# Patient Record
Sex: FEMALE | Race: WHITE | NOT HISPANIC OR LATINO | Employment: FULL TIME | URBAN - METROPOLITAN AREA
[De-identification: names, ages, dates, MRNs, and addresses within clinical notes are randomized per-mention and may not be internally consistent; named-entity substitution may affect disease eponyms.]

---

## 2017-02-16 ENCOUNTER — APPOINTMENT (OUTPATIENT)
Dept: URGENT CARE | Age: 42
End: 2017-02-16
Payer: OTHER MISCELLANEOUS

## 2017-02-16 PROCEDURE — G0382 LEV 3 HOSP TYPE B ED VISIT: HCPCS | Performed by: FAMILY MEDICINE

## 2017-02-16 PROCEDURE — 99283 EMERGENCY DEPT VISIT LOW MDM: CPT | Performed by: FAMILY MEDICINE

## 2018-01-10 NOTE — MISCELLANEOUS
Provider Comments  Provider Comments:   no show for 45 min  EDX study        Signatures   Electronically signed by : Allan Chung DO; Jun 6 2016  2:13PM EST                       (Author)

## 2018-01-10 NOTE — MISCELLANEOUS
Message   Recorded as Task   Date: 07/13/2016 01:25 PM, Created By: Lazaro Mary   Task Name: Miscellaneous   Assigned To: [facility] clinical,Team   Regarding Patient: Violetta Brown, Status: Active   Comment:    Isabella Underwood - 13 Jul 2016 1:25 PM     TASK CREATED  Caller: Self; General Medical Question; [phone] (Home); [phone] (Work)  S/w the pt  after receiving a vmlom from 0952 this am  She states she was previously seeing Dr Nancy Beltran who prescribing her tramadol and now Dr Jose Luis Whitlock does not want to order that for her  Her last povs was 4/16  I told her that FQ will prescribe the medication but that if she wanted FQ to prescribe then she would need a povs to review our prescri[ption policy and be evaluated by FQ  Reviewed narcotic contract regulations with her  Another alternative was to contact her PCP and see if they would be willing to take over prescribing  Pt  will consider the options and contact us  Just FYI  Thanks ! !   Zeb Pope - 13 Jul 2016 1:30 PM     TASK REPLIED TO: Previously Assigned To [facility],Team  md aware        Active Problems    1  Acute bronchitis (466 0) (J20 9)   2  Acute sinusitis (461 9) (J01 90)   3  ASCUS on Pap smear (796 9) (R89 6)   4  ASCUS on Pap smear (796 9) (R89 6)   5  Carpal tunnel syndrome, bilateral (354 0) (G56 01,G56 02)   6  Chronic left SI joint pain (724 6,338 29) (M53 3,G89 29)   7  Disc degeneration, lumbar (722 52) (M51 36)   8  Encounter for gynecological examination without abnormal finding (V72 31) (Z01 419)   9  Encounter for routine gynecological examination (V72 31) (Z01 419)   10  Encounter for screening mammogram for malignant neoplasm of breast (V76 12)    (Z12 31)   11  Low back pain (724 2) (M54 5)   12  Muscle spasm (728 85) (M62 838)   13  Myofascial pain syndrome (729 1) (M79 1)   14  Neck pain (723 1) (M54 2)   15  Nicotine dependence (305 1) (F17 200)   16   Pain, upper back (724 5) (M54 9)   17  Pap smear, as part of routine gynecological examination (V76 2) (Z01 419)   18  Pneumonia (486) (J18 9)   19  Postlaminectomy syndrome, lumbar (722 83) (M96 1)   20  Radicular pain of left lower extremity (724 4) (M54 10)   21  Sorethroat (462) (J02 9)   22  Visit for screening mammogram (V76 12) (Z12 31)    Current Meds   1  Methocarbamol 750 MG Oral Tablet; TAKE 1 TABLET 3 TIMES DAILY AS NEEDED FOR   MUSCLE SPASM; Therapy: 09IYZ5199 to (Evaluate:20Jan2016)  Requested for: 22Dec2015; Last   Rx:[id number] Ordered   2  TraMADol HCl - 50 MG Oral Tablet; TAKE 1 TABLET BY MOUTH EVERY 6 HOURS AS   NEEDED; Therapy: 89RMT5467 to (Last Rx:29Jun2016) Ordered   3  TraMADol HCl - 50 MG Oral Tablet; TAKE 1 TABLET EVERY 6 HOURS AS NEEDED FOR   PAIN;   Therapy: 17FGM0939 to (Evaluate:09Jyg8024); Last Rx:[id number] Ordered    Allergies    1   No Known Drug Allergies    Signatures   Electronically signed by : Kevin Lind RN; Jul 13 2016  3:22PM EST                       (Author)

## 2018-01-12 NOTE — MISCELLANEOUS
Message      Recorded as Task   Date: 04/18/2016 04:20 PM, Created By: Susan Cline   Task Name: Follow Up   Assigned To: [facility],Team   Regarding Patient: Breonna Nephew, Status: In Progress   Comment:    Steffanie Osullivan - 18 Apr 2016 4:20 PM     TASK CREATED  please let patient know xray cervical spine showed some mild arthritis  There also is some straigthening of the normal curvature of the spine   which means there are muscle spasms  She was prescribed physical therapy at last ov  She should ask that they do some massage/TENS after her sessions to help with muscle release  Elsi Kulkarni - 19 Apr 2016 8:31 AM     TASK EDITED  Left vm on home/cell for pt to c/b  Elsi Kulkarni - 19 Apr 2016 8:31 AM     TASK IN PROGRESS   Elsi Kulkarni - 19 Apr 2016 8:42 AM     TASK EDITED  Pt here today for an inj and asked to s/w me b/c I left a message for her to c/b  Pt informed of her xray results and Steffanie's rec for PT to do some massage/TENS after her sessions when she's there as outlined in the task note  Active Problems    1  Acute bronchitis (466 0) (J20 9)   2  Acute sinusitis (461 9) (J01 90)   3  ASCUS on Pap smear (796 9) (R89 6)   4  ASCUS on Pap smear (796 9) (R89 6)   5  Carpal tunnel syndrome, bilateral (354 0) (G56 01,G56 02)   6  Chronic left SI joint pain (724 6,338 29) (M53 3,G89 29)   7  Disc degeneration, lumbar (722 52) (M51 36)   8  Encounter for gynecological examination without abnormal finding (V72 31) (Z01 419)   9  Encounter for routine gynecological examination (V72 31) (Z01 419)   10  Encounter for screening mammogram for malignant neoplasm of breast (V76 12)    (Z12 31)   11  Low back pain (724 2) (M54 5)   12  Muscle spasm (728 85) (M62 838)   13  Myofascial pain syndrome (729 1) (M79 1)   14  Neck pain (723 1) (M54 2)   15  Nicotine dependence (305 1) (F17 200)   16   Pain, upper back (724 5) (M54 9)   17  Pap smear, as part of routine gynecological examination (V76 2) (Z01 419)   18  Pneumonia (486) (J18 9)   19  Postlaminectomy syndrome, lumbar (722 83) (M96 1)   20  Radicular pain of left lower extremity (724 4) (M54 10)   21  Sorethroat (462) (J02 9)   22  Visit for screening mammogram (V76 12) (Z12 31)    Current Meds   1  Methocarbamol 750 MG Oral Tablet; TAKE 1 TABLET 3 TIMES DAILY AS NEEDED FOR   MUSCLE SPASM; Therapy: 95FCL9551 to (Evaluate:20Jan2016)  Requested for: 17Uqc3943; Last   Rx:[id number] Ordered   2  TraMADol HCl - 50 MG Oral Tablet; TAKE 1 TABLET EVERY 6 HOURS AS NEEDED FOR   PAIN;   Therapy: 48MBU6614 to (Evaluate:98Tdl6905); Last Rx:[id number] Ordered    Allergies    1   No Known Drug Allergies    Signatures   Electronically signed by : Sandy Sanon, ; Apr 19 2016  8:43AM EST                       (Author)

## 2018-01-13 NOTE — RESULT NOTES
Message   Recorded as Task   Date: 07/12/2016 01:08 PM, Created By: Sandra High   Task Name: Follow Up   Assigned To: [facility],Team   Regarding Patient: Emerita Espinosa, Status: In Progress   Comment:    Danette Braun - 12 Jul 2016 1:08 PM     TASK CREATED  Caller: Self; General Medical Question; [phone] (Home); [phone] (Work)  Received VM from pt  on [facility] triage line from [phone] pm  Pt  states that she has a question about meds  Pt  requesting c/b at [phone]  Imelda Mittal - 12 Jul 2016 3:00 PM     TASK IN PROGRESS   Imelda Mittal - 12 Jul 2016 3:02 PM     TASK REPLIED TO: Previously Assigned To STONE[facility] ramirez,Team  Lmom for pt to return call  Isabella Underwood - 21 Jul 2016 8:40 AM     TASK EDITED  Attempted to call the pt  and LMOM to CB          Signatures   Electronically signed by : Lelia Storm, ; Jul 21 2016  1:39PM EST                       (Author)

## 2018-01-13 NOTE — MISCELLANEOUS
Message   Recorded as Task   Date: 03/17/2016 02:55 PM, Created By: Earline Ba   Task Name: Follow Up   Assigned To: [facility] clerical,Team   Regarding Patient: Richar uTrpin, Status: In Progress   Comment:    Vandana Wells - 17 Mar 2016 2:55 PM     TASK CREATED  By any chance would there by another CPT code used for the Left S1-S3 LBB (the CPT code I have is the 64450 x 3) - received call from patient's insurance and they are stating it is not a billable CPT code for the procedure we are requesting and they did not have a comparable code to offer - please advise   Jojo Reed - 17 Mar 2016 3:02 PM     TASK REPLIED TO: Previously Assigned To Jojo Reed  sacral 1-3 lateral branches are peripheral nerves and code 87285 is for peripheral nerve block   Vandana Wells - 17 Mar 2016 4:40 PM     TASK EDITED  S/W Sindy Lombardi of [employer] stated the CPT code 50642 can not be used for procedure and will be denied for coding issue - also stated it will most likely be denied anyway due to the fact that anything anything below S1 is considered investigational and experimental - she also stated the L4-L5 MBB will most likely be denied as well due to patient had fusions at the L4-L5, L5-S1 levels   Elias Pemberton - 18 Mar 2016 8:41 AM     TASK REPLIED TO: Previously Assigned To Jojo Reed md aware    please let pt know unable to do procedure   Vandana Wells - 18 Mar 2016 9:09 AM     TASK EDITED  Left message for patient to call back - procedure is being cancelled due to insurance will not approve - per Dr Yazmin Beltran patient can be scheduled for an ov to discuss other treatment options or can follow up with Dr Rafaela Waterman (will discuss when patient returns call)   Earline Ba - 18 Mar 2016 9:09 AM     TASK IN [address] [place] [address] - 18 Mar 2016 10:24 AM     TASK EDITED  Received message from Arlyn Schirmer (3/17/16 at 4:35 pm) CPT 41217 Left L4-L5 MBB was denied - can do peer to peer by calling [phone]    Also received call from Nehemiah Vivar stated can approve the CPT 35 66 48 x 3 Left S1-S3 LBB but to make the doctor aware the RFA will be denied   Tamera Haines - 21 Mar 2016 11:23 AM     TASK EDITED  S/W patient - made patient aware insurance did not authorize procedure - per Dr Toñito Norman - patient can be scheduled for a ov to discuss other treatment options or can follow up with Dr Rola Rivero - per patient she would like to be scheduled for an OV with Dr Toñito Norman and she will also follow up with Dr Rola Rivero - forwarding task to clerical team to call patient to schedule iTto Rashid - 21 Mar 2016 11:24 AM     TASK EDITED  Per Dr Toñito Norman please schedule patient for OV to discuss other treatment options - patient aware and expecting a call at [phone]   [facility] - 25 Mar 2016 9:32 AM     TASK EDITED  LMOM FOR PT TO C/B   Caridad Melvin - 05 Apr 2016 3:54 PM     TASK EDITED  PT WAS SEE YESTERDAY        Active Problems    1  Acute bronchitis (466 0) (J20 9)   2  Acute sinusitis (461 9) (J01 90)   3  ASCUS on Pap smear (796 9) (R89 6)   4  ASCUS on Pap smear (796 9) (R89 6)   5  Carpal tunnel syndrome, bilateral (354 0) (G56 01,G56 02)   6  Chronic left SI joint pain (724 6,338 29) (M53 3,G89 29)   7  Disc degeneration, lumbar (722 52) (M51 36)   8  Encounter for gynecological examination without abnormal finding (V72 31) (Z01 419)   9  Encounter for routine gynecological examination (V72 31) (Z01 419)   10  Encounter for screening mammogram for malignant neoplasm of breast (V76 12)    (Z12 31)   11  Low back pain (724 2) (M54 5)   12  Muscle spasm (728 85) (M62 838)   13  Myofascial pain syndrome (729 1) (M79 1)   14  Neck pain (723 1) (M54 2)   15  Nicotine dependence (305 1) (F17 200)   16  Pain, upper back (724 5) (M54 9)   17  Pap smear, as part of routine gynecological examination (V76 2) (Z01 419)   18  Pneumonia (486) (J18 9)   19  Postlaminectomy syndrome, lumbar (592 83) (M96 1)   20   Radicular pain of left lower extremity (724 4) (M54 10)   21  Sorethroat (462) (J02 9)   22  Visit for screening mammogram (V76 12) (Z12 31)    Current Meds   1  Methocarbamol 750 MG Oral Tablet; TAKE 1 TABLET 3 TIMES DAILY AS NEEDED FOR   MUSCLE SPASM; Therapy: 09QJT4712 to (Evaluate:20Jan2016)  Requested for: 96Jlo3926; Last   Rx:[id number] Ordered   2  TraMADol HCl - 50 MG Oral Tablet; TAKE 1 TABLET EVERY 6 HOURS AS NEEDED FOR   PAIN;   Therapy: 25SMR3203 to (Evaluate:18Mar2016); Last Rx:[id number] Ordered    Allergies    1  No Known Drug Allergies    Signatures   Electronically signed by :  Ysasine Stahl, ; Apr 5 2016  3:54PM EST                       (Author)

## 2018-01-15 NOTE — MISCELLANEOUS
Message   Recorded as Task   Date: 04/25/2016 03:36 PM, Created By: Tristan Aiken   Task Name: Follow Up   Assigned To: [address] ,Team   Regarding Patient: Dorcas Meneses, Status: In Progress   Comment:    Samra Shepard - 25 Apr 2016 3:36 PM     TASK CREATED  Pt is S/P CAUDAL BILLIE on 4/19/16 by Dr Davis Greek   No f/u scheduled   Samra Shepard - 26 Apr 2016 10:15 AM     TASK EDITED  1st attempt to reach pt  LM for return call  Samra Shepard - 28 Apr 2016 11:26 AM     TASK EDITED  2nd attempt to reach pt  LM for return call  Samra Shepard - 98 May 2016 11:00 AM     TASK REASSIGNED: Previously Assigned To [facility],Team  Please send a cannot reach you letter  Letter Sent  Active Problems    1  Acute bronchitis (466 0) (J20 9)   2  Acute sinusitis (461 9) (J01 90)   3  ASCUS on Pap smear (796 9) (R89 6)   4  ASCUS on Pap smear (796 9) (R89 6)   5  Carpal tunnel syndrome, bilateral (354 0) (G56 01,G56 02)   6  Chronic left SI joint pain (724 6,338 29) (M53 3,G89 29)   7  Disc degeneration, lumbar (722 52) (M51 36)   8  Encounter for gynecological examination without abnormal finding (V72 31) (Z01 419)   9  Encounter for routine gynecological examination (V72 31) (Z01 419)   10  Encounter for screening mammogram for malignant neoplasm of breast (V76 12)    (Z12 31)   11  Low back pain (724 2) (M54 5)   12  Muscle spasm (728 85) (M62 838)   13  Myofascial pain syndrome (729 1) (M79 1)   14  Neck pain (723 1) (M54 2)   15  Nicotine dependence (305 1) (F17 200)   16  Pain, upper back (724 5) (M54 9)   17  Pap smear, as part of routine gynecological examination (V76 2) (Z01 419)   18  Pneumonia (486) (J18 9)   19  Postlaminectomy syndrome, lumbar (722 83) (M96 1)   20  Radicular pain of left lower extremity (724 4) (M54 10)   21  Sorethroat (462) (J02 9)   22  Visit for screening mammogram (V76 12) (Z12 31)    Current Meds   1   Methocarbamol 750 MG Oral Tablet; TAKE 1 TABLET 3 TIMES DAILY AS NEEDED FOR   MUSCLE SPASM; Therapy: 43WUP4431 to (Evaluate:20Jan2016)  Requested for: 21Fks9971; Last   Rx:[id number] Ordered   2  TraMADol HCl - 50 MG Oral Tablet; TAKE 1 TABLET EVERY 4 TO 6 HOURS AS NEEDED   FOR PAIN;   Therapy: 04RFI4870 to (Evaluate:10May2016); Last CF:[id number] Ordered   3  TraMADol HCl - 50 MG Oral Tablet; TAKE 1 TABLET EVERY 6 HOURS AS NEEDED FOR   PAIN;   Therapy: 40EEI8328 to (Evaluate:44Cse5925); Last Rx:[id number] Ordered    Allergies    1   No Known Drug Allergies    Signatures   Electronically signed by : [facility], ; May 11 2016 11:18AM EST                       (Author)

## 2018-01-17 NOTE — RESULT NOTES
Message   Recorded as Task   Date: 03/03/2016 10:33 AM, Created By: Nicolasa Bowen   Task Name: Miscellaneous   Assigned To: Eduardo Abreu   Regarding Patient: Natalia Penaloza, Status: Active   CommentDonna Costello - 03 Mar 2016 10:33 AM     TASK CREATED  Caller: Self; [phone] (Home); [phone] (Work)  Pt called she is being re ref by   [facility]  She said she would like to have the RFA done  She wanted me to see if she could just have that done w/o coming in for a follow up first  I told her I would ask  Wasn't sure how to handle it she was last seen in Dec  Please advise  Thank you  Joelle Holt - 73 Mar 2016 11:53 AM     TASK REPLIED TO: Previously Assigned To [facility],Team  she needs to be schedule for nerve block to SIJ before RFA can be done    please schedule left L4-5 MBB, left S1-s3 LBB (Dx M47 816, M46 1, M 54 5) CPT 18827, 78204 x 3   Danette Braun - 03 Mar 2016 11:56 AM     TASK REASSIGNED: Previously Assigned To STONEY Bender - 03 Mar 2016 4:58 PM     TASK REASSIGNED: Previously Assigned To [address] surgery sched,Team   Vandana Wells - 07 Mar 2016 3:11 PM     TASK EDITED  S/W patient - patient scheduled for Tuesday, March 22 at [facility] with Dr Jeannie Castillo - patient advised nothing to eat or drink 1 hour prior to procedure but encouraged to have a light lunch - patient denies any blood thinners/abx's - patient also advised to arrange for  - patient aware and agreed        Signatures   Electronically signed by : Boo Cleveland, ; Mar  7 2016  3:11PM EST                       (Author)

## 2020-01-19 ENCOUNTER — HOSPITAL ENCOUNTER (EMERGENCY)
Facility: HOSPITAL | Age: 45
Discharge: HOME | End: 2020-01-19
Attending: EMERGENCY MEDICINE
Payer: COMMERCIAL

## 2020-01-19 VITALS
RESPIRATION RATE: 18 BRPM | DIASTOLIC BLOOD PRESSURE: 87 MMHG | TEMPERATURE: 97.2 F | HEART RATE: 89 BPM | SYSTOLIC BLOOD PRESSURE: 151 MMHG | OXYGEN SATURATION: 99 %

## 2020-01-19 DIAGNOSIS — S61.211A LACERATION OF LEFT INDEX FINGER WITHOUT FOREIGN BODY WITHOUT DAMAGE TO NAIL, INITIAL ENCOUNTER: Primary | ICD-10-CM

## 2020-01-19 PROCEDURE — 99282 EMERGENCY DEPT VISIT SF MDM: CPT

## 2020-01-19 PROCEDURE — 90715 TDAP VACCINE 7 YRS/> IM: CPT | Performed by: STUDENT IN AN ORGANIZED HEALTH CARE EDUCATION/TRAINING PROGRAM

## 2020-01-19 PROCEDURE — 12001 RPR S/N/AX/GEN/TRNK 2.5CM/<: CPT | Performed by: EMERGENCY MEDICINE

## 2020-01-19 PROCEDURE — 99284 EMERGENCY DEPT VISIT MOD MDM: CPT | Performed by: EMERGENCY MEDICINE

## 2020-01-19 PROCEDURE — 90471 IMMUNIZATION ADMIN: CPT

## 2020-01-19 RX ORDER — LIDOCAINE HYDROCHLORIDE 10 MG/ML
5 INJECTION, SOLUTION EPIDURAL; INFILTRATION; INTRACAUDAL; PERINEURAL ONCE
Status: COMPLETED | OUTPATIENT
Start: 2020-01-19 | End: 2020-01-19

## 2020-01-19 RX ORDER — BACITRACIN, NEOMYCIN, POLYMYXIN B 400; 3.5; 5 [USP'U]/G; MG/G; [USP'U]/G
1 OINTMENT TOPICAL ONCE
Status: DISCONTINUED | OUTPATIENT
Start: 2020-01-19 | End: 2020-01-19 | Stop reason: HOSPADM

## 2020-01-19 RX ADMIN — LIDOCAINE HYDROCHLORIDE 5 ML: 10 INJECTION, SOLUTION EPIDURAL; INFILTRATION; INTRACAUDAL; PERINEURAL at 04:28

## 2020-01-19 RX ADMIN — TETANUS TOXOID, REDUCED DIPHTHERIA TOXOID AND ACELLULAR PERTUSSIS VACCINE, ADSORBED 0.5 ML: 5; 2.5; 8; 8; 2.5 SUSPENSION INTRAMUSCULAR at 04:28

## 2020-01-19 NOTE — ED ATTENDING ATTESTATION
1/19/2020  IMoshe MD, saw and evaluated the patient  I have discussed the patient with the resident/non-physician practitioner and agree with the resident's/non-physician practitioner's findings, Plan of Care, and MDM as documented in the resident's/non-physician practitioner's note, except where noted  All available labs and Radiology studies were reviewed  I was present for key portions of any procedure(s) performed by the resident/non-physician practitioner and I was immediately available to provide assistance  At this point I agree with the current assessment done in the Emergency Department  I have conducted an independent evaluation of this patient a history and physical is as follows:    ED Course      Emergency Department Note- Chip Fitzpatrick 53 y o  female MRN: [id number]    Unit/Bed#: QCE Encounter: [id number]    Juanjose Sanchez is a 53 y o  female who presents with   Chief Complaint   Patient presents with    Finger Laceration     left pointer finger laceration from          History of Present Illness   HPI:  Juanjose Sanchez is a 53 y o  female who presents for evaluation of:  Left index finger laceration  Patient lacerated finger with woodcarving knife  Patient cannot remember last tetanus vaccination  Review of Systems   Constitutional: Negative for chills and fever  Musculoskeletal: Negative for back pain and neck pain  Neurological: Negative for dizziness and light-headedness  All other systems reviewed and are negative  Historical Information   History reviewed  No pertinent past medical history    Past Surgical History:   Procedure Laterality Date    BACK SURGERY       Social History   Social History     Substance and Sexual Activity   Alcohol Use Yes     Social History     Substance and Sexual Activity   Drug Use No     Social History     Tobacco Use   Smoking Status Current Every Day Smoker    Packs/day: 1 00    Types: Cigarettes     Family History: non-contributory    Meds/Allergies   all medications and allergies reviewed  No Known Allergies    Objective   First Vitals:   Blood Pressure: 151/87 (20 0350)  Pulse: 89 (20 0350)  Temperature: (!) 97 2 °F (36 2 °C) (20 035)  Temp Source: Oral (20 035)  Respirations: 18 (20)  SpO2: 99 % (20)    Current Vitals:   Blood Pressure: 151/87 (20 0350)  Pulse: 89 (200)  Temperature: (!) 97 2 °F (36 2 °C) (20)  Temp Source: Oral (20)  Respirations: 18 (20)  SpO2: 99 % (20)    No intake or output data in the 24 hours ending 20 0402    Invasive Devices     None                 Physical Exam   Constitutional: She appears well-developed and well-nourished  HENT:   Head: Normocephalic and atraumatic  Neck: Normal range of motion  Neck supple  Musculoskeletal: Normal range of motion  She exhibits no deformity  Neurological: She is alert  Skin: Skin is warm and dry  Capillary refill takes less than 2 seconds  Psychiatric: She has a normal mood and affect  Her behavior is normal  Judgment and thought content normal    Nursing note and vitals reviewed  52 yo F presents in NAD    Medical Decision Makin  Acute 2nd finger laceration: plan to suture wound closed    No results found for this or any previous visit (from the past 36 hour(s))  No orders to display         Portions of the record may have been created with voice recognition software  Occasional wrong word or "sound a like" substitutions may have occurred due to the inherent limitations of voice recognition software  Read the chart carefully and recognize, using context, where substitutions have occurred            Critical Care Time  Procedures

## 2020-01-19 NOTE — ED PROVIDER NOTES
History  Chief Complaint   Patient presents with    Finger Laceration     left pointer finger laceration from      This is a 47-year-old right-hand-dominant female with no significant past medical history who presents to the emergency department this morning with a laceration to the flexural surface of her left index finger  Patient states that she was up working on some woodworking projects with her  this morning when the knife in her right hand accidentally slipped off the wood and into her right finger  She immediately applied pressure and came to the emergency department for repair  Patient does not remember when her last tetanus shot was and is willing to get a booster today  Prior to Admission Medications   Prescriptions Last Dose Informant Patient Reported? Taking?   naproxen (EC NAPROSYN) 500 MG EC tablet   No No   Sig: Take 1 tablet by mouth 2 (two) times a day with meals for 5 days  Facility-Administered Medications: None       History reviewed  No pertinent past medical history  Past Surgical History:   Procedure Laterality Date    BACK SURGERY         History reviewed  No pertinent family history  I have reviewed and agree with the history as documented  Social History     Tobacco Use    Smoking status: Current Every Day Smoker     Packs/day: 1 00     Types: Cigarettes   Substance Use Topics    Alcohol use: Yes    Drug use: No        Review of Systems   Constitutional: Negative for chills, fatigue and fever  HENT: Negative for congestion, rhinorrhea, sinus pressure and sore throat  Eyes: Negative for visual disturbance  Respiratory: Negative for cough and shortness of breath  Cardiovascular: Negative for chest pain  Gastrointestinal: Negative for abdominal pain, constipation, diarrhea, nausea and vomiting  Genitourinary: Negative for dysuria, frequency, hematuria and urgency  Musculoskeletal: Negative for arthralgias and myalgias     Skin: Positive for wound  Negative for color change and rash  Neurological: Negative for dizziness, light-headedness and numbness  Physical Exam  ED Triage Vitals [01/19/20 0350]   Temperature Pulse Respirations Blood Pressure SpO2   (!) 97 2 °F (36 2 °C) 89 18 151/87 99 %      Temp Source Heart Rate Source Patient Position - Orthostatic VS BP Location FiO2 (%)   Oral Monitor -- Right arm --      Pain Score       --             Orthostatic Vital Signs  Vitals:    01/19/20 0350   BP: 151/87   Pulse: 89       Physical Exam   Constitutional: She is oriented to person, place, and time  She appears well-developed and well-nourished  No distress  HENT:   Head: Normocephalic and atraumatic  Eyes: Pupils are equal, round, and reactive to light  Conjunctivae and EOM are normal  Right eye exhibits no discharge  Left eye exhibits no discharge  No scleral icterus  Neck: Normal range of motion  Neck supple  No JVD present  Cardiovascular: Normal rate, regular rhythm and normal heart sounds  Exam reveals no gallop and no friction rub  No murmur heard  Pulmonary/Chest: Effort normal and breath sounds normal  No stridor  No respiratory distress  She has no wheezes  She has no rales  Abdominal: Soft  Bowel sounds are normal  She exhibits no distension  There is no tenderness  There is no guarding  Musculoskeletal: Normal range of motion  She exhibits no edema, tenderness or deformity  Neurological: She is alert and oriented to person, place, and time  No cranial nerve deficit or sensory deficit  She exhibits normal muscle tone  Skin: Skin is warm and dry  No rash noted  She is not diaphoretic  No erythema  No pallor  2 cm laceration to the flexural surface of the proximal phalanx of the left index finger  See attached photo  On examination after anesthesia the patient's wound was explored through the full range of motion and her flexor tendon appear to be untouched    The laceration only extended into the subcutaneous fat  Psychiatric: She has a normal mood and affect  Her behavior is normal    Nursing note and vitals reviewed  ED Medications  Medications   neomycin-bacitracin-polymyxin b (NEOSPORIN) ointment 1 small application (has no administration in time range)   lidocaine (PF) (XYLOCAINE-MPF) 1 % injection 5 mL (5 mL Infiltration Given 1/19/20 0428)   tetanus-diphtheria-acellular pertussis (BOOSTRIX) IM injection 0 5 mL (0 5 mL Intramuscular Given 1/19/20 0428)       Diagnostic Studies  Results Reviewed     None                 No orders to display         Procedures  Laceration repair  Date/Time: 1/19/2020 5:26 AM  Performed by: Anthony Reyes MD  Authorized by: Anthony Reyes MD   Consent: Verbal consent obtained  Risks and benefits: risks, benefits and alternatives were discussed  Consent given by: patient  Patient identity confirmed: verbally with patient  Body area: upper extremity  Location details: left index finger  Laceration length: 2 cm  Foreign bodies: no foreign bodies  Tendon involvement: none  Nerve involvement: none  Vascular damage: no  Anesthesia: local infiltration and digital block    Anesthesia:  Local Anesthetic: lidocaine 1% without epinephrine  Anesthetic total: 2 mL    Sedation:  Patient sedated: no      Wound Dehiscence:  Superficial Wound Dehiscence: simple closure      Procedure Details:  Preparation: Patient was prepped and draped in the usual sterile fashion    Amount of cleaning: standard  Debridement: none  Degree of undermining: none  Skin closure: 4-0 nylon  Number of sutures: 4  Technique: simple  Approximation: close  Approximation difficulty: simple  Dressing: antibiotic ointment and gauze roll  Patient tolerance: Patient tolerated the procedure well with no immediate complications            ED Course                               MDM  Number of Diagnoses or Management Options  Laceration of left index finger without foreign body without damage to nail, initial encounter:   Diagnosis management comments: Patient tolerated the laceration repair without any difficulties according to the procedure note above  She was given her tetanus booster here in the emergency department  The laceration repair was dressed with antibiotic ointment and gauze  Patient was instructed to return to their family physician in 5-7 days for suture removal or return to the emergency department sooner for any worsening or otherwise concerning symptoms  Disposition  Final diagnoses:   Laceration of left index finger without foreign body without damage to nail, initial encounter     Time reflects when diagnosis was documented in both MDM as applicable and the Disposition within this note     Time User Action Codes Description Comment    1/19/2020  4:53 AM Jai Rodarte Add [P47 [id number]] Laceration of left index finger without foreign body without damage to nail, initial encounter       ED Disposition     ED Disposition Condition Date/Time Comment    Discharge Stable Sun Jan 19, 2020  4:53 AM Puja Fitzpatrick discharge to home/self care  Follow-up Information     Follow up With Specialties Details Why Contact Info [facility], [address] [place] [address] Emergency Department Emergency Medicine  If symptoms worsen [address]  [phone]  ED, [address] [place] [address], [id number]   [phone]          Discharge Medication List as of 1/19/2020  4:54 AM      CONTINUE these medications which have NOT CHANGED    Details   naproxen (EC NAPROSYN) 500 MG EC tablet Take 1 tablet by mouth 2 (two) times a day with meals for 5 days  , Starting 9/24/2016, Until Thu 9/29/16, Print           No discharge procedures on file  ED Provider  Attending physically available and evaluated Rosalinda Mariscal I managed the patient along with the ED Attending      Electronically Signed by         Benjamin Rubin MD  01/19/20 7999

## 2020-01-19 NOTE — DISCHARGE INSTRUCTIONS
Follow-up in 5-7 days with her primary care physician for suture removal   Return to the emergency department sooner for any worsening or otherwise concerning symptoms

## 2020-06-02 ENCOUNTER — CONSULT (OUTPATIENT)
Dept: SURGERY | Facility: CLINIC | Age: 45
End: 2020-06-02
Payer: COMMERCIAL

## 2020-06-02 VITALS
SYSTOLIC BLOOD PRESSURE: 110 MMHG | DIASTOLIC BLOOD PRESSURE: 60 MMHG | HEART RATE: 74 BPM | WEIGHT: 138.2 LBS | BODY MASS INDEX: 27.13 KG/M2 | TEMPERATURE: 97.4 F | HEIGHT: 60 IN

## 2020-06-02 DIAGNOSIS — R19.4 BOWEL HABIT CHANGES: Primary | ICD-10-CM

## 2020-06-02 DIAGNOSIS — K43.2 VENTRAL INCISIONAL HERNIA: ICD-10-CM

## 2020-06-02 PROCEDURE — 99243 OFF/OP CNSLTJ NEW/EST LOW 30: CPT | Performed by: PHYSICIAN ASSISTANT

## 2020-06-08 ENCOUNTER — HOSPITAL ENCOUNTER (OUTPATIENT)
Dept: RADIOLOGY | Facility: HOSPITAL | Age: 45
Discharge: HOME | End: 2020-06-08
Payer: COMMERCIAL

## 2020-06-08 ENCOUNTER — TRANSCRIBE ORDERS (OUTPATIENT)
Dept: RADIOLOGY | Facility: HOSPITAL | Age: 45
End: 2020-06-08

## 2020-06-08 DIAGNOSIS — R19.4 BOWEL HABIT CHANGES: ICD-10-CM

## 2020-06-08 DIAGNOSIS — K43.2 VENTRAL INCISIONAL HERNIA: ICD-10-CM

## 2020-06-08 DIAGNOSIS — R93.89 ABNORMAL CT SCAN: Primary | ICD-10-CM

## 2020-06-08 PROCEDURE — 74177 CT ABD & PELVIS W/CONTRAST: CPT

## 2020-06-08 RX ADMIN — IOHEXOL 100 ML: 350 INJECTION, SOLUTION INTRAVENOUS at 18:22

## 2020-06-09 DIAGNOSIS — R19.4 BOWEL HABIT CHANGES: ICD-10-CM

## 2020-06-09 PROCEDURE — U0003 INFECTIOUS AGENT DETECTION BY NUCLEIC ACID (DNA OR RNA); SEVERE ACUTE RESPIRATORY SYNDROME CORONAVIRUS 2 (SARS-COV-2) (CORONAVIRUS DISEASE [COVID-19]), AMPLIFIED PROBE TECHNIQUE, MAKING USE OF HIGH THROUGHPUT TECHNOLOGIES AS DESCRIBED BY CMS-2020-01-R: HCPCS

## 2020-06-10 ENCOUNTER — TELEPHONE (OUTPATIENT)
Dept: SURGERY | Facility: CLINIC | Age: 45
End: 2020-06-10

## 2020-06-10 ENCOUNTER — HOSPITAL ENCOUNTER (OUTPATIENT)
Dept: CT IMAGING | Facility: HOSPITAL | Age: 45
Discharge: HOME | End: 2020-06-10
Attending: SURGERY
Payer: COMMERCIAL

## 2020-06-10 ENCOUNTER — TRANSCRIBE ORDERS (OUTPATIENT)
Dept: SURGERY | Facility: CLINIC | Age: 45
End: 2020-06-10

## 2020-06-10 DIAGNOSIS — R93.89 ABNORMAL CT SCAN: ICD-10-CM

## 2020-06-10 DIAGNOSIS — R16.0 LIVER MASS: Primary | ICD-10-CM

## 2020-06-10 LAB — SARS-COV-2 RNA SPEC QL NAA+PROBE: NOT DETECTED

## 2020-06-10 PROCEDURE — 71260 CT THORAX DX C+: CPT

## 2020-06-10 RX ADMIN — IOHEXOL 85 ML: 350 INJECTION, SOLUTION INTRAVENOUS at 09:36

## 2020-06-11 ENCOUNTER — ANESTHESIA EVENT (OUTPATIENT)
Dept: GASTROENTEROLOGY | Facility: HOSPITAL | Age: 45
End: 2020-06-11

## 2020-06-11 ENCOUNTER — OFFICE VISIT (OUTPATIENT)
Dept: SURGERY | Facility: CLINIC | Age: 45
End: 2020-06-11
Payer: COMMERCIAL

## 2020-06-11 VITALS
HEIGHT: 60 IN | WEIGHT: 131.2 LBS | TEMPERATURE: 97.1 F | HEART RATE: 92 BPM | DIASTOLIC BLOOD PRESSURE: 60 MMHG | SYSTOLIC BLOOD PRESSURE: 100 MMHG | BODY MASS INDEX: 25.76 KG/M2

## 2020-06-11 DIAGNOSIS — C79.9 METASTATIC DISEASE (HCC): ICD-10-CM

## 2020-06-11 DIAGNOSIS — R93.89 ABNORMAL CT SCAN: Primary | ICD-10-CM

## 2020-06-11 PROCEDURE — 99215 OFFICE O/P EST HI 40 MIN: CPT | Performed by: SURGERY

## 2020-06-11 RX ORDER — SODIUM CHLORIDE, SODIUM LACTATE, POTASSIUM CHLORIDE, CALCIUM CHLORIDE 600; 310; 30; 20 MG/100ML; MG/100ML; MG/100ML; MG/100ML
125 INJECTION, SOLUTION INTRAVENOUS CONTINUOUS
Status: CANCELLED | OUTPATIENT
Start: 2020-06-11

## 2020-06-12 ENCOUNTER — TRANSCRIBE ORDERS (OUTPATIENT)
Dept: ADMINISTRATIVE | Facility: HOSPITAL | Age: 45
End: 2020-06-12

## 2020-06-12 ENCOUNTER — ANESTHESIA (OUTPATIENT)
Dept: GASTROENTEROLOGY | Facility: HOSPITAL | Age: 45
End: 2020-06-12

## 2020-06-12 ENCOUNTER — LAB (OUTPATIENT)
Dept: LAB | Facility: HOSPITAL | Age: 45
End: 2020-06-12
Payer: COMMERCIAL

## 2020-06-12 ENCOUNTER — TRANSCRIBE ORDERS (OUTPATIENT)
Dept: LAB | Facility: HOSPITAL | Age: 45
End: 2020-06-12

## 2020-06-12 ENCOUNTER — HOSPITAL ENCOUNTER (OUTPATIENT)
Dept: GASTROENTEROLOGY | Facility: HOSPITAL | Age: 45
Setting detail: OUTPATIENT SURGERY
Discharge: HOME | End: 2020-06-12
Attending: SURGERY | Admitting: SURGERY
Payer: COMMERCIAL

## 2020-06-12 VITALS
BODY MASS INDEX: 23.75 KG/M2 | RESPIRATION RATE: 18 BRPM | SYSTOLIC BLOOD PRESSURE: 99 MMHG | HEART RATE: 84 BPM | TEMPERATURE: 98.4 F | HEIGHT: 60 IN | OXYGEN SATURATION: 99 % | DIASTOLIC BLOOD PRESSURE: 62 MMHG | WEIGHT: 121 LBS

## 2020-06-12 DIAGNOSIS — R10.9 ABDOMINAL PAIN: ICD-10-CM

## 2020-06-12 DIAGNOSIS — C18.0 MALIGNANT NEOPLASM OF CECUM (HCC): ICD-10-CM

## 2020-06-12 DIAGNOSIS — R19.4 BOWEL HABIT CHANGES: ICD-10-CM

## 2020-06-12 DIAGNOSIS — C18.0 MALIGNANT NEOPLASM OF CECUM (HCC): Primary | ICD-10-CM

## 2020-06-12 DIAGNOSIS — C20 RECTAL CANCER (HCC): Primary | ICD-10-CM

## 2020-06-12 DIAGNOSIS — R93.89 ABNORMAL CT SCAN: ICD-10-CM

## 2020-06-12 DIAGNOSIS — C79.9 METASTATIC DISEASE (HCC): Primary | ICD-10-CM

## 2020-06-12 DIAGNOSIS — K43.2 VENTRAL INCISIONAL HERNIA: ICD-10-CM

## 2020-06-12 LAB
ABO GROUP BLD: NORMAL
ALBUMIN SERPL BCP-MCNC: 2.3 G/DL (ref 3.5–5)
ALP SERPL-CCNC: 139 U/L (ref 46–116)
ALT SERPL W P-5'-P-CCNC: 18 U/L (ref 12–78)
ANION GAP SERPL CALCULATED.3IONS-SCNC: 9 MMOL/L (ref 4–13)
AST SERPL W P-5'-P-CCNC: 37 U/L (ref 5–45)
BASOPHILS # BLD AUTO: 0.06 THOUSANDS/ΜL (ref 0–0.1)
BASOPHILS NFR BLD AUTO: 0 % (ref 0–1)
BILIRUB SERPL-MCNC: 0.19 MG/DL (ref 0.2–1)
BLD GP AB SCN SERPL QL: NEGATIVE
BUN SERPL-MCNC: 7 MG/DL (ref 5–25)
CALCIUM SERPL-MCNC: 8.2 MG/DL (ref 8.3–10.1)
CANCER AG125 SERPL-ACNC: 32.5 U/ML (ref 0–30)
CEA SERPL-MCNC: 19.8 NG/ML (ref 0–3)
CHLORIDE SERPL-SCNC: 98 MMOL/L (ref 100–108)
CO2 SERPL-SCNC: 26 MMOL/L (ref 21–32)
CREAT SERPL-MCNC: 0.61 MG/DL (ref 0.6–1.3)
EOSINOPHIL # BLD AUTO: 0.32 THOUSAND/ΜL (ref 0–0.61)
EOSINOPHIL NFR BLD AUTO: 2 % (ref 0–6)
ERYTHROCYTE [DISTWIDTH] IN BLOOD BY AUTOMATED COUNT: 16.9 % (ref 11.6–15.1)
GFR SERPL CREATININE-BSD FRML MDRD: 109 ML/MIN/1.73SQ M
GLUCOSE P FAST SERPL-MCNC: 188 MG/DL (ref 65–99)
HBV SURFACE AB SER-ACNC: >1000 MIU/ML
HBV SURFACE AG SER QL: NORMAL
HCT VFR BLD AUTO: 29.4 % (ref 34.8–46.1)
HCV AB SER QL: NORMAL
HGB BLD-MCNC: 8.8 G/DL (ref 11.5–15.4)
IMM GRANULOCYTES # BLD AUTO: 0.06 THOUSAND/UL (ref 0–0.2)
IMM GRANULOCYTES NFR BLD AUTO: 0 % (ref 0–2)
INR PPP: 1.19 (ref 0.84–1.19)
LYMPHOCYTES # BLD AUTO: 1.92 THOUSANDS/ΜL (ref 0.6–4.47)
LYMPHOCYTES NFR BLD AUTO: 14 % (ref 14–44)
MCH RBC QN AUTO: 22.4 PG (ref 26.8–34.3)
MCHC RBC AUTO-ENTMCNC: 29.9 G/DL (ref 31.4–37.4)
MCV RBC AUTO: 75 FL (ref 82–98)
MONOCYTES # BLD AUTO: 0.84 THOUSAND/ΜL (ref 0.17–1.22)
MONOCYTES NFR BLD AUTO: 6 % (ref 4–12)
NEUTROPHILS # BLD AUTO: 10.26 THOUSANDS/ΜL (ref 1.85–7.62)
NEUTS SEG NFR BLD AUTO: 78 % (ref 43–75)
NRBC BLD AUTO-RTO: 0 /100 WBCS
PLATELET # BLD AUTO: 501 THOUSANDS/UL (ref 149–390)
PMV BLD AUTO: 9.9 FL (ref 8.9–12.7)
POTASSIUM SERPL-SCNC: 2.9 MMOL/L (ref 3.5–5.3)
PROT SERPL-MCNC: 7 G/DL (ref 6.4–8.2)
PROTHROMBIN TIME: 14.7 SECONDS (ref 11.6–14.5)
RBC # BLD AUTO: 3.93 MILLION/UL (ref 3.81–5.12)
RH BLD: POSITIVE
SODIUM SERPL-SCNC: 133 MMOL/L (ref 136–145)
SPECIMEN EXPIRATION DATE: NORMAL
WBC # BLD AUTO: 13.46 THOUSAND/UL (ref 4.31–10.16)

## 2020-06-12 PROCEDURE — 88305 TISSUE EXAM BY PATHOLOGIST: CPT | Performed by: PATHOLOGY

## 2020-06-12 PROCEDURE — 87340 HEPATITIS B SURFACE AG IA: CPT

## 2020-06-12 PROCEDURE — 85610 PROTHROMBIN TIME: CPT

## 2020-06-12 PROCEDURE — 88341 IMHCHEM/IMCYTCHM EA ADD ANTB: CPT | Performed by: PATHOLOGY

## 2020-06-12 PROCEDURE — 86301 IMMUNOASSAY TUMOR CA 19-9: CPT

## 2020-06-12 PROCEDURE — 86304 IMMUNOASSAY TUMOR CA 125: CPT

## 2020-06-12 PROCEDURE — 86901 BLOOD TYPING SEROLOGIC RH(D): CPT

## 2020-06-12 PROCEDURE — 85025 COMPLETE CBC W/AUTO DIFF WBC: CPT

## 2020-06-12 PROCEDURE — 86803 HEPATITIS C AB TEST: CPT

## 2020-06-12 PROCEDURE — 45380 COLONOSCOPY AND BIOPSY: CPT | Performed by: SURGERY

## 2020-06-12 PROCEDURE — 81232 DPYD GENE COMMON VARIANTS: CPT

## 2020-06-12 PROCEDURE — 86900 BLOOD TYPING SEROLOGIC ABO: CPT

## 2020-06-12 PROCEDURE — 88342 IMHCHEM/IMCYTCHM 1ST ANTB: CPT | Performed by: PATHOLOGY

## 2020-06-12 PROCEDURE — 86850 RBC ANTIBODY SCREEN: CPT

## 2020-06-12 PROCEDURE — 43239 EGD BIOPSY SINGLE/MULTIPLE: CPT | Performed by: SURGERY

## 2020-06-12 PROCEDURE — 82378 CARCINOEMBRYONIC ANTIGEN: CPT

## 2020-06-12 PROCEDURE — 80053 COMPREHEN METABOLIC PANEL: CPT

## 2020-06-12 PROCEDURE — 86706 HEP B SURFACE ANTIBODY: CPT

## 2020-06-12 PROCEDURE — 81350 UGT1A1 GENE COMMON VARIANTS: CPT

## 2020-06-12 PROCEDURE — 36415 COLL VENOUS BLD VENIPUNCTURE: CPT

## 2020-06-12 RX ORDER — PROPOFOL 10 MG/ML
INJECTION, EMULSION INTRAVENOUS AS NEEDED
Status: DISCONTINUED | OUTPATIENT
Start: 2020-06-12 | End: 2020-06-12 | Stop reason: SURG

## 2020-06-12 RX ORDER — SODIUM CHLORIDE 9 MG/ML
INJECTION, SOLUTION INTRAVENOUS CONTINUOUS PRN
Status: DISCONTINUED | OUTPATIENT
Start: 2020-06-12 | End: 2020-06-12 | Stop reason: SURG

## 2020-06-12 RX ORDER — LIDOCAINE HYDROCHLORIDE 10 MG/ML
INJECTION, SOLUTION EPIDURAL; INFILTRATION; INTRACAUDAL; PERINEURAL AS NEEDED
Status: DISCONTINUED | OUTPATIENT
Start: 2020-06-12 | End: 2020-06-12 | Stop reason: SURG

## 2020-06-12 RX ORDER — PROPOFOL 10 MG/ML
INJECTION, EMULSION INTRAVENOUS CONTINUOUS PRN
Status: DISCONTINUED | OUTPATIENT
Start: 2020-06-12 | End: 2020-06-12 | Stop reason: SURG

## 2020-06-12 RX ADMIN — SODIUM CHLORIDE: 9 INJECTION, SOLUTION INTRAVENOUS at 08:01

## 2020-06-12 RX ADMIN — PROPOFOL 120 MCG/KG/MIN: 10 INJECTION, EMULSION INTRAVENOUS at 08:08

## 2020-06-12 RX ADMIN — LIDOCAINE HYDROCHLORIDE 6 ML: 10 INJECTION, SOLUTION EPIDURAL; INFILTRATION; INTRACAUDAL; PERINEURAL at 08:08

## 2020-06-12 RX ADMIN — PROPOFOL 50 MG: 10 INJECTION, EMULSION INTRAVENOUS at 08:12

## 2020-06-12 RX ADMIN — PROPOFOL 50 MG: 10 INJECTION, EMULSION INTRAVENOUS at 08:14

## 2020-06-12 RX ADMIN — PROPOFOL 60 MG: 10 INJECTION, EMULSION INTRAVENOUS at 08:09

## 2020-06-12 RX ADMIN — PROPOFOL 60 MG: 10 INJECTION, EMULSION INTRAVENOUS at 08:08

## 2020-06-15 ENCOUNTER — TELEPHONE (OUTPATIENT)
Dept: RADIOLOGY | Facility: HOSPITAL | Age: 45
End: 2020-06-15

## 2020-06-15 ENCOUNTER — TELEPHONE (OUTPATIENT)
Dept: SURGERY | Facility: CLINIC | Age: 45
End: 2020-06-15

## 2020-06-15 DIAGNOSIS — E87.6 HYPOKALEMIA: Primary | ICD-10-CM

## 2020-06-15 RX ORDER — SODIUM CHLORIDE 9 MG/ML
75 INJECTION, SOLUTION INTRAVENOUS CONTINUOUS
Status: CANCELLED | OUTPATIENT
Start: 2020-06-15

## 2020-06-15 RX ORDER — POTASSIUM CHLORIDE 20 MEQ/1
20 TABLET, EXTENDED RELEASE ORAL 2 TIMES DAILY
Qty: 6 TABLET | Refills: 0 | Status: SHIPPED | OUTPATIENT
Start: 2020-06-15 | End: 2020-07-08 | Stop reason: HOSPADM

## 2020-06-16 ENCOUNTER — TELEPHONE (OUTPATIENT)
Dept: SURGERY | Facility: HOSPITAL | Age: 45
End: 2020-06-16

## 2020-06-16 ENCOUNTER — HOSPITAL ENCOUNTER (OUTPATIENT)
Dept: RADIOLOGY | Age: 45
Discharge: HOME | End: 2020-06-16
Payer: COMMERCIAL

## 2020-06-16 DIAGNOSIS — C20 RECTAL CANCER (HCC): ICD-10-CM

## 2020-06-16 LAB
CANCER AG19-9 SERPL-ACNC: 17 U/ML (ref 0–35)
GLUCOSE SERPL-MCNC: 98 MG/DL (ref 65–140)

## 2020-06-16 PROCEDURE — 78815 PET IMAGE W/CT SKULL-THIGH: CPT

## 2020-06-16 PROCEDURE — A9552 F18 FDG: HCPCS

## 2020-06-16 PROCEDURE — 82948 REAGENT STRIP/BLOOD GLUCOSE: CPT

## 2020-06-17 ENCOUNTER — TRANSCRIBE ORDERS (OUTPATIENT)
Dept: ADMINISTRATIVE | Facility: HOSPITAL | Age: 45
End: 2020-06-17

## 2020-06-17 ENCOUNTER — HOSPITAL ENCOUNTER (OUTPATIENT)
Dept: RADIOLOGY | Facility: HOSPITAL | Age: 45
Discharge: HOME | End: 2020-06-17
Attending: SURGERY
Payer: COMMERCIAL

## 2020-06-17 ENCOUNTER — TELEPHONE (OUTPATIENT)
Dept: SURGERY | Facility: CLINIC | Age: 45
End: 2020-06-17

## 2020-06-17 VITALS
HEART RATE: 82 BPM | HEIGHT: 62 IN | BODY MASS INDEX: 22.26 KG/M2 | SYSTOLIC BLOOD PRESSURE: 101 MMHG | OXYGEN SATURATION: 97 % | RESPIRATION RATE: 14 BRPM | TEMPERATURE: 97.4 F | WEIGHT: 121 LBS | DIASTOLIC BLOOD PRESSURE: 55 MMHG

## 2020-06-17 DIAGNOSIS — C79.9 METASTATIC DISEASE (HCC): ICD-10-CM

## 2020-06-17 DIAGNOSIS — C78.01 SECONDARY MALIGNANT NEOPLASM OF RIGHT LUNG (HCC): ICD-10-CM

## 2020-06-17 DIAGNOSIS — R16.0 LIVER MASS: ICD-10-CM

## 2020-06-17 DIAGNOSIS — Z12.31 VISIT FOR SCREENING MAMMOGRAM: Primary | ICD-10-CM

## 2020-06-17 PROCEDURE — 88307 TISSUE EXAM BY PATHOLOGIST: CPT | Performed by: PATHOLOGY

## 2020-06-17 PROCEDURE — 88342 IMHCHEM/IMCYTCHM 1ST ANTB: CPT | Performed by: PATHOLOGY

## 2020-06-17 PROCEDURE — 88341 IMHCHEM/IMCYTCHM EA ADD ANTB: CPT | Performed by: PATHOLOGY

## 2020-06-17 PROCEDURE — 99153 MOD SED SAME PHYS/QHP EA: CPT

## 2020-06-17 PROCEDURE — 88313 SPECIAL STAINS GROUP 2: CPT | Performed by: PATHOLOGY

## 2020-06-17 PROCEDURE — 76942 ECHO GUIDE FOR BIOPSY: CPT

## 2020-06-17 PROCEDURE — 47000 NEEDLE BIOPSY OF LIVER PERQ: CPT

## 2020-06-17 PROCEDURE — 88333 PATH CONSLTJ SURG CYTO XM 1: CPT | Performed by: PATHOLOGY

## 2020-06-17 PROCEDURE — 99152 MOD SED SAME PHYS/QHP 5/>YRS: CPT | Performed by: RADIOLOGY

## 2020-06-17 PROCEDURE — 76942 ECHO GUIDE FOR BIOPSY: CPT | Performed by: RADIOLOGY

## 2020-06-17 PROCEDURE — 99152 MOD SED SAME PHYS/QHP 5/>YRS: CPT

## 2020-06-17 PROCEDURE — 47000 NEEDLE BIOPSY OF LIVER PERQ: CPT | Performed by: RADIOLOGY

## 2020-06-17 RX ORDER — ACETAMINOPHEN 325 MG/1
650 TABLET ORAL EVERY 6 HOURS PRN
Status: DISCONTINUED | OUTPATIENT
Start: 2020-06-17 | End: 2020-06-18 | Stop reason: HOSPADM

## 2020-06-17 RX ORDER — SODIUM CHLORIDE 9 MG/ML
75 INJECTION, SOLUTION INTRAVENOUS CONTINUOUS
Status: DISCONTINUED | OUTPATIENT
Start: 2020-06-17 | End: 2020-06-18 | Stop reason: HOSPADM

## 2020-06-17 RX ORDER — OXYCODONE HYDROCHLORIDE AND ACETAMINOPHEN 5; 325 MG/1; MG/1
1 TABLET ORAL EVERY 4 HOURS PRN
Status: ON HOLD | COMMUNITY
End: 2020-08-15 | Stop reason: SDUPTHER

## 2020-06-17 RX ORDER — LIDOCAINE WITH 8.4% SOD BICARB 0.9%(10ML)
SYRINGE (ML) INJECTION CODE/TRAUMA/SEDATION MEDICATION
Status: COMPLETED | OUTPATIENT
Start: 2020-06-17 | End: 2020-06-17

## 2020-06-17 RX ORDER — FENTANYL CITRATE 50 UG/ML
INJECTION, SOLUTION INTRAMUSCULAR; INTRAVENOUS CODE/TRAUMA/SEDATION MEDICATION
Status: COMPLETED | OUTPATIENT
Start: 2020-06-17 | End: 2020-06-17

## 2020-06-17 RX ORDER — SERTRALINE HYDROCHLORIDE 25 MG/1
25 TABLET, FILM COATED ORAL DAILY
COMMUNITY

## 2020-06-17 RX ORDER — OXYCODONE HYDROCHLORIDE AND ACETAMINOPHEN 5; 325 MG/1; MG/1
1 TABLET ORAL EVERY 4 HOURS PRN
Status: DISCONTINUED | OUTPATIENT
Start: 2020-06-17 | End: 2020-06-18 | Stop reason: HOSPADM

## 2020-06-17 RX ORDER — MIDAZOLAM HYDROCHLORIDE 2 MG/2ML
INJECTION, SOLUTION INTRAMUSCULAR; INTRAVENOUS CODE/TRAUMA/SEDATION MEDICATION
Status: COMPLETED | OUTPATIENT
Start: 2020-06-17 | End: 2020-06-17

## 2020-06-17 RX ADMIN — FENTANYL CITRATE 25 MCG: 50 INJECTION INTRAMUSCULAR; INTRAVENOUS at 09:41

## 2020-06-17 RX ADMIN — MIDAZOLAM 0.5 MG: 1 INJECTION INTRAMUSCULAR; INTRAVENOUS at 09:27

## 2020-06-17 RX ADMIN — FENTANYL CITRATE 25 MCG: 50 INJECTION INTRAMUSCULAR; INTRAVENOUS at 09:30

## 2020-06-17 RX ADMIN — OXYCODONE HYDROCHLORIDE AND ACETAMINOPHEN 1 TABLET: 5; 325 TABLET ORAL at 10:56

## 2020-06-17 RX ADMIN — SODIUM CHLORIDE 75 ML/HR: 0.9 INJECTION, SOLUTION INTRAVENOUS at 08:50

## 2020-06-17 RX ADMIN — MIDAZOLAM 0.5 MG: 1 INJECTION INTRAMUSCULAR; INTRAVENOUS at 09:42

## 2020-06-17 RX ADMIN — Medication 8 ML: at 09:41

## 2020-06-17 RX ADMIN — FENTANYL CITRATE 25 MCG: 50 INJECTION INTRAMUSCULAR; INTRAVENOUS at 09:33

## 2020-06-17 RX ADMIN — MIDAZOLAM 0.5 MG: 1 INJECTION INTRAMUSCULAR; INTRAVENOUS at 09:54

## 2020-06-17 RX ADMIN — MIDAZOLAM 0.5 MG: 1 INJECTION INTRAMUSCULAR; INTRAVENOUS at 09:30

## 2020-06-17 RX ADMIN — FENTANYL CITRATE 25 MCG: 50 INJECTION INTRAMUSCULAR; INTRAVENOUS at 09:27

## 2020-06-18 ENCOUNTER — LAB REQUISITION (OUTPATIENT)
Dept: LAB | Facility: HOSPITAL | Age: 45
End: 2020-06-18
Payer: COMMERCIAL

## 2020-06-18 ENCOUNTER — TRANSCRIBE ORDERS (OUTPATIENT)
Dept: ADMINISTRATIVE | Facility: HOSPITAL | Age: 45
End: 2020-06-18

## 2020-06-18 DIAGNOSIS — C78.02 SECONDARY MALIGNANT NEOPLASM OF LEFT LUNG (HCC): ICD-10-CM

## 2020-06-18 DIAGNOSIS — C78.7 SECONDARY MALIGNANT NEOPLASM OF LIVER AND INTRAHEPATIC BILE DUCT (HCC): ICD-10-CM

## 2020-06-18 DIAGNOSIS — C20 MALIGNANT NEOPLASM OF RECTUM (HCC): ICD-10-CM

## 2020-06-18 DIAGNOSIS — C78.01 SECONDARY MALIGNANT NEOPLASM OF RIGHT LUNG (HCC): ICD-10-CM

## 2020-06-18 DIAGNOSIS — Z11.59 SPECIAL SCREENING EXAMINATION FOR UNSPECIFIED VIRAL DISEASE: Primary | ICD-10-CM

## 2020-06-18 DIAGNOSIS — C78.7 SECONDARY MALIGNANT NEOPLASM OF LIVER (HCC): ICD-10-CM

## 2020-06-18 DIAGNOSIS — C20 MALIGNANT NEOPLASM OF RECTUM (HCC): Primary | ICD-10-CM

## 2020-06-18 LAB
HCG SERPL QL: NEGATIVE
TIBC SERPL-MCNC: 243 UG/DL (ref 250–450)

## 2020-06-18 PROCEDURE — 83550 IRON BINDING TEST: CPT | Performed by: INTERNAL MEDICINE

## 2020-06-18 PROCEDURE — 84703 CHORIONIC GONADOTROPIN ASSAY: CPT | Performed by: INTERNAL MEDICINE

## 2020-06-18 PROCEDURE — U0003 INFECTIOUS AGENT DETECTION BY NUCLEIC ACID (DNA OR RNA); SEVERE ACUTE RESPIRATORY SYNDROME CORONAVIRUS 2 (SARS-COV-2) (CORONAVIRUS DISEASE [COVID-19]), AMPLIFIED PROBE TECHNIQUE, MAKING USE OF HIGH THROUGHPUT TECHNOLOGIES AS DESCRIBED BY CMS-2020-01-R: HCPCS | Performed by: OBSTETRICS & GYNECOLOGY

## 2020-06-19 ENCOUNTER — HOSPITAL ENCOUNTER (OUTPATIENT)
Dept: ULTRASOUND IMAGING | Facility: CLINIC | Age: 45
Discharge: HOME | End: 2020-06-19
Admitting: RADIOLOGY
Payer: COMMERCIAL

## 2020-06-19 ENCOUNTER — TELEPHONE (OUTPATIENT)
Dept: INTERVENTIONAL RADIOLOGY/VASCULAR | Facility: HOSPITAL | Age: 45
End: 2020-06-19

## 2020-06-19 ENCOUNTER — HOSPITAL ENCOUNTER (OUTPATIENT)
Dept: MAMMOGRAPHY | Facility: CLINIC | Age: 45
Discharge: HOME | End: 2020-06-19
Payer: COMMERCIAL

## 2020-06-19 ENCOUNTER — HOSPITAL ENCOUNTER (OUTPATIENT)
Dept: ULTRASOUND IMAGING | Facility: CLINIC | Age: 45
Discharge: HOME | End: 2020-06-19
Payer: COMMERCIAL

## 2020-06-19 ENCOUNTER — HOSPITAL ENCOUNTER (OUTPATIENT)
Dept: MAMMOGRAPHY | Facility: CLINIC | Age: 45
Discharge: HOME | End: 2020-06-19

## 2020-06-19 VITALS — BODY MASS INDEX: 23.92 KG/M2 | HEIGHT: 62 IN | WEIGHT: 130 LBS

## 2020-06-19 VITALS
WEIGHT: 130 LBS | SYSTOLIC BLOOD PRESSURE: 102 MMHG | BODY MASS INDEX: 23.92 KG/M2 | HEIGHT: 62 IN | TEMPERATURE: 97 F | HEART RATE: 86 BPM | DIASTOLIC BLOOD PRESSURE: 62 MMHG

## 2020-06-19 DIAGNOSIS — C20 MALIGNANT NEOPLASM OF RECTUM (HCC): ICD-10-CM

## 2020-06-19 DIAGNOSIS — R92.8 ABNORMAL MAMMOGRAM: ICD-10-CM

## 2020-06-19 DIAGNOSIS — C78.7 SECONDARY MALIGNANT NEOPLASM OF LIVER (HCC): ICD-10-CM

## 2020-06-19 LAB — SARS-COV-2 RNA SPEC QL NAA+PROBE: NOT DETECTED

## 2020-06-19 PROCEDURE — 19083 BX BREAST 1ST LESION US IMAG: CPT

## 2020-06-19 PROCEDURE — 88305 TISSUE EXAM BY PATHOLOGIST: CPT | Performed by: PATHOLOGY

## 2020-06-19 PROCEDURE — 76942 ECHO GUIDE FOR BIOPSY: CPT

## 2020-06-19 PROCEDURE — 76642 ULTRASOUND BREAST LIMITED: CPT

## 2020-06-19 PROCEDURE — 88361 TUMOR IMMUNOHISTOCHEM/COMPUT: CPT | Performed by: PATHOLOGY

## 2020-06-19 PROCEDURE — 88342 IMHCHEM/IMCYTCHM 1ST ANTB: CPT | Performed by: PATHOLOGY

## 2020-06-19 PROCEDURE — 88341 IMHCHEM/IMCYTCHM EA ADD ANTB: CPT | Performed by: PATHOLOGY

## 2020-06-19 PROCEDURE — 77066 DX MAMMO INCL CAD BI: CPT

## 2020-06-19 PROCEDURE — G0279 TOMOSYNTHESIS, MAMMO: HCPCS

## 2020-06-19 RX ORDER — LIDOCAINE HYDROCHLORIDE 10 MG/ML
5 INJECTION, SOLUTION EPIDURAL; INFILTRATION; INTRACAUDAL; PERINEURAL ONCE
Status: COMPLETED | OUTPATIENT
Start: 2020-06-19 | End: 2020-06-19

## 2020-06-19 RX ADMIN — LIDOCAINE HYDROCHLORIDE 5 ML: 10 INJECTION, SOLUTION EPIDURAL; INFILTRATION; INTRACAUDAL; PERINEURAL at 16:02

## 2020-06-19 RX ADMIN — LIDOCAINE HYDROCHLORIDE 5 ML: 10 INJECTION, SOLUTION EPIDURAL; INFILTRATION; INTRACAUDAL; PERINEURAL at 16:10

## 2020-06-22 ENCOUNTER — HOSPITAL ENCOUNTER (OUTPATIENT)
Dept: INTERVENTIONAL RADIOLOGY/VASCULAR | Facility: HOSPITAL | Age: 45
Discharge: HOME | End: 2020-06-22
Payer: COMMERCIAL

## 2020-06-22 VITALS
OXYGEN SATURATION: 98 % | WEIGHT: 130 LBS | SYSTOLIC BLOOD PRESSURE: 103 MMHG | BODY MASS INDEX: 23.92 KG/M2 | RESPIRATION RATE: 18 BRPM | DIASTOLIC BLOOD PRESSURE: 67 MMHG | HEIGHT: 62 IN | HEART RATE: 71 BPM | TEMPERATURE: 97.9 F

## 2020-06-22 DIAGNOSIS — C20 MALIGNANT NEOPLASM OF RECTUM (HCC): ICD-10-CM

## 2020-06-22 LAB — MISCELLANEOUS LAB TEST RESULT: NORMAL

## 2020-06-22 PROCEDURE — 36561 INSERT TUNNELED CV CATH: CPT

## 2020-06-22 PROCEDURE — 99152 MOD SED SAME PHYS/QHP 5/>YRS: CPT

## 2020-06-22 PROCEDURE — 76937 US GUIDE VASCULAR ACCESS: CPT | Performed by: RADIOLOGY

## 2020-06-22 PROCEDURE — 99152 MOD SED SAME PHYS/QHP 5/>YRS: CPT | Performed by: RADIOLOGY

## 2020-06-22 PROCEDURE — 99153 MOD SED SAME PHYS/QHP EA: CPT

## 2020-06-22 PROCEDURE — 76937 US GUIDE VASCULAR ACCESS: CPT

## 2020-06-22 PROCEDURE — 36561 INSERT TUNNELED CV CATH: CPT | Performed by: RADIOLOGY

## 2020-06-22 PROCEDURE — 77001 FLUOROGUIDE FOR VEIN DEVICE: CPT

## 2020-06-22 PROCEDURE — 77001 FLUOROGUIDE FOR VEIN DEVICE: CPT | Performed by: RADIOLOGY

## 2020-06-22 PROCEDURE — C1788 PORT, INDWELLING, IMP: HCPCS

## 2020-06-22 RX ORDER — SODIUM CHLORIDE 9 MG/ML
50 INJECTION, SOLUTION INTRAVENOUS CONTINUOUS
Status: DISCONTINUED | OUTPATIENT
Start: 2020-06-22 | End: 2020-06-23 | Stop reason: HOSPADM

## 2020-06-22 RX ORDER — FENTANYL CITRATE 50 UG/ML
INJECTION, SOLUTION INTRAMUSCULAR; INTRAVENOUS CODE/TRAUMA/SEDATION MEDICATION
Status: COMPLETED | OUTPATIENT
Start: 2020-06-22 | End: 2020-06-22

## 2020-06-22 RX ORDER — MIDAZOLAM HYDROCHLORIDE 2 MG/2ML
INJECTION, SOLUTION INTRAMUSCULAR; INTRAVENOUS CODE/TRAUMA/SEDATION MEDICATION
Status: COMPLETED | OUTPATIENT
Start: 2020-06-22 | End: 2020-06-22

## 2020-06-22 RX ADMIN — SODIUM CHLORIDE 50 ML/HR: 0.9 INJECTION, SOLUTION INTRAVENOUS at 08:38

## 2020-06-22 RX ADMIN — FENTANYL CITRATE 50 MCG: 50 INJECTION, SOLUTION INTRAMUSCULAR; INTRAVENOUS at 09:45

## 2020-06-22 RX ADMIN — MIDAZOLAM 1 MG: 1 INJECTION INTRAMUSCULAR; INTRAVENOUS at 09:45

## 2020-06-22 RX ADMIN — MIDAZOLAM 1 MG: 1 INJECTION INTRAMUSCULAR; INTRAVENOUS at 09:35

## 2020-06-22 RX ADMIN — FENTANYL CITRATE 50 MCG: 50 INJECTION, SOLUTION INTRAMUSCULAR; INTRAVENOUS at 09:35

## 2020-06-24 ENCOUNTER — HOSPITAL ENCOUNTER (OUTPATIENT)
Dept: ULTRASOUND IMAGING | Facility: CLINIC | Age: 45
Discharge: HOME | End: 2020-06-24

## 2020-06-25 LAB — MISCELLANEOUS LAB TEST RESULT: NORMAL

## 2020-06-26 ENCOUNTER — LAB REQUISITION (OUTPATIENT)
Dept: LAB | Facility: HOSPITAL | Age: 45
End: 2020-06-26
Payer: COMMERCIAL

## 2020-06-26 DIAGNOSIS — C78.7 SECONDARY MALIGNANT NEOPLASM OF LIVER AND INTRAHEPATIC BILE DUCT (HCC): ICD-10-CM

## 2020-06-26 DIAGNOSIS — C20 MALIGNANT NEOPLASM OF RECTUM (HCC): ICD-10-CM

## 2020-06-26 DIAGNOSIS — Z79.899 OTHER LONG TERM (CURRENT) DRUG THERAPY: ICD-10-CM

## 2020-06-26 DIAGNOSIS — C78.01 SECONDARY MALIGNANT NEOPLASM OF RIGHT LUNG (HCC): ICD-10-CM

## 2020-06-26 DIAGNOSIS — C78.02 SECONDARY MALIGNANT NEOPLASM OF LEFT LUNG (HCC): ICD-10-CM

## 2020-06-26 LAB
ALBUMIN SERPL BCP-MCNC: 2.4 G/DL (ref 3.5–5)
ALP SERPL-CCNC: 115 U/L (ref 46–116)
ALT SERPL W P-5'-P-CCNC: 37 U/L (ref 12–78)
ANION GAP SERPL CALCULATED.3IONS-SCNC: 12 MMOL/L (ref 4–13)
AST SERPL W P-5'-P-CCNC: 71 U/L (ref 5–45)
BILIRUB SERPL-MCNC: 0.27 MG/DL (ref 0.2–1)
BUN SERPL-MCNC: 8 MG/DL (ref 5–25)
CALCIUM SERPL-MCNC: 8.6 MG/DL (ref 8.3–10.1)
CHLORIDE SERPL-SCNC: 100 MMOL/L (ref 100–108)
CO2 SERPL-SCNC: 24 MMOL/L (ref 21–32)
CREAT SERPL-MCNC: 0.5 MG/DL (ref 0.6–1.3)
GFR SERPL CREATININE-BSD FRML MDRD: 116 ML/MIN/1.73SQ M
GLUCOSE SERPL-MCNC: 87 MG/DL (ref 65–140)
LDH SERPL-CCNC: 690 U/L (ref 81–234)
MAGNESIUM SERPL-MCNC: 2.3 MG/DL (ref 1.6–2.6)
POTASSIUM SERPL-SCNC: 4.3 MMOL/L (ref 3.5–5.3)
PROT SERPL-MCNC: 6.3 G/DL (ref 6.4–8.2)
SODIUM SERPL-SCNC: 136 MMOL/L (ref 136–145)

## 2020-06-26 PROCEDURE — 80053 COMPREHEN METABOLIC PANEL: CPT | Performed by: INTERNAL MEDICINE

## 2020-06-26 PROCEDURE — 83615 LACTATE (LD) (LDH) ENZYME: CPT | Performed by: INTERNAL MEDICINE

## 2020-06-26 PROCEDURE — 83735 ASSAY OF MAGNESIUM: CPT | Performed by: INTERNAL MEDICINE

## 2020-06-29 ENCOUNTER — APPOINTMENT (EMERGENCY)
Dept: RADIOLOGY | Facility: HOSPITAL | Age: 45
End: 2020-06-29
Payer: COMMERCIAL

## 2020-06-29 ENCOUNTER — HOSPITAL ENCOUNTER (EMERGENCY)
Facility: HOSPITAL | Age: 45
Discharge: HOME | End: 2020-06-29
Attending: EMERGENCY MEDICINE | Admitting: EMERGENCY MEDICINE
Payer: COMMERCIAL

## 2020-06-29 VITALS
HEIGHT: 62 IN | HEART RATE: 58 BPM | OXYGEN SATURATION: 96 % | BODY MASS INDEX: 23.92 KG/M2 | DIASTOLIC BLOOD PRESSURE: 73 MMHG | WEIGHT: 130 LBS | SYSTOLIC BLOOD PRESSURE: 121 MMHG | RESPIRATION RATE: 14 BRPM | TEMPERATURE: 97.2 F

## 2020-06-29 DIAGNOSIS — R10.30 LOWER ABDOMINAL PAIN: Primary | ICD-10-CM

## 2020-06-29 LAB
ALBUMIN SERPL BCP-MCNC: 2.8 G/DL (ref 3.5–5)
ALP SERPL-CCNC: 240 U/L (ref 46–116)
ALT SERPL W P-5'-P-CCNC: 36 U/L (ref 12–78)
ANION GAP SERPL CALCULATED.3IONS-SCNC: 7 MMOL/L (ref 4–13)
ANISOCYTOSIS BLD QL SMEAR: PRESENT
AST SERPL W P-5'-P-CCNC: 37 U/L (ref 5–45)
ATRIAL RATE: 53 BPM
BASOPHILS # BLD MANUAL: 0 THOUSAND/UL (ref 0–0.1)
BASOPHILS NFR MAR MANUAL: 0 % (ref 0–1)
BILIRUB SERPL-MCNC: 0.27 MG/DL (ref 0.2–1)
BILIRUB UR QL STRIP: NEGATIVE
BUN SERPL-MCNC: 14 MG/DL (ref 5–25)
CALCIUM SERPL-MCNC: 8.6 MG/DL (ref 8.3–10.1)
CHLORIDE SERPL-SCNC: 102 MMOL/L (ref 100–108)
CLARITY UR: CLEAR
CO2 SERPL-SCNC: 27 MMOL/L (ref 21–32)
COLOR UR: YELLOW
COLOR, POC: NORMAL
CREAT SERPL-MCNC: 0.58 MG/DL (ref 0.6–1.3)
EOSINOPHIL # BLD MANUAL: 0 THOUSAND/UL (ref 0–0.4)
EOSINOPHIL NFR BLD MANUAL: 0 % (ref 0–6)
ERYTHROCYTE [DISTWIDTH] IN BLOOD BY AUTOMATED COUNT: 17.6 % (ref 11.6–15.1)
EXT PREG TEST URINE: NEGATIVE
EXT. CONTROL ED NAV: NORMAL
GFR SERPL CREATININE-BSD FRML MDRD: 110 ML/MIN/1.73SQ M
GLUCOSE SERPL-MCNC: 119 MG/DL (ref 65–140)
GLUCOSE UR STRIP-MCNC: NEGATIVE MG/DL
HCT VFR BLD AUTO: 27.2 % (ref 34.8–46.1)
HGB BLD-MCNC: 8.2 G/DL (ref 11.5–15.4)
HGB UR QL STRIP.AUTO: NEGATIVE
HYPERCHROMIA BLD QL SMEAR: PRESENT
KETONES UR STRIP-MCNC: ABNORMAL MG/DL
LEUKOCYTE ESTERASE UR QL STRIP: NEGATIVE
LIPASE SERPL-CCNC: 55 U/L (ref 73–393)
LYMPHOCYTES # BLD AUTO: 2.97 THOUSAND/UL (ref 0.6–4.47)
LYMPHOCYTES # BLD AUTO: 7 % (ref 14–44)
MCH RBC QN AUTO: 22.4 PG (ref 26.8–34.3)
MCHC RBC AUTO-ENTMCNC: 30.1 G/DL (ref 31.4–37.4)
MCV RBC AUTO: 74 FL (ref 82–98)
METAMYELOCYTES NFR BLD MANUAL: 3 % (ref 0–1)
MICROCYTES BLD QL AUTO: PRESENT
MONOCYTES # BLD AUTO: 0 THOUSAND/UL (ref 0–1.22)
MONOCYTES NFR BLD: 0 % (ref 4–12)
NEUTROPHILS # BLD MANUAL: 37.39 THOUSAND/UL (ref 1.85–7.62)
NEUTS BAND NFR BLD MANUAL: 10 % (ref 0–8)
NEUTS SEG NFR BLD AUTO: 78 % (ref 43–75)
NITRITE UR QL STRIP: NEGATIVE
NRBC BLD AUTO-RTO: 0 /100 WBCS
OVALOCYTES BLD QL SMEAR: PRESENT
P AXIS: 30 DEGREES
PH UR STRIP.AUTO: 6 [PH] (ref 4.5–8)
PLATELET # BLD AUTO: 379 THOUSANDS/UL (ref 149–390)
PLATELET BLD QL SMEAR: ADEQUATE
PMV BLD AUTO: 9.7 FL (ref 8.9–12.7)
POIKILOCYTOSIS BLD QL SMEAR: PRESENT
POTASSIUM SERPL-SCNC: 3.5 MMOL/L (ref 3.5–5.3)
PR INTERVAL: 152 MS
PROT SERPL-MCNC: 6.8 G/DL (ref 6.4–8.2)
PROT UR STRIP-MCNC: NEGATIVE MG/DL
QRS AXIS: 49 DEGREES
QRSD INTERVAL: 90 MS
QT INTERVAL: 422 MS
QTC INTERVAL: 395 MS
RBC # BLD AUTO: 3.66 MILLION/UL (ref 3.81–5.12)
RBC MORPH BLD: PRESENT
SODIUM SERPL-SCNC: 136 MMOL/L (ref 136–145)
SP GR UR STRIP.AUTO: 1.02 (ref 1–1.03)
T WAVE AXIS: 51 DEGREES
UROBILINOGEN UR QL STRIP.AUTO: 1 E.U./DL
VARIANT LYMPHS # BLD AUTO: 2 %
VENTRICULAR RATE: 53 BPM
WBC # BLD AUTO: 42.49 THOUSAND/UL (ref 4.31–10.16)

## 2020-06-29 PROCEDURE — 99284 EMERGENCY DEPT VISIT MOD MDM: CPT | Performed by: EMERGENCY MEDICINE

## 2020-06-29 PROCEDURE — 36415 COLL VENOUS BLD VENIPUNCTURE: CPT | Performed by: EMERGENCY MEDICINE

## 2020-06-29 PROCEDURE — 81025 URINE PREGNANCY TEST: CPT | Performed by: EMERGENCY MEDICINE

## 2020-06-29 PROCEDURE — 80053 COMPREHEN METABOLIC PANEL: CPT | Performed by: EMERGENCY MEDICINE

## 2020-06-29 PROCEDURE — 83690 ASSAY OF LIPASE: CPT | Performed by: EMERGENCY MEDICINE

## 2020-06-29 PROCEDURE — 85027 COMPLETE CBC AUTOMATED: CPT | Performed by: EMERGENCY MEDICINE

## 2020-06-29 PROCEDURE — 93010 ELECTROCARDIOGRAM REPORT: CPT | Performed by: INTERNAL MEDICINE

## 2020-06-29 PROCEDURE — 81003 URINALYSIS AUTO W/O SCOPE: CPT

## 2020-06-29 PROCEDURE — 85007 BL SMEAR W/DIFF WBC COUNT: CPT | Performed by: EMERGENCY MEDICINE

## 2020-06-29 PROCEDURE — 99284 EMERGENCY DEPT VISIT MOD MDM: CPT

## 2020-06-29 PROCEDURE — 93005 ELECTROCARDIOGRAM TRACING: CPT

## 2020-06-29 PROCEDURE — 96374 THER/PROPH/DIAG INJ IV PUSH: CPT

## 2020-06-29 PROCEDURE — 74177 CT ABD & PELVIS W/CONTRAST: CPT

## 2020-06-29 RX ORDER — FENTANYL CITRATE 50 UG/ML
25 INJECTION, SOLUTION INTRAMUSCULAR; INTRAVENOUS ONCE
Status: COMPLETED | OUTPATIENT
Start: 2020-06-29 | End: 2020-06-29

## 2020-06-29 RX ORDER — ACETAMINOPHEN 325 MG/1
975 TABLET ORAL ONCE
Status: COMPLETED | OUTPATIENT
Start: 2020-06-29 | End: 2020-06-29

## 2020-06-29 RX ADMIN — FENTANYL CITRATE 25 MCG: 50 INJECTION, SOLUTION INTRAMUSCULAR; INTRAVENOUS at 13:22

## 2020-06-29 RX ADMIN — ACETAMINOPHEN 975 MG: 325 TABLET ORAL at 13:20

## 2020-06-29 RX ADMIN — IOHEXOL 80 ML: 350 INJECTION, SOLUTION INTRAVENOUS at 16:26

## 2020-06-29 NOTE — DISCHARGE INSTRUCTIONS
Please follow the return precautions for acute abdominal pain provided  Please call your oncologist in the morning regarding your symptoms and follow her recommendations  CT findings:     1  No bowel obstruction  Unchanged appearance of the periumbilical hernia, again containing nonobstructed loops of bowel  Also again seen is a smaller epigastric hernia containing fat and fluid  2   Decreased conspicuity of the rectal mass and improved pelvic lymphadenopathy  3   Increased size of metastatic lesions in the liver  4   Partially imaged bilateral lower lung metastatic disease is not significantly changed

## 2020-06-29 NOTE — ED PROVIDER NOTES
History  Chief Complaint   Patient presents with    Abdominal Pain     pt reports lower abdominal pain since this morning  pt recently started chemo for liver, breast, and rectal cancer  hx of hernias     53-year-old female with a past medical history of rectal, breast, liver cancer who presents for lower abdominal pain  She describes that pain worsened mostly over the course yesterday into today, starting in the left lower quadrant and proceeding to the right and then inferiorly the suprapubic region  She describes that has been associated some nausea, although she is not experiencing any nausea right now  She is not reporting any vomiting  She is reporting new heartburn  She says that she does have some bloody BM when she goes to the bathroom, however says that is not necessarily new given her rectal cancer  No new or increased bleeding  She is reporting diarrhea, but says that is not unusual also given that she has discern diet given her rectal cancer and decreased rectal sphincter diameter  No urinary changes  ROS otherwise negative  Prior to Admission Medications   Prescriptions Last Dose Informant Patient Reported? Taking?    Na Sulfate-K Sulfate-Mg Sulf (Suprep Bowel Prep Kit) 17 5-3 13-1 6 GM/177ML SOLN   No No   Sig: Take 1 kit by mouth once for 1 dose   oxyCODONE-acetaminophen (PERCOCET) 5-325 mg per tablet   Yes No   Sig: Take 1 tablet by mouth every 4 (four) hours as needed for moderate pain   potassium chloride (K-DUR,KLOR-CON) 20 mEq tablet   No No   Sig: Take 1 tablet (20 mEq total) by mouth 2 (two) times a day for 3 days   sertraline (ZOLOFT) 25 mg tablet   Yes No   Sig: Take 25 mg by mouth daily      Facility-Administered Medications: None       Past Medical History:   Diagnosis Date    History of rectal cancer 06/2020       Past Surgical History:   Procedure Laterality Date    BACK SURGERY      BREAST BIOPSY Right 06/19/2020    2 sites; 1 breast 1 axilla    HERNIA REPAIR 3 repairs    IR IMAGE GUIDED BIOPSY/ASPIRATION LIVER  6/17/2020    IR PORT PLACEMENT  6/22/2020    TUBAL LIGATION      US GUIDED BREAST BIOPSY RIGHT COMPLETE Right 6/19/2020    US GUIDED BREAST LYMPH NODE BIOPSY RIGHT Right 6/19/2020       Family History   Problem Relation Age of Onset    No Known Problems Mother     No Known Problems Sister     No Known Problems Daughter     Breast cancer Maternal Grandmother         age unknown    No Known Problems Sister     No Known Problems Daughter     Breast cancer Maternal Aunt 61    No Known Problems Maternal Aunt      I have reviewed and agree with the history as documented  E-Cigarette/Vaping    E-Cigarette Use Never User      E-Cigarette/Vaping Substances    Nicotine No     THC No     CBD No     Flavoring No     Other No     Unknown No      Social History     Tobacco Use    Smoking status: Current Every Day Smoker     Packs/day: 0 50     Types: Cigarettes    Smokeless tobacco: Never Used   Substance Use Topics    Alcohol use: Not Currently    Drug use: No        Review of Systems   Constitutional: Negative for chills and fever  HENT: Negative for congestion, rhinorrhea and sore throat  Respiratory: Negative for cough and shortness of breath  Cardiovascular: Negative for chest pain and palpitations  Gastrointestinal: Positive for abdominal pain, diarrhea and nausea  Negative for constipation and vomiting  Genitourinary: Negative for difficulty urinating and flank pain  Musculoskeletal: Negative for arthralgias  Neurological: Negative for dizziness, weakness, light-headedness and headaches  Psychiatric/Behavioral: Negative for agitation, behavioral problems and confusion  All other systems reviewed and are negative        Physical Exam  ED Triage Vitals   Temperature Pulse Respirations Blood Pressure SpO2   06/29/20 1159 06/29/20 1159 06/29/20 1159 06/29/20 1159 06/29/20 1159   (!) 97 2 °F (36 2 °C) 65 18 133/75 98 %      Temp Source Heart Rate Source Patient Position - Orthostatic VS BP Location FiO2 (%)   06/29/20 1159 06/29/20 1159 06/29/20 1428 06/29/20 1159 --   Oral Monitor Lying Left arm       Pain Score       06/29/20 1159       4             Orthostatic Vital Signs  Vitals:    06/29/20 1330 06/29/20 1428 06/29/20 1558 06/29/20 1741   BP: 123/74 126/87 120/73 121/73   Pulse: 60 59 62 58   Patient Position - Orthostatic VS:  Lying Lying Lying       Physical Exam   Constitutional: She is oriented to person, place, and time  She appears well-developed and well-nourished  HENT:   Head: Normocephalic and atraumatic  Eyes: EOM are normal    Neck: Normal range of motion  Neck supple  Cardiovascular: Normal rate, regular rhythm and normal heart sounds  Exam reveals no friction rub  No murmur heard  Pulmonary/Chest: Effort normal and breath sounds normal  No respiratory distress  She has no wheezes  She has no rales  Abdominal: Soft  Bowel sounds are normal  She exhibits no distension  There is tenderness in the suprapubic area and left lower quadrant  There is no rigidity, no rebound, no guarding and no CVA tenderness  Musculoskeletal: Normal range of motion  She exhibits no edema  Neurological: She is alert and oriented to person, place, and time  Coordination normal    Skin: Skin is warm  Psychiatric: She has a normal mood and affect   Her behavior is normal  Judgment and thought content normal        ED Medications  Medications   acetaminophen (TYLENOL) tablet 975 mg (975 mg Oral Given 6/29/20 1320)   fentanyl citrate (PF) 100 MCG/2ML 25 mcg (25 mcg Intravenous Given 6/29/20 1322)   iohexol (OMNIPAQUE) 350 MG/ML injection (MULTI-DOSE) 80 mL (80 mL Intravenous Given 6/29/20 1626)       Diagnostic Studies  Results Reviewed     Procedure Component Value Units Date/Time    POCT pregnancy, urine [[id number]]  (Normal) Resulted:  06/29/20 1414    Lab Status:  Final result Updated:  06/29/20 1415     EXT PREG TEST UR (Ref: Negative) NEGATIVE     Control V    POCT urinalysis dipstick [[id number]]  (Normal) Resulted:  06/29/20 1414    Lab Status:  Final result Updated:  06/29/20 1414     Color, UA -    Urine Macroscopic, POC [[id number]]  (Abnormal) Collected:  06/29/20 1413    Lab Status:  Final result Specimen:  Urine Updated:  06/29/20 1413     Color, UA Yellow     Clarity, UA Clear     pH, UA 6 0     Leukocytes, UA Negative     Nitrite, UA Negative     Protein, UA Negative mg/dl      Glucose, UA Negative mg/dl      Ketones, UA Trace mg/dl      Urobilinogen, UA 1 0 E U /dl      Bilirubin, UA Negative     Blood, UA Negative     Specific Gravity, UA 1 025    Narrative:       CLINITEK RESULT    CBC and differential [[id number]]  (Abnormal) Collected:  06/29/20 1324    Lab Status:  Final result Specimen:  Blood from Arm, Right Updated:  06/29/20 1401     WBC 42 49 Thousand/uL      RBC 3 66 Million/uL      Hemoglobin 8 2 g/dL      Hematocrit 27 2 %      MCV 74 fL      MCH 22 4 pg      MCHC 30 1 g/dL      RDW 17 6 %      MPV 9 7 fL      Platelets 391 Thousands/uL      nRBC 0 /100 WBCs     Narrative: This is an appended report  These results have been appended to a previously verified report      Comprehensive metabolic panel [[id number]]  (Abnormal) Collected:  06/29/20 1324    Lab Status:  Final result Specimen:  Blood from Arm, Right Updated:  06/29/20 1358     Sodium 136 mmol/L      Potassium 3 5 mmol/L      Chloride 102 mmol/L      CO2 27 mmol/L      ANION GAP 7 mmol/L      BUN 14 mg/dL      Creatinine 0 58 mg/dL      Glucose 119 mg/dL      Calcium 8 6 mg/dL      AST 37 U/L      ALT 36 U/L      Alkaline Phosphatase 240 U/L      Total Protein 6 8 g/dL      Albumin 2 8 g/dL      Total Bilirubin 0 27 mg/dL      eGFR 110 ml/min/1 73sq m     Narrative:       [place] guidelines for Chronic Kidney Disease (CKD):     Stage 1 with normal or high GFR (GFR > 90 mL/min/1 73 square meters)    Stage 2 Mild CKD (GFR = 60-89 mL/min/1 73 square meters)    Stage 3A Moderate CKD (GFR = 45-59 mL/min/1 73 square meters)    Stage 3B Moderate CKD (GFR = 30-44 mL/min/1 73 square meters)    Stage 4 Severe CKD (GFR = 15-29 mL/min/1 73 square meters)    Stage 5 End Stage CKD (GFR <15 mL/min/1 73 square meters)  Note: GFR calculation is accurate only with a steady state creatinine    Lipase [[id number]]  (Abnormal) Collected:  06/29/20 1324    Lab Status:  Final result Specimen:  Blood from Arm, Right Updated:  06/29/20 1358     Lipase 55 u/L                  CT abdomen pelvis with contrast   Final Result by Ulices Sarmiento MD (06/29 1713)      1  No bowel obstruction  Unchanged appearance of the periumbilical hernia, again containing nonobstructed loops of bowel  Also again seen is a smaller epigastric hernia containing fat and fluid  2   Decreased conspicuity of the rectal mass and improved pelvic lymphadenopathy  3   Increased size of metastatic lesions in the liver  4   Partially imaged bilateral lower lung metastatic disease is not significantly changed  Workstation performed: [id number]               Procedures  Procedures      ED Course  ED Course as of Jun 29 1937   Mon Jun 29, 2020   1359 WBC(!!): 42 49   1406 Reports improvement in pain on re-assessment  US AUDIT      Most Recent Value   Initial Alcohol Screen: US AUDIT-C    1  How often do you have a drink containing alcohol?  0 Filed at: 06/29/2020 1201   Audit-C Score  0 Filed at: 06/29/2020 1201                  CARL/DAST-10      Most Recent Value   How many times in the past year have you    Used an illegal drug or used a prescription medication for non-medical reasons? Never Filed at: 06/29/2020 1201                              MDM  Number of Diagnoses or Management Options  Lower abdominal pain:   Diagnosis management comments: Patient's presentation is concerning for possible bowel obstruction, appendicitis, hernia    CT scan of the abdomen and pelvis not reveal any acute pathology  GI labs were unremarkable except for a markedly elevated white blood cell count likely attributable to her chemotherapy  Patient discharged and advised to call her oncologist tomorrow morning to update her on CT findings and follow her management study  On discharge patient appears comfortable, able to ambulate without difficulty  Disposition  Final diagnoses:   Lower abdominal pain     Time reflects when diagnosis was documented in both MDM as applicable and the Disposition within this note     Time User Action Codes Description Comment    6/29/2020  6:18 PM 1001 [facility], [facility] [facility] [R10 30] Lower abdominal pain       ED Disposition     ED Disposition Condition Date/Time Comment    Discharge Stable Mon Jun 29, 2020  6:17 PM Puja Fitzpatrick discharge to home/self care  Follow-up Information     Follow up With Specialties Details Why [facility] , [address] [facility]  [phone]            Discharge Medication List as of 6/29/2020  6:19 PM      CONTINUE these medications which have NOT CHANGED    Details   Na Sulfate-K Sulfate-Mg Sulf (Suprep Bowel Prep Kit) 17 5-3 13-1 6 GM/177ML SOLN Take 1 kit by mouth once for 1 dose, Starting Tue 6/2/2020, Normal      oxyCODONE-acetaminophen (PERCOCET) 5-325 mg per tablet Take 1 tablet by mouth every 4 (four) hours as needed for moderate pain, Historical Med      potassium chloride (K-DUR,KLOR-CON) 20 mEq tablet Take 1 tablet (20 mEq total) by mouth 2 (two) times a day for 3 days, Starting Mon 6/15/2020, Until Thu 6/18/2020, Normal      sertraline (ZOLOFT) 25 mg tablet Take 25 mg by mouth daily, Historical Med           No discharge procedures on file  PDMP Review     None           ED Provider  Attending physically available and evaluated Iftikhar Vaughn  SIDNEY managed the patient along with the ED Attending      Electronically Signed by         Irma Espinal [address] [place], MD  06/29/20 462 Karen Sterling MD  06/29/20 9378

## 2020-06-29 NOTE — ED ATTENDING ATTESTATION
6/29/2020  I, Landy Patterson MD, saw and evaluated the patient  I have discussed the patient with the resident/non-physician practitioner and agree with the resident's/non-physician practitioner's findings, Plan of Care, and MDM as documented in the resident's/non-physician practitioner's note, except where noted  All available labs and Radiology studies were reviewed  I was present for key portions of any procedure(s) performed by the resident/non-physician practitioner and I was immediately available to provide assistance  At this point I agree with the current assessment done in the Emergency Department  I have conducted an independent evaluation of this patient a history and physical is as follows:  Patient presents to the emergency department the chief complaint of abdominal pain  The patient reports she was recently diagnosed with rectal cancer along with breast cancer and liver Mets from her rectal cancer  The patient had her 1st course of chemotherapy on Wednesday and noticed mild lower abdominal discomfort yesterday which worsened today  The patient has had several weeks of vague lower abdominal discomfort but the pain she has had over the last 2 days is more severe  The patient localizes the pain to the bilateral lower abdomen  The pain has not migrated  The patient admits to mild occasional nausea but has not at this time  The patient has had no vomiting  The patient has had several loose stools that are unchanged  The patient has also had blood in her stools for several weeks and that has not changed either  The patient denies any urinary symptoms  Patient also reports a 1 day history of the feeling of heartburn  Physical exam demonstrates a pleasant alert nontoxic female no acute distress  HEENT exam is normal   Lungs are clear with equal breath sounds  The heart had a regular rate rhythm  Abdomen is soft with normal bowel sounds    There is mild tenderness in the bilateral lower abdomen with left lower quadrant being more tender than the right lower quadrant  There is no rebound or guarding  Back was nontender  Skin had no rash  There is no focal neurologic deficits  ED Course      Signed out patient to Dr Joann Granados at 1500hrs, evaluation ongoing        Critical Care Time  Procedures

## 2020-07-01 ENCOUNTER — APPOINTMENT (EMERGENCY)
Dept: RADIOLOGY | Facility: HOSPITAL | Age: 45
DRG: 374 | End: 2020-07-01
Payer: COMMERCIAL

## 2020-07-01 ENCOUNTER — HOSPITAL ENCOUNTER (INPATIENT)
Facility: HOSPITAL | Age: 45
LOS: 7 days | Discharge: HOME HEALTH | DRG: 374 | End: 2020-07-08
Attending: EMERGENCY MEDICINE | Admitting: COLON & RECTAL SURGERY
Payer: COMMERCIAL

## 2020-07-01 ENCOUNTER — APPOINTMENT (INPATIENT)
Dept: RADIOLOGY | Facility: HOSPITAL | Age: 45
DRG: 374 | End: 2020-07-01
Payer: COMMERCIAL

## 2020-07-01 DIAGNOSIS — R50.81 FEVER IN OTHER DISEASES: ICD-10-CM

## 2020-07-01 DIAGNOSIS — D72.829 LEUKOCYTOSIS: ICD-10-CM

## 2020-07-01 DIAGNOSIS — K56.609 LARGE BOWEL OBSTRUCTION (HCC): Primary | ICD-10-CM

## 2020-07-01 DIAGNOSIS — R79.89 ELEVATED LACTIC ACID LEVEL: ICD-10-CM

## 2020-07-01 DIAGNOSIS — Z85.048 HISTORY OF RECTAL CANCER: ICD-10-CM

## 2020-07-01 DIAGNOSIS — C20 RECTAL CANCER (HCC): ICD-10-CM

## 2020-07-01 LAB
ABO GROUP BLD: NORMAL
ALBUMIN SERPL BCP-MCNC: 2.9 G/DL (ref 3.5–5)
ALP SERPL-CCNC: 336 U/L (ref 46–116)
ALT SERPL W P-5'-P-CCNC: 37 U/L (ref 12–78)
ANION GAP SERPL CALCULATED.3IONS-SCNC: 10 MMOL/L (ref 4–13)
AST SERPL W P-5'-P-CCNC: 28 U/L (ref 5–45)
BILIRUB SERPL-MCNC: 0.46 MG/DL (ref 0.2–1)
BLD GP AB SCN SERPL QL: NEGATIVE
BUN SERPL-MCNC: 23 MG/DL (ref 5–25)
CALCIUM SERPL-MCNC: 9.2 MG/DL (ref 8.3–10.1)
CHLORIDE SERPL-SCNC: 98 MMOL/L (ref 100–108)
CO2 SERPL-SCNC: 23 MMOL/L (ref 21–32)
CREAT SERPL-MCNC: 0.84 MG/DL (ref 0.6–1.3)
ERYTHROCYTE [DISTWIDTH] IN BLOOD BY AUTOMATED COUNT: 19.2 % (ref 11.6–15.1)
GFR SERPL CREATININE-BSD FRML MDRD: 83 ML/MIN/1.73SQ M
GLUCOSE SERPL-MCNC: 181 MG/DL (ref 65–140)
HCT VFR BLD AUTO: 35.7 % (ref 34.8–46.1)
HGB BLD-MCNC: 10.6 G/DL (ref 11.5–15.4)
LACTATE SERPL-SCNC: 1.3 MMOL/L (ref 0.5–2)
LACTATE SERPL-SCNC: 2.8 MMOL/L (ref 0.5–2)
LACTATE SERPL-SCNC: 2.8 MMOL/L (ref 0.5–2)
LIPASE SERPL-CCNC: 96 U/L (ref 73–393)
MCH RBC QN AUTO: 22.1 PG (ref 26.8–34.3)
MCHC RBC AUTO-ENTMCNC: 29.7 G/DL (ref 31.4–37.4)
MCV RBC AUTO: 75 FL (ref 82–98)
NRBC BLD AUTO-RTO: 0 /100 WBCS
PLATELET # BLD AUTO: 312 THOUSANDS/UL (ref 149–390)
PLATELET # BLD AUTO: 449 THOUSANDS/UL (ref 149–390)
PMV BLD AUTO: 9.3 FL (ref 8.9–12.7)
PMV BLD AUTO: 9.5 FL (ref 8.9–12.7)
POTASSIUM SERPL-SCNC: 3.6 MMOL/L (ref 3.5–5.3)
PROT SERPL-MCNC: 7.3 G/DL (ref 6.4–8.2)
RBC # BLD AUTO: 4.79 MILLION/UL (ref 3.81–5.12)
RH BLD: POSITIVE
SARS-COV-2 RNA RESP QL NAA+PROBE: NEGATIVE
SODIUM SERPL-SCNC: 131 MMOL/L (ref 136–145)
SPECIMEN EXPIRATION DATE: NORMAL
WBC # BLD AUTO: 30.92 THOUSAND/UL (ref 4.31–10.16)

## 2020-07-01 PROCEDURE — 85027 COMPLETE CBC AUTOMATED: CPT | Performed by: EMERGENCY MEDICINE

## 2020-07-01 PROCEDURE — 36415 COLL VENOUS BLD VENIPUNCTURE: CPT | Performed by: EMERGENCY MEDICINE

## 2020-07-01 PROCEDURE — 96375 TX/PRO/DX INJ NEW DRUG ADDON: CPT

## 2020-07-01 PROCEDURE — 99285 EMERGENCY DEPT VISIT HI MDM: CPT

## 2020-07-01 PROCEDURE — 80053 COMPREHEN METABOLIC PANEL: CPT | Performed by: EMERGENCY MEDICINE

## 2020-07-01 PROCEDURE — 99285 EMERGENCY DEPT VISIT HI MDM: CPT | Performed by: EMERGENCY MEDICINE

## 2020-07-01 PROCEDURE — 74018 RADEX ABDOMEN 1 VIEW: CPT

## 2020-07-01 PROCEDURE — 96374 THER/PROPH/DIAG INJ IV PUSH: CPT

## 2020-07-01 PROCEDURE — 86850 RBC ANTIBODY SCREEN: CPT | Performed by: SURGERY

## 2020-07-01 PROCEDURE — 83605 ASSAY OF LACTIC ACID: CPT | Performed by: EMERGENCY MEDICINE

## 2020-07-01 PROCEDURE — 85049 AUTOMATED PLATELET COUNT: CPT | Performed by: SURGERY

## 2020-07-01 PROCEDURE — 96361 HYDRATE IV INFUSION ADD-ON: CPT

## 2020-07-01 PROCEDURE — 96376 TX/PRO/DX INJ SAME DRUG ADON: CPT

## 2020-07-01 PROCEDURE — 86900 BLOOD TYPING SEROLOGIC ABO: CPT | Performed by: SURGERY

## 2020-07-01 PROCEDURE — U0003 INFECTIOUS AGENT DETECTION BY NUCLEIC ACID (DNA OR RNA); SEVERE ACUTE RESPIRATORY SYNDROME CORONAVIRUS 2 (SARS-COV-2) (CORONAVIRUS DISEASE [COVID-19]), AMPLIFIED PROBE TECHNIQUE, MAKING USE OF HIGH THROUGHPUT TECHNOLOGIES AS DESCRIBED BY CMS-2020-01-R: HCPCS | Performed by: SURGERY

## 2020-07-01 PROCEDURE — 83690 ASSAY OF LIPASE: CPT | Performed by: EMERGENCY MEDICINE

## 2020-07-01 PROCEDURE — 86901 BLOOD TYPING SEROLOGIC RH(D): CPT | Performed by: SURGERY

## 2020-07-01 PROCEDURE — 83605 ASSAY OF LACTIC ACID: CPT | Performed by: SURGERY

## 2020-07-01 PROCEDURE — 74177 CT ABD & PELVIS W/CONTRAST: CPT

## 2020-07-01 RX ORDER — HYDROMORPHONE HCL/PF 1 MG/ML
1 SYRINGE (ML) INJECTION ONCE
Status: COMPLETED | OUTPATIENT
Start: 2020-07-01 | End: 2020-07-01

## 2020-07-01 RX ORDER — ONDANSETRON 2 MG/ML
4 INJECTION INTRAMUSCULAR; INTRAVENOUS EVERY 4 HOURS PRN
Status: DISCONTINUED | OUTPATIENT
Start: 2020-07-01 | End: 2020-07-03

## 2020-07-01 RX ORDER — HYDROMORPHONE HCL/PF 1 MG/ML
0.5 SYRINGE (ML) INJECTION ONCE
Status: COMPLETED | OUTPATIENT
Start: 2020-07-01 | End: 2020-07-01

## 2020-07-01 RX ORDER — ONDANSETRON 2 MG/ML
INJECTION INTRAMUSCULAR; INTRAVENOUS
Status: COMPLETED
Start: 2020-07-01 | End: 2020-07-01

## 2020-07-01 RX ORDER — HYDROMORPHONE HCL/PF 1 MG/ML
0.5 SYRINGE (ML) INJECTION
Status: DISCONTINUED | OUTPATIENT
Start: 2020-07-01 | End: 2020-07-03

## 2020-07-01 RX ORDER — HYDROMORPHONE HCL/PF 1 MG/ML
0.5 SYRINGE (ML) INJECTION ONCE AS NEEDED
Status: COMPLETED | OUTPATIENT
Start: 2020-07-01 | End: 2020-07-01

## 2020-07-01 RX ORDER — LIDOCAINE HYDROCHLORIDE 20 MG/ML
1 JELLY TOPICAL ONCE
Status: DISCONTINUED | OUTPATIENT
Start: 2020-07-01 | End: 2020-07-08 | Stop reason: HOSPADM

## 2020-07-01 RX ORDER — MORPHINE SULFATE 4 MG/ML
4 INJECTION, SOLUTION INTRAMUSCULAR; INTRAVENOUS EVERY 4 HOURS PRN
Status: DISCONTINUED | OUTPATIENT
Start: 2020-07-01 | End: 2020-07-03

## 2020-07-01 RX ORDER — XYLITOL/YERBA SANTA
5 AEROSOL, SPRAY WITH PUMP (ML) MUCOUS MEMBRANE 4 TIMES DAILY PRN
Status: DISCONTINUED | OUTPATIENT
Start: 2020-07-01 | End: 2020-07-08 | Stop reason: HOSPADM

## 2020-07-01 RX ORDER — POTASSIUM CHLORIDE 14.9 MG/ML
20 INJECTION INTRAVENOUS ONCE
Status: COMPLETED | OUTPATIENT
Start: 2020-07-01 | End: 2020-07-01

## 2020-07-01 RX ORDER — PROMETHAZINE HYDROCHLORIDE 25 MG/ML
12.5 INJECTION, SOLUTION INTRAMUSCULAR; INTRAVENOUS EVERY 6 HOURS PRN
Status: DISCONTINUED | OUTPATIENT
Start: 2020-07-01 | End: 2020-07-01

## 2020-07-01 RX ORDER — HYDROMORPHONE HCL/PF 1 MG/ML
0.5 SYRINGE (ML) INJECTION ONCE
Status: DISCONTINUED | OUTPATIENT
Start: 2020-07-01 | End: 2020-07-03

## 2020-07-01 RX ORDER — ONDANSETRON 2 MG/ML
4 INJECTION INTRAMUSCULAR; INTRAVENOUS EVERY 6 HOURS PRN
Status: DISCONTINUED | OUTPATIENT
Start: 2020-07-01 | End: 2020-07-01

## 2020-07-01 RX ORDER — ONDANSETRON 2 MG/ML
4 INJECTION INTRAMUSCULAR; INTRAVENOUS ONCE
Status: COMPLETED | OUTPATIENT
Start: 2020-07-01 | End: 2020-07-01

## 2020-07-01 RX ORDER — LORAZEPAM 2 MG/ML
0.5 INJECTION INTRAMUSCULAR ONCE AS NEEDED
Status: COMPLETED | OUTPATIENT
Start: 2020-07-01 | End: 2020-07-01

## 2020-07-01 RX ORDER — SODIUM CHLORIDE 9 MG/ML
125 INJECTION, SOLUTION INTRAVENOUS CONTINUOUS
Status: DISCONTINUED | OUTPATIENT
Start: 2020-07-01 | End: 2020-07-02

## 2020-07-01 RX ORDER — PROMETHAZINE HYDROCHLORIDE 25 MG/ML
12.5 INJECTION, SOLUTION INTRAMUSCULAR; INTRAVENOUS EVERY 6 HOURS PRN
Status: DISCONTINUED | OUTPATIENT
Start: 2020-07-01 | End: 2020-07-08 | Stop reason: HOSPADM

## 2020-07-01 RX ORDER — ONDANSETRON 2 MG/ML
4 INJECTION INTRAMUSCULAR; INTRAVENOUS ONCE AS NEEDED
Status: COMPLETED | OUTPATIENT
Start: 2020-07-01 | End: 2020-07-01

## 2020-07-01 RX ADMIN — ONDANSETRON 4 MG: 2 INJECTION INTRAMUSCULAR; INTRAVENOUS at 09:12

## 2020-07-01 RX ADMIN — SODIUM CHLORIDE 125 ML/HR: 0.9 INJECTION, SOLUTION INTRAVENOUS at 12:30

## 2020-07-01 RX ADMIN — HYDROMORPHONE HYDROCHLORIDE 0.5 MG: 1 INJECTION, SOLUTION INTRAMUSCULAR; INTRAVENOUS; SUBCUTANEOUS at 16:14

## 2020-07-01 RX ADMIN — LORAZEPAM 0.5 MG: 2 INJECTION INTRAMUSCULAR; INTRAVENOUS at 14:07

## 2020-07-01 RX ADMIN — ONDANSETRON 4 MG: 2 INJECTION INTRAMUSCULAR; INTRAVENOUS at 13:57

## 2020-07-01 RX ADMIN — HYDROMORPHONE HYDROCHLORIDE 0.5 MG: 1 INJECTION, SOLUTION INTRAMUSCULAR; INTRAVENOUS; SUBCUTANEOUS at 06:57

## 2020-07-01 RX ADMIN — Medication 5 SPRAY: at 15:58

## 2020-07-01 RX ADMIN — SODIUM CHLORIDE 1000 ML: 0.9 INJECTION, SOLUTION INTRAVENOUS at 14:30

## 2020-07-01 RX ADMIN — MORPHINE SULFATE 4 MG: 4 INJECTION INTRAVENOUS at 18:30

## 2020-07-01 RX ADMIN — POTASSIUM CHLORIDE 20 MEQ: 14.9 INJECTION, SOLUTION INTRAVENOUS at 15:41

## 2020-07-01 RX ADMIN — PROMETHAZINE HYDROCHLORIDE 12.5 MG: 25 INJECTION INTRAMUSCULAR; INTRAVENOUS at 12:38

## 2020-07-01 RX ADMIN — ONDANSETRON 4 MG: 2 INJECTION INTRAMUSCULAR; INTRAVENOUS at 05:42

## 2020-07-01 RX ADMIN — MORPHINE SULFATE 4 MG: 4 INJECTION INTRAVENOUS at 12:37

## 2020-07-01 RX ADMIN — ENOXAPARIN SODIUM 40 MG: 40 INJECTION SUBCUTANEOUS at 15:57

## 2020-07-01 RX ADMIN — SODIUM CHLORIDE 1000 ML: 0.9 INJECTION, SOLUTION INTRAVENOUS at 06:58

## 2020-07-01 RX ADMIN — HYDROMORPHONE HYDROCHLORIDE 0.5 MG: 1 INJECTION, SOLUTION INTRAMUSCULAR; INTRAVENOUS; SUBCUTANEOUS at 21:07

## 2020-07-01 RX ADMIN — IOHEXOL 100 ML: 350 INJECTION, SOLUTION INTRAVENOUS at 05:51

## 2020-07-01 RX ADMIN — SODIUM CHLORIDE 1000 ML: 0.9 INJECTION, SOLUTION INTRAVENOUS at 07:37

## 2020-07-01 RX ADMIN — POTASSIUM CHLORIDE 20 MEQ: 14.9 INJECTION, SOLUTION INTRAVENOUS at 20:59

## 2020-07-01 RX ADMIN — MORPHINE SULFATE 4 MG: 4 INJECTION INTRAVENOUS at 23:43

## 2020-07-01 RX ADMIN — HYDROMORPHONE HYDROCHLORIDE 0.5 MG: 1 INJECTION, SOLUTION INTRAMUSCULAR; INTRAVENOUS; SUBCUTANEOUS at 09:10

## 2020-07-01 RX ADMIN — HYDROMORPHONE HYDROCHLORIDE 1 MG: 1 INJECTION, SOLUTION INTRAMUSCULAR; INTRAVENOUS; SUBCUTANEOUS at 05:36

## 2020-07-01 NOTE — ED PROVIDER NOTES
History  Chief Complaint   Patient presents with    Abdominal Pain     Pt c/o abdominal pain for several days and was recently seen to rule out bowel obstruction  Pt vomiting since 0200, denies diarrhea       50 YOF with history of rectal cancer currently on chemotherapy with metastasis to the liver, breast cancer, who presents for abdominal pain and vomiting  Pt was recently evaluated for abdominal pain on 6/29 and had an unremarkable workup  Her pain has significantly worsened since then  Her pain is LLQ and suprapubic and she describes it as sharp  She developed NBNB vomiting and nausea tonight  She has had minimal bowel movements since her symptoms began  She denies fevers  Prior to Admission Medications   Prescriptions Last Dose Informant Patient Reported? Taking?    Cholecalciferol 50 MCG (2000 UT) CAPS   Yes Yes   Sig: Take by mouth   Ergocalciferol (VITAMIN D2 PO)   Yes Yes   Sig: Take by mouth   Na Sulfate-K Sulfate-Mg Sulf (Suprep Bowel Prep Kit) 17 5-3 13-1 6 GM/177ML SOLN   No No   Sig: Take 1 kit by mouth once for 1 dose   cetirizine (ZyrTEC) 10 mg tablet  Other (Specify) Yes Yes   Sig: Take 10 mg by mouth daily   metoprolol succinate (TOPROL-XL) 25 mg 24 hr tablet  Other (Specify) Yes Yes   Sig: Take 25 mg by mouth daily   oxyCODONE-acetaminophen (PERCOCET) 5-325 mg per tablet   Yes No   Sig: Take 1 tablet by mouth every 4 (four) hours as needed for moderate pain   potassium chloride (K-DUR,KLOR-CON) 20 mEq tablet   No No   Sig: Take 1 tablet (20 mEq total) by mouth 2 (two) times a day for 3 days   sertraline (ZOLOFT) 25 mg tablet   Yes No   Sig: Take 25 mg by mouth daily      Facility-Administered Medications: None       Past Medical History:   Diagnosis Date    History of rectal cancer 06/2020       Past Surgical History:   Procedure Laterality Date    BACK SURGERY      BREAST BIOPSY Right 06/19/2020    2 sites; 1 breast 1 axilla    HERNIA REPAIR      3 repairs    IR IMAGE GUIDED BIOPSY/ASPIRATION LIVER  6/17/2020    IR PORT PLACEMENT  6/22/2020    TUBAL LIGATION      US GUIDED BREAST BIOPSY RIGHT COMPLETE Right 6/19/2020    US GUIDED BREAST LYMPH NODE BIOPSY RIGHT Right 6/19/2020       Family History   Problem Relation Age of Onset    No Known Problems Mother     No Known Problems Sister     No Known Problems Daughter     Breast cancer Maternal Grandmother         age unknown    No Known Problems Sister     No Known Problems Daughter     Breast cancer Maternal Aunt 61    No Known Problems Maternal Aunt      I have reviewed and agree with the history as documented  E-Cigarette/Vaping    E-Cigarette Use Never User      E-Cigarette/Vaping Substances    Nicotine No     THC No     CBD No     Flavoring No     Other No     Unknown No      Social History     Tobacco Use    Smoking status: Current Every Day Smoker     Packs/day: 0 50     Types: Cigarettes    Smokeless tobacco: Never Used   Substance Use Topics    Alcohol use: Not Currently    Drug use: No        Review of Systems   Constitutional: Negative for chills, diaphoresis and fever  HENT: Negative for congestion, rhinorrhea and sore throat  Eyes: Negative for visual disturbance  Respiratory: Negative for cough, chest tightness and shortness of breath  Cardiovascular: Negative for chest pain and leg swelling  Gastrointestinal: Positive for abdominal pain, nausea and vomiting  Negative for abdominal distention and diarrhea  Genitourinary: Negative for dysuria, flank pain, frequency and urgency  Musculoskeletal: Negative for back pain and gait problem  Skin: Negative for pallor and rash  Neurological: Negative for syncope, weakness, light-headedness and headaches  All other systems reviewed and are negative        Physical Exam  ED Triage Vitals   Temperature Pulse Respirations Blood Pressure SpO2   07/01/20 0546 07/01/20 0530 07/01/20 0530 07/01/20 0530 07/01/20 0530   (!) 97 4 °F (36 3 °C) 99 20 127/75 97 %      Temp Source Heart Rate Source Patient Position - Orthostatic VS BP Location FiO2 (%)   07/02/20 1530 07/01/20 0530 07/01/20 0530 07/01/20 0530 --   Oral Monitor Sitting Right arm       Pain Score       07/01/20 0536       Worst Possible Pain             Orthostatic Vital Signs  Vitals:    07/01/20 2155 07/02/20 0717 07/02/20 1530 07/02/20 1920   BP: 115/67 114/72 113/70 139/76   Pulse: 81 76 63 84   Patient Position - Orthostatic VS:   Lying        Physical Exam   Constitutional: She is oriented to person, place, and time  She appears distressed  HENT:   Head: Normocephalic and atraumatic  Mouth/Throat: Oropharynx is clear and moist    Eyes: Pupils are equal, round, and reactive to light  Conjunctivae and EOM are normal    Neck: Normal range of motion  Neck supple  Cardiovascular: Normal rate and regular rhythm  Exam reveals no gallop and no friction rub  No murmur heard  Pulmonary/Chest: Effort normal and breath sounds normal  She has no wheezes  She has no rales  Abdominal: Soft  Bowel sounds are normal  She exhibits no distension  There is generalized tenderness  There is guarding  There is no rigidity  A hernia (reducible) is present  Musculoskeletal: Normal range of motion  She exhibits no edema  Neurological: She is alert and oriented to person, place, and time  No cranial nerve deficit or sensory deficit  Skin: Skin is warm and dry  No rash noted  No pallor  Psychiatric: She has a normal mood and affect  Her behavior is normal    Nursing note and vitals reviewed        ED Medications  Medications   HYDROmorphone (DILAUDID) injection 0 5 mg ( Intravenous Canceled Entry 7/1/20 1103)   lidocaine (URO-JET) 2 % urethral/mucosal gel 1 application (1 application Urethral Not Given 7/1/20 1500)   saliva substitute (MOUTH KOTE) mucosal solution 5 spray (5 sprays Mouth/Throat Given 7/1/20 6668)   nicotine (NICODERM CQ) 7 mg/24hr TD 24 hr patch 1 patch (1 patch Transdermal Not Given 7/2/20 0829)   enoxaparin (LOVENOX) subcutaneous injection 40 mg (40 mg Subcutaneous Given 7/2/20 0828)   morphine injection 2 mg (has no administration in time range)   morphine (PF) 4 mg/mL injection 4 mg (4 mg Intravenous Given 7/3/20 0343)   HYDROmorphone (DILAUDID) injection 0 5 mg (0 5 mg Intravenous Given 7/1/20 2107)   phenol (CHLORASEPTIC) 1 4 % mucosal liquid 2 spray (has no administration in time range)   ondansetron (ZOFRAN) injection 4 mg (4 mg Intravenous Given 7/2/20 1917)   promethazine (PHENERGAN) injection 12 5 mg (has no administration in time range)   dextrose 5 % and sodium chloride 0 45 % with KCl 20 mEq/L infusion (84 mL/hr Intravenous New Bag 7/2/20 2232)   oxyCODONE (ROXICODONE) IR tablet 5 mg (5 mg Oral Given 7/2/20 2231)   acetaminophen (TYLENOL) tablet 650 mg (650 mg Oral Given 7/2/20 0958)   polyethylene glycol (MIRALAX) packet 17 g (17 g Oral Not Given 7/2/20 2104)   metoprolol succinate (TOPROL-XL) 24 hr tablet 25 mg (has no administration in time range)   loratadine (CLARITIN) tablet 10 mg (10 mg Oral Not Given 7/2/20 1530)   melatonin tablet 3 mg (3 mg Oral Given 7/2/20 2231)   HYDROmorphone (DILAUDID) injection 1 mg (1 mg Intravenous Given 7/1/20 0536)   ondansetron (ZOFRAN) injection 4 mg (4 mg Intravenous Given 7/1/20 0542)   iohexol (OMNIPAQUE) 350 MG/ML injection (MULTI-DOSE) 100 mL (100 mL Intravenous Given 7/1/20 0551)   HYDROmorphone (DILAUDID) injection 0 5 mg (0 5 mg Intravenous Given 7/1/20 0657)   sodium chloride 0 9 % bolus 1,000 mL (0 mL Intravenous Stopped 7/1/20 1000)   sodium chloride 0 9 % bolus 1,000 mL (0 mL Intravenous Stopped 7/1/20 0930)   HYDROmorphone (DILAUDID) injection 0 5 mg (0 5 mg Intravenous Given 7/1/20 0910)   ondansetron (ZOFRAN) injection 4 mg (4 mg Intravenous Given 7/1/20 0912)   sodium chloride 0 9 % bolus 1,000 mL (0 mL Intravenous Stopped 7/1/20 1608)   potassium chloride 20 mEq IVPB (premix) (0 mEq Intravenous Stopped 7/1/20 2056)   potassium chloride 20 mEq IVPB (premix) (20 mEq Intravenous New Bag 7/1/20 2059)   LORazepam (ATIVAN) injection 0 5 mg (0 5 mg Intravenous Given 7/1/20 1407)   dexamethasone (DECADRON) injection 4 mg (4 mg Intravenous Given 7/2/20 0142)   sodium phosphate 9 mmol in sodium chloride 0 9 % 100 mL Infusion (9 mmol Intravenous New Bag 7/2/20 1055)       Diagnostic Studies  Results Reviewed     Procedure Component Value Units Date/Time    CBC and differential [[id number]]  (Abnormal) Collected:  07/02/20 0501    Lab Status:  Final result Specimen:  Blood from Arm, Right Updated:  07/02/20 0727     WBC 13 68 Thousand/uL      RBC 3 71 Million/uL      Hemoglobin 8 4 g/dL      Hematocrit 28 1 %      MCV 76 fL      MCH 22 6 pg      MCHC 29 9 g/dL      RDW 19 8 %      MPV 10 2 fL      Platelets 455 Thousands/uL      nRBC 0 /100 WBCs     Narrative:        See manual diff done within 3 days  This is an appended report  These results have been appended to a previously verified report      Basic metabolic panel [[id number]]  (Abnormal) Collected:  07/02/20 0501    Lab Status:  Final result Specimen:  Blood from Arm, Right Updated:  07/02/20 0710     Sodium 136 mmol/L      Potassium 4 1 mmol/L      Chloride 107 mmol/L      CO2 22 mmol/L      ANION GAP 7 mmol/L      BUN 13 mg/dL      Creatinine 0 45 mg/dL      Glucose 100 mg/dL      Calcium 8 3 mg/dL      eGFR 120 ml/min/1 73sq m     Narrative:       [facility] guidelines for Chronic Kidney Disease (CKD):     Stage 1 with normal or high GFR (GFR > 90 mL/min/1 73 square meters)    Stage 2 Mild CKD (GFR = 60-89 mL/min/1 73 square meters)    Stage 3A Moderate CKD (GFR = 45-59 mL/min/1 73 square meters)    Stage 3B Moderate CKD (GFR = 30-44 mL/min/1 73 square meters)    Stage 4 Severe CKD (GFR = 15-29 mL/min/1 73 square meters)    Stage 5 End Stage CKD (GFR <15 mL/min/1 73 square meters)  Note: GFR calculation is accurate only with a steady state creatinine Magnesium [[id number]]  (Normal) Collected:  07/02/20 0501    Lab Status:  Final result Specimen:  Blood from Arm, Right Updated:  07/02/20 0710     Magnesium 2 4 mg/dL     Phosphorus [[id number]]  (Abnormal) Collected:  07/02/20 0501    Lab Status:  Final result Specimen:  Blood from Arm, Right Updated:  07/02/20 0710     Phosphorus 2 6 mg/dL     Novel Coronavirus (Covid-19),PCR [facility] [[id number]]  (Normal) Collected:  07/01/20 1126    Lab Status:  Final result Specimen:  Nares from Nose Updated:  07/01/20 1310     SARS-CoV-2 Negative    Narrative: The specimen collection materials, transport medium, and/or testing methodology utilized in the production of these test results have been proven to be reliable in a limited validation with an abbreviated program under the Emergency Utilization Authorization provided by the FDA  Testing reported as "Presumptive positive" will be confirmed with secondary testing with a reference laboratory to ensure result accuracy  Clinical caution and judgement should be used with the interpretation of these results with consideration of the clinical impression and other laboratory testing  Testing reported as "Positive" or "Negative" has been proven to be accurate according to standard laboratory validation requirements  All testing is performed with control materials showing appropriate reactivity at standard intervals  Lactic acid 2 Hours [[id number]]  (Abnormal) Collected:  07/01/20 1126    Lab Status:  Final result Specimen:  Blood from Arm, Left Updated:  07/01/20 1258     LACTIC ACID 2 8 mmol/L     Narrative:       Result may be elevated if tourniquet was used during collection      Platelet count [[id number]]  (Normal) Collected:  07/01/20 1126    Lab Status:  Final result Specimen:  Blood from Arm, Left Updated:  07/01/20 1221     Platelets 020 Thousands/uL      MPV 9 3 fL     Lactic acid, plasma [[id number]] Collected:  07/01/20 1132    Lab Status:  No result Specimen: Blood from Arm, Left     Lactic acid [[id number]]  (Abnormal) Collected:  07/01/20 0546    Lab Status:  Final result Specimen:  Blood from Arm, Left Updated:  07/01/20 0656     LACTIC ACID 2 8 mmol/L     Narrative:       Result may be elevated if tourniquet was used during collection      Comprehensive metabolic panel [[id number]]  (Abnormal) Collected:  07/01/20 0542    Lab Status:  Final result Specimen:  Blood from Arm, Left Updated:  07/01/20 4641     Sodium 131 mmol/L      Potassium 3 6 mmol/L      Chloride 98 mmol/L      CO2 23 mmol/L      ANION GAP 10 mmol/L      BUN 23 mg/dL      Creatinine 0 84 mg/dL      Glucose 181 mg/dL      Calcium 9 2 mg/dL      AST 28 U/L      ALT 37 U/L      Alkaline Phosphatase 336 U/L      Total Protein 7 3 g/dL      Albumin 2 9 g/dL      Total Bilirubin 0 46 mg/dL      eGFR 83 ml/min/1 73sq m     Narrative:       [facility] guidelines for Chronic Kidney Disease (CKD):     Stage 1 with normal or high GFR (GFR > 90 mL/min/1 73 square meters)    Stage 2 Mild CKD (GFR = 60-89 mL/min/1 73 square meters)    Stage 3A Moderate CKD (GFR = 45-59 mL/min/1 73 square meters)    Stage 3B Moderate CKD (GFR = 30-44 mL/min/1 73 square meters)    Stage 4 Severe CKD (GFR = 15-29 mL/min/1 73 square meters)    Stage 5 End Stage CKD (GFR <15 mL/min/1 73 square meters)  Note: GFR calculation is accurate only with a steady state creatinine    Lipase [[id number]]  (Normal) Collected:  07/01/20 0542    Lab Status:  Final result Specimen:  Blood from Arm, Left Updated:  07/01/20 0613     Lipase 96 u/L     CBC and differential [[id number]]  (Abnormal) Collected:  07/01/20 0542    Lab Status:  Final result Specimen:  Blood from Arm, Left Updated:  07/01/20 0556     WBC 30 92 Thousand/uL      RBC 4 79 Million/uL      Hemoglobin 10 6 g/dL      Hematocrit 35 7 %      MCV 75 fL      MCH 22 1 pg      MCHC 29 7 g/dL      RDW 19 2 %      MPV 9 5 fL      Platelets 685 Thousands/uL      nRBC 0 /100 WBCs     Narrative:        See manual diff done within 3 days  POCT urinalysis dipstick [[id number]]     Lab Status:  No result Specimen:  Urine                  XR abdomen 1 view kub   Final Result by Niles Nuñez MD (07/01 1654)      Feeding tube tip terminating in the stomach  Workstation performed: [id number]         CT abdomen pelvis with contrast   Final Result by Charla Pino DO (07/01 2964)   1  High-grade distal large bowel obstruction at the level of the distal sigmoid colon/rectum, secondary to patient's known rectal carcinoma  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  2   Mild mucosal thickening of the sigmoid colon, most compatible with mild colitis, likely related to the patient's chemotherapy  3   No significant change in pulmonary or hepatic metastatic disease  4   The previously identified ventral abdominal wall hernia containing loops of small bowel, has been reduced  5   Stable left ovarian cyst       Follow-up surgical consultation recommended  I personally discussed this study with Laniebie Zafar on 7/1/2020 at 6:16 AM                Workstation performed: [id number]               Procedures  Procedures      ED Course  ED Course as of Jul 03 0816   Wed Jul 01, 2020   0557 WBC(!!): 30 92   0613 Lipase: 96   0613 Comprehensive metabolic panel(!)   4887 CTAP: high grad distal colonic obstruction at level of tumor, ileocecal valve incompetent decompressing into distal small bowel, mild colitis of sigmoid colon likely to chemo      0656 LACTIC ACID(!!): 2 8                                           MDM  Number of Diagnoses or Management Options  Elevated lactic acid level: new and requires workup  History of rectal cancer: new and requires workup  Large bowel obstruction ([facility] [address] ): new and requires workup  Leukocytosis: new and requires workup  Diagnosis management comments: 50 YOF with abdominal pain   Plan to repeat abdominal labs, CTAP to evaluate for obstruction, perforation, diverticulitis, etc  CT showing high grade obstruction at the level of her rectal tumor  Discussed with [facility]  Admitted to their service         Amount and/or Complexity of Data Reviewed  Clinical lab tests: ordered and reviewed  Tests in the radiology section of CPT®: ordered and reviewed  Decide to obtain previous medical records or to obtain history from someone other than the patient: yes  Review and summarize past medical records: yes    Risk of Complications, Morbidity, and/or Mortality  Presenting problems: high  Diagnostic procedures: moderate  Management options: high    Patient Progress  Patient progress: stable        Disposition  Final diagnoses:   Large bowel obstruction ([facility] [address] )   History of rectal cancer   Leukocytosis   Elevated lactic acid level     Time reflects when diagnosis was documented in both MDM as applicable and the Disposition within this note     Time User Action Codes Description Comment    7/1/2020  6:55 AM Lurlene Hunting Add [K56 609] Small bowel obstruction ([facility] [address] )     7/1/2020  6:57 AM Lurlene Hunting Remove [K56 609] [facility]     7/1/2020  6:57 AM Lurlene Hunting Add [K56 609] Large bowel obstruction ([facility] [address] )     7/1/2020  6:57 AM Lurlene Hunting Add [Z85 048] History of rectal cancer     7/1/2020  6:58 AM Lurlene Hunting Add [D72 829] Leukocytosis     7/1/2020  6:58 AM Lurlene Hunting Add [R79 89] Elevated lactic acid level     7/1/2020  9:37 AM Marlon Sheikh Add [C20] Rectal cancer [facility])       ED Disposition     ED Disposition Condition Date/Time Comment    Admit Stable Wed Jul 1, 2020  6:55 AM Case was discussed with [facility] team and the patient's admission status was agreed to be Admission Status: inpatient status to the service of Dr Claribel Zambrano    None         Current Discharge Medication List      CONTINUE these medications which have NOT CHANGED    Details cetirizine (ZyrTEC) 10 mg tablet Take 10 mg by mouth daily      Cholecalciferol 50 MCG (2000 UT) CAPS Take by mouth      Ergocalciferol (VITAMIN D2 PO) Take by mouth      metoprolol succinate (TOPROL-XL) 25 mg 24 hr tablet Take 25 mg by mouth daily      Na Sulfate-K Sulfate-Mg Sulf (Suprep Bowel Prep Kit) 17 5-3 13-1 6 GM/177ML SOLN Take 1 kit by mouth once for 1 dose  Qty: 2 Bottle, Refills: 0    Associated Diagnoses: Bowel habit changes      oxyCODONE-acetaminophen (PERCOCET) 5-325 mg per tablet Take 1 tablet by mouth every 4 (four) hours as needed for moderate pain      potassium chloride (K-DUR,KLOR-CON) 20 mEq tablet Take 1 tablet (20 mEq total) by mouth 2 (two) times a day for 3 days  Qty: 6 tablet, Refills: 0    Associated Diagnoses: Hypokalemia      sertraline (ZOLOFT) 25 mg tablet Take 25 mg by mouth daily           No discharge procedures on file  PDMP Review     None           ED Provider  Attending physically available and evaluated Adrianarebeca Pérezmeche LITTLE managed the patient along with the ED Attending      Electronically Signed by         Prince Pablo MD  07/03/20 1408

## 2020-07-01 NOTE — ED NOTES
Pt has been unavb for blood work or NG placement due to need to keep going to the bathroom      Bronson Pedersen, RN  07/01/20 [address] Centerdarline Read,[facility], RN  07/01/20 5365

## 2020-07-01 NOTE — H&P
H&P- Colorectal Surgery  Amado Fitzpatrick 50 y o  female MRN: [id number]  Unit/Bed#: ED 05 Encounter: [id number]        Assessment/Plan     Assessment:  40-year-old female with stage IV rectal cancer on FOLFIRINOX now with obstructing rectal tumor and large bowel obstruction with incompetent ileocecal valce    Plan:  Admit to colorectal surgery  Place nasogastric tube to low intermittent wall suction  Fluid resuscitate, received to L boluses, follow-up lactate  Normal saline at 125  Pain control as needed  Nausea control as needed    Consult to Hematology-Oncology as she is currently being treated however is on her week off would appreciate recs    Plan for OR on Friday 07/03/2020 for laparoscopic loop colostomy, explained the need for this to the patient and she is in agreement  She has consented    Follow-up coronavirus nasal swab    The patient does follow with Dr Maycol Tim however due to her presentation to [place] [address], a call was made to Dr Orestes Avila and she was in agreement that if the patient shows to stay here she could be admitted per colorectal for surgery with our team   Patient did choose to stay at [facility]  History of Present Illness     HPI:  Boris Bruno is a 50 y o  female who presents to [address] [place] [address] emergency department with increasing abdominal pain  She presented on 06/29/2020 for the same issue however CT scan at that time did not show large bowel obstruction nor concern for obstruction at the rectal tumor  She does have a past medical history significant for stage IV rectal cancer with liver and lung mets as well as concomitant breast cancer  She is currently receiving treatment per Oncology however this is her week off  She denies passing much flatus and is having very small liquid bowel movements however the pain has become worse and worse which prompted her to come into the emergency department  She is having some nausea with emesis    She denies any shortness of breath or chest pain       Review of Systems   Constitutional: Positive for activity change, appetite change and fatigue  HENT: Negative  Eyes: Negative  Respiratory: Negative  Cardiovascular: Negative  Gastrointestinal: Positive for abdominal distention, abdominal pain, constipation (small liquid stools, little flatus), nausea and vomiting  Endocrine: Negative  Genitourinary: Negative  Musculoskeletal: Negative  Skin: Negative  Allergic/Immunologic: Positive for immunocompromised state  Neurological: Negative  Hematological: Negative  Psychiatric/Behavioral: Negative  Historical Information   Past Medical History:   Diagnosis Date    History of rectal cancer 06/2020     Past Surgical History:   Procedure Laterality Date    BACK SURGERY      BREAST BIOPSY Right 06/19/2020    2 sites; 1 breast 1 axilla    HERNIA REPAIR      3 repairs    IR IMAGE GUIDED BIOPSY/ASPIRATION LIVER  6/17/2020    IR PORT PLACEMENT  6/22/2020    TUBAL LIGATION      US GUIDED BREAST BIOPSY RIGHT COMPLETE Right 6/19/2020    US GUIDED BREAST LYMPH NODE BIOPSY RIGHT Right 6/19/2020     Social History   Social History     Substance and Sexual Activity   Alcohol Use Not Currently     Social History     Substance and Sexual Activity   Drug Use No     Social History     Tobacco Use   Smoking Status Current Every Day Smoker    Packs/day: 0 50    Types: Cigarettes   Smokeless Tobacco Never Used     Family History:   Family History   Problem Relation Age of Onset    No Known Problems Mother     No Known Problems Sister     No Known Problems Daughter     Breast cancer Maternal Grandmother         age unknown    No Known Problems Sister     No Known Problems Daughter     Breast cancer Maternal Aunt 61    No Known Problems Maternal Aunt        Meds/Allergies   PTA meds:   Prior to Admission Medications   Prescriptions Last Dose Informant Patient Reported? Taking?    Na Sulfate-K Sulfate-Mg Sulf (Suprep Bowel Prep Kit) 17 5-3 13-1 6 GM/177ML SOLN   No No   Sig: Take 1 kit by mouth once for 1 dose   oxyCODONE-acetaminophen (PERCOCET) 5-325 mg per tablet   Yes No   Sig: Take 1 tablet by mouth every 4 (four) hours as needed for moderate pain   potassium chloride (K-DUR,KLOR-CON) 20 mEq tablet   No No   Sig: Take 1 tablet (20 mEq total) by mouth 2 (two) times a day for 3 days   sertraline (ZOLOFT) 25 mg tablet   Yes No   Sig: Take 25 mg by mouth daily      Facility-Administered Medications: None     No Known Allergies    Objective   First Vitals:   Blood Pressure: 127/75 (07/01/20 0530)  Pulse: 99 (07/01/20 0530)  Temperature: (!) 97 4 °F (36 3 °C) (07/01/20 0546)  Respirations: 20 (07/01/20 0530)  SpO2: 97 % (07/01/20 0530)    Current Vitals:   Blood Pressure: 112/78 (07/01/20 0700)  Pulse: 84 (07/01/20 0700)  Temperature: (!) 97 4 °F (36 3 °C) (07/01/20 2614)  Respirations: 16 (07/01/20 0700)  SpO2: 97 % (07/01/20 0700)    No intake or output data in the 24 hours ending 07/01/20 0940    Invasive Devices     Central Venous Catheter Line            Port A Cath 06/22/20 Right Chest 8 days          Peripheral Intravenous Line            Peripheral IV 07/01/20 Left Antecubital less than 1 day                Physical Exam   Constitutional: She is oriented to person, place, and time  She appears well-developed  She appears distressed  HENT:   Head: Normocephalic and atraumatic  Eyes: EOM are normal    Neck: Neck supple  No tracheal deviation present  Cardiovascular: Normal rate and regular rhythm  Pulmonary/Chest: Effort normal  No respiratory distress  Abdominal: Soft  She exhibits distension (with tympany)  There is tenderness (mostly in RLQ however throughout adomen)  A hernia (sub umbilical hernia is reducible) is present     Genitourinary:   Genitourinary Comments: CAMILA - liquid stool in rectal vault  Large tumor felt in rectum appears to be circumferential   Musculoskeletal: Normal range of motion  She exhibits no tenderness  Neurological: She is alert and oriented to person, place, and time  Skin: Skin is warm  Psychiatric: She has a normal mood and affect  Her behavior is normal        Lab Results:   I have personally reviewed pertinent lab results  , CBC:   Lab Results   Component Value Date    WBC 30 92 (HH) 07/01/2020    HGB 10 6 (L) 07/01/2020    HCT 35 7 07/01/2020    MCV 75 (L) 07/01/2020     (H) 07/01/2020    MCH 22 1 (L) 07/01/2020    MCHC 29 7 (L) 07/01/2020    RDW 19 2 (H) 07/01/2020    MPV 9 5 07/01/2020    NRBC 0 07/01/2020   , CMP:   Lab Results   Component Value Date    SODIUM 131 (L) 07/01/2020    K 3 6 07/01/2020    CL 98 (L) 07/01/2020    CO2 23 07/01/2020    BUN 23 07/01/2020    CREATININE 0 84 07/01/2020    CALCIUM 9 2 07/01/2020    AST 28 07/01/2020    ALT 37 07/01/2020    ALKPHOS 336 (H) 07/01/2020    EGFR 83 07/01/2020     Imaging: I have personally reviewed pertinent reports  and I have personally reviewed pertinent films in PACS  EKG, Pathology, and Other Studies: I have personally reviewed pertinent reports     and I have personally reviewed pertinent films in PACS    Code Status: Level 1 - Full Code  Advance Directive and Living Will:      Power of :    POLST:

## 2020-07-01 NOTE — PLAN OF CARE
Problem: PAIN - ADULT  Goal: Verbalizes/displays adequate comfort level or baseline comfort level  Description  Interventions:  - Encourage patient to monitor pain and request assistance  - Assess pain using appropriate pain scale  - Administer analgesics based on type and severity of pain and evaluate response  - Implement non-pharmacological measures as appropriate and evaluate response  - Consider cultural and social influences on pain and pain management  - Notify physician/advanced practitioner if interventions unsuccessful or patient reports new pain  7/1/2020 1307 by Jose Nagel RN  Outcome: Progressing  7/1/2020 1306 by Jose Nagel RN  Outcome: Progressing

## 2020-07-02 LAB
ANION GAP SERPL CALCULATED.3IONS-SCNC: 7 MMOL/L (ref 4–13)
BUN SERPL-MCNC: 13 MG/DL (ref 5–25)
CALCIUM SERPL-MCNC: 8.3 MG/DL (ref 8.3–10.1)
CHLORIDE SERPL-SCNC: 107 MMOL/L (ref 100–108)
CO2 SERPL-SCNC: 22 MMOL/L (ref 21–32)
CREAT SERPL-MCNC: 0.45 MG/DL (ref 0.6–1.3)
ERYTHROCYTE [DISTWIDTH] IN BLOOD BY AUTOMATED COUNT: 19.8 % (ref 11.6–15.1)
GFR SERPL CREATININE-BSD FRML MDRD: 120 ML/MIN/1.73SQ M
GLUCOSE SERPL-MCNC: 100 MG/DL (ref 65–140)
HCT VFR BLD AUTO: 28.1 % (ref 34.8–46.1)
HGB BLD-MCNC: 8.4 G/DL (ref 11.5–15.4)
MAGNESIUM SERPL-MCNC: 2.4 MG/DL (ref 1.6–2.6)
MCH RBC QN AUTO: 22.6 PG (ref 26.8–34.3)
MCHC RBC AUTO-ENTMCNC: 29.9 G/DL (ref 31.4–37.4)
MCV RBC AUTO: 76 FL (ref 82–98)
NRBC BLD AUTO-RTO: 0 /100 WBCS
PHOSPHATE SERPL-MCNC: 2.6 MG/DL (ref 2.7–4.5)
PLATELET # BLD AUTO: 255 THOUSANDS/UL (ref 149–390)
PMV BLD AUTO: 10.2 FL (ref 8.9–12.7)
POTASSIUM SERPL-SCNC: 4.1 MMOL/L (ref 3.5–5.3)
RBC # BLD AUTO: 3.71 MILLION/UL (ref 3.81–5.12)
SODIUM SERPL-SCNC: 136 MMOL/L (ref 136–145)
WBC # BLD AUTO: 13.68 THOUSAND/UL (ref 4.31–10.16)

## 2020-07-02 PROCEDURE — 85027 COMPLETE CBC AUTOMATED: CPT | Performed by: SURGERY

## 2020-07-02 PROCEDURE — 80048 BASIC METABOLIC PNL TOTAL CA: CPT | Performed by: SURGERY

## 2020-07-02 PROCEDURE — 83735 ASSAY OF MAGNESIUM: CPT | Performed by: SURGERY

## 2020-07-02 PROCEDURE — 84100 ASSAY OF PHOSPHORUS: CPT | Performed by: SURGERY

## 2020-07-02 RX ORDER — CETIRIZINE HYDROCHLORIDE 10 MG/1
10 TABLET ORAL DAILY
COMMUNITY

## 2020-07-02 RX ORDER — DEXAMETHASONE SODIUM PHOSPHATE 4 MG/ML
4 INJECTION, SOLUTION INTRA-ARTICULAR; INTRALESIONAL; INTRAMUSCULAR; INTRAVENOUS; SOFT TISSUE ONCE
Status: COMPLETED | OUTPATIENT
Start: 2020-07-02 | End: 2020-07-02

## 2020-07-02 RX ORDER — METOPROLOL SUCCINATE 25 MG/1
25 TABLET, EXTENDED RELEASE ORAL DAILY
COMMUNITY
End: 2020-09-16 | Stop reason: HOSPADM

## 2020-07-02 RX ORDER — DEXTROSE, SODIUM CHLORIDE, AND POTASSIUM CHLORIDE 5; .45; .15 G/100ML; G/100ML; G/100ML
75 INJECTION INTRAVENOUS CONTINUOUS
Status: DISCONTINUED | OUTPATIENT
Start: 2020-07-02 | End: 2020-07-02

## 2020-07-02 RX ORDER — LANOLIN ALCOHOL/MO/W.PET/CERES
3 CREAM (GRAM) TOPICAL
Status: DISCONTINUED | OUTPATIENT
Start: 2020-07-02 | End: 2020-07-08 | Stop reason: HOSPADM

## 2020-07-02 RX ORDER — METOPROLOL SUCCINATE 25 MG/1
25 TABLET, EXTENDED RELEASE ORAL DAILY
Status: DISCONTINUED | OUTPATIENT
Start: 2020-07-03 | End: 2020-07-08 | Stop reason: HOSPADM

## 2020-07-02 RX ORDER — OXYCODONE HYDROCHLORIDE 5 MG/1
5 TABLET ORAL EVERY 4 HOURS PRN
Status: DISCONTINUED | OUTPATIENT
Start: 2020-07-02 | End: 2020-07-05

## 2020-07-02 RX ORDER — LORATADINE 10 MG/1
10 TABLET ORAL DAILY
Status: DISCONTINUED | OUTPATIENT
Start: 2020-07-02 | End: 2020-07-08 | Stop reason: HOSPADM

## 2020-07-02 RX ORDER — ACETAMINOPHEN 325 MG/1
650 TABLET ORAL EVERY 6 HOURS PRN
Status: DISCONTINUED | OUTPATIENT
Start: 2020-07-02 | End: 2020-07-03

## 2020-07-02 RX ORDER — POLYETHYLENE GLYCOL 3350 17 G/17G
17 POWDER, FOR SOLUTION ORAL 4 TIMES DAILY
Status: DISCONTINUED | OUTPATIENT
Start: 2020-07-02 | End: 2020-07-05

## 2020-07-02 RX ORDER — CETIRIZINE HYDROCHLORIDE 10 MG/1
10 TABLET ORAL DAILY
Status: DISCONTINUED | OUTPATIENT
Start: 2020-07-02 | End: 2020-07-02

## 2020-07-02 RX ORDER — DEXTROSE, SODIUM CHLORIDE, AND POTASSIUM CHLORIDE 5; .45; .15 G/100ML; G/100ML; G/100ML
84 INJECTION INTRAVENOUS CONTINUOUS
Status: DISCONTINUED | OUTPATIENT
Start: 2020-07-02 | End: 2020-07-03

## 2020-07-02 RX ADMIN — DEXAMETHASONE SODIUM PHOSPHATE 4 MG: 4 INJECTION, SOLUTION INTRAMUSCULAR; INTRAVENOUS at 01:42

## 2020-07-02 RX ADMIN — MORPHINE SULFATE 4 MG: 4 INJECTION INTRAVENOUS at 19:18

## 2020-07-02 RX ADMIN — ONDANSETRON 4 MG: 2 INJECTION INTRAMUSCULAR; INTRAVENOUS at 19:17

## 2020-07-02 RX ADMIN — OXYCODONE HYDROCHLORIDE 5 MG: 5 TABLET ORAL at 09:58

## 2020-07-02 RX ADMIN — ENOXAPARIN SODIUM 40 MG: 40 INJECTION SUBCUTANEOUS at 08:28

## 2020-07-02 RX ADMIN — SODIUM CHLORIDE 125 ML/HR: 0.9 INJECTION, SOLUTION INTRAVENOUS at 07:29

## 2020-07-02 RX ADMIN — DEXTROSE, SODIUM CHLORIDE, AND POTASSIUM CHLORIDE 84 ML/HR: 5; .45; .15 INJECTION INTRAVENOUS at 22:32

## 2020-07-02 RX ADMIN — ACETAMINOPHEN 650 MG: 325 TABLET ORAL at 09:58

## 2020-07-02 RX ADMIN — SODIUM PHOSPHATE, MONOBASIC, MONOHYDRATE 9 MMOL: 276; 142 INJECTION, SOLUTION INTRAVENOUS at 10:55

## 2020-07-02 RX ADMIN — OXYCODONE HYDROCHLORIDE 5 MG: 5 TABLET ORAL at 22:31

## 2020-07-02 RX ADMIN — DEXTROSE, SODIUM CHLORIDE, AND POTASSIUM CHLORIDE 84 ML/HR: 5; .45; .15 INJECTION INTRAVENOUS at 08:28

## 2020-07-02 RX ADMIN — OXYCODONE HYDROCHLORIDE 5 MG: 5 TABLET ORAL at 16:16

## 2020-07-02 RX ADMIN — MELATONIN 3 MG: at 22:31

## 2020-07-02 NOTE — PROGRESS NOTES
Progress Note - Colorectal Surgery   Puja Fitzpatrick 50 y o  female MRN: [id number]  Unit/Bed#: -01 Encounter: [id number]    Assessment:  49-year-old female with stage IV rectal cancer on FOLFIRINOX now with obstructing rectal tumor and large bowel obstruction with incompetent ileocecal valce    Having multiple liquid bowel movements, less nausea  NGT only water out that was used to place NGT    Due to taking of Avastin, surgery is too high risk, patient informed and she is OK with this    Plan:  Clear sips  NGT removed today  Decrease fluids as PO intake increases  Nausea control PRN  Will monitor today and make sure she does not become completely obstructed because at that time we will be forced into a procedure for diversion - colostomy    Subjective/Objective   Chief Complaint:     Subjective: NGT hurts, still having multiple liquid bowel movements    Objective:     Blood pressure 114/72, pulse 76, temperature 97 6 °F (36 4 °C), resp  rate 18, SpO2 96 %, not currently breastfeeding  ,There is no height or weight on file to calculate BMI  Intake/Output Summary (Last 24 hours) at 7/2/2020 0717  Last data filed at 7/1/2020 1700  Gross per 24 hour   Intake 2000 ml   Output    Net 2000 ml       Invasive Devices     Central Venous Catheter Line            Port A Cath 06/22/20 Right Chest 9 days          Peripheral Intravenous Line            Peripheral IV 07/01/20 Left Antecubital 1 day                Physical Exam: General: AAOx3  Head: normocephalic, atraumatic  Neck: supple, trachea midline   Respiratory: BS b/l  Abdomen: Soft, mild distention, some deep pain in pelvis, hernia at sub-umbilical region reducible, non tender in abdomen  Heart: RRR, S1s2  Ext: Warm no cyanosis   Pulse: 2+ radial      Lab, Imaging and other studies:  I have personally reviewed pertinent lab results    , CBC:   Lab Results   Component Value Date    WBC 13 68 (H) 07/02/2020    HGB 8 4 (L) 07/02/2020    HCT 28 1 (L) 07/02/2020 MCV 76 (L) 07/02/2020     07/02/2020    MCH 22 6 (L) 07/02/2020    MCHC 29 9 (L) 07/02/2020    RDW 19 8 (H) 07/02/2020    MPV 10 2 07/02/2020    NRBC 0 07/02/2020   , CMP:   Lab Results   Component Value Date    SODIUM 136 07/02/2020    K 4 1 07/02/2020     07/02/2020    CO2 22 07/02/2020    BUN 13 07/02/2020    CREATININE 0 45 (L) 07/02/2020    CALCIUM 8 3 07/02/2020    EGFR 120 07/02/2020     VTE Pharmacologic Prophylaxis: Heparin  VTE Mechanical Prophylaxis: sequential compression device

## 2020-07-02 NOTE — RESTORATIVE TECHNICIAN NOTE
Restorative Specialist Mobility Note       Activity: Ambulate in [place], Ambulate in room, Bathroom privileges, Chair, Dangle, Stand at bedside(Educated/encouraged pt to ambulate with assistance 3-4 x's/day   Pt callbell, phone/tray within reach )     Assistive Device: None       Quan HAJI, Restorative Technician, [employer]

## 2020-07-02 NOTE — CONSULTS
Consultation - Medical Oncology   Jazmin Fitzpatrick 50 y o  female MRN: [id number]  Unit/Bed#: -01 Encounter: [id number]      Assessment/Plan      1  Metastatic rectal cancer  Multiple hepatic and pulmonary metastases  2  Acute partial  distal large bowel obstruction from known rectal carcinoma  3  Status post first dose of chemotherapy last week, FOLFOXIRI  with Avastin, Day #8 ,cycle #1     4  Right breast cancer, ER/CO + , HER-2 neg, Incidentally discovered during evaluation for metastatic rectal cancer  Stage unknown  5  Xerostomia - opioid induced  6  Nausea / vomiting secondary to bowel obstruction,with element of delayed post chemotherapy nausea- recommend decadron 4 mg iv bid for 1-2 days  Plan  1 Avoid surgery if at all possible  Due to recent chemotherapy with bevacizumab the patient is at very high risk for surgical complications including wound dehiscence  [facility] this [facility]  2  Supportive care as  per surgery  3  Resume  chemotherapy when stable  History of Present Illness   Physician Requesting Consult: Mere Galevz MD  Reason for Consult / Principal Problem: Stage IV rectal cancer  Invasive right breast cancer  HPI: Germán Villalobos is a 50y o  year old female who presents with abdominal pain, nausea, vomiting, which started 5 days post first cycle of chemotherapy  Was passing small amount of liquid stool with mucous but had not adequate BM  since chemotherapy  CT scan of the abdomen and pelvis showed distal colonic obstruction from known rectal carcinoma  The patient has  and is passing flatus  Received chemotherapy with bevacizumab  and FOLFOXIRI on 6/24/20  Consults    Review of Systems   HEENT: dry mouth  Resp: no shortness of breath, no cough  Cardiovascular, no chest pain, no palpitations  GI: As per HPI  : No urinary symptoms    MS: NO back pain, no arthralgias  Historical Information   Past Medical History:   Diagnosis Date    History of rectal cancer 06/2020     Past Surgical History:   Procedure Laterality Date    BACK SURGERY      BREAST BIOPSY Right 06/19/2020    2 sites; 1 breast 1 axilla    HERNIA REPAIR      3 repairs    IR IMAGE GUIDED BIOPSY/ASPIRATION LIVER  6/17/2020    IR PORT PLACEMENT  6/22/2020    TUBAL LIGATION      US GUIDED BREAST BIOPSY RIGHT COMPLETE Right 6/19/2020    US GUIDED BREAST LYMPH NODE BIOPSY RIGHT Right 6/19/2020     Social History   Social History     Substance and Sexual Activity   Alcohol Use Not Currently     Social History     Substance and Sexual Activity   Drug Use No     Social History     Tobacco Use   Smoking Status Current Every Day Smoker    Packs/day: 0 50    Types: Cigarettes   Smokeless Tobacco Never Used     Family History: non-contributory, reviewed    Meds/Allergies   all current active meds have been reviewed  No Known Allergies    Objective   Vitals: weight 59 kg,  Temp 97 4, HR- 79, 122/76     Intake/Output Summary (Last 24 hours) at 7/1/2020 2032  Last data filed at 7/1/2020 1700  Gross per 24 hour   Intake 2000 ml   Output    Net 2000 ml     Invasive Devices     Central Venous Catheter Line            Port A Cath 06/22/20 Right Chest 9 days          Peripheral Intravenous Line            Peripheral IV 07/01/20 Left Antecubital less than 1 day                Physical Exam:  Awake, well developed, distressed,NG in place  Skin: warm, good color  HEENT:  oral mucosa dry, no neck mass  Chest: Clear , no wheezing  Heart: Regular, no murmur  Abdomen:  Mildly distended with tympany  Soft, tender in the lower abdomen  Extr: Warm, no edema, no calf tenderness     Neuro: Ox3, no focal abnormalities, speech is intact  Lab Results :reviewed  Imaging Studies: I have personally reviewed pertinent reports     and I have personally reviewed pertinent films in PACS  EKG, Pathology, and Other Studies: I have personally reviewed pertinent reports          Code Status: Level 1 - Full Code  Advance Directive and Living Will:      Power of :    POLST:

## 2020-07-02 NOTE — UTILIZATION REVIEW
Initial Clinical Review    Admission: Date/Time/Statement: Admission Orders (From admission, onward)     Ordered        07/01/20 0939  Inpatient Admission  Once                   Orders Placed This Encounter   Procedures    Inpatient Admission     Standing Status:   Standing     Number of Occurrences:   1     Order Specific Question:   Admitting Physician     Answer:   Nisreen Pelayo [[id number]]     Order Specific Question:   Level of Care     Answer:   Med Surg [[id number]]     Order Specific Question:   Estimated length of stay     Answer:   More than 2 Midnights     Order Specific Question:   Certification     Answer:   I certify that inpatient services are medically necessary for this patient for a duration of greater than two midnights  See H&P and MD Progress Notes for additional information about the patient's course of treatment  ED Arrival Information     Expected Arrival Acuity Means of Arrival Escorted By Service Admission Type    - 7/1/2020 05:19 Urgent Walk-In Self Colorectal Urgent    Arrival Complaint    Abdominal pain        Chief Complaint   Patient presents with    Abdominal Pain     Pt c/o abdominal pain for several days and was recently seen to rule out bowel obstruction  Pt vomiting since 0200, denies diarrhea  Assessment/Plan: 50year old female to the ED from home with complaints of abdominal pain, vomiting  Seen 6/29 and worked up for potential bowel obstruction, but was negative  Admitted to inpatient  For large bowel obstruction  Currently being treated for Stage IV rectal cancer with mets to liver and lung in addition to breast cancer, currently receiving chemo  She is not passing much gas, having liquid bowel movements with worsening pain  She has abdominal distension with tympany, tenderness RLQ  Large tumor felt in rectum  CT A/P shows:  High-grade distal large bowel obstruction at the level of the distal sigmoid colon/rectum, secondary to patient's known rectal carcinoma   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  IV fluids started  NGtube placed  7/1 Med/oncology consult: Metastatic rectal cancer  Multiple hepatic and pulmonary metastases  S/P first dose of chemo last week  Nauase  REcommend decadron IV for 1-2 days  Avoid surgery if at all possible due to recent chemo she is at very high risk for surgical complications  REsume chemo when stable  7/2 UPdate:  NGtube has been removed  Having loose bms overnight complaining of lower abdominal pain  Abdomen is soft, non distended  Advance to clear liquid diet  Start Miralax     ED Triage Vitals   Temperature Pulse Respirations Blood Pressure SpO2   07/01/20 0546 07/01/20 0530 07/01/20 0530 07/01/20 0530 07/01/20 0530   (!) 97 4 °F (36 3 °C) 99 20 127/75 97 %      Temp src Heart Rate Source Patient Position - Orthostatic VS BP Location FiO2 (%)   -- 07/01/20 0530 07/01/20 0530 07/01/20 0530 --    Monitor Sitting Right arm       Pain Score       07/01/20 0536       Worst Possible Pain        Wt Readings from Last 1 Encounters:   06/29/20 59 kg (130 lb)     Additional Vital Signs:   Date/Time  Temp  Pulse  Resp  BP  MAP (mmHg)  SpO2  O2 Device  Patient Position - Orthostatic VS   07/02/20 07:17:46  97 6 °F (36 4 °C)  76  18  114/72  86  96 %       07/01/20 21:55:58  97 9 °F (36 6 °C)  81  16  115/67  83  96 %       07/01/20 2000              None (Room air)     07/01/20 15:23:19    79    122/76  91  98 %       07/01/20 13:30:53  97 4 °F (36 3 °C)Abnormal   69  20  107/64  78  99 %       07/01/20 1329    65        98 %       07/01/20 0700    84  16  112/78  90  97 %       07/01/20 0630    82  16  121/70  87  96 %  None (Room air)     07/01/20 0608  97 4 °F (36 3 °C)Abnormal                  07/01/20 0546  97 4 °F (36 3 °C)Abnormal                    Pertinent Labs/Diagnostic Test Results:   7/1 XRay abdomen/KUB:  Feeding tube tip terminating in the stomach  7/1 CT A/P: High-grade distal large bowel obstruction at the level of the distal sigmoid colon/rectum, secondary to patient's known rectal carcinoma  Josiane Flax 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  Mild mucosal thickening of the sigmoid colon, most compatible with mild colitis, likely related to the patient's chemotherapy    Debbe Mode significant change in pulmonary or hepatic metastatic disease    The previously identified ventral abdominal wall hernia containing loops of small bowel, has been reduced     Stable left ovarian cyst   Results from last 7 days   Lab Units 07/01/20  1126   SARS-COV-2  Negative     Results from last 7 days   Lab Units 07/02/20  0501 07/01/20  1126 07/01/20  0542 06/29/20  1324   WBC Thousand/uL 13 68*  --  30 92* 42 49*   HEMOGLOBIN g/dL 8 4*  --  10 6* 8 2*   HEMATOCRIT % 28 1*  --  35 7 27 2*   PLATELETS Thousands/uL 255 312 449* 379   BANDS PCT %  --   --   --  10*         Results from last 7 days   Lab Units 07/02/20  0501 07/01/20  0542 06/29/20  1324 06/26/20  2107   SODIUM mmol/L 136 131* 136 136   POTASSIUM mmol/L 4 1 3 6 3 5 4 3   CHLORIDE mmol/L 107 98* 102 100   CO2 mmol/L 22 23 27 24   ANION GAP mmol/L 7 10 7 12   BUN mg/dL 13 23 14 8   CREATININE mg/dL 0 45* 0 84 0 58* 0 50*   EGFR ml/min/1 73sq m 120 83 110 116   CALCIUM mg/dL 8 3 9 2 8 6 8 6   MAGNESIUM mg/dL 2 4  --   --  2 3   PHOSPHORUS mg/dL 2 6*  --   --   --      Results from last 7 days   Lab Units 07/01/20  0542 06/29/20  1324 06/26/20  2107   AST U/L 28 37 71*   ALT U/L 37 36 37   ALK PHOS U/L 336* 240* 115   TOTAL PROTEIN g/dL 7 3 6 8 6 3*   ALBUMIN g/dL 2 9* 2 8* 2 4*   TOTAL BILIRUBIN mg/dL 0 46 0 27 0 27         Results from last 7 days   Lab Units 07/02/20  0501 07/01/20  0542 06/29/20  1324 06/26/20  2107   GLUCOSE RANDOM mg/dL 100 181* 119 87       Results from last 7 days   Lab Units 07/01/20  1623 07/01/20  1126 07/01/20  0546   LACTIC ACID mmol/L 1 3 2 8* 2 8* Results from last 7 days   Lab Units 07/01/20  0542 06/29/20  1324   LIPASE u/L 96 55*       Results from last 7 days   Lab Units 06/29/20  1414 06/29/20  1413   CLARITY UA   --  Clear   COLOR UA  - Yellow   SPEC GRAV UA   --  1 025   PH UA   --  6 0   GLUCOSE UA mg/dl  --  Negative   KETONES UA mg/dl  --  Trace*   BLOOD UA   --  Negative   PROTEIN UA mg/dl  --  Negative   NITRITE UA   --  Negative   BILIRUBIN UA   --  Negative   UROBILINOGEN UA E U /dl  --  1 0   LEUKOCYTES UA   --  Negative       ED Treatment:   Medication Administration from 07/01/2020 0519 to 07/01/2020 1159       Date/Time Order Dose Route Action     07/01/2020 0536 HYDROmorphone (DILAUDID) injection 1 mg 1 mg Intravenous Given     07/01/2020 0542 ondansetron (ZOFRAN) injection 4 mg 4 mg Intravenous Given     07/01/2020 0657 HYDROmorphone (DILAUDID) injection 0 5 mg 0 5 mg Intravenous Given     07/01/2020 0658 sodium chloride 0 9 % bolus 1,000 mL 1,000 mL Intravenous New Bag     07/01/2020 0737 sodium chloride 0 9 % bolus 1,000 mL 1,000 mL Intravenous New Bag     07/01/2020 0910 HYDROmorphone (DILAUDID) injection 0 5 mg 0 5 mg Intravenous Given     07/01/2020 0912 ondansetron (ZOFRAN) injection 4 mg 4 mg Intravenous Given        Past Medical History:   Diagnosis Date    History of rectal cancer 06/2020       Admitting Diagnosis: Leukocytosis [D72 829]  Rectal cancer (HCC) [C20]  Abdominal pain [R10 9]  Large bowel obstruction (HCC) [K56 609]  History of rectal cancer [Z85 048]  Elevated lactic acid level [R79 89]  Age/Sex: 50 y o  female  Admission Orders:  Scds  Up at tolerated  Lactic acid  Clear liquid diet  Scheduled Medications:    Medications:  enoxaparin 40 mg Subcutaneous Daily   HYDROmorphone 0 5 mg Intravenous Once   lidocaine 1 application Urethral Once   nicotine 1 patch Transdermal Daily   sodium phosphate 9 mmol Intravenous Once     Continuous IV Infusions:    dextrose 5 % and sodium chloride 0 45 % with KCl 20 mEq/L 84 mL/hr Intravenous Continuous     PRN Meds:    acetaminophen 650 mg Oral Q6H PRN   HYDROmorphone 0 5 mg Intravenous Q3H PRN   morphine injection 4 mg Intravenous Q4H PRN   morphine injection 2 mg Intravenous Q4H PRN   ondansetron 4 mg Intravenous Q4H PRN   oxyCODONE 5 mg Oral Q4H PRN   phenol 2 spray Mouth/Throat Q2H PRN   promethazine 12 5 mg Intravenous Q6H PRN   saliva substitute 5 spray Mouth/Throat 4x Daily PRN       IP CONSULT TO HEMATOLOGY    Network Utilization Review Department  [email or web address]  ATTENTION: Please call with any questions or concerns to [phone] and carefully listen to the prompts so that you are directed to the right person  All voicemails are confidential   Tabatha Hoskins all requests for admission clinical reviews, approved or denied determinations and any other requests to dedicated fax number below belonging to the campus where the patient is receiving treatment   List of dedicated fax numbers for the Facilities:  [address] DENIALS (Administrative/Medical Necessity) [phone]   [address] (Maternity/NICU/Pediatrics) [phone]   [place] [phone]   Jamas Piggs [phone]   [address] [facility] [address] [phone]   Levell Pleasure [phone]   [address] [facility] [address] [facility] [phone]   [facility] [phone] [address] [facility] [address]  [address] [facility] [address] [facility] [phone]

## 2020-07-02 NOTE — ED ATTENDING ATTESTATION
7/1/2020  ISandeep MD, saw and evaluated the patient  I have discussed the patient with the resident/non-physician practitioner and agree with the resident's/non-physician practitioner's findings, Plan of Care, and MDM as documented in the resident's/non-physician practitioner's note, except where noted  All available labs and Radiology studies were reviewed  I was present for key portions of any procedure(s) performed by the resident/non-physician practitioner and I was immediately available to provide assistance  At this point I agree with the current assessment done in the Emergency Department  I have conducted an independent evaluation of this patient a history and physical is as follows:      Patient is a 24-year-old female with history of rectal cancer followed by Oncology as well as surgery  She presents with worsening abdominal pain nausea vomiting  She denies fevers or chills  Vital signs reviewed upon arrival   Patient appears to be visibly uncomfortable in acute distress secondary to pain nausea  Examination abdomen is soft but diffusely tender to palpation with bowel sounds  There is a hernia present which is reducible  Abdomen does not appear distended this time  Impression:  Acute abdominal pain given patient's history and presentation will provide patient with IV analgesia antiemetics patient will undergo CT imaging again today  ED Course    results of CT imaging reviewed with Radiology patient has evidence of a large bowel obstruction  Plan to consult colorectal surgery for recommendations and admission    Critical Care Time  Procedures

## 2020-07-02 NOTE — SOCIAL WORK
Patient is not a bundle  Patient is not a 30 day readmission  CM met with patient to review role of Cm  Patient presented as alert during conversation  Patient reported that she lives in a 2 story home with her  Oliviaregla Rose and children  Patient reported that there are 2-3 steps with rails leading into the home  Patient has a 1st floor set up  Patient was independent with ADLS PTA  Patient denied having any inpatient PT/OT, inpatient MH, substance abuse treatment or [facility]  Patient denied having any DME in the home  Patient's pharmacy of choice is [facility] on [address]  Patient is affiliated with [facility] physician group  CM reviewed d/c planning process including the following: identifying help at home, patient preference for d/c planning needs, Discharge Lounge, [facility] Meds to Bed program, availability of treatment team to discuss questions or concerns patient and/or family may have regarding understanding medications and recognizing signs and symptoms once discharged  CM also encouraged patient to follow up with all recommended appointments after discharge  Patient advised of importance for patient and family to participate in managing patients medical well being

## 2020-07-03 PROBLEM — R10.9 ABDOMINAL PAIN: Status: ACTIVE | Noted: 2020-07-03

## 2020-07-03 PROBLEM — R50.9 FEVER: Status: ACTIVE | Noted: 2020-07-03

## 2020-07-03 PROBLEM — C50.919 BREAST CANCER (HCC): Status: ACTIVE | Noted: 2020-07-03

## 2020-07-03 PROBLEM — D64.9 ANEMIA: Status: ACTIVE | Noted: 2020-07-03

## 2020-07-03 PROBLEM — C20 RECTAL CANCER (HCC): Status: ACTIVE | Noted: 2020-07-03

## 2020-07-03 LAB
ALBUMIN SERPL BCP-MCNC: 2.5 G/DL (ref 3.5–5)
ALP SERPL-CCNC: 192 U/L (ref 46–116)
ALT SERPL W P-5'-P-CCNC: 21 U/L (ref 12–78)
ANION GAP SERPL CALCULATED.3IONS-SCNC: 6 MMOL/L (ref 4–13)
ANION GAP SERPL CALCULATED.3IONS-SCNC: 7 MMOL/L (ref 4–13)
ANISOCYTOSIS BLD QL SMEAR: PRESENT
AST SERPL W P-5'-P-CCNC: 37 U/L (ref 5–45)
ATRIAL RATE: 98 BPM
BASOPHILS # BLD AUTO: 0.02 THOUSANDS/ΜL (ref 0–0.1)
BASOPHILS # BLD MANUAL: 0 THOUSAND/UL (ref 0–0.1)
BASOPHILS NFR MAR MANUAL: 0 % (ref 0–1)
BILIRUB SERPL-MCNC: 0.27 MG/DL (ref 0.2–1)
BUN SERPL-MCNC: 5 MG/DL (ref 5–25)
BUN SERPL-MCNC: 5 MG/DL (ref 5–25)
CALCIUM SERPL-MCNC: 7.9 MG/DL (ref 8.3–10.1)
CALCIUM SERPL-MCNC: 8.2 MG/DL (ref 8.3–10.1)
CHLORIDE SERPL-SCNC: 100 MMOL/L (ref 100–108)
CHLORIDE SERPL-SCNC: 100 MMOL/L (ref 100–108)
CO2 SERPL-SCNC: 24 MMOL/L (ref 21–32)
CO2 SERPL-SCNC: 25 MMOL/L (ref 21–32)
CREAT SERPL-MCNC: 0.54 MG/DL (ref 0.6–1.3)
CREAT SERPL-MCNC: 0.58 MG/DL (ref 0.6–1.3)
EOSINOPHIL # BLD AUTO: 0.17 THOUSAND/ΜL (ref 0–0.61)
EOSINOPHIL # BLD MANUAL: 0.33 THOUSAND/UL (ref 0–0.4)
EOSINOPHIL NFR BLD MANUAL: 3 % (ref 0–6)
ERYTHROCYTE [DISTWIDTH] IN BLOOD BY AUTOMATED COUNT: 20.2 % (ref 11.6–15.1)
ERYTHROCYTE [DISTWIDTH] IN BLOOD BY AUTOMATED COUNT: 20.5 % (ref 11.6–15.1)
GFR SERPL CREATININE-BSD FRML MDRD: 110 ML/MIN/1.73SQ M
GFR SERPL CREATININE-BSD FRML MDRD: 113 ML/MIN/1.73SQ M
GLUCOSE SERPL-MCNC: 105 MG/DL (ref 65–140)
GLUCOSE SERPL-MCNC: 109 MG/DL (ref 65–140)
HCT VFR BLD AUTO: 29.6 % (ref 34.8–46.1)
HCT VFR BLD AUTO: 32.6 % (ref 34.8–46.1)
HGB BLD-MCNC: 8.7 G/DL (ref 11.5–15.4)
HGB BLD-MCNC: 9.4 G/DL (ref 11.5–15.4)
IMM GRANULOCYTES # BLD AUTO: 0.49 THOUSAND/UL (ref 0–0.2)
LYMPHOCYTES # BLD AUTO: 0.64 THOUSANDS/ΜL (ref 0.6–4.47)
LYMPHOCYTES # BLD AUTO: 1.11 THOUSAND/UL (ref 0.6–4.47)
LYMPHOCYTES # BLD AUTO: 10 % (ref 14–44)
MAGNESIUM SERPL-MCNC: 1.8 MG/DL (ref 1.6–2.6)
MCH RBC QN AUTO: 22.2 PG (ref 26.8–34.3)
MCH RBC QN AUTO: 22.7 PG (ref 26.8–34.3)
MCHC RBC AUTO-ENTMCNC: 28.8 G/DL (ref 31.4–37.4)
MCHC RBC AUTO-ENTMCNC: 29.4 G/DL (ref 31.4–37.4)
MCV RBC AUTO: 77 FL (ref 82–98)
MCV RBC AUTO: 77 FL (ref 82–98)
METAMYELOCYTES NFR BLD MANUAL: 1 % (ref 0–1)
MONOCYTES # BLD AUTO: 0.06 THOUSAND/ΜL (ref 0.17–1.22)
MONOCYTES # BLD AUTO: 0.44 THOUSAND/UL (ref 0–1.22)
MONOCYTES NFR BLD: 4 % (ref 4–12)
MYELOCYTES NFR BLD MANUAL: 1 % (ref 0–1)
NEUTROPHILS # BLD AUTO: 4.6 THOUSANDS/ΜL (ref 1.85–7.62)
NEUTROPHILS # BLD MANUAL: 8.97 THOUSAND/UL (ref 1.85–7.62)
NEUTS BAND NFR BLD MANUAL: 4 % (ref 0–8)
NEUTS SEG NFR BLD AUTO: 77 % (ref 43–75)
NRBC BLD AUTO-RTO: 0 /100 WBCS
NRBC BLD AUTO-RTO: 0 /100 WBCS
P AXIS: 40 DEGREES
PHOSPHATE SERPL-MCNC: 2.2 MG/DL (ref 2.7–4.5)
PLATELET # BLD AUTO: 203 THOUSANDS/UL (ref 149–390)
PLATELET # BLD AUTO: 223 THOUSANDS/UL (ref 149–390)
PLATELET BLD QL SMEAR: ADEQUATE
PMV BLD AUTO: 9.7 FL (ref 8.9–12.7)
PMV BLD AUTO: 9.9 FL (ref 8.9–12.7)
POIKILOCYTOSIS BLD QL SMEAR: PRESENT
POTASSIUM SERPL-SCNC: 3.6 MMOL/L (ref 3.5–5.3)
POTASSIUM SERPL-SCNC: 3.6 MMOL/L (ref 3.5–5.3)
PR INTERVAL: 132 MS
PROT SERPL-MCNC: 6.3 G/DL (ref 6.4–8.2)
QRS AXIS: 40 DEGREES
QRSD INTERVAL: 94 MS
QT INTERVAL: 306 MS
QTC INTERVAL: 390 MS
RBC # BLD AUTO: 3.84 MILLION/UL (ref 3.81–5.12)
RBC # BLD AUTO: 4.24 MILLION/UL (ref 3.81–5.12)
RBC MORPH BLD: PRESENT
SODIUM SERPL-SCNC: 130 MMOL/L (ref 136–145)
SODIUM SERPL-SCNC: 132 MMOL/L (ref 136–145)
T WAVE AXIS: 28 DEGREES
VENTRICULAR RATE: 98 BPM
WBC # BLD AUTO: 11.07 THOUSAND/UL (ref 4.31–10.16)
WBC # BLD AUTO: 5.98 THOUSAND/UL (ref 4.31–10.16)

## 2020-07-03 PROCEDURE — 83735 ASSAY OF MAGNESIUM: CPT | Performed by: SURGERY

## 2020-07-03 PROCEDURE — 99255 IP/OBS CONSLTJ NEW/EST HI 80: CPT | Performed by: FAMILY MEDICINE

## 2020-07-03 PROCEDURE — 85007 BL SMEAR W/DIFF WBC COUNT: CPT | Performed by: SURGERY

## 2020-07-03 PROCEDURE — 80053 COMPREHEN METABOLIC PANEL: CPT | Performed by: FAMILY MEDICINE

## 2020-07-03 PROCEDURE — 84100 ASSAY OF PHOSPHORUS: CPT | Performed by: SURGERY

## 2020-07-03 PROCEDURE — 85027 COMPLETE CBC AUTOMATED: CPT | Performed by: SURGERY

## 2020-07-03 PROCEDURE — 93005 ELECTROCARDIOGRAM TRACING: CPT

## 2020-07-03 PROCEDURE — 87040 BLOOD CULTURE FOR BACTERIA: CPT | Performed by: FAMILY MEDICINE

## 2020-07-03 PROCEDURE — 87077 CULTURE AEROBIC IDENTIFY: CPT | Performed by: FAMILY MEDICINE

## 2020-07-03 PROCEDURE — 99254 IP/OBS CNSLTJ NEW/EST MOD 60: CPT | Performed by: PHYSICIAN ASSISTANT

## 2020-07-03 PROCEDURE — 93010 ELECTROCARDIOGRAM REPORT: CPT | Performed by: INTERNAL MEDICINE

## 2020-07-03 PROCEDURE — 80048 BASIC METABOLIC PNL TOTAL CA: CPT | Performed by: SURGERY

## 2020-07-03 PROCEDURE — 85027 COMPLETE CBC AUTOMATED: CPT | Performed by: FAMILY MEDICINE

## 2020-07-03 PROCEDURE — 81003 URINALYSIS AUTO W/O SCOPE: CPT | Performed by: SURGERY

## 2020-07-03 PROCEDURE — 87076 CULTURE ANAEROBE IDENT EACH: CPT | Performed by: FAMILY MEDICINE

## 2020-07-03 RX ORDER — MAGNESIUM SULFATE HEPTAHYDRATE 40 MG/ML
2 INJECTION, SOLUTION INTRAVENOUS ONCE
Status: COMPLETED | OUTPATIENT
Start: 2020-07-03 | End: 2020-07-03

## 2020-07-03 RX ORDER — VANCOMYCIN HYDROCHLORIDE 1 G/200ML
15 INJECTION, SOLUTION INTRAVENOUS EVERY 12 HOURS
Status: DISCONTINUED | OUTPATIENT
Start: 2020-07-04 | End: 2020-07-04 | Stop reason: SDUPTHER

## 2020-07-03 RX ORDER — HYDROMORPHONE HCL/PF 1 MG/ML
0.5 SYRINGE (ML) INJECTION
Status: DISCONTINUED | OUTPATIENT
Start: 2020-07-03 | End: 2020-07-05

## 2020-07-03 RX ORDER — VANCOMYCIN HYDROCHLORIDE 1 G/200ML
15 INJECTION, SOLUTION INTRAVENOUS ONCE
Status: COMPLETED | OUTPATIENT
Start: 2020-07-03 | End: 2020-07-03

## 2020-07-03 RX ORDER — DEXTROSE, SODIUM CHLORIDE, AND POTASSIUM CHLORIDE 5; .9; .15 G/100ML; G/100ML; G/100ML
90 INJECTION INTRAVENOUS CONTINUOUS
Status: DISCONTINUED | OUTPATIENT
Start: 2020-07-03 | End: 2020-07-05

## 2020-07-03 RX ORDER — ACETAMINOPHEN 325 MG/1
650 TABLET ORAL EVERY 6 HOURS PRN
Status: DISCONTINUED | OUTPATIENT
Start: 2020-07-03 | End: 2020-07-08 | Stop reason: HOSPADM

## 2020-07-03 RX ADMIN — MORPHINE SULFATE 2 MG: 2 INJECTION, SOLUTION INTRAMUSCULAR; INTRAVENOUS at 18:44

## 2020-07-03 RX ADMIN — METOPROLOL SUCCINATE 25 MG: 25 TABLET, EXTENDED RELEASE ORAL at 08:43

## 2020-07-03 RX ADMIN — MORPHINE SULFATE 4 MG: 4 INJECTION INTRAVENOUS at 13:10

## 2020-07-03 RX ADMIN — ENOXAPARIN SODIUM 40 MG: 40 INJECTION SUBCUTANEOUS at 08:42

## 2020-07-03 RX ADMIN — MAGNESIUM SULFATE HEPTAHYDRATE 2 G: 40 INJECTION, SOLUTION INTRAVENOUS at 11:39

## 2020-07-03 RX ADMIN — POLYETHYLENE GLYCOL 3350 17 G: 17 POWDER, FOR SOLUTION ORAL at 11:39

## 2020-07-03 RX ADMIN — HYDROMORPHONE HYDROCHLORIDE 0.5 MG: 1 INJECTION, SOLUTION INTRAMUSCULAR; INTRAVENOUS; SUBCUTANEOUS at 21:49

## 2020-07-03 RX ADMIN — POTASSIUM & SODIUM PHOSPHATES POWDER PACK 280-160-250 MG 1 PACKET: 280-160-250 PACK at 11:39

## 2020-07-03 RX ADMIN — ONDANSETRON 4 MG: 2 INJECTION INTRAMUSCULAR; INTRAVENOUS at 13:10

## 2020-07-03 RX ADMIN — DEXTROSE, SODIUM CHLORIDE, AND POTASSIUM CHLORIDE 90 ML/HR: 5; .9; .15 INJECTION INTRAVENOUS at 14:42

## 2020-07-03 RX ADMIN — MORPHINE SULFATE 4 MG: 4 INJECTION INTRAVENOUS at 03:43

## 2020-07-03 RX ADMIN — ACETAMINOPHEN 650 MG: 325 TABLET ORAL at 17:58

## 2020-07-03 RX ADMIN — POLYETHYLENE GLYCOL 3350 17 G: 17 POWDER, FOR SOLUTION ORAL at 21:47

## 2020-07-03 RX ADMIN — ACETAMINOPHEN 650 MG: 325 TABLET ORAL at 09:03

## 2020-07-03 RX ADMIN — POLYETHYLENE GLYCOL 3350 17 G: 17 POWDER, FOR SOLUTION ORAL at 17:01

## 2020-07-03 RX ADMIN — CEFEPIME HYDROCHLORIDE 2000 MG: 2 INJECTION, POWDER, FOR SOLUTION INTRAVENOUS at 21:47

## 2020-07-03 RX ADMIN — POLYETHYLENE GLYCOL 3350 17 G: 17 POWDER, FOR SOLUTION ORAL at 08:42

## 2020-07-03 RX ADMIN — MORPHINE SULFATE 4 MG: 4 INJECTION INTRAVENOUS at 08:42

## 2020-07-03 RX ADMIN — VANCOMYCIN HYDROCHLORIDE 1000 MG: 750 INJECTION, SOLUTION INTRAVENOUS at 18:22

## 2020-07-03 RX ADMIN — POTASSIUM & SODIUM PHOSPHATES POWDER PACK 280-160-250 MG 2 PACKET: 280-160-250 PACK at 17:01

## 2020-07-03 RX ADMIN — LORATADINE 10 MG: 10 TABLET ORAL at 08:42

## 2020-07-03 NOTE — NURSING NOTE
Contacted Dr Jere Grimes at 1800 as pt fever went up to 103F, given tylenol  Vanco still not given through port due to no access  Will call P9 charge again regarding access  Will continue to monitor pt   -Dr Grimes changed order to IV due to no longer prolonging needed antibiotics

## 2020-07-03 NOTE — PLAN OF CARE
Problem: PAIN - ADULT  Goal: Verbalizes/displays adequate comfort level or baseline comfort level  Description  Interventions:  - Encourage patient to monitor pain and request assistance  - Assess pain using appropriate pain scale  - Administer analgesics based on type and severity of pain and evaluate response  - Implement non-pharmacological measures as appropriate and evaluate response  - Consider cultural and social influences on pain and pain management  - Notify physician/advanced practitioner if interventions unsuccessful or patient reports new pain  Outcome: Progressing     Problem: SAFETY ADULT  Goal: Patient will remain free of falls  Description  INTERVENTIONS:  - Assess patient frequently for physical needs  -  Identify cognitive and physical deficits and behaviors that affect risk of falls    -  [place] fall precautions as indicated by assessment   - Educate patient/family on patient safety including physical limitations  - Instruct patient to call for assistance with activity based on assessment  - Modify environment to reduce risk of injury  - Consider OT/PT consult to assist with strengthening/mobility  Outcome: Progressing  Goal: Maintain or return to baseline ADL function  Description  INTERVENTIONS:  -  Assess patient's ability to carry out ADLs; assess patient's baseline for ADL function and identify physical deficits which impact ability to perform ADLs (bathing, care of mouth/teeth, toileting, grooming, dressing, etc )  - Assess/evaluate cause of self-care deficits   - Assess range of motion  - Assess patient's mobility; develop plan if impaired  - Assess patient's need for assistive devices and provide as appropriate  - Encourage maximum independence but intervene and supervise when necessary  - Involve family in performance of ADLs  - Assess for home care needs following discharge   - Consider OT consult to assist with ADL evaluation and planning for discharge  - Provide patient education as appropriate  Outcome: Progressing  Goal: Maintain or return mobility status to optimal level  Description  INTERVENTIONS:  - Assess patient's baseline mobility status (ambulation, transfers, stairs, etc )    - Identify cognitive and physical deficits and behaviors that affect mobility  - Identify mobility aids required to assist with transfers and/or ambulation (gait belt, sit-to-stand, lift, walker, cane, etc )  - [place] fall precautions as indicated by assessment  - Record patient progress and toleration of activity level on Mobility SBAR; progress patient to next Phase/Stage  - Instruct patient to call for assistance with activity based on assessment  - Consider rehabilitation consult to assist with strengthening/weightbearing, etc   Outcome: Progressing     Problem: DISCHARGE PLANNING  Goal: Discharge to home or other facility with appropriate resources  Description  INTERVENTIONS:  - Identify barriers to discharge w/patient and caregiver  - Arrange for needed discharge resources and transportation as appropriate  - Identify discharge learning needs (meds, wound care, etc )  - Arrange for interpretive services to assist at discharge as needed  - Refer to Case Management Department for coordinating discharge planning if the patient needs post-hospital services based on physician/advanced practitioner order or complex needs related to functional status, cognitive ability, or social support system  Outcome: Progressing     Problem: Knowledge Deficit  Goal: Patient/family/caregiver demonstrates understanding of disease process, treatment plan, medications, and discharge instructions  Description  Complete learning assessment and assess knowledge base    Interventions:  - Provide teaching at level of understanding  - Provide teaching via preferred learning methods  Outcome: Progressing

## 2020-07-03 NOTE — PROGRESS NOTES
Progress Note - Colorectal Surgery   Puja Fitzpatrick 50 y o  female MRN: [id number]  Unit/Bed#: -01 Encounter: [id number]    Assessment:  47-year-old female with stage IV rectal cancer on FOLFIRINOX now with obstructing rectal tumor and large bowel obstruction with incompetent ileocecal valve, NO surgery during this admission due to recent use of Avastin    Still with liquid bowel movements    Plan:  Regular diet  Continue IVF  MiraLax round the clock  Nausea control PRN  Continue to monitor, patient will need a pain regimen prior to being discharged, not stable at this time  Consult to palliative about home pain regimen and goals of care    Subjective/Objective   Chief Complaint:     Subjective:  Having multiple small bowel movements all liquid, tolerating clears however does have pain deep in pelvis    Objective:     Blood pressure 139/76, pulse 84, temperature (!) 97 3 °F (36 3 °C), resp  rate 14, SpO2 94 %, not currently breastfeeding  ,There is no height or weight on file to calculate BMI  Intake/Output Summary (Last 24 hours) at 7/3/2020 0651  Last data filed at 7/2/2020 1048  Gross per 24 hour   Intake 1413 92 ml   Output 100 ml   Net 1313 92 ml       Invasive Devices     Central Venous Catheter Line            Port A Cath 06/22/20 Right Chest 10 days          Peripheral Intravenous Line            Peripheral IV 07/01/20 Left Antecubital 2 days                Physical Exam: General: AAOx3  Head: normocephalic, atraumatic  Neck: supple, trachea midline   Respiratory: BS b/l  Abdomen: Soft, non distended, reducible hernia, tender in pelvis  Heart: RRR, S1s2  Ext: Warm no cyanosis   Pulse: 2+ radial      Lab, Imaging and other studies:  I have personally reviewed pertinent lab results    , CBC:   No results found for: WBC, HGB, HCT, MCV, PLT, ADJUSTEDWBC, MCH, MCHC, RDW, MPV, NRBC, CMP:   No results found for: SODIUM, K, CL, CO2, ANIONGAP, BUN, CREATININE, GLUCOSE, CALCIUM, AST, ALT, ALKPHOS, PROT, BILITOT, EGFR  VTE Pharmacologic Prophylaxis: Heparin  VTE Mechanical Prophylaxis: sequential compression device

## 2020-07-03 NOTE — CONSULTS
Consultation - Palliative and Supportive Care   Jessica Fitzpatrick 50 y o  female [id number]      Patient Active Problem List   Diagnosis    Large bowel obstruction (HCC)   - Tremors  - Fever  - Cancer of rectum  - Cancer of breast  - Nausea      Plan:  1  Symptom management  Pain  · Acetaminophen 650 mg Q 6 hours prn mild pain  · Oxycodone 5mg Q 4 hours prn moderate pain  · dilaudid IV currently ordered  · STOP morphine    Sleep disturbance  · Melatonin 3 mg Q hs     2  Goals  · Patient has been disease focused without limitations on care  Social  · Patient lives with her signifcant other, Arden Villarreal and children  Code Status: Full Code - Level 1   Decisional apparatus:  Patient is competent on my exam today  If competence is lost, patient's substitute decision maker would default to children (?) by [place] 169  Advance Directive / Living Will / POLST:  None on file   I have reviewed the patient's controlled substance dispensing history in the Prescription Drug Monitoring Program in compliance with the [facility] regulations before prescribing any controlled substances  We appreciate the invitation to be involved in this patient's care  We will continue to follow  Please do not hesitate to reach our on call provider through our clinic answering service at  should you have acute symptom control concerns  Sharon Gallegos MD  Palliative and Supportive Care  Clinic/Answering Service: [phone]  You can find me on [facility]!        IDENTIFICATION:  Inpatient consult to Palliative Care  Consult performed by: Sharon Gallegos MD  Consult ordered by: Laura Solano MD        Physician Requesting Consult: Jere Ledesma MD  Reason for Consult / Principal Problem: symptoms  Hx and PE limited by: pt's change in status    HISTORY OF PRESENT ILLNESS:       Chaparrita Saenz is a 50 y o  female with widely metastatic rectal cancer diagnosed last month who received her first treatment of FOLFOXIRI last week  Unfortunately she also has right breast cancer (stage unknown) which was found during the work up for metastatic rectal cancer who presents with and obstructing rectal tumor and large bowel obstruction  She has had significant amounts of pain since admission she has received both IV and PO narcotics to control pain  Upon my visit this PM, the pt is fully alert, but tremulous and fearful  She feels out of control of her body, but is lucid and appropriate with her word choice, and can follow instruction  BPs are lower, but above 455 systolic, and her HR is roughly 90  She has just been given morphine and ondansetron  We agreed to D/C these meds and narrow her symptom coverage to hydromorphone for pain and phenergan for nausea  Benadryl is offered PO to alleviate what may be an atypical EPSE from ondansetron, and pt declines  She feels that she is spontaneously improving at this moment  Notified Dr Tanika Hawthorne, who will engage his team to f/up and consider sepsis w/up  Further discussion of pain and goals deferred today         Review of Systems   Unable to perform ROS: acuity of condition       Past Medical History:   Diagnosis Date    History of rectal cancer 06/2020     Past Surgical History:   Procedure Laterality Date    BACK SURGERY      BREAST BIOPSY Right 06/19/2020    2 sites; 1 breast 1 axilla    HERNIA REPAIR      3 repairs    IR IMAGE GUIDED BIOPSY/ASPIRATION LIVER  6/17/2020    IR PORT PLACEMENT  6/22/2020    TUBAL LIGATION      US GUIDED BREAST BIOPSY RIGHT COMPLETE Right 6/19/2020    US GUIDED BREAST LYMPH NODE BIOPSY RIGHT Right 6/19/2020     Social History     Socioeconomic History    Marital status: Single     Spouse name: Not on file    Number of children: Not on file    Years of education: Not on file    Highest education level: Not on file   Occupational History    Not on file   Social Needs    Financial resource strain: Not on file    Food insecurity:     Worry: Not on file     Inability: Not on file    Transportation needs:     Medical: Not on file     Non-medical: Not on file   Tobacco Use    Smoking status: Current Every Day Smoker     Packs/day: 0 50     Types: Cigarettes    Smokeless tobacco: Never Used   Substance and Sexual Activity    Alcohol use: Not Currently    Drug use: No    Sexual activity: Not on file   Lifestyle    Physical activity:     Days per week: Not on file     Minutes per session: Not on file    Stress: Not on file   Relationships    Social connections:     Talks on phone: Not on file     Gets together: Not on file     Attends Cheondoism service: Not on file     Active member of club or organization: Not on file     Attends meetings of clubs or organizations: Not on file     Relationship status: Not on file    Intimate partner violence:     Fear of current or ex partner: Not on file     Emotionally abused: Not on file     Physically abused: Not on file     Forced sexual activity: Not on file   Other Topics Concern    Not on file   Social History Narrative    Not on file     Family History   Problem Relation Age of Onset    No Known Problems Mother     No Known Problems Sister     No Known Problems Daughter     Breast cancer Maternal Grandmother         age unknown    No Known Problems Sister     No Known Problems Daughter     Breast cancer Maternal Aunt 61    No Known Problems Maternal Aunt        MEDICATIONS / ALLERGIES:    current meds:   Current Facility-Administered Medications   Medication Dose Route Frequency    acetaminophen (TYLENOL) tablet 650 mg  650 mg Oral Q6H PRN    dextrose 5 % and sodium chloride 0 9 % with KCl 20 mEq/L infusion (premix)  90 mL/hr Intravenous Continuous    diphenhydrAMINE (BENADRYL) oral liquid 12 5 mg  12 5 mg Oral Q6H PRN    enoxaparin (LOVENOX) subcutaneous injection 40 mg  40 mg Subcutaneous Daily    HYDROmorphone (DILAUDID) injection 0 5 mg  0 5 mg Intravenous Q3H PRN    lidocaine (URO-JET) 2 % urethral/mucosal gel 1 application  1 application Urethral Once    loratadine (CLARITIN) tablet 10 mg  10 mg Oral Daily    melatonin tablet 3 mg  3 mg Oral HS PRN    metoprolol succinate (TOPROL-XL) 24 hr tablet 25 mg  25 mg Oral Daily    morphine injection 2 mg  2 mg Intravenous Q4H PRN    nicotine (NICODERM CQ) 7 mg/24hr TD 24 hr patch 1 patch  1 patch Transdermal Daily    oxyCODONE (ROXICODONE) IR tablet 5 mg  5 mg Oral Q4H PRN    phenol (CHLORASEPTIC) 1 4 % mucosal liquid 2 spray  2 spray Mouth/Throat Q2H PRN    polyethylene glycol (MIRALAX) packet 17 g  17 g Oral 4x Daily    potassium-sodium phosphates (PHOS-NAK) packet 2 packet  2 packet Oral BID With Meals    promethazine (PHENERGAN) injection 12 5 mg  12 5 mg Intravenous Q6H PRN    saliva substitute (MOUTH KOTE) mucosal solution 5 spray  5 spray Mouth/Throat 4x Daily PRN       No Known Allergies    OBJECTIVE:    Physical Exam  Physical Exam   Constitutional: She is oriented to person, place, and time  She appears distressed  frail   HENT:   Head: Normocephalic and atraumatic  Right Ear: External ear normal    Left Ear: External ear normal    Mouth/Throat: Oropharyngeal exudate (mucous membranes tacky) present  Eyes: Pupils are equal, round, and reactive to light  Conjunctivae and EOM are normal  Right eye exhibits no discharge  Left eye exhibits no discharge  Neck: No tracheal deviation present  Cardiovascular:   tachycardic   Pulmonary/Chest: Effort normal  No stridor  No respiratory distress  Abdominal: Soft  She exhibits distension  Tender in lower quadrantas   Musculoskeletal: She exhibits no edema or deformity  Neurological: She is alert and oriented to person, place, and time  No cranial nerve deficit  Skin: Skin is warm  No rash noted  She is diaphoretic  No erythema  No pallor     Psychiatric:   Affect - fearful, ill  Mood - not endorsed  Thoughts - concrete  Judgment - limited       Lab Results:   I have personally reviewed pertinent labs  , CBC:   Lab Results   Component Value Date    WBC 11 07 (H) 07/03/2020    HGB 9 4 (L) 07/03/2020    HCT 32 6 (L) 07/03/2020    MCV 77 (L) 07/03/2020     07/03/2020    MCH 22 2 (L) 07/03/2020    MCHC 28 8 (L) 07/03/2020    RDW 20 5 (H) 07/03/2020    MPV 9 7 07/03/2020    NRBC 0 07/03/2020   , CMP:   Lab Results   Component Value Date    SODIUM 132 (L) 07/03/2020    K 3 6 07/03/2020     07/03/2020    CO2 25 07/03/2020    BUN 5 07/03/2020    CREATININE 0 58 (L) 07/03/2020    CALCIUM 8 2 (L) 07/03/2020    EGFR 110 07/03/2020   , PT/PTT:No results found for: PT, PTT   MCV significantly depressed for (presumably) several weeks; unclear etiology    Imaging Studies: reviewed imaging reports  EKG, Pathology, and Other Studies: none pertinent; gathering new EKG now     Counseling / Coordination of Care    Total floor / unit time spent today 90+ minutes  Greater than 50% of total time was spent with the patient and / or family counseling and / or coordination of care  A description of the counseling / coordination of care: adjustment of parenteral controlled substances for advanced pain and symptoms, and review of the patient's controlled substance dispensing history in the Prescription Drug Monitoring Program in compliance with the [facility] regulations before prescribing said controlled substances; decision to order additional w/up; conference with surgical teams from the floor for acute developments in clinical case

## 2020-07-03 NOTE — NURSING NOTE
Pt became very nauseous, febrile with a temp of 102F and rigid/tremors  Dr Ursula North with Palliative was at bedside when this all occurred  Notified colorectal sx where Dr Jamie Trent How came to pt bedside  Blood work and cultures taken as well as ECG  Order later put in for Vanco to be infused through pt unacessed port a cath  Contacted P9 charge nurse at 1600 for someone to come access port  Will continue to monitor pt

## 2020-07-04 ENCOUNTER — APPOINTMENT (INPATIENT)
Dept: RADIOLOGY | Facility: HOSPITAL | Age: 45
DRG: 374 | End: 2020-07-04
Payer: COMMERCIAL

## 2020-07-04 PROBLEM — A41.9 SEPSIS (HCC): Status: ACTIVE | Noted: 2020-07-04

## 2020-07-04 LAB
ALBUMIN SERPL BCP-MCNC: 2.1 G/DL (ref 3.5–5)
ALP SERPL-CCNC: 196 U/L (ref 46–116)
ALT SERPL W P-5'-P-CCNC: 18 U/L (ref 12–78)
ANION GAP SERPL CALCULATED.3IONS-SCNC: 5 MMOL/L (ref 4–13)
AST SERPL W P-5'-P-CCNC: 41 U/L (ref 5–45)
BILIRUB SERPL-MCNC: 0.33 MG/DL (ref 0.2–1)
BILIRUB UR QL STRIP: NEGATIVE
BUN SERPL-MCNC: 3 MG/DL (ref 5–25)
CALCIUM SERPL-MCNC: 7.7 MG/DL (ref 8.3–10.1)
CHLORIDE SERPL-SCNC: 103 MMOL/L (ref 100–108)
CLARITY UR: CLEAR
CO2 SERPL-SCNC: 25 MMOL/L (ref 21–32)
COLOR UR: YELLOW
CREAT SERPL-MCNC: 0.48 MG/DL (ref 0.6–1.3)
ERYTHROCYTE [DISTWIDTH] IN BLOOD BY AUTOMATED COUNT: 20.2 % (ref 11.6–15.1)
GFR SERPL CREATININE-BSD FRML MDRD: 117 ML/MIN/1.73SQ M
GLUCOSE SERPL-MCNC: 104 MG/DL (ref 65–140)
GLUCOSE UR STRIP-MCNC: NEGATIVE MG/DL
HCT VFR BLD AUTO: 25.5 % (ref 34.8–46.1)
HGB BLD-MCNC: 7.7 G/DL (ref 11.5–15.4)
HGB UR QL STRIP.AUTO: NEGATIVE
KETONES UR STRIP-MCNC: NEGATIVE MG/DL
LEUKOCYTE ESTERASE UR QL STRIP: NEGATIVE
MCH RBC QN AUTO: 23.1 PG (ref 26.8–34.3)
MCHC RBC AUTO-ENTMCNC: 30.2 G/DL (ref 31.4–37.4)
MCV RBC AUTO: 76 FL (ref 82–98)
NITRITE UR QL STRIP: NEGATIVE
NRBC BLD AUTO-RTO: 0 /100 WBCS
PH UR STRIP.AUTO: 6 [PH]
PLATELET # BLD AUTO: 169 THOUSANDS/UL (ref 149–390)
PMV BLD AUTO: 10.7 FL (ref 8.9–12.7)
POTASSIUM SERPL-SCNC: 3.4 MMOL/L (ref 3.5–5.3)
PROCALCITONIN SERPL-MCNC: 5.14 NG/ML
PROT SERPL-MCNC: 5.5 G/DL (ref 6.4–8.2)
PROT UR STRIP-MCNC: NEGATIVE MG/DL
RBC # BLD AUTO: 3.34 MILLION/UL (ref 3.81–5.12)
SODIUM SERPL-SCNC: 133 MMOL/L (ref 136–145)
SP GR UR STRIP.AUTO: 1 (ref 1–1.03)
UROBILINOGEN UR QL STRIP.AUTO: 0.2 E.U./DL
WBC # BLD AUTO: 9.55 THOUSAND/UL (ref 4.31–10.16)

## 2020-07-04 PROCEDURE — 99255 IP/OBS CONSLTJ NEW/EST HI 80: CPT | Performed by: INTERNAL MEDICINE

## 2020-07-04 PROCEDURE — 99232 SBSQ HOSP IP/OBS MODERATE 35: CPT | Performed by: PHYSICIAN ASSISTANT

## 2020-07-04 PROCEDURE — 85027 COMPLETE CBC AUTOMATED: CPT | Performed by: SURGERY

## 2020-07-04 PROCEDURE — 80053 COMPREHEN METABOLIC PANEL: CPT | Performed by: FAMILY MEDICINE

## 2020-07-04 PROCEDURE — 84145 PROCALCITONIN (PCT): CPT | Performed by: PHYSICIAN ASSISTANT

## 2020-07-04 PROCEDURE — 71046 X-RAY EXAM CHEST 2 VIEWS: CPT

## 2020-07-04 RX ORDER — VANCOMYCIN HYDROCHLORIDE 500 MG/100ML
500 INJECTION, SOLUTION INTRAVENOUS ONCE
Status: COMPLETED | OUTPATIENT
Start: 2020-07-04 | End: 2020-07-04

## 2020-07-04 RX ORDER — METRONIDAZOLE 500 MG/1
500 TABLET ORAL EVERY 8 HOURS SCHEDULED
Status: DISCONTINUED | OUTPATIENT
Start: 2020-07-04 | End: 2020-07-04

## 2020-07-04 RX ORDER — CEFAZOLIN SODIUM 2 G/50ML
2000 SOLUTION INTRAVENOUS EVERY 8 HOURS
Status: DISCONTINUED | OUTPATIENT
Start: 2020-07-04 | End: 2020-07-06

## 2020-07-04 RX ADMIN — CEFEPIME HYDROCHLORIDE 2000 MG: 2 INJECTION, POWDER, FOR SOLUTION INTRAVENOUS at 08:52

## 2020-07-04 RX ADMIN — VANCOMYCIN HYDROCHLORIDE 500 MG: 500 INJECTION, SOLUTION INTRAVENOUS at 04:23

## 2020-07-04 RX ADMIN — ENOXAPARIN SODIUM 40 MG: 40 INJECTION SUBCUTANEOUS at 08:53

## 2020-07-04 RX ADMIN — POTASSIUM & SODIUM PHOSPHATES POWDER PACK 280-160-250 MG 2 PACKET: 280-160-250 PACK at 08:49

## 2020-07-04 RX ADMIN — HYDROMORPHONE HYDROCHLORIDE 0.5 MG: 1 INJECTION, SOLUTION INTRAMUSCULAR; INTRAVENOUS; SUBCUTANEOUS at 19:48

## 2020-07-04 RX ADMIN — POTASSIUM & SODIUM PHOSPHATES POWDER PACK 280-160-250 MG 2 PACKET: 280-160-250 PACK at 18:40

## 2020-07-04 RX ADMIN — HYDROMORPHONE HYDROCHLORIDE 0.5 MG: 1 INJECTION, SOLUTION INTRAMUSCULAR; INTRAVENOUS; SUBCUTANEOUS at 09:58

## 2020-07-04 RX ADMIN — POLYETHYLENE GLYCOL 3350 17 G: 17 POWDER, FOR SOLUTION ORAL at 08:51

## 2020-07-04 RX ADMIN — ACETAMINOPHEN 650 MG: 325 TABLET ORAL at 22:34

## 2020-07-04 RX ADMIN — PROMETHAZINE HYDROCHLORIDE 12.5 MG: 25 INJECTION INTRAMUSCULAR; INTRAVENOUS at 19:48

## 2020-07-04 RX ADMIN — DEXTROSE, SODIUM CHLORIDE, AND POTASSIUM CHLORIDE 90 ML/HR: 5; .9; .15 INJECTION INTRAVENOUS at 19:58

## 2020-07-04 RX ADMIN — POLYETHYLENE GLYCOL 3350 17 G: 17 POWDER, FOR SOLUTION ORAL at 13:46

## 2020-07-04 RX ADMIN — OXYCODONE HYDROCHLORIDE 5 MG: 5 TABLET ORAL at 17:55

## 2020-07-04 RX ADMIN — OXYCODONE HYDROCHLORIDE 5 MG: 5 TABLET ORAL at 06:21

## 2020-07-04 RX ADMIN — METRONIDAZOLE 500 MG: 500 TABLET ORAL at 09:44

## 2020-07-04 RX ADMIN — VANCOMYCIN HYDROCHLORIDE 750 MG: 750 INJECTION, SOLUTION INTRAVENOUS at 18:40

## 2020-07-04 RX ADMIN — LORATADINE 10 MG: 10 TABLET ORAL at 08:53

## 2020-07-04 RX ADMIN — OXYCODONE HYDROCHLORIDE 5 MG: 5 TABLET ORAL at 13:47

## 2020-07-04 RX ADMIN — POLYETHYLENE GLYCOL 3350 17 G: 17 POWDER, FOR SOLUTION ORAL at 22:34

## 2020-07-04 RX ADMIN — POLYETHYLENE GLYCOL 3350 17 G: 17 POWDER, FOR SOLUTION ORAL at 17:55

## 2020-07-04 RX ADMIN — CEFAZOLIN SODIUM 2000 MG: 2 SOLUTION INTRAVENOUS at 17:54

## 2020-07-04 RX ADMIN — VANCOMYCIN HYDROCHLORIDE 1250 MG: 10 INJECTION, POWDER, LYOPHILIZED, FOR SOLUTION INTRAVENOUS at 06:52

## 2020-07-04 NOTE — PROGRESS NOTES
Progress Note - Colorectal Surgery   Puja Fitzpatrick 50 y o  female MRN: [id number]  Unit/Bed#: -01 Encounter: [id number]    Assessment:  38-year-old female with stage IV rectal cancer on FOLFIRINOX now with obstructing rectal tumor and large bowel obstruction with incompetent ileocecal valve, NO surgery during this admission due to recent use of Avastin    Yesterday he had fevers up to 103  Blood cultures were sent  EKG as well as chest x-ray was ordered  EKG looks fine  Chest x-ray is still pending  The fever subsided subsequently  She was started on vancomycin and cefepime after conversation with the oncologist   Niels Valencia was also consulted which recommended a chest x-ray  Plan:  Regular diet  Continue IVF  MiraLax round the clock  Nausea control PRN  Follow-up blood cultures  UA that was sent was normal   Continue to monitor, patient will need a pain regimen prior to being discharged, not stable at this time  Consult to palliative about home pain regimen and goals of care    Subjective/Objective   Chief Complaint:     Subjective:  Overnight had some fevers for which she feels tremulous  She has [place] feeling much better  She still has the pain around the abdomen mostly in the lower pelvic area  She tolerated some clears but not much solid food  She will try to add  Objective:     Blood pressure 96/54, pulse 85, temperature 99 5 °F (37 5 °C), resp  rate 16, SpO2 97 %, not currently breastfeeding  ,There is no height or weight on file to calculate BMI        Intake/Output Summary (Last 24 hours) at 7/4/2020 0446  Last data filed at 7/3/2020 1500  Gross per 24 hour   Intake 620 ml   Output 250 ml   Net 370 ml       Invasive Devices     Central Venous Catheter Line            Port A Cath 06/22/20 Right Chest 11 days          Peripheral Intravenous Line            Peripheral IV 07/01/20 Left Antecubital 2 days                Physical Exam: General: AAOx3  Head: normocephalic, atraumatic  Neck: supple, trachea midline   Respiratory: BS b/l  Chest:  Port-A-Cath site looks clean dry and intact  No signs of infection such as swelling, discharge or erythema  Abdomen: Soft, non distended, reducible hernia, tender in pelvis  Heart: RRR, S1s2  Ext: Warm no cyanosis   Pulse: 2+ radial      Lab, Imaging and other studies:  I have personally reviewed pertinent lab results    , CBC:   Lab Results   Component Value Date    WBC 5 98 07/03/2020    HGB 8 7 (L) 07/03/2020    HCT 29 6 (L) 07/03/2020    MCV 77 (L) 07/03/2020     07/03/2020    MCH 22 7 (L) 07/03/2020    MCHC 29 4 (L) 07/03/2020    RDW 20 2 (H) 07/03/2020    MPV 9 9 07/03/2020    NRBC 0 07/03/2020   , CMP:   Lab Results   Component Value Date    SODIUM 132 (L) 07/03/2020    SODIUM 130 (L) 07/03/2020    K 3 6 07/03/2020    K 3 6 07/03/2020     07/03/2020     07/03/2020    CO2 25 07/03/2020    CO2 24 07/03/2020    BUN 5 07/03/2020    BUN 5 07/03/2020    CREATININE 0 58 (L) 07/03/2020    CREATININE 0 54 (L) 07/03/2020    CALCIUM 8 2 (L) 07/03/2020    CALCIUM 7 9 (L) 07/03/2020    AST 37 07/03/2020    ALT 21 07/03/2020    ALKPHOS 192 (H) 07/03/2020    EGFR 110 07/03/2020    EGFR 113 07/03/2020     VTE Pharmacologic Prophylaxis: Heparin  VTE Mechanical Prophylaxis: sequential compression device

## 2020-07-04 NOTE — ASSESSMENT & PLAN NOTE
· Stage IV rectal cancer with known mets   · Following with oncology outpt Dr Sahara Morton, currently receiving FOLFIRINOX   · Now with obstructing rectal tumor and large bowel obstruction with incompetent ileocecal valce  · Onc consulted, appreciate input

## 2020-07-04 NOTE — CONSULTS
Consultation - Infectious Disease   Puja Fitzpatrick 50 y o  female MRN: [id number]  Unit/Bed#: -01 Encounter: [id number]      IMPRESSION & RECOMMENDATIONS:   1  Sepsis-fever, tachycardia, hypotension  Appears to be secondary to Gram-positive bacteremia  No other clear sources appreciated  No clinical evidence of a worsening intra-abdominal process or complication to suggest intra-abdominal source  Temperatures come down and the patient is now hemodynamically stable  She seems to be tolerating the antibiotics without difficulty  -continue vancomycin for now at current dose  -pharmacy follow-up for vancomycin trough management  -discontinue cefepime  -begin cefazolin 2 g IV q 8 hours in case this is MSSA  -follow up sensitivities and adjust antibiotics as needed  -recheck CBC with diff and BMP  -supportive care    2  Gram-positive bacteremia-suspect Staphylococcus aureus  Consideration for the possibility of MRSA  A primary source is not appreciated however I am concerned about the possibility of a Port-A-Cath infection in this patient who recently had a Port-A-Cath placed  There is no area of phlebitis or other soft tissue infection to suggest another source   -antibiotics as above  -recheck blood cultures x2 sets tomorrow a m  To confirm clearance of the bacteremia  -check transthoracic echocardiogram  -follow up sensitivities and adjust antibiotics as needed  -may ultimately need Port-A-Cath removed    3  Distal colonic obstruction-in the setting of stage IV rectal carcinoma  The obstruction appears to be partial is the patient is able to pass some stool  The NG tube has been removed however the patient is not really able to eat much    Initial plan was to undergo surgical diversion, however the patient has been started on chemotherapy which would but the med high risk complications    -serial abdominal exams  -conservative measures to palliate through obstruction  -MiraLax  -close colorectal followup    4  Stage IV rectal carcinoma-started on chemotherapy with bevacizumab and FOLFIRINOX  -hematology oncology follow-up  -chemotherapy on hold with the active infectious process    Have discussed the above management plan in detail with the primary service    Extensive review of the medical records in epic including review of the notes, radiographs, and laboratory results     HISTORY OF PRESENT ILLNESS:  Reason for Consult:  Bacteremia  HPI: Ale Hayward is a 50y o  year old female with recently diagnosed stage IV rectal carcinoma admitted to [facility] in [facility] with abdominal pain, nausea and vomiting, and found to have a distal bowel obstruction who I am asked to assist with management of new onset sepsis and bacteremia  The patient has recently diagnosed stage IV rectal carcinoma with metastatic disease to the lung  She had received her 1st cycle of chemotherapy with bevacizumab and FOLFIRINOX on 6/24/2020  She had relatively recently had a right subclavian Port-A-Cath placed  Five days post beginning the chemotherapy she began developing nausea vomiting and abdominal pain and therefore came to the ER for further evaluation  She underwent CT the abdomen pelvis that revealed evidence of a distal colonic obstruction  She was treated supportively without going to the operating room due to recent chemotherapy  Yesterday the patient had the sudden onset of fever and shaking chills as well as shortness of breath  She had blood cultures obtained and was started on vancomycin cefepime  Today her temperatures come down and she has remained hemodynamically stable  She had metronidazole added to her antibiotic regimen  Patient's respiratory status has improved and she is not having any cough or sputum production  She continues to have some abdominal distention and pain and a very poor appetite and is unable to eat but she is not having any more nausea vomiting    She is having intermittent liquid stool on MiraLax  She denies any headache or stiff neck, denies any chest pain, denies any dysuria or hematuria, denies any new joint or muscle pains, denies any new rash or skin lesions  REVIEW OF SYSTEMS:  A complete review of systems is negative other than that noted in the HPI  PAST MEDICAL HISTORY:  Past Medical History:   Diagnosis Date    History of rectal cancer 2020     Past Surgical History:   Procedure Laterality Date    BACK SURGERY      BREAST BIOPSY Right 2020    2 sites; 1 breast 1 axilla    HERNIA REPAIR      3 repairs    IR IMAGE GUIDED BIOPSY/ASPIRATION LIVER  2020    IR PORT PLACEMENT  2020    TUBAL LIGATION      US GUIDED BREAST BIOPSY RIGHT COMPLETE Right 2020    US GUIDED BREAST LYMPH NODE BIOPSY RIGHT Right 2020       FAMILY HISTORY:  Non-contributory    SOCIAL HISTORY:  Social History   Social History     Substance and Sexual Activity   Alcohol Use Not Currently     Social History     Substance and Sexual Activity   Drug Use No     Social History     Tobacco Use   Smoking Status Current Every Day Smoker    Packs/day: 0 50    Types: Cigarettes   Smokeless Tobacco Never Used       ALLERGIES:  No Known Allergies    MEDICATIONS:  All current active medications have been reviewed    Antibiotics:  Vancomycin cefepime 2    PHYSICAL EXAM:  Temp:  [99 5 °F (37 5 °C)-103 °F (39 4 °C)] 99 8 °F (37 7 °C)  HR:  [] 75  Resp:  [19] 19  BP: (89-96)/(54-62) 93/60  SpO2:  [96 %-99 %] 99 %  Temp (24hrs), Av 6 °F (38 1 °C), Min:99 5 °F (37 5 °C), Max:103 °F (39 4 °C)  Current: Temperature: 99 8 °F (37 7 °C)    Intake/Output Summary (Last 24 hours) at 2020 1559  Last data filed at 2020 1200  Gross per 24 hour   Intake 1180 ml   Output    Net 1180 ml       General Appearance:  Appearing well, nontoxic, and in no distress   Head:  Normocephalic, without obvious abnormality, atraumatic   Eyes:  Conjunctiva pale and sclera anicteric, both eyes   Nose: Nares normal, mucosa normal, no drainage   Throat: Oropharynx moist without lesions   Neck: Supple, symmetrical, no adenopathy, no tenderness/mass/nodules   Back:   Symmetric, no curvature, ROM normal, no CVA tenderness   Lungs:   Clear to auscultation bilaterally, respirations unlabored   Chest Wall:  No tenderness or deformity  Right-sided Port-A-Cath in place without erythema or drainage  Small area of ecchymosis   Heart:  RRR; no murmur, rub or gallop   Abdomen:   Soft, mildly tender, mildly distended, positive bowel sounds    Extremities: No cyanosis, clubbing or edema   Skin: No rashes or lesions  No draining wounds noted  Lymph nodes: Cervical, supraclavicular nodes normal   Neurologic: Alert and oriented times 3, extremity strength 5/5 and symmetric       LABS, IMAGING, & OTHER STUDIES:  Lab Results:  I have personally reviewed pertinent labs  Results from last 7 days   Lab Units 07/04/20  0444 07/03/20  1504 07/03/20  0841   WBC Thousand/uL 9 55 5 98 11 07*   HEMOGLOBIN g/dL 7 7* 8 7* 9 4*   PLATELETS Thousands/uL 169 203 223     Results from last 7 days   Lab Units 07/04/20  0444 07/03/20  0841 07/02/20  0501 07/01/20  0542   SODIUM mmol/L 133* 130*  132* 136 131*   POTASSIUM mmol/L 3 4* 3 6  3 6 4 1 3 6   CHLORIDE mmol/L 103 100  100 107 98*   CO2 mmol/L 25 24  25 22 23   BUN mg/dL 3* 5  5 13 23   CREATININE mg/dL 0 48* 0 54*  0 58* 0 45* 0 84   EGFR ml/min/1 73sq m 117 113  110 120 83   CALCIUM mg/dL 7 7* 7 9*  8 2* 8 3 9 2   AST U/L 41 37  --  28   ALT U/L 18 21  --  37   ALK PHOS U/L 196* 192*  --  336*     Results from last 7 days   Lab Units 07/03/20  1510 07/03/20  1504   GRAM STAIN RESULT  Gram positive cocci in clusters* Gram positive cocci in clusters*     Results from last 7 days   Lab Units 07/04/20  0444   PROCALCITONIN ng/ml 5 14*                   Imaging Studies:     CT abdomen pelvis-high-grade distal large bowel obstruction    No abscess reported    Chest x-ray- numerous lung nodules    Now pneumonia    Images personally reviewed by me in PACS

## 2020-07-04 NOTE — CONSULTS
Inpatient Medical Consultation - [facility]    Patient Information: Colette Donnelly 50 y o  female MRN: [id number]  Unit/Bed#: -01 Encounter: [id number]  PCP: Darylene Doles, MD  Date of Admission:  7/1/2020  Date of Consultation: 07/03/20  Requesting Physician: Arnav Agudelo MD      Inpatient consult to Internal Medicine  Consult performed by: Katie Caraballo PA-C  Consult ordered by: Sharron Grimes MD      Reason For Consultation:   Fever, rectal cancer     Assessment/Plan:    * Large bowel obstruction [facility])  Assessment & Plan  · Partial obstruction from rectal carcinoma  · Conservative management per primary team  · Pt is a poor surgical candidate secondary to chemo   · NG removed, having BM  · Tolerating diet   · Pain regimen per palliative     Rectal cancer [facility])  Assessment & Plan  · Stage IV rectal cancer with known mets   · Following with oncology outpt, currently receiving FOLFIRINOX   · Now with obstructing rectal tumor and large bowel obstruction with incompetent ileocecal valce  · Onc consulted, appreciate input   ·       Fever  Assessment & Plan  · Developed fever 7/3 with Tmax of 103: developed SOB, N, and chills  · Unclear source of infection:    · Pt without infectious changes in abdominal imaging 7/1   Abdominal pain improving throughout admission   · Port placed 6/22 without signs of infection:  · Will hold off on accessing port until Blood cultures result   · Blood cultures obtained  · Oncology recommending Cefipime and vanc, continue  · Trend  WBC and procal  · CXR ordered     Abdominal pain  Assessment & Plan  · Acute on chronic   · Multifactorial   · Known hernias - continue abdominal binder   · Constipation - bowel regimen per primary   · Cancer related pain - pain regimen per palliative     Anemia  Assessment & Plan  · Baseline 8-9 since diagnosis of rectal cancer   · Continue to monitor     Breast cancer [facility])  Assessment & Plan  · Currently following with oncology outpt for evaluation and treatment   · Stage unknown       VTE Prophylaxis: Enoxaparin (Lovenox)  / sequential compression device     Recommendations for Discharge:  · Continued inpatient stay for infectious workup     Counseling / Coordination of Care Time: 45 minutes  Greater than 50% of total time spent on patient counseling and coordination of care  Collaboration of Care: Were Recommendations Directly Discussed with Primary Treatment Team? - Yes     History of Present Illness:    Chaparrita Saenz is a 50 y o  female who is originally admitted to the Colorectal service on 7/1/2020 due to partial large bowel obstruction secondary to rectal mass  Patient is currently receiving chemo for stage IV rectal cancer with known mets and is a poor surgical candidate as she would have poor wound healing  Decision was made by onc and colorectal to manage conservatively  Pt developed fever, chills, n and SOB on 7/3 afternoon  Tmax 103  Patient states she felt as though she was having a panic attack prior to temperature  All symptoms resolved  Currently denying SOB/CP, f/c  Occasion nausea without vomiting and improving abdominal pain  Pt states the symptoms came on when she attempted to eat soft food for first time  Denies vomiting or feeling of aspiration  Blood cultures obtained at time of fever  Started on cefepime and vanc per oncology recommendations  SLIM consulted for further management of fever  Review of Systems:    Review of Systems   Constitutional: Negative for chills and fever  HENT: Negative for congestion, rhinorrhea, sneezing and sore throat  Eyes: Negative for pain and visual disturbance  Respiratory: Negative for cough and shortness of breath  Cardiovascular: Negative for chest pain and leg swelling  Gastrointestinal: Positive for abdominal pain and nausea  Negative for vomiting  Genitourinary: Negative for dysuria, hematuria and urgency     Musculoskeletal: Negative for arthralgias and myalgias  Skin: Negative for rash and wound  Neurological: Negative for dizziness, weakness, light-headedness and numbness  Psychiatric/Behavioral: Negative for agitation  Past Medical and Surgical History:     Past Medical History:   Diagnosis Date    History of rectal cancer 06/2020       Past Surgical History:   Procedure Laterality Date    BACK SURGERY      BREAST BIOPSY Right 06/19/2020    2 sites; 1 breast 1 axilla    HERNIA REPAIR      3 repairs    IR IMAGE GUIDED BIOPSY/ASPIRATION LIVER  6/17/2020    IR PORT PLACEMENT  6/22/2020    TUBAL LIGATION      US GUIDED BREAST BIOPSY RIGHT COMPLETE Right 6/19/2020    US GUIDED BREAST LYMPH NODE BIOPSY RIGHT Right 6/19/2020       Meds/Allergies:    all medications and allergies reviewed    Allergies: No Known Allergies    Social History:     Marital Status: Single    Substance Use History:   Social History     Substance and Sexual Activity   Alcohol Use Not Currently     Social History     Tobacco Use   Smoking Status Current Every Day Smoker    Packs/day: 0 50    Types: Cigarettes   Smokeless Tobacco Never Used     Social History     Substance and Sexual Activity   Drug Use No       Family History:    non-contributory    Physical Exam:     Vitals:   Blood Pressure: 96/54 (07/03/20 2208)  Pulse: 85 (07/03/20 2208)  Temperature: 99 5 °F (37 5 °C) (07/03/20 2208)  Temp Source: Axillary (07/03/20 1757)  Respirations: 16 (07/03/20 1527)  SpO2: 97 % (07/03/20 2208)    Physical Exam   Constitutional: She appears well-developed and well-nourished  No distress  Eyes: EOM are normal    Neck: Neck supple  Cardiovascular: Normal rate and regular rhythm  Pulmonary/Chest: Effort normal  No respiratory distress  She has wheezes (L sided )  Abdominal: Bowel sounds are normal  She exhibits distension  There is tenderness  A hernia is present  Musculoskeletal: She exhibits no edema     Neurological:   Alert and oriented    Skin: Skin is warm and dry  Capillary refill takes less than 2 seconds  She is not diaphoretic  Multiple marks/scars along LE b/l, appear faded and healing  No rash noted  Port site R chest clean and intact    Psychiatric: She has a normal mood and affect  Her behavior is normal    Nursing note and vitals reviewed  Additional Data:     Lab Results: I have personally reviewed pertinent reports  Results from last 7 days   Lab Units 07/03/20  1504 07/03/20  0841   WBC Thousand/uL 5 98 11 07*   HEMOGLOBIN g/dL 8 7* 9 4*   HEMATOCRIT % 29 6* 32 6*   PLATELETS Thousands/uL 203 223   LYMPHO PCT %  --  10*   MONO PCT %  --  4   EOS PCT %  --  3     Results from last 7 days   Lab Units 07/03/20  0841   POTASSIUM mmol/L 3 6  3 6   CHLORIDE mmol/L 100  100   CO2 mmol/L 24  25   BUN mg/dL 5  5   CREATININE mg/dL 0 54*  0 58*   CALCIUM mg/dL 7 9*  8 2*   ALK PHOS U/L 192*   ALT U/L 21   AST U/L 37           Imaging: I have personally reviewed pertinent reports  Xr Abdomen 1 View Kub    Result Date: 7/1/2020  Narrative: ABDOMEN INDICATION:   NGT placement - Please include stomach to show NGT placement  COMPARISON:  CT from same day  VIEWS:  AP supine FINDINGS: Tip of the feeding tube is terminating in the stomach  There is a right-sided Mediport with its tip in SVC  No discernible free air on this supine study  Upright or left lateral decubitus imaging is more sensitive to detect subtle free air in the appropriate setting  No pathologic calcifications or soft tissue masses  Multiple pulmonary nodules and masses are noted which should be correlated with prior CT  Orthopedic hardware in the lumbar spine  Impression: Feeding tube tip terminating in the stomach  Workstation performed: [id number]     Ct Chest With Contrast    Result Date: 6/10/2020  Narrative: CT CHEST WITH IV CONTRAST INDICATION:   R93 89: Abnormal findings on diagnostic imaging of other specified body structures    Rectal mass and liver/pulmonary lesion seen on recent CT abdomen pelvis from 2 days earlier  COMPARISON:  Two-view chest 11/28/2012  Lung bases imaging included with CT abdomen pelvis 6/8/2020 TECHNIQUE: CT examination of the chest was performed  Axial, sagittal, and coronal 2D reformatted images were created from the source data and submitted for interpretation  Radiation dose length product (DLP) for this visit:  104 mGy-cm   This examination, like all CT scans performed in the [facility], was performed utilizing techniques to minimize radiation dose exposure, including the use of iterative reconstruction and automated exposure control  IV Contrast:  85 mL of iohexol (OMNIPAQUE) FINDINGS: LUNGS:  Numerous masses throughout both lungs  Dominant right lower lobe mass measuring 6 3 x 4 5 cm #3/97  Dominant peripheral right upper lobe mass measuring 24 x 19 mm #3/38 dominant left upper lobe mass measuring 15 x 10 mm #3/69  Multiple large left  lower lobe masses  The largest measures 59 x 51 mm  #3/101  No infiltrate or consolidation  No endotracheal or endobronchial lesion  Mild upper lobe predominant background centrilobular emphysema  PLEURA:  Unremarkable  HEART/GREAT VESSELS:  Unremarkable for patient's age  MEDIASTINUM AND MARKEL:  Unremarkable  CHEST WALL AND LOWER NECK:   Unremarkable  VISUALIZED STRUCTURES IN THE UPPER ABDOMEN:  Hepatic masses; see the CT abdomen pelvis report from 2 days earlier  OSSEOUS STRUCTURES:  No acute fracture or destructive osseous lesion  Impression: Numerous pulmonary nodules/masses in keeping with metastatic disease  Workstation performed: [id number]     Us Guided Breast Biopsy Right Complete, Us Guided Breast Lymph Node Biopsy Right, Mammo Post Biopsy Right    Addendum Date: 6/25/2020 Addendum:   ADDENDUM: PATHOLOGY RESULTS: A  Breast, right at 11:00 o'clock 5 cm from nipple, ultrasound-guided biopsy (3 passes): -Invasive breast carcinoma of no special type (ductal NST/invasive ductal carcinoma)  B   Lymph node, right axilla, biopsy: -  Lymph node tissue with rare keratin positive cells identified by immunohistochemistry, cannot exclude lymph node involvement by isolated tumor cells  -  Immunohistochemical stain performed with appropriate controls for keratin AE1/3 highlights rare positive cells in the lymph node  * please see additional details in the pathology report  The pathology is malignant  In review of the patients recent imaging, the right malignancy appears unifocal within the breast   When the mass is measured with the rim of increased echogenicity on the cine images from ultrasound 06/19/2020, it measures 12 x 12 x 12 mm  The mass is approximately 1 mm from the skin on ultrasound although it appears farther on mammography  It does not appear close to the pectoralis muscle on mammography  A thickened right axillary lymph node was biopsied with pathology as above  The mammogram from 6/19/2020 was reviewed and there is no mammographic evidence of malignancy in the left breast  RECOMMENDATION:      - Surgical Consultation for the right breast  The patient is already under the care of hematology/oncology and will be notified of the pathology results by her clinicians  END ADDENDUM    Result Date: 6/19/2020  Narrative: DIAGNOSIS: Abnormal mammogram INDICATION: Jazmin Desouza is a 50 y o  female presenting for ultrasound-guided core needle biopsies  FINDINGS: RIGHT 1) MASS US guided breast biopsy right complete: Images of the right breast mass at 11 o'clock in the middle portion, 5 cm from the nipple were obtained  A core needle biopsy of a 9 mm x 8 mm mass was performed under ultrasound guidance using a lateral approach  The skin was prepped in the usual fashion  Anesthesia was administered using lidocaine 1%  A 12 G device was advanced in multiple passes  A clip was inserted into the target area  Wing shaped biopsy marker clip   Post-placement ultrasound and digital mammographic imaging demonstrate the clip was at the site  The patient tolerated the procedure well  There were no immediate complications  2) LYMPH NODE US guided breast biopsy right complete: Images of the right breast lymph node in the axilla region were obtained  The lymph node with 4 mm thickened cortex identified on the earlier ultrasound was not amenable to ultrasound-guided core needle biopsy secondary to arteries draping over the cortex and no safe window for biopsy was identified  A 2nd lymph node with 4 mm thickened cortex was identified and subsequently biopsied  A core needle biopsy was performed under ultrasound guidance using a lateral approach  The skin was prepped in the usual fashion  Anesthesia was administered using lidocaine 1%  A 12 G device was advanced in multiple passes  A clip was inserted into the target area  HydroMARK butterfly  Post-placement ultrasound imaging demonstrates the clip was at the site  The patient tolerated the procedure well  There were no immediate complications  Impression:  Technically successful ultrasound-guided core needle biopsy of a hypoechoic mass in the 11 o'clock position of the right breast, 5 cm from the nipple with wing shaped biopsy marker clip placement  Technically successful ultrasound-guided core needle biopsy of an abnormal appearing lymph node in the right axilla with HydroMARK butterfly shaped biopsy marker clip placement  RECOMMENDATION:      - Waiting for pathology for the right breast  Workstation ID: [id number]     Nm Pet Ct Skull Base To Mid Thigh    Result Date: 6/16/2020  Narrative: PET/CT SCAN INDICATION: Initial staging of rectal cancer  C20: Malignant neoplasm of rectum MODIFIER: PI COMPARISON: CT chest 6/10/2020, CT abdomen pelvis 6/8/2020 CELL TYPE:  Adenocarcinoma, rectal biopsy 6/12/2020 TECHNIQUE:   8 7 mCi F-18-FDG administered IV   Multiplanar attenuation corrected and non attenuation corrected PET images were acquired 60 minutes post injection  Contiguous, low dose, axial CT sections were obtained from the skull base through the femurs   Intravenous contrast material was not utilized  This examination, like all CT scans performed in the [facility], was performed utilizing techniques to minimize radiation dose exposure, including the use of iterative reconstruction and automated exposure control  Fasting serum glucose: 98 mg/dl FINDINGS: VISUALIZED BRAIN:   No acute abnormalities are seen  HEAD/NECK: Somewhat suboptimal due to motion  There is a physiologic distribution of FDG  No FDG avid cervical adenopathy is seen  CT images: Unremarkable  CHEST:   Multiple FDG avid bilateral pulmonary lesions  Right lower lobe lesion demonstrates SUV max of 10 8  This measures 6 6 x 4 9 cm image 113 series 3  Central photopenia suggesting necrosis  Left lower lobe lesion demonstrates SUV max of 11 5  This lesion measures 6 3 x 4 8 cm image 115 series 3 abutting the pleural margin  This also demonstrates central photopenia suggesting necrosis  Nodule close to the left hilum in the left lower lobe demonstrates SUV max of 7 3  Hilar lymph node is not excluded  Additional FDG avid pulmonary lesions bilaterally  There are additional small nodules without significant FDG uptake but these may be too small to characterize  Small focus of FDG uptake in the right breast parenchyma just right of center, SUV max of 2 7  This is within the right breast parenchyma, no discernible lesion on limited CT or prior contrast CT  Minimal FDG uptake in a small right axillary lymph node, SUV max of 1 2, nonspecific  CT images: No additional significant findings  ABDOMEN:   Multiple FDG avid hepatic lesions  There are at least 6  Dominant lesion in the right lobe posteriorly demonstrates SUV max of 11 1  There is central photopenia suggesting necrosis  This measures approximately 8 5 x 7 9 cm    Lesion in the left lobe anteriorly demonstrates SUV max of 9 8   This measures approximately 5 5 x 4 7 cm image 144 series 3  Mild patchy FDG uptake at the anterior abdominal wall related to a fat-containing ventral wall hernia probably inflammatory  No FDG avid lymph nodes  CT images: Several ventral wall hernias some of which contain bowel without obstruction  PELVIS: Larger lobular FDG avid lesion at the rectosigmoid colon, SUV max of 16 2  There is associated soft tissue fullness on the CT images, not well-defined  Based on the PET images the rectosigmoid component measures approximately 5 6 x 5 5 cm axially and 6 3 cm in craniocaudad dimension  The FDG activity is contiguous with the rectosigmoid mass, SUV max of 13 8  This activity overlaps the sacrum, suspicious for direct osseous extension  No obvious osseous changes on CT  Presacral component measures approximately 5 8 x 4 0 cm axially on the PET images and 10 1 cm in cranial caudal dimension  Small focus of FDG uptake in the left adnexal region, SUV max of 4 2, may be related to physiologic ovarian activity  Mild FDG uptake in a left pelvic sidewall lymph node, SUV max of 2 0, nonspecific  This lymph node measures 1 4 x 1 0 cm image 212 series 3  No additional FDG avid lymph nodes  Focal FDG uptake at the right posterior uterus, SUV max of 12 3  On prior contrast CT there is an enhancing ovoid lesion here measuring 3 5 x 2 6 cm likely a fibroid  CT images: Thickening of the pararectal fascia  Small left adnexal cysts suggested  OSSEOUS STRUCTURES: Again the presacral activity does overlap the sacrum, SUV max of 9 6 at the upper sacrum centrally  No obvious findings on CT  Otherwise no suspicious FDG avid osseous lesions  CT images: Prior anterior fusion in the lower lumbosacral spine  Impression: 1  Lobular FDG uptake at the rectosigmoid colon compatible with known malignancy  Extension of FDG activity to the presacral region and overlapping the sacrum which is suspicious for osseous extension  This could be confirmed with MRI  2  Multiple FDG avid bilateral pulmonary lesions compatible with metastasis  3   Multiple FDG avid hepatic lesions compatible with metastasis  4   Small focus of FDG uptake in the right breast   Primary breast malignancy should be excluded  This would be an unusual location for metastasis  This could be further assessed beginning with dedicated mammographic imaging  5  FDG uptake at a right uterine lesion likely related to fibroid activity  Fibroids can normally demonstrate FDG uptake  This can be reassessed on follow-up  Workstation performed: [id number]     Ir Image Guided Biopsy/aspiration Liver    Result Date: 6/17/2020  Narrative: IMAGE-GUIDED BIOPSY Indication: Newly found colon mass with multiple liver and pulmonary masses  Procedure: In the supine position, a suitable location for biopsy was identified with ultrasound   The site and surrounding skin were prepped and draped in sterile fashion  1% lidocaine was used for local anesthetic and conscious sedation was administered  Using direct sonographic guidance, a 17 gauge introducer needle was advanced into right lobe liver mass using a intercostal access   Core biopsy was performed with 4 passes made coaxially with a 18 gauge Biopince biopsy device  Specimens were submitted to on-site cytology  The introducer needle was removed with simultaneous injection of D-Stat collagen-thrombin solution   The puncture site was cleansed and a dressing was applied  Sedation time: 30 minutes Findings: Diagnostic material was identified preliminarily based on touch prep  4 cm right lobe liver mass was sampled  Impression: Impression: Technically successful image-guided biopsy of right lobe liver mass Workstation performed: [id number]     Ct Abdomen Pelvis With Contrast    Result Date: 7/1/2020  Narrative: CT ABDOMEN AND PELVIS WITH IV CONTRAST INDICATION:   Severe lower abdominal pain, vomiting, history of rectal cancer    Patient is a current everyday smoker  COMPARISON:  Multiple prior studies, most recent of which is a CT abdomen pelvis June 29, 2020  TECHNIQUE:  CT examination of the abdomen and pelvis was performed  Axial, sagittal, and coronal 2D reformatted images were created from the source data and submitted for interpretation  Radiation dose length product (DLP) for this visit:  294 85 mGy-cm   This examination, like all CT scans performed in the [facility], was performed utilizing techniques to minimize radiation dose exposure, including the use of iterative  reconstruction and automated exposure control  IV Contrast:  100 mL of iohexol (OMNIPAQUE) Enteric Contrast:  Enteric contrast was not administered  FINDINGS: ABDOMEN LOWER CHEST:  Multiple, stable bilateral lower lobe lung lesions, most compatible with metastatic disease  LIVER/BILIARY TREE:  No significant change in the low-density hepatic lesions, most compatible with hepatic metastatic disease  GALLBLADDER: Distended  Layering increased density in the gallbladder, most compatible with vicarious excretion of contrast material from earlier CT scan  No calcified gallstones  No pericholecystic inflammatory change  SPLEEN:  Enlarged  PANCREAS:  Unremarkable  ADRENAL GLANDS:  Unremarkable  KIDNEYS/URETERS:  Unremarkable  No hydronephrosis  STOMACH AND BOWEL:  Stomach relatively decompressed  Hiatal hernia  The proximal small bowel is decompressed  The distal small bowel is distended and fluid-filled  The colon is severely distended and filled with both liquid and formed stool, up to the level of the distal sigmoid colon/rectum  There is heterogeneous enhancement of the distal sigmoid colon and rectum  Peripherally enhancing low-density areas in the  soft tissue, likely areas of necrosis  There is mild wall edema of the sigmoid colon  APPENDIX: Multiple appendicoliths  No findings to suggest appendicitis  ABDOMINOPELVIC CAVITY:  No ascites    No pneumoperitoneum  No lymphadenopathy  VESSELS:  Unremarkable for patient's age  PELVIS REPRODUCTIVE ORGANS:  Multiple calcified uterine fibroids  Stable 2 9 cm left ovarian cyst  URINARY BLADDER:  Decompressed  Inadequately evaluated  ABDOMINAL WALL/INGUINAL REGIONS:  Umbilical hernia containing fat  The previously identified hernia containing small bowel loops has been reduced  OSSEOUS STRUCTURES:  No acute fracture or destructive osseous lesion  Postoperative changes with anterior fusion  Impression: 1  High-grade distal large bowel obstruction at the level of the distal sigmoid colon/rectum, secondary to patient's known rectal carcinoma  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  2   Mild mucosal thickening of the sigmoid colon, most compatible with mild colitis, likely related to the patient's chemotherapy  3   No significant change in pulmonary or hepatic metastatic disease  4   The previously identified ventral abdominal wall hernia containing loops of small bowel, has been reduced  5   Stable left ovarian cyst  Follow-up surgical consultation recommended  I personally discussed this study with Alice Kunz on 7/1/2020 at 6:16 AM  Workstation performed: [id number]     Ct Abdomen Pelvis With Contrast    Result Date: 6/29/2020  Narrative: CT ABDOMEN AND PELVIS WITH IV CONTRAST INDICATION:   Concern for bowel obstruction  Metastatic rectal and breast cancer COMPARISON:  6/8/2020 TECHNIQUE:  CT examination of the abdomen and pelvis was performed  Axial, sagittal, and coronal 2D reformatted images were created from the source data and submitted for interpretation  Radiation dose length product (DLP) for this visit:  301 71 mGy-cm   This examination, like all CT scans performed in the [facility], was performed utilizing techniques to minimize radiation dose exposure, including the use of iterative  reconstruction and automated exposure control  IV Contrast:  80 mL of iohexol (OMNIPAQUE) Enteric Contrast:  Enteric contrast was not administered  FINDINGS: ABDOMEN LOWER CHEST:  Multiple bilateral metastatic masses and nodules are again partially imaged, not significantly changed  LIVER/BILIARY TREE:  Interval progression of metastatic disease in the liver  Multiple low-attenuating lesions have increased in size  For example, the largest lesion in the posterior segment that previously measured 7 4 cm now measures 8 2 cm (2/17)  No biliary ductal dilation  GALLBLADDER:  No calcified gallstones  No pericholecystic inflammatory change  SPLEEN:  Unremarkable  PANCREAS:  Unremarkable  ADRENAL GLANDS:  Unremarkable  KIDNEYS/URETERS:  Unremarkable  No hydronephrosis  STOMACH AND BOWEL:  Redemonstrated is a circumferential, muscle invasive high rectal mass, although it is difficult to measured appears to be slightly decreased in size and conspicuity  No bowel obstruction  APPENDIX:  No findings to suggest appendicitis  ABDOMINOPELVIC CAVITY:  Decreased size of pelvic lymphadenopathy  For example, the previously reported left obturator lymph node now measures 0 6 cm (previously 1 cm; 2/69)  The previously reported left superior rectal chain lymph node now measures 0 7  cm (previously 1 cm; 2/59)  No ascites  No pneumoperitoneum  VESSELS:  Unremarkable for patient's age  PELVIS REPRODUCTIVE ORGANS:  Fibroid uterus again noted  URINARY BLADDER:  Mostly collapsed, precluding evaluation  ABDOMINAL WALL/INGUINAL REGIONS:  Redemonstrated periumbilical hernia containing nondilated loops of bowel  Smaller fat and fluid containing epigastric hernia again noted  OSSEOUS STRUCTURES:  No acute fracture or destructive osseous lesion  Status post instrumented anterior fusion at L4-L5 and L5-S1  Impression: 1  No bowel obstruction  Unchanged appearance of the periumbilical hernia, again containing nonobstructed loops of bowel    Also again seen is a smaller epigastric hernia containing fat and fluid  2   Decreased conspicuity of the rectal mass and improved pelvic lymphadenopathy  3   Increased size of metastatic lesions in the liver  4   Partially imaged bilateral lower lung metastatic disease is not significantly changed  Workstation performed: [id number]     Ct Abdomen Pelvis W Contrast    Result Date: 6/9/2020  Narrative: CT ABDOMEN AND PELVIS WITH IV CONTRAST INDICATION:   R19 4: Change in bowel habit K43 2: Incisional hernia without obstruction or gangrene  Abdominal pain and change in bowel habits  COMPARISON:  None  TECHNIQUE:  CT examination of the abdomen and pelvis was performed  In addition to portal venous phase postcontrast scanning through the abdomen and pelvis, delayed phase postcontrast scanning was performed through the upper abdominal viscera  Axial, sagittal, and coronal 2D reformatted images were created from the source data and submitted for interpretation  Radiation dose length product (DLP) for this visit:  593 8 mGy-cm   This examination, like all CT scans performed in the [facility], was performed utilizing techniques to minimize radiation dose exposure, including the use of iterative reconstruction and automated exposure control  IV Contrast:  100 mL of iohexol (OMNIPAQUE) Enteric Contrast:  Enteric contrast was administered  FINDINGS: STOMACH AND BOWEL:  There is an ill-defined infiltrative and muscle invasive high rectal mass approximately measuring 4 7 x 4 4 x 4 2 cm (series 602 image 89, series 2 image 66),, highly suspicious for an invasive rectal cancer  There is extension of the mass into the presacral space as well as possible involvement of the posterior wall of the lower uterine segment (series 602 images 88- 94)  There are several mildly enlarged mesorectal, superior rectal chain and pelvic lymph nodes, suspicious for locoregional metastasis    Examples include: -left superior rectal chain lymph node measuring 1 2 x 1 0 cm (series 2 image 58) -left mesorectal lymph node measuring 10 x 9 mm (series 2 image 60)  -Left obturator lymph node measuring 1 5 x 1 0 cm (series 2 image 68)  REST OF ABDOMEN AND PELVIS: LOWER CHEST:  There are several masses and nodules at the lung bases, suspicious for metastasis  Examples include: - Right lower lobe lung mass measuring 6 2 x 5 2 cm (series 2 image 7)  - Left lower lobe mass measuring 6 0 x 4 7 cm (series 2 image 5)  - Left lower lobe mass measuring 4 2 x 3 4 cm (series 2 image 1)  LIVER/BILIARY TREE:  Several (approximately 10) hypoattenuating liver lesions, highly suspicious for metastasis  Examples include: - 7 8 x 7 2 cm segment 7 lesion (series 2 image 18)  - 4 3 x 4 3 cm segment IVb lesion (series 3 image 25)  - 3 9 x 3 7 cm segment 5/6 lesion (series 2 image 27)  No biliary ductal dilation  GALLBLADDER:  Contracted  No calcified gallstones  No pericholecystic inflammatory change  SPLEEN:  Unremarkable  PANCREAS:  Unremarkable  ADRENAL GLANDS:  Unremarkable  KIDNEYS/URETERS:  Unremarkable  No hydronephrosis  APPENDIX:  No findings to suggest appendicitis  ABDOMINOPELVIC CAVITY: Mildly enlarged mesorectal and superior rectal chain lymph nodes as described, suspicious for metastasis  Otherwise, no enlarged lymph nodes in the upper abdomen  No ascites  No pneumoperitoneum  VESSELS:  Unremarkable for patient's age  PELVIS REPRODUCTIVE ORGANS:  Enlarged and lobulated uterus with several fibroids  There is possible involvement of posterior aspect of the lower uterine segment by the rectal mass described above (series 603 image 99)  There our hypoattenuating lesions in the  adnexa bilaterally measuring 2 8 x 2 8 cm on the left (series 2 image 59) and 3 2 x 2 2 cm on the right side (series 2 image 63)  These are indeterminate and may represent prominent ovarian follicles or metastatic lesions  URINARY BLADDER:  underdistended and not adequately evaluated   ABDOMINAL WALL/INGUINAL REGIONS:  Moderate size periumbilical hernia containing nondilated loops of bowel  The hernia sac measures 10 7 x 2 2 cm, and the abdominal wall defect measures 7 2 cm in craniocaudal and 3 0 cm in transverse dimensions (series 2  image 56 and series 603 image 87)  There is an additional small fat-containing epigastric hernia  OSSEOUS STRUCTURES:  No acute fracture or destructive osseous lesion  Status post lower lumbar spine fusion  Impression: 1  Infiltrative and muscle invasive high rectal mass approximately measuring 4 7 x 4 4 x 4 2 cm, highly suspicious for an invasive rectal cancer  There is extension of the mass into the presacral space as well as possible involvement of the posterior wall of the lower uterine segment  2  Large mass lesions in the liver and at the lung bases as detailed above, highly suspicious for liver and pulmonary metastasis  3  Mildly enlarged local regional lymph nodes as detailed above, suspicious for metastasis  4   Hypoattenuating lesions in the adnexa bilaterally  These are indeterminate and may represent prominent ovarian follicles or metastatic lesions  Further characterization with pelvic ultrasound or MRI would be beneficial  5   Enlarged uterus with fibroids  6   Ventral abdominal wall hernias as described, the largest containing nondilated loops of bowel  I personally discussed this study with Rosalio Merchant on 6/9/2020 at 11:55 AM  Workstation performed: [id number]     Mammo Diagnostic Bilateral W 3d & Cad, Us Breast Right Limited (diagnostic)    Result Date: 6/19/2020  Narrative: DIAGNOSIS: Malignant neoplasm of rectum ([address] ); Abnormal mammogram TECHNIQUE: Digital diagnostic mammography was performed  Computer Aided Detection (CAD) analyzed all applicable images  Ultrasound of the right breast(s) was performed   COMPARISONS: Prior breast imaging dated: 07/28/2015 and 05/08/2014 RELEVANT HISTORY: Family Breast Cancer History: History of breast cancer in Maternal Grandmother, Maternal Aunt  Family Medical History: Family medical history includes breast cancer in maternal aunt and breast cancer in maternal grandmother  Personal History: Hormone history includes birth control  No known relevant surgical history  No known relevant medical history  RISK ASSESSMENT: 5 Year Tyrer-Cuzick: 1 25 % 10 Year Tyrer-Cuzick: 2 71 % Lifetime Tyrer-Cuzick: 13 27 % TISSUE DENSITY: The breasts are heterogeneously dense, which may obscure small masses  INDICATION: Jaycob Marie is a 50 y o  female presenting for history of rectal cancer  FINDINGS: RIGHT 1) MASS Mammo diagnostic bilateral w 3d & cad: There is a 10 mm high density, irregularly shaped mass with spiculated margins seen in the right breast in the middle depth, 5 cm from the nipple  US breast right limited (diagnostic): There is an 8 mm x 9 mm x 10 mm irregularly shaped, non-parallel, hypoechoic mass seen in the right breast at 11 o'clock  Findings are suspicious and ultrasound-guided biopsy is recommended  Enlarged right axillary lymph node is identified with thickened cortex  This too is suspicious in appearance and ultrasound-guided sampling is recommended  LEFT There are no suspicious masses, grouped microcalcifications or areas of architectural distortion  The skin and nipple areolar complex are unremarkable  Impression: ASSESSMENT/BI-RADS CATEGORY: Right: 4 - Suspicious Overall: 4 - Suspicious RECOMMENDATION:      - Ultrasound-guided breast biopsy for the right breast  Workstation ID: [id number]     Ir Port Placement    Result Date: 6/22/2020  Narrative: INDICATION: Metastatic colon cancer  PROCEDURE: 1  Right internal jugular approach single lumen port-a-cath placement FINDINGS: 1  Single lumen port placed via right internal jugular vein with tip in the right atrium  Impression: Successful placement of a single lumen port  The catheter is ready for use  _______________________________________________________________ COMPARISON: None  PROCEDURE DETAILS: Operators: Dr Vladimir Walsh, [address] [place] [address] attending, performed the procedure  Anesthesia: Conscious sedation was provided throughout a total intra-service time of 30 minutes during which the patient's hemodynamic parameters were continuously monitored by an independent trained radiology nurse  1% lidocaine with epinephrine was injected in the skin and subcutaneous tissues overlying the access site  Medications: 1% lidocaine, fentanyl, Versed Contrast: 0 mL of Omnipaque 300 Fluoroscopy time: 0 3 minutes Images: 5 COMMENTS: Following the discussion of the risks, benefits and alternatives to the procedure, written informed consent was obtained from the patient  The patient was placed supine on the imaging table  The site was prepped and draped in the usual sterile fashion  All elements of maximal sterile barrier technique were followed (cap, mask, sterile gown, sterile gloves, large sterile full-body drape, hand hygiene, and 2% chlorhexidine for cutaneous antisepsis)  Sterile ultrasound technique with sterile gel and sterile probe cover was also utilized  A preprocedure timeout was performed per [facility] protocol  Under continuous ultrasound guidance, the patent right internal jugular vein was compressible and accessed using a micropuncture needle  A static ultrasound image was saved to PACS  Over a microwire, the needle was exchanged for a micropuncture sheath followed by exchange for a J-wire advanced into the inferior vena cava  Next, attention was turned towards creation of a subcutaneous pocket over the upper anterior chest wall  After instilling superficial and deeper local anesthesia using lidocaine mixed with epinephrine, a 2 5 cm transverse incision was made and a subcutaneous pocket was created by using blunt dissection    The catheter of the port was then tunneled from the subcutaneous pocket towards the venotomy site  Following micropuncture sheath exchange for a peel-away sheath, the catheter was threaded into the right atrium using fluoroscopic guidance  The peel-away sheath was then removed  Catheter length was confirmed with fluoroscopic imaging  The catheter was cut and connected to the port hub  The hub was then placed within the subcutaneous pocket  The port was accessed using a noncoring Lainez needle, aspirated and flushed  The subcutaneous pocket was closed in layers with 3-0 interrupted Vicryl sutures and subcuticular continuous sutures using a Stratafix absorbable suture  Exofin glue was applied over the venotomy and chest incisions  Sterile dressings were then applied  Final spot fluoroscopic image demonstrated good alignment of the catheter, no kinking and with its tip in the right atrium  Workstation performed: [id number]       EKG, Pathology, and Other Studies Reviewed on Admission:   · EKG: NRS 7/3    ** Please Note: This note has been constructed using a voice recognition system   **

## 2020-07-04 NOTE — ASSESSMENT & PLAN NOTE
· Developed fever 7/3 with Tmax of 103: developed SOB, N, and chills  · Unclear source of infection:    · Pt without infectious changes in abdominal imaging 7/1   Abdominal pain improving throughout admission   · Port placed 6/22 without signs of infection:  · Will hold off on accessing port until Blood cultures result   · Blood cultures obtained  · Oncology recommending Cefipime and vanc, continue  · Trend  WBC and procal  · CXR ordered

## 2020-07-04 NOTE — ASSESSMENT & PLAN NOTE
· Acute on chronic and multifactorial   · Known hernias - continue abdominal binder   · Constipation - bowel regimen per primary   · Cancer related pain - pain regimen per palliative

## 2020-07-04 NOTE — PROGRESS NOTES
Vancomycin IV Pharmacy-to-Dose Consultation    Taiwo Preciado is a 50 y o  female who is currently receiving Vancomycin IV with management by the Pharmacy Consult service  Relevant clinical data and objective / subjective history reviewed  Vancomycin Assessment:  Indication: sepsis of unknown source, fever  Status: stable  Micro:   7/3 Blood cultures x 2: in process  7/1 SARS-CoV-2: negative  Procalcitonin: 5 14  Renal Function: Stable, Scr 0 48; CrCl ~100 mL/min  Potential Nephrotoxicity Factors (select ALL that apply):  Medications: none present  Patient-Factors: none present  Days of Therapy: 2  Current Dose: 1250 mg IV q12h (last dose: 7/4 at 0652)  Goal Trough: 15-20 (appropriate for most indications)   Goal AUC(24h): 400-600  Last Level: n/a  Pharmacokinetic Analysis: ke 0 107 hr-; t1/2 6 5 hr    Vancomycin Plan:  New Dosing: change to 750 mg IV q8h (next dose: 7/4 at 1600)  Predicted Trough / AUC: 14 3 / 521  Next Level: trough 7/5 at 0730  Renal Function Monitoring: daily BMP    Pharmacy will continue to follow closely for s/sx of nephrotoxicity, infusion reactions and appropriateness of therapy  BMP and CBC will be ordered per protocol  We will continue to follow the patients culture results and clinical progress daily       Jayne Goldberg, PharmD, [address] Clinical Pharmacist  [phone]

## 2020-07-04 NOTE — ASSESSMENT & PLAN NOTE
· Stage IV rectal cancer with known mets   · Following with oncology outpt, currently receiving FOLFIRINOX   · Now with obstructing rectal tumor and large bowel obstruction with incompetent ileocecal valce  · Onc consulted, appreciate input   ·

## 2020-07-04 NOTE — PROGRESS NOTES
Progress Note - Marycarmen Roldan 1975, 50 y o  female MRN: [id number]  Unit/Bed#: MS Alex-01 Encounter: [id number] DOS: 7/4/2020  Primary Care Provider: Merary Strauss MD   Date and time admitted to hospital: 7/1/2020  5:24 AM    Sepsis [facility])  Assessment & Plan  · Patient met sepsis criteria 7/3 @1409 e/b fever and tachycardia, blood pressure soft  · Unknown source for sepsis, currently on cefepime and vancomycin  Add flagyl     · CT abdomen pelvis 7/1 shows mild mucosal thickening of sigmoid colon possibly due to mild colitis from chemotherapy  · Consider infectious etiology although has chronically loose stools due to bowel regimen, consider stool studies   · She is not neutropenic - monitor CBC with diff, temperature curve trending down  · Follow-up chest x-ray and blood cultures x2  · Pt reports rigors yesterday - consider possibility of strep bovis bacteremia   · Port intact, COVID-19 negative, UA benign  · Incentive spirometry  · IV fluid hydration    * Large bowel obstruction (HCC)  Assessment & Plan  · Partial obstruction from rectal carcinoma  · Conservative management per primary team  · Pt is a poor surgical candidate secondary to chemo   · NG removed, having BM  · Tolerating diet   · Pain regimen per palliative     Rectal cancer ([facility] [address] )  Assessment & Plan  · Stage IV rectal cancer with known mets   · Following with oncology outpt Dr Arsenio Wells, currently receiving FOLFIRINOX   · Now with obstructing rectal tumor and large bowel obstruction with incompetent ileocecal valce  · Onc consulted, appreciate input     Anemia  Assessment & Plan  · Baseline 8-9 since diagnosis of rectal cancer   · Today 7 7 - monitor closely      Abdominal pain  Assessment & Plan  · Acute on chronic and multifactorial   · Known hernias - continue abdominal binder   · Constipation - bowel regimen per primary   · Cancer related pain - pain regimen per palliative     Breast cancer [facility])  Assessment & Plan  · Currently following with oncology outpt for evaluation and treatment   · Stage unknown     VTE Pharmacologic Prophylaxis:   Pharmacologic: Enoxaparin (Lovenox)  Mechanical VTE Prophylaxis in Place: Yes    Patient Centered Rounds: I have performed bedside rounds with nursing staff today  Discussions with Specialists or Other Care Team Provider: nursing, TT primary team     Education and Discussions with Family / Patient: patient     Time Spent for Care: 30 minutes  More than 50% of total time spent on counseling and coordination of care as described above  Current Length of Stay: 3 day(s)    Current Patient Status: Inpatient   Certification Statement: The patient will continue to require additional inpatient hospital stay due to pending infectious work up     Discharge Plan: pending clinical course - not medically stable     Code Status: Level 1 - Full Code    Subjective:   Pt seen and examined at bedside  Reports shaking chills yesterday  Feels better this AM  Denies subjective fever  Loose BMs  Objective:     Vitals:   Temp (24hrs), Av 9 °F (38 3 °C), Min:98 7 °F (37 1 °C), Max:103 °F (39 4 °C)    Temp:  [98 7 °F (37 1 °C)-103 °F (39 4 °C)] 100 1 °F (37 8 °C)  HR:  [] 80  Resp:  [16-19] 19  BP: ()/(52-77) 90/59  SpO2:  [89 %-100 %] 97 %  There is no height or weight on file to calculate BMI  Input and Output Summary (last 24 hours): Intake/Output Summary (Last 24 hours) at 2020 0917  Last data filed at 2020 0423  Gross per 24 hour   Intake 1620 ml   Output 250 ml   Net 1370 ml     Physical Exam:     Physical Exam   Constitutional: She is oriented to person, place, and time  She appears well-developed and well-nourished  No distress  HENT:   Head: Normocephalic and atraumatic  Eyes: EOM are normal  No scleral icterus  Neck: Normal range of motion  Neck supple  Cardiovascular: Normal rate, regular rhythm and normal heart sounds     Pulmonary/Chest: Effort normal and breath sounds normal  Abdominal: Soft  Bowel sounds are normal  There is no tenderness  Musculoskeletal: Normal range of motion  She exhibits no edema  Neurological: She is alert and oriented to person, place, and time  Skin: Skin is warm and dry  Psychiatric: She has a normal mood and affect  Additional Data:     Labs:    Results from last 7 days   Lab Units 07/04/20  0444  07/03/20  0841   WBC Thousand/uL 9 55   < > 11 07*   HEMOGLOBIN g/dL 7 7*   < > 9 4*   HEMATOCRIT % 25 5*   < > 32 6*   PLATELETS Thousands/uL 169   < > 223   BANDS PCT %  --   --  4   LYMPHO PCT %  --   --  10*   MONO PCT %  --   --  4   EOS PCT %  --   --  3    < > = values in this interval not displayed  Results from last 7 days   Lab Units 07/04/20  0444   SODIUM mmol/L 133*   POTASSIUM mmol/L 3 4*   CHLORIDE mmol/L 103   CO2 mmol/L 25   BUN mg/dL 3*   CREATININE mg/dL 0 48*   ANION GAP mmol/L 5   CALCIUM mg/dL 7 7*   ALBUMIN g/dL 2 1*   TOTAL BILIRUBIN mg/dL 0 33   ALK PHOS U/L 196*   ALT U/L 18   AST U/L 41   GLUCOSE RANDOM mg/dL 104                 Results from last 7 days   Lab Units 07/04/20  0444 07/01/20  1623 07/01/20  1126 07/01/20  0546   LACTIC ACID mmol/L  --  1 3 2 8* 2 8*   PROCALCITONIN ng/ml 5 14*  --   --   --            * I Have Reviewed All Lab Data Listed Above  * Additional Pertinent Lab Tests Reviewed: [address] Admission Reviewed    Imaging:    Imaging Reports Reviewed Today Include: all  Imaging Personally Reviewed by Myself Includes:  none    Recent Cultures (last 7 days):     Results from last 7 days   Lab Units 07/03/20  1510 07/03/20  1504   BLOOD CULTURE  Received in Microbiology Lab  Culture in Progress  Received in Microbiology Lab  Culture in Progress         Last 24 Hours Medication List:     Current Facility-Administered Medications:  acetaminophen 650 mg Oral Q6H PRN Shane Grimes MD    cefepime 2,000 mg Intravenous Q12H Shane Grimes MD Last Rate: 2,000 mg (07/04/20 0852)   dextrose 5 % and sodium chloride 0 9 % with KCl 20 mEq/L 90 mL/hr Intravenous Continuous Margarito Storey MD Last Rate: 90 mL/hr (07/03/20 1442)   diphenhydrAMINE 12 5 mg Oral Q6H PRN Margarito Storey MD    enoxaparin 40 mg Subcutaneous Daily Symone Zhao MD    HYDROmorphone 0 5 mg Intravenous Q3H PRN Margarito Storey MD    lidocaine 1 application Urethral Once Symone Zhao MD    loratadine 10 mg Oral Daily Symone Zhao MD    melatonin 3 mg Oral HS PRN Natalya Madsen PA-C    metoprolol succinate 25 mg Oral Daily Meghan Degroot PA-C    metroNIDAZOLE 500 mg Oral Q8H [address] [place] Tabares PA-C    nicotine 1 patch Transdermal Daily Symone Zhao MD    oxyCODONE 5 mg Oral Q4H PRN Tylene Gibran Grimes MD    phenol 2 spray Mouth/Throat Q2H PRN Symone Zhao MD    polyethylene glycol 17 g Oral 4x Daily Tylene Gibran Grimes MD    potassium-sodium phosphates 2 packet Oral BID With Meals Margarito Storey MD    promethazine 12 5 mg Intravenous Q6H PRN Symone Zhao MD    saliva substitute 5 spray Mouth/Throat 4x Daily PRN Symone Zhao MD    vancomycin 20 mg/kg Intravenous Q12H Natalya Madsen PA-C Last Rate: Stopped (07/04/20 1902)        Today, Patient Was Seen By: Jhonathan Ramirez PA-C    ** Please Note: Dictation voice to text software may have been used in the creation of this document   **

## 2020-07-04 NOTE — ASSESSMENT & PLAN NOTE
· Patient met sepsis criteria 7/3 @1409 e/b fever and tachycardia, blood pressure soft  · Unknown source for sepsis, currently on cefepime and vancomycin  Add flagyl     · CT abdomen pelvis 7/1 shows mild mucosal thickening of sigmoid colon possibly due to mild colitis from chemotherapy  · Consider infectious etiology although has chronically loose stools due to bowel regimen, consider stool studies   · She is not neutropenic - monitor CBC with diff, temperature curve trending down  · Follow-up chest x-ray and blood cultures x2  · Pt reports rigors yesterday - consider possibility of strep bovis bacteremia   · Port intact, COVID-19 negative, UA benign  · Incentive spirometry  · IV fluid hydration

## 2020-07-04 NOTE — ASSESSMENT & PLAN NOTE
· Acute on chronic   · Multifactorial   · Known hernias - continue abdominal binder   · Constipation - bowel regimen per primary   · Cancer related pain - pain regimen per palliative

## 2020-07-04 NOTE — ASSESSMENT & PLAN NOTE
· Partial obstruction from rectal carcinoma  · Conservative management per primary team  · Pt is a poor surgical candidate secondary to chemo   · NG removed, having BM  · Tolerating diet   · Pain regimen per palliative

## 2020-07-04 NOTE — PROGRESS NOTES
Vancomycin Assessment    Jonathan Ramirez is a 50 y o  female who is currently receiving vancomycin 1000mg once for other febrile port chemo   Relevant clinical data and objective history reviewed:  Creatinine   Date Value Ref Range Status   07/03/2020 0 58 (L) 0 60 - 1 30 mg/dL Final     Comment:     Standardized to IDMS reference method   07/03/2020 0 54 (L) 0 60 - 1 30 mg/dL Final     Comment:     Standardized to IDMS reference method   07/02/2020 0 45 (L) 0 60 - 1 30 mg/dL Final     Comment:     Standardized to IDMS reference method   12/10/2015 0 79 0 60 - 1 30 mg/dL Final     Comment:     Standardized to IDMS reference method     BP 96/54 (BP Location: Right arm)   Pulse 85   Temp 99 5 °F (37 5 °C)   Resp 16   SpO2 97%   I/O last 3 completed shifts: In: 2033 9 [P O :240; I V :1793 9]  Out: 350 [Stool:350]  Lab Results   Component Value Date/Time    BUN 5 07/03/2020 08:41 AM    BUN 5 07/03/2020 08:41 AM    BUN 7 12/10/2015 12:31 PM    WBC 5 98 07/03/2020 03:04 PM    WBC 8 77 09/08/2015 09:21 AM    HGB 8 7 (L) 07/03/2020 03:04 PM    HGB 12 4 09/08/2015 09:21 AM    HCT 29 6 (L) 07/03/2020 03:04 PM    HCT 37 5 09/08/2015 09:21 AM    MCV 77 (L) 07/03/2020 03:04 PM    MCV 86 09/08/2015 09:21 AM     07/03/2020 03:04 PM     09/08/2015 09:21 AM     Temp Readings from Last 3 Encounters:   07/03/20 99 5 °F (37 5 °C)   06/29/20 (!) 97 2 °F (36 2 °C) (Oral)   06/22/20 97 9 °F (36 6 °C) (Oral)     Vancomycin Days of Therapy: 1    Assessment/Plan  The patient is currently on vancomycin utilizing scheduled dosing based on actual body weight  Baseline risks associated with therapy include: dehydration  The patient is currently receiving 1000mg once and after clinical evaluation will be changed to 1250mg Q12h  Pharmacy will also follow closely for s/sx of nephrotoxicity, infusion reactions and appropriateness of therapy  BMP and CBC will be ordered per protocol        Plan for trough as patient approaches steady state, prior to the 4th  dose at approximately [place]@[facility]OVEFi[facility]  Due to infection severity, will target a trough of 15-20 (appropriate for most indications)   Pharmacy will continue to follow the patients culture results and clinical progress daily      Gopal Henry

## 2020-07-05 ENCOUNTER — APPOINTMENT (INPATIENT)
Dept: NON INVASIVE DIAGNOSTICS | Facility: HOSPITAL | Age: 45
DRG: 374 | End: 2020-07-05
Payer: COMMERCIAL

## 2020-07-05 PROBLEM — R78.81 GRAM-POSITIVE BACTEREMIA: Status: ACTIVE | Noted: 2020-07-05

## 2020-07-05 LAB
ALBUMIN SERPL BCP-MCNC: 1.9 G/DL (ref 3.5–5)
ALP SERPL-CCNC: 183 U/L (ref 46–116)
ALT SERPL W P-5'-P-CCNC: 20 U/L (ref 12–78)
ANION GAP SERPL CALCULATED.3IONS-SCNC: 4 MMOL/L (ref 4–13)
AST SERPL W P-5'-P-CCNC: 43 U/L (ref 5–45)
BILIRUB SERPL-MCNC: 0.26 MG/DL (ref 0.2–1)
BUN SERPL-MCNC: 2 MG/DL (ref 5–25)
CALCIUM SERPL-MCNC: 8 MG/DL (ref 8.3–10.1)
CHLORIDE SERPL-SCNC: 106 MMOL/L (ref 100–108)
CO2 SERPL-SCNC: 27 MMOL/L (ref 21–32)
CREAT SERPL-MCNC: 0.41 MG/DL (ref 0.6–1.3)
ERYTHROCYTE [DISTWIDTH] IN BLOOD BY AUTOMATED COUNT: 20.3 % (ref 11.6–15.1)
GFR SERPL CREATININE-BSD FRML MDRD: 123 ML/MIN/1.73SQ M
GLUCOSE SERPL-MCNC: 90 MG/DL (ref 65–140)
HCT VFR BLD AUTO: 25.8 % (ref 34.8–46.1)
HGB BLD-MCNC: 7.6 G/DL (ref 11.5–15.4)
MCH RBC QN AUTO: 22.8 PG (ref 26.8–34.3)
MCHC RBC AUTO-ENTMCNC: 29.5 G/DL (ref 31.4–37.4)
MCV RBC AUTO: 78 FL (ref 82–98)
PLATELET # BLD AUTO: 174 THOUSANDS/UL (ref 149–390)
PMV BLD AUTO: 10.6 FL (ref 8.9–12.7)
POTASSIUM SERPL-SCNC: 3.4 MMOL/L (ref 3.5–5.3)
PROCALCITONIN SERPL-MCNC: 1.9 NG/ML
PROCALCITONIN SERPL-MCNC: 2.82 NG/ML
PROT SERPL-MCNC: 5.3 G/DL (ref 6.4–8.2)
RBC # BLD AUTO: 3.33 MILLION/UL (ref 3.81–5.12)
SODIUM SERPL-SCNC: 137 MMOL/L (ref 136–145)
VANCOMYCIN TROUGH SERPL-MCNC: 16.2 UG/ML (ref 10–20)
WBC # BLD AUTO: 9.31 THOUSAND/UL (ref 4.31–10.16)

## 2020-07-05 PROCEDURE — 93306 TTE W/DOPPLER COMPLETE: CPT | Performed by: INTERNAL MEDICINE

## 2020-07-05 PROCEDURE — 84145 PROCALCITONIN (PCT): CPT | Performed by: PHYSICIAN ASSISTANT

## 2020-07-05 PROCEDURE — 99232 SBSQ HOSP IP/OBS MODERATE 35: CPT | Performed by: PHYSICIAN ASSISTANT

## 2020-07-05 PROCEDURE — 80202 ASSAY OF VANCOMYCIN: CPT | Performed by: COLON & RECTAL SURGERY

## 2020-07-05 PROCEDURE — 80053 COMPREHEN METABOLIC PANEL: CPT | Performed by: PHYSICIAN ASSISTANT

## 2020-07-05 PROCEDURE — 87040 BLOOD CULTURE FOR BACTERIA: CPT | Performed by: INTERNAL MEDICINE

## 2020-07-05 PROCEDURE — 99232 SBSQ HOSP IP/OBS MODERATE 35: CPT | Performed by: INTERNAL MEDICINE

## 2020-07-05 PROCEDURE — 85027 COMPLETE CBC AUTOMATED: CPT | Performed by: PHYSICIAN ASSISTANT

## 2020-07-05 PROCEDURE — 93306 TTE W/DOPPLER COMPLETE: CPT

## 2020-07-05 PROCEDURE — 84145 PROCALCITONIN (PCT): CPT | Performed by: INTERNAL MEDICINE

## 2020-07-05 RX ORDER — OXYCODONE HYDROCHLORIDE 10 MG/1
10 TABLET ORAL
Status: DISCONTINUED | OUTPATIENT
Start: 2020-07-05 | End: 2020-07-08 | Stop reason: HOSPADM

## 2020-07-05 RX ORDER — DEXTROSE, SODIUM CHLORIDE, AND POTASSIUM CHLORIDE 5; .45; .15 G/100ML; G/100ML; G/100ML
50 INJECTION INTRAVENOUS CONTINUOUS
Status: DISCONTINUED | OUTPATIENT
Start: 2020-07-05 | End: 2020-07-07

## 2020-07-05 RX ORDER — HYDROMORPHONE HCL/PF 1 MG/ML
0.5 SYRINGE (ML) INJECTION
Status: DISCONTINUED | OUTPATIENT
Start: 2020-07-05 | End: 2020-07-08 | Stop reason: HOSPADM

## 2020-07-05 RX ORDER — OXYCODONE HYDROCHLORIDE 5 MG/1
5 TABLET ORAL
Status: DISCONTINUED | OUTPATIENT
Start: 2020-07-05 | End: 2020-07-08 | Stop reason: HOSPADM

## 2020-07-05 RX ORDER — POLYETHYLENE GLYCOL 3350 17 G/17G
17 POWDER, FOR SOLUTION ORAL
Status: DISCONTINUED | OUTPATIENT
Start: 2020-07-06 | End: 2020-07-08

## 2020-07-05 RX ADMIN — POTASSIUM & SODIUM PHOSPHATES POWDER PACK 280-160-250 MG 2 PACKET: 280-160-250 PACK at 15:56

## 2020-07-05 RX ADMIN — VANCOMYCIN HYDROCHLORIDE 750 MG: 750 INJECTION, SOLUTION INTRAVENOUS at 00:58

## 2020-07-05 RX ADMIN — OXYCODONE HYDROCHLORIDE 5 MG: 5 TABLET ORAL at 22:19

## 2020-07-05 RX ADMIN — VANCOMYCIN HYDROCHLORIDE 1250 MG: 1 INJECTION, POWDER, LYOPHILIZED, FOR SOLUTION INTRAVENOUS at 15:52

## 2020-07-05 RX ADMIN — CEFAZOLIN SODIUM 2000 MG: 2 SOLUTION INTRAVENOUS at 10:33

## 2020-07-05 RX ADMIN — OXYCODONE HYDROCHLORIDE 5 MG: 5 TABLET ORAL at 15:52

## 2020-07-05 RX ADMIN — CEFAZOLIN SODIUM 2000 MG: 2 SOLUTION INTRAVENOUS at 18:16

## 2020-07-05 RX ADMIN — ENOXAPARIN SODIUM 40 MG: 40 INJECTION SUBCUTANEOUS at 08:34

## 2020-07-05 RX ADMIN — OXYCODONE HYDROCHLORIDE 5 MG: 5 TABLET ORAL at 06:47

## 2020-07-05 RX ADMIN — DEXTROSE, SODIUM CHLORIDE, AND POTASSIUM CHLORIDE 90 ML/HR: 5; .45; .15 INJECTION INTRAVENOUS at 19:06

## 2020-07-05 RX ADMIN — POLYETHYLENE GLYCOL 3350 17 G: 17 POWDER, FOR SOLUTION ORAL at 08:36

## 2020-07-05 RX ADMIN — CEFAZOLIN SODIUM 2000 MG: 2 SOLUTION INTRAVENOUS at 00:24

## 2020-07-05 RX ADMIN — OXYCODONE HYDROCHLORIDE 10 MG: 10 TABLET ORAL at 12:20

## 2020-07-05 RX ADMIN — POTASSIUM & SODIUM PHOSPHATES POWDER PACK 280-160-250 MG 2 PACKET: 280-160-250 PACK at 08:36

## 2020-07-05 RX ADMIN — LORATADINE 10 MG: 10 TABLET ORAL at 08:34

## 2020-07-05 RX ADMIN — VANCOMYCIN HYDROCHLORIDE 1250 MG: 1 INJECTION, POWDER, LYOPHILIZED, FOR SOLUTION INTRAVENOUS at 08:34

## 2020-07-05 RX ADMIN — DEXTROSE, SODIUM CHLORIDE, AND POTASSIUM CHLORIDE 90 ML/HR: 5; .9; .15 INJECTION INTRAVENOUS at 06:47

## 2020-07-05 NOTE — PROGRESS NOTES
Progress Note - Beverley Sernait 1975, 50 y o  female MRN: [id number]  Unit/Bed#: -01 Encounter: [id number] DOS: 7/5/2020  Primary Care Provider: Bayron Velazquez MD   Date and time admitted to hospital: 7/1/2020  5:24 AM    Sepsis [facility])  Assessment & Plan  · Patient met sepsis criteria 7/3 @1409 e/b fever and tachycardia, hypotension   · Suspect 2/2 gram pos bacteremia   · CT abdomen pelvis 7/1 shows mild mucosal thickening of sigmoid colon possibly due to mild colitis from chemotherapy  · She is not neutropenic - monitor CBC with diff, temperature curve trending down  · Chest x-ray negative for infiltrate, COVID-19 negative, UA benign  · F/u repeat blood cultures   · Incentive spirometry  · IV fluid hydration    * Large bowel obstruction (HCC)  Assessment & Plan  · Partial obstruction from rectal carcinoma  · Conservative management per primary team  · Pt is a poor surgical candidate secondary to chemo   · NG removed, having BM  · Tolerating diet   · Pain regimen per palliative     Gram-positive bacteremia  Assessment & Plan  · 2/2 blood cultures from 7/3 positive for gram pos cocci   · F/u repeat blood cultures from today 7/5  · Continue IV vancomycin, ancef per ID  · Consider possibility of port infection, placed 6/22  · May need to remove pending cx   · Check TTE     Rectal cancer [facility])  Assessment & Plan  · Stage IV rectal cancer with known mets   · Following with oncology outpt Dr Olman Woods, currently receiving FOLFIRINOX   · Now with obstructing rectal tumor and large bowel obstruction with incompetent ileocecal valce  · Onc consulted, appreciate input     Anemia  Assessment & Plan  · Baseline 8-9 since diagnosis of rectal cancer   · Today 7 6 - monitor closely and transfuse to keep hgb >7   · Reports rectal bleeding which given CA is expected     Abdominal pain  Assessment & Plan  · Acute on chronic and multifactorial   · Known hernias - continue abdominal binder   · Constipation - bowel regimen per primary   · Cancer related pain - pain regimen per palliative     Breast cancer [facility])  Assessment & Plan  · Currently following with oncology outpt for evaluation and treatment   · Stage unknown     VTE Pharmacologic Prophylaxis:   Pharmacologic: Enoxaparin (Lovenox)  Mechanical VTE Prophylaxis in Place: Yes    Patient Centered Rounds: I have performed bedside rounds with nursing staff today  Discussions with Specialists or Other Care Team Provider: nursing     Education and Discussions with Family / Patient: patient     Time Spent for Care: 30 minutes  More than 50% of total time spent on counseling and coordination of care as described above  Current Length of Stay: 4 day(s)    Current Patient Status: Inpatient   Certification Statement: The patient will continue to require additional inpatient hospital stay due to pending work up for sepsis and bacteremia     Discharge Plan: pending clinical course     Code Status: Level 1 - Full Code    Subjective:   Pt seen and examined at bedside  Upset that she was planning on going on justine in a few weeks  Denies subjective fever or chills  Ate most of her food for breakfast  Still having frequent BMs and we discussed decreasing frequeny of miralax but she was told she has to be on this by dr Kristyn Hawkins  Objective:     Vitals:   Temp (24hrs), Av 5 °F (37 5 °C), Min:98 4 °F (36 9 °C), Max:100 9 °F (38 3 °C)    Temp:  [98 4 °F (36 9 °C)-100 9 °F (38 3 °C)] 98 4 °F (36 9 °C)  HR:  [75-94] 84  Resp:  [16] 16  BP: ()/(60-65) 104/65  SpO2:  [97 %-99 %] 97 %  There is no height or weight on file to calculate BMI  Input and Output Summary (last 24 hours): Intake/Output Summary (Last 24 hours) at 2020 1040  Last data filed at 2020 0824  Gross per 24 hour   Intake 3240 ml   Output    Net 3240 ml       Physical Exam:     Physical Exam   Constitutional: She is oriented to person, place, and time  She appears well-developed and well-nourished     HENT: Head: Normocephalic and atraumatic  Eyes: EOM are normal  No scleral icterus  Neck: Normal range of motion  Neck supple  Cardiovascular: Normal rate, regular rhythm and normal heart sounds  No murmur heard  Right chest wall port    Pulmonary/Chest: Effort normal and breath sounds normal    Abdominal: Soft  Bowel sounds are normal    Musculoskeletal: Normal range of motion  She exhibits no edema  Neurological: She is alert and oriented to person, place, and time  Skin: Skin is warm and dry  Psychiatric: She has a normal mood and affect  Additional Data:     Labs:    Results from last 7 days   Lab Units 07/05/20  0515  07/03/20  0841   WBC Thousand/uL 9 31   < > 11 07*   HEMOGLOBIN g/dL 7 6*   < > 9 4*   HEMATOCRIT % 25 8*   < > 32 6*   PLATELETS Thousands/uL 174   < > 223   BANDS PCT %  --   --  4   LYMPHO PCT %  --   --  10*   MONO PCT %  --   --  4   EOS PCT %  --   --  3    < > = values in this interval not displayed  Results from last 7 days   Lab Units 07/05/20  0515   SODIUM mmol/L 137   POTASSIUM mmol/L 3 4*   CHLORIDE mmol/L 106   CO2 mmol/L 27   BUN mg/dL 2*   CREATININE mg/dL 0 41*   ANION GAP mmol/L 4   CALCIUM mg/dL 8 0*   ALBUMIN g/dL 1 9*   TOTAL BILIRUBIN mg/dL 0 26   ALK PHOS U/L 183*   ALT U/L 20   AST U/L 43   GLUCOSE RANDOM mg/dL 90                 Results from last 7 days   Lab Units 07/05/20  0752 07/05/20  0515 07/04/20  0444 07/01/20  1623 07/01/20  1126 07/01/20  0546   LACTIC ACID mmol/L  --   --   --  1 3 2 8* 2 8*   PROCALCITONIN ng/ml 1 90* 2 82* 5 14*  --   --   --        * I Have Reviewed All Lab Data Listed Above  * Additional Pertinent Lab Tests Reviewed:  [address] Admission Reviewed    Imaging:    Imaging Reports Reviewed Today Include: all  Imaging Personally Reviewed by Myself Includes:  none    Recent Cultures (last 7 days):     Results from last 7 days   Lab Units 07/03/20  1510 07/03/20  1504   GRAM STAIN RESULT  Gram positive cocci in clusters* Gram positive cocci in clusters*       Last 24 Hours Medication List:     Current Facility-Administered Medications:  acetaminophen 650 mg Oral Q6H PRN Vanessa Grimes MD    cefazolin 2,000 mg Intravenous Q8H Venetta Mohs, MD Last Rate: 2,000 mg (07/05/20 1033)   dextrose 5 % and sodium chloride 0 9 % with KCl 20 mEq/L 90 mL/hr Intravenous Continuous Veronika Milner MD Last Rate: 90 mL/hr (07/05/20 0647)   diphenhydrAMINE 12 5 mg Oral Q6H PRN Veronika Milner MD    enoxaparin 40 mg Subcutaneous Daily Maria Victoria Blood, MD    HYDROmorphone 0 5 mg Intravenous Q3H PRN Veronika Milner MD    lidocaine 1 application Urethral Once Maria Victoria Blood, MD    loratadine 10 mg Oral Daily Maria Victoria Blood, MD    melatonin 3 mg Oral HS PRN Natalya Madsen PA-C    metoprolol succinate 25 mg Oral Daily Meghan Degroot PA-C    nicotine 1 patch Transdermal Daily Maria Victoria Blood, MD    oxyCODONE 5 mg Oral Q4H PRN Vanessa Grimes MD    phenol 2 spray Mouth/Throat Q2H PRN Maria Victoria Blood, MD    polyethylene glycol 17 g Oral 4x Daily Vanessa Grimes MD    potassium-sodium phosphates 2 packet Oral BID With Meals Veronika Milner MD    promethazine 12 5 mg Intravenous Q6H PRN Maria Victoria Blood, MD    saliva substitute 5 spray Mouth/Throat 4x Daily PRN Maria Victoria Blood, MD    vancomycin 1,250 mg Intravenous Q8H Vika Panchal MD Last Rate: 1,250 mg (07/05/20 0834)        Today, Patient Was Seen By: Darryl Barr PA-C    ** Please Note: Dictation voice to text software may have been used in the creation of this document   **

## 2020-07-05 NOTE — ASSESSMENT & PLAN NOTE
· Baseline 8-9 since diagnosis of rectal cancer   · Today 7 6 - monitor closely and transfuse to keep hgb >7   · Reports rectal bleeding which given CA is expected

## 2020-07-05 NOTE — ASSESSMENT & PLAN NOTE
· Patient met sepsis criteria 7/3 @1409 e/b fever and tachycardia, hypotension   · Suspect 2/2 gram pos bacteremia   · CT abdomen pelvis 7/1 shows mild mucosal thickening of sigmoid colon possibly due to mild colitis from chemotherapy  · She is not neutropenic - monitor CBC with diff, temperature curve trending down  · Chest x-ray negative for infiltrate, COVID-19 negative, UA benign  · F/u repeat blood cultures   · Incentive spirometry  · IV fluid hydration

## 2020-07-05 NOTE — PROGRESS NOTES
Vancomycin IV Pharmacy-to-Dose Consultation    Elisa Deutsch is a 50 y o  female who is currently receiving Vancomycin IV with management by the Pharmacy Consult service  Relevant clinical data and objective / subjective history reviewed  Vancomycin Assessment:  Indication: sepsis secondary to gram positive bacteremia, possible Port-A-Cath infection, ID following  Status: stable  Micro:   7/5 Blood cultures x 2: in process  7/3 Blood cultures x 2: 2/2 GPC in clusters  7/1 SARS-CoV-2: negative  Procalcitonin: 5 14 (7/4)  Renal Function: Stable, Scr 0 41; CrCl >100 mL/min  Potential Nephrotoxicity Factors (select ALL that apply):  Medications: none present  Patient-Factors: hypotension  Days of Therapy: 3  Current Dose: 750 mg IV q8h (dose prior to level: 7/5 at 0058)  Goal Trough: 15-20 (appropriate for most indications)   Goal AUC(24h): 400-600  Last Level: 16 2 (7/5 at 0520) ~4 3 hours after last dose  Pharmacokinetic Analysis: Extrapolated level at 8 hours is subtherapeutic at 10 2; ke 0 126 hr-; t1/2 5 5 hr    Vancomycin Plan:  New Dosing: change to 1250 mg IV q8h (next dose: 7/5 at 0800)  Predicted Trough / AUC: 17 6 / 672  Next Level: 7/6 at 0730  Renal Function Monitoring: Daily BMP     Pharmacy will continue to follow closely for s/sx of nephrotoxicity, infusion reactions and appropriateness of therapy  BMP and CBC will be ordered per protocol  We will continue to follow the patients culture results and clinical progress daily       Ayan Luis, PharmD, [address] Clinical Pharmacist  [phone]

## 2020-07-05 NOTE — PROGRESS NOTES
Progress Note - Infectious Disease   Puja Fitzpatrick 50 y o  female MRN: [id number]  Unit/Bed#: -01 Encounter: [id number]      Impression/Plan:  1  Sepsis-fever, tachycardia, hypotension  Appears to be secondary to Gram-positive bacteremia  No other clear sources appreciated  No clinical evidence of a worsening intra-abdominal process or complication to suggest intra-abdominal source  Temperatures come down and the patient is now hemodynamically stable  She seems to be tolerating the antibiotics without difficulty  -continue vancomycin and cefazolin for now at current dose  -pharmacy follow-up for vancomycin trough management  -follow up sensitivities and adjust antibiotics as needed  -recheck CBC with diff and BMP  -recheck procalcitonin level  -supportive care     2  Gram-positive bacteremia-suspect Staphylococcus aureus  Consideration for the possibility of MRSA  A primary source is not appreciated however I am concerned about the possibility of a Port-A-Cath infection in this patient who recently had a Port-A-Cath placed  There is no area of phlebitis or other soft tissue infection to suggest another source   -antibiotics as above  -recheck blood cultures x2 sets to confirm clearance of the bacteremia  -check transthoracic echocardiogram  -follow up sensitivities and adjust antibiotics as needed  -may ultimately need Port-A-Cath removed     3  Distal colonic obstruction-in the setting of stage IV rectal carcinoma  The obstruction appears to be partial is the patient is able to pass some stool  The NG tube has been removed however the patient is not really able to eat much  Initial plan was to undergo surgical diversion, however the patient has been started on chemotherapy which would but the med high risk complications    Further, the patient is now tolerating orals and having bone  -serial abdominal exams  -conservative measures to palliate through any obstruction  -MiraLax  -close colorectal followup     4  Stage IV rectal carcinoma-started on chemotherapy with bevacizumab and FOLFIRINOX  -hematology oncology follow-up  -chemotherapy on hold with the active infectious process    Have discussed the above management plan in detail with the primary service    Antibiotics:  Vancomycin 3  Cefazolin 2    Subjective:  Patient still with intermittent fever; no nausea, vomiting, diarrhea; no cough, shortness of breath; no increased pain  No new symptoms  She is eating breakfast     Objective:  Vitals:  Temp:  [98 4 °F (36 9 °C)-100 9 °F (38 3 °C)] 98 4 °F (36 9 °C)  HR:  [74-94] 84  Resp:  [16] 16  BP: ()/(59-65) 104/65  SpO2:  [97 %-99 %] 97 %  Temp (24hrs), Av 5 °F (37 5 °C), Min:98 4 °F (36 9 °C), Max:100 9 °F (38 3 °C)  Current: Temperature: 98 4 °F (36 9 °C)    Physical Exam:   General Appearance:  Alert, interactive, nontoxic, no acute distress  Throat: Oropharynx moist without lesions  Lungs:   Clear to auscultation bilaterally; no wheezes, rhonchi or rales; respirations unlabored   Heart:  RRR; no murmur, rub or gallop   Abdomen:   Soft, mildly tender, non-distended, positive bowel sounds  Extremities: No clubbing, cyanosis or edema   Skin: No new rashes or lesions  No draining wounds noted    Port site without erythema or drainage       Labs, Imaging, & Other studies:   All pertinent labs and imaging studies were personally reviewed  Results from last 7 days   Lab Units 20  1504   WBC Thousand/uL 9 31 9 55 5 98   HEMOGLOBIN g/dL 7 6* 7 7* 8 7*   PLATELETS Thousands/uL 174 169 203     Results from last 7 days   Lab Units 20  0841   SODIUM mmol/L 137 133* 130*  132*   POTASSIUM mmol/L 3 4* 3 4* 3 6  3 6   CHLORIDE mmol/L 106 103 100  100   CO2 mmol/L 27 25 24  25   BUN mg/dL 2* 3* 5  5   CREATININE mg/dL 0 41* 0 48* 0 54*  0 58*   EGFR ml/min/1 73sq m 123 117 113  110   CALCIUM mg/dL 8 0* 7 7* 7 9*  8 2* AST U/L 43 41 37   ALT U/L 20 18 21   ALK PHOS U/L 183* 196* 192*     Results from last 7 days   Lab Units 07/03/20  1510 07/03/20  1504   GRAM STAIN RESULT  Gram positive cocci in clusters* Gram positive cocci in clusters*     Results from last 7 days   Lab Units 07/05/20  0752 07/05/20  0515 07/04/20  0444   PROCALCITONIN ng/ml 1 90* 2 82* 5 14*

## 2020-07-05 NOTE — ASSESSMENT & PLAN NOTE
· Stage IV rectal cancer with known mets   · Following with oncology outpt Dr Shanti Mehta, currently receiving FOLFIRINOX   · Now with obstructing rectal tumor and large bowel obstruction with incompetent ileocecal valce  · Onc consulted, appreciate input

## 2020-07-05 NOTE — ASSESSMENT & PLAN NOTE
· 2/2 blood cultures from 7/3 positive for gram pos cocci   · F/u repeat blood cultures from today 7/5  · Continue IV vancomycin, ancef per ID  · Consider possibility of port infection, placed 6/22  · May need to remove pending cx   · Check TTE

## 2020-07-05 NOTE — PROGRESS NOTES
Progress Note - Colorectal Surgery   Chrystal Frankel Willhoit 50 y o  female MRN: [id number]  Unit/Bed#: -01 Encounter: [id number]    Assessment:  Patient is a 50 y o  female with stage IV rectal cancer on chemo now with obstructing rectal tumor, now with bacteremia  Blood cultures x2 grew GPC in clusters  Chest x-ray showed metastatic lung masses and right lung base infiltrate    Plan:  Id following appreciate recommendation  Continue antibiotics per ID  Surgical soft diet  Continue maintenance IV fluids  SLIM following appreciate recommendations      Subjective/Objective     Subjective:   Events as noted above  Not tolerating solid food and minimal fluids  Feels urge to defecate immediately after eating/drinking  Pain somewhat controlled on current regiment  Frustrated about not being able to take in PO  Having loose stools, passing some flatus  Objective:    Blood pressure 102/63, pulse 84, temperature (!) 100 9 °F (38 3 °C), resp  rate 19, SpO2 97 %, not currently breastfeeding  ,There is no height or weight on file to calculate BMI  Intake/Output Summary (Last 24 hours) at 7/5/2020 0656  Last data filed at 7/5/2020 5956  Gross per 24 hour   Intake 3300 ml   Output    Net 3300 ml       Invasive Devices     Central Venous Catheter Line            Port A Cath 06/22/20 Right Chest 12 days          Peripheral Intravenous Line            Peripheral IV 07/04/20 Left;Ventral (anterior) Forearm 1 day                Physical Exam:   Gen:  Well-developed, well-nourished female in NAD  CV: RRR  Lungs: Normal respiratory effort   Abd: soft, appropriately tender, nondistended, hernias x2 soft, reducible, although they do not stay in  Neuro:  AxO x3      Results from last 7 days   Lab Units 07/05/20  0515 07/04/20  0444 07/03/20  1504   WBC Thousand/uL 9 31 9 55 5 98   HEMOGLOBIN g/dL 7 6* 7 7* 8 7*   HEMATOCRIT % 25 8* 25 5* 29 6*   PLATELETS Thousands/uL 174 169 203     Results from last 7 days   Lab Units 07/04/20  0444 07/03/20  0841 07/02/20  0501   POTASSIUM mmol/L 3 4* 3 6  3 6 4 1   CHLORIDE mmol/L 103 100  100 107   CO2 mmol/L 25 24  25 22   BUN mg/dL 3* 5  5 13   CREATININE mg/dL 0 48* 0 54*  0 58* 0 45*   CALCIUM mg/dL 7 7* 7 9*  8 2* 8 3

## 2020-07-05 NOTE — PLAN OF CARE
Problem: PAIN - ADULT  Goal: Verbalizes/displays adequate comfort level or baseline comfort level  Description  Interventions:  - Encourage patient to monitor pain and request assistance  - Assess pain using appropriate pain scale  - Administer analgesics based on type and severity of pain and evaluate response  - Implement non-pharmacological measures as appropriate and evaluate response  - Consider cultural and social influences on pain and pain management  - Notify physician/advanced practitioner if interventions unsuccessful or patient reports new pain  Outcome: Progressing     Problem: SAFETY ADULT  Goal: Patient will remain free of falls  Description  INTERVENTIONS:  - Assess patient frequently for physical needs  -  Identify cognitive and physical deficits and behaviors that affect risk of falls    -  [place] fall precautions as indicated by assessment   - Educate patient/family on patient safety including physical limitations  - Instruct patient to call for assistance with activity based on assessment  - Modify environment to reduce risk of injury  - Consider OT/PT consult to assist with strengthening/mobility  Outcome: Progressing  Goal: Maintain or return to baseline ADL function  Description  INTERVENTIONS:  -  Assess patient's ability to carry out ADLs; assess patient's baseline for ADL function and identify physical deficits which impact ability to perform ADLs (bathing, care of mouth/teeth, toileting, grooming, dressing, etc )  - Assess/evaluate cause of self-care deficits   - Assess range of motion  - Assess patient's mobility; develop plan if impaired  - Assess patient's need for assistive devices and provide as appropriate  - Encourage maximum independence but intervene and supervise when necessary  - Involve family in performance of ADLs  - Assess for home care needs following discharge   - Consider OT consult to assist with ADL evaluation and planning for discharge  - Provide patient education as appropriate  Outcome: Progressing  Goal: Maintain or return mobility status to optimal level  Description  INTERVENTIONS:  - Assess patient's baseline mobility status (ambulation, transfers, stairs, etc )    - Identify cognitive and physical deficits and behaviors that affect mobility  - Identify mobility aids required to assist with transfers and/or ambulation (gait belt, sit-to-stand, lift, walker, cane, etc )  - [place] fall precautions as indicated by assessment  - Record patient progress and toleration of activity level on Mobility SBAR; progress patient to next Phase/Stage  - Instruct patient to call for assistance with activity based on assessment  - Consider rehabilitation consult to assist with strengthening/weightbearing, etc   Outcome: Progressing     Problem: DISCHARGE PLANNING  Goal: Discharge to home or other facility with appropriate resources  Description  INTERVENTIONS:  - Identify barriers to discharge w/patient and caregiver  - Arrange for needed discharge resources and transportation as appropriate  - Identify discharge learning needs (meds, wound care, etc )  - Arrange for interpretive services to assist at discharge as needed  - Refer to Case Management Department for coordinating discharge planning if the patient needs post-hospital services based on physician/advanced practitioner order or complex needs related to functional status, cognitive ability, or social support system  Outcome: Progressing     Problem: Knowledge Deficit  Goal: Patient/family/caregiver demonstrates understanding of disease process, treatment plan, medications, and discharge instructions  Description  Complete learning assessment and assess knowledge base    Interventions:  - Provide teaching at level of understanding  - Provide teaching via preferred learning methods  Outcome: Progressing     Problem: GASTROINTESTINAL - ADULT  Goal: Minimal or absence of nausea and/or vomiting  Description  INTERVENTIONS:  - Administer IV fluids if ordered to ensure adequate hydration  - Maintain NPO status until nausea and vomiting are resolved  - Nasogastric tube if ordered  - Administer ordered antiemetic medications as needed  - Provide nonpharmacologic comfort measures as appropriate  - Advance diet as tolerated, if ordered  - Consider nutrition services referral to assist patient with adequate nutrition and appropriate food choices  Outcome: Progressing  Goal: Maintains or returns to baseline bowel function  Description  INTERVENTIONS:  - Assess bowel function  - Encourage oral fluids to ensure adequate hydration  - Administer IV fluids if ordered to ensure adequate hydration  - Administer ordered medications as needed  - Encourage mobilization and activity  - Consider nutritional services referral to assist patient with adequate nutrition and appropriate food choices  Outcome: Progressing  Goal: Maintains adequate nutritional intake  Description  INTERVENTIONS:  - Monitor percentage of each meal consumed  - Identify factors contributing to decreased intake, treat as appropriate  - Assist with meals as needed  - Monitor I&O, weight, and lab values if indicated  - Obtain nutrition services referral as needed  Outcome: Progressing  Goal: Establish and maintain optimal ostomy function  Description  INTERVENTIONS:  - Assess bowel function  - Encourage oral fluids to ensure adequate hydration  - Administer IV fluids if ordered to ensure adequate hydration   - Administer ordered medications as needed  - Encourage mobilization and activity  - Nutrition services referral to assist patient with appropriate food choices  - Assess stoma site  - Consider wound care consult   Outcome: Progressing

## 2020-07-06 LAB
ANION GAP SERPL CALCULATED.3IONS-SCNC: 5 MMOL/L (ref 4–13)
BUN SERPL-MCNC: 2 MG/DL (ref 5–25)
CALCIUM SERPL-MCNC: 7.8 MG/DL (ref 8.3–10.1)
CHLORIDE SERPL-SCNC: 102 MMOL/L (ref 100–108)
CO2 SERPL-SCNC: 27 MMOL/L (ref 21–32)
CREAT SERPL-MCNC: 0.41 MG/DL (ref 0.6–1.3)
ERYTHROCYTE [DISTWIDTH] IN BLOOD BY AUTOMATED COUNT: 19.9 % (ref 11.6–15.1)
GFR SERPL CREATININE-BSD FRML MDRD: 123 ML/MIN/1.73SQ M
GLUCOSE SERPL-MCNC: 97 MG/DL (ref 65–140)
HCT VFR BLD AUTO: 24.1 % (ref 34.8–46.1)
HGB BLD-MCNC: 7.4 G/DL (ref 11.5–15.4)
MCH RBC QN AUTO: 23.2 PG (ref 26.8–34.3)
MCHC RBC AUTO-ENTMCNC: 30.7 G/DL (ref 31.4–37.4)
MCV RBC AUTO: 76 FL (ref 82–98)
NRBC BLD AUTO-RTO: 0 /100 WBCS
PLATELET # BLD AUTO: 199 THOUSANDS/UL (ref 149–390)
PMV BLD AUTO: 10.3 FL (ref 8.9–12.7)
POTASSIUM SERPL-SCNC: 3.8 MMOL/L (ref 3.5–5.3)
PROCALCITONIN SERPL-MCNC: 1.04 NG/ML
RBC # BLD AUTO: 3.19 MILLION/UL (ref 3.81–5.12)
SODIUM SERPL-SCNC: 134 MMOL/L (ref 136–145)
VANCOMYCIN TROUGH SERPL-MCNC: 31.8 UG/ML (ref 10–20)
WBC # BLD AUTO: 8.05 THOUSAND/UL (ref 4.31–10.16)

## 2020-07-06 PROCEDURE — 99232 SBSQ HOSP IP/OBS MODERATE 35: CPT | Performed by: NURSE PRACTITIONER

## 2020-07-06 PROCEDURE — 85027 COMPLETE CBC AUTOMATED: CPT | Performed by: SURGERY

## 2020-07-06 PROCEDURE — 99233 SBSQ HOSP IP/OBS HIGH 50: CPT | Performed by: INTERNAL MEDICINE

## 2020-07-06 PROCEDURE — 80048 BASIC METABOLIC PNL TOTAL CA: CPT | Performed by: SURGERY

## 2020-07-06 PROCEDURE — 99232 SBSQ HOSP IP/OBS MODERATE 35: CPT | Performed by: PHYSICIAN ASSISTANT

## 2020-07-06 PROCEDURE — 84145 PROCALCITONIN (PCT): CPT | Performed by: INTERNAL MEDICINE

## 2020-07-06 PROCEDURE — 80202 ASSAY OF VANCOMYCIN: CPT | Performed by: INTERNAL MEDICINE

## 2020-07-06 RX ORDER — POTASSIUM CHLORIDE 20 MEQ/1
40 TABLET, EXTENDED RELEASE ORAL ONCE
Status: COMPLETED | OUTPATIENT
Start: 2020-07-06 | End: 2020-07-06

## 2020-07-06 RX ORDER — CALCIUM CARBONATE 200(500)MG
500 TABLET,CHEWABLE ORAL 2 TIMES DAILY PRN
Status: DISCONTINUED | OUTPATIENT
Start: 2020-07-06 | End: 2020-07-08 | Stop reason: HOSPADM

## 2020-07-06 RX ADMIN — POTASSIUM & SODIUM PHOSPHATES POWDER PACK 280-160-250 MG 2 PACKET: 280-160-250 PACK at 08:05

## 2020-07-06 RX ADMIN — CALCIUM CARBONATE (ANTACID) CHEW TAB 500 MG 500 MG: 500 CHEW TAB at 21:19

## 2020-07-06 RX ADMIN — POLYETHYLENE GLYCOL 3350 17 G: 17 POWDER, FOR SOLUTION ORAL at 08:04

## 2020-07-06 RX ADMIN — POTASSIUM & SODIUM PHOSPHATES POWDER PACK 280-160-250 MG 2 PACKET: 280-160-250 PACK at 16:57

## 2020-07-06 RX ADMIN — VANCOMYCIN HYDROCHLORIDE 1250 MG: 1 INJECTION, POWDER, LYOPHILIZED, FOR SOLUTION INTRAVENOUS at 08:05

## 2020-07-06 RX ADMIN — METOPROLOL SUCCINATE 25 MG: 25 TABLET, EXTENDED RELEASE ORAL at 08:04

## 2020-07-06 RX ADMIN — LORATADINE 10 MG: 10 TABLET ORAL at 08:04

## 2020-07-06 RX ADMIN — OXYCODONE HYDROCHLORIDE 5 MG: 5 TABLET ORAL at 16:56

## 2020-07-06 RX ADMIN — CEFAZOLIN SODIUM 2000 MG: 2 SOLUTION INTRAVENOUS at 10:03

## 2020-07-06 RX ADMIN — OXYCODONE HYDROCHLORIDE 5 MG: 5 TABLET ORAL at 10:49

## 2020-07-06 RX ADMIN — POLYETHYLENE GLYCOL 3350 17 G: 17 POWDER, FOR SOLUTION ORAL at 16:57

## 2020-07-06 RX ADMIN — DEXTROSE, SODIUM CHLORIDE, AND POTASSIUM CHLORIDE 90 ML/HR: 5; .45; .15 INJECTION INTRAVENOUS at 08:18

## 2020-07-06 RX ADMIN — OXYCODONE HYDROCHLORIDE 5 MG: 5 TABLET ORAL at 06:13

## 2020-07-06 RX ADMIN — POTASSIUM CHLORIDE 40 MEQ: 1500 TABLET, EXTENDED RELEASE ORAL at 08:04

## 2020-07-06 RX ADMIN — VANCOMYCIN HYDROCHLORIDE 1250 MG: 1 INJECTION, POWDER, LYOPHILIZED, FOR SOLUTION INTRAVENOUS at 00:29

## 2020-07-06 RX ADMIN — OXYCODONE HYDROCHLORIDE 5 MG: 5 TABLET ORAL at 21:18

## 2020-07-06 RX ADMIN — DEXTROSE, SODIUM CHLORIDE, AND POTASSIUM CHLORIDE 50 ML/HR: 5; .45; .15 INJECTION INTRAVENOUS at 21:24

## 2020-07-06 RX ADMIN — CEFTRIAXONE SODIUM 2000 MG: 10 INJECTION, POWDER, FOR SOLUTION INTRAVENOUS at 16:57

## 2020-07-06 RX ADMIN — CEFAZOLIN SODIUM 2000 MG: 2 SOLUTION INTRAVENOUS at 02:00

## 2020-07-06 RX ADMIN — ENOXAPARIN SODIUM 40 MG: 40 INJECTION SUBCUTANEOUS at 08:04

## 2020-07-06 NOTE — PLAN OF CARE
Problem: PAIN - ADULT  Goal: Verbalizes/displays adequate comfort level or baseline comfort level  Description  Interventions:  - Encourage patient to monitor pain and request assistance  - Assess pain using appropriate pain scale  - Administer analgesics based on type and severity of pain and evaluate response  - Implement non-pharmacological measures as appropriate and evaluate response  - Consider cultural and social influences on pain and pain management  - Notify physician/advanced practitioner if interventions unsuccessful or patient reports new pain  Outcome: Progressing     Problem: SAFETY ADULT  Goal: Patient will remain free of falls  Description  INTERVENTIONS:  - Assess patient frequently for physical needs  -  Identify cognitive and physical deficits and behaviors that affect risk of falls    -  [place] fall precautions as indicated by assessment   - Educate patient/family on patient safety including physical limitations  - Instruct patient to call for assistance with activity based on assessment  - Modify environment to reduce risk of injury  - Consider OT/PT consult to assist with strengthening/mobility  Outcome: Progressing  Goal: Maintain or return to baseline ADL function  Description  INTERVENTIONS:  -  Assess patient's ability to carry out ADLs; assess patient's baseline for ADL function and identify physical deficits which impact ability to perform ADLs (bathing, care of mouth/teeth, toileting, grooming, dressing, etc )  - Assess/evaluate cause of self-care deficits   - Assess range of motion  - Assess patient's mobility; develop plan if impaired  - Assess patient's need for assistive devices and provide as appropriate  - Encourage maximum independence but intervene and supervise when necessary  - Involve family in performance of ADLs  - Assess for home care needs following discharge   - Consider OT consult to assist with ADL evaluation and planning for discharge  - Provide patient education as appropriate  Outcome: Progressing  Goal: Maintain or return mobility status to optimal level  Description  INTERVENTIONS:  - Assess patient's baseline mobility status (ambulation, transfers, stairs, etc )    - Identify cognitive and physical deficits and behaviors that affect mobility  - Identify mobility aids required to assist with transfers and/or ambulation (gait belt, sit-to-stand, lift, walker, cane, etc )  - [place] fall precautions as indicated by assessment  - Record patient progress and toleration of activity level on Mobility SBAR; progress patient to next Phase/Stage  - Instruct patient to call for assistance with activity based on assessment  - Consider rehabilitation consult to assist with strengthening/weightbearing, etc   Outcome: Progressing     Problem: DISCHARGE PLANNING  Goal: Discharge to home or other facility with appropriate resources  Description  INTERVENTIONS:  - Identify barriers to discharge w/patient and caregiver  - Arrange for needed discharge resources and transportation as appropriate  - Identify discharge learning needs (meds, wound care, etc )  - Arrange for interpretive services to assist at discharge as needed  - Refer to Case Management Department for coordinating discharge planning if the patient needs post-hospital services based on physician/advanced practitioner order or complex needs related to functional status, cognitive ability, or social support system  Outcome: Progressing     Problem: Knowledge Deficit  Goal: Patient/family/caregiver demonstrates understanding of disease process, treatment plan, medications, and discharge instructions  Description  Complete learning assessment and assess knowledge base    Interventions:  - Provide teaching at level of understanding  - Provide teaching via preferred learning methods  Outcome: Progressing     Problem: GASTROINTESTINAL - ADULT  Goal: Minimal or absence of nausea and/or vomiting  Description  INTERVENTIONS:  - Administer IV fluids if ordered to ensure adequate hydration  - Maintain NPO status until nausea and vomiting are resolved  - Nasogastric tube if ordered  - Administer ordered antiemetic medications as needed  - Provide nonpharmacologic comfort measures as appropriate  - Advance diet as tolerated, if ordered  - Consider nutrition services referral to assist patient with adequate nutrition and appropriate food choices  Outcome: Progressing  Goal: Maintains or returns to baseline bowel function  Description  INTERVENTIONS:  - Assess bowel function  - Encourage oral fluids to ensure adequate hydration  - Administer IV fluids if ordered to ensure adequate hydration  - Administer ordered medications as needed  - Encourage mobilization and activity  - Consider nutritional services referral to assist patient with adequate nutrition and appropriate food choices  Outcome: Progressing  Goal: Maintains adequate nutritional intake  Description  INTERVENTIONS:  - Monitor percentage of each meal consumed  - Identify factors contributing to decreased intake, treat as appropriate  - Assist with meals as needed  - Monitor I&O, weight, and lab values if indicated  - Obtain nutrition services referral as needed  Outcome: Progressing  Goal: Establish and maintain optimal ostomy function  Description  INTERVENTIONS:  - Assess bowel function  - Encourage oral fluids to ensure adequate hydration  - Administer IV fluids if ordered to ensure adequate hydration   - Administer ordered medications as needed  - Encourage mobilization and activity  - Nutrition services referral to assist patient with appropriate food choices  - Assess stoma site  - Consider wound care consult   Outcome: Progressing

## 2020-07-06 NOTE — PROGRESS NOTES
Progress Note - Kristen Szymanski 1975, 50 y o  female MRN: [id number]  Unit/Bed#: -[facility] Encounter: [id number]  Primary Care Provider: Alice Julian MD   Date and time admitted to hospital: 7/1/2020  5:24 AM    * Large bowel obstruction [facility])  Assessment & Plan  · Colorectal surgery is primary service   · Partial obstruction from rectal carcinoma  · Conservative management per primary team  · Poor surgical candidate secondary to chemo   · NG removed, having BM  · Tolerating diet   · Pain regimen per palliative     Sepsis [facility])  Assessment & Plan  · Patient met sepsis criteria 7/3 @1409 e/b fever and tachycardia, hypotension   · Likely 2/2 gram positive bacteremia   · CT abdomen pelvis 7/1 shows mild mucosal thickening of sigmoid colon possibly due to mild colitis from chemotherapy  · She is not neutropenic - monitor CBC with diff, temperature curve trending down  · Chest x-ray negative for infiltrate, COVID-19 negative, UA benign  · PCT down-trending   · Repeat blood cultures obtained 7/5 negative x 24 hours   · ID following, appreciate recommendations   · Discontinue vancomycin and cefazolin   · Start IV ceftriaxone  · Trend procalcitonin    · Incentive spirometry  · IV fluid hydration    Gram-positive bacteremia  Assessment & Plan  · 2/2 blood cultures from 7/3 preliminary positive for gram pos cocci   · Repeat blood cultures from 7/5 negative x 24 hours   · Transition from IV vancomycin and Ancef to IV ceftriazone today, per ID   · Consider possibility of port infection, placed 6/22  · TTE negative for vegetation     Abdominal pain  Assessment & Plan  · Acute on chronic and multifactorial   · Known hernias - continue abdominal binder   · Constipation - bowel regimen per primary   · Cancer related pain - pain regimen per palliative     Anemia  Assessment & Plan  · Baseline 8-9 since diagnosis of rectal cancer   · Today 7 4 - monitor closely and transfuse to keep hgb >7   · Reports rectal bleeding which given CA is expected     Breast cancer [facility])  Assessment & Plan  · Currently following with oncology outpt for evaluation and treatment   · Stage unknown     Rectal cancer ([address] )  Assessment & Plan  · Stage IV rectal cancer with known mets   · Following with oncology outpt Dr Luis Castillo, currently receiving FOLFIRINOX   · Now with obstructing rectal tumor and large bowel obstruction with incompetent ileocecal valce  · Onc consulted, appreciate input     VTE Pharmacologic Prophylaxis:   Pharmacologic: Enoxaparin (Lovenox)  Mechanical VTE Prophylaxis in Place: No    Patient Centered Rounds: I have performed bedside rounds with nursing staff today  Discussions with Specialists or Other Care Team Provider: primary RN     Education and Discussions with Family / Patient: patient, declined call to family     Time Spent for Care: 15 minutes  More than 50% of total time spent on counseling and coordination of care as described above  Current Length of Stay: 5 day(s)    Current Patient Status: Inpatient   Certification Statement: The patient will continue to require additional inpatient hospital stay due to IV antibiotics    Discharge Plan: per primary service, once cleared by ID    Code Status: Level 1 - Full Code    Subjective:   Patient reports improvement in her abdominal pain  Tolerating diet and having bowel movements  Objective:     Vitals:   Temp (24hrs), Av 1 °F (36 7 °C), Min:97 7 °F (36 5 °C), Max:98 4 °F (36 9 °C)    Temp:  [97 7 °F (36 5 °C)-98 4 °F (36 9 °C)] 97 7 °F (36 5 °C)  HR:  [] 113  Resp:  [14-18] 18  BP: (123-124)/(84-93) 124/84  SpO2:  [91 %-99 %] 97 %  There is no height or weight on file to calculate BMI  Input and Output Summary (last 24 hours):        Intake/Output Summary (Last 24 hours) at 2020 1604  Last data filed at 2020 1200  Gross per 24 hour   Intake 1665 ml   Output    Net 1665 ml       Physical Exam:     Physical Exam   Constitutional: She is oriented to person, place, and time  She appears well-developed and well-nourished  No distress  Cardiovascular: Normal rate, regular rhythm, normal heart sounds and intact distal pulses  No murmur heard  Pulmonary/Chest: Effort normal and breath sounds normal  No respiratory distress  She has no wheezes  She has no rales  Abdominal: Soft  Bowel sounds are normal  She exhibits no distension  There is no tenderness  Musculoskeletal: She exhibits no edema  Neurological: She is alert and oriented to person, place, and time  Skin: Skin is warm and dry  She is not diaphoretic  No erythema  Nursing note and vitals reviewed  Additional Data:     Labs:    Results from last 7 days   Lab Units 07/06/20  0629  07/03/20  0841   WBC Thousand/uL 8 05   < > 11 07*   HEMOGLOBIN g/dL 7 4*   < > 9 4*   HEMATOCRIT % 24 1*   < > 32 6*   PLATELETS Thousands/uL 199   < > 223   BANDS PCT %  --   --  4   LYMPHO PCT %  --   --  10*   MONO PCT %  --   --  4   EOS PCT %  --   --  3    < > = values in this interval not displayed  Results from last 7 days   Lab Units 07/06/20  0629 07/05/20  0515   SODIUM mmol/L 134* 137   POTASSIUM mmol/L 3 8 3 4*   CHLORIDE mmol/L 102 106   CO2 mmol/L 27 27   BUN mg/dL 2* 2*   CREATININE mg/dL 0 41* 0 41*   ANION GAP mmol/L 5 4   CALCIUM mg/dL 7 8* 8 0*   ALBUMIN g/dL  --  1 9*   TOTAL BILIRUBIN mg/dL  --  0 26   ALK PHOS U/L  --  183*   ALT U/L  --  20   AST U/L  --  43   GLUCOSE RANDOM mg/dL 97 90                 Results from last 7 days   Lab Units 07/06/20  0629 07/05/20  0752 07/05/20  0515 07/04/20  0444 07/01/20  1623 07/01/20  1126 07/01/20  0546   LACTIC ACID mmol/L  --   --   --   --  1 3 2 8* 2 8*   PROCALCITONIN ng/ml 1 04* 1 90* 2 82* 5 14*  --   --   --            * I Have Reviewed All Lab Data Listed Above  * Additional Pertinent Lab Tests Reviewed:  All Labs Within Last 24 Hours Reviewed    Imaging:    Imaging Reports Reviewed Today Include: none  Imaging Personally Reviewed by Myself Includes:  none    Recent Cultures (last 7 days):     Results from last 7 days   Lab Units 07/05/20  0520 07/05/20  0519 07/03/20  1510 07/03/20  1504   BLOOD CULTURE  No Growth at 24 hrs  No Growth at 24 hrs  Gemella morbillorum* Gram-Positive Cocci Anaerobic (Group)*   GRAM STAIN RESULT   --   --  Gram positive cocci in clusters* Gram positive cocci in clusters*       Last 24 Hours Medication List:     Current Facility-Administered Medications:  acetaminophen 650 mg Oral Q6H PRN Sherron Grimes MD    cefTRIAXone 2,000 mg Intravenous Q24H Dbera Kaplan MD    dextrose 5 % and sodium chloride 0 45 % with KCl 20 mEq/L 50 mL/hr Intravenous Continuous Meghan Degroot PA-C Last Rate: 90 mL/hr (07/06/20 0818)   diphenhydrAMINE 12 5 mg Oral Q6H PRN Janessa Pradhan MD    enoxaparin 40 mg Subcutaneous Daily Mone Ventura MD    HYDROmorphone 0 5 mg Intravenous Q3H PRN Janessa Pradhan MD    lidocaine 1 application Urethral Once Mone Ventura MD    loratadine 10 mg Oral Daily Mone Ventura MD    melatonin 3 mg Oral HS PRN Natalya Madsen PA-C    metoprolol succinate 25 mg Oral Daily Meghan Degroot PA-C    nicotine 1 patch Transdermal Daily Mone Ventura MD    oxyCODONE 10 mg Oral Q3H PRN Janessa Pradhan MD    oxyCODONE 5 mg Oral 4x Daily (AC & HS) Janessa Pradhan MD    phenol 2 spray Mouth/Throat Q2H PRN Mone Ventura MD    polyethylene glycol 17 g Oral BID after meals Janessa Pradhan MD    potassium-sodium phosphates 2 packet Oral BID With Meals Janessa Pradhan MD    promethazine 12 5 mg Intravenous Q6H PRN Mone Ventura MD    saliva substitute 5 spray Mouth/Throat 4x Daily PRN Mone Ventura MD         Today, Patient Was Seen By: Diogenes Dasilva PA-C    ** Please Note: Dictation voice to text software may have been used in the creation of this document   **

## 2020-07-06 NOTE — PROGRESS NOTES
Progress note - Palliative and Supportive Care   Chrystal Frankel Willhoit 50 y o  female [id number]    Assessment:    -   Patient Active Problem List   Diagnosis    Large bowel obstruction (HCC)    Rectal cancer (HCC)    [facility] cancer [address] [address] )    Fever    Abdominal pain    Anemia    Sepsis (HCC)    Gram-positive bacteremia       Plan:  Symptom management  Pain  · Oxycodone 5 mg 4 times daily scheduled  · Oxycodone 10 mg prn severe pain  · Acetaminophen 650 mg Q 6 hours prn mild pain  37 5 OME past 24 hours  Nausea  · Phenergan 12 5 mg IV Q 6 hours prn (LD 7/4)    Goals  · Disease/treatment focused  Social support  · Patient states she has a great support system  Identifies her  and children as being very supportive  Code Status: Full Code - Level 1   Decisional apparatus:  Patient is competent on my exam today  Advance Directive / Living Will / POLST:  None on file    Interval history:       Patient up and walking around the room upon my arrival   She states both her nausea and pain are better controlled  She states she has been able to eat more over the past 24 hours and overall is feeling better  She states she has been dealing with her diagnosis and some days are better than others  There are times she acknowledges that she feels angry but she perceives this as part of her dealing with the diagnosis of advanced cancer  There are times she feels overwhelmed but she states she is able to work through those feelings  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        MEDICATIONS / ALLERGIES:     current meds:   Current Facility-Administered Medications   Medication Dose Route Frequency    acetaminophen (TYLENOL) tablet 650 mg  650 mg Oral Q6H PRN    ceFAZolin (ANCEF) IVPB (premix) 2,000 mg 50 mL  2,000 mg Intravenous Q8H    dextrose 5 % and sodium chloride 0 45 % with KCl 20 mEq/L infusion  50 mL/hr Intravenous Continuous    diphenhydrAMINE (BENADRYL) oral liquid 12 5 mg  12 5 mg Oral Q6H PRN    enoxaparin (LOVENOX) subcutaneous injection 40 mg  40 mg Subcutaneous Daily    HYDROmorphone (DILAUDID) injection 0 5 mg  0 5 mg Intravenous Q3H PRN    lidocaine (URO-JET) 2 % urethral/mucosal gel 1 application  1 application Urethral Once    loratadine (CLARITIN) tablet 10 mg  10 mg Oral Daily    melatonin tablet 3 mg  3 mg Oral HS PRN    metoprolol succinate (TOPROL-XL) 24 hr tablet 25 mg  25 mg Oral Daily    nicotine (NICODERM CQ) 7 mg/24hr TD 24 hr patch 1 patch  1 patch Transdermal Daily    oxyCODONE (ROXICODONE) immediate release tablet 10 mg  10 mg Oral Q3H PRN    oxyCODONE (ROXICODONE) IR tablet 5 mg  5 mg Oral 4x Daily (AC & HS)    phenol (CHLORASEPTIC) 1 4 % mucosal liquid 2 spray  2 spray Mouth/Throat Q2H PRN    polyethylene glycol (MIRALAX) packet 17 g  17 g Oral BID after meals    potassium-sodium phosphates (PHOS-NAK) packet 2 packet  2 packet Oral BID With Meals    promethazine (PHENERGAN) injection 12 5 mg  12 5 mg Intravenous Q6H PRN    saliva substitute (MOUTH KOTE) mucosal solution 5 spray  5 spray Mouth/Throat 4x Daily PRN    vancomycin (VANCOCIN) 1,250 mg in sodium chloride 0 9 % 250 mL IVPB  1,250 mg Intravenous Q8H       No Known Allergies    OBJECTIVE:    Physical Exam  Physical Exam   Constitutional: She is oriented to person, place, and time  She appears well-developed  She is cooperative  HENT:   Head: Normocephalic and atraumatic  Eyes: Pupils are equal, round, and reactive to light  Conjunctivae and EOM are normal    Pulmonary/Chest: Effort normal    Neurological: She is alert and oriented to person, place, and time  She has normal strength  GCS eye subscore is 4  GCS verbal subscore is 5  GCS motor subscore is 6  Skin: Skin is warm and dry  There is pallor  Psychiatric: She has a normal mood and affect  Lab Results:   I have personally reviewed pertinent labs  , CBC:   Lab Results   Component Value Date WBC 8 05 07/06/2020    HGB 7 4 (L) 07/06/2020    HCT 24 1 (L) 07/06/2020    MCV 76 (L) 07/06/2020     07/06/2020    MCH 23 2 (L) 07/06/2020    MCHC 30 7 (L) 07/06/2020    RDW 19 9 (H) 07/06/2020    MPV 10 3 07/06/2020    NRBC 0 07/06/2020   , CMP:   Lab Results   Component Value Date    SODIUM 134 (L) 07/06/2020    K 3 8 07/06/2020     07/06/2020    CO2 27 07/06/2020    BUN 2 (L) 07/06/2020    CREATININE 0 41 (L) 07/06/2020    CALCIUM 7 8 (L) 07/06/2020    EGFR 123 07/06/2020   , PT/PTT:No results found for: PT, PTT  Imaging Studies: no new imaging      Counseling / Coordination of Care    Total floor / unit time spent today 30+ minutes  Greater than 50% of total time was spent with the patient and / or family counseling and / or coordination of care  A description of the counseling / coordination of care: review of symptoms, emotional support provided, encouraged expression

## 2020-07-06 NOTE — ASSESSMENT & PLAN NOTE
· Stage IV rectal cancer with known mets   · Following with oncology outpt Dr Fahad Weathers, currently receiving FOLFIRINOX   · Now with obstructing rectal tumor and large bowel obstruction with incompetent ileocecal valce  · Onc consulted, appreciate input

## 2020-07-06 NOTE — ASSESSMENT & PLAN NOTE
· Developed fever 7/3 with Tmax of 103: developed SOB, N, and chills  · Unclear source of infection:    · Pt without infectious changes in abdominal imaging 7/1  Abdominal pain improving throughout admission   · Port placed 6/22 without signs of infection:  · Will hold off on accessing port until Blood cultures result   · Blood cultures obtained 7/3 preliminaryly growing gram positive cocci x 2   · Blood cultures obtained 7/5 pending   · ID following, appreciate recommendations   · Currently on IV vancomycin and cefepime   · Trend  WBC and procal  · CXR:  Bilateral pulmonary metastatic lung nodules/masses  Medial right base small infiltrate

## 2020-07-06 NOTE — CONSULTS
Vancomycin IV Pharmacy-to-Dose Consultation    Matilde Moya is a 50 y o  female who was receiving Vancomycin IV with management by the Pharmacy Consult service  The patient's Vancomycin therapy has been completed / discontinued  Thank you for allowing us to take part in this patient's care  Pharmacy will sign-off now; please call or re-consult if there are any questions          Naa Clay, PharmD  Pharmacist

## 2020-07-06 NOTE — PROGRESS NOTES
Progress Note - Infectious Disease   Puja Fitzpatrick 50 y o  female MRN: [id number]  Unit/Bed#: -01 Encounter: [id number]      Impression/Plan:  1  Sepsis-fever, tachycardia, hypotension   Appears to be secondary to Gemella bacteremia   No other clear sources appreciated   No clinical evidence of a worsening intra-abdominal process or complication to suggest intra-abdominal source   Temperatures come down and the patient is now hemodynamically stable   She seems to be tolerating the antibiotics without difficulty   -discontinue vancomycin cefazolin  -ceftriaxone 2 g IV Q 24 hours  -follow up sensitivities and adjust antibiotics as needed  -recheck CBC with diff and BMP  -recheck procalcitonin level  -supportive care     2  Gemella bacteremia-probably a bowel source  Thus far the repeat blood cultures are negative and the patient seems to be tolerating the antibiotics without difficulty  No clear clinical evidence of a Port-A-Cath infection at this time  Echocardiogram without valvular vegetation appreciated  -antibiotics as above  -follow up repeat blood cultures  -follow up sensitivities and adjust antibiotics as needed  -will attempt port salvage as long as bacteremia clears  -plan at least 2 weeks of intravenous antibiotics  -once confirm clearance of the bacteremia can consider discharge home on intravenous antibiotics     3  Distal colonic obstruction-in the setting of stage IV rectal carcinoma   The obstruction appears to be partial is the patient is able to pass some stool   The NG tube has been removed however the patient is not really able to eat much   Initial plan was to undergo surgical diversion, however the patient has been started on chemotherapy which would but the med high risk complications    She is now tolerating a diet  -serial abdominal exams  -conservative measures to palliate through any obstruction  -MiraLax  -close colorectal followup     4  Stage IV rectal carcinoma-started on chemotherapy with bevacizumab and FOLFIRINOX  -hematology oncology follow-up  -chemotherapy on hold with the active infectious process    Discussed the above management plan with the primary service    Discussed in detail with the patient and her sister    Antibiotics:  Vancomycin 4  Cefazolin 3    Subjective:  Patient has no fever, chills, sweats; no nausea, vomiting, diarrhea; no cough, shortness of breath; no pain  No new symptoms  She is feeling much better today  She was able to eat a full diet  Objective:  Vitals:  Temp:  [97 7 °F (36 5 °C)-98 4 °F (36 9 °C)] 97 7 °F (36 5 °C)  HR:  [] 113  Resp:  [14-18] 18  BP: (123-124)/(84-93) 124/84  SpO2:  [91 %-99 %] 97 %  Temp (24hrs), Av 1 °F (36 7 °C), Min:97 7 °F (36 5 °C), Max:98 4 °F (36 9 °C)  Current: Temperature: 97 7 °F (36 5 °C)    Physical Exam:   General Appearance:  Alert, interactive, nontoxic, no acute distress  Throat: Oropharynx moist without lesions  Lungs:   Clear to auscultation bilaterally; no wheezes, rhonchi or rales; respirations unlabored   Heart:  RRR; no murmur, rub or gallop   Abdomen:   Soft, non-tender, non-distended, positive bowel sounds  Extremities: No clubbing, cyanosis or edema   Skin: No new rashes or lesions  No draining wounds noted         Labs, Imaging, & Other studies:   All pertinent labs and imaging studies were personally reviewed  Results from last 7 days   Lab Units 20  06204   WBC Thousand/uL 8 05 9 31 9 55   HEMOGLOBIN g/dL 7 4* 7 6* 7 7*   PLATELETS Thousands/uL 199 174 169     Results from last 7 days   Lab Units 20  06204 20  0841   SODIUM mmol/L 134* 137 133* 130*  132*   POTASSIUM mmol/L 3 8 3 4* 3 4* 3 6  3 6   CHLORIDE mmol/L 102 106 103 100  100   CO2 mmol/L 27 27 25 24  25   BUN mg/dL 2* 2* 3* 5  5   CREATININE mg/dL 0 41* 0 41* 0 48* 0 54*  0 58*   EGFR ml/min/1 73sq m 123 123 117 113  110   CALCIUM mg/dL 7  8* 8 0* 7 7* 7 9*  8 2*   AST U/L  --  43 41 37   ALT U/L  --  20 18 21   ALK PHOS U/L  --  183* 196* 192*     Results from last 7 days   Lab Units 07/05/20  0520 07/05/20  0519 07/03/20  1510 07/03/20  1504   BLOOD CULTURE  No Growth at 24 hrs  No Growth at 24 hrs   Gemella morbillorum* Gram-Positive Cocci Anaerobic (Group)*   GRAM STAIN RESULT   --   --  Gram positive cocci in clusters* Gram positive cocci in clusters*     Results from last 7 days   Lab Units 07/06/20  0629 07/05/20  0752 07/05/20  0515 07/04/20  0444   PROCALCITONIN ng/ml 1 04* 1 90* 2 82* 5 14*

## 2020-07-06 NOTE — ASSESSMENT & PLAN NOTE
· Patient met sepsis criteria 7/3 @1409 e/b fever and tachycardia, hypotension   · Likely 2/2 gram positive bacteremia   · CT abdomen pelvis 7/1 shows mild mucosal thickening of sigmoid colon possibly due to mild colitis from chemotherapy  · She is not neutropenic - monitor CBC with diff, temperature curve trending down  · Chest x-ray negative for infiltrate, COVID-19 negative, UA benign  · PCT down-trending   · Repeat blood cultures obtained 7/5 negative x 24 hours   · ID following, appreciate recommendations   · Discontinue vancomycin and cefazolin   · Start IV ceftriaxone  · Trend procalcitonin    · Incentive spirometry  · IV fluid hydration

## 2020-07-06 NOTE — ASSESSMENT & PLAN NOTE
· Baseline 8-9 since diagnosis of rectal cancer   · Today 7 4 - monitor closely and transfuse to keep hgb >7   · Reports rectal bleeding which given CA is expected

## 2020-07-06 NOTE — ASSESSMENT & PLAN NOTE
· 2/2 blood cultures from 7/3 preliminary positive for gram pos cocci   · Repeat blood cultures from 7/5 negative x 24 hours   · Transition from IV vancomycin and Ancef to IV ceftriazone today, per ID   · Consider possibility of port infection, placed 6/22  · TTE negative for vegetation

## 2020-07-06 NOTE — ASSESSMENT & PLAN NOTE
· Colorectal surgery is primary service   · Partial obstruction from rectal carcinoma  · Conservative management per primary team  · Poor surgical candidate secondary to chemo   · NG removed, having BM  · Tolerating diet   · Pain regimen per palliative

## 2020-07-06 NOTE — PROGRESS NOTES
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, NO surgery during this admission due to recent use of Avastin  Now with bacteremia of unknown origin:    No acute events ON  States she is improving  Pain better  Tolerating diet more although still far from her baseline  Occasional N/V  Not feverish  Plan:  Regular diet  Continue IVF  MiraLax round the clock  Nausea control PRN  Follow-up blood cultures (GPC in chains so far), cont Abx per ID (on cefazolin/vanc)      Consult to palliative about home pain regimen and goals of care    Subjective/Objective   Chief Complaint:     Subjective:  Overnight had some fevers for which she feels tremulous  She has Wynema Feast feeling much better  She still has the pain around the abdomen mostly in the lower pelvic area  She tolerated some clears but not much solid food  She will try to add  Objective:     Blood pressure 123/93, pulse 84, temperature 98 4 °F (36 9 °C), resp  rate 16, SpO2 97 %, not currently breastfeeding  ,There is no height or weight on file to calculate BMI  Intake/Output Summary (Last 24 hours) at 7/6/2020 0240  Last data filed at 7/5/2020 1747  Gross per 24 hour   Intake 2012 5 ml   Output    Net 2012 5 ml       Invasive Devices     Central Venous Catheter Line            Port A Cath 06/22/20 Right Chest 13 days          Peripheral Intravenous Line            Peripheral IV 07/04/20 Left;Ventral (anterior) Forearm 1 day                Physical Exam: General: AAOx3  Head: normocephalic, atraumatic  Neck: supple, trachea midline   Respiratory: BS b/l  Chest:  Port-A-Cath site looks clean dry and intact  No signs of infection such as swelling, discharge or erythema    Abdomen: Much softer and less distended as compared to before, reducible hernia  Heart: RRR, S1s2  Ext: Warm no cyanosis   Pulse: 2+ radial      Lab, Imaging and other studies:  I have personally reviewed pertinent lab results    , CBC:   Lab Results   Component Value Date    WBC 9 31 07/05/2020    HGB 7 6 (L) 07/05/2020    HCT 25 8 (L) 07/05/2020    MCV 78 (L) 07/05/2020     07/05/2020    MCH 22 8 (L) 07/05/2020    MCHC 29 5 (L) 07/05/2020    RDW 20 3 (H) 07/05/2020    MPV 10 6 07/05/2020   , CMP:   Lab Results   Component Value Date    SODIUM 137 07/05/2020    K 3 4 (L) 07/05/2020     07/05/2020    CO2 27 07/05/2020    BUN 2 (L) 07/05/2020    CREATININE 0 41 (L) 07/05/2020    CALCIUM 8 0 (L) 07/05/2020    AST 43 07/05/2020    ALT 20 07/05/2020    ALKPHOS 183 (H) 07/05/2020    EGFR 123 07/05/2020     VTE Pharmacologic Prophylaxis: Heparin  VTE Mechanical Prophylaxis: sequential compression device

## 2020-07-07 LAB
ALBUMIN SERPL BCP-MCNC: 2.2 G/DL (ref 3.5–5)
ALP SERPL-CCNC: 238 U/L (ref 46–116)
ALT SERPL W P-5'-P-CCNC: 36 U/L (ref 12–78)
ANION GAP SERPL CALCULATED.3IONS-SCNC: 5 MMOL/L (ref 4–13)
ANISOCYTOSIS BLD QL SMEAR: PRESENT
AST SERPL W P-5'-P-CCNC: 56 U/L (ref 5–45)
BASOPHILS # BLD MANUAL: 0 THOUSAND/UL (ref 0–0.1)
BASOPHILS NFR MAR MANUAL: 0 % (ref 0–1)
BILIRUB SERPL-MCNC: 0.27 MG/DL (ref 0.2–1)
BUN SERPL-MCNC: 2 MG/DL (ref 5–25)
CALCIUM SERPL-MCNC: 8.6 MG/DL (ref 8.3–10.1)
CHLORIDE SERPL-SCNC: 99 MMOL/L (ref 100–108)
CO2 SERPL-SCNC: 28 MMOL/L (ref 21–32)
CREAT SERPL-MCNC: 0.45 MG/DL (ref 0.6–1.3)
EOSINOPHIL # BLD MANUAL: 0.36 THOUSAND/UL (ref 0–0.4)
EOSINOPHIL NFR BLD MANUAL: 3 % (ref 0–6)
ERYTHROCYTE [DISTWIDTH] IN BLOOD BY AUTOMATED COUNT: 20.2 % (ref 11.6–15.1)
GFR SERPL CREATININE-BSD FRML MDRD: 120 ML/MIN/1.73SQ M
GLUCOSE SERPL-MCNC: 93 MG/DL (ref 65–140)
HCT VFR BLD AUTO: 27.4 % (ref 34.8–46.1)
HGB BLD-MCNC: 8.2 G/DL (ref 11.5–15.4)
HYPERCHROMIA BLD QL SMEAR: PRESENT
LYMPHOCYTES # BLD AUTO: 0.97 THOUSAND/UL (ref 0.6–4.47)
LYMPHOCYTES # BLD AUTO: 8 % (ref 14–44)
MCH RBC QN AUTO: 22.7 PG (ref 26.8–34.3)
MCHC RBC AUTO-ENTMCNC: 29.9 G/DL (ref 31.4–37.4)
MCV RBC AUTO: 76 FL (ref 82–98)
MICROCYTES BLD QL AUTO: PRESENT
MONOCYTES # BLD AUTO: 0.61 THOUSAND/UL (ref 0–1.22)
MONOCYTES NFR BLD: 5 % (ref 4–12)
MYELOCYTES NFR BLD MANUAL: 1 % (ref 0–1)
NEUTROPHILS # BLD MANUAL: 8.51 THOUSAND/UL (ref 1.85–7.62)
NEUTS BAND NFR BLD MANUAL: 2 % (ref 0–8)
NEUTS SEG NFR BLD AUTO: 68 % (ref 43–75)
NRBC BLD AUTO-RTO: 0 /100 WBCS
PLATELET # BLD AUTO: 264 THOUSANDS/UL (ref 149–390)
PLATELET BLD QL SMEAR: ADEQUATE
PMV BLD AUTO: 10.3 FL (ref 8.9–12.7)
POIKILOCYTOSIS BLD QL SMEAR: PRESENT
POLYCHROMASIA BLD QL SMEAR: PRESENT
POTASSIUM SERPL-SCNC: 4.1 MMOL/L (ref 3.5–5.3)
PROCALCITONIN SERPL-MCNC: 0.66 NG/ML
PROT SERPL-MCNC: 6.2 G/DL (ref 6.4–8.2)
RBC # BLD AUTO: 3.62 MILLION/UL (ref 3.81–5.12)
RBC MORPH BLD: PRESENT
SODIUM SERPL-SCNC: 132 MMOL/L (ref 136–145)
VARIANT LYMPHS # BLD AUTO: 13 %
WBC # BLD AUTO: 12.16 THOUSAND/UL (ref 4.31–10.16)

## 2020-07-07 PROCEDURE — 85027 COMPLETE CBC AUTOMATED: CPT | Performed by: INTERNAL MEDICINE

## 2020-07-07 PROCEDURE — 99232 SBSQ HOSP IP/OBS MODERATE 35: CPT | Performed by: INTERNAL MEDICINE

## 2020-07-07 PROCEDURE — 84145 PROCALCITONIN (PCT): CPT | Performed by: INTERNAL MEDICINE

## 2020-07-07 PROCEDURE — 80053 COMPREHEN METABOLIC PANEL: CPT | Performed by: INTERNAL MEDICINE

## 2020-07-07 PROCEDURE — 85007 BL SMEAR W/DIFF WBC COUNT: CPT | Performed by: INTERNAL MEDICINE

## 2020-07-07 RX ADMIN — OXYCODONE HYDROCHLORIDE 5 MG: 5 TABLET ORAL at 06:17

## 2020-07-07 RX ADMIN — POTASSIUM & SODIUM PHOSPHATES POWDER PACK 280-160-250 MG 2 PACKET: 280-160-250 PACK at 17:16

## 2020-07-07 RX ADMIN — ENOXAPARIN SODIUM 40 MG: 40 INJECTION SUBCUTANEOUS at 08:37

## 2020-07-07 RX ADMIN — OXYCODONE HYDROCHLORIDE 5 MG: 5 TABLET ORAL at 20:49

## 2020-07-07 RX ADMIN — POTASSIUM & SODIUM PHOSPHATES POWDER PACK 280-160-250 MG 2 PACKET: 280-160-250 PACK at 08:00

## 2020-07-07 RX ADMIN — LORATADINE 10 MG: 10 TABLET ORAL at 08:38

## 2020-07-07 RX ADMIN — POLYETHYLENE GLYCOL 3350 17 G: 17 POWDER, FOR SOLUTION ORAL at 17:15

## 2020-07-07 RX ADMIN — CEFTRIAXONE SODIUM 2000 MG: 10 INJECTION, POWDER, FOR SOLUTION INTRAVENOUS at 17:16

## 2020-07-07 RX ADMIN — POLYETHYLENE GLYCOL 3350 17 G: 17 POWDER, FOR SOLUTION ORAL at 08:39

## 2020-07-07 RX ADMIN — OXYCODONE HYDROCHLORIDE 5 MG: 5 TABLET ORAL at 11:00

## 2020-07-07 RX ADMIN — OXYCODONE HYDROCHLORIDE 5 MG: 5 TABLET ORAL at 17:15

## 2020-07-07 NOTE — SOCIAL WORK
Pt was discussed earlier today for dc planning  Communicated with white sx & ID will be ordering IV abx  Per ID, awaiting for final culture results for final IV abx plan & will be using pt's port  S/w pt & she will go to IV infusion center  Awaiting to see if abx is daily or more frequently  CM to f/u in am for abx plan  Pt lives locally & would prefer [facility] choice if goes to infusion center

## 2020-07-07 NOTE — SOCIAL WORK
LSW met with patient to offer Palliative SW support  Patient was being visited by her sister  Patient feels that she has adequate support by her family and friends  Patient declines the support offered    LSW will follow-up if requested by patient

## 2020-07-07 NOTE — ASSESSMENT & PLAN NOTE
· 2/2 blood cultures from 7/3 preliminary positive for gram pos cocci   · Repeat blood cultures from 7/5 negative x 72 hours  · continue ceftriaxone through 7/16/2020 to complete 2 weeks total of treatment  · TTE negative for vegetation

## 2020-07-07 NOTE — PROGRESS NOTES
Progress Note - Colorectal Surgery   Jazmin Fitzpatrick 50 y o  female MRN: [id number]  Unit/Bed#: -01 Encounter: [id number]    Assessment:  Patient is a 50 y o  female with stage IV rectal cancer on chemo now with obstructing rectal tumor, now with bacteremia  Initial Blood cultures x2 grew GPC in clusters  Repeat BC are negative @24hrs    Plan:  Surgical soft diet  Maintenance IV fluids at 50 could possibly be discontinue  Id following appreciate recommendations switched antibiotics to ceftriaxone  Patient possibly be discharged today      Subjective/Objective     Subjective:   No acute events overnight  Objective:    Blood pressure 109/60, pulse 80, temperature 97 7 °F (36 5 °C), resp  rate 20, SpO2 94 %, not currently breastfeeding  ,There is no height or weight on file to calculate BMI  Intake/Output Summary (Last 24 hours) at 7/7/2020 0548  Last data filed at 7/6/2020 1200  Gross per 24 hour   Intake 1485 ml   Output    Net 1485 ml       Invasive Devices     Central Venous Catheter Line            Port A Cath 06/22/20 Right Chest 14 days          Peripheral Intravenous Line            Peripheral IV 07/04/20 Left;Ventral (anterior) Forearm 2 days    Peripheral IV 07/06/20 Distal;Right;Ventral (anterior) Forearm less than 1 day                Physical Exam:   Gen:  Well-developed, well-nourished female in NAD  CV: RRR  Lungs: Normal respiratory effort   Abd: soft, nontender, mild distended, ventral hernias soft, reducible  Neuro:  AxO x3      Results from last 7 days   Lab Units 07/06/20  0629 07/05/20  0515 07/04/20  0444   WBC Thousand/uL 8 05 9 31 9 55   HEMOGLOBIN g/dL 7 4* 7 6* 7 7*   HEMATOCRIT % 24 1* 25 8* 25 5*   PLATELETS Thousands/uL 199 174 169     Results from last 7 days   Lab Units 07/06/20  0629 07/05/20  0515 07/04/20  0444   POTASSIUM mmol/L 3 8 3 4* 3 4*   CHLORIDE mmol/L 102 106 103   CO2 mmol/L 27 27 25   BUN mg/dL 2* 2* 3*   CREATININE mg/dL 0 41* 0 41* 0 48*   CALCIUM mg/dL 7  8* 8 0* 7 7*

## 2020-07-07 NOTE — ASSESSMENT & PLAN NOTE
· Colorectal surgery is primary service   · Partial obstruction from rectal carcinoma  · Conservative management per primary team  · Poor surgical candidate secondary to chemo   · Tolerating diet   · Pain regimen per palliative

## 2020-07-07 NOTE — ASSESSMENT & PLAN NOTE
· Stage IV rectal cancer with known mets   · Following with oncology outpt Dr Marlen Glynn, currently receiving FOLFIRINOX   · Now with obstructing rectal tumor and large bowel obstruction with incompetent ileocecal valce  · Onc consulted, appreciate input

## 2020-07-07 NOTE — PROGRESS NOTES
Progress Note - Infectious Disease   Puja Fitzpatrick 50 y o  female MRN: [id number]  Unit/Bed#: -01 Encounter: [id number]      Impression/Plan:  1  Sepsis secondary to Gemella bacteremia   As evidenced by fever, tachycardia and hypotension   No clear source is appreciated, no clinical evidence of a worsening intra-abdominal process or complications to suggest an intra-abdominal source  Patient is currently on ceftriaxone 2 g every 24 hours  Leukocytosis is up trending today, white blood cell count is 12 16 from 8 05, procalcitonin is down trending 0 6-1 04  Blood culture on 7/03 growing Gemella morbillorum , sensitivities to follow  Blood cultures from 07/05 show no growth at 24 hours   Patient remains afebrile, heart rate  in the last 24 hours, no hypotension  Transthoracic echocardiogram showed no evidence of endocarditis  For now continue ceftriaxone 2 g every 24 hours and adjust according to final sensitivities  If patient continues clinically stable and afebrile and no growth in blood cultures for 72h can consider discharge on IV antibiotics to complete 2 weeks course and repeat blood cultures at that point to confirm bacteremia clearance  2  Colonic obstruction in the setting of a stage IV rectal carcinoma  Obstruction seemed partial, patient is able to pass some stool, initial plan was to undergo a surgical evaluation however patient was on chemotherapy  Patient is tolerating diet, no nausea or vomiting   3  Stage IV rectal carcinoma on chemotherapy with Bevacizumab and FOLFIRINOX  Hematology-Oncology closely following up, chemotherapy now on hold due to active infection  Antibiotics:  Ceftriaxone 2 g every 24 hours (day 2)  Antibiotics (day 5) was on cefazolin and vancomycin before    Subjective:  Patient has no fever, chills, sweats; no nausea, vomiting, diarrhea; no cough, shortness of breath; no pain  No new symptoms    Patient is doing well this morning, denies any abdominal pain, chest pain, no shortness of breath  She has breakfast this morning without any nausea or vomiting after  Overall doing better  Objective:  Vitals:  Temp:  [97 7 °F (36 5 °C)] 97 7 °F (36 5 °C)  HR:  [] 98  Resp:  [18-20] 18  BP: (107-124)/(60-84) 107/73  SpO2:  [94 %-98 %] 96 %  Temp (24hrs), Av 7 °F (36 5 °C), Min:97 7 °F (36 5 °C), Max:97 7 °F (36 5 °C)  Current: Temperature: 97 7 °F (36 5 °C)    Physical Exam:   General Appearance:  Alert, interactive, nontoxic, no acute distress  Throat: Oropharynx moist without lesions  Lungs:   Clear to auscultation bilaterally; no wheezes, rhonchi or rales; respirations unlabored   Heart:  RRR; no murmur, rub or gallop   Abdomen:   Soft, non-tender, non-distended, positive bowel sounds  Extremities: No clubbing, cyanosis or edema   Skin: No new rashes or lesions  No draining wounds noted  Labs, Imaging, & Other studies:   All pertinent labs and imaging studies were personally reviewed  Results from last 7 days   Lab Units 20  0511 20  0629 20  0515   WBC Thousand/uL 12 16* 8 05 9 31   HEMOGLOBIN g/dL 8 2* 7 4* 7 6*   PLATELETS Thousands/uL 264 199 174     Results from last 7 days   Lab Units 20  0511 20  0629 20  0515 20  0444   SODIUM mmol/L 132* 134* 137 133*   POTASSIUM mmol/L 4 1 3 8 3 4* 3 4*   CHLORIDE mmol/L 99* 102 106 103   CO2 mmol/L 28 27 27 25   BUN mg/dL 2* 2* 2* 3*   CREATININE mg/dL 0 45* 0 41* 0 41* 0 48*   EGFR ml/min/1 73sq m 120 123 123 117   CALCIUM mg/dL 8 6 7 8* 8 0* 7 7*   AST U/L 56*  --  43 41   ALT U/L 36  --  20 18   ALK PHOS U/L 238*  --  183* 196*     Results from last 7 days   Lab Units 20  0520 20  0519 20  1510 20  1504   BLOOD CULTURE  No Growth at 24 hrs  No Growth at 24 hrs   Gemella morbillorum* Gram-Positive Cocci Anaerobic (Group)*   GRAM STAIN RESULT   --   --  Gram positive cocci in clusters* Gram positive cocci in clusters*     Results from last 7 days   Lab Units 07/07/20  0511 07/06/20  0629 07/05/20  0752 07/05/20  0515 07/04/20  0444   PROCALCITONIN ng/ml 0 66* 1 04* 1 90* 2 82* 5 14*

## 2020-07-07 NOTE — UTILIZATION REVIEW
Continued Stay Review    Date: 7/7                     Current Patient Class: Inpatient Current Level of Care: Med Surg    HPI:45 y o  female initially admitted on 7/1 presents with increasing abdominal pain  Assessment/Plan:  Sepsis secondary to Gemella bacteremia  Stage IV rectal cancer on chemo now with obstructing rectal tumor, now with bacteremia  Initial Blood cultures x2 grew GPC in clusters  Repeat BC are negative @24hrs  Surgical soft diet  Continue IVFs  Continue IV antibiotics  Wbc trending up to 12 16 from 8 05 today  Procalcitonin is down trending 0 6-1 04  Chemotherapy now on hold due to active infection        Pertinent Labs/Diagnostic Results:   Results from last 7 days   Lab Units 07/01/20  1126   SARS-COV-2  Negative     Results from last 7 days   Lab Units 07/07/20  0511 07/06/20  0629 07/05/20  0515 07/04/20  0444 07/03/20  1504 07/03/20  0841   WBC Thousand/uL 12 16* 8 05 9 31 9 55 5 98 11 07*   HEMOGLOBIN g/dL 8 2* 7 4* 7 6* 7 7* 8 7* 9 4*   HEMATOCRIT % 27 4* 24 1* 25 8* 25 5* 29 6* 32 6*   PLATELETS Thousands/uL 264 199 174 169 203 223   NEUTROS ABS Thousands/µL  --   --   --   --  4 60  --    BANDS PCT % 2  --   --   --   --  4         Results from last 7 days   Lab Units 07/07/20  0511 07/06/20  0629 07/05/20  0515 07/04/20  0444 07/03/20  0841 07/02/20  0501   SODIUM mmol/L 132* 134* 137 133* 130*  132* 136   POTASSIUM mmol/L 4 1 3 8 3 4* 3 4* 3 6  3 6 4 1   CHLORIDE mmol/L 99* 102 106 103 100  100 107   CO2 mmol/L 28 27 27 25 24  25 22   ANION GAP mmol/L 5 5 4 5 6  7 7   BUN mg/dL 2* 2* 2* 3* 5  5 13   CREATININE mg/dL 0 45* 0 41* 0 41* 0 48* 0 54*  0 58* 0 45*   EGFR ml/min/1 73sq m 120 123 123 117 113  110 120   CALCIUM mg/dL 8 6 7 8* 8 0* 7 7* 7 9*  8 2* 8 3   MAGNESIUM mg/dL  --   --   --   --  1 8 2 4   PHOSPHORUS mg/dL  --   --   --   --  2 2* 2 6*     Results from last 7 days   Lab Units 07/07/20  0511 07/05/20  0515 07/04/20  0444 07/03/20  0841 07/01/20  0542   AST U/L 56* 43 41 37 28   ALT U/L 36 20 18 21 37   ALK PHOS U/L 238* 183* 196* 192* 336*   TOTAL PROTEIN g/dL 6 2* 5 3* 5 5* 6 3* 7 3   ALBUMIN g/dL 2 2* 1 9* 2 1* 2 5* 2 9*   TOTAL BILIRUBIN mg/dL 0 27 0 26 0 33 0 27 0 46         Results from last 7 days   Lab Units 07/07/20  0511 07/06/20  0629 07/05/20  0515 07/04/20  0444 07/03/20  0841 07/02/20  0501 07/01/20  0542   GLUCOSE RANDOM mg/dL 93 97 90 104 109  105 100 181*     Results from last 7 days   Lab Units 07/07/20  0511 07/06/20  0629 07/05/20  0752 07/05/20  0515 07/04/20  0444   PROCALCITONIN ng/ml 0 66* 1 04* 1 90* 2 82* 5 14*     Results from last 7 days   Lab Units 07/01/20  1623 07/01/20  1126 07/01/20  0546   LACTIC ACID mmol/L 1 3 2 8* 2 8*       Results from last 7 days   Lab Units 07/01/20  0542   LIPASE u/L 96             Results from last 7 days   Lab Units 07/03/20  2251   CLARITY UA  Clear   COLOR UA  Yellow   SPEC GRAV UA  1 005   PH UA  6 0   GLUCOSE UA mg/dl Negative   KETONES UA mg/dl Negative   BLOOD UA  Negative   PROTEIN UA mg/dl Negative   NITRITE UA  Negative   BILIRUBIN UA  Negative   UROBILINOGEN UA E U /dl 0 2   LEUKOCYTES UA  Negative     Results from last 7 days   Lab Units 07/05/20  0520 07/05/20  0519 07/03/20  1510 07/03/20  1504   BLOOD CULTURE  No Growth at 48 hrs  No Growth at 48 hrs   Gemella morbillorum* Gemella morbillorum*   GRAM STAIN RESULT   --   --  Gram positive cocci in clusters* Gram positive cocci in clusters*     Vital Signs:   07/07/20 14:57:30  98 6 °F (37 °C)      104/54  71         07/07/20 07:19:21  97 7 °F (36 5 °C)  98  18  107/73  84  96 %         Medications:   Scheduled Medications:  Medications:  cefTRIAXone 2,000 mg Intravenous Q24H   enoxaparin 40 mg Subcutaneous Daily   lidocaine 1 application Urethral Once   loratadine 10 mg Oral Daily   metoprolol succinate 25 mg Oral Daily   oxyCODONE 5 mg Oral 4x Daily (AC & HS)   polyethylene glycol 17 g Oral BID after meals   potassium-sodium phosphates 2 packet Oral BID With Meals     Continuous IV Infusions:    dextrose 5 % and sodium chloride 0 45 % with KCl 20 mEq/L infusion   Rate: 50 mL/hr Dose: 50 mL/hr  Freq: Continuous Route: IV  Indications of Use: IV Hydration  Last Dose: Stopped (07/07/20 0730)  Start: 07/05/20 1415 End: 07/07/20 0639    PRN Meds:  acetaminophen 650 mg Oral Q6H PRN   calcium carbonate 500 mg Oral BID PRN   diphenhydrAMINE 12 5 mg Oral Q6H PRN   HYDROmorphone 0 5 mg Intravenous Q3H PRN   melatonin 3 mg Oral HS PRN   oxyCODONE 10 mg Oral Q3H PRN   phenol 2 spray Mouth/Throat Q2H PRN   promethazine 12 5 mg Intravenous Q6H PRN   saliva substitute 5 spray Mouth/Throat 4x Daily PRN     Discharge Plan: TBD    Network Utilization Review Department  [email or web address]  ATTENTION: Please call with any questions or concerns to [phone] and carefully listen to the prompts so that you are directed to the right person  All voicemails are confidential   Augusto Rutledge all requests for admission clinical reviews, approved or denied determinations and any other requests to dedicated fax number below belonging to the campus where the patient is receiving treatment   List of dedicated fax numbers for the Facilities:  [address] DENIALS (Administrative/Medical Necessity) [phone]   [address] (Maternity/NICU/Pediatrics) [phone]   Reva Chahal [phone]   Ward Delay [phone]   Sindy Sitter [phone]   Marbella Banuelos [phone]   [address] [facility] [address] [place] [address] [phone]   [facility]  [phone]   [address] [facility] [address]  [address] [facility] [address] [facility] [phone]

## 2020-07-07 NOTE — ASSESSMENT & PLAN NOTE
· Baseline 8-9 since diagnosis of rectal cancer   · Today 8 2 - monitor closely and transfuse to keep hgb >7   · Reports rectal bleeding which given CA is expected

## 2020-07-07 NOTE — ASSESSMENT & PLAN NOTE
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

## 2020-07-07 NOTE — PROGRESS NOTES
Progress Note - Marycarmen Roldan 1975, 50 y o  female MRN: [id number]  Unit/Bed#: -01 Encounter: [id number]  Primary Care Provider: Merary Strauss MD   Date and time admitted to hospital: 7/1/2020  5:24 AM    Patient was not personally seen or examined today  Chart thoroughly reviewed  Awaiting final culture and sensitivities prior to discharge, which is tentatively planned for tomorrow 7/8  ID to provide antibiotic scripts  CM following       * Large bowel obstruction (HCC)  Assessment & Plan  · Colorectal surgery is primary service   · Partial obstruction from rectal carcinoma  · Conservative management per primary team  · Poor surgical candidate secondary to chemo   · NG removed, having BM  · Tolerating diet   · Pain regimen per palliative     Sepsis [facility])  Assessment & Plan  · Patient met sepsis criteria 7/3 @1409 e/b fever and tachycardia, hypotension   · Likely 2/2 gram positive bacteremia   · CT abdomen pelvis 7/1 shows mild mucosal thickening of sigmoid colon possibly due to mild colitis from chemotherapy  · She is not neutropenic - monitor CBC with diff, temperature curve trending down  · Chest x-ray negative for infiltrate, COVID-19 negative, UA benign  · PCT down-trending   · Repeat blood cultures obtained 7/5 negative x 24 hours   · ID following, appreciate recommendations   · Discontinued vancomycin and cefazolin   · Started IV ceftriaxone on 7/6  · Incentive spirometry  · IV fluid hydration    Gram-positive bacteremia  Assessment & Plan  · 2/2 blood cultures from 7/3 preliminary positive for gram pos cocci   · Repeat blood cultures from 7/5 negative x 48 hours   · Transition from IV vancomycin and Ancef to IV ceftriazone today, per ID   · Consider possibility of port infection, placed 6/22  · TTE negative for vegetation     Abdominal pain  Assessment & Plan  · Acute on chronic and multifactorial   · Known hernias - continue abdominal binder   · Constipation - bowel regimen per primary · Cancer related pain - pain regimen per palliative     Anemia  Assessment & Plan  · Baseline 8-9 since diagnosis of rectal cancer   · Today 8 2 - monitor closely and transfuse to keep hgb >7   · Reports rectal bleeding which given CA is expected     Breast cancer 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

## 2020-07-07 NOTE — ASSESSMENT & PLAN NOTE
· Patient met sepsis criteria 7/3 @1409 e/b fever and tachycardia, hypotension   · Likely 2/2 gram positive bacteremia   · CT abdomen pelvis 7/1 shows mild mucosal thickening of sigmoid colon possibly due to mild colitis from chemotherapy  · She is not neutropenic - monitor CBC with diff, temperature curve trending down  · Chest x-ray negative for infiltrate, COVID-19 negative, UA benign  · PCT down-trending   · Repeat blood cultures obtained 7/5 negative x 72 hours   · ID following, appreciate recommendations   · Started IV ceftriaxone on 7/6 and continue ceftriaxone through 7/16/2020 to complete 2 weeks total of treatment  · Incentive spirometry  · IV fluid hydration

## 2020-07-08 VITALS
BODY MASS INDEX: 23.92 KG/M2 | RESPIRATION RATE: 18 BRPM | HEART RATE: 98 BPM | SYSTOLIC BLOOD PRESSURE: 109 MMHG | DIASTOLIC BLOOD PRESSURE: 71 MMHG | WEIGHT: 130 LBS | HEIGHT: 62 IN | OXYGEN SATURATION: 96 % | TEMPERATURE: 98 F

## 2020-07-08 LAB
ANION GAP SERPL CALCULATED.3IONS-SCNC: 8 MMOL/L (ref 4–13)
BUN SERPL-MCNC: 4 MG/DL (ref 5–25)
CALCIUM SERPL-MCNC: 8.8 MG/DL (ref 8.3–10.1)
CHLORIDE SERPL-SCNC: 97 MMOL/L (ref 100–108)
CO2 SERPL-SCNC: 27 MMOL/L (ref 21–32)
CREAT SERPL-MCNC: 0.49 MG/DL (ref 0.6–1.3)
ERYTHROCYTE [DISTWIDTH] IN BLOOD BY AUTOMATED COUNT: 20.3 % (ref 11.6–15.1)
GFR SERPL CREATININE-BSD FRML MDRD: 116 ML/MIN/1.73SQ M
GLUCOSE SERPL-MCNC: 90 MG/DL (ref 65–140)
HCT VFR BLD AUTO: 28.2 % (ref 34.8–46.1)
HGB BLD-MCNC: 8.3 G/DL (ref 11.5–15.4)
MCH RBC QN AUTO: 22.9 PG (ref 26.8–34.3)
MCHC RBC AUTO-ENTMCNC: 29.4 G/DL (ref 31.4–37.4)
MCV RBC AUTO: 78 FL (ref 82–98)
NRBC BLD AUTO-RTO: 0 /100 WBCS
PLATELET # BLD AUTO: 360 THOUSANDS/UL (ref 149–390)
PMV BLD AUTO: 10.7 FL (ref 8.9–12.7)
POTASSIUM SERPL-SCNC: 4.3 MMOL/L (ref 3.5–5.3)
PROCALCITONIN SERPL-MCNC: 0.4 NG/ML
RBC # BLD AUTO: 3.63 MILLION/UL (ref 3.81–5.12)
SODIUM SERPL-SCNC: 132 MMOL/L (ref 136–145)
WBC # BLD AUTO: 14.25 THOUSAND/UL (ref 4.31–10.16)

## 2020-07-08 PROCEDURE — 99233 SBSQ HOSP IP/OBS HIGH 50: CPT | Performed by: NURSE PRACTITIONER

## 2020-07-08 PROCEDURE — 85027 COMPLETE CBC AUTOMATED: CPT | Performed by: SURGERY

## 2020-07-08 PROCEDURE — 80048 BASIC METABOLIC PNL TOTAL CA: CPT | Performed by: SURGERY

## 2020-07-08 PROCEDURE — 84145 PROCALCITONIN (PCT): CPT | Performed by: INTERNAL MEDICINE

## 2020-07-08 PROCEDURE — 99233 SBSQ HOSP IP/OBS HIGH 50: CPT | Performed by: INTERNAL MEDICINE

## 2020-07-08 RX ORDER — POLYETHYLENE GLYCOL 3350 17 G/17G
17 POWDER, FOR SOLUTION ORAL
Status: DISCONTINUED | OUTPATIENT
Start: 2020-07-08 | End: 2020-07-08 | Stop reason: HOSPADM

## 2020-07-08 RX ADMIN — CEFTRIAXONE SODIUM 2000 MG: 10 INJECTION, POWDER, FOR SOLUTION INTRAVENOUS at 15:28

## 2020-07-08 RX ADMIN — CALCIUM CARBONATE (ANTACID) CHEW TAB 500 MG 500 MG: 500 CHEW TAB at 16:35

## 2020-07-08 RX ADMIN — ENOXAPARIN SODIUM 40 MG: 40 INJECTION SUBCUTANEOUS at 08:47

## 2020-07-08 RX ADMIN — OXYCODONE HYDROCHLORIDE 5 MG: 5 TABLET ORAL at 15:35

## 2020-07-08 RX ADMIN — POLYETHYLENE GLYCOL 3350 17 G: 17 POWDER, FOR SOLUTION ORAL at 12:05

## 2020-07-08 RX ADMIN — POLYETHYLENE GLYCOL 3350 17 G: 17 POWDER, FOR SOLUTION ORAL at 08:47

## 2020-07-08 RX ADMIN — LORATADINE 10 MG: 10 TABLET ORAL at 08:48

## 2020-07-08 RX ADMIN — POLYETHYLENE GLYCOL 3350 17 G: 17 POWDER, FOR SOLUTION ORAL at 16:59

## 2020-07-08 RX ADMIN — CALCIUM CARBONATE (ANTACID) CHEW TAB 500 MG 500 MG: 500 CHEW TAB at 00:01

## 2020-07-08 RX ADMIN — OXYCODONE HYDROCHLORIDE 5 MG: 5 TABLET ORAL at 12:05

## 2020-07-08 RX ADMIN — OXYCODONE HYDROCHLORIDE 5 MG: 5 TABLET ORAL at 06:57

## 2020-07-08 RX ADMIN — PROMETHAZINE HYDROCHLORIDE 12.5 MG: 25 INJECTION INTRAMUSCULAR; INTRAVENOUS at 07:00

## 2020-07-08 RX ADMIN — POTASSIUM & SODIUM PHOSPHATES POWDER PACK 280-160-250 MG 2 PACKET: 280-160-250 PACK at 08:49

## 2020-07-08 NOTE — SOCIAL WORK
S/w pt in room with ID & script received for home IV abx infusion  Pt prefers to have abx at home & is comfortable self administering  Per ID, plan is to use port-a-cath  CM asked to inquire with [facility] if they can access port & leave it accessed for pt to administer  S/w Pat from  [facility] intake & she verified that if pt goes home today after her iv abx dose,  [facility] can access pt's port-a-cath tomorrow & do teaching  Plan is for pt to get abx here around 3pm & have home visit tomorrow between 1-2 pm     Message sent to [facility] to verify if pt cleared for dc today & update on dc plan  Pt agreeable to both [facility] & [facility] infusion referrals  Awaiting for medical clearance & infusion response  Updated ID, pt, RN & [facility] on plan

## 2020-07-08 NOTE — PROGRESS NOTES
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  As evidenced by fever, tachycardia and hypotension now resolved  Blood cultures  growing Gemella morbillorum, awaiting final report  Blood cultures  no growth for 48 hours  WBC count uptrending 14 25-->12 6, however procalcitonin is 0 4 (lower than yesterday)  Preliminary results showed Gemella sensitive to penicillins and ceftriaxone   Currently on ceftriaxone 2g daily, will continue the same  Consider discharge patient when bacteremia is clear for 72h and clinically stable  Plan for 2 weeks course of IV antibiotics with Ceftriaxone 2 g daily until 20 , patient does not need other access can use Port-A-Cath  Patient needs follow up with ID, she will need to repeat blood cultures after completion of 2 weeks IV antibiotics  Scripts for antibiotic and weekly CBC and CMP are in the chart  2  Colonic obstruction in the setting of a stage IV rectal carcinoma  Obstruction seemed partial, patient able to pass some stool, initial plan was to undergo a surgical evaluation however patient was on chemotherapy  Patient is tolerating diet, no vomiting   3  Stage IV rectal carcinoma on chemotherapy with Bevacizumab and FOLFIRINOX  Hematology-Oncology closely following up, chemotherapy now on hold due to active infection  Antibiotics:  Ceftriaxone (day 3)  Antibiotics (day 6) was on cefazolin and vancomycin before    Subjective:  Patient has no fever, chills, sweats; no vomiting, diarrhea; no cough, shortness of breath; no pain  Patient reports some nausea this morning  No other new symptoms      Objective:  Vitals:  Temp:  [98 6 °F (37 °C)-99 2 °F (37 3 °C)] 99 2 °F (37 3 °C)  BP: (104-113)/(54-71) 113/71  SpO2:  [96 %] 96 %  Temp (24hrs), Av 9 °F (37 2 °C), Min:98 6 °F (37 °C), Max:99 2 °F (37 3 °C)  Current: Temperature: 99 2 °F (37 3 °C)    Physical Exam:   General Appearance:  Alert, interactive, nontoxic, no acute distress  Throat: Oropharynx moist without lesions  Lungs:   Clear to auscultation bilaterally; no wheezes, rhonchi or rales; respirations unlabored   Heart:  RRR; no murmur, rub or gallop   Abdomen:   Soft, non-tender, mildly distended, decreased bowel sounds  Extremities: No clubbing, cyanosis or edema   Skin: No new rashes or lesions  No draining wounds noted  Labs, Imaging, & Other studies:   All pertinent labs and imaging studies were personally reviewed  Results from last 7 days   Lab Units 07/08/20  0509 07/07/20  0511 07/06/20  0629   WBC Thousand/uL 14 25* 12 16* 8 05   HEMOGLOBIN g/dL 8 3* 8 2* 7 4*   PLATELETS Thousands/uL 360 264 199     Results from last 7 days   Lab Units 07/07/20  0511 07/06/20  0629 07/05/20  0515 07/04/20  0444   SODIUM mmol/L 132* 134* 137 133*   POTASSIUM mmol/L 4 1 3 8 3 4* 3 4*   CHLORIDE mmol/L 99* 102 106 103   CO2 mmol/L 28 27 27 25   BUN mg/dL 2* 2* 2* 3*   CREATININE mg/dL 0 45* 0 41* 0 41* 0 48*   EGFR ml/min/1 73sq m 120 123 123 117   CALCIUM mg/dL 8 6 7 8* 8 0* 7 7*   AST U/L 56*  --  43 41   ALT U/L 36  --  20 18   ALK PHOS U/L 238*  --  183* 196*     Results from last 7 days   Lab Units 07/05/20  0520 07/05/20  0519 07/03/20  1510 07/03/20  1504   BLOOD CULTURE  No Growth at 48 hrs  No Growth at 48 hrs   Gemella morbillorum* Gemella morbillorum*   GRAM STAIN RESULT   --   --  Gram positive cocci in clusters* Gram positive cocci in clusters*     Results from last 7 days   Lab Units 07/07/20  0511 07/06/20  0629 07/05/20  0752 07/05/20  0515 07/04/20  0444   PROCALCITONIN ng/ml 0 66* 1 04* 1 90* 2 82* 5 14*

## 2020-07-08 NOTE — DISCHARGE SUMMARY
Discharge Summary    Chrystal Frankel Willhoit 50 y o  female MRN: [id number]    Unit/Bed#: -01 Encounter: [id number]    Admission Date: 7/1/2020     Discharge Date:/7/8/2020    Attending: Mike Savage MD    Consultants: [facility], Dr Eb High    Admitting Diagnosis: Leukocytosis [D72 829]  Rectal cancer ([address] ) [C20]  Abdominal pain [R10 9]  Large bowel obstruction ([address] ) [K56 609]  History of rectal cancer [Z85 048]  Elevated lactic acid level [R79 89]    Principle Diagnosis: sepsis, bacteremia    Secondary Diagnosis:  Past Medical History:   Diagnosis Date    History of rectal cancer 06/2020     Past Surgical History:   Procedure Laterality Date    BACK SURGERY      BREAST BIOPSY Right 06/19/2020    2 sites; 1 breast 1 axilla    HERNIA REPAIR      3 repairs    IR IMAGE GUIDED BIOPSY/ASPIRATION LIVER  6/17/2020    IR PORT PLACEMENT  6/22/2020    TUBAL LIGATION      US GUIDED BREAST BIOPSY RIGHT COMPLETE Right 6/19/2020    US GUIDED BREAST LYMPH NODE BIOPSY RIGHT Right 6/19/2020        Procedures Performed: Procedure(s):  COLOSTOMY LAPAROSCOPIC    Imaging:No results found  Discharge Medications:  See after visit summary for reconciled discharge medications provided to patient and family  Brief HPI (per H/P by [employer]):   61-year-old female with stage IV rectal cancer on FOLFIRINOX now with obstructing rectal tumor and large bowel obstruction with incompetent ileocecal valve  Hospital Course: Jaycob Marie is a 50 y o  female who presented 7/1/2020 per HPI  Patient was admitted to the colorectal surgery service, NG tube was placed and IV hydration was commenced  Patient surgical oncologist was consulted for guidance related to chemotherapy treatments  She informed the team that patient was on Avastin and that surgical intervention of any kind was contraindicated  Therefore patient was continued to be managed conservatively    Several days of the patient's hospital stay patient became septic and was found to have Gemella morbillorum bacteremia  Slim and ID were consulted at that time for continued workup and assistance with management  Patient's NG tube was removed she continued to have some bowel function, she was started on antibiotics broad-spectrum that were narrowed based on ID recommendations  She tolerated a regular diet and continued to have some bowel function  Per ID patient had repeat blood cultures on 07/05 that showed no growth to date at 72 hours  Xenia Kent patient was deemed appropriate for discharge on hospital day 8 with short interval follow-up with Infectious Disease on IV antibiotics for 2 weeks total course  Patient was discharged on HD8  On the day of discharge, the patient was voiding spontaneously, ambulating at baseline, and pain was well controlled  She understood all instructions for discharge  She was also given the names and numbers of the providers as well as instructions for follow up appointments  Condition at Discharge: good     Provisions for Follow-Up Care:  See after visit summary for information related to follow-up care and any pertinent home health orders  Disposition: Home    Discharge instructions/Information to patient and family:   See after visit summary for information provided to patient and family  Planned Readmission: No    Discharge Statement   I spent 25 minutes discharging the patient  This time was spent on the day of discharge  I had direct contact with the patient on the day of discharge  Additional documentation is required if more than 30 minutes were spent on discharge

## 2020-07-08 NOTE — PHYSICAL THERAPY NOTE
Pt screened for PT services  Spoke w/ pt at bedside, who reports she is OOB and mobilizing indep ad shiv  Will sign off    [place] Hair PT, DPT CSRS

## 2020-07-08 NOTE — PROGRESS NOTES
Progress Note - Colorectal Surgery   Sherman Fitzpatrick 50 y o  female MRN: [id number]  Unit/Bed#: -01 Encounter: [id number]    Assessment:  Patient is a 50 y o  female with stage IV rectal cancer on chemo now with obstructing rectal tumor, bacteremic  Initial Blood cultures x2 grew: Gemella morbillorum  Repeat BC are negative @48hrs    Pt reports no BM overnight and the past few days  Which is a change from previous where she was having liquid stools after eating  Plan:  Regular  Port  Follow-up repeat blood cultures initial blood cultures sensitivities  Id following appreciate recommendations need blood cultures negative times 72 hours will plan for IV antibiotics x2 weeks once 7 3 blood cultures sensitivities come back      Subjective/Objective     Subjective:   No acute events overnight  Objective:    Blood pressure 113/71, pulse 98, temperature 99 2 °F (37 3 °C), resp  rate 18, SpO2 96 %, not currently breastfeeding  ,There is no height or weight on file to calculate BMI  Intake/Output Summary (Last 24 hours) at 7/8/2020 0542  Last data filed at 7/8/2020 0447  Gross per 24 hour   Intake 960 ml   Output 1550 ml   Net -590 ml       Invasive Devices     Central Venous Catheter Line            Port A Cath 06/22/20 Right Chest 15 days          Peripheral Intravenous Line            Peripheral IV 07/06/20 Distal;Right;Ventral (anterior) Forearm 1 day                Physical Exam:   Physical Exam   Constitutional: She is oriented to person, place, and time  She appears well-developed and well-nourished  No distress  HENT:   Head: Normocephalic and atraumatic  Eyes: EOM are normal    Neck: Normal range of motion  Cardiovascular: Normal rate  Pulmonary/Chest: Effort normal  No respiratory distress  Abdominal: Soft  There is no tenderness  A hernia is present  Hernia confirmed positive in the ventral area  Musculoskeletal: Normal range of motion     Neurological: She is alert and oriented to person, place, and time  Skin: Skin is warm and dry  Psychiatric: She has a normal mood and affect   Her behavior is normal  Judgment and thought content normal          Results from last 7 days   Lab Units 07/07/20  0511 07/06/20  0629 07/05/20  0515   WBC Thousand/uL 12 16* 8 05 9 31   HEMOGLOBIN g/dL 8 2* 7 4* 7 6*   HEMATOCRIT % 27 4* 24 1* 25 8*   PLATELETS Thousands/uL 264 199 174     Results from last 7 days   Lab Units 07/07/20  0511 07/06/20  0629 07/05/20  0515   POTASSIUM mmol/L 4 1 3 8 3 4*   CHLORIDE mmol/L 99* 102 106   CO2 mmol/L 28 27 27   BUN mg/dL 2* 2* 2*   CREATININE mg/dL 0 45* 0 41* 0 41*   CALCIUM mg/dL 8 6 7 8* 8 0*

## 2020-07-08 NOTE — PLAN OF CARE
Problem: PAIN - ADULT  Goal: Verbalizes/displays adequate comfort level or baseline comfort level  Description  Interventions:  - Encourage patient to monitor pain and request assistance  - Assess pain using appropriate pain scale  - Administer analgesics based on type and severity of pain and evaluate response  - Implement non-pharmacological measures as appropriate and evaluate response  - Consider cultural and social influences on pain and pain management  - Notify physician/advanced practitioner if interventions unsuccessful or patient reports new pain  Outcome: Progressing     Problem: SAFETY ADULT  Goal: Patient will remain free of falls  Description  INTERVENTIONS:  - Assess patient frequently for physical needs  -  Identify cognitive and physical deficits and behaviors that affect risk of falls    -  [place] fall precautions as indicated by assessment   - Educate patient/family on patient safety including physical limitations  - Instruct patient to call for assistance with activity based on assessment  - Modify environment to reduce risk of injury  - Consider OT/PT consult to assist with strengthening/mobility  Outcome: Progressing  Goal: Maintain or return to baseline ADL function  Description  INTERVENTIONS:  -  Assess patient's ability to carry out ADLs; assess patient's baseline for ADL function and identify physical deficits which impact ability to perform ADLs (bathing, care of mouth/teeth, toileting, grooming, dressing, etc )  - Assess/evaluate cause of self-care deficits   - Assess range of motion  - Assess patient's mobility; develop plan if impaired  - Assess patient's need for assistive devices and provide as appropriate  - Encourage maximum independence but intervene and supervise when necessary  - Involve family in performance of ADLs  - Assess for home care needs following discharge   - Consider OT consult to assist with ADL evaluation and planning for discharge  - Provide patient education as appropriate  Outcome: Progressing  Goal: Maintain or return mobility status to optimal level  Description  INTERVENTIONS:  - Assess patient's baseline mobility status (ambulation, transfers, stairs, etc )    - Identify cognitive and physical deficits and behaviors that affect mobility  - Identify mobility aids required to assist with transfers and/or ambulation (gait belt, sit-to-stand, lift, walker, cane, etc )  - [place] fall precautions as indicated by assessment  - Record patient progress and toleration of activity level on Mobility SBAR; progress patient to next Phase/Stage  - Instruct patient to call for assistance with activity based on assessment  - Consider rehabilitation consult to assist with strengthening/weightbearing, etc   Outcome: Progressing     Problem: DISCHARGE PLANNING  Goal: Discharge to home or other facility with appropriate resources  Description  INTERVENTIONS:  - Identify barriers to discharge w/patient and caregiver  - Arrange for needed discharge resources and transportation as appropriate  - Identify discharge learning needs (meds, wound care, etc )  - Arrange for interpretive services to assist at discharge as needed  - Refer to Case Management Department for coordinating discharge planning if the patient needs post-hospital services based on physician/advanced practitioner order or complex needs related to functional status, cognitive ability, or social support system  Outcome: Progressing     Problem: Knowledge Deficit  Goal: Patient/family/caregiver demonstrates understanding of disease process, treatment plan, medications, and discharge instructions  Description  Complete learning assessment and assess knowledge base    Interventions:  - Provide teaching at level of understanding  - Provide teaching via preferred learning methods  Outcome: Progressing     Problem: GASTROINTESTINAL - ADULT  Goal: Minimal or absence of nausea and/or vomiting  Description  INTERVENTIONS:  - Administer IV fluids if ordered to ensure adequate hydration  - Maintain NPO status until nausea and vomiting are resolved  - Nasogastric tube if ordered  - Administer ordered antiemetic medications as needed  - Provide nonpharmacologic comfort measures as appropriate  - Advance diet as tolerated, if ordered  - Consider nutrition services referral to assist patient with adequate nutrition and appropriate food choices  Outcome: Progressing  Goal: Maintains or returns to baseline bowel function  Description  INTERVENTIONS:  - Assess bowel function  - Encourage oral fluids to ensure adequate hydration  - Administer IV fluids if ordered to ensure adequate hydration  - Administer ordered medications as needed  - Encourage mobilization and activity  - Consider nutritional services referral to assist patient with adequate nutrition and appropriate food choices  Outcome: Progressing  Goal: Maintains adequate nutritional intake  Description  INTERVENTIONS:  - Monitor percentage of each meal consumed  - Identify factors contributing to decreased intake, treat as appropriate  - Assist with meals as needed  - Monitor I&O, weight, and lab values if indicated  - Obtain nutrition services referral as needed  Outcome: Progressing  Goal: Establish and maintain optimal ostomy function  Description  INTERVENTIONS:  - Assess bowel function  - Encourage oral fluids to ensure adequate hydration  - Administer IV fluids if ordered to ensure adequate hydration   - Administer ordered medications as needed  - Encourage mobilization and activity  - Nutrition services referral to assist patient with appropriate food choices  - Assess stoma site  - Consider wound care consult   Outcome: Progressing     Problem: Potential for Falls  Goal: Patient will remain free of falls  Description  INTERVENTIONS:  - Assess patient frequently for physical needs  -  Identify cognitive and physical deficits and behaviors that affect risk of falls    -  [place] fall precautions as indicated by assessment   - Educate patient/family on patient safety including physical limitations  - Instruct patient to call for assistance with activity based on assessment  - Modify environment to reduce risk of injury  - Consider OT/PT consult to assist with strengthening/mobility  Outcome: Progressing

## 2020-07-08 NOTE — PROGRESS NOTES
Progress Note - Samantha Wills 1975, 50 y o  female MRN: [id number]    Unit/Bed#: -01 Encounter: [id number]    Primary Care Provider: Mary Dahl MD   Date and time admitted to hospital: 7/1/2020  5:24 AM        * Large bowel obstruction [facility])  Assessment & Plan  · Colorectal surgery is primary service   · Partial obstruction from rectal carcinoma  · Conservative management per primary team  · Poor surgical candidate secondary to chemo   · Tolerating diet   · Pain regimen per palliative     Gram-positive bacteremia  Assessment & Plan  · 2/2 blood cultures from 7/3 preliminary positive for gram pos cocci   · Repeat blood cultures from 7/5 negative x 72 hours  · continue ceftriaxone through 7/16/2020 to complete 2 weeks total of treatment  · TTE negative for vegetation     Sepsis [facility])  Assessment & Plan  · Patient met sepsis criteria 7/3 @1409 e/b fever and tachycardia, hypotension   · Likely 2/2 gram positive bacteremia   · CT abdomen pelvis 7/1 shows mild mucosal thickening of sigmoid colon possibly due to mild colitis from chemotherapy  · She is not neutropenic - monitor CBC with diff, temperature curve trending down  · Chest x-ray negative for infiltrate, COVID-19 negative, UA benign  · PCT down-trending   · Repeat blood cultures obtained 7/5 negative x 72 hours   · ID following, appreciate recommendations   · Started IV ceftriaxone on 7/6 and continue ceftriaxone through 7/16/2020 to complete 2 weeks total of treatment  · Incentive spirometry  · IV fluid hydration    Abdominal pain  Assessment & Plan  · Acute on chronic and multifactorial   · Known hernias - continue abdominal binder   · Constipation - bowel regimen per primary   · Cancer related pain - pain regimen per palliative     Rectal cancer ([facility] [address] )  Assessment & Plan  · Stage IV rectal cancer with known mets   · Following with oncology outpt Dr Diane Doe, currently receiving FOLFIRINOX   · Now with obstructing rectal tumor and large bowel obstruction with incompetent ileocecal valce       Anemia  Assessment & Plan  · Baseline 8-9 since diagnosis of rectal cancer   · Today 8 2 - monitor closely and transfuse to keep hgb >7   · Reports rectal bleeding which given CA is expected     Breast cancer [facility])  Assessment & Plan  · Currently following with oncology outpt for evaluation and treatment   · Stage unknown         VTE Pharmacologic Prophylaxis:   Pharmacologic: Enoxaparin (Lovenox)  Mechanical VTE Prophylaxis in Place: Yes    Patient Centered Rounds: I have performed bedside rounds with nursing staff today  Discussions with Specialists or Other Care Team Provider: ID and Surgery    Education and Discussions with Family / Patient: patient    Time Spent for Care: 20 minutes  More than 50% of total time spent on counseling and coordination of care as described above  Current Length of Stay: 7 day(s)    Current Patient Status: Inpatient     Discharge Plan: pending clearance by ID and surgery primary team     Code Status: Level 1 - Full Code      Subjective:   Patient reports some nausea this am   No abdominal pain and is passing gas  Objective:     Vitals:   Temp (24hrs), Av 6 °F (37 °C), Min:98 °F (36 7 °C), Max:99 2 °F (37 3 °C)    Temp:  [98 °F (36 7 °C)-99 2 °F (37 3 °C)] 98 °F (36 7 °C)  Resp:  [18] 18  BP: (104-113)/(54-72) 109/71  SpO2:  [96 %] 96 %  There is no height or weight on file to calculate BMI  Input and Output Summary (last 24 hours): Intake/Output Summary (Last 24 hours) at 2020 1231  Last data filed at 2020 0447  Gross per 24 hour   Intake 480 ml   Output 1550 ml   Net -1070 ml       Physical Exam:     Physical Exam   Constitutional: She is oriented to person, place, and time  THin   HENT:   Head: Normocephalic and atraumatic  Neck: Normal range of motion  Neck supple  Cardiovascular: Normal rate and regular rhythm  No murmur heard    Pulmonary/Chest: Effort normal and breath sounds normal  Abdominal: Soft  Bowel sounds are normal    Abdominal binder in place   Musculoskeletal: Normal range of motion  She exhibits no edema  Neurological: She is alert and oriented to person, place, and time  Skin: Skin is warm and dry  Psychiatric: She has a normal mood and affect  Her behavior is normal  Judgment and thought content normal          Additional Data:     Labs:    Results from last 7 days   Lab Units 07/08/20  0509 07/07/20  0511   WBC Thousand/uL 14 25* 12 16*   HEMOGLOBIN g/dL 8 3* 8 2*   HEMATOCRIT % 28 2* 27 4*   PLATELETS Thousands/uL 360 264   BANDS PCT %  --  2   LYMPHO PCT %  --  8*   MONO PCT %  --  5   EOS PCT %  --  3     Results from last 7 days   Lab Units 07/08/20  0509 07/07/20  0511   SODIUM mmol/L 132* 132*   POTASSIUM mmol/L 4 3 4 1   CHLORIDE mmol/L 97* 99*   CO2 mmol/L 27 28   BUN mg/dL 4* 2*   CREATININE mg/dL 0 49* 0 45*   ANION GAP mmol/L 8 5   CALCIUM mg/dL 8 8 8 6   ALBUMIN g/dL  --  2 2*   TOTAL BILIRUBIN mg/dL  --  0 27   ALK PHOS U/L  --  238*   ALT U/L  --  36   AST U/L  --  56*   GLUCOSE RANDOM mg/dL 90 93                 Results from last 7 days   Lab Units 07/08/20  0509 07/07/20  0511 07/06/20  0629 07/05/20  0752 07/05/20  0515  07/01/20  1623   LACTIC ACID mmol/L  --   --   --   --   --   --  1 3   PROCALCITONIN ng/ml 0 40* 0 66* 1 04* 1 90* 2 82*   < >  --     < > = values in this interval not displayed  * I Have Reviewed All Lab Data Listed Above  * Additional Pertinent Lab Tests Reviewed: [address] Admission Reviewed    Imaging:    Imaging Reports Reviewed Today Include: none  Imaging Personally Reviewed by Myself Includes:  none    Recent Cultures (last 7 days):     Results from last 7 days   Lab Units 07/05/20  0520 07/05/20  0519 07/03/20  1510 07/03/20  1504   BLOOD CULTURE  No Growth at 72 hrs  No Growth at 72 hrs   Gemella morbillorum* Gemella morbillorum*   GRAM STAIN RESULT   --   --  Gram positive cocci in clusters* Gram positive cocci in clusters*       Last 24 Hours Medication List:     Current Facility-Administered Medications:  acetaminophen 650 mg Oral Q6H PRN Grecia Grimes MD    calcium carbonate 500 mg Oral BID PRN Symone Zhao MD    cefTRIAXone 2,000 mg Intravenous Q24H Karla Litten, MD Last Rate: 2,000 mg (07/07/20 1716)   diphenhydrAMINE 12 5 mg Oral Q6H PRN Margarito Storey MD    enoxaparin 40 mg Subcutaneous Daily Symone Zhao MD    HYDROmorphone 0 5 mg Intravenous Q3H PRN Margarito Storey MD    lidocaine 1 application Urethral Once Symone Zhao MD    loratadine 10 mg Oral Daily Symone Zhao MD    melatonin 3 mg Oral HS PRN Natalya Madsen PA-C    metoprolol succinate 25 mg Oral Daily Meghan Degroot PA-C    oxyCODONE 10 mg Oral Q3H PRN Margarito Storey MD    oxyCODONE 5 mg Oral 4x Daily (AC & HS) Margarito Storey MD    phenol 2 spray Mouth/Throat Q2H PRN Symone Zhao MD    polyethylene glycol 17 g Oral TID after meals Aden Smith MD    potassium-sodium phosphates 2 packet Oral BID With Meals Margarito Storey MD    promethazine 12 5 mg Intravenous Q6H PRN Symone Zhao MD    saliva substitute 5 spray Mouth/Throat 4x Daily PRN Symone Zhao MD         Today, Patient Was Seen By: ZACH Benito    ** Please Note: Dictation voice to text software may have been used in the creation of this document   **

## 2020-07-08 NOTE — SOCIAL WORK
Approved for [facility] visit tomorrow for port access & IV abx teaching  Approved for [facility] infusion, covered at 100% & they will deliver to pt room today  S/w sx, ID & SLIM  Pt for dc today after abx dose  Updated pt & agreeable to plan  Updated RN

## 2020-07-08 NOTE — PLAN OF CARE
Problem: PAIN - ADULT  Goal: Verbalizes/displays adequate comfort level or baseline comfort level  Description  Interventions:  - Encourage patient to monitor pain and request assistance  - Assess pain using appropriate pain scale  - Administer analgesics based on type and severity of pain and evaluate response  - Implement non-pharmacological measures as appropriate and evaluate response  - Consider cultural and social influences on pain and pain management  - Notify physician/advanced practitioner if interventions unsuccessful or patient reports new pain  Outcome: Progressing     Problem: SAFETY ADULT  Goal: Patient will remain free of falls  Description  INTERVENTIONS:  - Assess patient frequently for physical needs  -  Identify cognitive and physical deficits and behaviors that affect risk of falls    -  [place] fall precautions as indicated by assessment   - Educate patient/family on patient safety including physical limitations  - Instruct patient to call for assistance with activity based on assessment  - Modify environment to reduce risk of injury  - Consider OT/PT consult to assist with strengthening/mobility  Outcome: Progressing  Goal: Maintain or return to baseline ADL function  Description  INTERVENTIONS:  -  Assess patient's ability to carry out ADLs; assess patient's baseline for ADL function and identify physical deficits which impact ability to perform ADLs (bathing, care of mouth/teeth, toileting, grooming, dressing, etc )  - Assess/evaluate cause of self-care deficits   - Assess range of motion  - Assess patient's mobility; develop plan if impaired  - Assess patient's need for assistive devices and provide as appropriate  - Encourage maximum independence but intervene and supervise when necessary  - Involve family in performance of ADLs  - Assess for home care needs following discharge   - Consider OT consult to assist with ADL evaluation and planning for discharge  - Provide patient education as appropriate  Outcome: Progressing  Goal: Maintain or return mobility status to optimal level  Description  INTERVENTIONS:  - Assess patient's baseline mobility status (ambulation, transfers, stairs, etc )    - Identify cognitive and physical deficits and behaviors that affect mobility  - Identify mobility aids required to assist with transfers and/or ambulation (gait belt, sit-to-stand, lift, walker, cane, etc )  - [place] fall precautions as indicated by assessment  - Record patient progress and toleration of activity level on Mobility SBAR; progress patient to next Phase/Stage  - Instruct patient to call for assistance with activity based on assessment  - Consider rehabilitation consult to assist with strengthening/weightbearing, etc   Outcome: Progressing     Problem: DISCHARGE PLANNING  Goal: Discharge to home or other facility with appropriate resources  Description  INTERVENTIONS:  - Identify barriers to discharge w/patient and caregiver  - Arrange for needed discharge resources and transportation as appropriate  - Identify discharge learning needs (meds, wound care, etc )  - Arrange for interpretive services to assist at discharge as needed  - Refer to Case Management Department for coordinating discharge planning if the patient needs post-hospital services based on physician/advanced practitioner order or complex needs related to functional status, cognitive ability, or social support system  Outcome: Progressing     Problem: Knowledge Deficit  Goal: Patient/family/caregiver demonstrates understanding of disease process, treatment plan, medications, and discharge instructions  Description  Complete learning assessment and assess knowledge base    Interventions:  - Provide teaching at level of understanding  - Provide teaching via preferred learning methods  Outcome: Progressing     Problem: GASTROINTESTINAL - ADULT  Goal: Minimal or absence of nausea and/or vomiting  Description  INTERVENTIONS:  - Administer IV fluids if ordered to ensure adequate hydration  - Maintain NPO status until nausea and vomiting are resolved  - Nasogastric tube if ordered  - Administer ordered antiemetic medications as needed  - Provide nonpharmacologic comfort measures as appropriate  - Advance diet as tolerated, if ordered  - Consider nutrition services referral to assist patient with adequate nutrition and appropriate food choices  Outcome: Progressing  Goal: Maintains or returns to baseline bowel function  Description  INTERVENTIONS:  - Assess bowel function  - Encourage oral fluids to ensure adequate hydration  - Administer IV fluids if ordered to ensure adequate hydration  - Administer ordered medications as needed  - Encourage mobilization and activity  - Consider nutritional services referral to assist patient with adequate nutrition and appropriate food choices  Outcome: Progressing  Goal: Maintains adequate nutritional intake  Description  INTERVENTIONS:  - Monitor percentage of each meal consumed  - Identify factors contributing to decreased intake, treat as appropriate  - Assist with meals as needed  - Monitor I&O, weight, and lab values if indicated  - Obtain nutrition services referral as needed  Outcome: Progressing  Goal: Establish and maintain optimal ostomy function  Description  INTERVENTIONS:  - Assess bowel function  - Encourage oral fluids to ensure adequate hydration  - Administer IV fluids if ordered to ensure adequate hydration   - Administer ordered medications as needed  - Encourage mobilization and activity  - Nutrition services referral to assist patient with appropriate food choices  - Assess stoma site  - Consider wound care consult   Outcome: Progressing     Problem: Potential for Falls  Goal: Patient will remain free of falls  Description  INTERVENTIONS:  - Assess patient frequently for physical needs  -  Identify cognitive and physical deficits and behaviors that affect risk of falls    -  [place] fall precautions as indicated by assessment   - Educate patient/family on patient safety including physical limitations  - Instruct patient to call for assistance with activity based on assessment  - Modify environment to reduce risk of injury  - Consider OT/PT consult to assist with strengthening/mobility  Outcome: Progressing

## 2020-07-08 NOTE — DISCHARGE INSTRUCTIONS
Bowel Obstruction   WHAT YOU NEED TO KNOW:   A bowel obstruction occurs when your large or small intestine is completely or partly blocked  The blockage prevents food and waste from passing through normally  DISCHARGE INSTRUCTIONS:   Follow up with your healthcare provider as directed:  Write down your questions so you remember to ask them during your visits  Contact your healthcare provider if:   · You have nausea and are vomiting  · Your abdomen is enlarged  · You cannot pass a bowel movement or gas  · You lose weight without trying  · You have blood in your bowel movement  · You have questions or concerns about your condition or care  Seek care immediately or call 911 if:   · You have severe abdominal pain that does not get better  · Your heart is beating faster than normal for you  · You have a fever  © 2017 [address]  Information is for End User's use only and may not be sold, redistributed or otherwise used for commercial purposes  All illustrations and images included in CareNotes® are the copyrighted property of A D A M , Inc  or [employer]  The above information is an  only  It is not intended as medical advice for individual conditions or treatments  Talk to your doctor, nurse or pharmacist before following any medical regimen to see if it is safe and effective for you

## 2020-07-09 ENCOUNTER — TELEPHONE (OUTPATIENT)
Dept: INFECTIOUS DISEASES | Facility: CLINIC | Age: 45
End: 2020-07-09

## 2020-07-09 ENCOUNTER — TELEPHONE (OUTPATIENT)
Dept: OTHER | Facility: OTHER | Age: 45
End: 2020-07-09

## 2020-07-09 ENCOUNTER — TRANSITIONAL CARE MANAGEMENT (OUTPATIENT)
Dept: FAMILY MEDICINE CLINIC | Facility: CLINIC | Age: 45
End: 2020-07-09

## 2020-07-09 DIAGNOSIS — R78.81 GRAM-POSITIVE BACTEREMIA: Primary | ICD-10-CM

## 2020-07-09 LAB
BACTERIA BLD CULT: ABNORMAL
BACTERIA BLD CULT: ABNORMAL
GRAM STN SPEC: ABNORMAL
GRAM STN SPEC: ABNORMAL

## 2020-07-09 NOTE — UTILIZATION REVIEW
Notification of Discharge  This is a Notification of Discharge from our facility [address]  Please be advised that this patient has been discharge from our facility  Below you will find the admission and discharge date and time including the patients disposition  PRESENTATION DATE: 7/1/2020  5:24 AM  OBS ADMISSION DATE:   IP ADMISSION DATE: 7/1/20 0937   DISCHARGE DATE: 7/8/2020  6:58 PM  DISPOSITION: Home/Self Care Home/Self Care   Admission Orders listed below:  Admission Orders (From admission, onward)     Ordered        07/01/20 0939  Inpatient Admission  Once                   Please contact the UR Department if additional information is required to close this patient's authorization/case  [address] [employer] Utilization Review Department  Main: [phone] x carefully listen to the prompts  All voicemails are confidential   [email or web address]  Send all requests for admission clinical reviews, approved or denied determinations and any other requests to dedicated fax number below belonging to the campus where the patient is receiving treatment   List of dedicated fax numbers:  [address] DENIALS (Administrative/Medical Necessity) [phone]   [address] (Maternity/NICU/Pediatrics) [phone]   Anuradha Armenta [phone]   Heflin Lat [phone]   Sherman Madsen [phone]   [address] [facility] [address] [place] [address] [phone]   [facility]  [phone]   [address] [facility]  [address] [address] [address] [facility] [phone]

## 2020-07-09 NOTE — TELEPHONE ENCOUNTER
Pt contacted our office  We discussed weekly lab orders and blood cx x2 no earlier than 7/30  Pt requests that I mail her the lab slips  Discussed that Per Dr Linda Fitzgerald, no outpatient f/u needed unless issues and patient is ok with that  Pt advised she can call us at any time with any questions or concerns  Pt thanks me for my call

## 2020-07-09 NOTE — TELEPHONE ENCOUNTER
Jared Covarrubias from [facility] [phone]  "This pt was opened today but also discharged at the same time bc she is going to [place] for the next 8 days   So this means the blood work Dr Iesha Ricks requesting will not be completed "

## 2020-07-10 ENCOUNTER — TELEPHONE (OUTPATIENT)
Dept: INFECTIOUS DISEASES | Facility: CLINIC | Age: 45
End: 2020-07-10

## 2020-07-10 LAB
BACTERIA BLD CULT: NORMAL
BACTERIA BLD CULT: NORMAL

## 2020-07-10 NOTE — TELEPHONE ENCOUNTER
Received information that this patient will be leaving on vacation after a f/u with Dr Sakina Curtis today  [facility] will not be able to go out and draw her last set of labs  Pt has a port; does not have a PICC  She is aware that she has one more set of labs due before she completes and she states that Dr Sakina Curtis would be happy to draw them in her office today  I advised patient that she should have them done and that there will be no further orders until she gets her blood cultures drawn two weeks after completion of antibiotics - physical orders were put in the mail to her  Pt verbalizes understanding

## 2020-07-22 ENCOUNTER — TELEPHONE (OUTPATIENT)
Dept: PALLIATIVE MEDICINE | Facility: CLINIC | Age: 45
End: 2020-07-22

## 2020-07-22 LAB — SCAN RESULT: NORMAL

## 2020-07-22 NOTE — TELEPHONE ENCOUNTER
----- Message from Bella Smith sent at 7/22/2020 12:47 PM EDT -----  Called pt to schedule a Hospital follow up appt  She declined service at this time stating she has no symptoms she needs Palliative Care to manage  ----- Message -----  From: Jammie Gamez  Sent: 7/14/2020   3:03 PM EDT  To: Lan Smith Clerical    Patient from hospital f/u list  I reached out no answer LM

## 2020-07-30 ENCOUNTER — APPOINTMENT (EMERGENCY)
Dept: RADIOLOGY | Facility: HOSPITAL | Age: 45
DRG: 394 | End: 2020-07-30
Payer: COMMERCIAL

## 2020-07-30 ENCOUNTER — HOSPITAL ENCOUNTER (INPATIENT)
Facility: HOSPITAL | Age: 45
LOS: 9 days | Discharge: HOME | DRG: 394 | End: 2020-08-09
Attending: EMERGENCY MEDICINE | Admitting: INTERNAL MEDICINE
Payer: COMMERCIAL

## 2020-07-30 DIAGNOSIS — E87.1 ACUTE HYPONATREMIA: ICD-10-CM

## 2020-07-30 DIAGNOSIS — L02.31 GLUTEAL ABSCESS: ICD-10-CM

## 2020-07-30 DIAGNOSIS — N73.9 PELVIC ABSCESS IN FEMALE: Primary | ICD-10-CM

## 2020-07-30 DIAGNOSIS — C20 RECTAL CANCER (HCC): ICD-10-CM

## 2020-07-30 DIAGNOSIS — G89.3 CANCER RELATED PAIN: ICD-10-CM

## 2020-07-30 DIAGNOSIS — K61.1 RECTAL ABSCESS: ICD-10-CM

## 2020-07-30 DIAGNOSIS — C50.919 MALIGNANT NEOPLASM OF FEMALE BREAST, UNSPECIFIED ESTROGEN RECEPTOR STATUS, UNSPECIFIED LATERALITY, UNSPECIFIED SITE OF BREAST (HCC): ICD-10-CM

## 2020-07-30 DIAGNOSIS — R78.81 GRAM-POSITIVE BACTEREMIA: ICD-10-CM

## 2020-07-30 DIAGNOSIS — D84.9 ACQUIRED IMMUNOCOMPROMISED STATE (HCC): ICD-10-CM

## 2020-07-30 DIAGNOSIS — Z51.5 PALLIATIVE CARE PATIENT: ICD-10-CM

## 2020-07-30 LAB
ALBUMIN SERPL BCP-MCNC: 2.5 G/DL (ref 3.5–5)
ALP SERPL-CCNC: 254 U/L (ref 46–116)
ALT SERPL W P-5'-P-CCNC: 22 U/L (ref 12–78)
ANION GAP SERPL CALCULATED.3IONS-SCNC: 7 MMOL/L (ref 4–13)
AST SERPL W P-5'-P-CCNC: 44 U/L (ref 5–45)
BACTERIA UR QL AUTO: ABNORMAL /HPF
BASOPHILS # BLD AUTO: 0.03 THOUSANDS/ΜL (ref 0–0.1)
BASOPHILS NFR BLD AUTO: 0 % (ref 0–1)
BILIRUB SERPL-MCNC: 0.2 MG/DL (ref 0.2–1)
BILIRUB UR QL STRIP: NEGATIVE
BUN SERPL-MCNC: 13 MG/DL (ref 5–25)
CALCIUM SERPL-MCNC: 8.6 MG/DL (ref 8.3–10.1)
CHLORIDE SERPL-SCNC: 99 MMOL/L (ref 100–108)
CLARITY UR: CLEAR
CO2 SERPL-SCNC: 26 MMOL/L (ref 21–32)
COLOR UR: YELLOW
COLOR, POC: YELLOW
CREAT SERPL-MCNC: 0.84 MG/DL (ref 0.6–1.3)
EOSINOPHIL # BLD AUTO: 0.3 THOUSAND/ΜL (ref 0–0.61)
EOSINOPHIL NFR BLD AUTO: 2 % (ref 0–6)
ERYTHROCYTE [DISTWIDTH] IN BLOOD BY AUTOMATED COUNT: 23.9 % (ref 11.6–15.1)
EXT PREG TEST URINE: NORMAL
EXT. CONTROL ED NAV: NORMAL
GFR SERPL CREATININE-BSD FRML MDRD: 83 ML/MIN/1.73SQ M
GLUCOSE SERPL-MCNC: 110 MG/DL (ref 65–140)
GLUCOSE UR STRIP-MCNC: NEGATIVE MG/DL
HCT VFR BLD AUTO: 26.6 % (ref 34.8–46.1)
HGB BLD-MCNC: 8.2 G/DL (ref 11.5–15.4)
HGB UR QL STRIP.AUTO: ABNORMAL
HYALINE CASTS #/AREA URNS LPF: ABNORMAL /LPF
IMM GRANULOCYTES # BLD AUTO: 0.36 THOUSAND/UL (ref 0–0.2)
IMM GRANULOCYTES NFR BLD AUTO: 2 % (ref 0–2)
KETONES UR STRIP-MCNC: NEGATIVE MG/DL
LACTATE SERPL-SCNC: 1.2 MMOL/L (ref 0.5–2)
LEUKOCYTE ESTERASE UR QL STRIP: NEGATIVE
LIPASE SERPL-CCNC: 102 U/L (ref 73–393)
LYMPHOCYTES # BLD AUTO: 1.54 THOUSANDS/ΜL (ref 0.6–4.47)
LYMPHOCYTES NFR BLD AUTO: 10 % (ref 14–44)
MCH RBC QN AUTO: 24.6 PG (ref 26.8–34.3)
MCHC RBC AUTO-ENTMCNC: 30.8 G/DL (ref 31.4–37.4)
MCV RBC AUTO: 80 FL (ref 82–98)
MONOCYTES # BLD AUTO: 1.15 THOUSAND/ΜL (ref 0.17–1.22)
MONOCYTES NFR BLD AUTO: 7 % (ref 4–12)
NEUTROPHILS # BLD AUTO: 12.86 THOUSANDS/ΜL (ref 1.85–7.62)
NEUTS SEG NFR BLD AUTO: 79 % (ref 43–75)
NITRITE UR QL STRIP: NEGATIVE
NON-SQ EPI CELLS URNS QL MICRO: ABNORMAL /HPF
NRBC BLD AUTO-RTO: 0 /100 WBCS
PH UR STRIP.AUTO: 7 [PH] (ref 4.5–8)
PLATELET # BLD AUTO: 167 THOUSANDS/UL (ref 149–390)
PMV BLD AUTO: 10.5 FL (ref 8.9–12.7)
POTASSIUM SERPL-SCNC: 3.4 MMOL/L (ref 3.5–5.3)
PROT SERPL-MCNC: 6.5 G/DL (ref 6.4–8.2)
PROT UR STRIP-MCNC: NEGATIVE MG/DL
RBC # BLD AUTO: 3.34 MILLION/UL (ref 3.81–5.12)
RBC #/AREA URNS AUTO: ABNORMAL /HPF
SODIUM SERPL-SCNC: 132 MMOL/L (ref 136–145)
SP GR UR STRIP.AUTO: 1.01 (ref 1–1.03)
UROBILINOGEN UR QL STRIP.AUTO: 0.2 E.U./DL
WBC # BLD AUTO: 16.24 THOUSAND/UL (ref 4.31–10.16)
WBC #/AREA URNS AUTO: ABNORMAL /HPF

## 2020-07-30 PROCEDURE — 74177 CT ABD & PELVIS W/CONTRAST: CPT

## 2020-07-30 PROCEDURE — 96361 HYDRATE IV INFUSION ADD-ON: CPT

## 2020-07-30 PROCEDURE — 96365 THER/PROPH/DIAG IV INF INIT: CPT

## 2020-07-30 PROCEDURE — 99285 EMERGENCY DEPT VISIT HI MDM: CPT | Performed by: EMERGENCY MEDICINE

## 2020-07-30 PROCEDURE — 96367 TX/PROPH/DG ADDL SEQ IV INF: CPT

## 2020-07-30 PROCEDURE — 87040 BLOOD CULTURE FOR BACTERIA: CPT | Performed by: EMERGENCY MEDICINE

## 2020-07-30 PROCEDURE — 81025 URINE PREGNANCY TEST: CPT | Performed by: EMERGENCY MEDICINE

## 2020-07-30 PROCEDURE — 83690 ASSAY OF LIPASE: CPT | Performed by: EMERGENCY MEDICINE

## 2020-07-30 PROCEDURE — 83605 ASSAY OF LACTIC ACID: CPT | Performed by: EMERGENCY MEDICINE

## 2020-07-30 PROCEDURE — 85025 COMPLETE CBC W/AUTO DIFF WBC: CPT | Performed by: EMERGENCY MEDICINE

## 2020-07-30 PROCEDURE — 96375 TX/PRO/DX INJ NEW DRUG ADDON: CPT

## 2020-07-30 PROCEDURE — 81001 URINALYSIS AUTO W/SCOPE: CPT

## 2020-07-30 PROCEDURE — 36415 COLL VENOUS BLD VENIPUNCTURE: CPT

## 2020-07-30 PROCEDURE — 99285 EMERGENCY DEPT VISIT HI MDM: CPT

## 2020-07-30 PROCEDURE — 80053 COMPREHEN METABOLIC PANEL: CPT | Performed by: EMERGENCY MEDICINE

## 2020-07-30 RX ORDER — ONDANSETRON 2 MG/ML
4 INJECTION INTRAMUSCULAR; INTRAVENOUS ONCE
Status: COMPLETED | OUTPATIENT
Start: 2020-07-30 | End: 2020-07-30

## 2020-07-30 RX ORDER — HYDROMORPHONE HCL/PF 1 MG/ML
0.5 SYRINGE (ML) INJECTION ONCE
Status: COMPLETED | OUTPATIENT
Start: 2020-07-30 | End: 2020-07-30

## 2020-07-30 RX ORDER — VANCOMYCIN HYDROCHLORIDE 1 G/200ML
17.5 INJECTION, SOLUTION INTRAVENOUS ONCE
Status: COMPLETED | OUTPATIENT
Start: 2020-07-30 | End: 2020-07-30

## 2020-07-30 RX ADMIN — VANCOMYCIN HYDROCHLORIDE 1000 MG: 1 INJECTION, SOLUTION INTRAVENOUS at 22:47

## 2020-07-30 RX ADMIN — SODIUM CHLORIDE 1000 ML: 0.9 INJECTION, SOLUTION INTRAVENOUS at 21:38

## 2020-07-30 RX ADMIN — PIPERACILLIN SODIUM AND TAZOBACTAM SODIUM 3.38 G: 36; 4.5 INJECTION, POWDER, FOR SOLUTION INTRAVENOUS at 23:48

## 2020-07-30 RX ADMIN — IOHEXOL 100 ML: 350 INJECTION, SOLUTION INTRAVENOUS at 22:38

## 2020-07-30 RX ADMIN — ONDANSETRON 4 MG: 2 INJECTION INTRAMUSCULAR; INTRAVENOUS at 21:45

## 2020-07-30 RX ADMIN — HYDROMORPHONE HYDROCHLORIDE 0.5 MG: 1 INJECTION, SOLUTION INTRAMUSCULAR; INTRAVENOUS; SUBCUTANEOUS at 21:45

## 2020-07-31 ENCOUNTER — APPOINTMENT (INPATIENT)
Dept: RADIOLOGY | Facility: HOSPITAL | Age: 45
DRG: 394 | End: 2020-07-31
Payer: COMMERCIAL

## 2020-07-31 PROBLEM — K61.1 RECTAL ABSCESS: Status: ACTIVE | Noted: 2020-07-31

## 2020-07-31 LAB
BACTERIA UR QL AUTO: ABNORMAL /HPF
BILIRUB UR QL STRIP: NEGATIVE
CLARITY UR: CLEAR
COLOR UR: YELLOW
GLUCOSE UR STRIP-MCNC: NEGATIVE MG/DL
HGB UR QL STRIP.AUTO: ABNORMAL
HYALINE CASTS #/AREA URNS LPF: ABNORMAL /LPF
INR PPP: 1.07 (ref 0.84–1.19)
KETONES UR STRIP-MCNC: NEGATIVE MG/DL
LEUKOCYTE ESTERASE UR QL STRIP: NEGATIVE
NITRITE UR QL STRIP: NEGATIVE
NON-SQ EPI CELLS URNS QL MICRO: ABNORMAL /HPF
PH UR STRIP.AUTO: 6 [PH]
PROT UR STRIP-MCNC: NEGATIVE MG/DL
PROTHROMBIN TIME: 14 SECONDS (ref 11.6–14.5)
RBC #/AREA URNS AUTO: ABNORMAL /HPF
SP GR UR STRIP.AUTO: 1.03 (ref 1–1.03)
UROBILINOGEN UR QL STRIP.AUTO: 0.2 E.U./DL
WBC #/AREA URNS AUTO: ABNORMAL /HPF

## 2020-07-31 PROCEDURE — 76942 ECHO GUIDE FOR BIOPSY: CPT | Performed by: RADIOLOGY

## 2020-07-31 PROCEDURE — 87205 SMEAR GRAM STAIN: CPT | Performed by: SURGERY

## 2020-07-31 PROCEDURE — 87102 FUNGUS ISOLATION CULTURE: CPT | Performed by: SURGERY

## 2020-07-31 PROCEDURE — 81001 URINALYSIS AUTO W/SCOPE: CPT | Performed by: INTERNAL MEDICINE

## 2020-07-31 PROCEDURE — 10030 IMG GID FLU COLL DRG SFT TIS: CPT

## 2020-07-31 PROCEDURE — 87077 CULTURE AEROBIC IDENTIFY: CPT | Performed by: SURGERY

## 2020-07-31 PROCEDURE — 99152 MOD SED SAME PHYS/QHP 5/>YRS: CPT | Performed by: RADIOLOGY

## 2020-07-31 PROCEDURE — 0K9P3ZZ DRAINAGE OF LEFT HIP MUSCLE, PERCUTANEOUS APPROACH: ICD-10-PCS | Performed by: RADIOLOGY

## 2020-07-31 PROCEDURE — 76942 ECHO GUIDE FOR BIOPSY: CPT

## 2020-07-31 PROCEDURE — 87181 SC STD AGAR DILUTION PER AGT: CPT | Performed by: SURGERY

## 2020-07-31 PROCEDURE — 10160 PNXR ASPIR ABSC HMTMA BULLA: CPT | Performed by: RADIOLOGY

## 2020-07-31 PROCEDURE — 96376 TX/PRO/DX INJ SAME DRUG ADON: CPT

## 2020-07-31 PROCEDURE — 99255 IP/OBS CONSLTJ NEW/EST HI 80: CPT | Performed by: COLON & RECTAL SURGERY

## 2020-07-31 PROCEDURE — 99152 MOD SED SAME PHYS/QHP 5/>YRS: CPT

## 2020-07-31 PROCEDURE — 99254 IP/OBS CNSLTJ NEW/EST MOD 60: CPT | Performed by: INTERNAL MEDICINE

## 2020-07-31 PROCEDURE — 99232 SBSQ HOSP IP/OBS MODERATE 35: CPT | Performed by: INTERNAL MEDICINE

## 2020-07-31 PROCEDURE — NC001 PR NO CHARGE: Performed by: PHYSICIAN ASSISTANT

## 2020-07-31 PROCEDURE — 87075 CULTR BACTERIA EXCEPT BLOOD: CPT | Performed by: SURGERY

## 2020-07-31 PROCEDURE — 85610 PROTHROMBIN TIME: CPT | Performed by: PHYSICIAN ASSISTANT

## 2020-07-31 PROCEDURE — 87081 CULTURE SCREEN ONLY: CPT | Performed by: INTERNAL MEDICINE

## 2020-07-31 PROCEDURE — 99223 1ST HOSP IP/OBS HIGH 75: CPT | Performed by: INTERNAL MEDICINE

## 2020-07-31 PROCEDURE — 87076 CULTURE ANAEROBE IDENT EACH: CPT | Performed by: SURGERY

## 2020-07-31 PROCEDURE — 87185 SC STD ENZYME DETCJ PER NZM: CPT | Performed by: SURGERY

## 2020-07-31 PROCEDURE — 87070 CULTURE OTHR SPECIMN AEROBIC: CPT | Performed by: SURGERY

## 2020-07-31 RX ORDER — HYDROMORPHONE HCL/PF 1 MG/ML
1 SYRINGE (ML) INJECTION
Status: DISCONTINUED | OUTPATIENT
Start: 2020-07-31 | End: 2020-08-02

## 2020-07-31 RX ORDER — MELATONIN
2000 DAILY
Status: DISCONTINUED | OUTPATIENT
Start: 2020-07-31 | End: 2020-08-09 | Stop reason: HOSPADM

## 2020-07-31 RX ORDER — SODIUM CHLORIDE, SODIUM GLUCONATE, SODIUM ACETATE, POTASSIUM CHLORIDE, MAGNESIUM CHLORIDE, SODIUM PHOSPHATE, DIBASIC, AND POTASSIUM PHOSPHATE .53; .5; .37; .037; .03; .012; .00082 G/100ML; G/100ML; G/100ML; G/100ML; G/100ML; G/100ML; G/100ML
100 INJECTION, SOLUTION INTRAVENOUS CONTINUOUS
Status: DISCONTINUED | OUTPATIENT
Start: 2020-07-31 | End: 2020-08-06

## 2020-07-31 RX ORDER — ACETAMINOPHEN 325 MG/1
650 TABLET ORAL EVERY 6 HOURS PRN
Status: DISCONTINUED | OUTPATIENT
Start: 2020-07-31 | End: 2020-08-09 | Stop reason: HOSPADM

## 2020-07-31 RX ORDER — OXYCODONE HYDROCHLORIDE AND ACETAMINOPHEN 5; 325 MG/1; MG/1
1 TABLET ORAL EVERY 4 HOURS PRN
Status: DISCONTINUED | OUTPATIENT
Start: 2020-07-31 | End: 2020-08-03

## 2020-07-31 RX ORDER — HYDROMORPHONE HCL/PF 1 MG/ML
0.5 SYRINGE (ML) INJECTION ONCE
Status: COMPLETED | OUTPATIENT
Start: 2020-07-31 | End: 2020-07-31

## 2020-07-31 RX ORDER — SENNOSIDES 8.6 MG
1 TABLET ORAL 2 TIMES DAILY
Status: DISCONTINUED | OUTPATIENT
Start: 2020-07-31 | End: 2020-08-09 | Stop reason: HOSPADM

## 2020-07-31 RX ORDER — SERTRALINE HYDROCHLORIDE 25 MG/1
25 TABLET, FILM COATED ORAL DAILY
Status: DISCONTINUED | OUTPATIENT
Start: 2020-07-31 | End: 2020-08-09 | Stop reason: HOSPADM

## 2020-07-31 RX ORDER — LORATADINE 10 MG/1
10 TABLET ORAL DAILY
Status: DISCONTINUED | OUTPATIENT
Start: 2020-07-31 | End: 2020-08-09 | Stop reason: HOSPADM

## 2020-07-31 RX ORDER — MAGNESIUM HYDROXIDE/ALUMINUM HYDROXICE/SIMETHICONE 120; 1200; 1200 MG/30ML; MG/30ML; MG/30ML
30 SUSPENSION ORAL EVERY 6 HOURS PRN
Status: DISCONTINUED | OUTPATIENT
Start: 2020-07-31 | End: 2020-08-09 | Stop reason: HOSPADM

## 2020-07-31 RX ORDER — FENTANYL CITRATE 50 UG/ML
INJECTION, SOLUTION INTRAMUSCULAR; INTRAVENOUS CODE/TRAUMA/SEDATION MEDICATION
Status: COMPLETED | OUTPATIENT
Start: 2020-07-31 | End: 2020-07-31

## 2020-07-31 RX ORDER — MIDAZOLAM HYDROCHLORIDE 2 MG/2ML
INJECTION, SOLUTION INTRAMUSCULAR; INTRAVENOUS CODE/TRAUMA/SEDATION MEDICATION
Status: COMPLETED | OUTPATIENT
Start: 2020-07-31 | End: 2020-07-31

## 2020-07-31 RX ORDER — SODIUM CHLORIDE, SODIUM LACTATE, POTASSIUM CHLORIDE, CALCIUM CHLORIDE 600; 310; 30; 20 MG/100ML; MG/100ML; MG/100ML; MG/100ML
100 INJECTION, SOLUTION INTRAVENOUS CONTINUOUS
Status: DISCONTINUED | OUTPATIENT
Start: 2020-07-31 | End: 2020-07-31

## 2020-07-31 RX ORDER — ONDANSETRON 2 MG/ML
4 INJECTION INTRAMUSCULAR; INTRAVENOUS EVERY 6 HOURS PRN
Status: DISCONTINUED | OUTPATIENT
Start: 2020-07-31 | End: 2020-08-09 | Stop reason: HOSPADM

## 2020-07-31 RX ORDER — NICOTINE 21 MG/24HR
1 PATCH, TRANSDERMAL 24 HOURS TRANSDERMAL DAILY
Status: DISCONTINUED | OUTPATIENT
Start: 2020-07-31 | End: 2020-07-31

## 2020-07-31 RX ORDER — LIDOCAINE WITH 8.4% SOD BICARB 0.9%(10ML)
SYRINGE (ML) INJECTION CODE/TRAUMA/SEDATION MEDICATION
Status: COMPLETED | OUTPATIENT
Start: 2020-07-31 | End: 2020-07-31

## 2020-07-31 RX ORDER — METOPROLOL SUCCINATE 25 MG/1
25 TABLET, EXTENDED RELEASE ORAL DAILY
Status: DISCONTINUED | OUTPATIENT
Start: 2020-07-31 | End: 2020-08-09 | Stop reason: HOSPADM

## 2020-07-31 RX ORDER — OXYCODONE HYDROCHLORIDE 10 MG/1
10 TABLET ORAL EVERY 4 HOURS PRN
Status: DISCONTINUED | OUTPATIENT
Start: 2020-07-31 | End: 2020-08-03

## 2020-07-31 RX ADMIN — SODIUM CHLORIDE, SODIUM LACTATE, POTASSIUM CHLORIDE, AND CALCIUM CHLORIDE 100 ML/HR: .6; .31; .03; .02 INJECTION, SOLUTION INTRAVENOUS at 04:58

## 2020-07-31 RX ADMIN — PIPERACILLIN SODIUM AND TAZOBACTAM SODIUM 3.38 G: 36; 4.5 INJECTION, POWDER, FOR SOLUTION INTRAVENOUS at 07:16

## 2020-07-31 RX ADMIN — MELATONIN 2000 UNITS: at 09:38

## 2020-07-31 RX ADMIN — HYDROMORPHONE HYDROCHLORIDE 0.5 MG: 1 INJECTION, SOLUTION INTRAMUSCULAR; INTRAVENOUS; SUBCUTANEOUS at 01:43

## 2020-07-31 RX ADMIN — HYDROMORPHONE HYDROCHLORIDE 1 MG: 1 INJECTION, SOLUTION INTRAMUSCULAR; INTRAVENOUS; SUBCUTANEOUS at 17:35

## 2020-07-31 RX ADMIN — HYDROMORPHONE HYDROCHLORIDE 1 MG: 1 INJECTION, SOLUTION INTRAMUSCULAR; INTRAVENOUS; SUBCUTANEOUS at 07:27

## 2020-07-31 RX ADMIN — PIPERACILLIN SODIUM AND TAZOBACTAM SODIUM 3.38 G: 36; 4.5 INJECTION, POWDER, FOR SOLUTION INTRAVENOUS at 23:16

## 2020-07-31 RX ADMIN — HYDROMORPHONE HYDROCHLORIDE 1 MG: 1 INJECTION, SOLUTION INTRAMUSCULAR; INTRAVENOUS; SUBCUTANEOUS at 03:46

## 2020-07-31 RX ADMIN — PIPERACILLIN SODIUM AND TAZOBACTAM SODIUM 3.38 G: 36; 4.5 INJECTION, POWDER, FOR SOLUTION INTRAVENOUS at 11:53

## 2020-07-31 RX ADMIN — Medication 5 ML: at 15:25

## 2020-07-31 RX ADMIN — LORATADINE 10 MG: 10 TABLET ORAL at 09:38

## 2020-07-31 RX ADMIN — SENNOSIDES 8.6 MG: 8.6 TABLET ORAL at 12:41

## 2020-07-31 RX ADMIN — OXYCODONE HYDROCHLORIDE AND ACETAMINOPHEN 1 TABLET: 5; 325 TABLET ORAL at 20:15

## 2020-07-31 RX ADMIN — HYDROMORPHONE HYDROCHLORIDE 1 MG: 1 INJECTION, SOLUTION INTRAMUSCULAR; INTRAVENOUS; SUBCUTANEOUS at 04:53

## 2020-07-31 RX ADMIN — SENNOSIDES 8.6 MG: 8.6 TABLET ORAL at 17:35

## 2020-07-31 RX ADMIN — PIPERACILLIN SODIUM AND TAZOBACTAM SODIUM 3.38 G: 36; 4.5 INJECTION, POWDER, FOR SOLUTION INTRAVENOUS at 17:35

## 2020-07-31 RX ADMIN — OXYCODONE HYDROCHLORIDE 10 MG: 10 TABLET ORAL at 09:38

## 2020-07-31 RX ADMIN — SODIUM CHLORIDE, SODIUM GLUCONATE, SODIUM ACETATE, POTASSIUM CHLORIDE, MAGNESIUM CHLORIDE, SODIUM PHOSPHATE, DIBASIC, AND POTASSIUM PHOSPHATE 100 ML/HR: .53; .5; .37; .037; .03; .012; .00082 INJECTION, SOLUTION INTRAVENOUS at 09:32

## 2020-07-31 RX ADMIN — ENOXAPARIN SODIUM 40 MG: 40 INJECTION SUBCUTANEOUS at 09:38

## 2020-07-31 RX ADMIN — FENTANYL CITRATE 50 MCG: 50 INJECTION, SOLUTION INTRAMUSCULAR; INTRAVENOUS at 15:23

## 2020-07-31 RX ADMIN — MIDAZOLAM 1 MG: 1 INJECTION INTRAMUSCULAR; INTRAVENOUS at 15:23

## 2020-07-31 NOTE — ASSESSMENT & PLAN NOTE
Recently was admitted for fever with blood cultures growing Gemella  Patient on Zosyn  ID consult  Patient given 1 time dose of Vanco in ED  Patient with known history of rectal cancer on chemotherapy  colorectal surgery follow-up  Oncology follow-up care of Dr Linda Gomez who she regularly sees  IR tentatively plans for drainage later this afternoon

## 2020-07-31 NOTE — ASSESSMENT & PLAN NOTE
Recently was admitted for fever with blood cultures growing Gemella  Will place patient on Zosyn once again  Patient was also given a dose of vancomycin  MRSA culture  Infectious Disease consult  Knowing that patient has a history of rectal cancer on active chemotherapy; oncology has stated that she is not a candidate for any surgical procedure  Nevertheless, will place patient on colorectal surgery follow-up  Oncology follow-up care of Dr Fallon Carpenter who she regularly sees  Recommendation for possibility of IR drainage of abscesses therefore interventional radiology consult  Recheck metabolic profile, CBC

## 2020-07-31 NOTE — PROGRESS NOTES
Progress Note - Radha Velasco 1975, 50 y o  female MRN: [id number]    Unit/Bed#: [facility] 931-01 Encounter: [id number]    Primary Care Provider: Devika Cazares MD   Date and time admitted to hospital: 2020  8:59 PM        * Rectal abscess  Assessment & Plan  Recently was admitted for fever with blood cultures growing Gemella  Patient on Zosyn  ID consult  Patient given 1 time dose of Vanco in ED  Patient with known history of rectal cancer on chemotherapy  colorectal surgery follow-up  Oncology follow-up care of Dr Jessica Quiñones who she regularly sees  IR tentatively plans for drainage later this afternoon  Breast cancer [facility])  Assessment & Plan  Continued oncology follow-up  Rectal cancer [facility])  Assessment & Plan  Continued oncology follow-up  VTE Pharmacologic Prophylaxis:   Pharmacologic: Enoxaparin (Lovenox)  Mechanical VTE Prophylaxis in Place: No    Patient Centered Rounds: I have performed bedside rounds with nursing staff today  Time Spent for Care: 15 minutes  More than 50% of total time spent on counseling and coordination of care as described above  Current Length of Stay: 0 day(s)    Current Patient Status: Inpatient   Certification Statement: The patient will continue to require additional inpatient hospital stay due to         Code Status: Level 1 - Full Code      Subjective:   No acute distress    Objective:     Vitals:   Temp (24hrs), Av 8 °F (37 7 °C), Min:99 7 °F (37 6 °C), Max:99 9 °F (37 7 °C)    Temp:  [99 7 °F (37 6 °C)-99 9 °F (37 7 °C)] 99 7 °F (37 6 °C)  HR:  [] 94  Resp:  [16-20] 16  BP: ()/(52-71) 105/60  SpO2:  [95 %-99 %] 98 %  Body mass index is 22 5 kg/m²  Input and Output Summary (last 24 hours):        Intake/Output Summary (Last 24 hours) at 2020 0920  Last data filed at 2020 0029  Gross per 24 hour   Intake 1300 ml   Output    Net 1300 ml       Physical Exam:     Physical Exam   Constitutional: She is oriented to person, place, and time  HENT:   Head: Normocephalic and atraumatic  Eyes: Pupils are equal, round, and reactive to light  EOM are normal    Neck: Normal range of motion  Neck supple  Cardiovascular: Normal rate and regular rhythm  No murmur heard  Pulmonary/Chest: Effort normal and breath sounds normal  No stridor  No respiratory distress  Abdominal: Soft  Bowel sounds are normal  She exhibits no distension  Musculoskeletal: Normal range of motion  She exhibits no edema  Neurological: She is alert and oriented to person, place, and time  Skin: Skin is warm and dry  Psychiatric: She has a normal mood and affect  Additional Data:     Labs:    Results from last 7 days   Lab Units 07/30/20  2135   WBC Thousand/uL 16 24*   HEMOGLOBIN g/dL 8 2*   HEMATOCRIT % 26 6*   PLATELETS Thousands/uL 167   NEUTROS PCT % 79*   LYMPHS PCT % 10*   MONOS PCT % 7   EOS PCT % 2     Results from last 7 days   Lab Units 07/30/20  2135   POTASSIUM mmol/L 3 4*   CHLORIDE mmol/L 99*   CO2 mmol/L 26   BUN mg/dL 13   CREATININE mg/dL 0 84   CALCIUM mg/dL 8 6   ALK PHOS U/L 254*   ALT U/L 22   AST U/L 44     Results from last 7 days   Lab Units 07/31/20  0656   INR  1 07       * I Have Reviewed All Lab Data Listed Above  * Additional Pertinent Lab Tests Reviewed: All Labs Within Last 24 Hours Reviewed      Recent Cultures (last 7 days):     Results from last 7 days   Lab Units 07/30/20  2156 07/30/20  2135   BLOOD CULTURE  Received in Microbiology Lab  Culture in Progress  Received in Microbiology Lab  Culture in Progress         Last 24 Hours Medication List:     Current Facility-Administered Medications:  aluminum-magnesium hydroxide-simethicone 30 mL Oral Q6H PRN Tayo Bender MD    cholecalciferol 2,000 Units Oral Daily Tayo Bender MD    enoxaparin 40 mg Subcutaneous Daily Tayo Bender MD    HYDROmorphone 1 mg Intravenous Q1H PRN Tayo Bender MD    loratadine 10 mg Oral Daily Tayo Bender MD metoprolol succinate 25 mg Oral Daily Gil Zhang MD    multi-electrolyte 100 mL/hr Intravenous Continuous Gil Zhang MD    nicotine 1 patch Transdermal Daily Gil Zhang MD    ondansetron 4 mg Intravenous Q6H PRN Gil Zhang MD    oxyCODONE 10 mg Oral Q4H PRN Gil Zhang MD    oxyCODONE-acetaminophen 1 tablet Oral Q4H PRN Gil Zhang MD    piperacillin-tazobactam 3 375 g Intravenous Q6H Marce Arias MD Last Rate: 3 375 g (07/31/20 0716)   sertraline 25 mg Oral Daily Gil Zhang MD         Today, Patient Was Seen By: Kiara Blood DO    ** Please Note: Dictation voice to text software may have been used in the creation of this document   **

## 2020-07-31 NOTE — CONSULTS
Consultation - Colorectal Surgery  Puja Fitzpatrick 50 y o  female MRN: [id number]  Unit/Bed#: ED 24 Encounter: [id number]        Assessment/Plan     Assessment:  45F w/ known Stage IV Rectal Cancer on Chemotherapy w/ Avastin now with leukocytosis, fevers and new "collections" seen on CT in pre-sacral area and Left Gluteal muscle    Plan:  Appreciate care per medicine  Continue Broad Spectrum Antibiotics   - Zosyn   - F/u Blood cultures  Fluid Resuscitate  NPO at this time  Consult to IR   - drain collection?   - Patient recently underwent treatment with Avastin  Her CT scan does show a large collection in the Presacral region that abuts the rectum  This looks to be concerning for either a contained perforation of the rectum vs spread of her tumor burden  Avastin is a contra-indication to surgery  At this time no surgical intervention is planned  Recommend Broad Spectrum Abx  Pain control PRN     History of Present Illness     HPI:  Yumiko Cabrera is a 50 y o  female who presents with 1 day history of fevers and chills  She had her most recent chemotherapy treatment within the last week and had a fever yesterday 7/29 which she waited out at home  She had tenesmus and abdominal pain with more rectal pain on 7/30 which prompted her to come to ED at [facility]  She denies any nausea or emesis however does have lower abdominal pain  She has had loose stools  She does have pain on her buttock  Her bowel movements are non bloody  She does take prophylactic ciprofloxacin for fevers given to her by her oncologist which she took 1 dose yesterday  Review of Systems   Constitutional: Positive for activity change, fatigue and fever  Negative for chills  HENT: Negative  Eyes: Negative  Respiratory: Negative  Cardiovascular: Negative  Gastrointestinal: Positive for abdominal pain, diarrhea and rectal pain  Endocrine: Negative  Genitourinary: Negative  Musculoskeletal: Negative  Skin: Negative  Allergic/Immunologic: Negative  Neurological: Negative  Hematological: Negative  Psychiatric/Behavioral: Negative  Historical Information   Past Medical History:   Diagnosis Date    History of rectal cancer 06/2020     Past Surgical History:   Procedure Laterality Date    BACK SURGERY      BREAST BIOPSY Right 06/19/2020    2 sites; 1 breast 1 axilla    HERNIA REPAIR      3 repairs    IR IMAGE GUIDED BIOPSY/ASPIRATION LIVER  6/17/2020    IR PORT PLACEMENT  6/22/2020    TUBAL LIGATION      US GUIDED BREAST BIOPSY RIGHT COMPLETE Right 6/19/2020    US GUIDED BREAST LYMPH NODE BIOPSY RIGHT Right 6/19/2020     Social History   Social History     Substance and Sexual Activity   Alcohol Use Not Currently     Social History     Substance and Sexual Activity   Drug Use No     Social History     Tobacco Use   Smoking Status Current Every Day Smoker    Packs/day: 0 50    Types: Cigarettes   Smokeless Tobacco Never Used     Family History:   Family History   Problem Relation Age of Onset    No Known Problems Mother     No Known Problems Sister     No Known Problems Daughter     Breast cancer Maternal Grandmother         age unknown    No Known Problems Sister     No Known Problems Daughter     Breast cancer Maternal Aunt 61    No Known Problems Maternal Aunt        Meds/Allergies   PTA meds:   Prior to Admission Medications   Prescriptions Last Dose Informant Patient Reported? Taking?    Cholecalciferol 50 MCG (2000 UT) CAPS   Yes No   Sig: Take by mouth   Ergocalciferol (VITAMIN D2 PO)   Yes No   Sig: Take by mouth   cetirizine (ZyrTEC) 10 mg tablet  Other (Specify) Yes No   Sig: Take 10 mg by mouth daily   metoprolol succinate (TOPROL-XL) 25 mg 24 hr tablet  Other (Specify) Yes No   Sig: Take 25 mg by mouth daily   oxyCODONE-acetaminophen (PERCOCET) 5-325 mg per tablet   Yes No   Sig: Take 1 tablet by mouth every 4 (four) hours as needed for moderate pain   sertraline (ZOLOFT) 25 mg tablet Yes No   Sig: Take 25 mg by mouth daily      Facility-Administered Medications: None     No Known Allergies    Objective   First Vitals:   Blood Pressure: 108/71 (07/30/20 2039)  Pulse: (!) 115 (07/30/20 2039)  Temperature: 99 9 °F (37 7 °C) (07/30/20 2039)  Temp Source: Oral (07/30/20 2039)  Respirations: 20 (07/30/20 2039)  Height: 5' 2" (157 5 cm) (07/30/20 2039)  Weight - Scale: 59 kg (130 lb) (07/30/20 2039)  SpO2: 98 % (07/30/20 2039)    Current Vitals:   Blood Pressure: 98/57 (07/31/20 0224)  Pulse: 74 (07/31/20 0224)  Temperature: 99 9 °F (37 7 °C) (07/30/20 2039)  Temp Source: Oral (07/30/20 2039)  Respirations: 18 (07/31/20 0224)  Height: 5' 2" (157 5 cm) (07/30/20 2039)  Weight - Scale: 59 kg (130 lb) (07/30/20 2039)  SpO2: 97 % (07/31/20 0224)      Intake/Output Summary (Last 24 hours) at 7/31/2020 0336  Last data filed at 7/31/2020 0029  Gross per 24 hour   Intake 1300 ml   Output    Net 1300 ml       Invasive Devices     Central Venous Catheter Line            Port A Cath 06/22/20 Right Chest 38 days          Peripheral Intravenous Line            Peripheral IV 07/30/20 Left Antecubital less than 1 day                Physical Exam   Constitutional: She is oriented to person, place, and time  She appears well-developed  HENT:   Head: Normocephalic and atraumatic  Eyes: EOM are normal    Neck: Neck supple  Tracheal deviation present  Cardiovascular: Regular rhythm  tachycardia   Pulmonary/Chest: Effort normal  No respiratory distress  Abdominal: Soft  She exhibits no distension  There is tenderness (mild tenderness near lower abdomen and suprapubic region)  There is no guarding  A hernia (reducible hernia just left lateral and sub umbilical) is present  Genitourinary:   Genitourinary Comments: Rectal exam with normal tone  No bloody stool in vault  No palpable fluctuance in rectum  Tumor felt in rectum, non obstructive   Musculoskeletal: She exhibits no tenderness     Neurological: She is alert and oriented to person, place, and time  Skin: Skin is warm  Psychiatric: She has a normal mood and affect  Her behavior is normal        Lab Results:   I have personally reviewed pertinent lab results  , CBC:   Lab Results   Component Value Date    WBC 16 24 (H) 07/30/2020    HGB 8 2 (L) 07/30/2020    HCT 26 6 (L) 07/30/2020    MCV 80 (L) 07/30/2020     07/30/2020    MCH 24 6 (L) 07/30/2020    MCHC 30 8 (L) 07/30/2020    RDW 23 9 (H) 07/30/2020    MPV 10 5 07/30/2020    NRBC 0 07/30/2020   , CMP:   Lab Results   Component Value Date    SODIUM 132 (L) 07/30/2020    K 3 4 (L) 07/30/2020    CL 99 (L) 07/30/2020    CO2 26 07/30/2020    BUN 13 07/30/2020    CREATININE 0 84 07/30/2020    CALCIUM 8 6 07/30/2020    AST 44 07/30/2020    ALT 22 07/30/2020    ALKPHOS 254 (H) 07/30/2020    EGFR 83 07/30/2020     Imaging: I have personally reviewed pertinent reports  and I have personally reviewed pertinent films in PACS  EKG, Pathology, and Other Studies: I have personally reviewed pertinent reports     and I have personally reviewed pertinent films in PACS    Code Status: Prior  Advance Directive and Living Will:      Power of :    POLST:

## 2020-07-31 NOTE — BRIEF OP NOTE (RAD/CATH)
INTERVENTIONAL RADIOLOGY PROCEDURE NOTE    Date: 7/31/2020    Procedure: IR IMAGE GUIDED ASPIRATION / DRAINAGE     Preoperative diagnosis:   1  Pelvic abscess in female    2  Acquired immunocompromised state ([facility] )    3  Gluteal abscess    4  Acute hyponatremia    5  Rectal abscess    6  Gram-positive bacteremia    7  Rectal cancer ([facility] )    8  Malignant neoplasm of female breast, unspecified estrogen receptor status, unspecified laterality, unspecified site of breast ([place] [address] )         Postoperative diagnosis: Same  Surgeon: Alivia Hernandez MD     Assistant: None  No qualified resident was available  Blood loss:  Trace    Specimens:  Sent for culture    Findings:  10 mL thin purulent appearing fluid aspirated under ultrasound guidance from left gluteal collection  Complications: None immediate      Anesthesia: concious sedation

## 2020-07-31 NOTE — SOCIAL WORK
LOS: 1  Not a bundle  Readmission risk: Green  Pt re-admission for rectal abscess  Met with pt and discussed role of CM  Pt lives with her  and children in a 2-story home; 1st floor set-up; with 3 steps at entrance  Pt is independent in ADLs  No DME  Pt has hx [facility]  Preference for pharmacy is [facility] [address]  No MH/D&A tx hx  PCP- Juan Miguel Cruz MD ([phone])  No POA  Main contact: - Jeni Schofield ([phone])  Anticipate family transport  CM reviewed d/c planning process including the following: identifying help at home, patient preference for d/c planning needs, Discharge Lounge, [facility] Meds to Bed program, availability of treatment team to discuss questions or concerns patient and/or family may have regarding understanding medications and recognizing signs and symptoms once discharged  CM also encouraged patient to follow up with all recommended appointments after discharge  Patient advised of importance for patient and family to participate in managing patients medical well being  Patient/caregiver received discharge checklist  Content reviewed  Patient/caregiver encouraged to participate in discharge plan of care prior to discharge home

## 2020-07-31 NOTE — ED ATTENDING ATTESTATION
7/30/2020  Sis Wilson DO, saw and evaluated the patient  I have discussed the patient with the resident/non-physician practitioner and agree with the resident's/non-physician practitioner's findings, Plan of Care, and MDM as documented in the resident's/non-physician practitioner's note, except where noted  All available labs and Radiology studies were reviewed  I was present for key portions of any procedure(s) performed by the resident/non-physician practitioner and I was immediately available to provide assistance  At this point I agree with the current assessment done in the Emergency Department  I have conducted an independent evaluation of this patient a history and physical is as follows:    Patient is a 50year old female with a past mhx significant for metastatic rectal CA currently undergoing chemotherapy, h/o recent admission for large bowel obstruction and bacteremia who presents with fevers  Patient reports that yesterday she had a temp of 101 6 and today her chronic abdominal pain worsened  SHe describes her pain as lower abdomen, burning and intermittently sharp, progressively worsening associated with multiple episodes of non-bloody, non-mucoid diarrhea and 2 episodes of non-bloody, non-bilious emesis  Denies cp, sob, lightheadedness or dizziness  Physical Exam   Constitutional: She is oriented to person, place, and time  She appears well-developed  She is cooperative  Thin, frail appearing   HENT:   Head: Normocephalic and atraumatic  Right Ear: External ear normal    Left Ear: External ear normal    Nose: Nose normal    Mouth/Throat: Oropharynx is clear and moist    Eyes: Pupils are equal, round, and reactive to light  Conjunctivae and EOM are normal    Neck: Normal range of motion  Neck supple  Cardiovascular: Normal rate, regular rhythm, normal heart sounds and intact distal pulses  Exam reveals no gallop and no friction rub  No murmur heard    Pulmonary/Chest: Effort normal and breath sounds normal  No respiratory distress  She has no wheezes  She has no rales  She exhibits no tenderness  Abdominal: Soft  Bowel sounds are normal  She exhibits no distension  There is tenderness in the right lower quadrant, suprapubic area and left lower quadrant  There is no rigidity, no rebound, no guarding, no CVA tenderness, no tenderness at McBurney's point and negative Marie's sign  Musculoskeletal: Normal range of motion  She exhibits no edema  Tender over the left buttock without any palpable mass or skin changes  Neurological: She is alert and oriented to person, place, and time  Skin: Skin is warm and dry  No rash noted  No erythema  No pallor  Psychiatric: She has a normal mood and affect  Her behavior is normal    Nursing note and vitals reviewed  Assessment and Plan:   Fever with worsening abdominal pain/nausea/diarrhea  Infectious process versus progression of cancer  Labs to evaluate for lyte abnormalities, kidney and liver function, leukocytosis; CT AP to assess for worsening lets, obstruction  Symptom control, fluids  Admit  ED Course  ED Course as of Jul 31 1514 Fri Jul 31, 2020   0021 1  Multiseptated organizing presacral fluid collection measuring 2 3 x 2 3 x 6 7 cm  Adjacent left gluteal fluid collection measuring 1 9 x 2 8 x 3 5 cm  Findings concerning for abscesses  Surgical consultation recommended  2   Stable wall thickening in the rectum, not well evaluated in the absence of intraluminal contrast distention  3   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

## 2020-07-31 NOTE — UTILIZATION REVIEW
Initial Clinical Review    Admission: Date/Time/Statement: Admission Orders (From admission, onward)     Ordered        07/31/20 0237  Inpatient Admission (expected length of stay for this patient Order details is greater than two midnights)  Once                   Orders Placed This Encounter   Procedures    Inpatient Admission (expected length of stay for this patient Order details is greater than two midnights)     Standing Status:   Standing     Number of Occurrences:   1     Order Specific Question:   Admitting Physician     Answer:   Lina Ríos [[id number]]     Order Specific Question:   Level of Care     Answer:   Level 2 Stepdown / HOT [14]     Order Specific Question:   Estimated length of stay     Answer:   Not Applicable     ED Arrival Information     Expected Arrival Acuity Means of Arrival Escorted By Service Admission Type    - 7/30/2020 20:16 Emergent Walk-In Self Hospitalist Emergency    Arrival Complaint    fever,abd pain        Chief Complaint   Patient presents with    Abdominal Pain     Patient reports lower abdominal pain; states that she was here recently for a blood infection; states that she has been running a fever and feeling very weak; hx of rectal cancer      Assessment/Plan:   Ms Jose Alfredo Taylor is a 51 yo female who presents to the ED from home with c/o increasing rectal pain with radiation to hip, fever  She recently completed treatment for bacteremia with Zosyn  CT showed multi-septate organizing pre-sacral and L gluteal fluid collections and enlargement of pulmonary and hepatic mets  She is not a candidate for surgery because she is receiving chemotherapy  Surgery is recommending IR consult for radiologic assisted drainage of abscesses  PMH: breast cancer which was incidentally found upon evaluation for current rectal cancer with metastatic disease to bone, liver, and lung, bacteremia from Gemella    She is admitted to INPATIENT status with Rectal abscess  IV Zosyn, Vanco dose, MRSA culture, ID consult, IR consult for possible drainage  7/31 IR consult - will attempt drainage, collection may be too small for drain and may aspirate    ED Triage Vitals [07/30/20 2039]   Temperature Pulse Respirations Blood Pressure SpO2   99 9 °F (37 7 °C) (!) 115 20 108/71 98 %      Temp Source Heart Rate Source Patient Position - Orthostatic VS BP Location FiO2 (%)   Oral Monitor Sitting Left arm --      Pain Score       7        Wt Readings from Last 1 Encounters:   07/31/20 56 4 kg (124 lb 5 4 oz)     Additional Vital Signs:   07/31/20 12:15:03  100 4 °F (38 °C)  96  16  100/60  73  98 %       07/31/20 0730              None (Room air)     07/31/20 06:52:42    94  16  105/60  75  98 %       07/31/20 04:14:22  99 7 °F (37 6 °C)  77  18  103/52  69  98 %       07/31/20 0224    74  18  98/57    97 %  None (Room air)  Lying   07/31/20 0129    74  18  109/66    99 %  None (Room air)  Lying   07/30/20 2349    88  18  103/62    95 %  None (Room air)  Lying   07/30/20 2249    80  18  106/59    96 %  None (Room air)  Lying   07/30/20 2157    85  18  124/67    97 %  None (Room air)  Lying     Pertinent Labs/Diagnostic Test Results:     7/30 CT abd, pelvis - 1  Multiseptated organizing presacral fluid collection measuring 2 3 x 2 3 x 6 7 cm  Adjacent left gluteal fluid collection measuring 1 9 x 2 8 x 3 5 cm  Findings concerning for abscesses  Surgical consultation recommended  2   Stable wall thickening in the rectum, not well evaluated in the absence of intraluminal contrast distention  3   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 measuring 11 x 2 5 x 8 5 cm, without evidence of bowel obstruction or incarceration    4   Mild interval increase in size of pulmonary and hepatic metastases        Results from last 7 days   Lab Units 07/30/20  2135   WBC Thousand/uL 16 24*   HEMOGLOBIN g/dL 8 2*   HEMATOCRIT % 26 6*   PLATELETS Thousands/uL 167   NEUTROS ABS Thousands/µL 12 86*         Results from last 7 days   Lab Units 07/30/20  2135   SODIUM mmol/L 132*   POTASSIUM mmol/L 3 4*   CHLORIDE mmol/L 99*   CO2 mmol/L 26   ANION GAP mmol/L 7   BUN mg/dL 13   CREATININE mg/dL 0 84   EGFR ml/min/1 73sq m 83   CALCIUM mg/dL 8 6     Results from last 7 days   Lab Units 07/30/20  2135   AST U/L 44   ALT U/L 22   ALK PHOS U/L 254*   TOTAL PROTEIN g/dL 6 5   ALBUMIN g/dL 2 5*   TOTAL BILIRUBIN mg/dL 0 20     Results from last 7 days   Lab Units 07/30/20  2135   GLUCOSE RANDOM mg/dL 110     Results from last 7 days   Lab Units 07/31/20  0656   PROTIME seconds 14 0   INR  1 07     Results from last 7 days   Lab Units 07/30/20  2134   LACTIC ACID mmol/L 1 2     Results from last 7 days   Lab Units 07/30/20  2135   LIPASE u/L 102     Results from last 7 days   Lab Units 07/30/20  2150 07/30/20  2149   CLARITY UA   --  Clear   COLOR UA  yellow Yellow   SPEC GRAV UA   --  1 015   PH UA   --  7 0   GLUCOSE UA mg/dl  --  Negative   KETONES UA mg/dl  --  Negative   BLOOD UA   --  Trace*   PROTEIN UA mg/dl  --  Negative   NITRITE UA   --  Negative   BILIRUBIN UA   --  Negative   UROBILINOGEN UA E U /dl  --  0 2   LEUKOCYTES UA   --  Negative   WBC UA /hpf  --  2-4*   RBC UA /hpf  --  2-4*   BACTERIA UA /hpf  --  None Seen   EPITHELIAL CELLS WET PREP /hpf  --  None Seen     Results from last 7 days   Lab Units 07/30/20 2156 07/30/20 2135   BLOOD CULTURE  Received in Microbiology Lab  Culture in Progress  Received in Microbiology Lab  Culture in Progress       ED Treatment:   Medication Administration from 07/30/2020 2016 to 07/31/2020 0411    Date/Time Order Dose Route Action   07/30/2020 2145 ondansetron (ZOFRAN) injection 4 mg 4 mg Intravenous Given   07/30/2020 2138 sodium chloride 0 9 % bolus 1,000 mL 1,000 mL Intravenous New Bag   07/30/2020 2145 HYDROmorphone (DILAUDID) injection 0 5 mg 0 5 mg Intravenous Given   07/30/2020 2348 piperacillin-tazobactam (ZOSYN) 3 375 g in sodium chloride 0 9 % 100 mL IVPB 3 375 g Intravenous New Bag   07/30/2020 2247 vancomycin (VANCOCIN) IVPB (premix) 1,000 mg 200 mL 1,000 mg Intravenous New Bag   07/30/2020 2238 iohexol (OMNIPAQUE) 350 MG/ML injection (MULTI-DOSE) 100 mL 100 mL Intravenous Given   07/31/2020 0143 HYDROmorphone (DILAUDID) injection 0 5 mg 0 5 mg Intravenous Given   07/31/2020 0346 HYDROmorphone (DILAUDID) injection 1 mg 1 mg Intravenous Given        Past Medical History:   Diagnosis Date    History of rectal cancer 06/2020     Present on Admission:   Rectal cancer ([facility] )   Breast cancer ([place] [address] )    Admitting Diagnosis: Acute hyponatremia [E87 1]  Rectal cancer ([place] [address] ) [C20]  Gluteal abscess [L02 31]  Gram-positive bacteremia [R78 81]  Rectal abscess [K61 1]  Fever [R50 9]  Acquired immunocompromised state ([place] [address] ) [D89 9]  Pelvic abscess in female [N73 9]  Malignant neoplasm of female breast, unspecified estrogen receptor status, unspecified laterality, unspecified site of breast ([place] [address] ) [C50 919]     Age/Sex: 50 y o  female     Admission Orders:  Scheduled Medications:    Medications:  cholecalciferol 2,000 Units Oral Daily   enoxaparin 40 mg Subcutaneous Daily   loratadine 10 mg Oral Daily   metoprolol succinate 25 mg Oral Daily   piperacillin-tazobactam 3 375 g Intravenous Q6H   senna 1 tablet Oral BID   sertraline 25 mg Oral Daily     Continuous IV Infusions:    multi-electrolyte 100 mL/hr Intravenous Continuous     PRN Meds:    acetaminophen 650 mg Oral Q6H PRN    aluminum-magnesium hydroxide-simethicone 30 mL Oral Q6H PRN    HYDROmorphone 1 mg Intravenous Q1H PRN x3 7/31   ondansetron 4 mg Intravenous Q6H PRN    oxyCODONE 10 mg Oral Q4H PRN x1 7/31   oxyCODONE-acetaminophen 1 tablet Oral Q4H PRN      SCDs  Up w/ assist   IR image guided aspiration/drainage L gluteal abscess  Diet  NPO w/ sips  IR PATIENT EVAL  IP CONSULT TO COLORECTAL SURGERY  IP CONSULT TO INFECTIOUS DISEASES  IP CONSULT TO CASE MANAGEMENT  IP CONSULT TO COLORECTAL SURGERY  IP CONSULT TO ONCOLOGY      Network Utilization Review Department  [email or web address]  ATTENTION: Please call with any questions or concerns to [phone] and carefully listen to the prompts so that you are directed to the right person  All voicemails are confidential   Claudia Pichardo all requests for admission clinical reviews, approved or denied determinations and any other requests to dedicated fax number below belonging to the campus where the patient is receiving treatment   List of dedicated fax numbers for the Facilities:  [address] DENIALS (Administrative/Medical Necessity) [phone]   [address] (Maternity/NICU/Pediatrics) [phone]   Rafael Trinidad [phone]   Bernadette Mosley [phone]   Srinath Rea [phone]   Arnie Carson [phone]   [address] [place] [address] [phone]   [facility]  [phone]   [address] [place] [address], [address]  [address] [facility] [address] [facility] [phone]

## 2020-07-31 NOTE — PLAN OF CARE
Problem: PAIN - ADULT  Goal: Verbalizes/displays adequate comfort level or baseline comfort level  Description  Interventions:  - Encourage patient to monitor pain and request assistance  - Assess pain using appropriate pain scale  - Administer analgesics based on type and severity of pain and evaluate response  - Implement non-pharmacological measures as appropriate and evaluate response  - Consider cultural and social influences on pain and pain management  - Notify physician/advanced practitioner if interventions unsuccessful or patient reports new pain  Outcome: Progressing     Problem: INFECTION - ADULT  Goal: Absence or prevention of progression during hospitalization  Description  INTERVENTIONS:  - Assess and monitor for signs and symptoms of infection  - Monitor lab/diagnostic results  - Monitor all insertion sites, i e  indwelling lines, tubes, and drains  - Monitor endotracheal if appropriate and nasal secretions for changes in amount and color  - [place] appropriate cooling/warming therapies per order  - Administer medications as ordered  - Instruct and encourage patient and family to use good hand hygiene technique  - Identify and instruct in appropriate isolation precautions for identified infection/condition  Outcome: Progressing     Problem: SAFETY ADULT  Goal: Patient will remain free of falls  Description  INTERVENTIONS:  - Assess patient frequently for physical needs  -  Identify cognitive and physical deficits and behaviors that affect risk of falls    -  [place] fall precautions as indicated by assessment   - Educate patient/family on patient safety including physical limitations  - Instruct patient to call for assistance with activity based on assessment  - Modify environment to reduce risk of injury  - Consider OT/PT consult to assist with strengthening/mobility  Outcome: Progressing     Problem: DISCHARGE PLANNING  Goal: Discharge to home or other facility with appropriate resources  Description  INTERVENTIONS:  - Identify barriers to discharge w/patient and caregiver  - Arrange for needed discharge resources and transportation as appropriate  - Identify discharge learning needs (meds, wound care, etc )  - Arrange for interpretive services to assist at discharge as needed  - Refer to Case Management Department for coordinating discharge planning if the patient needs post-hospital services based on physician/advanced practitioner order or complex needs related to functional status, cognitive ability, or social support system  Outcome: Progressing     Problem: Knowledge Deficit  Goal: Patient/family/caregiver demonstrates understanding of disease process, treatment plan, medications, and discharge instructions  Description  Complete learning assessment and assess knowledge base    Interventions:  - Provide teaching at level of understanding  - Provide teaching via preferred learning methods  Outcome: Progressing

## 2020-07-31 NOTE — TELEMEDICINE
INTERPROFESSIONAL (PHONE) [address] [facility] 50 y o  female MRN: [id number]  Unit/Bed#: [facility] 931-01 Encounter: [id number]    IR has been consulted to evaluate the patient, determine the appropriate procedure, and whether or not a procedure can and should be performed regarding the care of Huy Jain  We were consulted by colorectal surgery concerning left gluteal abscess, and to possibly perform a drain placement/aspiration if medically appropriate for the patient  Inpatient Consult to IR  Consult performed by: Lexine Kawasaki, PA-C  Consult ordered by: Reno Cordero MD        07/31/20      Assessment/Recommendation:     50year old female with stage IV colon ca presenting with left gluteal abscess    - please keep npo  - will attempt drain placement  Collection may be too small for drain and may aspirate  - will send fluid for cultures      Total time spent in review of data, discussion with requesting provider and rendering advice was 25 minutes       Patient or appropriate family member was verbally informed by colorectal surgery of this consultative service on their behalf to provide more timely access to specialty care in lieu of an in person consultation  They were informed it is a billable service unless the it was determined an in person follow up was medically necessary by me within the next 14 days at which time only the in person consult would be billed  Verbal consent was obtained  Thank you for allowing Interventional Radiology to participate in the care of Huy Jain  Please don't hesitate to call or TigerText us with any questions       Lexine Kawasaki, PA-C

## 2020-07-31 NOTE — ED PROVIDER NOTES
History  Chief Complaint   Patient presents with    Abdominal Pain     Patient reports lower abdominal pain; states that she was here recently for a blood infection; states that she has been running a fever and feeling very weak; hx of rectal cancer      Patient is a 57-year-old female with past medical history of active cancer, rectal cancer with possible metastasis to breast and liver as well, and recent admission to the hospital for large bowel obstruction and bacteremia requiring IV antibiotics, who presents the emergency department for evaluation of abdominal pain and fever  She has chronic abdominal pain, and states hers today is slightly worse than it normally is  Her abdominal pain is located bilateral lower abdomen which she describes is a burning sensation with intermittent sharp pain as well  The only difference in her current pain is that his it increased in severity over the last few days  She is also endorsing 6 loose bowel movements over the last 24 hours, as well as tenesmus, stool without blood or mucus  She vomited twice yesterday, nonbloody and nonbilious emesis, no vomiting today she is currently nauseous  Denies any dysuria, urgency or frequency, denies the possibility of pregnancy, no vaginal bleeding or discharge  She denies any cough, chest pain, shortness of breath, no recent sick contacts  She is endorsing fever, T-max 101 6° orally yesterday  She is currently receiving cancer treatment with Avastin therapy  She was diagnosed with cancer this spring, was started on therapy in June  She had a port placed which has been in place since her therapy was started  She is on prophylactic p r n  Ciprofloxacin given to her by her oncologist, which she is supposed to take when she gets a fever  She took it 1 hour prior to ED arrival           Prior to Admission Medications   Prescriptions Last Dose Informant Patient Reported? Taking?    Cholecalciferol 50 MCG (2000 UT) CAPS Not Taking at Unknown time  Yes No   Sig: Take by mouth   Ergocalciferol (VITAMIN D2 PO) Past Week at Unknown time  Yes Yes   Sig: Take by mouth   cetirizine (ZyrTEC) 10 mg tablet 7/30/2020 at Unknown time Other (Specify) Yes Yes   Sig: Take 10 mg by mouth daily   metoprolol succinate (TOPROL-XL) 25 mg 24 hr tablet Not Taking at Unknown time Other (Specify) Yes No   Sig: Take 25 mg by mouth daily   oxyCODONE-acetaminophen (PERCOCET) 5-325 mg per tablet 7/30/2020 at Unknown time  Yes Yes   Sig: Take 1 tablet by mouth every 4 (four) hours as needed for moderate pain   sertraline (ZOLOFT) 25 mg tablet Not Taking at Unknown time  Yes No   Sig: Take 25 mg by mouth daily      Facility-Administered Medications: None       Past Medical History:   Diagnosis Date    History of rectal cancer 06/2020       Past Surgical History:   Procedure Laterality Date    BACK SURGERY      BREAST BIOPSY Right 06/19/2020    2 sites; 1 breast 1 axilla    HERNIA REPAIR      3 repairs    IR IMAGE GUIDED ASPIRATION / DRAINAGE  7/31/2020    IR IMAGE GUIDED BIOPSY/ASPIRATION LIVER  6/17/2020    IR PORT PLACEMENT  6/22/2020    TUBAL LIGATION      US GUIDED BREAST BIOPSY RIGHT COMPLETE Right 6/19/2020    US GUIDED BREAST LYMPH NODE BIOPSY RIGHT Right 6/19/2020       Family History   Problem Relation Age of Onset    No Known Problems Mother     No Known Problems Sister     No Known Problems Daughter     Breast cancer Maternal Grandmother         age unknown    No Known Problems Sister     No Known Problems Daughter     Breast cancer Maternal Aunt 61    No Known Problems Maternal Aunt      I have reviewed and agree with the history as documented      E-Cigarette/Vaping    E-Cigarette Use Never User      E-Cigarette/Vaping Substances    Nicotine No     THC No     CBD No     Flavoring No     Other No     Unknown No      Social History     Tobacco Use    Smoking status: Former Smoker     Packs/day: 0 00     Last attempt to quit: 5/1/2020 Years since quittin 2    Smokeless tobacco: Never Used   Substance Use Topics    Alcohol use: Not Currently    Drug use: No        Review of Systems   Constitutional: Positive for chills and fever  Negative for appetite change  HENT: Negative  Eyes: Negative  Negative for photophobia and visual disturbance  Respiratory: Negative  Negative for cough, chest tightness and shortness of breath  Cardiovascular: Negative  Negative for chest pain and leg swelling  Gastrointestinal: Positive for abdominal pain, nausea and vomiting  Negative for blood in stool, constipation and diarrhea  Endocrine: Negative  Genitourinary: Positive for pelvic pain  Negative for difficulty urinating, dysuria, flank pain, frequency and urgency  Musculoskeletal: Negative  Negative for back pain, neck pain and neck stiffness  Skin: Negative  Allergic/Immunologic: Negative  Neurological: Negative  Negative for dizziness, weakness, light-headedness and headaches  Hematological: Negative  Psychiatric/Behavioral: Negative  Physical Exam  ED Triage Vitals [20]   Temperature Pulse Respirations Blood Pressure SpO2   99 9 °F (37 7 °C) (!) 115 20 108/71 98 %      Temp Source Heart Rate Source Patient Position - Orthostatic VS BP Location FiO2 (%)   Oral Monitor Sitting Left arm --      Pain Score       7             Orthostatic Vital Signs  Vitals:    20 1945 20 2240 20 0302 20 0656   BP: 98/60 96/59 98/59 97/60   Pulse: 86 78 80 80   Patient Position - Orthostatic VS:           Physical Exam  Vitals signs reviewed  Constitutional:       Appearance: She is well-developed and well-nourished  Comments: Chronically ill appearing female    HENT:      Head: Normocephalic and atraumatic        Right Ear: External ear normal       Left Ear: External ear normal       Nose: Nose normal       Mouth/Throat:      Mouth: Oropharynx is clear and moist    Eyes:      General: No scleral icterus  Extraocular Movements: EOM normal       Conjunctiva/sclera: Conjunctivae normal    Neck:      Musculoskeletal: Normal range of motion  Vascular: No JVD  Trachea: No tracheal deviation  Cardiovascular:      Rate and Rhythm: Normal rate and regular rhythm  Pulses: Intact distal pulses  Heart sounds: Normal heart sounds  No murmur  Pulmonary:      Effort: Pulmonary effort is normal  No respiratory distress  Breath sounds: Normal breath sounds  Comments: Clubbing of digits  Abdominal:      General: Bowel sounds are normal  There is no distension  Palpations: Abdomen is soft  Tenderness: There is abdominal tenderness  There is no guarding  Comments: Suprapubic abdominal tenderness    Musculoskeletal: Normal range of motion  General: No edema  Skin:     General: Skin is warm  Capillary Refill: Capillary refill takes less than 2 seconds  Neurological:      Mental Status: She is alert and oriented to person, place, and time  Motor: No abnormal muscle tone     Psychiatric:         Mood and Affect: Mood and affect normal          Behavior: Behavior normal          ED Medications  Medications   loratadine (CLARITIN) tablet 10 mg (10 mg Oral Given 8/3/20 0814)   cholecalciferol (VITAMIN D3) tablet 2,000 Units (2,000 Units Oral Refused 8/3/20 0813)   metoprolol succinate (TOPROL-XL) 24 hr tablet 25 mg (25 mg Oral Not Given 8/3/20 0808)   oxyCODONE-acetaminophen (PERCOCET) 5-325 mg per tablet 1 tablet (1 tablet Oral Given 7/31/20 2015)   sertraline (ZOLOFT) tablet 25 mg (25 mg Oral Refused 8/3/20 0813)   multi-electrolyte (PLASMALYTE-A/ISOLYTE-S PH 7 4) IV solution (100 mL/hr Intravenous Restarted 8/3/20 0602)   ondansetron (ZOFRAN) injection 4 mg (has no administration in time range)   aluminum-magnesium hydroxide-simethicone (MYLANTA) 200-200-20 mg/5 mL oral suspension 30 mL (30 mL Oral Given 8/2/20 1801)   enoxaparin (LOVENOX) subcutaneous injection 40 mg (40 mg Subcutaneous Given 8/3/20 0814)   oxyCODONE (ROXICODONE) immediate release tablet 10 mg (10 mg Oral Given 8/3/20 0659)   senna (SENOKOT) tablet 8 6 mg (8 6 mg Oral Given 8/3/20 0814)   acetaminophen (TYLENOL) tablet 650 mg (650 mg Oral Given 8/2/20 2019)   polyethylene glycol (MIRALAX) packet 17 g (17 g Oral Given 8/3/20 0814)   HYDROmorphone (DILAUDID) injection 1 mg (1 mg Intravenous Given 8/3/20 1102)   oxyCODONE (OxyCONTIN) 12 hr tablet 20 mg (20 mg Oral Given 8/3/20 0814)   ampicillin-sulbactam (UNASYN) 3 g in sodium chloride 0 9 % 100 mL IVPB (0 g Intravenous Stopped 8/3/20 1116)   ondansetron (ZOFRAN) injection 4 mg (4 mg Intravenous Given 7/30/20 2145)   sodium chloride 0 9 % bolus 1,000 mL (0 mL Intravenous Stopped 7/30/20 2247)   HYDROmorphone (DILAUDID) injection 0 5 mg (0 5 mg Intravenous Given 7/30/20 2145)   piperacillin-tazobactam (ZOSYN) 3 375 g in sodium chloride 0 9 % 100 mL IVPB (0 g Intravenous Stopped 7/31/20 0029)   vancomycin (VANCOCIN) IVPB (premix) 1,000 mg 200 mL (0 mg/kg × 59 kg Intravenous Stopped 7/30/20 2348)   iohexol (OMNIPAQUE) 350 MG/ML injection (MULTI-DOSE) 100 mL (100 mL Intravenous Given 7/30/20 2238)   HYDROmorphone (DILAUDID) injection 0 5 mg (0 5 mg Intravenous Given 7/31/20 0143)   midazolam (VERSED) injection (1 mg Intravenous Given 7/31/20 1523)   fentanyl citrate (PF) 100 MCG/2ML (50 mcg Intravenous Given 7/31/20 1523)   lidocaine 1% buffered (5 mL Infiltration Given 7/31/20 1525)   potassium chloride (K-DUR,KLOR-CON) CR tablet 40 mEq (40 mEq Oral Given 8/1/20 1140)       Diagnostic Studies  Results Reviewed     Procedure Component Value Units Date/Time    Blood culture [[id number]] Collected:  07/30/20 2156    Lab Status:  Preliminary result Specimen:  Blood from Arm, Right Updated:  08/03/20 0801     Blood Culture No Growth at 72 hrs      Blood culture [[id number]] Collected:  07/30/20 2135    Lab Status:  Preliminary result Specimen: Blood from Arm, Left Updated:  08/03/20 0801     Blood Culture No Growth at 72 hrs  Urine Microscopic [[id number]]  (Abnormal) Collected:  07/30/20 2149    Lab Status:  Final result Specimen:  Urine, Clean Catch Updated:  07/30/20 2213     RBC, UA 2-4 /hpf      WBC, UA 2-4 /hpf      Epithelial Cells None Seen /hpf      Bacteria, UA None Seen /hpf      Hyaline Casts, UA None Seen /lpf     Lactic acid, plasma [[id number]]  (Normal) Collected:  07/30/20 2134    Lab Status:  Final result Specimen:  Blood from Arm, Left Updated:  07/30/20 2211     LACTIC ACID 1 2 mmol/L     Narrative:       Result may be elevated if tourniquet was used during collection      Comprehensive metabolic panel [[id number]]  (Abnormal) Collected:  07/30/20 2135    Lab Status:  Final result Specimen:  Blood from Arm, Left Updated:  07/30/20 2203     Sodium 132 mmol/L      Potassium 3 4 mmol/L      Chloride 99 mmol/L      CO2 26 mmol/L      ANION GAP 7 mmol/L      BUN 13 mg/dL      Creatinine 0 84 mg/dL      Glucose 110 mg/dL      Calcium 8 6 mg/dL      AST 44 U/L      ALT 22 U/L      Alkaline Phosphatase 254 U/L      Total Protein 6 5 g/dL      Albumin 2 5 g/dL      Total Bilirubin 0 20 mg/dL      eGFR 83 ml/min/1 73sq m     Narrative:       [place] guidelines for Chronic Kidney Disease (CKD):     Stage 1 with normal or high GFR (GFR > 90 mL/min/1 73 square meters)    Stage 2 Mild CKD (GFR = 60-89 mL/min/1 73 square meters)    Stage 3A Moderate CKD (GFR = 45-59 mL/min/1 73 square meters)    Stage 3B Moderate CKD (GFR = 30-44 mL/min/1 73 square meters)    Stage 4 Severe CKD (GFR = 15-29 mL/min/1 73 square meters)    Stage 5 End Stage CKD (GFR <15 mL/min/1 73 square meters)  Note: GFR calculation is accurate only with a steady state creatinine    Lipase [[id number]]  (Normal) Collected:  07/30/20 2135    Lab Status:  Final result Specimen:  Blood from Arm, Left Updated:  07/30/20 2203     Lipase 102 u/L     POCT urinalysis dipstick [[id number]]  (Normal) Resulted:  07/30/20 2150    Lab Status:  Final result Updated:  07/30/20 2150     Color, UA yellow    POCT pregnancy, urine [[id number]]  (Normal) Resulted:  07/30/20 2150    Lab Status:  Final result Updated:  07/30/20 2150     EXT PREG TEST UR (Ref: Negative) neg     Control valid    Urine Macroscopic, POC [[id number]]  (Abnormal) Collected:  07/30/20 2149    Lab Status:  Final result Specimen:  Urine Updated:  07/30/20 2148     Color, UA Yellow     Clarity, UA Clear     pH, UA 7 0     Leukocytes, UA Negative     Nitrite, UA Negative     Protein, UA Negative mg/dl      Glucose, UA Negative mg/dl      Ketones, UA Negative mg/dl      Urobilinogen, UA 0 2 E U /dl      Bilirubin, UA Negative     Blood, UA Trace     Specific [place], UA 1 015    Narrative:       CLINITEK RESULT    CBC and differential [[id number]]  (Abnormal) Collected:  07/30/20 2135    Lab Status:  Final result Specimen:  Blood from Arm, Left Updated:  07/30/20 2147     WBC 16 24 Thousand/uL      RBC 3 34 Million/uL      Hemoglobin 8 2 g/dL      Hematocrit 26 6 %      MCV 80 fL      MCH 24 6 pg      MCHC 30 8 g/dL      RDW 23 9 %      MPV 10 5 fL      Platelets 496 Thousands/uL      nRBC 0 /100 WBCs      Neutrophils Relative 79 %      Immat GRANS % 2 %      Lymphocytes Relative 10 %      Monocytes Relative 7 %      Eosinophils Relative 2 %      Basophils Relative 0 %      Neutrophils Absolute 12 86 Thousands/µL      Immature Grans Absolute 0 36 Thousand/uL      Lymphocytes Absolute 1 54 Thousands/µL      Monocytes Absolute 1 15 Thousand/µL      Eosinophils Absolute 0 30 Thousand/µL      Basophils Absolute 0 03 Thousands/µL                  IR image guided aspiration / drainage   Final Result by Ana Paula Christiansen MD (07/31 1848)   Impression:   1  Successful ultrasound-guided aspiration of a left gluteal collection, yielding 10 cc of purulent appearing fluid     2  Specimens were sent to the laboratory as described above                Workstation performed: [id number]         CT abdomen pelvis with contrast   Final Result by Mason Cazares MD (07/31 0014)      1  Multiseptated organizing presacral fluid collection measuring 2 3 x 2 3 x 6 7 cm  Adjacent left gluteal fluid collection measuring 1 9 x 2 8 x 3 5 cm  Findings concerning for abscesses  Surgical consultation recommended  2   Stable wall thickening in the rectum, not well evaluated in the absence of intraluminal contrast distention  3   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 measuring 11 x 2 5 x 8 5 cm, without evidence of bowel obstruction or incarceration  4   Mild interval increase in size of pulmonary and hepatic metastases  I personally discussed this study with Ashlee Vickers on 7/30/2020 at 11:37 PM                Workstation performed: [id number]         CT abdomen pelvis w contrast    (Results Pending)         Procedures  Procedures  Conscious Sedation Assessment      Classification Score   ASA Scale Assessment  3-Severe systemic disease that results in functional limitation filed at 07/31/2020 1504   Mallampati Classification  Class II: soft palate, uvula, fauces visible - No Difficulty filed at 07/31/2020 1504          ED Course  ED Course as of Aug 03 1339   Thu Jul 30, 2020   2158 PREGNANCY TEST URINE: neg   Fri Jul 31, 2020   0010 Abscess posterior to rectum, also deep gluteal abscess  Will admit to white surgery          US AUDIT      Most Recent Value   Initial Alcohol Screen: US AUDIT-C    1  How often do you have a drink containing alcohol?  0 Filed at: 07/30/2020 2039   2  How many drinks containing alcohol do you have on a typical day you are drinking? 0 Filed at: 07/30/2020 2039   3a  Male UNDER 65: How often do you have five or more drinks on one occasion? 0 Filed at: 07/30/2020 2039   3b  FEMALE Any Age, or MALE 65+: How often do you have 4 or more drinks on one occassion?   0 Filed at: 07/30/2020 2039   Audit-C Score  0 Filed at: 07/30/2020 2039                  CARL/DAST-10      Most Recent Value   How many times in the past year have you    Used an illegal drug or used a prescription medication for non-medical reasons? Never Filed at: 07/30/2020 2039              Initial Sepsis Screening     Row Name 07/30/20 2105                Is the patient's history suggestive of a new or worsening infection? (!) Yes (Proceed)  -HB        Suspected source of infection  acute abdominal infection;bloodstream catheter infection  -HB        Are two or more of the following signs & symptoms of infection both present and new to the patient? (!) Yes (Proceed)  -HB        Indicate SIRS criteria  Tachycardia > 90 bpm;Leukocytosis (WBC > 01144 IJL)  -HB        If the answer is yes to both questions, suspicion of sepsis is present          If severe sepsis is present AND tissue hypoperfusion perists in the hour after fluid resuscitation or lactate > 4, the patient meets criteria for SEPTIC SHOCK          Are any of the following organ dysfunction criteria present within 6 hours of suspected infection and SIRS criteria that are NOT considered to be chronic conditions? (!) Yes  -HB        Organ dysfunction          Date of presentation of severe sepsis          Time of presentation of severe sepsis          Tissue hypoperfusion persists in the hour after crystalloid fluid administration, evidenced, by either:          Was hypotension present within one hour of the conclusion of crystalloid fluid administration?           Date of presentation of septic shock          Time of presentation of septic shock            User Key  (r) = Recorded By, (t) = Taken By, (c) = Cosigned By    [address] Name Provider Type    HB Rollin Cowden, DO Physician                      MDM  Number of Diagnoses or Management Options  Acquired immunocompromised state [facility]): new and does not require workup  Acute hyponatremia: new and does not require workup  Gluteal abscess: new and requires workup  Pelvic abscess in female: new and requires workup  Diagnosis management comments: 50 female on chemo for metastatic rectal cancer presenting with fever, chills, abdominal pain, N/V  Septic workup will be obtained, including CT imaging of abd/pelvis, and broad spectrum antibiotics started  Imaging reveals presacral and gluteal probable abscess  Discussed with colorectal surgery, who will see the patient in the ED  Unfortunately patient not surgical candidate as she is currently receiving Avastin therapy   Patient signed out to Dr Marya Koyanagi, with plan to admit with surgery consult          Amount and/or Complexity of Data Reviewed  Clinical lab tests: ordered and reviewed  Tests in the radiology section of CPT®: ordered and reviewed  Review and summarize past medical records: yes  Discuss the patient with other providers: yes  Independent visualization of images, tracings, or specimens: yes          Disposition  Final diagnoses:   Acquired immunocompromised state ([facility] )   Gluteal abscess   Pelvic abscess in female   Acute hyponatremia     Time reflects when diagnosis was documented in both MDM as applicable and the Disposition within this note     Time User Action Codes Description Comment    7/31/2020 12:44 AM Van Sims Add [D89 9] Acquired immunocompromised state ([facility] )     7/31/2020 12:44 AM Van Sims Add [L02 31] Gluteal abscess     7/31/2020 12:47 AM Van Sims Add [N73 9] Pelvic abscess in female     7/31/2020 12:47 AM Van Sims Modify [D89 9] Acquired immunocompromised state ([facility] )     7/31/2020 12:47 AM Van Sims Modify [N73 9] Pelvic abscess in female     7/31/2020 12:48 AM Van Sims Add [E87 1] Acute hyponatremia     7/31/2020  2:44 AM Florecita Chune 429 [K61 1] Rectal abscess     7/31/2020  2:47 AM Ova People Add [R78 81] Gram-positive bacteremia     7/31/2020  2:47 AM Rola Chun 23 Rectal cancer ([facility] )     7/31/2020  2:47 AM Ruby Zee Add [N30 880] Malignant neoplasm of female breast, unspecified estrogen receptor status, unspecified laterality, unspecified site of breast [facility])       ED Disposition     ED Disposition Condition Date/Time Comment    Admit Stable Fri Jul 31, 2020  2:36 AM Case was discussed with RICA and the patient's admission status was agreed to be Admission Status: inpatient status to the service of Dr Brian Fierro   Follow-up Information    None         Current Discharge Medication List      CONTINUE these medications which have NOT CHANGED    Details   cetirizine (ZyrTEC) 10 mg tablet Take 10 mg by mouth daily      Ergocalciferol (VITAMIN D2 PO) Take by mouth      oxyCODONE-acetaminophen (PERCOCET) 5-325 mg per tablet Take 1 tablet by mouth every 4 (four) hours as needed for moderate pain      Cholecalciferol 50 MCG (2000 UT) CAPS Take by mouth      metoprolol succinate (TOPROL-XL) 25 mg 24 hr tablet Take 25 mg by mouth daily      sertraline (ZOLOFT) 25 mg tablet Take 25 mg by mouth daily           No discharge procedures on file  PDMP Review     None           ED Provider  Attending physically available and evaluated Mook Agosto I managed the patient along with the ED Attending      Electronically Signed by         Tamika Alvarez DO  08/03/20 6225

## 2020-07-31 NOTE — H&P
H&P- Mayi Marmolejo [place] 1975, 50 y o  female MRN: [id number]    Unit/Bed#: ED 24 Encounter: [id number]    Primary Care Provider: Carolina Newsome MD   Date and time admitted to hospital: 7/30/2020  8:59 PM        * Rectal abscess  Assessment & Plan  Recently was admitted for fever with blood cultures growing Gemella  Will place patient on Zosyn once again  Patient was also given a dose of vancomycin  MRSA culture  Infectious Disease consult  Knowing that patient has a history of rectal cancer on active chemotherapy; oncology has stated that she is not a candidate for any surgical procedure  Nevertheless, will place patient on colorectal surgery follow-up  Oncology follow-up care of Dr Johana Florez who she regularly sees  Recommendation for possibility of IR drainage of abscesses therefore interventional radiology consult  Recheck metabolic profile, CBC  Rectal cancer [facility])  Assessment & Plan  Continued oncology follow-up  Breast cancer [facility])  Assessment & Plan  Continued oncology follow-up  VTE Prophylaxis: Enoxaparin (Lovenox)  / sequential compression device   Code Status: Prior full Code as discussed  POLST: There is no POLST form on file for this patient (pre-hospital)    Anticipated Length of Stay:  Patient will be admitted on an Inpatient basis with an anticipated length of stay of  greater than 2 midnights  Justification for Hospital Stay: Please see detailed plans noted above  Chief Complaint:     Fever with increased rectal pain  History of Present Illness:  Matilde Moya is a 50 y o  female who has a past medical history significant for breast cancer which was incidentally found upon evaluation for current rectal cancer  Unfortunately, she also has 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  This patient was recently admitted under the colorectal service for similar problem involving fever and constipation    She was does found to have bacteremia involving Gemella and she was started on Zosyn which she completed 2 weeks of therapy post discharge  She was doing better however within the past 36 hours there was increasing rectal pain which radiates towards the hip which is her usual presentation  Likewise, she is complaining of fever currently at 99 9  No chills  She was quite concerned about this she went to the emergency room to be evaluated and ache CT scan of the abdomen pelvis showed the presence of multi septated organizing pre sacral fluid collection of 2 3 x 2 3 x 6 7 as well as the left gluteal fluid collection of 1 9 x 2 8 x 3 5 cm  They recommended surgical consultation but according to the note of Oncology is that she is not a candidate for any surgical intervention due to the fact that she is on chemotherapy  That said, patient was placed under medicine as surgery recommended that she be seen by interventional radiology for radiologic assisted drainage of abscesses  Currently, patient is resting much more comfortably after given Dilaudid  Rectal pain is much less except when palpated      Review of Systems:    Constitutional:  Fever as noted  Eyes:  Denies change in visual acuity   HENT:  Denies nasal congestion or sore throat   Respiratory:  Denies cough or shortness of breath   Cardiovascular:  Denies chest pain or edema   GI:  Denies abdominal pain, but complains of rectal area pain nausea, vomiting, bloody stools or diarrhea   :  Denies dysuria   Musculoskeletal:  Denies back pain or joint pain   Integument:  Denies rash   Neurologic:  Denies headache, focal weakness or sensory changes   Endocrine:  Denies polyuria or polydipsia   Lymphatic:  Denies swollen glands   Psychiatric:  Denies depression or anxiety     Past Medical and Surgical History:   Past Medical History:   Diagnosis Date    History of rectal cancer 06/2020     Past Surgical History:   Procedure Laterality Date    BACK SURGERY      BREAST BIOPSY Right 06/19/2020    2 sites; 1 breast 1 axilla    HERNIA REPAIR      3 repairs    IR IMAGE GUIDED BIOPSY/ASPIRATION LIVER  6/17/2020    IR PORT PLACEMENT  6/22/2020    TUBAL LIGATION      US GUIDED BREAST BIOPSY RIGHT COMPLETE Right 6/19/2020    US GUIDED BREAST LYMPH NODE BIOPSY RIGHT Right 6/19/2020       Meds/Allergies:    (Not in a hospital admission)  cetirizine (ZyrTEC) 10 mg tablet Take 10 mg by mouth daily Umm Blancas MD Reordered   Ordered as: loratadine (CLARITIN) tablet 10 mg - 10 mg, Oral, Daily, First dose on Fri 7/31/20 at 0900   Cholecalciferol 50 MCG (2000 UT) CAPS Take by mouth Umm Blancas MD Reordered   Ordered as: cholecalciferol (VITAMIN D3) tablet 2,000 Units - 2,000 Units, Oral, Daily, First dose on Fri 7/31/20 at 0900 25 mcg = 1000 units of cholecalciferol (Vitamin D3)    Ergocalciferol (VITAMIN D2 PO) Take by mouth Umm Blancas MD Not Ordered   metoprolol succinate (TOPROL-XL) 25 mg 24 hr tablet Take 25 mg by mouth daily Umm Blancas MD Reordered   Ordered as: metoprolol succinate (TOPROL-XL) 24 hr tablet 25 mg - 25 mg, Oral, Daily, First dose on Fri 7/31/20 at 0900 Hold for heart rate less than 50 beats per minute  Do not crush or chew  LOOK ALIKE SOUND ALIKE MED Hold for systolic blood pressure less than (mmHg): 110   oxyCODONE-acetaminophen (PERCOCET) 5-325 mg per tablet Take 1 tablet by mouth every 4 (four) hours as needed for moderate pain Umm Blancas MD Reordered   Ordered as: oxyCODONE-acetaminophen (PERCOCET) 5-325 mg per tablet 1 tablet - 1 tablet, Oral, Every 4 hours PRN, moderate pain, Starting Fri 7/31/20 at 0242 High alert medication   LOOK ALIKE S[employer] ALIKE MED    sertraline (ZOLOFT) 25 mg tablet Take 25 mg by mouth daily Umm Blancas MD Reordered   Ordered as: sertraline (ZOLOFT) tablet 25 mg - 25 mg, Oral, Daily, First dose on Fri 7/31/20 at 0900 LOOK ALIKE SOUND ALIKE MED        Allergies: No Known Allergies  History:  Marital Status: Single   Occupation: not working  Patient Pre-hospital Living Situation: home  Patient Pre-hospital Level of Mobility: ambulatory  Patient Pre-hospital Diet Restrictions: regulary  Substance Use History:   Social History     Substance and Sexual Activity   Alcohol Use Not Currently     Social History     Tobacco Use   Smoking Status Current Every Day Smoker    Packs/day: 0 50    Types: Cigarettes   Smokeless Tobacco Never Used     Social History     Substance and Sexual Activity   Drug Use No       Family History:  Family History   Problem Relation Age of Onset    No Known Problems Mother     No Known Problems Sister     No Known Problems Daughter     Breast cancer Maternal Grandmother         age unknown    No Known Problems Sister     No Known Problems Daughter     Breast cancer Maternal Aunt 61    No Known Problems Maternal Aunt        Physical Exam:     Vitals:   Blood Pressure: 98/57 (07/31/20 0224)  Pulse: 74 (07/31/20 0224)  Temperature: 99 9 °F (37 7 °C) (07/30/20 2039)  Temp Source: Oral (07/30/20 2039)  Respirations: 18 (07/31/20 0224)  Height: 5' 2" (157 5 cm) (07/30/20 2039)  Weight - Scale: 59 kg (130 lb) (07/30/20 2039)  SpO2: 97 % (07/31/20 0224)    Constitutional:  Well developed,underweight no acute distress, non-toxic appearance   Eyes:  PERRL, conjunctiva normal   HENT:  Atraumatic, external ears normal, nose normal, oropharynx moist, no pharyngeal exudates  Neck- normal range of motion, no tenderness, supple   Respiratory:  No respiratory distress, normal breath sounds, no rales, no wheezing   Cardiovascular:  Normal rate, normal rhythm, no murmurs, no gallops, no rubs   GI:  Soft, nondistended, normal bowel sounds, nontender,with discomfort at hypogastric area and left buttock and sacral area,  no organomegaly, no mass, no rebound, no guarding   :  No costovertebral angle tenderness   Musculoskeletal:  No edema, no tenderness, no deformities   Back- no tenderness  Integument:  Well hydrated, no rash   Lymphatic:  No lymphadenopathy noted   Neurologic:  Alert &awake, communicative, CN 2-12 normal, normal motor function, normal sensory function, no focal deficits noted   Psychiatric:  Speech and behavior appropriate       Lab Results: I have personally reviewed pertinent reports  Results from last 7 days   Lab Units 07/30/20  2135   WBC Thousand/uL 16 24*   HEMOGLOBIN g/dL 8 2*   HEMATOCRIT % 26 6*   PLATELETS Thousands/uL 167   NEUTROS PCT % 79*   LYMPHS PCT % 10*   MONOS PCT % 7   EOS PCT % 2     Results from last 7 days   Lab Units 07/30/20  2135   POTASSIUM mmol/L 3 4*   CHLORIDE mmol/L 99*   CO2 mmol/L 26   BUN mg/dL 13   CREATININE mg/dL 0 84   CALCIUM mg/dL 8 6   ALK PHOS U/L 254*   ALT U/L 22   AST U/L 44               Imaging: I have personally reviewed pertinent reports  Xr Chest Pa & Lateral    Result Date: 7/4/2020  Narrative: CHEST INDICATION:   Fever  COMPARISON:  11/28/2012 EXAM PERFORMED/VIEWS:  XR CHEST PA & LATERAL FINDINGS:  Multiple bilateral masses and nodules; largest peripheral posterior left lower lung 6 5 cm  Right Mediport tip cavoatrial junction  Cardiomediastinal silhouette appears unremarkable  Medial posterior right base slight airspace opacity  No pneumothorax or pleural effusion  Osseous structures appear within normal limits for patient age  Impression: Bilateral pulmonary metastatic lung nodules/masses  Medial right base small infiltrate  The study was marked in [place] for immediate notification  Workstation performed: [id number]     Xr Abdomen 1 View Kub    Result Date: 7/1/2020  Narrative: ABDOMEN INDICATION:   NGT placement - Please include stomach to show NGT placement  COMPARISON:  CT from same day  VIEWS:  AP supine FINDINGS: Tip of the feeding tube is terminating in the stomach  There is a right-sided Mediport with its tip in SVC  No discernible free air on this supine study    Upright or left lateral decubitus imaging is more sensitive to detect subtle free air in the appropriate setting  No pathologic calcifications or soft tissue masses  Multiple pulmonary nodules and masses are noted which should be correlated with prior CT  Orthopedic hardware in the lumbar spine  Impression: Feeding tube tip terminating in the stomach  Workstation performed: [id number]     Ct Abdomen Pelvis With Contrast    Result Date: 7/31/2020  Narrative: CT ABDOMEN AND PELVIS WITH IV CONTRAST INDICATION:   Abdominal pain and fever  COMPARISON:  6/10/2020 and 7/1/2020  TECHNIQUE:  CT examination of the abdomen and pelvis was performed  Axial, sagittal, and coronal 2D reformatted images were created from the source data and submitted for interpretation  Radiation dose length product (DLP) for this visit:  291 83 mGy-cm   This examination, like all CT scans performed in the [facility], was performed utilizing techniques to minimize radiation dose exposure, including the use of iterative  reconstruction and automated exposure control  IV Contrast:  100 mL of iohexol (OMNIPAQUE) Enteric Contrast:  Enteric contrast was not administered  FINDINGS: ABDOMEN LOWER CHEST:  Masses in the bilateral lower lobes are mildly increased in size  LIVER/BILIARY TREE:  Hepatic masses measure up to 9 7 cm cyst in the right lobe and 2 3 cm in the left lobe, mildly increased in size  No clear evidence of change in number of lesions  No biliary obstruction GALLBLADDER:  No calcified gallstones  No pericholecystic inflammatory change  SPLEEN:  Unremarkable  PANCREAS:  Unremarkable  ADRENAL GLANDS:  Unremarkable  KIDNEYS/URETERS:  No pyelonephritis or obstructive uropathy  STOMACH AND BOWEL:  New rectus sheath hernia containing loops of small bowel measuring 11 x 2 5 x 8 5 cm  No evidence of bowel obstruction or incarceration  Stable upper abdominal midline fat-containing hernia measuring 4 cm   Stable wall thickening in the rectum extending to the rectosigmoid, not well evaluated in the absence of rectal or oral contrast   No evidence of colitis  APPENDIX:  No findings to suggest appendicitis  ABDOMINOPELVIC CAVITY:  Organizing, multiseptated presacral fluid collection measuring 3 3 x 2 3 x 6 7 cm  Fluid collection in the left gluteal muscle adjacent to the coccyx and presacral fluid collection, measuring 1 9 x 2 8 x 3 5 cm  VESSELS:  Patent portal and splenic veins  PELVIS REPRODUCTIVE ORGANS:  Stable 3 2 cm leiomyoma in the uterus  Adjacent smaller lesions  Cysts or follicles in the adnexa measuring up to 4 3 cm on the right and 2 9 cm on the left  Stable nodular soft tissue lesions in the left adnexa measuring up to 1 8 cm  URINARY BLADDER:  Unremarkable  ABDOMINAL WALL/INGUINAL REGIONS:  Unremarkable  OSSEOUS STRUCTURES:  L4-5 and L5-S1 anterior fusion  Normal alignment  No lytic or blastic lesion  Impression: 1  Multiseptated organizing presacral fluid collection measuring 2 3 x 2 3 x 6 7 cm  Adjacent left gluteal fluid collection measuring 1 9 x 2 8 x 3 5 cm  Findings concerning for abscesses  Surgical consultation recommended  2   Stable wall thickening in the rectum, not well evaluated in the absence of intraluminal contrast distention  3   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 measuring 11 x 2 5 x 8 5 cm, without evidence of bowel obstruction or incarceration  4   Mild interval increase in size of pulmonary and hepatic metastases  I personally discussed this study with Alexei Meneses on 7/30/2020 at 11:37 PM  Workstation performed: [id number]     Ct Abdomen Pelvis With Contrast    Result Date: 7/1/2020  Narrative: CT ABDOMEN AND PELVIS WITH IV CONTRAST INDICATION:   Severe lower abdominal pain, vomiting, history of rectal cancer  Patient is a current everyday smoker  COMPARISON:  Multiple prior studies, most recent of which is a CT abdomen pelvis June 29, 2020  TECHNIQUE:  CT examination of the abdomen and pelvis was performed   Axial, sagittal, and coronal 2D reformatted images were created from the source data and submitted for interpretation  Radiation dose length product (DLP) for this visit:  294 85 mGy-cm   This examination, like all CT scans performed in the [facility], was performed utilizing techniques to minimize radiation dose exposure, including the use of iterative  reconstruction and automated exposure control  IV Contrast:  100 mL of iohexol (OMNIPAQUE) Enteric Contrast:  Enteric contrast was not administered  FINDINGS: ABDOMEN LOWER CHEST:  Multiple, stable bilateral lower lobe lung lesions, most compatible with metastatic disease  LIVER/BILIARY TREE:  No significant change in the low-density hepatic lesions, most compatible with hepatic metastatic disease  GALLBLADDER: Distended  Layering increased density in the gallbladder, most compatible with vicarious excretion of contrast material from earlier CT scan  No calcified gallstones  No pericholecystic inflammatory change  SPLEEN:  Enlarged  PANCREAS:  Unremarkable  ADRENAL GLANDS:  Unremarkable  KIDNEYS/URETERS:  Unremarkable  No hydronephrosis  STOMACH AND BOWEL:  Stomach relatively decompressed  Hiatal hernia  The proximal small bowel is decompressed  The distal small bowel is distended and fluid-filled  The colon is severely distended and filled with both liquid and formed stool, up to the level of the distal sigmoid colon/rectum  There is heterogeneous enhancement of the distal sigmoid colon and rectum  Peripherally enhancing low-density areas in the  soft tissue, likely areas of necrosis  There is mild wall edema of the sigmoid colon  APPENDIX: Multiple appendicoliths  No findings to suggest appendicitis  ABDOMINOPELVIC CAVITY:  No ascites  No pneumoperitoneum  No lymphadenopathy  VESSELS:  Unremarkable for patient's age  PELVIS REPRODUCTIVE ORGANS:  Multiple calcified uterine fibroids  Stable 2 9 cm left ovarian cyst  URINARY BLADDER:  Decompressed  Inadequately evaluated  ABDOMINAL WALL/INGUINAL REGIONS:  Umbilical hernia containing fat  The previously identified hernia containing small bowel loops has been reduced  OSSEOUS STRUCTURES:  No acute fracture or destructive osseous lesion  Postoperative changes with anterior fusion  Impression: 1  High-grade distal large bowel obstruction at the level of the distal sigmoid colon/rectum, secondary to patient's known rectal carcinoma  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  2   Mild mucosal thickening of the sigmoid colon, most compatible with mild colitis, likely related to the patient's chemotherapy  3   No significant change in pulmonary or hepatic metastatic disease  4   The previously identified ventral abdominal wall hernia containing loops of small bowel, has been reduced  5   Stable left ovarian cyst  Follow-up surgical consultation recommended  I personally discussed this study with Rosa Berry on 7/1/2020 at 6:16 AM  Workstation performed: [id number]         ** Please Note: Dragon 360 Dictation voice to text software was used in the creation of this document   **

## 2020-07-31 NOTE — SEDATION DOCUMENTATION
Left gluteal abscess drainage completed by Dr Johnson Challenger  10mL cloudy yellow fluid aspirated and specimens hand delivered to the lab  Adhesive bandage over puncture site clean, dry and intact  Bedside report given to LEANN Reyes

## 2020-07-31 NOTE — INTERVAL H&P NOTE
Patient referred for drainage of left buttock collection  The history and physical were reviewed, along with progress notes, and the patient was examined  There are no changes since it was written      /57   Pulse 96   Temp 100 4 °F (38 °C) (Oral)   Resp 16   Ht 5' 2" (1 575 m)   Wt 56 4 kg (124 lb 5 4 oz)   SpO2 96%   BMI 22 74 kg/m²        Luis Manuel Banegas MD/July 31, 2020/3:37 PM

## 2020-07-31 NOTE — PLAN OF CARE
Problem: PAIN - ADULT  Goal: Verbalizes/displays adequate comfort level or baseline comfort level  Description  Interventions:  - Encourage patient to monitor pain and request assistance  - Assess pain using appropriate pain scale  - Administer analgesics based on type and severity of pain and evaluate response  - Implement non-pharmacological measures as appropriate and evaluate response  - Consider cultural and social influences on pain and pain management  - Notify physician/advanced practitioner if interventions unsuccessful or patient reports new pain  Outcome: Progressing     Problem: INFECTION - ADULT  Goal: Absence or prevention of progression during hospitalization  Description  INTERVENTIONS:  - Assess and monitor for signs and symptoms of infection  - Monitor lab/diagnostic results  - Monitor all insertion sites, i e  indwelling lines, tubes, and drains  - Monitor endotracheal if appropriate and nasal secretions for changes in amount and color  - [place] appropriate cooling/warming therapies per order  - Administer medications as ordered  - Instruct and encourage patient and family to use good hand hygiene technique  - Identify and instruct in appropriate isolation precautions for identified infection/condition  Outcome: Progressing     Problem: SAFETY ADULT  Goal: Patient will remain free of falls  Description  INTERVENTIONS:  - Assess patient frequently for physical needs  -  Identify cognitive and physical deficits and behaviors that affect risk of falls    -  [place] fall precautions as indicated by assessment   - Educate patient/family on patient safety including physical limitations  - Instruct patient to call for assistance with activity based on assessment  - Modify environment to reduce risk of injury  - Consider OT/PT consult to assist with strengthening/mobility  Outcome: Progressing     Problem: DISCHARGE PLANNING  Goal: Discharge to home or other facility with appropriate resources  Description  INTERVENTIONS:  - Identify barriers to discharge w/patient and caregiver  - Arrange for needed discharge resources and transportation as appropriate  - Identify discharge learning needs (meds, wound care, etc )  - Arrange for interpretive services to assist at discharge as needed  - Refer to Case Management Department for coordinating discharge planning if the patient needs post-hospital services based on physician/advanced practitioner order or complex needs related to functional status, cognitive ability, or social support system  Outcome: Progressing     Problem: Knowledge Deficit  Goal: Patient/family/caregiver demonstrates understanding of disease process, treatment plan, medications, and discharge instructions  Description  Complete learning assessment and assess knowledge base    Interventions:  - Provide teaching at level of understanding  - Provide teaching via preferred learning methods  Outcome: Progressing

## 2020-07-31 NOTE — CONSULTS
Consultation - Infectious Disease   Puja Fitzpatrick 50 y o  female MRN: [id number]  Unit/Bed#: [facility] 931-01 Encounter: [id number]      Inpatient consult to Infectious Diseases  Consult performed by: Kayla Robles MD  Consult ordered by: Radha Vargas MD          IMPRESSION & RECOMMENDATIONS:   Impression:  1  Left gluteal abscess S/P I&D   2  Stage IV rectal carcinoma on chemotherapy     Recommendations:    Discuss with the primary service  1  Check final culture results  2  Pending above agree with continuing piperacillin/tazobactam and vancomycin IV       HISTORY OF PRESENT ILLNESS:    Reason for Consult:  Fever and rectal cancer  HPI: Ly Titus is a 50y o  year old female with a history of stage IV rectal cancer on chemotherapy, distal bowel obstruction and incompetent ileocecal valve  She was last here from 7/1/20-7/8/20 for leukocytosis  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 that appeared to be susceptible to ceftriaxone, penicillins, and vancomycin  The patient was treated with a course of ceftriaxone and also underwent lysis of adhesions  She was admitted from the emergency room on 7/30 complaining of fever, abdominal pain diarrhea for 1 day prior to admission     Blood cultures x2 from 7/30 are not negative so far, WBC count was 16,240 without left shift in lactate was normal   The patient was started yesterday on piperacillin/tazobactam 3 375 g q 6 hours IV and vancomycin 1 dose of 1 g IV yesterday  She was found to have a left gluteal and pelvic abscess and underwent IR drainage today     Gram stain and cultures of abscess fluid are pending  Review of Systems   Constitutional: Positive for activity change, appetite change, chills, diaphoresis, fatigue and fever  Gastrointestinal: Positive for abdominal distention, abdominal pain, nausea and rectal pain  Neurological: Positive for weakness       A [id number] point system-based review of systems is otherwise negative  PAST MEDICAL HISTORY:  Past Medical History:   Diagnosis Date    History of rectal cancer 2020     Past Surgical History:   Procedure Laterality Date    BACK SURGERY      BREAST BIOPSY Right 2020    2 sites; 1 breast 1 axilla    HERNIA REPAIR      3 repairs    IR IMAGE GUIDED BIOPSY/ASPIRATION LIVER  2020    IR PORT PLACEMENT  2020    TUBAL LIGATION      US GUIDED BREAST BIOPSY RIGHT COMPLETE Right 2020    US GUIDED BREAST LYMPH NODE BIOPSY RIGHT Right 2020       FAMILY HISTORY:  Non-contributory    SOCIAL HISTORY:  Social History   Single  Social History     Substance and Sexual Activity   Alcohol Use Not Currently     Social History     Substance and Sexual Activity   Drug Use No     Social History     Tobacco Use   Smoking Status Former Smoker    Packs/day: 0 00    Last attempt to quit: 2020    Years since quittin 2   Smokeless Tobacco Never Used       ALLERGIES:  No Known Allergies    MEDICATIONS:  All current active medications have been reviewed        PHYSICAL EXAM:  Temp:  [99 7 °F (37 6 °C)-100 4 °F (38 °C)] 100 1 °F (37 8 °C)  HR:  [] 90  Resp:  [16-20] 16  BP: ()/(52-71) 101/59  SpO2:  [95 %-99 %] 98 %  Temp (24hrs), Av °F (37 8 °C), Min:99 7 °F (37 6 °C), Max:100 4 °F (38 °C)  Current: Temperature: 100 1 °F (37 8 °C)    Intake/Output Summary (Last 24 hours) at 2020 1815  Last data filed at 2020 1442  Gross per 24 hour   Intake 1501 67 ml   Output 550 ml   Net 951 67 ml       General Appearance:  Appearing chronically ill female who was nontoxic, and in no distress, appears stated age   Head:  Normocephalic, without obvious abnormality, atraumatic   Eyes:  PERRL, conjunctiva pale and sclera anicteric, both eyes   Nose: Nares normal, mucosa normal, no drainage   Throat: Oropharynx moist without lesions; lips, mucosa, and tongue normal; teeth and gums normal   Neck: Supple, symmetrical, trachea midline, no adenopathy, no tenderness/mass/nodules   Back:   Symmetric, no curvature, ROM normal, no CVA tenderness   Lungs:   Clear to auscultation bilaterally, no audible wheezes, rhonchi and rales, respirations unlabored   Chest Wall:  No tenderness or deformity   Heart:  Regular rate and rhythm, S1, S2 normal, no murmur, rub or gallop   Abdomen:   Soft, abdominal hernias, tenderness more pronounced on right, non-distended, positive bowel sounds, no masses, no organomegaly    No CVA tenderness   Extremities: Extremities normal, atraumatic, no cyanosis, clubbing or edema   Skin: Surgical scars, multiple tattoos   Neurologic: Alert and oriented times 3, extremity strength 5/5 and symmetric           Invasive Devices:   Port A Cath 06/22/20 Right Chest (Active)       Peripheral IV 07/30/20 Left Antecubital (Active)   Site Assessment Clean;Dry; Intact 7/31/2020  7:30 AM   Dressing Type Transparent 7/31/2020  7:30 AM   Line Status Flushed; Infusing 7/31/2020  7:30 AM   Dressing Status Intact; Clean;Dry 7/31/2020  7:30 AM   Dressing Change Due 08/03/20 7/31/2020  7:30 AM       LABS, IMAGING, & OTHER STUDIES:  Lab Results:      I have personally reviewed pertinent labs  Results from last 7 days   Lab Units 07/30/20  2135   WBC Thousand/uL 16 24*   HEMOGLOBIN g/dL 8 2*   PLATELETS Thousands/uL 167     Results from last 7 days   Lab Units 07/30/20  2135   SODIUM mmol/L 132*   POTASSIUM mmol/L 3 4*   CHLORIDE mmol/L 99*   CO2 mmol/L 26   BUN mg/dL 13   CREATININE mg/dL 0 84   EGFR ml/min/1 73sq m 83   CALCIUM mg/dL 8 6   AST U/L 44   ALT U/L 22   ALK PHOS U/L 254*     Results from last 7 days   Lab Units 07/30/20  2156 07/30/20  2135   BLOOD CULTURE  Received in Microbiology Lab  Culture in Progress  Received in Microbiology Lab  Culture in Progress  Imaging Studies:   I have personally reviewed pertinent imaging study reports and images in PACS  EKG, Pathology, and Other Studies:   I have personally reviewed pertinent reports

## 2020-07-31 NOTE — CONSULTS
Consultation - Medical Oncology   Radha Fitzpatrick 50 y o  female MRN: [id number]  [facility] Encounter: [id number]    Assessment/Plan      1 45 y  o female with stage IV rectal cancer, K-LITA mut,MSI stable,high tumor burden(pelvis ,lung ,liver), (dx6/12/20), synchronous R BRCA,[place] positive HER-2(-),(dx 6/19/20), s/p FOLFIRINOX x2 , (6/23/20,7/21/20),   admitted with fever and pelvic/perirectal pain  Day 11,Cycle2  ECOG PS 1- 2         - new fluid collections in the left gluteal and presacral area                               correspond to cancer deposits seen on PET 6/16/20 and are consistent         with post treatment effect possibly complicated by abscess formation  This is quite likely since patient was recently admitted (post cycle 1               with partial bowel obstructoin and gram -positive bacteremia (Gemella         morbillorum),         -s/p diagnostic aspiration of the gluteal area collection labs,blood                   cultures pending,          - continue antibiotics per ID           -pain control         - f/up by colorectal surgery      2  Fever 2 to 1, if blood cultures positive will need chest PAC removal       3  Leukocytosis 2 to 1 ,but also post PEG G-CSF 8 days ago( WBC was             27K on 7/28/20, now is 16K        4  Anemia 2 to cancer and chemotherapy , iron deficiency 2 to chronic              rectal  bleed , improved post iv iron  5  Partial bowel obstruction 2 to CRCA, improved , F/UP, CT looks better  6 Synchronous at least clinical stage II right breast cancer , [place]           positive, HER -2 negative,being followed  7 Weight loss, dehydration secondary to ongoing GI symptoms, iv fluids,         nutritional support           CC: fever, perirectal pain     HPI:45 y old female with stage IV CRC was admitted with recurrent , perirectal pain, pressure , which she developed one week post second cycle of chemotherapy   Felt tired but had no nausea , vomiting  Had several formed bowel movement but no diarrhea  On the day of admission developed fever 101 6 with no chills,was instructed to start ciprofloxacin and go to the ER              Past Surgical History:   Procedure Laterality Date    BACK SURGERY      BREAST BIOPSY Right 06/19/2020    2 sites; 1 breast 1 axilla    HERNIA REPAIR      3 repairs    IR IMAGE GUIDED ASPIRATION / DRAINAGE  7/31/2020    IR IMAGE GUIDED BIOPSY/ASPIRATION LIVER  6/17/2020    IR PORT PLACEMENT  6/22/2020    TUBAL LIGATION      US GUIDED BREAST BIOPSY RIGHT COMPLETE Right 6/19/2020    US GUIDED BREAST LYMPH NODE BIOPSY RIGHT Right 6/19/2020         SH: tobacco:1/2 to 1ppd since teenage, quit April 2020         ETOH: denies use    FH :Father- stomach ca age 62        Paternal GF-lung cancer        Maternal GM breast ca        Maternal aunt - breast cancer        Paternal GGM ovarian cancer    Meds/Allergies      Medications Prior to Admission   Medication    cetirizine (ZyrTEC) 10 mg tablet    Ergocalciferol (VITAMIN D2 PO)    oxyCODONE-acetaminophen (PERCOCET) 5-325 mg per tablet    Cholecalciferol 50 MCG (2000 UT) CAPS    metoprolol succinate (TOPROL-XL) 25 mg 24 hr tablet    sertraline (ZOLOFT) 25 mg tablet     No Known Allergies    Current Facility-Administered Medications:     acetaminophen (TYLENOL) tablet 650 mg, 650 mg, Oral, Q6H PRN, Yocasta Lee DO    aluminum-magnesium hydroxide-simethicone (MYLANTA) 200-200-20 mg/5 mL oral suspension 30 mL, 30 mL, Oral, Q6H PRN, James Nickerson MD    cholecalciferol (VITAMIN D3) tablet 2,000 Units, 2,000 Units, Oral, Daily, James Nickerson MD, 2,000 Units at 07/31/20 0938    enoxaparin (LOVENOX) subcutaneous injection 40 mg, 40 mg, Subcutaneous, Daily, James Nickerson MD, 40 mg at 07/31/20 0938    HYDROmorphone (DILAUDID) injection 1 mg, 1 mg, Intravenous, Q1H PRN, James Nickerson MD, 1 mg at 07/31/20 1734    loratadine (CLARITIN) tablet 10 mg, 10 mg, Oral, Daily, Daly Lau MD, 10 mg at 07/31/20 1053    metoprolol succinate (TOPROL-XL) 24 hr tablet 25 mg, 25 mg, Oral, Daily, Daly Lau MD    multi-electrolyte (PLASMALYTE-A/ISOLYTE-S PH 7 4) IV solution, 100 mL/hr, Intravenous, Continuous, Daly Lau MD, Last Rate: 100 mL/hr at 07/31/20 0932, 100 mL/hr at 07/31/20 0932    ondansetron (ZOFRAN) injection 4 mg, 4 mg, Intravenous, Q6H PRN, Daly Lau MD    oxyCODONE (ROXICODONE) immediate release tablet 10 mg, 10 mg, Oral, Q4H PRN, Daly Lau MD, 10 mg at 07/31/20 4162    oxyCODONE-acetaminophen (PERCOCET) 5-325 mg per tablet 1 tablet, 1 tablet, Oral, Q4H PRN, Daly Lau MD    piperacillin-tazobactam (ZOSYN) 3 375 g in sodium chloride 0 9 % 100 mL IVPB, 3 375 g, Intravenous, Q6H, Boo Swan MD, Last Rate: 200 mL/hr at 07/31/20 1735, 3 375 g at 07/31/20 1735    senna (SENOKOT) tablet 8 6 mg, 1 tablet, Oral, BID, Yocasta Lee DO, 8 6 mg at 07/31/20 1735    sertraline (ZOLOFT) tablet 25 mg, 25 mg, Oral, Daily, Daly Lau MD    Review of Systems:     GENERAL:  Feeling well  Denies fatigue  Appetite good  Denies fevers, night sweats, or weight change  SKIN: Denies itchy skin or rashes, or easy bruising  HEENT: Denies vision changes or hearing changes  Denies sinus congestion or running nose or post-nasal drip  Denies nose bleeds  Denies sore mouth or sore throat  Denies hoarseness of voice  CARDIOVASCULAR: Denies chest pain or tightness, heart palpitations  Denies dizziness  RESPIRATORY: Denies SOB, dyspnea on exertion  Denies  cough, hemoptysis  GASTROINTESTINAL: as per HPI Denies nausea / vomiting  Denies swallowing difficulties, heartburn or indigestion  Denies black stools or blood in stools  Loose stools few, passing gas  GENITOURINARY: Denies dysuria, frequency, hematuria, or nocturia  NEUROLOGICAL: Denies headache, focal weakness, abnormal sensations   Denies changes in hearing or vision  Denies difficulty with ambulation  Denies seizures  MUSCULOSKELETAL: Denies back pain, muscle aches, joint pain or swelling  PSYCHIATRIC: Denies problems sleeping  Denies anxiety or depression  Objective   Vitals: Blood pressure 105/59, pulse 100, temperature 100 1 °F (37 8 °C), temperature source Oral, resp  rate 18, height 5' 2" (1 575 m), weight 56 4 kg (124 lb 5 4 oz), SpO2 98 %, not currently breastfeeding  Physical Examination    GENERAL: Alert, oriented to name, time and place  In no acute distress  INTEGUMENTARY: Skin warm, dry  No ecchymosis  No jaundice or rash, multiple tattoos  HEENT: Sclerae anicteric  Conjunctivae normal  Pupils equal and reactive to light  Oral/throat mucosa moist, intact without lesions  NECK:  Supple  No palpable mass, lymphadenopathy or goiter  LYMPHATIC: No palpable lymphadenopathy in neck / axillary / inguinal areas  CHEST/LUNGS: Lung sounds clear bilaterally  No wheezing or rale, right PAC site looks good  CARDIOVASCULAR: Heart rhythm and rate regular  S1, S2  No murmur or gallop  Breast: Right breast nodularity upper outer quadrant , left breast unremarkable,  Shotty right axillary lyphadenopathy  GASTROINTESTINAL: Abdomen soft  Normal bowel sounds  Non-tender to palpation  No palpable mass or organomegaly  No ascites  Left buttock area mild edema and tenderness but skin unremarkable  MUSCULOSKELETAL: No peripheral edema, clubbing or cyanosis  Pedal pulse 2+ bilaterally  No tenderness to percussion over vertebral bodies  NEUROLOGICAL: alert & oriented x 3  Motor and sensory grossly intact  PSYCHIATRIC: Pt is relaxed  Appropriated mood and interaction with conversation         Intake/Output Summary (Last 24 hours) at 7/31/2020 1911  Last data filed at 7/31/2020 1442  Gross per 24 hour   Intake 1501 67 ml   Output 550 ml   Net 951 67 ml       Invasive Devices     Central Venous Catheter Line            Port A Cath 06/22/20 Right Chest 39 days          Peripheral Intravenous Line            Peripheral IV 07/30/20 Left Antecubital less than 1 day                      Admission on 07/30/2020   Component Date Value    WBC 07/30/2020 16 24*    RBC 07/30/2020 3 34*    Hemoglobin 07/30/2020 8 2*    Hematocrit 07/30/2020 26 6*    MCV 07/30/2020 80*    MCH 07/30/2020 24 6*    MCHC 07/30/2020 30 8*    RDW 07/30/2020 23 9*    MPV 07/30/2020 10 5     Platelets 07/10/7434 167     nRBC 07/30/2020 0     Neutrophils Relative 07/30/2020 79*    Immat GRANS % 07/30/2020 2     Lymphocytes Relative 07/30/2020 10*    Monocytes Relative 07/30/2020 7     Eosinophils Relative 07/30/2020 2     Basophils Relative 07/30/2020 0     Neutrophils Absolute 07/30/2020 12 86*    Immature Grans Absolute 07/30/2020 0 36*    Lymphocytes Absolute 07/30/2020 1 54     Monocytes Absolute 07/30/2020 1 15     Eosinophils Absolute 07/30/2020 0 30     Basophils Absolute 07/30/2020 0 03     Sodium 07/30/2020 132*    Potassium 07/30/2020 3 4*    Chloride 07/30/2020 99*    CO2 07/30/2020 26     ANION GAP 07/30/2020 7     BUN 07/30/2020 13     Creatinine 07/30/2020 0 84     Glucose 07/30/2020 110     Calcium 07/30/2020 8 6     AST 07/30/2020 44     ALT 07/30/2020 22     Alkaline Phosphatase 07/30/2020 254*    Total Protein 07/30/2020 6 5     Albumin 07/30/2020 2 5*    Total Bilirubin 07/30/2020 0 20     eGFR 07/30/2020 83     Lipase 07/30/2020 102     Color, UA 07/30/2020 yellow     EXT PREG TEST UR (Ref: N* 07/30/2020 neg     Control 07/30/2020 valid     Blood Culture 07/30/2020 Received in Microbiology Lab  Culture in Progress   LACTIC ACID 07/30/2020 1 2     Blood Culture 07/30/2020 Received in Microbiology Lab  Culture in Progress       Color, UA 07/30/2020 Yellow     Clarity, UA 07/30/2020 Clear     pH, UA 07/30/2020 7 0     Leukocytes, UA 07/30/2020 Negative     Nitrite, UA 07/30/2020 Negative     Protein, UA 07/30/2020 Negative     Glucose, UA 07/30/2020 Negative     Ketones, UA 07/30/2020 Negative     Urobilinogen, UA 07/30/2020 0 2     Bilirubin, UA 07/30/2020 Negative     Blood, UA 07/30/2020 Trace*    Specific [place], UA 07/30/2020 1 015     RBC, UA 07/30/2020 2-4*    WBC, UA 07/30/2020 2-4*    Epithelial Cells 07/30/2020 None Seen     Bacteria, UA 07/30/2020 None Seen     Hyaline Casts, UA 07/30/2020 None Seen     Protime 07/31/2020 14 0     INR 07/31/2020 1 07     Color, UA 07/31/2020 Yellow     Clarity, UA 07/31/2020 Clear     Specific Gravity, UA 07/31/2020 1 026     pH, UA 07/31/2020 6 0     Leukocytes, UA 07/31/2020 Negative     Nitrite, UA 07/31/2020 Negative     Protein, UA 07/31/2020 Negative     Glucose, UA 07/31/2020 Negative     Ketones, UA 07/31/2020 Negative     Urobilinogen, UA 07/31/2020 0 2     Bilirubin, UA 07/31/2020 Negative     Blood, UA 07/31/2020 Trace*    RBC, UA 07/31/2020 4-10*    WBC, UA 07/31/2020 None Seen     Epithelial Cells 07/31/2020 None Seen     Bacteria, UA 07/31/2020 None Seen     Hyaline Casts, UA 07/31/2020 None Seen        Lab Results: I have reviewed all pertinent labs  Imaging: I have personally reviewed pertinent reports  EKG, Pathology, and Other Studies: I have personally reviewed pertinent reports  Code Status: Level 1 - Full Code        Counseling / Coordination of Care  Total floor / unit time spent today 45 minutes  Greater than 50% of total time was spent with the patient and / or family counseling and / or coordination of care

## 2020-07-31 NOTE — QUICK NOTE
Patient follows with Dr Kia Garcia spoke with Dr Kia Saxena and she will be coming to the hospital to see the patient  Oncology consult will be fulfilled by her

## 2020-08-01 LAB
ANION GAP SERPL CALCULATED.3IONS-SCNC: 5 MMOL/L (ref 4–13)
BASOPHILS # BLD AUTO: 0.04 THOUSANDS/ΜL (ref 0–0.1)
BASOPHILS NFR BLD AUTO: 0 % (ref 0–1)
BUN SERPL-MCNC: 6 MG/DL (ref 5–25)
CALCIUM SERPL-MCNC: 8.7 MG/DL (ref 8.3–10.1)
CHLORIDE SERPL-SCNC: 101 MMOL/L (ref 100–108)
CO2 SERPL-SCNC: 28 MMOL/L (ref 21–32)
CREAT SERPL-MCNC: 0.54 MG/DL (ref 0.6–1.3)
EOSINOPHIL # BLD AUTO: 0.37 THOUSAND/ΜL (ref 0–0.61)
EOSINOPHIL NFR BLD AUTO: 3 % (ref 0–6)
ERYTHROCYTE [DISTWIDTH] IN BLOOD BY AUTOMATED COUNT: 23.7 % (ref 11.6–15.1)
GFR SERPL CREATININE-BSD FRML MDRD: 113 ML/MIN/1.73SQ M
GLUCOSE SERPL-MCNC: 89 MG/DL (ref 65–140)
HCT VFR BLD AUTO: 26.7 % (ref 34.8–46.1)
HGB BLD-MCNC: 7.9 G/DL (ref 11.5–15.4)
IMM GRANULOCYTES # BLD AUTO: 0.19 THOUSAND/UL (ref 0–0.2)
IMM GRANULOCYTES NFR BLD AUTO: 2 % (ref 0–2)
LYMPHOCYTES # BLD AUTO: 1.78 THOUSANDS/ΜL (ref 0.6–4.47)
LYMPHOCYTES NFR BLD AUTO: 15 % (ref 14–44)
MCH RBC QN AUTO: 24.2 PG (ref 26.8–34.3)
MCHC RBC AUTO-ENTMCNC: 29.6 G/DL (ref 31.4–37.4)
MCV RBC AUTO: 82 FL (ref 82–98)
MONOCYTES # BLD AUTO: 0.78 THOUSAND/ΜL (ref 0.17–1.22)
MONOCYTES NFR BLD AUTO: 7 % (ref 4–12)
MRSA NOSE QL CULT: NORMAL
NEUTROPHILS # BLD AUTO: 8.53 THOUSANDS/ΜL (ref 1.85–7.62)
NEUTS SEG NFR BLD AUTO: 73 % (ref 43–75)
NRBC BLD AUTO-RTO: 0 /100 WBCS
PLATELET # BLD AUTO: 166 THOUSANDS/UL (ref 149–390)
PMV BLD AUTO: 10.4 FL (ref 8.9–12.7)
POTASSIUM SERPL-SCNC: 3.1 MMOL/L (ref 3.5–5.3)
RBC # BLD AUTO: 3.26 MILLION/UL (ref 3.81–5.12)
SODIUM SERPL-SCNC: 134 MMOL/L (ref 136–145)
WBC # BLD AUTO: 11.69 THOUSAND/UL (ref 4.31–10.16)

## 2020-08-01 PROCEDURE — 80048 BASIC METABOLIC PNL TOTAL CA: CPT | Performed by: PHYSICIAN ASSISTANT

## 2020-08-01 PROCEDURE — 99233 SBSQ HOSP IP/OBS HIGH 50: CPT | Performed by: INTERNAL MEDICINE

## 2020-08-01 PROCEDURE — 99232 SBSQ HOSP IP/OBS MODERATE 35: CPT | Performed by: INTERNAL MEDICINE

## 2020-08-01 PROCEDURE — 85025 COMPLETE CBC W/AUTO DIFF WBC: CPT | Performed by: PHYSICIAN ASSISTANT

## 2020-08-01 PROCEDURE — 99233 SBSQ HOSP IP/OBS HIGH 50: CPT | Performed by: COLON & RECTAL SURGERY

## 2020-08-01 RX ORDER — POTASSIUM CHLORIDE 20 MEQ/1
40 TABLET, EXTENDED RELEASE ORAL ONCE
Status: COMPLETED | OUTPATIENT
Start: 2020-08-01 | End: 2020-08-01

## 2020-08-01 RX ADMIN — HYDROMORPHONE HYDROCHLORIDE 1 MG: 1 INJECTION, SOLUTION INTRAMUSCULAR; INTRAVENOUS; SUBCUTANEOUS at 23:11

## 2020-08-01 RX ADMIN — OXYCODONE HYDROCHLORIDE 10 MG: 10 TABLET ORAL at 12:28

## 2020-08-01 RX ADMIN — OXYCODONE HYDROCHLORIDE 10 MG: 10 TABLET ORAL at 19:55

## 2020-08-01 RX ADMIN — POTASSIUM CHLORIDE 40 MEQ: 1500 TABLET, EXTENDED RELEASE ORAL at 11:40

## 2020-08-01 RX ADMIN — LORATADINE 10 MG: 10 TABLET ORAL at 08:37

## 2020-08-01 RX ADMIN — PIPERACILLIN SODIUM AND TAZOBACTAM SODIUM 3.38 G: 36; 4.5 INJECTION, POWDER, FOR SOLUTION INTRAVENOUS at 18:21

## 2020-08-01 RX ADMIN — HYDROMORPHONE HYDROCHLORIDE 1 MG: 1 INJECTION, SOLUTION INTRAMUSCULAR; INTRAVENOUS; SUBCUTANEOUS at 14:36

## 2020-08-01 RX ADMIN — SENNOSIDES 8.6 MG: 8.6 TABLET ORAL at 18:22

## 2020-08-01 RX ADMIN — OXYCODONE HYDROCHLORIDE 10 MG: 10 TABLET ORAL at 02:54

## 2020-08-01 RX ADMIN — OXYCODONE HYDROCHLORIDE 10 MG: 10 TABLET ORAL at 08:37

## 2020-08-01 RX ADMIN — SENNOSIDES 8.6 MG: 8.6 TABLET ORAL at 08:37

## 2020-08-01 RX ADMIN — SODIUM CHLORIDE, SODIUM GLUCONATE, SODIUM ACETATE, POTASSIUM CHLORIDE, MAGNESIUM CHLORIDE, SODIUM PHOSPHATE, DIBASIC, AND POTASSIUM PHOSPHATE 100 ML/HR: .53; .5; .37; .037; .03; .012; .00082 INJECTION, SOLUTION INTRAVENOUS at 20:00

## 2020-08-01 RX ADMIN — PIPERACILLIN SODIUM AND TAZOBACTAM SODIUM 3.38 G: 36; 4.5 INJECTION, POWDER, FOR SOLUTION INTRAVENOUS at 05:55

## 2020-08-01 RX ADMIN — ENOXAPARIN SODIUM 40 MG: 40 INJECTION SUBCUTANEOUS at 08:37

## 2020-08-01 RX ADMIN — PIPERACILLIN SODIUM AND TAZOBACTAM SODIUM 3.38 G: 36; 4.5 INJECTION, POWDER, FOR SOLUTION INTRAVENOUS at 11:40

## 2020-08-01 RX ADMIN — PIPERACILLIN SODIUM AND TAZOBACTAM SODIUM 3.38 G: 36; 4.5 INJECTION, POWDER, FOR SOLUTION INTRAVENOUS at 23:13

## 2020-08-01 RX ADMIN — OXYCODONE HYDROCHLORIDE 10 MG: 10 TABLET ORAL at 23:57

## 2020-08-01 RX ADMIN — SODIUM CHLORIDE, SODIUM GLUCONATE, SODIUM ACETATE, POTASSIUM CHLORIDE, MAGNESIUM CHLORIDE, SODIUM PHOSPHATE, DIBASIC, AND POTASSIUM PHOSPHATE 100 ML/HR: .53; .5; .37; .037; .03; .012; .00082 INJECTION, SOLUTION INTRAVENOUS at 00:24

## 2020-08-01 RX ADMIN — HYDROMORPHONE HYDROCHLORIDE 1 MG: 1 INJECTION, SOLUTION INTRAMUSCULAR; INTRAVENOUS; SUBCUTANEOUS at 18:21

## 2020-08-01 RX ADMIN — MELATONIN 2000 UNITS: at 08:37

## 2020-08-01 RX ADMIN — ACETAMINOPHEN 650 MG: 325 TABLET, FILM COATED ORAL at 18:41

## 2020-08-01 RX ADMIN — ACETAMINOPHEN 650 MG: 325 TABLET, FILM COATED ORAL at 02:54

## 2020-08-01 NOTE — PLAN OF CARE
Problem: PAIN - ADULT  Goal: Verbalizes/displays adequate comfort level or baseline comfort level  Description  Interventions:  - Encourage patient to monitor pain and request assistance  - Assess pain using appropriate pain scale  - Administer analgesics based on type and severity of pain and evaluate response  - Implement non-pharmacological measures as appropriate and evaluate response  - Consider cultural and social influences on pain and pain management  - Notify physician/advanced practitioner if interventions unsuccessful or patient reports new pain  Outcome: Progressing     Problem: INFECTION - ADULT  Goal: Absence or prevention of progression during hospitalization  Description  INTERVENTIONS:  - Assess and monitor for signs and symptoms of infection  - Monitor lab/diagnostic results  - Monitor all insertion sites, i e  indwelling lines, tubes, and drains  - Monitor endotracheal if appropriate and nasal secretions for changes in amount and color  - [place] appropriate cooling/warming therapies per order  - Administer medications as ordered  - Instruct and encourage patient and family to use good hand hygiene technique  - Identify and instruct in appropriate isolation precautions for identified infection/condition  Outcome: Progressing     Problem: SAFETY ADULT  Goal: Patient will remain free of falls  Description  INTERVENTIONS:  - Assess patient frequently for physical needs  -  Identify cognitive and physical deficits and behaviors that affect risk of falls    -  [place] fall precautions as indicated by assessment   - Educate patient/family on patient safety including physical limitations  - Instruct patient to call for assistance with activity based on assessment  - Modify environment to reduce risk of injury  - Consider OT/PT consult to assist with strengthening/mobility  Outcome: Progressing     Problem: DISCHARGE PLANNING  Goal: Discharge to home or other facility with appropriate resources  Description  INTERVENTIONS:  - Identify barriers to discharge w/patient and caregiver  - Arrange for needed discharge resources and transportation as appropriate  - Identify discharge learning needs (meds, wound care, etc )  - Arrange for interpretive services to assist at discharge as needed  - Refer to Case Management Department for coordinating discharge planning if the patient needs post-hospital services based on physician/advanced practitioner order or complex needs related to functional status, cognitive ability, or social support system  Outcome: Progressing     Problem: Knowledge Deficit  Goal: Patient/family/caregiver demonstrates understanding of disease process, treatment plan, medications, and discharge instructions  Description  Complete learning assessment and assess knowledge base    Interventions:  - Provide teaching at level of understanding  - Provide teaching via preferred learning methods  Outcome: Progressing

## 2020-08-01 NOTE — PROGRESS NOTES
Progress Note - Infectious Disease   Puja Fitzpatrick 50 y o  female MRN: [id number]  Unit/Bed#: [facility] 931-01 Encounter: [id number]      Impression:  1  Left gluteal abscess S/P I&D   2  Stage IV rectal carcinoma on chemotherapy     Recommendations:  Patient had T-max of a 102 5° in early a m  currently she is 101 9° with declining but modestly elevated WBC count  1  Abscess culture so far showing Streptococcus anginosus which should be susceptible to the piperacillin/tazobactam   If no other isolates obtained could change Rx to cefazolin or ampicillin    Antibiotics:  1  Piperacillin/tazobactam 3 375 g q 6 hours IV, day 3 Rx     Subjective:  She still complains of left buttock and rectal pain  Denies nausea, vomiting, or diarrhea  Objective:  Vitals:  Temp:  [97 8 °F (36 6 °C)-102 5 °F (39 2 °C)] 98 1 °F (36 7 °C)  HR:  [] 89  Resp:  [18-20] 20  BP: ()/(54-68) 111/68  SpO2:  [96 %-98 %] 97 %  Temp (24hrs), Av 1 °F (37 3 °C), Min:97 8 °F (36 6 °C), Max:102 5 °F (39 2 °C)  Current: Temperature: 98 1 °F (36 7 °C)    Physical Exam:     General Appearance:  Alert, chronically ill-appearing female nontoxic, no acute distress  Throat: Oropharynx moist without lesions  Lips, mucosa, and tongue normal   Neck: Supple, symmetrical, trachea midline, no adenopathy,  no tenderness/mass/nodules   Lungs:   Clear to auscultation bilaterally, no audible wheezes, rhonchi or rales; respirations unlabored  Port-A-Cath right subclavian area   Heart:  Regular rate and rhythm, S1, S2 normal, no murmur, rub or gallop   Abdomen:   Soft, mild right-sided tenderness, non-distended, positive bowel sounds    No masses, no organomegaly    No CVA tenderness   Extremities: Extremities normal, atraumatic, no clubbing, cyanosis or edema   Skin: Surgical scars, multiple tattoos, left gluteal wound         Invasive Devices     Central Venous Catheter Line            Port A Cath 20 Right Chest 40 days          Peripheral Intravenous Line            Peripheral IV 08/01/20 Right Antecubital less than 1 day                Labs, Imaging, & Other studies:   All pertinent labs were personally reviewed  Results from last 7 days   Lab Units 08/01/20  0654 07/30/20  2135   WBC Thousand/uL 11 69* 16 24*   HEMOGLOBIN g/dL 7 9* 8 2*   PLATELETS Thousands/uL 166 167     Results from last 7 days   Lab Units 08/01/20  0654 07/30/20  2135   SODIUM mmol/L 134* 132*   POTASSIUM mmol/L 3 1* 3 4*   CHLORIDE mmol/L 101 99*   CO2 mmol/L 28 26   BUN mg/dL 6 13   CREATININE mg/dL 0 54* 0 84   EGFR ml/min/1 73sq m 113 83   CALCIUM mg/dL 8 7 8 6   AST U/L  --  44   ALT U/L  --  22   ALK PHOS U/L  --  254*     Results from last 7 days   Lab Units 07/31/20  1541 07/30/20  2156 07/30/20  2135   BLOOD CULTURE   --  No Growth at 24 hrs  No Growth at 24 hrs     GRAM STAIN RESULT  3+ Polys  No bacteria seen  --   --    BODY FLUID CULTURE, STERILE  3+ Growth of Streptococcus anginosus*  --   --

## 2020-08-01 NOTE — PLAN OF CARE
Problem: PAIN - ADULT  Goal: Verbalizes/displays adequate comfort level or baseline comfort level  Description  Interventions:  - Encourage patient to monitor pain and request assistance  - Assess pain using appropriate pain scale  - Administer analgesics based on type and severity of pain and evaluate response  - Implement non-pharmacological measures as appropriate and evaluate response  - Consider cultural and social influences on pain and pain management  - Notify physician/advanced practitioner if interventions unsuccessful or patient reports new pain  Outcome: Progressing     Problem: INFECTION - ADULT  Goal: Absence or prevention of progression during hospitalization  Description  INTERVENTIONS:  - Assess and monitor for signs and symptoms of infection  - Monitor lab/diagnostic results  - Monitor all insertion sites, i e  indwelling lines, tubes, and drains  - Monitor endotracheal if appropriate and nasal secretions for changes in amount and color  - [place] appropriate cooling/warming therapies per order  - Administer medications as ordered  - Instruct and encourage patient and family to use good hand hygiene technique  - Identify and instruct in appropriate isolation precautions for identified infection/condition  Outcome: Progressing     Problem: SAFETY ADULT  Goal: Patient will remain free of falls  Description  INTERVENTIONS:  - Assess patient frequently for physical needs  -  Identify cognitive and physical deficits and behaviors that affect risk of falls    -  [place] fall precautions as indicated by assessment   - Educate patient/family on patient safety including physical limitations  - Instruct patient to call for assistance with activity based on assessment  - Modify environment to reduce risk of injury  - Consider OT/PT consult to assist with strengthening/mobility  Outcome: Progressing     Problem: DISCHARGE PLANNING  Goal: Discharge to home or other facility with appropriate resources  Description  INTERVENTIONS:  - Identify barriers to discharge w/patient and caregiver  - Arrange for needed discharge resources and transportation as appropriate  - Identify discharge learning needs (meds, wound care, etc )  - Arrange for interpretive services to assist at discharge as needed  - Refer to Case Management Department for coordinating discharge planning if the patient needs post-hospital services based on physician/advanced practitioner order or complex needs related to functional status, cognitive ability, or social support system  Outcome: Progressing     Problem: Knowledge Deficit  Goal: Patient/family/caregiver demonstrates understanding of disease process, treatment plan, medications, and discharge instructions  Description  Complete learning assessment and assess knowledge base    Interventions:  - Provide teaching at level of understanding  - Provide teaching via preferred learning methods  Outcome: Progressing

## 2020-08-01 NOTE — ASSESSMENT & PLAN NOTE
Recently was admitted for fever with blood cultures growing Gemella  Patient on Zosyn  ID consult  Patient given 1 time dose of Vanco in ED  Patient with known history of rectal cancer on chemotherapy  colorectal surgery follow-up  Oncology follow-up care of Dr Crissy Vallecillo who she regularly sees      Status post IR drain on July 31st

## 2020-08-01 NOTE — PROGRESS NOTES
Progress Note - Jaylyn Moreira 1975, 50 y o  female MRN: [id number]    Unit/Bed#: [facility] 931-01 Encounter: [id number]    Primary Care Provider: Mason Gutiérrez MD   Date and time admitted to hospital: 2020  8:59 PM        * Rectal abscess  Assessment & Plan  Recently was admitted for fever with blood cultures growing Gemella  Patient on Zosyn  ID consult  Patient given 1 time dose of Vanco in ED  Patient with known history of rectal cancer on chemotherapy  colorectal surgery follow-up  Oncology follow-up care of Dr Mariaelena Weston who she regularly sees  Status post IR drain on     Breast cancer [facility])  Assessment & Plan  Continued oncology follow-up  Rectal cancer [facility])  Assessment & Plan  Continued oncology follow-up  VTE Pharmacologic Prophylaxis:   Pharmacologic: Enoxaparin (Lovenox)  Mechanical VTE Prophylaxis in Place: No    Patient Centered Rounds: I have performed bedside rounds with nursing staff today  Time Spent for Care: 15 minutes  More than 50% of total time spent on counseling and coordination of care as described above  Current Length of Stay: 1 day(s)    Current Patient Status: Inpatient       Code Status: Level 1 - Full Code      Subjective:   Patient more comfortable today a status post drain yesterday    Objective:     Vitals:   Temp (24hrs), Av 2 °F (37 9 °C), Min:98 6 °F (37 °C), Max:102 5 °F (39 2 °C)    Temp:  [98 6 °F (37 °C)-102 5 °F (39 2 °C)] 99 2 °F (37 3 °C)  HR:  [] 103  Resp:  [16-18] 18  BP: ()/(54-67) 93/54  SpO2:  [96 %-98 %] 96 %  Body mass index is 22 58 kg/m²  Input and Output Summary (last 24 hours): Intake/Output Summary (Last 24 hours) at 2020 0851  Last data filed at 2020 0244  Gross per 24 hour   Intake 1686 67 ml   Output 650 ml   Net 1036 67 ml       Physical Exam:     Physical Exam   HENT:   Head: Normocephalic and atraumatic  Eyes: Pupils are equal, round, and reactive to light   EOM are normal    Neck: Normal range of motion  Neck supple  Cardiovascular: Normal rate  Pulmonary/Chest: Effort normal and breath sounds normal  No stridor  No respiratory distress  Abdominal: Soft  Bowel sounds are normal  She exhibits no distension  Musculoskeletal: Normal range of motion  Neurological: She is alert  Additional Data:     Labs:    Results from last 7 days   Lab Units 08/01/20  0654   WBC Thousand/uL 11 69*   HEMOGLOBIN g/dL 7 9*   HEMATOCRIT % 26 7*   PLATELETS Thousands/uL 166   NEUTROS PCT % 73   LYMPHS PCT % 15   MONOS PCT % 7   EOS PCT % 3     Results from last 7 days   Lab Units 08/01/20  0654 07/30/20  2135   POTASSIUM mmol/L 3 1* 3 4*   CHLORIDE mmol/L 101 99*   CO2 mmol/L 28 26   BUN mg/dL 6 13   CREATININE mg/dL 0 54* 0 84   CALCIUM mg/dL 8 7 8 6   ALK PHOS U/L  --  254*   ALT U/L  --  22   AST U/L  --  44     Results from last 7 days   Lab Units 07/31/20  0656   INR  1 07       * I Have Reviewed All Lab Data Listed Above  * Additional Pertinent Lab Tests Reviewed: All Labs Within Last 24 Hours Reviewed        Recent Cultures (last 7 days):     Results from last 7 days   Lab Units 07/31/20  1541 07/30/20  2156 07/30/20  2135   BLOOD CULTURE   --  No Growth at 24 hrs  No Growth at 24 hrs     GRAM STAIN RESULT  3+ Polys  No bacteria seen  --   --        Last 24 Hours Medication List:     Current Facility-Administered Medications:  acetaminophen 650 mg Oral Q6H PRN Yocasta Lee DO    aluminum-magnesium hydroxide-simethicone 30 mL Oral Q6H PRN Blanca Thomason MD    cholecalciferol 2,000 Units Oral Daily Blanca Thomason MD    enoxaparin 40 mg Subcutaneous Daily Blanca Thomason MD    HYDROmorphone 1 mg Intravenous Q1H PRN Blanca Thomason MD    loratadine 10 mg Oral Daily Blanca Thomason MD    metoprolol succinate 25 mg Oral Daily Blanca Thomason MD    multi-electrolyte 100 mL/hr Intravenous Continuous Blanca Thomason MD Last Rate: 100 mL/hr (08/01/20 0024)   ondansetron 4 mg Intravenous Q6H PRN Chucky Nathan MD    oxyCODONE 10 mg Oral Q4H PRN Chucky Nathan MD    oxyCODONE-acetaminophen 1 tablet Oral Q4H PRN Chucky Nathan MD    piperacillin-tazobactam 3 375 g Intravenous Q6H Mathew Patel MD Last Rate: 3 375 g (08/01/20 0555)   senna 1 tablet Oral BID Yocasta Lee DO    sertraline 25 mg Oral Daily Chucky Nathan MD         Today, Patient Was Seen By: Olamide Buck DO    ** Please Note: Dictation voice to text software may have been used in the creation of this document   **

## 2020-08-01 NOTE — PROGRESS NOTES
Progress Note - Colorectal Surgery   Puja Fitzpatrick 50 y o  female MRN: [id number]  Unit/Bed#: [facility] 931-01 Encounter: [id number]    Assessment:  45F w/ known Stage IV Rectal Cancer on Chemotherapy w/ Avastin now with leukocytosis, fevers and new "collections" seen on CT in pre-sacral area and Left Gluteal muscle  IR aspiration 7/31  102 5 fever this morning    Plan:   Continue Regular   Continue Abx   F/u drain culture   Follow up fever    Subjective/Objective     Subjective:   Complains abdominal pain in suprapubic area but getting better  Denies nausea or vomiting, +ve flatus, no BM    Objective:    Blood pressure 107/67, pulse 103, temperature 99 2 °F (37 3 °C), temperature source Oral, resp  rate 18, height 5' 2" (1 575 m), weight 56 kg (123 lb 7 3 oz), SpO2 96 %, not currently breastfeeding  ,Body mass index is 22 58 kg/m²        Intake/Output Summary (Last 24 hours) at 8/1/2020 0724  Last data filed at 8/1/2020 0244  Gross per 24 hour   Intake 1686 67 ml   Output 650 ml   Net 1036 67 ml       Invasive Devices     Central Venous Catheter Line            Port A Cath 06/22/20 Right Chest 39 days          Peripheral Intravenous Line            Peripheral IV 07/30/20 Left Antecubital 1 day                Physical Exam:   Gen:  NAD  CV:  warm, well-perfused  Lungs: nl effort  Abd:  soft, mild tenderness in RLQ, RRQ, ND  Ext:  no CCE  Neuro: A&Ox3     Results from last 7 days   Lab Units 08/01/20  0654 07/30/20  2135   WBC Thousand/uL 11 69* 16 24*   HEMOGLOBIN g/dL 7 9* 8 2*   HEMATOCRIT % 26 7* 26 6*   PLATELETS Thousands/uL 166 167     Results from last 7 days   Lab Units 07/30/20  2135   POTASSIUM mmol/L 3 4*   CHLORIDE mmol/L 99*   CO2 mmol/L 26   BUN mg/dL 13   CREATININE mg/dL 0 84   CALCIUM mg/dL 8 6     Results from last 7 days   Lab Units 07/31/20  0656   INR  1 07

## 2020-08-02 LAB
ANION GAP SERPL CALCULATED.3IONS-SCNC: 7 MMOL/L (ref 4–13)
BACTERIA SPEC ANAEROBE CULT: ABNORMAL
BACTERIA SPEC BFLD CULT: ABNORMAL
BASOPHILS # BLD AUTO: 0.03 THOUSANDS/ΜL (ref 0–0.1)
BASOPHILS NFR BLD AUTO: 0 % (ref 0–1)
BUN SERPL-MCNC: 3 MG/DL (ref 5–25)
CALCIUM SERPL-MCNC: 8.7 MG/DL (ref 8.3–10.1)
CHLORIDE SERPL-SCNC: 103 MMOL/L (ref 100–108)
CO2 SERPL-SCNC: 25 MMOL/L (ref 21–32)
CREAT SERPL-MCNC: 0.47 MG/DL (ref 0.6–1.3)
EOSINOPHIL # BLD AUTO: 0.38 THOUSAND/ΜL (ref 0–0.61)
EOSINOPHIL NFR BLD AUTO: 3 % (ref 0–6)
ERYTHROCYTE [DISTWIDTH] IN BLOOD BY AUTOMATED COUNT: 23.3 % (ref 11.6–15.1)
GFR SERPL CREATININE-BSD FRML MDRD: 118 ML/MIN/1.73SQ M
GLUCOSE SERPL-MCNC: 98 MG/DL (ref 65–140)
GRAM STN SPEC: ABNORMAL
GRAM STN SPEC: ABNORMAL
HCT VFR BLD AUTO: 28.6 % (ref 34.8–46.1)
HGB BLD-MCNC: 8.4 G/DL (ref 11.5–15.4)
IMM GRANULOCYTES # BLD AUTO: 0.17 THOUSAND/UL (ref 0–0.2)
IMM GRANULOCYTES NFR BLD AUTO: 1 % (ref 0–2)
LYMPHOCYTES # BLD AUTO: 1.76 THOUSANDS/ΜL (ref 0.6–4.47)
LYMPHOCYTES NFR BLD AUTO: 15 % (ref 14–44)
MAGNESIUM SERPL-MCNC: 2.5 MG/DL (ref 1.6–2.6)
MCH RBC QN AUTO: 23.9 PG (ref 26.8–34.3)
MCHC RBC AUTO-ENTMCNC: 29.4 G/DL (ref 31.4–37.4)
MCV RBC AUTO: 82 FL (ref 82–98)
MONOCYTES # BLD AUTO: 0.77 THOUSAND/ΜL (ref 0.17–1.22)
MONOCYTES NFR BLD AUTO: 7 % (ref 4–12)
NEUTROPHILS # BLD AUTO: 8.81 THOUSANDS/ΜL (ref 1.85–7.62)
NEUTS SEG NFR BLD AUTO: 74 % (ref 43–75)
NRBC BLD AUTO-RTO: 0 /100 WBCS
PLATELET # BLD AUTO: 238 THOUSANDS/UL (ref 149–390)
PMV BLD AUTO: 10.7 FL (ref 8.9–12.7)
POTASSIUM SERPL-SCNC: 3.7 MMOL/L (ref 3.5–5.3)
RBC # BLD AUTO: 3.51 MILLION/UL (ref 3.81–5.12)
SODIUM SERPL-SCNC: 135 MMOL/L (ref 136–145)
WBC # BLD AUTO: 11.92 THOUSAND/UL (ref 4.31–10.16)

## 2020-08-02 PROCEDURE — 85025 COMPLETE CBC W/AUTO DIFF WBC: CPT | Performed by: INTERNAL MEDICINE

## 2020-08-02 PROCEDURE — 83735 ASSAY OF MAGNESIUM: CPT | Performed by: INTERNAL MEDICINE

## 2020-08-02 PROCEDURE — 99232 SBSQ HOSP IP/OBS MODERATE 35: CPT | Performed by: INTERNAL MEDICINE

## 2020-08-02 PROCEDURE — 80048 BASIC METABOLIC PNL TOTAL CA: CPT | Performed by: INTERNAL MEDICINE

## 2020-08-02 RX ORDER — POLYETHYLENE GLYCOL 3350 17 G/17G
17 POWDER, FOR SOLUTION ORAL DAILY
Status: DISCONTINUED | OUTPATIENT
Start: 2020-08-02 | End: 2020-08-09 | Stop reason: HOSPADM

## 2020-08-02 RX ORDER — HYDROMORPHONE HCL/PF 1 MG/ML
1 SYRINGE (ML) INJECTION
Status: DISCONTINUED | OUTPATIENT
Start: 2020-08-02 | End: 2020-08-09 | Stop reason: HOSPADM

## 2020-08-02 RX ORDER — OXYCODONE HCL 20 MG/1
20 TABLET, FILM COATED, EXTENDED RELEASE ORAL EVERY 12 HOURS SCHEDULED
Status: DISCONTINUED | OUTPATIENT
Start: 2020-08-02 | End: 2020-08-09 | Stop reason: HOSPADM

## 2020-08-02 RX ADMIN — OXYCODONE HYDROCHLORIDE 10 MG: 10 TABLET ORAL at 09:33

## 2020-08-02 RX ADMIN — ALUMINUM HYDROXIDE, MAGNESIUM HYDROXIDE, AND SIMETHICONE 30 ML: 200; 200; 20 SUSPENSION ORAL at 18:01

## 2020-08-02 RX ADMIN — PIPERACILLIN SODIUM AND TAZOBACTAM SODIUM 3.38 G: 36; 4.5 INJECTION, POWDER, FOR SOLUTION INTRAVENOUS at 11:52

## 2020-08-02 RX ADMIN — POLYETHYLENE GLYCOL 3350 17 G: 17 POWDER, FOR SOLUTION ORAL at 13:31

## 2020-08-02 RX ADMIN — OXYCODONE HYDROCHLORIDE 20 MG: 20 TABLET, FILM COATED, EXTENDED RELEASE ORAL at 13:31

## 2020-08-02 RX ADMIN — OXYCODONE HYDROCHLORIDE 10 MG: 10 TABLET ORAL at 13:31

## 2020-08-02 RX ADMIN — SODIUM CHLORIDE, SODIUM GLUCONATE, SODIUM ACETATE, POTASSIUM CHLORIDE, MAGNESIUM CHLORIDE, SODIUM PHOSPHATE, DIBASIC, AND POTASSIUM PHOSPHATE 100 ML/HR: .53; .5; .37; .037; .03; .012; .00082 INJECTION, SOLUTION INTRAVENOUS at 08:26

## 2020-08-02 RX ADMIN — PIPERACILLIN SODIUM AND TAZOBACTAM SODIUM 3.38 G: 36; 4.5 INJECTION, POWDER, FOR SOLUTION INTRAVENOUS at 05:14

## 2020-08-02 RX ADMIN — AMPICILLIN SODIUM AND SULBACTAM SODIUM 3 G: 2; 1 INJECTION, POWDER, FOR SOLUTION INTRAMUSCULAR; INTRAVENOUS at 23:19

## 2020-08-02 RX ADMIN — OXYCODONE HYDROCHLORIDE 10 MG: 10 TABLET ORAL at 17:23

## 2020-08-02 RX ADMIN — HYDROMORPHONE HYDROCHLORIDE 1 MG: 1 INJECTION, SOLUTION INTRAMUSCULAR; INTRAVENOUS; SUBCUTANEOUS at 08:32

## 2020-08-02 RX ADMIN — SENNOSIDES 8.6 MG: 8.6 TABLET ORAL at 09:33

## 2020-08-02 RX ADMIN — LORATADINE 10 MG: 10 TABLET ORAL at 09:33

## 2020-08-02 RX ADMIN — PIPERACILLIN SODIUM AND TAZOBACTAM SODIUM 3.38 G: 36; 4.5 INJECTION, POWDER, FOR SOLUTION INTRAVENOUS at 17:23

## 2020-08-02 RX ADMIN — OXYCODONE HYDROCHLORIDE 10 MG: 10 TABLET ORAL at 05:09

## 2020-08-02 RX ADMIN — MELATONIN 2000 UNITS: at 09:33

## 2020-08-02 RX ADMIN — SODIUM CHLORIDE, SODIUM GLUCONATE, SODIUM ACETATE, POTASSIUM CHLORIDE, MAGNESIUM CHLORIDE, SODIUM PHOSPHATE, DIBASIC, AND POTASSIUM PHOSPHATE 100 ML/HR: .53; .5; .37; .037; .03; .012; .00082 INJECTION, SOLUTION INTRAVENOUS at 22:26

## 2020-08-02 RX ADMIN — ENOXAPARIN SODIUM 40 MG: 40 INJECTION SUBCUTANEOUS at 09:33

## 2020-08-02 RX ADMIN — ACETAMINOPHEN 650 MG: 325 TABLET, FILM COATED ORAL at 20:19

## 2020-08-02 RX ADMIN — HYDROMORPHONE HYDROCHLORIDE 1 MG: 1 INJECTION, SOLUTION INTRAMUSCULAR; INTRAVENOUS; SUBCUTANEOUS at 19:20

## 2020-08-02 RX ADMIN — HYDROMORPHONE HYDROCHLORIDE 1 MG: 1 INJECTION, SOLUTION INTRAMUSCULAR; INTRAVENOUS; SUBCUTANEOUS at 06:10

## 2020-08-02 RX ADMIN — HYDROMORPHONE HYDROCHLORIDE 1 MG: 1 INJECTION, SOLUTION INTRAMUSCULAR; INTRAVENOUS; SUBCUTANEOUS at 15:10

## 2020-08-02 RX ADMIN — OXYCODONE HYDROCHLORIDE 20 MG: 20 TABLET, FILM COATED, EXTENDED RELEASE ORAL at 20:19

## 2020-08-02 RX ADMIN — METOPROLOL SUCCINATE 25 MG: 25 TABLET, EXTENDED RELEASE ORAL at 09:33

## 2020-08-02 RX ADMIN — SENNOSIDES 8.6 MG: 8.6 TABLET ORAL at 17:20

## 2020-08-02 RX ADMIN — HYDROMORPHONE HYDROCHLORIDE 1 MG: 1 INJECTION, SOLUTION INTRAMUSCULAR; INTRAVENOUS; SUBCUTANEOUS at 11:52

## 2020-08-02 NOTE — PROGRESS NOTES
Progress Note - Bladimir Ratliff 1975, 50 y o  female MRN: [id number]    Unit/Bed#: [facility] 931-01 Encounter: [id number]    Primary Care Provider: Ankit Champion MD   Date and time admitted to hospital: 2020  8:59 PM        * Rectal abscess  Assessment & Plan  Recently was admitted for fever with blood cultures growing Gemella  Patient on Zosyn-possible transition ampicillin or cefazolin  ID consult  Patient with known history of rectal cancer on chemotherapy  colorectal surgery follow-up  Oncology follow-up care of Dr Ej Duval who she regularly sees  Status post IR drain on     Breast cancer [facility])  Assessment & Plan  Continued oncology follow-up  Rectal cancer [facility])  Assessment & Plan  Continued oncology follow-up  VTE Pharmacologic Prophylaxis:   Pharmacologic: Enoxaparin (Lovenox)  Mechanical VTE Prophylaxis in Place: No    Patient Centered Rounds: I have performed bedside rounds with nursing staff today  Time Spent for Care: 15 minutes  More than 50% of total time spent on counseling and coordination of care as described above  Current Length of Stay: 2 day(s)    Current Patient Status: Inpatient   Certification Statement: The patient will continue to require additional inpatient hospital stay due to Need to monitor symptoms        Code Status: Level 1 - Full Code      Subjective:   No acute distress    Objective:     Vitals:   Temp (24hrs), Av 6 °F (37 °C), Min:97 7 °F (36 5 °C), Max:100 9 °F (38 3 °C)    Temp:  [97 7 °F (36 5 °C)-100 9 °F (38 3 °C)] 97 7 °F (36 5 °C)  HR:  [] 93  Resp:  [18-20] 18  BP: ()/(56-80) 124/80  SpO2:  [93 %-97 %] 97 %  Body mass index is 23 43 kg/m²  Input and Output Summary (last 24 hours):        Intake/Output Summary (Last 24 hours) at 2020 0918  Last data filed at 2020 0824  Gross per 24 hour   Intake 5100 ml   Output 2500 ml   Net 2600 ml       Physical Exam:     Physical Exam   HENT:   Head: Normocephalic and atraumatic  Cardiovascular: Exam reveals no friction rub  No murmur heard  Pulmonary/Chest: No stridor  No respiratory distress  Abdominal: Soft  Bowel sounds are normal  She exhibits no distension  There is no abdominal tenderness  Musculoskeletal: Normal range of motion  General: No edema  Skin: There is erythema  Psychiatric: She has a normal mood and affect  Additional Data:     Labs:    Results from last 7 days   Lab Units 08/02/20  0515   WBC Thousand/uL 11 92*   HEMOGLOBIN g/dL 8 4*   HEMATOCRIT % 28 6*   PLATELETS Thousands/uL 238   NEUTROS PCT % 74   LYMPHS PCT % 15   MONOS PCT % 7   EOS PCT % 3     Results from last 7 days   Lab Units 08/02/20  0515  07/30/20  2135   POTASSIUM mmol/L 3 7   < > 3 4*   CHLORIDE mmol/L 103   < > 99*   CO2 mmol/L 25   < > 26   BUN mg/dL 3*   < > 13   CREATININE mg/dL 0 47*   < > 0 84   CALCIUM mg/dL 8 7   < > 8 6   ALK PHOS U/L  --   --  254*   ALT U/L  --   --  22   AST U/L  --   --  44    < > = values in this interval not displayed  Results from last 7 days   Lab Units 07/31/20  0656   INR  1 07       * I Have Reviewed All Lab Data Listed Above  * Additional Pertinent Lab Tests Reviewed: All Labs Within Last 24 Hours Reviewed        Recent Cultures (last 7 days):     Results from last 7 days   Lab Units 07/31/20  1541 07/30/20  2156 07/30/20  2135   BLOOD CULTURE   --  No Growth at 48 hrs  No Growth at 48 hrs     GRAM STAIN RESULT  3+ Polys  No bacteria seen  --   --    BODY FLUID CULTURE, STERILE  3+ Growth of Streptococcus anginosus*  --   --        Last 24 Hours Medication List:   acetaminophen, 650 mg, Oral, Q6H PRN, Yocasta Lee DO  aluminum-magnesium hydroxide-simethicone, 30 mL, Oral, Q6H PRN, Yelitza Deutsch MD  cholecalciferol, 2,000 Units, Oral, Daily, Yelitza Deutsch MD  enoxaparin, 40 mg, Subcutaneous, Daily, Yelitza Deutsch MD  HYDROmorphone, 1 mg, Intravenous, Q1H PRN, Yelitza Deutsch MD  loratadine, 10 mg, Oral, Daily, Feliciano Lu MD  metoprolol succinate, 25 mg, Oral, Daily, Feliciano Lu MD  multi-electrolyte, 100 mL/hr, Intravenous, Continuous, Feliciano Lu MD, Last Rate: 100 mL/hr (08/02/20 0826)  ondansetron, 4 mg, Intravenous, Q6H PRN, Feliciano Lu MD  oxyCODONE, 10 mg, Oral, Q4H PRN, Feliciano Lu MD  oxyCODONE-acetaminophen, 1 tablet, Oral, Q4H PRN, Feliciano Lu MD  piperacillin-tazobactam, 3 375 g, Intravenous, Q6H, Radha Rivers MD, Last Rate: 3 375 g (08/02/20 0514)  senna, 1 tablet, Oral, BID, Yocasta Lee DO  sertraline, 25 mg, Oral, Daily, Feliciano Lu MD         Today, Patient Was Seen By: Vero Jameson DO    ** Please Note: Dictation voice to text software may have been used in the creation of this document   **

## 2020-08-02 NOTE — ASSESSMENT & PLAN NOTE
Recently was admitted for fever with blood cultures growing Gemella  Patient on Zosyn-possible transition ampicillin or cefazolin  ID consult  Patient with known history of rectal cancer on chemotherapy  colorectal surgery follow-up  Oncology follow-up care of Dr Sangeetha Montes De Oca who she regularly sees      Status post IR drain on July 31st

## 2020-08-02 NOTE — PROGRESS NOTES
Progress Note - Infectious Disease   Puja Fitzpatrick 50 y o  female MRN: [id number]  Unit/Bed#: [facility] 931-01 Encounter: [id number]      Impression:  1  Left gluteal abscess S/P I&D   2  Stage IV rectal carcinoma on chemotherapy     Recommendations:  Patient had T-max of a 100 9° last p m  currently she is 99 4 ° with modestly elevated WBC count  1  Abscess culture  showing Streptococcus anginosus and had anaerobic growth of Bacteroides species and Prevotella intermedia  Will switch Rx to ampicillin/sulbactam  2  If temperatures persist patient will need repeat ultrasound of the area to rule out continued loculated focus   Antibiotics:  1  Ampicillin/sulbactam 3 g q 6 hours IV, day 4 total Rx     Subjective:  She still complains of left buttock and rectal pain  Denies nausea, vomiting, or diarrhea  Objective:  Vitals:  Temp:  [97 7 °F (36 5 °C)-100 9 °F (38 3 °C)] 99 4 °F (37 4 °C)  HR:  [76-93] 85  Resp:  [18-20] 19  BP: ()/(63-80) 96/63  SpO2:  [93 %-97 %] 97 %  Temp (24hrs), Av °F (37 2 °C), Min:97 7 °F (36 5 °C), Max:100 9 °F (38 3 °C)  Current: Temperature: 99 4 °F (37 4 °C)    Physical Exam:     General Appearance:  Alert, chronically ill-appearing female nontoxic, no acute distress  Throat: Oropharynx moist without lesions  Lips, mucosa, and tongue normal   Neck: Supple, symmetrical, trachea midline, no adenopathy,  no tenderness/mass/nodules   Lungs:   Clear to auscultation bilaterally, no audible wheezes, rhonchi or rales; respirations unlabored  Port-A-Cath right subclavian area   Heart:  Regular rate and rhythm, S1, S2 normal, no murmur, rub or gallop   Abdomen:   Soft, mild right-sided tenderness, non-distended, positive bowel sounds    No masses, no organomegaly    No CVA tenderness   Extremities: Extremities normal, atraumatic, no clubbing, cyanosis or edema   Skin: Surgical scars, multiple tattoos, left gluteal wound tenderness         Invasive Devices     Central Venous Catheter Line            Port A Cath 06/22/20 Right Chest 41 days          Peripheral Intravenous Line            Peripheral IV 08/01/20 Right Antecubital 1 day                Labs, Imaging, & Other studies:   All pertinent labs were personally reviewed  Results from last 7 days   Lab Units 08/02/20  0515 08/01/20  0654 07/30/20  2135   WBC Thousand/uL 11 92* 11 69* 16 24*   HEMOGLOBIN g/dL 8 4* 7 9* 8 2*   PLATELETS Thousands/uL 238 166 167     Results from last 7 days   Lab Units 08/02/20  0515 08/01/20  0654 07/30/20  2135   SODIUM mmol/L 135* 134* 132*   POTASSIUM mmol/L 3 7 3 1* 3 4*   CHLORIDE mmol/L 103 101 99*   CO2 mmol/L 25 28 26   BUN mg/dL 3* 6 13   CREATININE mg/dL 0 47* 0 54* 0 84   EGFR ml/min/1 73sq m 118 113 83   CALCIUM mg/dL 8 7 8 7 8 6   AST U/L  --   --  44   ALT U/L  --   --  22   ALK PHOS U/L  --   --  254*     Results from last 7 days   Lab Units 07/31/20  1541 07/31/20  1248 07/30/20  2156 07/30/20  2135   BLOOD CULTURE   --   --  No Growth at 48 hrs  No Growth at 48 hrs     GRAM STAIN RESULT  3+ Polys  No bacteria seen  --   --   --    BODY FLUID CULTURE, STERILE  4+ Growth of Streptococcus anginosus*  --   --   --    MRSA CULTURE ONLY   --  No Methicillin Resistant Staphlyococcus aureus (MRSA) isolated  --   --

## 2020-08-02 NOTE — PLAN OF CARE
Problem: PAIN - ADULT  Goal: Verbalizes/displays adequate comfort level or baseline comfort level  Description: Interventions:  - Encourage patient to monitor pain and request assistance  - Assess pain using appropriate pain scale  - Administer analgesics based on type and severity of pain and evaluate response  - Implement non-pharmacological measures as appropriate and evaluate response  - Consider cultural and social influences on pain and pain management  - Notify physician/advanced practitioner if interventions unsuccessful or patient reports new pain  Outcome: Progressing     Problem: INFECTION - ADULT  Goal: Absence or prevention of progression during hospitalization  Description: INTERVENTIONS:  - Assess and monitor for signs and symptoms of infection  - Monitor lab/diagnostic results  - Monitor all insertion sites, i e  indwelling lines, tubes, and drains  - Monitor endotracheal if appropriate and nasal secretions for changes in amount and color  - [place] appropriate cooling/warming therapies per order  - Administer medications as ordered  - Instruct and encourage patient and family to use good hand hygiene technique  - Identify and instruct in appropriate isolation precautions for identified infection/condition  Outcome: Progressing     Problem: SAFETY ADULT  Goal: Patient will remain free of falls  Description: INTERVENTIONS:  - Assess patient frequently for physical needs  -  Identify cognitive and physical deficits and behaviors that affect risk of falls    -  [place] fall precautions as indicated by assessment   - Educate patient/family on patient safety including physical limitations  - Instruct patient to call for assistance with activity based on assessment  - Modify environment to reduce risk of injury  - Consider OT/PT consult to assist with strengthening/mobility  Outcome: Progressing     Problem: DISCHARGE PLANNING  Goal: Discharge to home or other facility with appropriate resources  Description: INTERVENTIONS:  - Identify barriers to discharge w/patient and caregiver  - Arrange for needed discharge resources and transportation as appropriate  - Identify discharge learning needs (meds, wound care, etc )  - Arrange for interpretive services to assist at discharge as needed  - Refer to Case Management Department for coordinating discharge planning if the patient needs post-hospital services based on physician/advanced practitioner order or complex needs related to functional status, cognitive ability, or social support system  Outcome: Progressing     Problem: Knowledge Deficit  Goal: Patient/family/caregiver demonstrates understanding of disease process, treatment plan, medications, and discharge instructions  Description: Complete learning assessment and assess knowledge base    Interventions:  - Provide teaching at level of understanding  - Provide teaching via preferred learning methods  Outcome: Progressing

## 2020-08-02 NOTE — PLAN OF CARE
Problem: PAIN - ADULT  Goal: Verbalizes/displays adequate comfort level or baseline comfort level  Description  Interventions:  - Encourage patient to monitor pain and request assistance  - Assess pain using appropriate pain scale  - Administer analgesics based on type and severity of pain and evaluate response  - Implement non-pharmacological measures as appropriate and evaluate response  - Consider cultural and social influences on pain and pain management  - Notify physician/advanced practitioner if interventions unsuccessful or patient reports new pain  Outcome: Progressing     Problem: INFECTION - ADULT  Goal: Absence or prevention of progression during hospitalization  Description  INTERVENTIONS:  - Assess and monitor for signs and symptoms of infection  - Monitor lab/diagnostic results  - Monitor all insertion sites, i e  indwelling lines, tubes, and drains  - Monitor endotracheal if appropriate and nasal secretions for changes in amount and color  - [place] appropriate cooling/warming therapies per order  - Administer medications as ordered  - Instruct and encourage patient and family to use good hand hygiene technique  - Identify and instruct in appropriate isolation precautions for identified infection/condition  Outcome: Progressing     Problem: SAFETY ADULT  Goal: Patient will remain free of falls  Description  INTERVENTIONS:  - Assess patient frequently for physical needs  -  Identify cognitive and physical deficits and behaviors that affect risk of falls    -  [place] fall precautions as indicated by assessment   - Educate patient/family on patient safety including physical limitations  - Instruct patient to call for assistance with activity based on assessment  - Modify environment to reduce risk of injury  - Consider OT/PT consult to assist with strengthening/mobility  Outcome: Progressing     Problem: DISCHARGE PLANNING  Goal: Discharge to home or other facility with appropriate resources  Description  INTERVENTIONS:  - Identify barriers to discharge w/patient and caregiver  - Arrange for needed discharge resources and transportation as appropriate  - Identify discharge learning needs (meds, wound care, etc )  - Arrange for interpretive services to assist at discharge as needed  - Refer to Case Management Department for coordinating discharge planning if the patient needs post-hospital services based on physician/advanced practitioner order or complex needs related to functional status, cognitive ability, or social support system  Outcome: Progressing     Problem: Knowledge Deficit  Goal: Patient/family/caregiver demonstrates understanding of disease process, treatment plan, medications, and discharge instructions  Description  Complete learning assessment and assess knowledge base    Interventions:  - Provide teaching at level of understanding  - Provide teaching via preferred learning methods  Outcome: Progressing

## 2020-08-03 ENCOUNTER — APPOINTMENT (INPATIENT)
Dept: RADIOLOGY | Facility: HOSPITAL | Age: 45
DRG: 394 | End: 2020-08-03
Payer: COMMERCIAL

## 2020-08-03 PROCEDURE — 99253 IP/OBS CNSLTJ NEW/EST LOW 45: CPT | Performed by: INTERNAL MEDICINE

## 2020-08-03 PROCEDURE — 99231 SBSQ HOSP IP/OBS SF/LOW 25: CPT | Performed by: INTERNAL MEDICINE

## 2020-08-03 PROCEDURE — G1004 CDSM NDSC: HCPCS

## 2020-08-03 PROCEDURE — 74177 CT ABD & PELVIS W/CONTRAST: CPT

## 2020-08-03 PROCEDURE — 99232 SBSQ HOSP IP/OBS MODERATE 35: CPT | Performed by: INTERNAL MEDICINE

## 2020-08-03 RX ORDER — OXYCODONE HYDROCHLORIDE 10 MG/1
10 TABLET ORAL EVERY 4 HOURS PRN
Status: DISCONTINUED | OUTPATIENT
Start: 2020-08-03 | End: 2020-08-09 | Stop reason: HOSPADM

## 2020-08-03 RX ORDER — HYDROMORPHONE HCL/PF 1 MG/ML
1 SYRINGE (ML) INJECTION ONCE
Status: COMPLETED | OUTPATIENT
Start: 2020-08-03 | End: 2020-08-03

## 2020-08-03 RX ORDER — OXYCODONE HYDROCHLORIDE AND ACETAMINOPHEN 5; 325 MG/1; MG/1
1 TABLET ORAL EVERY 4 HOURS PRN
Status: DISCONTINUED | OUTPATIENT
Start: 2020-08-03 | End: 2020-08-09 | Stop reason: HOSPADM

## 2020-08-03 RX ADMIN — OXYCODONE HYDROCHLORIDE 10 MG: 10 TABLET ORAL at 06:59

## 2020-08-03 RX ADMIN — AMPICILLIN SODIUM AND SULBACTAM SODIUM 3 G: 2; 1 INJECTION, POWDER, FOR SOLUTION INTRAMUSCULAR; INTRAVENOUS at 05:07

## 2020-08-03 RX ADMIN — LORATADINE 10 MG: 10 TABLET ORAL at 08:14

## 2020-08-03 RX ADMIN — HYDROMORPHONE HYDROCHLORIDE 1 MG: 1 INJECTION, SOLUTION INTRAMUSCULAR; INTRAVENOUS; SUBCUTANEOUS at 15:55

## 2020-08-03 RX ADMIN — HYDROMORPHONE HYDROCHLORIDE 1 MG: 1 INJECTION, SOLUTION INTRAMUSCULAR; INTRAVENOUS; SUBCUTANEOUS at 03:51

## 2020-08-03 RX ADMIN — OXYCODONE HYDROCHLORIDE 10 MG: 10 TABLET ORAL at 19:08

## 2020-08-03 RX ADMIN — SENNOSIDES 8.6 MG: 8.6 TABLET ORAL at 17:22

## 2020-08-03 RX ADMIN — AMPICILLIN SODIUM AND SULBACTAM SODIUM 3 G: 2; 1 INJECTION, POWDER, FOR SOLUTION INTRAMUSCULAR; INTRAVENOUS at 10:58

## 2020-08-03 RX ADMIN — OXYCODONE HYDROCHLORIDE 20 MG: 20 TABLET, FILM COATED, EXTENDED RELEASE ORAL at 08:14

## 2020-08-03 RX ADMIN — HYDROMORPHONE HYDROCHLORIDE 1 MG: 1 INJECTION, SOLUTION INTRAMUSCULAR; INTRAVENOUS; SUBCUTANEOUS at 13:58

## 2020-08-03 RX ADMIN — HYDROMORPHONE HYDROCHLORIDE 1 MG: 1 INJECTION, SOLUTION INTRAMUSCULAR; INTRAVENOUS; SUBCUTANEOUS at 11:02

## 2020-08-03 RX ADMIN — OXYCODONE HYDROCHLORIDE 20 MG: 20 TABLET, FILM COATED, EXTENDED RELEASE ORAL at 20:47

## 2020-08-03 RX ADMIN — HYDROMORPHONE HYDROCHLORIDE 1 MG: 1 INJECTION, SOLUTION INTRAMUSCULAR; INTRAVENOUS; SUBCUTANEOUS at 08:11

## 2020-08-03 RX ADMIN — AMPICILLIN SODIUM AND SULBACTAM SODIUM 3 G: 2; 1 INJECTION, POWDER, FOR SOLUTION INTRAMUSCULAR; INTRAVENOUS at 23:33

## 2020-08-03 RX ADMIN — IOHEXOL 100 ML: 350 INJECTION, SOLUTION INTRAVENOUS at 15:35

## 2020-08-03 RX ADMIN — SENNOSIDES 8.6 MG: 8.6 TABLET ORAL at 08:14

## 2020-08-03 RX ADMIN — ENOXAPARIN SODIUM 40 MG: 40 INJECTION SUBCUTANEOUS at 08:14

## 2020-08-03 RX ADMIN — AMPICILLIN SODIUM AND SULBACTAM SODIUM 3 G: 2; 1 INJECTION, POWDER, FOR SOLUTION INTRAMUSCULAR; INTRAVENOUS at 17:22

## 2020-08-03 RX ADMIN — SODIUM CHLORIDE, SODIUM GLUCONATE, SODIUM ACETATE, POTASSIUM CHLORIDE, MAGNESIUM CHLORIDE, SODIUM PHOSPHATE, DIBASIC, AND POTASSIUM PHOSPHATE 100 ML/HR: .53; .5; .37; .037; .03; .012; .00082 INJECTION, SOLUTION INTRAVENOUS at 22:34

## 2020-08-03 RX ADMIN — OXYCODONE HYDROCHLORIDE 10 MG: 10 TABLET ORAL at 01:31

## 2020-08-03 RX ADMIN — HYDROMORPHONE HYDROCHLORIDE 1 MG: 1 INJECTION, SOLUTION INTRAMUSCULAR; INTRAVENOUS; SUBCUTANEOUS at 17:25

## 2020-08-03 RX ADMIN — OXYCODONE HYDROCHLORIDE 10 MG: 10 TABLET ORAL at 23:43

## 2020-08-03 RX ADMIN — POLYETHYLENE GLYCOL 3350 17 G: 17 POWDER, FOR SOLUTION ORAL at 08:14

## 2020-08-03 NOTE — PLAN OF CARE
Problem: PAIN - ADULT  Goal: Verbalizes/displays adequate comfort level or baseline comfort level  Description: Interventions:  - Encourage patient to monitor pain and request assistance  - Assess pain using appropriate pain scale  - Administer analgesics based on type and severity of pain and evaluate response  - Implement non-pharmacological measures as appropriate and evaluate response  - Consider cultural and social influences on pain and pain management  - Notify physician/advanced practitioner if interventions unsuccessful or patient reports new pain  Outcome: Progressing     Problem: INFECTION - ADULT  Goal: Absence or prevention of progression during hospitalization  Description: INTERVENTIONS:  - Assess and monitor for signs and symptoms of infection  - Monitor lab/diagnostic results  - Monitor all insertion sites, i e  indwelling lines, tubes, and drains  - Monitor endotracheal if appropriate and nasal secretions for changes in amount and color  - [place] appropriate cooling/warming therapies per order  - Administer medications as ordered  - Instruct and encourage patient and family to use good hand hygiene technique  - Identify and instruct in appropriate isolation precautions for identified infection/condition  Outcome: Progressing     Problem: SAFETY ADULT  Goal: Patient will remain free of falls  Description: INTERVENTIONS:  - Assess patient frequently for physical needs  -  Identify cognitive and physical deficits and behaviors that affect risk of falls    -  [place] fall precautions as indicated by assessment   - Educate patient/family on patient safety including physical limitations  - Instruct patient to call for assistance with activity based on assessment  - Modify environment to reduce risk of injury  - Consider OT/PT consult to assist with strengthening/mobility  Outcome: Progressing     Problem: DISCHARGE PLANNING  Goal: Discharge to home or other facility with appropriate resources  Description: INTERVENTIONS:  - Identify barriers to discharge w/patient and caregiver  - Arrange for needed discharge resources and transportation as appropriate  - Identify discharge learning needs (meds, wound care, etc )  - Arrange for interpretive services to assist at discharge as needed  - Refer to Case Management Department for coordinating discharge planning if the patient needs post-hospital services based on physician/advanced practitioner order or complex needs related to functional status, cognitive ability, or social support system  Outcome: Progressing     Problem: Knowledge Deficit  Goal: Patient/family/caregiver demonstrates understanding of disease process, treatment plan, medications, and discharge instructions  Description: Complete learning assessment and assess knowledge base  Interventions:  - Provide teaching at level of understanding  - Provide teaching via preferred learning methods  Outcome: Progressing     Problem: Potential for Falls  Goal: Patient will remain free of falls  Description: INTERVENTIONS:  - Assess patient frequently for physical needs  -  Identify cognitive and physical deficits and behaviors that affect risk of falls    -  [place] fall precautions as indicated by assessment   - Educate patient/family on patient safety including physical limitations  - Instruct patient to call for assistance with activity based on assessment  - Modify environment to reduce risk of injury  - Consider OT/PT consult to assist with strengthening/mobility  Outcome: Progressing

## 2020-08-03 NOTE — CONSULTS
Consultation - [address] [place] [address] Ferdinand Fitzpatrick 50 y o  female MRN: [id number]  Unit/Bed#: [facility] 931-01 Encounter: [id number]      Assessment/Plan     Assessment:  Patient Active Problem List   Diagnosis    [facility]    Rectal cancer ([facility]    Breast cancer ([facility]    Abdominal pain    Anemia    [facility]    Gram-positive bacteremia    Rectal abscess       Plan:  1  Symptom management:    Cancer related pain- patient currently satisfied with regimen, no changes    OxyContin 20 mg BID    Percocet 5/325 q4H PRN for moderate pain    OxyIR 10 mg q4H PRN for severe pain (will link these orders)    IV Dilaudid 1 mg q3H PRN for breakthrough pain   Depression    Continue Zoloft   Bowel regimen to prevent OIC    2  Goals of care: Full cares    3  Psychosocial support: Time spent providing support, she remains hopeful that an area will be able to be drained as she feels more pressure at this time  History of Present Illness   Physician Requesting Consult: Candance Dales, DO  Reason for Consult / Principal Problem: pain  Hx and PE limited by: NA  HPI: Kush Knight is a 50y o  year old female who presented on July 31st for evaluation of a rectal abscess  She has been seen by [facility] in the past with a known diagnosis of Stage IV rectal cancer and breast cancer (ER/[place]  She underwent aspiration of the collection and ID is following along  There is concern of ongoing collection as she continues with fevers (Tmax 101 4)  She feels that the collection may have re-accumulated as she notices a fullness and continues to find it difficult to sit in a comfortable position  She was recently initiated on ER medication and she is hopeful that this will be helpful  She does not want to make any changes in her regimen at this time as she feels she has not had an opportunity to see how well these medications work   She is icing the area, but not applying anything topical    Otherwise, she denies other complaints and remains in genuinely good spirits  Inpatient consult to Palliative Care  Consult performed by: Cathleen Moon DO  Consult ordered by: Delta Whitehead DO          Review of Systems   Gastrointestinal: Positive for abdominal pain and rectal pain  All other systems reviewed and are negative        Historical Information   Past Medical History:   Diagnosis Date    History of rectal cancer 2020     Past Surgical History:   Procedure Laterality Date    BACK SURGERY      BREAST BIOPSY Right 2020    2 sites; 1 breast 1 axilla    HERNIA REPAIR      3 repairs    IR IMAGE GUIDED ASPIRATION / DRAINAGE  2020    IR IMAGE GUIDED BIOPSY/ASPIRATION LIVER  2020    IR PORT PLACEMENT  2020    TUBAL LIGATION      US GUIDED BREAST BIOPSY RIGHT COMPLETE Right 2020    US GUIDED BREAST LYMPH NODE BIOPSY RIGHT Right 2020     E-Cigarette/Vaping    E-Cigarette Use Never User      E-Cigarette/Vaping Substances    Nicotine No     THC No     CBD No     Flavoring No     Other No     Unknown No      Social History     Socioeconomic History    Marital status: Single     Spouse name: None    Number of children: None    Years of education: None    Highest education level: None   Occupational History    None   Social Needs    Financial resource strain: None    Food insecurity     Worry: None     Inability: None    Transportation needs     Medical: None     Non-medical: None   Tobacco Use    Smoking status: Former Smoker     Packs/day: 0 00     Last attempt to quit: 2020     Years since quittin 2    Smokeless tobacco: Never Used   Substance and Sexual Activity    Alcohol use: Not Currently    Drug use: No    Sexual activity: None   Lifestyle    Physical activity     Days per week: None     Minutes per session: None    Stress: None   Relationships    Social connections     Talks on phone: None     Gets together: None     Attends [employer] service: None     Active member of club or organization: None     Attends meetings of clubs or organizations: None     Relationship status: None    Intimate partner violence     Fear of current or ex partner: None     Emotionally abused: None     Physically abused: None     Forced sexual activity: None   Other Topics Concern    None   Social History Narrative    None     Family History   Problem Relation Age of Onset    No Known Problems Mother     No Known Problems Sister     No Known Problems Daughter     Breast cancer Maternal Grandmother         age unknown    No Known Problems Sister     No Known Problems Daughter     Breast cancer Maternal Aunt 61    No Known Problems Maternal Aunt        Meds/Allergies   all current active meds have been reviewed and current meds:   Current Facility-Administered Medications   Medication Dose Route Frequency    acetaminophen (TYLENOL) tablet 650 mg  650 mg Oral Q6H PRN    aluminum-magnesium hydroxide-simethicone (MYLANTA) 200-200-20 mg/5 mL oral suspension 30 mL  30 mL Oral Q6H PRN    ampicillin-sulbactam (UNASYN) 3 g in sodium chloride 0 9 % 100 mL IVPB  3 g Intravenous Q6H    cholecalciferol (VITAMIN D3) tablet 2,000 Units  2,000 Units Oral Daily    enoxaparin (LOVENOX) subcutaneous injection 40 mg  40 mg Subcutaneous Daily    HYDROmorphone (DILAUDID) injection 1 mg  1 mg Intravenous Q3H PRN    loratadine (CLARITIN) tablet 10 mg  10 mg Oral Daily    metoprolol succinate (TOPROL-XL) 24 hr tablet 25 mg  25 mg Oral Daily    multi-electrolyte (PLASMALYTE-A/ISOLYTE-S PH 7 4) IV solution  100 mL/hr Intravenous Continuous    ondansetron (ZOFRAN) injection 4 mg  4 mg Intravenous Q6H PRN    oxyCODONE (OxyCONTIN) 12 hr tablet 20 mg  20 mg Oral Q12H GOZNÁLEZ    oxyCODONE (ROXICODONE) immediate release tablet 10 mg  10 mg Oral Q4H PRN    oxyCODONE-acetaminophen (PERCOCET) 5-325 mg per tablet 1 tablet  1 tablet Oral Q4H PRN    polyethylene glycol (MIRALAX) packet 17 g  17 g Oral Daily    senna (SENOKOT) tablet 8 6 mg  1 tablet Oral BID    sertraline (ZOLOFT) tablet 25 mg  25 mg Oral Daily       Palliative Care Medications: see plan    No Known Allergies    Objective     Physical Exam  Vitals signs and nursing note reviewed  Constitutional:       General: She is not in acute distress  HENT:      Head: Normocephalic and atraumatic  Right Ear: External ear normal       Left Ear: External ear normal    Eyes:      General:         Right eye: No discharge  Left eye: No discharge  Cardiovascular:      Rate and Rhythm: Normal rate  Pulmonary:      Effort: Pulmonary effort is normal  No respiratory distress  Abdominal:      General: There is no distension  Palpations: Abdomen is soft  Musculoskeletal:         General: No edema  Skin:     Coloration: Skin is pale  Neurological:      Mental Status: She is alert and oriented to person, place, and time  Psychiatric:         Mood and Affect: Mood and affect normal          Lab Results: I have personally reviewed pertinent labs  , CBC: No results found for: WBC, HGB, HCT, MCV, PLT, ADJUSTEDWBC, MCH, MCHC, RDW, MPV, NRBC, CMP: No results found for: SODIUM, K, CL, CO2, ANIONGAP, BUN, CREATININE, GLUCOSE, CALCIUM, AST, ALT, ALKPHOS, PROT, BILITOT, EGFR, PT/PTT:No results found for: PT, PTT  Imaging Studies: I have personally reviewed pertinent reports  EKG, Pathology, and Other Studies: I have personally reviewed pertinent reports  Code Status: Level 1 - Full Code  Advance Directive and Living Will:      Power of :    POLST:      Counseling / Coordination of Care  Total floor / unit time spent today 55+ minutes  Greater than 50% of total time was spent with the patient and / or family counseling and / or coordination of care  A description of the counseling / coordination of care: chart review, medication review, supportive listening

## 2020-08-03 NOTE — PROGRESS NOTES
Progress Note - Infectious Disease   Puja Fitzpatrick 50 y o  female MRN: [id number]  Unit/Bed#: [facility] 931-01 Encounter: [id number]      Impression:  1  Left gluteal abscess S/P I&D   2  Stage IV rectal carcinoma on chemotherapy     Recommendations:  Patient had T-max of a 101 4° last p m  currently she is afebrile with modestly elevated WBC count when last taken  1  Abscess culture  showing Streptococcus anginosus and had anaerobic growth of Bacteroides species and Prevotella intermedia  Will switch Rx to ampicillin/sulbactam  2  Had repeat CT of the area to rule out loculated focus which shows stable irregular thickening of the rectum compatible with the known rectal cancer as well as stable lung and liver metastases  In addition there is  a mildly enlarged complex presacral collection that might represent combination abscess or tumor adjacent left gluteal abscess is also mildly increased  Patient will need further surgical intervention  Antibiotics:  1  Ampicillin/sulbactam 3 g q 6 hours IV, day 5 total Rx     Subjective:  She still complains of left buttock and rectal pain  Denies nausea, vomiting, or diarrhea  Objective:  Vitals:  Temp:  [97 4 °F (36 3 °C)-101 4 °F (38 6 °C)] 98 1 °F (36 7 °C)  HR:  [78-89] 89  Resp:  [18-20] 18  BP: ()/(59-85) 130/85  SpO2:  [90 %-98 %] 90 %  Temp (24hrs), Av 7 °F (37 1 °C), Min:97 4 °F (36 3 °C), Max:101 4 °F (38 6 °C)  Current: Temperature: 98 1 °F (36 7 °C)    Physical Exam:     General Appearance:  Alert, chronically ill-appearing female nontoxic, no acute distress  Throat: Oropharynx moist without lesions  Lips, mucosa, and tongue normal   Neck: Supple, symmetrical, trachea midline, no adenopathy,  no tenderness/mass/nodules   Lungs:   Clear to auscultation bilaterally, no audible wheezes, rhonchi or rales; respirations unlabored    Port-A-Cath right subclavian area   Heart:  Regular rate and rhythm, S1, S2 normal, no murmur, rub or gallop   Abdomen: Soft, mild right-sided tenderness, non-distended, positive bowel sounds  No masses, no organomegaly    No CVA tenderness   Extremities: Extremities normal, atraumatic, no clubbing, cyanosis or edema   Skin: Surgical scars, multiple tattoos, left gluteal wound tenderness         Invasive Devices     Central Venous Catheter Line            Port A Cath 06/22/20 Right Chest 42 days          Peripheral Intravenous Line            Peripheral IV 08/02/20 Left;Ventral (anterior) Forearm less than 1 day    Peripheral IV 08/03/20 Left Antecubital less than 1 day                Labs, Imaging, & Other studies:   All pertinent labs were personally reviewed  Results from last 7 days   Lab Units 08/02/20  0515 08/01/20  0654 07/30/20  2135   WBC Thousand/uL 11 92* 11 69* 16 24*   HEMOGLOBIN g/dL 8 4* 7 9* 8 2*   PLATELETS Thousands/uL 238 166 167     Results from last 7 days   Lab Units 08/02/20  0515 08/01/20  0654 07/30/20  2135   SODIUM mmol/L 135* 134* 132*   POTASSIUM mmol/L 3 7 3 1* 3 4*   CHLORIDE mmol/L 103 101 99*   CO2 mmol/L 25 28 26   BUN mg/dL 3* 6 13   CREATININE mg/dL 0 47* 0 54* 0 84   EGFR ml/min/1 73sq m 118 113 83   CALCIUM mg/dL 8 7 8 7 8 6   AST U/L  --   --  44   ALT U/L  --   --  22   ALK PHOS U/L  --   --  254*     Results from last 7 days   Lab Units 07/31/20  1541 07/31/20  1248 07/30/20  2156 07/30/20  2135   BLOOD CULTURE   --   --  No Growth at 72 hrs  No Growth at 72 hrs     GRAM STAIN RESULT  3+ Polys  No bacteria seen  --   --   --    BODY FLUID CULTURE, STERILE  4+ Growth of Streptococcus anginosus*  --   --   --    MRSA CULTURE ONLY   --  No Methicillin Resistant Staphlyococcus aureus (MRSA) isolated  --   --

## 2020-08-03 NOTE — PROGRESS NOTES
Progress Note - Mayi Sernait 1975, 50 y o  female MRN: [id number]    Unit/Bed#: [facility] 931-01 Encounter: [id number]    Primary Care Provider: Carolina Newsome MD   Date and time admitted to hospital: 2020  8:59 PM        * Rectal abscess  Assessment & Plan  Follow up on cultures noted:4+ Growth of Streptococcus anginosus  Unasyn as per Infectious Disease  Patient with known history of rectal cancer on chemotherapy  colorectal surgery follow-up  Oncology follow-up care of Dr Johana Florez who she regularly sees  Status post IR I&D drainage on  of left gluteal abscess  101 fever spike last night  Will repeat CT imaging  Discussed with IR  Breast cancer [facility])  Assessment & Plan  Continued oncology follow-up  Rectal cancer [facility])  Assessment & Plan  Stage IV colon cancer  Consult palliative care for pain control  Patient on OxyContin and oxycodone currently  She reports mild improvement of pain however still having fevers with concern for possible collection/abscess noted as above  VTE Pharmacologic Prophylaxis:   Pharmacologic: Enoxaparin (Lovenox)  Mechanical VTE Prophylaxis in Place: No    Patient Centered Rounds: I have performed bedside rounds with nursing staff today  Time Spent for Care: 15 minutes  More than 50% of total time spent on counseling and coordination of care as described above  Current Length of Stay: 3 day(s)    Current Patient Status: Inpatient   Certification Statement: The patient will continue to require additional inpatient hospital stay due to Need to monitor infection      Code Status: Level 1 - Full Code      Subjective:   No acute distress    Objective:     Vitals:   Temp (24hrs), Av °F (37 2 °C), Min:97 4 °F (36 3 °C), Max:101 4 °F (38 6 °C)    Temp:  [97 4 °F (36 3 °C)-101 4 °F (38 6 °C)] 97 4 °F (36 3 °C)  HR:  [78-86] 80  Resp:  [18-20] 18  BP: (96-98)/(59-63) 97/60  SpO2:  [96 %-98 %] 98 %  Body mass index is 23 43 kg/m²       Input and Output Summary (last 24 hours): Intake/Output Summary (Last 24 hours) at 8/3/2020 1055  Last data filed at 8/3/2020 0926  Gross per 24 hour   Intake 4261 67 ml   Output 4850 ml   Net -588  33 ml       Physical Exam:     Physical Exam   Constitutional: She is oriented to person, place, and time  HENT:   Head: Normocephalic and atraumatic  Pulmonary/Chest: Effort normal  No stridor  No respiratory distress  Neurological: She is alert and oriented to person, place, and time  Skin: Skin is warm and dry  Additional Data:     Labs:    Results from last 7 days   Lab Units 08/02/20  0515   WBC Thousand/uL 11 92*   HEMOGLOBIN g/dL 8 4*   HEMATOCRIT % 28 6*   PLATELETS Thousands/uL 238   NEUTROS PCT % 74   LYMPHS PCT % 15   MONOS PCT % 7   EOS PCT % 3     Results from last 7 days   Lab Units 08/02/20  0515  07/30/20  2135   POTASSIUM mmol/L 3 7   < > 3 4*   CHLORIDE mmol/L 103   < > 99*   CO2 mmol/L 25   < > 26   BUN mg/dL 3*   < > 13   CREATININE mg/dL 0 47*   < > 0 84   CALCIUM mg/dL 8 7   < > 8 6   ALK PHOS U/L  --   --  254*   ALT U/L  --   --  22   AST U/L  --   --  44    < > = values in this interval not displayed  Results from last 7 days   Lab Units 07/31/20  0656   INR  1 07       * I Have Reviewed All Lab Data Listed Above  * Additional Pertinent Lab Tests Reviewed: All Labs Within Last 24 Hours Reviewed        Recent Cultures (last 7 days):     Results from last 7 days   Lab Units 07/31/20  1541 07/30/20  2156 07/30/20  2135   BLOOD CULTURE   --  No Growth at 72 hrs  No Growth at 72 hrs     GRAM STAIN RESULT  3+ Polys  No bacteria seen  --   --    BODY FLUID CULTURE, STERILE  4+ Growth of Streptococcus anginosus*  --   --        Last 24 Hours Medication List:   acetaminophen, 650 mg, Oral, Q6H PRN, Yocasta Lee, DO  aluminum-magnesium hydroxide-simethicone, 30 mL, Oral, Q6H PRN, James Nickerson MD  ampicillin-sulbactam, 3 g, Intravenous, Q6H, Jmaes Matson MD, Last Rate: Stopped (08/03/20 0537)  cholecalciferol, 2,000 Units, Oral, Daily, Yelitza Deutsch MD  enoxaparin, 40 mg, Subcutaneous, Daily, Yelitza Deutsch MD  HYDROmorphone, 1 mg, Intravenous, Q3H PRN, Yocasta Lee DO  loratadine, 10 mg, Oral, Daily, Yelitza Deutsch MD  metoprolol succinate, 25 mg, Oral, Daily, Yelitza Deutsch MD  multi-electrolyte, 100 mL/hr, Intravenous, Continuous, Yelitza Deutsch MD, Last Rate: 100 mL/hr (08/03/20 0602)  ondansetron, 4 mg, Intravenous, Q6H PRN, Yelitza Deutsch MD  oxyCODONE, 20 mg, Oral, Q12H [address], Yocasta Lee DO  oxyCODONE, 10 mg, Oral, Q4H PRN, Yelitza Deutsch MD  oxyCODONE-acetaminophen, 1 tablet, Oral, Q4H PRN, Yelitza Deutsch MD  polyethylene glycol, 17 g, Oral, Daily, Yocasta Lee DO  senna, 1 tablet, Oral, BID, Yocasta Lee DO  sertraline, 25 mg, Oral, Daily, Yelitza Deutsch MD         Today, Patient Was Seen By: Cade Baez DO    ** Please Note: Dictation voice to text software may have been used in the creation of this document   **

## 2020-08-03 NOTE — NURSING NOTE
Temp 101 4; HR 86; BP 98/60  SLIM ELENITA Madsen notified  Will give pt ordered prn tylenol and monitor  No new orders at this time

## 2020-08-03 NOTE — ASSESSMENT & PLAN NOTE
Follow up on cultures noted:4+ Growth of Streptococcus anginosus  Unasyn as per Infectious Disease  Patient with known history of rectal cancer on chemotherapy  colorectal surgery follow-up  Oncology follow-up care of Dr Uzair Michaud who she regularly sees  Status post IR I&D drainage on July 31st of left gluteal abscess  101 fever spike last night  Will repeat CT imaging  Discussed with IR

## 2020-08-03 NOTE — ASSESSMENT & PLAN NOTE
Stage IV colon cancer  Consult palliative care for pain control  Patient on OxyContin and oxycodone currently  She reports mild improvement of pain however still having fevers with concern for possible collection/abscess noted as above

## 2020-08-04 PROBLEM — D63.8 ANEMIA OF CHRONIC DISEASE: Status: ACTIVE | Noted: 2020-07-03

## 2020-08-04 PROBLEM — D72.829 LEUKOCYTOSIS: Status: ACTIVE | Noted: 2020-08-04

## 2020-08-04 PROBLEM — E87.6 HYPOKALEMIA: Status: ACTIVE | Noted: 2020-08-04

## 2020-08-04 PROBLEM — E87.1 HYPONATREMIA: Status: ACTIVE | Noted: 2020-08-04

## 2020-08-04 PROBLEM — G89.3 CANCER ASSOCIATED PAIN: Status: ACTIVE | Noted: 2020-08-04

## 2020-08-04 PROCEDURE — 99232 SBSQ HOSP IP/OBS MODERATE 35: CPT | Performed by: INTERNAL MEDICINE

## 2020-08-04 PROCEDURE — 99231 SBSQ HOSP IP/OBS SF/LOW 25: CPT | Performed by: INTERNAL MEDICINE

## 2020-08-04 PROCEDURE — 99233 SBSQ HOSP IP/OBS HIGH 50: CPT | Performed by: COLON & RECTAL SURGERY

## 2020-08-04 RX ADMIN — OXYCODONE HYDROCHLORIDE 10 MG: 10 TABLET ORAL at 06:02

## 2020-08-04 RX ADMIN — ACETAMINOPHEN 650 MG: 325 TABLET, FILM COATED ORAL at 22:43

## 2020-08-04 RX ADMIN — POLYETHYLENE GLYCOL 3350 17 G: 17 POWDER, FOR SOLUTION ORAL at 08:24

## 2020-08-04 RX ADMIN — OXYCODONE HYDROCHLORIDE 20 MG: 20 TABLET, FILM COATED, EXTENDED RELEASE ORAL at 21:31

## 2020-08-04 RX ADMIN — SENNOSIDES 8.6 MG: 8.6 TABLET ORAL at 08:23

## 2020-08-04 RX ADMIN — AMPICILLIN SODIUM AND SULBACTAM SODIUM 3 G: 2; 1 INJECTION, POWDER, FOR SOLUTION INTRAMUSCULAR; INTRAVENOUS at 06:00

## 2020-08-04 RX ADMIN — SENNOSIDES 8.6 MG: 8.6 TABLET ORAL at 17:52

## 2020-08-04 RX ADMIN — ENOXAPARIN SODIUM 40 MG: 40 INJECTION SUBCUTANEOUS at 08:24

## 2020-08-04 RX ADMIN — OXYCODONE HYDROCHLORIDE 10 MG: 10 TABLET ORAL at 13:41

## 2020-08-04 RX ADMIN — LORATADINE 10 MG: 10 TABLET ORAL at 08:23

## 2020-08-04 RX ADMIN — SODIUM CHLORIDE, SODIUM GLUCONATE, SODIUM ACETATE, POTASSIUM CHLORIDE, MAGNESIUM CHLORIDE, SODIUM PHOSPHATE, DIBASIC, AND POTASSIUM PHOSPHATE 100 ML/HR: .53; .5; .37; .037; .03; .012; .00082 INJECTION, SOLUTION INTRAVENOUS at 08:31

## 2020-08-04 RX ADMIN — AMPICILLIN SODIUM AND SULBACTAM SODIUM 3 G: 2; 1 INJECTION, POWDER, FOR SOLUTION INTRAMUSCULAR; INTRAVENOUS at 17:52

## 2020-08-04 RX ADMIN — OXYCODONE HYDROCHLORIDE 10 MG: 10 TABLET ORAL at 09:57

## 2020-08-04 RX ADMIN — AMPICILLIN SODIUM AND SULBACTAM SODIUM 3 G: 2; 1 INJECTION, POWDER, FOR SOLUTION INTRAMUSCULAR; INTRAVENOUS at 11:19

## 2020-08-04 RX ADMIN — SODIUM CHLORIDE, SODIUM GLUCONATE, SODIUM ACETATE, POTASSIUM CHLORIDE, MAGNESIUM CHLORIDE, SODIUM PHOSPHATE, DIBASIC, AND POTASSIUM PHOSPHATE 100 ML/HR: .53; .5; .37; .037; .03; .012; .00082 INJECTION, SOLUTION INTRAVENOUS at 21:33

## 2020-08-04 RX ADMIN — OXYCODONE HYDROCHLORIDE 10 MG: 10 TABLET ORAL at 17:55

## 2020-08-04 RX ADMIN — HYDROMORPHONE HYDROCHLORIDE 1 MG: 1 INJECTION, SOLUTION INTRAMUSCULAR; INTRAVENOUS; SUBCUTANEOUS at 16:18

## 2020-08-04 RX ADMIN — OXYCODONE HYDROCHLORIDE 20 MG: 20 TABLET, FILM COATED, EXTENDED RELEASE ORAL at 08:24

## 2020-08-04 NOTE — PROGRESS NOTES
Progress note - Palliative and Supportive Care   Chrystal Frankel Willhoit 50 y o  female [id number]    Assessment:    -   Patient Active Problem List   Diagnosis    Large bowel obstruction (HCC)    [facility] ([facility]    Breast cancer ([facility] )    Abdominal pain    Anemia    Sepsis ([facility] )    Gram-positive bacteremia    Rectal abscess         Plan:  1  Symptom management - no changes, may up-titrate OxyContin tomorrow if ongoing OME needs stay at this same level  - OxyContin 20 mg BID   - Percocet 5/325 or oxyIR 10 mg q4H PRN for moderate to severe pain   - IV dilaudid 1 mg q3H PRN for BT pain   - continue Zoloft   - Bowel regiment to prevent OIC    2  Goals - Full cares, awaiting final recommendations of surgical intervention (?palliative colostomy)    3  Psychosocial support- time spent providing supportive listening  D/W colorectal resident team       Interval history:       Overall, patient doing okay with pain control  She feels that it is managed with current regimen  She does not want any changes today  Wants to know what plan is moving forward       MEDICATIONS / ALLERGIES:     all current active meds have been reviewed and current meds:   Current Facility-Administered Medications   Medication Dose Route Frequency    acetaminophen (TYLENOL) tablet 650 mg  650 mg Oral Q6H PRN    aluminum-magnesium hydroxide-simethicone (MYLANTA) 200-200-20 mg/5 mL oral suspension 30 mL  30 mL Oral Q6H PRN    ampicillin-sulbactam (UNASYN) 3 g in sodium chloride 0 9 % 100 mL IVPB  3 g Intravenous Q6H    cholecalciferol (VITAMIN D3) tablet 2,000 Units  2,000 Units Oral Daily    enoxaparin (LOVENOX) subcutaneous injection 40 mg  40 mg Subcutaneous Daily    HYDROmorphone (DILAUDID) injection 1 mg  1 mg Intravenous Q3H PRN    loratadine (CLARITIN) tablet 10 mg  10 mg Oral Daily    metoprolol succinate (TOPROL-XL) 24 hr tablet 25 mg  25 mg Oral Daily    multi-electrolyte (PLASMALYTE-A/ISOLYTE-S PH 7 4) IV solution  100 mL/hr Intravenous Continuous    ondansetron (ZOFRAN) injection 4 mg  4 mg Intravenous Q6H PRN    oxyCODONE (OxyCONTIN) 12 hr tablet 20 mg  20 mg Oral Q12H [address]    oxyCODONE-acetaminophen (PERCOCET) 5-325 mg per tablet 1 tablet  1 tablet Oral Q4H PRN    Or    oxyCODONE (ROXICODONE) immediate release tablet 10 mg  10 mg Oral Q4H PRN    polyethylene glycol (MIRALAX) packet 17 g  17 g Oral Daily    senna (SENOKOT) tablet 8 6 mg  1 tablet Oral BID    sertraline (ZOLOFT) tablet 25 mg  25 mg Oral Daily       No Known Allergies    OBJECTIVE:    Physical Exam  Physical Exam  Vitals signs and nursing note reviewed  Constitutional:       General: She is not in acute distress  Comments: Cachetic    HENT:      Head: Normocephalic and atraumatic  Right Ear: External ear normal       Left Ear: External ear normal    Eyes:      General:         Right eye: No discharge  Left eye: No discharge  Cardiovascular:      Rate and Rhythm: Normal rate  Pulmonary:      Effort: Pulmonary effort is normal  No respiratory distress  Abdominal:      General: There is no distension  Palpations: Abdomen is soft  Musculoskeletal:         General: No edema  Skin:     Coloration: Skin is pale  Neurological:      Mental Status: She is alert and oriented to person, place, and time  Psychiatric:         Mood and Affect: Mood and affect normal          Lab Results: I have personally reviewed pertinent labs  , CBC: No results found for: WBC, HGB, HCT, MCV, PLT, ADJUSTEDWBC, MCH, MCHC, RDW, MPV, NRBC, CMP: No results found for: SODIUM, K, CL, CO2, ANIONGAP, BUN, CREATININE, GLUCOSE, CALCIUM, AST, ALT, ALKPHOS, PROT, BILITOT, EGFR, PT/PTT:No results found for: PT, PTT  Imaging Studies: reviewed  EKG, Pathology, and Other Studies: reviewes    Counseling / Coordination of Care    Total floor / unit time spent today 25+ minutes   Greater than 50% of total time was spent with the patient and / or family counseling and / or coordination of care   A description of the counseling / coordination of care: chart review, medication review, discussion with CRS resident team

## 2020-08-04 NOTE — ASSESSMENT & PLAN NOTE
Follows with outpatient oncology regarding chemotherapy  Per pathology in June 2020, ER/VA (+) and HER-2 (-)

## 2020-08-04 NOTE — UTILIZATION REVIEW
Continued Stay Review    Date: 8/4/20                       Current Patient Class: IP  Current Level of Care: MS    HPI:45 y o  female initially admitted on 7/31/20 with rectal abscess, metastatic rectal cancer to liver and lung s/p chemo, breat CA, cancer pain, leukocytosis,     Assessment/Plan:   Rectal abscess - gluteal aspirate on 7/31 greq Staph noamsus with  Bacteroides/Prevotella species - on IV Unasyn per Infectious Disease  Still considering palliative colostomy  Palliative Care helping with pain mgmt  She is comfortable today and wants no changes at this time  Na is stalble today  Following H/H  Stable at present  BUN/Cr trending down        Pertinent Labs/Diagnostic Results:       Results from last 7 days   Lab Units 08/02/20  0515 08/01/20  0654 07/30/20  2135   WBC Thousand/uL 11 92* 11 69* 16 24*   HEMOGLOBIN g/dL 8 4* 7 9* 8 2*   HEMATOCRIT % 28 6* 26 7* 26 6*   PLATELETS Thousands/uL 238 166 167   NEUTROS ABS Thousands/µL 8 81* 8 53* 12 86*         Results from last 7 days   Lab Units 08/02/20  0515 08/01/20  0654 07/30/20  2135   SODIUM mmol/L 135* 134* 132*   POTASSIUM mmol/L 3 7 3 1* 3 4*   CHLORIDE mmol/L 103 101 99*   CO2 mmol/L 25 28 26   ANION GAP mmol/L 7 5 7   BUN mg/dL 3* 6 13   CREATININE mg/dL 0 47* 0 54* 0 84   EGFR ml/min/1 73sq m 118 113 83   CALCIUM mg/dL 8 7 8 7 8 6   MAGNESIUM mg/dL 2 5  --   --      Results from last 7 days   Lab Units 07/30/20  2135   AST U/L 44   ALT U/L 22   ALK PHOS U/L 254*   TOTAL PROTEIN g/dL 6 5   ALBUMIN g/dL 2 5*   TOTAL BILIRUBIN mg/dL 0 20         Results from last 7 days   Lab Units 08/02/20  0515 08/01/20  0654 07/30/20  2135   GLUCOSE RANDOM mg/dL 98 89 110     Results from last 7 days   Lab Units 07/31/20  0656   PROTIME seconds 14 0   INR  1 07     Results from last 7 days   Lab Units 07/30/20  2134   LACTIC ACID mmol/L 1 2     Results from last 7 days   Lab Units 07/30/20  2135   LIPASE u/L 102     Results from last 7 days   Lab Units 07/31/20  1248 07/30/20  2150 07/30/20  2149   CLARITY UA  Clear  --  Clear   COLOR UA  Yellow yellow Yellow   SPEC GRAV UA  1 026  --  1 015   PH UA  6 0  --  7 0   GLUCOSE UA mg/dl Negative  --  Negative   KETONES UA mg/dl Negative  --  Negative   BLOOD UA  Trace*  --  Trace*   PROTEIN UA mg/dl Negative  --  Negative   NITRITE UA  Negative  --  Negative   BILIRUBIN UA  Negative  --  Negative   UROBILINOGEN UA E U /dl 0 2  --  0 2   LEUKOCYTES UA  Negative  --  Negative   WBC UA /hpf None Seen  --  2-4*   RBC UA /hpf 4-10*  --  2-4*   BACTERIA UA /hpf None Seen  --  None Seen   EPITHELIAL CELLS WET PREP /hpf None Seen  --  None Seen     Results from last 7 days   Lab Units 07/31/20  1541 07/30/20  2156 07/30/20  2135   BLOOD CULTURE   --  No Growth After 4 Days  No Growth After 4 Days     GRAM STAIN RESULT  3+ Polys  No bacteria seen  --   --    BODY FLUID CULTURE, STERILE  4+ Growth of Streptococcus anginosus*  --   --    Vital Signs:   08/04/20 14:53:58   99 °F (37 2 °C)   94   18   107/68   81   99 %       08/04/20 0825                     None (Room air)    08/04/20 06:47:14   98 6 °F (37 °C)   88   18   98/66   77   98 %       08/03/20 23:42:43   99 4 °F (37 4 °C)   99   18   123/80   94   98 %       08/03/20 2331                     None (Room air)    08/03/20 20:39:05            121/81   94          08/03/20 1625   98 °F (36 7 °C)   86   18   134/74      94 %       08/03/20 15:34:39      89   18   130/85   100   90 %       08/03/20 1116   98 1 °F (36 7 °C)                      08/03/20 0817                     None (Room air)    08/03/20 06:56:17   97 4 °F (36 3 °C)Abnormal     80   18   97/60   72   98 %       08/03/20 03:02:54      80   18   98/59   72   97 %       08/02/20 22:40:29   97 7 °F (36 5 °C)   78   20   96/59   71   96 %   None (Room air)    08/02/20 19:45:07      86      98/60   73   98 %       08/02/20 1900   101 4 °F (38 6 °C)Abnormal                      08/02/20 1635                     None (Room air)    08/02/20 15:31:40   99 4 °F (37 4 °C)   85   19   96/63   74   97 %         Medications:   Scheduled Medications:  ampicillin-sulbactam, 3 g, Intravenous, Q6H  cholecalciferol, 2,000 Units, Oral, Daily  enoxaparin, 40 mg, Subcutaneous, Daily  loratadine, 10 mg, Oral, Daily  metoprolol succinate, 25 mg, Oral, Daily  oxyCODONE, 20 mg, Oral, Q12H GONZÁLEZ  polyethylene glycol, 4,000 mL, Oral, Once  polyethylene glycol, 17 g, Oral, Daily  senna, 1 tablet, Oral, BID  sertraline, 25 mg, Oral, Daily    Continuous IV Infusions:  multi-electrolyte, 100 mL/hr, Intravenous, Continuous      PRN Meds:  acetaminophen, 650 mg, Oral, Q6H PRN  aluminum-magnesium hydroxide-simethicone, 30 mL, Oral, Q6H PRN  HYDROmorphone, 1 mg, Intravenous, Q3H PRN = x 5 8/3 and x 1 8/4  ondansetron, 4 mg, Intravenous, Q6H PRN  oxyCODONE-acetaminophen, 1 tablet, Oral, Q4H PRN    Or  oxyCODONE, 10 mg, Oral, Q4H PRN - x 2 8/3, x3 8/4    Discharge Plan: D    Network Utilization Review Department  [email or web address]  ATTENTION: Please call with any questions or concerns to [phone] and carefully listen to the prompts so that you are directed to the right person  All voicemails are confidential   Lani Grand all requests for admission clinical reviews, approved or denied determinations and any other requests to dedicated fax number below belonging to the campus where the patient is receiving treatment   List of dedicated fax numbers for the Facilities:  FACILITY NAME UR FAX NUMBER   ADMISSION DENIALS (Administrative/Medical Necessity) [phone]   [address]  (Maternity/NICU/Pediatrics) [phone]   Katerine Vaughn [phone]   Yuriy Macias [phone]   [facility] [phone]   Pari Recinos [address] [phone] [facility]  [phone]   [address] [phone]   [address] [place] [address] [phone]

## 2020-08-04 NOTE — ASSESSMENT & PLAN NOTE
Per repeat CT imaging yesterday, "Mildly enlarged complex presacral collection that may represent combination of abscess and tumor   Adjacent or contiguous left gluteal abscess is mildly increased "  Gluteal aspirate culture s/p I&D on 7/31 growing Streptococcus anginosus along with Bacteroides/Prevotella species - on IV Unasyn per Infectious Disease  History of rectal cancer on chemotherapy noted  Monitor fever curve - monitor vitals and maintain hemodynamics  PRN pain control

## 2020-08-04 NOTE — ASSESSMENT & PLAN NOTE
With liver/lung metastasis -> s/p chemotherapy per outpatient oncology  Appreciate palliative care regarding pain control  Appreciate colorectal surgery input -> considering palliative colostomy  Supportive care otherwise

## 2020-08-04 NOTE — PROGRESS NOTES
[facility] Internal Medicine - Progress Note  Patient: Radha Fitzpatrick 50 y o  female MRN: [id number]  Unit/Bed#: [facility] 931-01 Encounter: [id number]  Primary Care Provider: Devika Cazares MD  Date Of Visit: 08/04/20        Assessment & Plan:    * Rectal abscess  Assessment & Plan  Per repeat CT imaging yesterday, "Mildly enlarged complex presacral collection that may represent combination of abscess and tumor  Adjacent or contiguous left gluteal abscess is mildly increased "  Gluteal aspirate culture s/p I&D on 7/31 growing Streptococcus anginosus along with Bacteroides/Prevotella species - on IV Unasyn per Infectious Disease  History of rectal cancer on chemotherapy noted  Monitor fever curve - monitor vitals and maintain hemodynamics  PRN pain control    Metastatic rectal cancer  Assessment & Plan  With liver/lung metastasis -> s/p chemotherapy per outpatient oncology  Appreciate palliative care regarding pain control  Appreciate colorectal surgery input -> considering palliative colostomy  Supportive care otherwise    Breast cancer  Assessment & Plan  Follows with outpatient oncology regarding chemotherapy  Per pathology in June 2020, ER/[place] (+) and HER-2 (-)    Cancer associated pain  Assessment & Plan  Appreciate palliative care input - continue current analgesia regimen    Leukocytosis  Assessment & Plan  Likely reactive acute medical issue(s)  Monitor WBC count    Anemia of chronic disease  Assessment & Plan  Monitor H/H    Hyponatremia  Assessment & Plan  Serum sodium stable at 135 today    Hypokalemia  Assessment & Plan  Monitor/replete serum potassium  Check serum magnesium level        DVT Prophylaxis:  Lovenox       Patient Centered Rounds:  I have performed bedside rounds and discussed plan of care with nursing today      Discussions with Specialists or Other Care Team Provider:  see above assessments if applicable    Education and Discussions with Family / Patient:  Patient at bedside - denied/refused need to update other family/friends at this time    Time Spent for Care:  32 minutes  More than 50% of total time spent on counseling and coordination of care as described above  Current Length of Stay: 4 day(s)    Current Patient Status: Inpatient   Certification Statement:  Patient will continue to require additional hospital stay due to assessments as noted above  Code Status: Level 1 - Full Code        Subjective:     Seen/examined earlier in the afternoon  She is resting fairly comfortably in bed at the time my encounter but does complain of waxing/waning pain relatively controlled on her current analgesia regimen  Denying fever/chills this time  Objective:     Vitals:   Temp (24hrs), Av 8 °F (37 1 °C), Min:98 °F (36 7 °C), Max:99 4 °F (37 4 °C)    Temp:  [98 °F (36 7 °C)-99 4 °F (37 4 °C)] 99 °F (37 2 °C)  HR:  [86-99] 94  Resp:  [18] 18  BP: ()/(66-85) 107/68  SpO2:  [90 %-99 %] 99 %  Body mass index is 23 27 kg/m²  Input and Output Summary (last 24 hours):        Intake/Output Summary (Last 24 hours) at 2020 1528  Last data filed at 2020 1453  Gross per 24 hour   Intake 2688 ml   Output 6550 ml   Net -3862 ml       Physical Exam:     GENERAL:  Well-developed/nourished - waxing/waning distress from pain  HEAD:  Normocephalic - atraumatic  EYES: PERRL - EOMI   MOUTH:  Mucosa moist  NECK:  Supple - full range of motion  CARDIAC:  Rate controlled - S1/S2 positive  PULMONARY:  Clear breath sounds bilaterally - nonlabored respirations  ABDOMEN:  Soft - nontender/nondistended - active bowel sounds  MUSCULOSKELETAL:  Motor strength/range of motion intact  NEUROLOGIC:  Alert/oriented at baseline  SKIN:  Chronic wrinkles/blemishes - tattoos present  PSYCHIATRIC:  Mood/affect stable      Additional Data:     Labs & Recent Cultures:    Results from last 7 days   Lab Units 20  0515   WBC Thousand/uL 11 92*   HEMOGLOBIN g/dL 8 4*   HEMATOCRIT % 28 6*   PLATELETS Thousands/uL 238 NEUTROS PCT % 74   LYMPHS PCT % 15   MONOS PCT % 7   EOS PCT % 3     Results from last 7 days   Lab Units 08/02/20  0515  07/30/20  2135   POTASSIUM mmol/L 3 7   < > 3 4*   CHLORIDE mmol/L 103   < > 99*   CO2 mmol/L 25   < > 26   BUN mg/dL 3*   < > 13   CREATININE mg/dL 0 47*   < > 0 84   CALCIUM mg/dL 8 7   < > 8 6   ALK PHOS U/L  --   --  254*   ALT U/L  --   --  22   AST U/L  --   --  44    < > = values in this interval not displayed  Results from last 7 days   Lab Units 07/31/20  0656   INR  1 07             Results from last 7 days   Lab Units 07/30/20  2134   LACTIC ACID mmol/L 1 2         Results from last 7 days   Lab Units 07/31/20  1541 07/30/20  2156 07/30/20  2135   BLOOD CULTURE   --  No Growth After 4 Days  No Growth After 4 Days     GRAM STAIN RESULT  3+ Polys  No bacteria seen  --   --    BODY FLUID CULTURE, STERILE  4+ Growth of Streptococcus anginosus*  --   --          Last 24 Hours Medication List:   acetaminophen, 650 mg, Oral, Q6H PRN, Yocasta Lee DO  aluminum-magnesium hydroxide-simethicone, 30 mL, Oral, Q6H PRN, Krystal Ponce MD  ampicillin-sulbactam, 3 g, Intravenous, Q6H, Thai MD Lewis, Last Rate: Stopped (08/04/20 1349)  cholecalciferol, 2,000 Units, Oral, Daily, Krystal Ponce MD  enoxaparin, 40 mg, Subcutaneous, Daily, Krystal Ponce MD  HYDROmorphone, 1 mg, Intravenous, Q3H PRN, Yocasta Lee DO  loratadine, 10 mg, Oral, Daily, Krystal Ponce MD  metoprolol succinate, 25 mg, Oral, Daily, Krystal Ponce MD  multi-electrolyte, 100 mL/hr, Intravenous, Continuous, Krystal Ponce MD, Last Rate: 100 mL/hr (08/04/20 0831)  ondansetron, 4 mg, Intravenous, Q6H PRN, Krystal Ponce MD  oxyCODONE, 20 mg, Oral, Q12H [address], Hetul DO Rosa  oxyCODONE-acetaminophen, 1 tablet, Oral, Q4H PRN, Shavonne Cedillo DO    Or  oxyCODONE, 10 mg, Oral, Q4H PRN, Shavonne Cedillo DO  polyethylene glycol, 4,000 mL, Oral, Once, Ean Saunders MD  polyethylene glycol, 17 g, Oral, Daily, Hetul Mulu Ty DO  senna, 1 tablet, Oral, BID, Yocasta Lee DO  sertraline, 25 mg, Oral, Daily, Chucky Nathan MD             ** Please Note: This note is constructed using a voice recognition dictation system  An occasional wrong word/phrase or sound-a-like substitution may have been picked up by dictation device due to the inherent limitations of voice recognition software  Read the chart carefully and recognize, using reasonable context, where substitutions may have occurred  **

## 2020-08-04 NOTE — PROGRESS NOTES
Progress Note - Infectious Disease   Puja Fitzpatrick 50 y o  female MRN: [id number]  Unit/Bed#: [facility] 931-01 Encounter: [id number]      Impression:  1  Left gluteal abscess S/P I&D   2  Stage IV rectal carcinoma on chemotherapy     Recommendations:  Patient afebrile so far today with modestly elevated WBC count when last taken  1  Abscess culture  showing Streptococcus anginosus and had anaerobic growth of Bacteroides species and Prevotella intermedia  Will switch Rx to ampicillin/sulbactam  2  Had repeat CT of the area to rule out loculated focus which shows stable irregular thickening of the rectum compatible with the known rectal cancer as well as stable lung and liver metastases  In addition there is  a mildly enlarged complex presacral collection that might represent combination abscess or tumor adjacent left gluteal abscess is also mildly increased  Patient was discussed with Dr Lucina Haddad  Because she continues to be uncomfortable she may benefit from a needle aspiration/drainage by IR of the area  There is a risk of the drainage site nonhealing but the patient is aware of this and currently is having issues with pain secondary to swelling of the area  Dr Tamera Lyons will contact IR  I spoke with primary service    Antibiotics:  Stent  1  Ampicillin/sulbactam 3 g q 6 hours IV, day 6 total Rx     Subjective:  She still complains of left buttock and rectal pain  Denies nausea, vomiting, or diarrhea  Objective:  Vitals:  Temp:  [98 6 °F (37 °C)-99 4 °F (37 4 °C)] 99 °F (37 2 °C)  HR:  [88-99] 94  Resp:  [18] 18  BP: ()/(66-81) 107/68  SpO2:  [98 %-99 %] 99 %  Temp (24hrs), Av °F (37 2 °C), Min:98 6 °F (37 °C), Max:99 4 °F (37 4 °C)  Current: Temperature: 99 °F (37 2 °C)    Physical Exam:     General Appearance:  Alert, chronically ill-appearing female nontoxic, no acute distress  Throat: Oropharynx moist without lesions    Lips, mucosa, and tongue normal   Neck: Supple, symmetrical, trachea midline, no adenopathy,  no tenderness/mass/nodules   Lungs:   Clear to auscultation bilaterally, no audible wheezes, rhonchi or rales; respirations unlabored  Port-A-Cath right subclavian area   Heart:  Regular rate and rhythm, S1, S2 normal, no murmur, rub or gallop   Abdomen:   Soft, mild right-sided tenderness, non-distended, positive bowel sounds  No masses, no organomegaly    No CVA tenderness   Extremities: Extremities normal, atraumatic, no clubbing, cyanosis or edema   Skin: Surgical scars, multiple tattoos, left gluteal wound tenderness         Invasive Devices     Central Venous Catheter Line            Port A Cath 06/22/20 Right Chest 43 days          Peripheral Intravenous Line            Peripheral IV 08/02/20 Left;Ventral (anterior) Forearm 1 day    Peripheral IV 08/03/20 Left Antecubital 1 day                Labs, Imaging, & Other studies:   All pertinent labs were personally reviewed  Results from last 7 days   Lab Units 08/02/20  0515 08/01/20  0654 07/30/20  2135   WBC Thousand/uL 11 92* 11 69* 16 24*   HEMOGLOBIN g/dL 8 4* 7 9* 8 2*   PLATELETS Thousands/uL 238 166 167     Results from last 7 days   Lab Units 08/02/20  0515 08/01/20  0654 07/30/20  2135   SODIUM mmol/L 135* 134* 132*   POTASSIUM mmol/L 3 7 3 1* 3 4*   CHLORIDE mmol/L 103 101 99*   CO2 mmol/L 25 28 26   BUN mg/dL 3* 6 13   CREATININE mg/dL 0 47* 0 54* 0 84   EGFR ml/min/1 73sq m 118 113 83   CALCIUM mg/dL 8 7 8 7 8 6   AST U/L  --   --  44   ALT U/L  --   --  22   ALK PHOS U/L  --   --  254*     Results from last 7 days   Lab Units 07/31/20  1541 07/31/20  1248 07/30/20  2156 07/30/20  2135   BLOOD CULTURE   --   --  No Growth After 4 Days  No Growth After 4 Days     GRAM STAIN RESULT  3+ Polys  No bacteria seen  --   --   --    BODY FLUID CULTURE, STERILE  4+ Growth of Streptococcus anginosus*  --   --   --    MRSA CULTURE ONLY   --  No Methicillin Resistant Staphlyococcus aureus (MRSA) isolated  --   --

## 2020-08-05 LAB
ANION GAP SERPL CALCULATED.3IONS-SCNC: 4 MMOL/L (ref 4–13)
BACTERIA BLD CULT: NORMAL
BACTERIA BLD CULT: NORMAL
BASOPHILS # BLD AUTO: 0.04 THOUSANDS/ΜL (ref 0–0.1)
BASOPHILS NFR BLD AUTO: 0 % (ref 0–1)
BUN SERPL-MCNC: 3 MG/DL (ref 5–25)
CALCIUM SERPL-MCNC: 8.4 MG/DL (ref 8.3–10.1)
CHLORIDE SERPL-SCNC: 105 MMOL/L (ref 100–108)
CO2 SERPL-SCNC: 29 MMOL/L (ref 21–32)
CREAT SERPL-MCNC: 0.46 MG/DL (ref 0.6–1.3)
EOSINOPHIL # BLD AUTO: 0.52 THOUSAND/ΜL (ref 0–0.61)
EOSINOPHIL NFR BLD AUTO: 5 % (ref 0–6)
ERYTHROCYTE [DISTWIDTH] IN BLOOD BY AUTOMATED COUNT: 22.7 % (ref 11.6–15.1)
GFR SERPL CREATININE-BSD FRML MDRD: 119 ML/MIN/1.73SQ M
GLUCOSE SERPL-MCNC: 83 MG/DL (ref 65–140)
HCT VFR BLD AUTO: 25.9 % (ref 34.8–46.1)
HGB BLD-MCNC: 7.5 G/DL (ref 11.5–15.4)
IMM GRANULOCYTES # BLD AUTO: 0.21 THOUSAND/UL (ref 0–0.2)
IMM GRANULOCYTES NFR BLD AUTO: 2 % (ref 0–2)
LYMPHOCYTES # BLD AUTO: 1.94 THOUSANDS/ΜL (ref 0.6–4.47)
LYMPHOCYTES NFR BLD AUTO: 18 % (ref 14–44)
MAGNESIUM SERPL-MCNC: 2.6 MG/DL (ref 1.6–2.6)
MCH RBC QN AUTO: 23.7 PG (ref 26.8–34.3)
MCHC RBC AUTO-ENTMCNC: 29 G/DL (ref 31.4–37.4)
MCV RBC AUTO: 82 FL (ref 82–98)
MONOCYTES # BLD AUTO: 0.87 THOUSAND/ΜL (ref 0.17–1.22)
MONOCYTES NFR BLD AUTO: 8 % (ref 4–12)
NEUTROPHILS # BLD AUTO: 6.97 THOUSANDS/ΜL (ref 1.85–7.62)
NEUTS SEG NFR BLD AUTO: 67 % (ref 43–75)
NRBC BLD AUTO-RTO: 0 /100 WBCS
PLATELET # BLD AUTO: 378 THOUSANDS/UL (ref 149–390)
PMV BLD AUTO: 10 FL (ref 8.9–12.7)
POTASSIUM SERPL-SCNC: 3.9 MMOL/L (ref 3.5–5.3)
RBC # BLD AUTO: 3.16 MILLION/UL (ref 3.81–5.12)
SODIUM SERPL-SCNC: 138 MMOL/L (ref 136–145)
WBC # BLD AUTO: 10.55 THOUSAND/UL (ref 4.31–10.16)

## 2020-08-05 PROCEDURE — 83735 ASSAY OF MAGNESIUM: CPT | Performed by: INTERNAL MEDICINE

## 2020-08-05 PROCEDURE — 0K9P3ZZ DRAINAGE OF LEFT HIP MUSCLE, PERCUTANEOUS APPROACH: ICD-10-PCS | Performed by: RADIOLOGY

## 2020-08-05 PROCEDURE — 99232 SBSQ HOSP IP/OBS MODERATE 35: CPT | Performed by: INTERNAL MEDICINE

## 2020-08-05 PROCEDURE — 85025 COMPLETE CBC W/AUTO DIFF WBC: CPT | Performed by: INTERNAL MEDICINE

## 2020-08-05 PROCEDURE — 80048 BASIC METABOLIC PNL TOTAL CA: CPT | Performed by: INTERNAL MEDICINE

## 2020-08-05 PROCEDURE — 99231 SBSQ HOSP IP/OBS SF/LOW 25: CPT | Performed by: INTERNAL MEDICINE

## 2020-08-05 PROCEDURE — NC001 PR NO CHARGE: Performed by: PHYSICIAN ASSISTANT

## 2020-08-05 RX ADMIN — AMPICILLIN SODIUM AND SULBACTAM SODIUM 3 G: 2; 1 INJECTION, POWDER, FOR SOLUTION INTRAMUSCULAR; INTRAVENOUS at 00:14

## 2020-08-05 RX ADMIN — OXYCODONE HYDROCHLORIDE 10 MG: 10 TABLET ORAL at 11:36

## 2020-08-05 RX ADMIN — POLYETHYLENE GLYCOL 3350 17 G: 17 POWDER, FOR SOLUTION ORAL at 08:01

## 2020-08-05 RX ADMIN — SENNOSIDES 8.6 MG: 8.6 TABLET ORAL at 17:41

## 2020-08-05 RX ADMIN — AMPICILLIN SODIUM AND SULBACTAM SODIUM 3 G: 2; 1 INJECTION, POWDER, FOR SOLUTION INTRAMUSCULAR; INTRAVENOUS at 17:42

## 2020-08-05 RX ADMIN — ENOXAPARIN SODIUM 40 MG: 40 INJECTION SUBCUTANEOUS at 08:01

## 2020-08-05 RX ADMIN — OXYCODONE HYDROCHLORIDE 10 MG: 10 TABLET ORAL at 06:03

## 2020-08-05 RX ADMIN — AMPICILLIN SODIUM AND SULBACTAM SODIUM 3 G: 2; 1 INJECTION, POWDER, FOR SOLUTION INTRAMUSCULAR; INTRAVENOUS at 23:40

## 2020-08-05 RX ADMIN — HYDROMORPHONE HYDROCHLORIDE 1 MG: 1 INJECTION, SOLUTION INTRAMUSCULAR; INTRAVENOUS; SUBCUTANEOUS at 18:20

## 2020-08-05 RX ADMIN — SENNOSIDES 8.6 MG: 8.6 TABLET ORAL at 08:01

## 2020-08-05 RX ADMIN — OXYCODONE HYDROCHLORIDE 20 MG: 20 TABLET, FILM COATED, EXTENDED RELEASE ORAL at 22:15

## 2020-08-05 RX ADMIN — LORATADINE 10 MG: 10 TABLET ORAL at 08:01

## 2020-08-05 RX ADMIN — AMPICILLIN SODIUM AND SULBACTAM SODIUM 3 G: 2; 1 INJECTION, POWDER, FOR SOLUTION INTRAMUSCULAR; INTRAVENOUS at 12:12

## 2020-08-05 RX ADMIN — OXYCODONE HYDROCHLORIDE 20 MG: 20 TABLET, FILM COATED, EXTENDED RELEASE ORAL at 08:00

## 2020-08-05 RX ADMIN — OXYCODONE HYDROCHLORIDE 10 MG: 10 TABLET ORAL at 17:41

## 2020-08-05 RX ADMIN — AMPICILLIN SODIUM AND SULBACTAM SODIUM 3 G: 2; 1 INJECTION, POWDER, FOR SOLUTION INTRAMUSCULAR; INTRAVENOUS at 06:00

## 2020-08-05 RX ADMIN — OXYCODONE HYDROCHLORIDE AND ACETAMINOPHEN 1 TABLET: 5; 325 TABLET ORAL at 01:07

## 2020-08-05 NOTE — H&P
History and Physical   Colon and Rectal Surgery   Amado Fitzpatrick 50 y o  female MRN: [id number]  Unit/Bed#: [facility] 931-01 Encounter: [id number]  08/05/20   @NOW    Chief Complaint   Patient presents with    Abdominal Pain     Patient reports lower abdominal pain; states that she was here recently for a blood infection; states that she has been running a fever and feeling very weak; hx of rectal cancer          History of Present Illness   HPI:  Boris rBuno is a 50 y o  female who presents for evaluation for pelvic abscess is recent chemotherapy and cancer  Since her admission she has undergone drainage procedures well as next she has buttock pain the affected side  She has low-grade fevers overnight 100 4      Historical Information   Past Medical History:   Diagnosis Date    History of rectal cancer 06/2020     Past Surgical History:   Procedure Laterality Date    BACK SURGERY      BREAST BIOPSY Right 06/19/2020    2 sites; 1 breast 1 axilla    HERNIA REPAIR      3 repairs    IR IMAGE GUIDED ASPIRATION / DRAINAGE  7/31/2020    IR IMAGE GUIDED BIOPSY/ASPIRATION LIVER  6/17/2020    IR PORT PLACEMENT  6/22/2020    TUBAL LIGATION      US GUIDED BREAST BIOPSY RIGHT COMPLETE Right 6/19/2020    US GUIDED BREAST LYMPH NODE BIOPSY RIGHT Right 6/19/2020       Meds/Allergies     Medications Prior to Admission   Medication    cetirizine (ZyrTEC) 10 mg tablet    Ergocalciferol (VITAMIN D2 PO)    oxyCODONE-acetaminophen (PERCOCET) 5-325 mg per tablet    Cholecalciferol 50 MCG (2000 UT) CAPS    metoprolol succinate (TOPROL-XL) 25 mg 24 hr tablet    sertraline (ZOLOFT) 25 mg tablet         Current Facility-Administered Medications:     acetaminophen (TYLENOL) tablet 650 mg, 650 mg, Oral, Q6H PRN, Yocasta Lee DO, 650 mg at 08/04/20 2245    aluminum-magnesium hydroxide-simethicone (MYLANTA) 200-200-20 mg/5 mL oral suspension 30 mL, 30 mL, Oral, Q6H PRN, Bertha Mercado MD, 30 mL at 08/02/20 3629   ampicillin-sulbactam (UNASYN) 3 g in sodium chloride 0 9 % 100 mL IVPB, 3 g, Intravenous, Q6H, Braxton Sosa MD, Last Rate: 200 mL/hr at 08/05/20 0600, 3 g at 08/05/20 0600    cholecalciferol (VITAMIN D3) tablet 2,000 Units, 2,000 Units, Oral, Daily, Jersey Ortega MD, 2,000 Units at 08/02/20 0933    enoxaparin (LOVENOX) subcutaneous injection 40 mg, 40 mg, Subcutaneous, Daily, Jersey Ortega MD, 40 mg at 08/05/20 0801    HYDROmorphone (DILAUDID) injection 1 mg, 1 mg, Intravenous, Q3H PRN, Yocasta Lee DO, 1 mg at 08/04/20 1618    loratadine (CLARITIN) tablet 10 mg, 10 mg, Oral, Daily, Jersey Ortega MD, 10 mg at 08/05/20 0801    metoprolol succinate (TOPROL-XL) 24 hr tablet 25 mg, 25 mg, Oral, Daily, Jersey Ortega MD, 25 mg at 08/02/20 0933    multi-electrolyte (PLASMALYTE-A/ISOLYTE-S PH 7 4) IV solution, 100 mL/hr, Intravenous, Continuous, Jersey Ortega MD, Last Rate: 100 mL/hr at 08/04/20 2133, 100 mL/hr at 08/04/20 2133    ondansetron (ZOFRAN) injection 4 mg, 4 mg, Intravenous, Q6H PRN, Jersey Ortega MD    oxyCODONE (OxyCONTIN) 12 hr tablet 20 mg, 20 mg, Oral, Q12H [address], Yocasta Lozanoa DO, 20 mg at 08/05/20 0800    oxyCODONE-acetaminophen (PERCOCET) 5-325 mg per tablet 1 tablet, 1 tablet, Oral, Q4H PRN, 1 tablet at 08/05/20 0107 **OR** oxyCODONE (ROXICODONE) immediate release tablet 10 mg, 10 mg, Oral, Q4H PRN, Shavonne Cedillo DO, 10 mg at 08/05/20 0603    polyethylene glycol (MIRALAX) packet 17 g, 17 g, Oral, Daily, Yocasta Lee DO, 17 g at 08/05/20 0801    senna (SENOKOT) tablet 8 6 mg, 1 tablet, Oral, BID, Hetul Lee, DO, 8 6 mg at 08/05/20 0801    sertraline (ZOLOFT) tablet 25 mg, 25 mg, Oral, Daily, Jersey Ortega MD    No Known Allergies      Social History   Social History     Substance and Sexual Activity   Alcohol Use Not Currently     Social History     Substance and Sexual Activity   Drug Use No     Social History     Tobacco Use   Smoking Status Former Smoker    Packs/day: 0 00    Last attempt to quit: 2020    Years since quittin 2   Smokeless Tobacco Never Used         Family History:   Family History   Problem Relation Age of Onset    No Known Problems Mother     No Known Problems Sister     No Known Problems Daughter     Breast cancer Maternal Grandmother         age unknown    No Known Problems Sister     No Known Problems Daughter     Breast cancer Maternal Aunt 61    No Known Problems Maternal Aunt          Objective     Current Vitals:   Blood Pressure: 110/67 (20 0700)  Pulse: 77 (20 0700)  Temperature: 98 9 °F (37 2 °C) (20 07)  Temp Source: Oral (20 0110)  Respirations: 18 (20 07)  Height: 5' 2" (20 0414)  Weight - Scale: 59 2 kg (130 lb 8 2 oz) (20 06)  SpO2: 96 % (20)    Intake/Output Summary (Last 24 hours) at 2020 0804  Last data filed at 2020 0601  Gross per 24 hour   Intake 3918 ml   Output 4100 ml   Net -182 ml       Physical Exam:  General:  Resting comfortably in bed   Eyes:Sclera anicteric  ENT: Trachea midline  Pulm:  Symmetric chest raise  No respiratory Distress  CV:  Regular on monitor  Abdomen:  Soft NT ND  Extremities:  No clubbing/ cyanosis/ edema    Lab Results: I have personally reviewed pertinent lab results  Imaging: I have personally reviewed pertinent reports  ASSESSMENT:  Oneida Monroe is a 50 y o  female who is being treated for pelvic abscess as a chemotherapy and rectal cancer  We discussed treatment possibilities  This discussion was in line with that had with Dr Shiela Flroes  We discussed fecal diversion to decrease fecal soiling of the pelvis  We discussed repeat drainage and antibiotics  She is unwilling to commit to surgery at this point in time  We will follow peripherally shall the the need arise for diverting colostomy  This is not an emergent indication at present but likely will help facilitate her return to chemotherapy

## 2020-08-05 NOTE — PROGRESS NOTES
[facility] - Progress Note  Patient: Hong Fitzpatrick 50 y o  female MRN: [id number]  Unit/Bed#: [facility] 931-01 Encounter: [id number]  Primary Care Provider: Jennifer Muniz MD  Date Of Visit: 08/05/20        Assessment & Plan:    * Rectal abscess  Assessment & Plan  Per repeat CT imaging, "Mildly enlarged complex presacral collection that may represent combination of abscess and tumor   Adjacent or contiguous left gluteal abscess is mildly increased "  Gluteal aspirate culture s/p I&D on 7/31 growing Streptococcus anginosus along with Bacteroides/Prevotella species - on IV Unasyn per Infectious Disease  History of rectal cancer on chemotherapy noted  Monitor fever curve - monitor vitals and maintain hemodynamics  PRN pain control  Long and in-depth discussions with Interventional Radiology, Infectious Disease, colorectal surgery, and palliative care -> plan for one more attempt at IR guided aspirate drainage noted - recurrent/intermittent percutaneous draining or a permanent drain placement are not a long-term solution and will increased risk of further complications in the setting of underlying rectal cancer, including decreased quality of life, possible fistula formation, possible recurrent super infections, and need for frequent drain tube replacements - patient is aware and is now starting to consider a diverting colostomy as previously offered by colorectal surgery    Metastatic rectal cancer  Assessment & Plan  With liver/lung metastasis -> s/p chemotherapy per outpatient oncology  Appreciate palliative care regarding pain control  Appreciate colorectal surgery input -> palliative/diverting colostomy has been offered  Supportive care otherwise    Breast cancer  Assessment & Plan  Follows with outpatient oncology regarding chemotherapy  Per pathology in June 2020, ER/ME (+) and HER-2 (-)    Cancer associated pain  Assessment & Plan  Appreciate palliative care input - continue current analgesia regimen    Leukocytosis  Assessment & Plan  Likely reactive acute medical issue(s)  Monitor WBC count    Anemia of chronic disease  Assessment & Plan  Monitor H/H    Hyponatremia  Assessment & Plan  Serum sodium normalized today    Hypokalemia  Assessment & Plan  Monitor/replete serum potassium  Serum magnesium normal        DVT Prophylaxis:  Lovenox       Patient Centered Rounds:  I have performed bedside rounds and discussed plan of care with nursing today  Discussions with Specialists or Other Care Team Provider:  see above assessments if applicable    Education and Discussions with Family / Patient:  Patient at bedside, who will self-update family/friends as necessary    Time Spent for Care:  32 minutes  More than 50% of total time spent on counseling and coordination of care as described above  Current Length of Stay: 5 day(s)    Current Patient Status: Inpatient   Certification Statement:  Patient will continue to require additional hospital stay due to assessments as noted above  Code Status: Level 1 - Full Code        Subjective:     Seen/examined earlier today  States pain is waxing/waning but generally controlled  Expressed some concern over possible edema of her lower back and requested IV fluids be shut off  Still intermittently frustrated over the lack of a direct goal oriented plan regarding further treatment  Objective:     Vitals:   Temp (24hrs), Av 3 °F (37 4 °C), Min:98 8 °F (37 1 °C), Max:100 4 °F (38 °C)    Temp:  [98 8 °F (37 1 °C)-100 4 °F (38 °C)] 99 1 °F (37 3 °C)  HR:  [77-99] 81  Resp:  [18-19] 19  BP: ()/(60-80) 97/64  SpO2:  [96 %-99 %] 99 %  Body mass index is 23 87 kg/m²  Input and Output Summary (last 24 hours):        Intake/Output Summary (Last 24 hours) at 2020 1802  Last data filed at 2020 1517  Gross per 24 hour   Intake 2060 ml   Output 3150 ml   Net -1090 ml       Physical Exam:     GENERAL:  Well-developed/nourished - waxing/waning but improved distress from pain/discomfort  HEAD:  Normocephalic - atraumatic  EYES: PERRL - EOMI   MOUTH:  Mucosa moist  NECK:  Supple - full range of motion  CARDIAC:  Regular rate/rhythm - S1/S2 positive  PULMONARY:  Clear breath sounds bilaterally - nonlabored respirations  ABDOMEN:  Soft - nontender/nondistended - active bowel sounds  MUSCULOSKELETAL:  Motor strength/range of motion fairly intact  NEUROLOGIC:  Alert/oriented at baseline  SKIN:  Chronic wrinkles/blemishes - tattoos noted  PSYCHIATRIC:  Mood/affect stable      Additional Data:     Labs & Recent Cultures:    Results from last 7 days   Lab Units 08/05/20  0649   WBC Thousand/uL 10 55*   HEMOGLOBIN g/dL 7 5*   HEMATOCRIT % 25 9*   PLATELETS Thousands/uL 378   NEUTROS PCT % 67   LYMPHS PCT % 18   MONOS PCT % 8   EOS PCT % 5     Results from last 7 days   Lab Units 08/05/20  0649  07/30/20  2135   POTASSIUM mmol/L 3 9   < > 3 4*   CHLORIDE mmol/L 105   < > 99*   CO2 mmol/L 29   < > 26   BUN mg/dL 3*   < > 13   CREATININE mg/dL 0 46*   < > 0 84   CALCIUM mg/dL 8 4   < > 8 6   ALK PHOS U/L  --   --  254*   ALT U/L  --   --  22   AST U/L  --   --  44    < > = values in this interval not displayed  Results from last 7 days   Lab Units 07/31/20  0656   INR  1 07             Results from last 7 days   Lab Units 07/30/20  2134   LACTIC ACID mmol/L 1 2         Results from last 7 days   Lab Units 07/31/20  1541 07/30/20  2156 07/30/20  2135   BLOOD CULTURE   --  No Growth After 5 Days  No Growth After 5 Days     GRAM STAIN RESULT  3+ Polys  No bacteria seen  --   --    BODY FLUID CULTURE, STERILE  4+ Growth of Streptococcus anginosus*  --   --          Last 24 Hours Medication List:   acetaminophen, 650 mg, Oral, Q6H PRN, Yocasta Lee DO  aluminum-magnesium hydroxide-simethicone, 30 mL, Oral, Q6H PRN, Aubrey Nguyen MD  ampicillin-sulbactam, 3 g, Intravenous, Q6H, Michele Hess MD, Last Rate: 3 g (08/05/20 4902)  cholecalciferol, 2,000 Units, Oral, Daily, Robinson Pichardo MD  enoxaparin, 40 mg, Subcutaneous, Daily, Robinson Pichardo MD  HYDROmorphone, 1 mg, Intravenous, Q3H PRN, Yocasta Lee DO  loratadine, 10 mg, Oral, Daily, Robinson Pichardo MD  metoprolol succinate, 25 mg, Oral, Daily, Robinson Pichardo MD  multi-electrolyte, 100 mL/hr, Intravenous, Continuous, Robinson Pichardo MD, Last Rate: 100 mL/hr (08/04/20 2133)  ondansetron, 4 mg, Intravenous, Q6H PRN, Robinson Pichardo MD  oxyCODONE, 20 mg, Oral, Q12H [facility], Yocasta Lee DO  oxyCODONE-acetaminophen, 1 tablet, Oral, Q4H PRN, Shavonne Cedillo DO    Or  oxyCODONE, 10 mg, Oral, Q4H PRN, Shavonne Cedillo DO  polyethylene glycol, 17 g, Oral, Daily, Yocasta Lee DO  senna, 1 tablet, Oral, BID, Yocasta Lee DO  sertraline, 25 mg, Oral, Daily, Robinson Pichardo MD               ** Please Note: This note is constructed using a voice recognition dictation system  An occasional wrong word/phrase or sound-a-like substitution may have been picked up by dictation device due to the inherent limitations of voice recognition software  Read the chart carefully and recognize, using reasonable context, where substitutions may have occurred  **

## 2020-08-05 NOTE — PROGRESS NOTES
Progress Note - Infectious Disease   Puja Fitzpatrick 50 y o  female MRN: [id number]  Unit/Bed#: [facility] 931-01 Encounter: [id number]      Impression:  1  Left gluteal abscess S/P I&D   2  Stage IV rectal carcinoma on chemotherapy     Recommendations:  Patient had T-max of  100 4° with modestly elevated but declining WBC count   1  Abscess culture  showing Streptococcus anginosus and had anaerobic growth of Bacteroides species and Prevotella intermedia  Will switch Rx to ampicillin/sulbactam  2  Discussed with IR who is planning on doing 1 more aspiration  If this is unsuccessful then additional measures may be contemplated by Oncology service    Antibiotics:  Stent  1  Ampicillin/sulbactam 3 g q 6 hours IV, day 7 total Rx     Subjective:  She still complains of left buttock and rectal pain but it is at a lower intensity  Denies nausea, vomiting, or diarrhea  Objective:  Vitals:  Temp:  [98 8 °F (37 1 °C)-100 4 °F (38 °C)] 99 1 °F (37 3 °C)  HR:  [77-99] 81  Resp:  [18-19] 19  BP: ()/(60-80) 97/64  SpO2:  [96 %-99 %] 99 %  Temp (24hrs), Av 3 °F (37 4 °C), Min:98 8 °F (37 1 °C), Max:100 4 °F (38 °C)  Current: Temperature: 99 1 °F (37 3 °C)    Physical Exam:     General Appearance:  Alert, chronically ill-appearing female nontoxic, no acute distress  Throat: Oropharynx moist without lesions  Lips, mucosa, and tongue normal   Neck: Supple, symmetrical, trachea midline, no adenopathy,  no tenderness/mass/nodules   Lungs:   Clear to auscultation bilaterally, no audible wheezes, rhonchi or rales; respirations unlabored  Port-A-Cath right subclavian area   Heart:  Regular rate and rhythm, S1, S2 normal, no murmur, rub or gallop   Abdomen:   Soft, mild right-sided tenderness, non-distended, positive bowel sounds    No masses, no organomegaly    No CVA tenderness   Extremities: Extremities normal, atraumatic, no clubbing, cyanosis or edema   Skin: Surgical scars, multiple tattoos, left gluteal wound tenderness         Invasive Devices     Central Venous Catheter Line            Port A Cath 06/22/20 Right Chest 44 days          Peripheral Intravenous Line            Peripheral IV 08/05/20 Right;Upper;Ventral (anterior) Arm less than 1 day                Labs, Imaging, & Other studies:   All pertinent labs were personally reviewed  Results from last 7 days   Lab Units 08/05/20  0649 08/02/20  0515 08/01/20  0654   WBC Thousand/uL 10 55* 11 92* 11 69*   HEMOGLOBIN g/dL 7 5* 8 4* 7 9*   PLATELETS Thousands/uL 378 238 166     Results from last 7 days   Lab Units 08/05/20  0649 08/02/20  0515 08/01/20  0654 07/30/20  2135   SODIUM mmol/L 138 135* 134* 132*   POTASSIUM mmol/L 3 9 3 7 3 1* 3 4*   CHLORIDE mmol/L 105 103 101 99*   CO2 mmol/L 29 25 28 26   BUN mg/dL 3* 3* 6 13   CREATININE mg/dL 0 46* 0 47* 0 54* 0 84   EGFR ml/min/1 73sq m 119 118 113 83   CALCIUM mg/dL 8 4 8 7 8 7 8 6   AST U/L  --   --   --  44   ALT U/L  --   --   --  22   ALK PHOS U/L  --   --   --  254*     Results from last 7 days   Lab Units 07/31/20  1541 07/31/20  1248 07/30/20  2156 07/30/20  2135   BLOOD CULTURE   --   --  No Growth After 5 Days  No Growth After 5 Days     GRAM STAIN RESULT  3+ Polys  No bacteria seen  --   --   --    BODY FLUID CULTURE, STERILE  4+ Growth of Streptococcus anginosus*  --   --   --    MRSA CULTURE ONLY   --  No Methicillin Resistant Staphlyococcus aureus (MRSA) isolated  --   --

## 2020-08-05 NOTE — ASSESSMENT & PLAN NOTE
Follows with outpatient oncology regarding chemotherapy  Per pathology in June 2020, ER/FL (+) and HER-2 (-)

## 2020-08-05 NOTE — ASSESSMENT & PLAN NOTE
Per repeat CT imaging, "Mildly enlarged complex presacral collection that may represent combination of abscess and tumor   Adjacent or contiguous left gluteal abscess is mildly increased "  Gluteal aspirate culture s/p I&D on 7/31 growing Streptococcus anginosus along with Bacteroides/Prevotella species - on IV Unasyn per Infectious Disease  History of rectal cancer on chemotherapy noted  Monitor fever curve - monitor vitals and maintain hemodynamics  PRN pain control  Long and in-depth discussions with Interventional Radiology, Infectious Disease, colorectal surgery, and palliative care -> plan for one more attempt at IR guided aspirate drainage noted - recurrent/intermittent percutaneous draining or a permanent drain placement are not a long-term solution and will increased risk of further complications in the setting of underlying rectal cancer, including decreased quality of life, possible fistula formation, possible recurrent super infections, and need for frequent drain tube replacements - patient is aware and is now starting to consider a diverting colostomy as previously offered by colorectal surgery

## 2020-08-05 NOTE — ASSESSMENT & PLAN NOTE
With liver/lung metastasis -> s/p chemotherapy per outpatient oncology  Appreciate palliative care regarding pain control  Appreciate colorectal surgery input -> palliative/diverting colostomy has been offered  Supportive care otherwise

## 2020-08-05 NOTE — TELEMEDICINE
INTERPROFESSIONAL (PHONE) [address] [facility] 50 y o  female MRN: [id number]  Unit/Bed#: [facility] 931-01 Encounter: [id number]    IR has been consulted to evaluate the patient, determine the appropriate procedure, and whether or not a procedure can and should be performed regarding the care of Huy Jain  We were consulted by internal medicine/ID concerning gluteal abscess, and to possibly perform an aspiration if medically appropriate for the patient  Consults  08/05/20      Assessment/Recommendation:     50year old female with stage IV rectal carcinoma, presenting with rectal/left gluteal abscess, s/p IR aspiration left gluteal 7/31/20 10cc, recurrent left gluteal collection    - multiple teams involved with patient care  - IR will plan for one additional aspiration  If collection were to recur, IR would not attempt third aspiration  Option of diversion would be favorable at that point  - Drain is highly unfavorable to be placed into necrotic tumor/gluteal collection currently  Drain could create fistula, leakage of stool, frequent tube checks with upsizing, etc   - please keep npo after midnight  Please hold anticoagulation in AM  - Dr Matthias Solares discussed with Dr Ray Jaimes and Dr Yulia Cortes      Total time spent in review of data, discussion with requesting provider and rendering advice was 25 minutes       Patient or appropriate family member was verbally informed by internal medicine of this consultative service on their behalf to provide more timely access to specialty care in lieu of an in person consultation  They were informed it is a billable service unless the it was determined an in person follow up was medically necessary by me within the next 14 days at which time only the in person consult would be billed  Verbal consent was obtained  Thank you for allowing Interventional Radiology to participate in the care of Huy Jain   Please don't hesitate to call or TigerText us with any questions       Dana Santoro PA-C

## 2020-08-05 NOTE — PROGRESS NOTES
I have discussed the case with Saba Chinchilla, at length  I have reviewed the chart and reviewed with the patient  We were involved for consideration of fecal diversion in the case of a patient with a rectal tumor, invasive outside the rectum and associated with perirectal inflammatory changes and probable abscess  This follows treatment with chemotherapy that may have created tumor dissolution  Her CT was reviewed, her labs were studied, and her history was discussed at length for over an hour today  Options include fecal diversion to remove the continuous contamination of the perirectal area  Transrectal drainage is not recommended in that BS would open the rectum to more tumor bed and would probably not result in any definitive benefit  It appears the tumor has already eroded through the wall of the rectum  Transgluteal drainage is an option but may result in fistulization of a tumor tract  Resection of tumor from the pelvis would almost certainly result in small-bowel dropping into the pelvis and secondarily being invaded  Diverting colostomy seems to be a good option  This would potentially improve her pelvic infection risk  However, at this time, she and her team prefer to continue a tense with antibiotic therapy and percutaneous drainage  My only recommendation is that diversion be considered if this is failing for any significant length of time, given the delay in getting to chemotherapy that will be produced by longer periods of time without diversion      We will remain available for consideration of diversion if it is deemed to be indicated by her care team

## 2020-08-05 NOTE — PROGRESS NOTES
Progress note - Palliative and Supportive Care   Cachorro Fitzpatrick 50 y o  female [id number]    Assessment:    -   Patient Active Problem List   Diagnosis    Large bowel obstruction ([facility] [address] )    Metastatic rectal cancer    Breast cancer    Abdominal pain    Anemia of chronic disease    Sepsis (HCC)    Gram-positive bacteremia    Rectal abscess    Cancer associated pain    Leukocytosis    Hyponatremia    Hypokalemia         Plan:  1  Symptom management - no changes   - OxyContin 20 mg BID   - Percocet 5/325 or oxyIR 10 mg q4H PRN for moderate to severe pain   - IV dilaudid 1 mg q3H PRN for BT pain   - continue Zoloft   - Bowel regiment to prevent OIC    2  Goals - Long time spent with patient discussing plan of care  She feels that she has received mixed messages and together reviewed:  - IR drainage (currently planned for tomorrow) with no drain placement  - Medical management   - diverting colostomy  - combination of the above  She requested final recommendation from Dr Sahara Morton- who will visit/call today- I spoke with Dr Sahara Morton, [place] [address]  resident and Dr Landy Haque- it appears the uniformed recommendation is for diverting colostomy  Patient information printed for Ms Piero Dale regarding this intervention  She will discuss further with her niece who works in the [employer]  3  Psychosocial support- time spent providing supportive listening  Interval history:       Multiple conversations held today addressing her options for her care moving forward  She is okay with current pain regimen and does not want any changes       MEDICATIONS / ALLERGIES:     all current active meds have been reviewed and current meds:   Current Facility-Administered Medications   Medication Dose Route Frequency    acetaminophen (TYLENOL) tablet 650 mg  650 mg Oral Q6H PRN    aluminum-magnesium hydroxide-simethicone (MYLANTA) 200-200-20 mg/5 mL oral suspension 30 mL  30 mL Oral Q6H PRN    ampicillin-sulbactam (UNASYN) 3 g in sodium chloride 0 9 % 100 mL IVPB  3 g Intravenous Q6H    cholecalciferol (VITAMIN D3) tablet 2,000 Units  2,000 Units Oral Daily    enoxaparin (LOVENOX) subcutaneous injection 40 mg  40 mg Subcutaneous Daily    HYDROmorphone (DILAUDID) injection 1 mg  1 mg Intravenous Q3H PRN    loratadine (CLARITIN) tablet 10 mg  10 mg Oral Daily    metoprolol succinate (TOPROL-XL) 24 hr tablet 25 mg  25 mg Oral Daily    multi-electrolyte (PLASMALYTE-A/ISOLYTE-S PH 7 4) IV solution  100 mL/hr Intravenous Continuous    ondansetron (ZOFRAN) injection 4 mg  4 mg Intravenous Q6H PRN    oxyCODONE (OxyCONTIN) 12 hr tablet 20 mg  20 mg Oral Q12H [address]    oxyCODONE-acetaminophen (PERCOCET) 5-325 mg per tablet 1 tablet  1 tablet Oral Q4H PRN    Or    oxyCODONE (ROXICODONE) immediate release tablet 10 mg  10 mg Oral Q4H PRN    polyethylene glycol (MIRALAX) packet 17 g  17 g Oral Daily    senna (SENOKOT) tablet 8 6 mg  1 tablet Oral BID    sertraline (ZOLOFT) tablet 25 mg  25 mg Oral Daily       No Known Allergies    OBJECTIVE:    Physical Exam  Physical Exam  Vitals signs and nursing note reviewed  Constitutional:       General: She is not in acute distress  HENT:      Head: Normocephalic and atraumatic  Right Ear: External ear normal       Left Ear: External ear normal    Eyes:      General:         Right eye: No discharge  Left eye: No discharge  Cardiovascular:      Rate and Rhythm: Normal rate  Pulmonary:      Effort: Pulmonary effort is normal  No respiratory distress  Abdominal:      General: There is no distension  Palpations: Abdomen is soft  Musculoskeletal:         General: No edema  Skin:     Coloration: Skin is pale  Psychiatric:         Mood and Affect: Mood and affect normal          Lab Results: I have personally reviewed pertinent labs  , CBC:   Lab Results   Component Value Date    WBC 10 55 (H) 08/05/2020    HGB 7 5 (L) 08/05/2020    HCT 25 9 (L) 08/05/2020    MCV 82 08/05/2020  08/05/2020    MCH 23 7 (L) 08/05/2020    MCHC 29 0 (L) 08/05/2020    RDW 22 7 (H) 08/05/2020    MPV 10 0 08/05/2020    NRBC 0 08/05/2020   , CMP:   Lab Results   Component Value Date    SODIUM 138 08/05/2020    K 3 9 08/05/2020     08/05/2020    CO2 29 08/05/2020    BUN 3 (L) 08/05/2020    CREATININE 0 46 (L) 08/05/2020    CALCIUM 8 4 08/05/2020    EGFR 119 08/05/2020   , PT/PTT:No results found for: PT, PTT  Imaging Studies: reviewed  EKG, Pathology, and Other Studies: reviewes    Counseling / Coordination of Care    Total floor / unit time spent today 35+ minutes  Greater than 50% of total time was spent with the patient and / or family counseling and / or coordination of care  A description of the counseling / coordination of care: chart review, medication review, discussion with CRS resident team, discussion with different care teams, review of plans

## 2020-08-06 LAB
ANION GAP SERPL CALCULATED.3IONS-SCNC: 8 MMOL/L (ref 4–13)
BASOPHILS # BLD AUTO: 0.05 THOUSANDS/ΜL (ref 0–0.1)
BASOPHILS NFR BLD AUTO: 1 % (ref 0–1)
BUN SERPL-MCNC: 5 MG/DL (ref 5–25)
CALCIUM SERPL-MCNC: 8.5 MG/DL (ref 8.3–10.1)
CHLORIDE SERPL-SCNC: 104 MMOL/L (ref 100–108)
CO2 SERPL-SCNC: 25 MMOL/L (ref 21–32)
CREAT SERPL-MCNC: 0.49 MG/DL (ref 0.6–1.3)
EOSINOPHIL # BLD AUTO: 0.51 THOUSAND/ΜL (ref 0–0.61)
EOSINOPHIL NFR BLD AUTO: 5 % (ref 0–6)
ERYTHROCYTE [DISTWIDTH] IN BLOOD BY AUTOMATED COUNT: 22.8 % (ref 11.6–15.1)
GFR SERPL CREATININE-BSD FRML MDRD: 116 ML/MIN/1.73SQ M
GLUCOSE SERPL-MCNC: 102 MG/DL (ref 65–140)
HCT VFR BLD AUTO: 25.6 % (ref 34.8–46.1)
HGB BLD-MCNC: 7.5 G/DL (ref 11.5–15.4)
IMM GRANULOCYTES # BLD AUTO: 0.18 THOUSAND/UL (ref 0–0.2)
IMM GRANULOCYTES NFR BLD AUTO: 2 % (ref 0–2)
LYMPHOCYTES # BLD AUTO: 2.05 THOUSANDS/ΜL (ref 0.6–4.47)
LYMPHOCYTES NFR BLD AUTO: 20 % (ref 14–44)
MAGNESIUM SERPL-MCNC: 2.5 MG/DL (ref 1.6–2.6)
MCH RBC QN AUTO: 24 PG (ref 26.8–34.3)
MCHC RBC AUTO-ENTMCNC: 29.3 G/DL (ref 31.4–37.4)
MCV RBC AUTO: 82 FL (ref 82–98)
MONOCYTES # BLD AUTO: 0.88 THOUSAND/ΜL (ref 0.17–1.22)
MONOCYTES NFR BLD AUTO: 9 % (ref 4–12)
NEUTROPHILS # BLD AUTO: 6.48 THOUSANDS/ΜL (ref 1.85–7.62)
NEUTS SEG NFR BLD AUTO: 63 % (ref 43–75)
NRBC BLD AUTO-RTO: 0 /100 WBCS
PLATELET # BLD AUTO: 415 THOUSANDS/UL (ref 149–390)
PMV BLD AUTO: 9.7 FL (ref 8.9–12.7)
POTASSIUM SERPL-SCNC: 3.9 MMOL/L (ref 3.5–5.3)
RBC # BLD AUTO: 3.13 MILLION/UL (ref 3.81–5.12)
SODIUM SERPL-SCNC: 137 MMOL/L (ref 136–145)
WBC # BLD AUTO: 10.15 THOUSAND/UL (ref 4.31–10.16)

## 2020-08-06 PROCEDURE — 99232 SBSQ HOSP IP/OBS MODERATE 35: CPT | Performed by: INTERNAL MEDICINE

## 2020-08-06 PROCEDURE — 83735 ASSAY OF MAGNESIUM: CPT | Performed by: INTERNAL MEDICINE

## 2020-08-06 PROCEDURE — 85025 COMPLETE CBC W/AUTO DIFF WBC: CPT | Performed by: INTERNAL MEDICINE

## 2020-08-06 PROCEDURE — 80048 BASIC METABOLIC PNL TOTAL CA: CPT | Performed by: INTERNAL MEDICINE

## 2020-08-06 PROCEDURE — 99231 SBSQ HOSP IP/OBS SF/LOW 25: CPT | Performed by: INTERNAL MEDICINE

## 2020-08-06 RX ORDER — OXYCODONE HCL 20 MG/1
20 TABLET, FILM COATED, EXTENDED RELEASE ORAL EVERY 12 HOURS SCHEDULED
Qty: 30 TABLET | Refills: 0 | Status: SHIPPED | OUTPATIENT
Start: 2020-08-06 | End: 2020-08-09

## 2020-08-06 RX ADMIN — AMPICILLIN SODIUM AND SULBACTAM SODIUM 3 G: 2; 1 INJECTION, POWDER, FOR SOLUTION INTRAMUSCULAR; INTRAVENOUS at 11:46

## 2020-08-06 RX ADMIN — AMPICILLIN SODIUM AND SULBACTAM SODIUM 3 G: 2; 1 INJECTION, POWDER, FOR SOLUTION INTRAMUSCULAR; INTRAVENOUS at 17:34

## 2020-08-06 RX ADMIN — AMPICILLIN SODIUM AND SULBACTAM SODIUM 3 G: 2; 1 INJECTION, POWDER, FOR SOLUTION INTRAMUSCULAR; INTRAVENOUS at 05:43

## 2020-08-06 RX ADMIN — OXYCODONE HYDROCHLORIDE 10 MG: 10 TABLET ORAL at 18:13

## 2020-08-06 RX ADMIN — SENNOSIDES 8.6 MG: 8.6 TABLET ORAL at 09:08

## 2020-08-06 RX ADMIN — HYDROMORPHONE HYDROCHLORIDE 1 MG: 1 INJECTION, SOLUTION INTRAMUSCULAR; INTRAVENOUS; SUBCUTANEOUS at 16:08

## 2020-08-06 RX ADMIN — OXYCODONE HYDROCHLORIDE 20 MG: 20 TABLET, FILM COATED, EXTENDED RELEASE ORAL at 21:18

## 2020-08-06 RX ADMIN — AMPICILLIN SODIUM AND SULBACTAM SODIUM 3 G: 2; 1 INJECTION, POWDER, FOR SOLUTION INTRAMUSCULAR; INTRAVENOUS at 23:03

## 2020-08-06 RX ADMIN — LORATADINE 10 MG: 10 TABLET ORAL at 09:08

## 2020-08-06 RX ADMIN — OXYCODONE HYDROCHLORIDE 20 MG: 20 TABLET, FILM COATED, EXTENDED RELEASE ORAL at 09:08

## 2020-08-06 RX ADMIN — OXYCODONE HYDROCHLORIDE AND ACETAMINOPHEN 1 TABLET: 5; 325 TABLET ORAL at 11:52

## 2020-08-06 RX ADMIN — ENOXAPARIN SODIUM 40 MG: 40 INJECTION SUBCUTANEOUS at 09:08

## 2020-08-06 RX ADMIN — SENNOSIDES 8.6 MG: 8.6 TABLET ORAL at 17:34

## 2020-08-06 RX ADMIN — OXYCODONE HYDROCHLORIDE AND ACETAMINOPHEN 1 TABLET: 5; 325 TABLET ORAL at 23:08

## 2020-08-06 RX ADMIN — POLYETHYLENE GLYCOL 3350 17 G: 17 POWDER, FOR SOLUTION ORAL at 09:07

## 2020-08-06 RX ADMIN — OXYCODONE HYDROCHLORIDE AND ACETAMINOPHEN 1 TABLET: 5; 325 TABLET ORAL at 05:43

## 2020-08-06 NOTE — ASSESSMENT & PLAN NOTE
Per repeat CT imaging, "Mildly enlarged complex presacral collection that may represent combination of abscess and tumor   Adjacent or contiguous left gluteal abscess is mildly increased "  Gluteal aspirate culture s/p I&D on 7/31 growing Streptococcus anginosus along with Bacteroides/Prevotella species - on IV Unasyn per Infectious Disease  History of rectal cancer on chemotherapy noted  Monitor fever curve - monitor vitals and maintain hemodynamics  PRN pain control  Long and in-depth discussions with Interventional Radiology, Infectious Disease, colorectal surgery, and palliative care on 8/5 -> plan for one more attempt at IR guided aspirate drainage noted - recurrent/intermittent percutaneous draining or a permanent drain placement are not a long-term solution and will increased risk of further complications in the setting of underlying rectal cancer, including decreased quality of life, possible fistula formation, possible recurrent super infections, and need for frequent drain tube replacements - patient is aware has decided against diverting colostomy for now as previously offered by colorectal surgery -> awaiting repeat attempt at IR guided drainage later today

## 2020-08-06 NOTE — PROGRESS NOTES
Progress note - Palliative and Supportive Care   Jaun Fitzpatrick 50 y o  female [id number]    Assessment:    -   Patient Active Problem List   Diagnosis    Large bowel obstruction ([address] )    Metastatic rectal cancer    Breast cancer    Abdominal pain    Anemia of chronic disease    Sepsis (HCC)    Gram-positive bacteremia    Rectal abscess    Cancer associated pain    Leukocytosis    Hyponatremia    Hypokalemia         Plan:  1  Symptom management - no changes   - OxyContin 20 mg BID   - Percocet 5/325 or oxyIR 10 mg q4H PRN for moderate to severe pain   - IV dilaudid 1 mg q3H PRN for BT pain   - Bowel regiment to prevent OIC    2  Goals -  Full cares    3  Psychosocial support- time spent providing supportive listening  E-scribed OxyContin to pharmacy, patient with recent fill of Percocet- does not need at discharge  I have reviewed the patient's controlled substance dispensing history in the Prescription Drug Monitoring Program in compliance with the [facility] regulations before prescribing any controlled substances  Interval history:      Patient feeling better today  Plan for IR drainage  She is not currently interested in diverting colostomy but may consider in future if repeat issues with rectal tumor/collection       MEDICATIONS / ALLERGIES:     all current active meds have been reviewed and current meds:   Current Facility-Administered Medications   Medication Dose Route Frequency    acetaminophen (TYLENOL) tablet 650 mg  650 mg Oral Q6H PRN    aluminum-magnesium hydroxide-simethicone (MYLANTA) 200-200-20 mg/5 mL oral suspension 30 mL  30 mL Oral Q6H PRN    ampicillin-sulbactam (UNASYN) 3 g in sodium chloride 0 9 % 100 mL IVPB  3 g Intravenous Q6H    cholecalciferol (VITAMIN D3) tablet 2,000 Units  2,000 Units Oral Daily    enoxaparin (LOVENOX) subcutaneous injection 40 mg  40 mg Subcutaneous Daily    HYDROmorphone (DILAUDID) injection 1 mg  1 mg Intravenous Q3H PRN    loratadine (CLARITIN) tablet 10 mg  10 mg Oral Daily    metoprolol succinate (TOPROL-XL) 24 hr tablet 25 mg  25 mg Oral Daily    ondansetron (ZOFRAN) injection 4 mg  4 mg Intravenous Q6H PRN    oxyCODONE (OxyCONTIN) 12 hr tablet 20 mg  20 mg Oral Q12H [address]    oxyCODONE-acetaminophen (PERCOCET) 5-325 mg per tablet 1 tablet  1 tablet Oral Q4H PRN    Or    oxyCODONE (ROXICODONE) immediate release tablet 10 mg  10 mg Oral Q4H PRN    polyethylene glycol (MIRALAX) packet 17 g  17 g Oral Daily    senna (SENOKOT) tablet 8 6 mg  1 tablet Oral BID    sertraline (ZOLOFT) tablet 25 mg  25 mg Oral Daily       No Known Allergies    OBJECTIVE:    Physical Exam  Physical Exam  Vitals signs and nursing note reviewed  Constitutional:       General: She is not in acute distress  HENT:      Head: Normocephalic and atraumatic  Right Ear: External ear normal       Left Ear: External ear normal    Eyes:      General:         Right eye: No discharge  Left eye: No discharge  Cardiovascular:      Rate and Rhythm: Normal rate  Pulmonary:      Effort: Pulmonary effort is normal  No respiratory distress  Abdominal:      General: There is no distension  Palpations: Abdomen is soft  Musculoskeletal:         General: No edema  Skin:     Coloration: Skin is pale  Psychiatric:         Mood and Affect: Mood and affect normal          Lab Results: I have personally reviewed pertinent labs  , CBC:   Lab Results   Component Value Date    WBC 10 15 08/06/2020    HGB 7 5 (L) 08/06/2020    HCT 25 6 (L) 08/06/2020    MCV 82 08/06/2020     (H) 08/06/2020    MCH 24 0 (L) 08/06/2020    MCHC 29 3 (L) 08/06/2020    RDW 22 8 (H) 08/06/2020    MPV 9 7 08/06/2020    NRBC 0 08/06/2020   , CMP:   Lab Results   Component Value Date    SODIUM 137 08/06/2020    K 3 9 08/06/2020     08/06/2020    CO2 25 08/06/2020    BUN 5 08/06/2020    CREATININE 0 49 (L) 08/06/2020    CALCIUM 8 5 08/06/2020    EGFR 116 08/06/2020   , PT/PTT:No results found for: PT, PTT  Imaging Studies: reviewed  EKG, Pathology, and Other Studies: reviewes    Counseling / Coordination of Care    Total floor / unit time spent today 25+ minutes  Greater than 50% of total time was spent with the patient and / or family counseling and / or coordination of care  A description of the counseling / coordination of care: chart review, medication review, discussion with CRS resident team, discussion with different care teams, review of plans

## 2020-08-06 NOTE — PROGRESS NOTES
Progress Note  Name: Braeden Coy  : 1975  MRN: [id number]    Subjective: Feeling better, less tired but has persistent left gluteal area pain which is less intense on current opioid regimen  Her appetite has improved and she has good bowel movements  Denies chills and night sweats  Had low grade fever (max 104)       PAST MEDICAL HISTORY  Past Medical History:   Diagnosis Date    History of rectal cancer 2020       Right breast cancer    PAST SURGICAL HISTORY  Past Surgical History:   Procedure Laterality Date    BACK SURGERY      BREAST BIOPSY Right 2020    2 sites; 1 breast 1 axilla    HERNIA REPAIR      3 repairs    IR IMAGE GUIDED ASPIRATION / DRAINAGE  2020    IR IMAGE GUIDED BIOPSY/ASPIRATION LIVER  2020    IR PORT PLACEMENT  2020    TUBAL LIGATION      US GUIDED BREAST BIOPSY RIGHT COMPLETE Right 2020    US GUIDED BREAST LYMPH NODE BIOPSY RIGHT Right 2020       SOCIAL HISTORY  Social History     Socioeconomic History    Marital status: Single     Spouse name: None    Number of children: None    Years of education: None    Highest education level: None   Occupational History    None   Social Needs    Financial resource strain: None    Food insecurity     Worry: None     Inability: None    Transportation needs     Medical: None     Non-medical: None   Tobacco Use    Smoking status: Former Smoker     Packs/day: 0 00     Last attempt to quit: 2020     Years since quittin 2    Smokeless tobacco: Never Used   Substance and Sexual Activity    Alcohol use: Not Currently    Drug use: No    Sexual activity: None   Lifestyle    Physical activity     Days per week: None     Minutes per session: None    Stress: None   Relationships    Social connections     Talks on phone: None     Gets together: None     Attends Faith service: None     Active member of club or organization: None     Attends meetings of clubs or organizations: None Relationship status: None    Intimate partner violence     Fear of current or ex partner: None     Emotionally abused: None     Physically abused: None     Forced sexual activity: None   Other Topics Concern    None   Social History Narrative    None       FAMILY HISTORY  Family History   Problem Relation Age of Onset    No Known Problems Mother     No Known Problems Sister     No Known Problems Daughter     Breast cancer Maternal Grandmother         age unknown    No Known Problems Sister     No Known Problems Daughter     Breast cancer Maternal Aunt 61    No Known Problems Maternal Aunt        ALLERGIES  No Known Allergies    CURRENT MEDICATIONS    Current Facility-Administered Medications:     acetaminophen (TYLENOL) tablet 650 mg, 650 mg, Oral, Q6H PRN, Yandyul Lee, DO, 650 mg at 08/04/20 2243    aluminum-magnesium hydroxide-simethicone (MYLANTA) 200-200-20 mg/5 mL oral suspension 30 mL, 30 mL, Oral, Q6H PRN, Amina Huang MD, 30 mL at 08/02/20 1801    ampicillin-sulbactam (UNASYN) 3 g in sodium chloride 0 9 % 100 mL IVPB, 3 g, Intravenous, Q6H, Jolynn Benitez MD, Last Rate: 200 mL/hr at 08/05/20 1742, 3 g at 08/05/20 1742    cholecalciferol (VITAMIN D3) tablet 2,000 Units, 2,000 Units, Oral, Daily, Amina Huang MD, 2,000 Units at 08/02/20 0933    enoxaparin (LOVENOX) subcutaneous injection 40 mg, 40 mg, Subcutaneous, Daily, Amina Huang MD, 40 mg at 08/05/20 0801    HYDROmorphone (DILAUDID) injection 1 mg, 1 mg, Intravenous, Q3H PRN, Yocasta Lee DO, 1 mg at 08/05/20 1820    loratadine (CLARITIN) tablet 10 mg, 10 mg, Oral, Daily, Amina Huang MD, 10 mg at 08/05/20 0801    metoprolol succinate (TOPROL-XL) 24 hr tablet 25 mg, 25 mg, Oral, Daily, Amina Huang MD, 25 mg at 08/02/20 0933    multi-electrolyte (PLASMALYTE-A/ISOLYTE-S PH 7 4) IV solution, 100 mL/hr, Intravenous, Continuous, Amina Huang MD, Last Rate: 100 mL/hr at 08/04/20 2133, 100 mL/hr at 08/04/20 2133   ondansetron (ZOFRAN) injection 4 mg, 4 mg, Intravenous, Q6H PRN, Jamil Fuller MD    oxyCODONE (OxyCONTIN) 12 hr tablet 20 mg, 20 mg, Oral, Q12H [address], Hetul Lee, DO, 20 mg at 08/05/20 2215    oxyCODONE-acetaminophen (PERCOCET) 5-325 mg per tablet 1 tablet, 1 tablet, Oral, Q4H PRN, 1 tablet at 08/05/20 0107 **OR** oxyCODONE (ROXICODONE) immediate release tablet 10 mg, 10 mg, Oral, Q4H PRN, Shavonne Cedillo, DO, 10 mg at 08/05/20 1741    polyethylene glycol (MIRALAX) packet 17 g, 17 g, Oral, Daily, Hetul Lee, DO, 17 g at 08/05/20 0801    senna (SENOKOT) tablet 8 6 mg, 1 tablet, Oral, BID, Hetricardo Lee, DO, 8 6 mg at 08/05/20 1741    sertraline (ZOLOFT) tablet 25 mg, 25 mg, Oral, Daily, Jamil Fuller MD      REVIEW OF SYSTEMS:  GENERAL: Appetite good  Denies fevers, night sweats, fatigue, or weight change  INTEGUMENTARY: Denies rashes, easy bruisability, pruritus, or hair loss  HEENT: Vision normal  Hearing normal  Sinus clear  Denies alopecia  Denies epistaxis or xerostomia  Denies mouth sores  CARDIOVASCULAR: Denies chest pain, palpitations, peripheral edema, or paroxysmal nocturnal dyspnea  RESPIRATORY: Denies hoarseness, shortness of breat, cough, hemoptysis, sputum production, exertional dyspnea, or postnasal drip  GASTROINTESTINAL: Denies dysphagia, odynophagia, heartburn, indigestion, melena, hematochezia, diarrhea, or constipation  GENITOURINARY: Denies dysuria, frequency, hematuria, or nocturia  NEUROLOGICAL: Denies focal weakness, paresthesias, headaches, changes in hearing, changes in vision, or difficulty ambulating  MUSCULOSKELETAL: Denies back pain, muscle pain, joint pain or swelling  PSYCHIATRIC: Denies problems sleeping  Denies anxiety or depression  All other systems are unremarkable  PHYSICAL EXAMINATION:  Blood pressure 98/60, pulse 81, temperature 99 1 °F (37 3 °C), resp   rate 19, height 5' 2" (1 575 m), weight 59 2 kg (130 lb 8 2 oz), SpO2 99 %, not currently breastfeeding  INTEGUMENTARY: Skin warm, dry  No jaundice or rash  HEENT: Pupils equal and reactive to light  Sclerae anicteric  Conjunctivae normal  Oral mucosa without lesions  Neck supple  No mass or goiter  LYMPHATIC: No palpable lymphadenopathy in all peripheral lymph node stations  RESPIRATORY: Chest clear bilaterally  No wheezing  R PAC unremarcable  CARDIOVASCULAR: Heart rhythm and rate regular  No murmur or gallop  GASTROINTESTINAL: Abdomen soft  No palpable mass, organomegaly or tenderness  Normoactive bowel sounds  No ascites  MUSCULOSKELETAL: Extremities no edema, clubbing or cyanosis  Pulses symmetrical bilaterally  No tenderness to percussion over vertebral bodies  NEUROLOGICAL: Motor and sensory intact  LABORATORY DATA:   Lab Results   Component Value Date    WBC 10 55 (H) 08/05/2020    HGB 7 5 (L) 08/05/2020    HCT 25 9 (L) 08/05/2020    MCV 82 08/05/2020     08/05/2020     Lab Results   Component Value Date    K 3 9 08/05/2020     08/05/2020    CO2 29 08/05/2020    BUN 3 (L) 08/05/2020    CREATININE 0 46 (L) 08/05/2020    GLUF 188 (H) 06/12/2020    CALCIUM 8 4 08/05/2020    AST 44 07/30/2020    ALT 22 07/30/2020    ALKPHOS 254 (H) 07/30/2020    EGFR 119 08/05/2020         RADIOLOGY DATA: reviewed  PROCEDURE DATA: reviewed  PATHOLOGY DATA: reviewed  CYTOLOGY DATA: reviewed  IMPRESSION:      1  Rectal ca stage IV with extensive intrabdominal             and lung metastasis,post x 2 cycleFOLFIRINOX  2  Left gluteal abscess secondary to number 1, s/p needle aspiration  3  Cancer pain secondary to number 2    PLAN:    1  Discussed best management options of this unusual cancer complication with ID and colorectal surgery  2  Second aspiration/drainage will be attempted by IR   If there is no significant clinical  improvement, I fully support diversion colostomy as the best management option  3  I had a long discussion today with the patient and her   She voiced her understanding of all of the above as well as the importance of the surgery, although remains reluctant to proceed with surgery  4  Antibiotics as per ID        MD Isaac Gamino MD saw, examined the patient and formulated the treatment plan

## 2020-08-06 NOTE — ASSESSMENT & PLAN NOTE
Follows with outpatient oncology regarding chemotherapy  Per pathology in June 2020, ER/KS (+) and HER-2 (-)

## 2020-08-06 NOTE — PROGRESS NOTES
Colon and Rectal Surgery Staff  Cachorro Fitzpatrick 50 y o  female MRN: [id number]  Unit/Bed#: [facility] 931-01 Encounter: [id number]  08/06/20   8:13 AM           Subjective/Objective   Chief Complaint   Patient presents with    Abdominal Pain     Patient reports lower abdominal pain; states that she was here recently for a blood infection; states that she has been running a fever and feeling very weak; hx of rectal cancer        Seen/examined, no acute events overnight  Blood pressure 101/66, pulse 81, temperature 99 2 °F (37 3 °C), resp  rate 16, height 5' 2" (1 575 m), weight 58 5 kg (128 lb 15 5 oz), SpO2 97 %, not currently breastfeeding  ,Body mass index is 23 59 kg/m²  Intake/Output Summary (Last 24 hours) at 8/6/2020 0813  Last data filed at 8/6/2020 0447  Gross per 24 hour   Intake 960 ml   Output 2360 ml   Net -1400 ml         Physical Exam:  Gen: no acute distress  CV: Sinus  Pulm: No increased work of breathing  Abdomen: Soft, nontender  Extremities: No lower extremity edema    Lab, Imaging and other studies:  I have personally reviewed pertinent lab results  , CBC:   Lab Results   Component Value Date    WBC 10 15 08/06/2020    HGB 7 5 (L) 08/06/2020    HCT 25 6 (L) 08/06/2020    MCV 82 08/06/2020     (H) 08/06/2020    MCH 24 0 (L) 08/06/2020    MCHC 29 3 (L) 08/06/2020    RDW 22 8 (H) 08/06/2020    MPV 9 7 08/06/2020    NRBC 0 08/06/2020         Assessment:  50 y o  female with history of metastatic rectal cancer on chemotherapy presents with gluteal abscess      Plan:  Again reviewed rationale discussed risks of colostomy  She is at this point in time unwilling to have surgery  Will follow peripherally    Ambulate/Incentive Spirometry  DVT Prophylaxis      Khanh Castorena MD

## 2020-08-06 NOTE — ASSESSMENT & PLAN NOTE
With liver/lung metastasis -> s/p chemotherapy per outpatient oncology  Appreciate palliative care regarding pain control  Appreciate colorectal surgery input -> palliative/diverting colostomy has been offered but patient currently refusing  Supportive care otherwise

## 2020-08-07 ENCOUNTER — APPOINTMENT (INPATIENT)
Dept: RADIOLOGY | Facility: HOSPITAL | Age: 45
DRG: 394 | End: 2020-08-07
Payer: COMMERCIAL

## 2020-08-07 PROBLEM — D72.829 LEUKOCYTOSIS: Status: RESOLVED | Noted: 2020-08-04 | Resolved: 2020-08-07

## 2020-08-07 PROBLEM — E87.6 HYPOKALEMIA: Status: RESOLVED | Noted: 2020-08-04 | Resolved: 2020-08-07

## 2020-08-07 PROBLEM — E87.1 HYPONATREMIA: Status: RESOLVED | Noted: 2020-08-04 | Resolved: 2020-08-07

## 2020-08-07 LAB
ANION GAP SERPL CALCULATED.3IONS-SCNC: 5 MMOL/L (ref 4–13)
BASOPHILS # BLD AUTO: 0.06 THOUSANDS/ΜL (ref 0–0.1)
BASOPHILS NFR BLD AUTO: 1 % (ref 0–1)
BUN SERPL-MCNC: 5 MG/DL (ref 5–25)
CALCIUM SERPL-MCNC: 9.4 MG/DL (ref 8.3–10.1)
CHLORIDE SERPL-SCNC: 106 MMOL/L (ref 100–108)
CO2 SERPL-SCNC: 27 MMOL/L (ref 21–32)
CREAT SERPL-MCNC: 0.56 MG/DL (ref 0.6–1.3)
EOSINOPHIL # BLD AUTO: 0.54 THOUSAND/ΜL (ref 0–0.61)
EOSINOPHIL NFR BLD AUTO: 6 % (ref 0–6)
ERYTHROCYTE [DISTWIDTH] IN BLOOD BY AUTOMATED COUNT: 22.7 % (ref 11.6–15.1)
GFR SERPL CREATININE-BSD FRML MDRD: 111 ML/MIN/1.73SQ M
GLUCOSE SERPL-MCNC: 79 MG/DL (ref 65–140)
HCT VFR BLD AUTO: 25.3 % (ref 34.8–46.1)
HGB BLD-MCNC: 7.4 G/DL (ref 11.5–15.4)
IMM GRANULOCYTES # BLD AUTO: 0.15 THOUSAND/UL (ref 0–0.2)
IMM GRANULOCYTES NFR BLD AUTO: 2 % (ref 0–2)
LYMPHOCYTES # BLD AUTO: 2.27 THOUSANDS/ΜL (ref 0.6–4.47)
LYMPHOCYTES NFR BLD AUTO: 24 % (ref 14–44)
MAGNESIUM SERPL-MCNC: 2.5 MG/DL (ref 1.6–2.6)
MCH RBC QN AUTO: 23.9 PG (ref 26.8–34.3)
MCHC RBC AUTO-ENTMCNC: 29.2 G/DL (ref 31.4–37.4)
MCV RBC AUTO: 82 FL (ref 82–98)
MONOCYTES # BLD AUTO: 0.9 THOUSAND/ΜL (ref 0.17–1.22)
MONOCYTES NFR BLD AUTO: 9 % (ref 4–12)
NEUTROPHILS # BLD AUTO: 5.7 THOUSANDS/ΜL (ref 1.85–7.62)
NEUTS SEG NFR BLD AUTO: 58 % (ref 43–75)
NRBC BLD AUTO-RTO: 0 /100 WBCS
PLATELET # BLD AUTO: 436 THOUSANDS/UL (ref 149–390)
PMV BLD AUTO: 9.8 FL (ref 8.9–12.7)
POTASSIUM SERPL-SCNC: 4 MMOL/L (ref 3.5–5.3)
RBC # BLD AUTO: 3.09 MILLION/UL (ref 3.81–5.12)
SODIUM SERPL-SCNC: 138 MMOL/L (ref 136–145)
WBC # BLD AUTO: 9.62 THOUSAND/UL (ref 4.31–10.16)

## 2020-08-07 PROCEDURE — 99231 SBSQ HOSP IP/OBS SF/LOW 25: CPT | Performed by: INTERNAL MEDICINE

## 2020-08-07 PROCEDURE — 80048 BASIC METABOLIC PNL TOTAL CA: CPT | Performed by: INTERNAL MEDICINE

## 2020-08-07 PROCEDURE — 83735 ASSAY OF MAGNESIUM: CPT | Performed by: INTERNAL MEDICINE

## 2020-08-07 PROCEDURE — 99152 MOD SED SAME PHYS/QHP 5/>YRS: CPT | Performed by: RADIOLOGY

## 2020-08-07 PROCEDURE — 85025 COMPLETE CBC W/AUTO DIFF WBC: CPT | Performed by: INTERNAL MEDICINE

## 2020-08-07 PROCEDURE — 10160 PNXR ASPIR ABSC HMTMA BULLA: CPT | Performed by: RADIOLOGY

## 2020-08-07 PROCEDURE — 99232 SBSQ HOSP IP/OBS MODERATE 35: CPT | Performed by: INTERNAL MEDICINE

## 2020-08-07 PROCEDURE — 76942 ECHO GUIDE FOR BIOPSY: CPT | Performed by: RADIOLOGY

## 2020-08-07 PROCEDURE — 10030 IMG GID FLU COLL DRG SFT TIS: CPT

## 2020-08-07 RX ORDER — FENTANYL CITRATE 50 UG/ML
INJECTION, SOLUTION INTRAMUSCULAR; INTRAVENOUS CODE/TRAUMA/SEDATION MEDICATION
Status: COMPLETED | OUTPATIENT
Start: 2020-08-07 | End: 2020-08-07

## 2020-08-07 RX ORDER — MIDAZOLAM HYDROCHLORIDE 2 MG/2ML
INJECTION, SOLUTION INTRAMUSCULAR; INTRAVENOUS CODE/TRAUMA/SEDATION MEDICATION
Status: COMPLETED | OUTPATIENT
Start: 2020-08-07 | End: 2020-08-07

## 2020-08-07 RX ADMIN — METOPROLOL SUCCINATE 25 MG: 25 TABLET, EXTENDED RELEASE ORAL at 08:14

## 2020-08-07 RX ADMIN — OXYCODONE HYDROCHLORIDE 10 MG: 10 TABLET ORAL at 20:07

## 2020-08-07 RX ADMIN — MIDAZOLAM 1 MG: 1 INJECTION INTRAMUSCULAR; INTRAVENOUS at 11:13

## 2020-08-07 RX ADMIN — LORATADINE 10 MG: 10 TABLET ORAL at 08:13

## 2020-08-07 RX ADMIN — HYDROMORPHONE HYDROCHLORIDE 1 MG: 1 INJECTION, SOLUTION INTRAMUSCULAR; INTRAVENOUS; SUBCUTANEOUS at 08:13

## 2020-08-07 RX ADMIN — AMPICILLIN SODIUM AND SULBACTAM SODIUM 3 G: 2; 1 INJECTION, POWDER, FOR SOLUTION INTRAMUSCULAR; INTRAVENOUS at 12:26

## 2020-08-07 RX ADMIN — OXYCODONE HYDROCHLORIDE 20 MG: 20 TABLET, FILM COATED, EXTENDED RELEASE ORAL at 08:13

## 2020-08-07 RX ADMIN — OXYCODONE HYDROCHLORIDE 20 MG: 20 TABLET, FILM COATED, EXTENDED RELEASE ORAL at 20:44

## 2020-08-07 RX ADMIN — SENNOSIDES 8.6 MG: 8.6 TABLET ORAL at 08:14

## 2020-08-07 RX ADMIN — POLYETHYLENE GLYCOL 3350 17 G: 17 POWDER, FOR SOLUTION ORAL at 08:11

## 2020-08-07 RX ADMIN — MELATONIN 2000 UNITS: at 08:14

## 2020-08-07 RX ADMIN — HYDROMORPHONE HYDROCHLORIDE 1 MG: 1 INJECTION, SOLUTION INTRAMUSCULAR; INTRAVENOUS; SUBCUTANEOUS at 12:27

## 2020-08-07 RX ADMIN — HYDROMORPHONE HYDROCHLORIDE 1 MG: 1 INJECTION, SOLUTION INTRAMUSCULAR; INTRAVENOUS; SUBCUTANEOUS at 17:33

## 2020-08-07 RX ADMIN — OXYCODONE HYDROCHLORIDE 10 MG: 10 TABLET ORAL at 16:16

## 2020-08-07 RX ADMIN — OXYCODONE HYDROCHLORIDE 10 MG: 10 TABLET ORAL at 10:11

## 2020-08-07 RX ADMIN — SENNOSIDES 8.6 MG: 8.6 TABLET ORAL at 17:33

## 2020-08-07 RX ADMIN — AMPICILLIN SODIUM AND SULBACTAM SODIUM 3 G: 2; 1 INJECTION, POWDER, FOR SOLUTION INTRAMUSCULAR; INTRAVENOUS at 17:33

## 2020-08-07 RX ADMIN — AMPICILLIN SODIUM AND SULBACTAM SODIUM 3 G: 2; 1 INJECTION, POWDER, FOR SOLUTION INTRAMUSCULAR; INTRAVENOUS at 23:14

## 2020-08-07 RX ADMIN — AMPICILLIN SODIUM AND SULBACTAM SODIUM 3 G: 2; 1 INJECTION, POWDER, FOR SOLUTION INTRAMUSCULAR; INTRAVENOUS at 06:00

## 2020-08-07 RX ADMIN — FENTANYL CITRATE 50 MCG: 50 INJECTION, SOLUTION INTRAMUSCULAR; INTRAVENOUS at 11:13

## 2020-08-07 NOTE — ASSESSMENT & PLAN NOTE
Per repeat CT imaging, "Mildly enlarged complex presacral collection that may represent combination of abscess and tumor   Adjacent or contiguous left gluteal abscess is mildly increased "  Gluteal aspirate culture s/p I&D on 7/31 growing Streptococcus anginosus along with Bacteroides/Prevotella species - on IV Unasyn per Infectious Disease with progressive improvement  History of rectal cancer on chemotherapy noted  Monitor fever curve - monitor vitals and maintain hemodynamics  PRN pain control  Long and in-depth discussions with Interventional Radiology, Infectious Disease, colorectal surgery, and palliative care on 8/5 -> plan for one more attempt at IR guided aspirate drainage noted - recurrent/intermittent percutaneous draining or a permanent drain placement are not a long-term solution and will increased risk of further complications in the setting of underlying rectal cancer, including decreased quality of life, possible fistula formation, possible recurrent super infections, and need for frequent drain tube replacements - patient is aware has decided against diverting colostomy for now as previously offered by colorectal surgery -> underwent a final IR guided drainage yesterday yielding approximately 8 5 mL of pus (notation made of transitioned loculation now)

## 2020-08-07 NOTE — ASSESSMENT & PLAN NOTE
Follows with outpatient oncology regarding chemotherapy  Per pathology in June 2020, ER/OH (+) and HER-2 (-)

## 2020-08-07 NOTE — PROGRESS NOTES
Progress Note - Infectious Disease   Puja Fitzpatrick 50 y o  female MRN: [id number]  Unit/Bed#: [facility] [place] 931-01 Encounter: [id number]      Impression:  1  Left gluteal abscess S/P I&D   2  Stage IV rectal carcinoma on chemotherapy     Recommendations:  Patient afebrile with normal WBC count   1  Attention has less rectal swelling and pain and now has a normal WBC count  2  Discussed with IR yesterday who is planning on doing 1 more aspiration  If this is unsuccessful and if pain returns then additional measures may be contemplated by Oncology service  3  Continue ampicillin/sulbactam since it appears to be having a positive affect     Antibiotics:  Stent  1  Ampicillin/sulbactam 3 g q 6 hours IV, day 8 total Rx     Subjective:  Left buttock swelling, and pain are continually improving  Denies nausea, vomiting, or diarrhea  Objective:  Vitals:  Temp:  [97 9 °F (36 6 °C)-99 2 °F (37 3 °C)] 97 9 °F (36 6 °C)  HR:  [66-81] 66  Resp:  [16-20] 20  BP: ()/(50-66) 104/56  SpO2:  [97 %-99 %] 99 %  Temp (24hrs), Av 5 °F (36 9 °C), Min:97 9 °F (36 6 °C), Max:99 2 °F (37 3 °C)  Current: Temperature: 97 9 °F (36 6 °C)    Physical Exam:     General Appearance:  Alert, chronically ill-appearing female nontoxic, no acute distress  Throat: Oropharynx moist without lesions  Lips, mucosa, and tongue normal   Neck: Supple, symmetrical, trachea midline, no adenopathy,  no tenderness/mass/nodules   Lungs:   Clear to auscultation bilaterally, no audible wheezes, rhonchi or rales; respirations unlabored  Port-A-Cath right subclavian area   Heart:  Regular rate and rhythm, S1, S2 normal, no murmur, rub or gallop   Abdomen:   Soft, mild right-sided tenderness, non-distended, positive bowel sounds    No masses, no organomegaly    No CVA tenderness   Extremities: Extremities normal, atraumatic, no clubbing, cyanosis or edema   Skin: Surgical scars, multiple tattoos, left gluteal wound tenderness         Invasive Devices Central Venous Catheter Line            Port A Cath 06/22/20 Right Chest 45 days          Peripheral Intravenous Line            Peripheral IV 08/05/20 Right;Upper;Ventral (anterior) Arm 1 day                Labs, Imaging, & Other studies:   All pertinent labs were personally reviewed  Results from last 7 days   Lab Units 08/06/20  0452 08/05/20  0649 08/02/20  0515   WBC Thousand/uL 10 15 10 55* 11 92*   HEMOGLOBIN g/dL 7 5* 7 5* 8 4*   PLATELETS Thousands/uL 415* 378 238     Results from last 7 days   Lab Units 08/06/20  0452 08/05/20  0649 08/02/20  0515  07/30/20  2135   SODIUM mmol/L 137 138 135*   < > 132*   POTASSIUM mmol/L 3 9 3 9 3 7   < > 3 4*   CHLORIDE mmol/L 104 105 103   < > 99*   CO2 mmol/L 25 29 25   < > 26   BUN mg/dL 5 3* 3*   < > 13   CREATININE mg/dL 0 49* 0 46* 0 47*   < > 0 84   EGFR ml/min/1 73sq m 116 119 118   < > 83   CALCIUM mg/dL 8 5 8 4 8 7   < > 8 6   AST U/L  --   --   --   --  44   ALT U/L  --   --   --   --  22   ALK PHOS U/L  --   --   --   --  254*    < > = values in this interval not displayed  Results from last 7 days   Lab Units 07/31/20  1541 07/31/20  1248 07/30/20  2156 07/30/20  2135   BLOOD CULTURE   --   --  No Growth After 5 Days  No Growth After 5 Days     GRAM STAIN RESULT  3+ Polys  No bacteria seen  --   --   --    BODY FLUID CULTURE, STERILE  4+ Growth of Streptococcus anginosus*  --   --   --    MRSA CULTURE ONLY   --  No Methicillin Resistant Staphlyococcus aureus (MRSA) isolated  --   --

## 2020-08-07 NOTE — INTERVAL H&P NOTE
Patient presents for repeat left gluteal collection aspiration  A long discussion was had with the patient concerning this procedure  Patient is aware that it is a consensus of the IR doctors that this procedure is likely to be of little benefit  Patient agrees to have the procedure 1 additional time  The history and physical were reviewed, along with progress notes, and the patient was examined  There are no changes since it was written      /71   Pulse 78   Temp 98 2 °F (36 8 °C)   Resp 16   Ht 5' 2" (1 575 m)   Wt 58 5 kg (128 lb 15 5 oz)   SpO2 98%   BMI 23 59 kg/m²        Crystal Lennox, MD/August 7, 2020/11:01 AM

## 2020-08-07 NOTE — PLAN OF CARE
Problem: PAIN - ADULT  Goal: Verbalizes/displays adequate comfort level or baseline comfort level  Description: Interventions:  - Encourage patient to monitor pain and request assistance  - Assess pain using appropriate pain scale  - Administer analgesics based on type and severity of pain and evaluate response  - Implement non-pharmacological measures as appropriate and evaluate response  - Consider cultural and social influences on pain and pain management  - Notify physician/advanced practitioner if interventions unsuccessful or patient reports new pain  Outcome: Progressing     Problem: INFECTION - ADULT  Goal: Absence or prevention of progression during hospitalization  Description: INTERVENTIONS:  - Assess and monitor for signs and symptoms of infection  - Monitor lab/diagnostic results  - Monitor all insertion sites, i e  indwelling lines, tubes, and drains  - Monitor endotracheal if appropriate and nasal secretions for changes in amount and color  - [place] appropriate cooling/warming therapies per order  - Administer medications as ordered  - Instruct and encourage patient and family to use good hand hygiene technique  - Identify and instruct in appropriate isolation precautions for identified infection/condition  Outcome: Progressing     Problem: SAFETY ADULT  Goal: Patient will remain free of falls  Description: INTERVENTIONS:  - Assess patient frequently for physical needs  -  Identify cognitive and physical deficits and behaviors that affect risk of falls    -  [place] fall precautions as indicated by assessment   - Educate patient/family on patient safety including physical limitations  - Instruct patient to call for assistance with activity based on assessment  - Modify environment to reduce risk of injury  - Consider OT/PT consult to assist with strengthening/mobility  Outcome: Progressing     Problem: DISCHARGE PLANNING  Goal: Discharge to home or other facility with appropriate resources  Description: INTERVENTIONS:  - Identify barriers to discharge w/patient and caregiver  - Arrange for needed discharge resources and transportation as appropriate  - Identify discharge learning needs (meds, wound care, etc )  - Arrange for interpretive services to assist at discharge as needed  - Refer to Case Management Department for coordinating discharge planning if the patient needs post-hospital services based on physician/advanced practitioner order or complex needs related to functional status, cognitive ability, or social support system  Outcome: Progressing     Problem: Knowledge Deficit  Goal: Patient/family/caregiver demonstrates understanding of disease process, treatment plan, medications, and discharge instructions  Description: Complete learning assessment and assess knowledge base  Interventions:  - Provide teaching at level of understanding  - Provide teaching via preferred learning methods  Outcome: Progressing     Problem: Potential for Falls  Goal: Patient will remain free of falls  Description: INTERVENTIONS:  - Assess patient frequently for physical needs  -  Identify cognitive and physical deficits and behaviors that affect risk of falls    -  [place] fall precautions as indicated by assessment   - Educate patient/family on patient safety including physical limitations  - Instruct patient to call for assistance with activity based on assessment  - Modify environment to reduce risk of injury  - Consider OT/PT consult to assist with strengthening/mobility  Outcome: Progressing

## 2020-08-07 NOTE — PROGRESS NOTES
[facility] Internal Medicine - Progress Note  Patient: Mary Fitzpatrick 50 y o  female MRN: [id number]  Unit/Bed#: [facility] 931-01 Encounter: [id number]  Primary Care Provider: Kota Piedra MD  Date Of Visit: 08/07/20        Assessment & Plan:    * Rectal abscess  Assessment & Plan  Per repeat CT imaging, "Mildly enlarged complex presacral collection that may represent combination of abscess and tumor   Adjacent or contiguous left gluteal abscess is mildly increased "  Gluteal aspirate culture s/p I&D on 7/31 growing Streptococcus anginosus along with Bacteroides/Prevotella species - on IV Unasyn per Infectious Disease with progressive improvement  History of rectal cancer on chemotherapy noted  Monitor fever curve - monitor vitals and maintain hemodynamics  PRN pain control  Long and in-depth discussions with Interventional Radiology, Infectious Disease, colorectal surgery, and palliative care on 8/5 -> plan for one more attempt at IR guided aspirate drainage noted - recurrent/intermittent percutaneous draining or a permanent drain placement are not a long-term solution and will increased risk of further complications in the setting of underlying rectal cancer, including decreased quality of life, possible fistula formation, possible recurrent super infections, and need for frequent drain tube replacements - patient is aware has decided against diverting colostomy for now as previously offered by colorectal surgery -> underwent a final IR guided drainage yesterday yielding approximately 8 5 mL of pus (notation made of transitioned loculation now)    Metastatic rectal cancer  Assessment & Plan  With liver/lung metastasis -> s/p chemotherapy per outpatient oncology  Appreciate palliative care regarding pain control  Appreciate colorectal surgery input -> palliative/diverting colostomy has been offered but patient currently refusing  Supportive care otherwise    Breast cancer  Assessment & Plan  Follows with outpatient oncology regarding chemotherapy  Per pathology in 2020, ER/MT (+) and HER-2 (-)    Cancer associated pain  Assessment & Plan  Appreciate palliative care input - continue analgesic regimen    Leukocytosisresolved as of 2020  Assessment & Plan  Likely reactive acute medical issue(s) - normalized  Monitor WBC count    Anemia of chronic disease  Assessment & Plan  Monitor H/H    Hyponatremiaresolved as of 2020  Assessment & Plan  Serum sodium normalized    Hypokalemiaresolved as of 2020  Assessment & Plan  Monitor/replete serum potassium - normalized  Serum magnesium normal        DVT Prophylaxis:  Lovenox       Patient Centered Rounds:  I have performed bedside rounds and discussed plan of care with nursing today  Discussions with Specialists or Other Care Team Provider:  see above assessments if applicable    Education and Discussions with Family / Patient:  Patient at bedside, who will self-update   Time Spent for Care:  32 minutes  More than 50% of total time spent on counseling and coordination of care as described above  Current Length of Stay: 7 day(s)    Current Patient Status: Inpatient   Certification Statement:  Patient will continue to require additional hospital stay due to assessments as noted above  Code Status: Level 1 - Full Code        Subjective:     Seen/examined earlier in the morning  States that her pain has improved today  Denies fever/chills  Overall, she remains in positive spirits  Objective:     Vitals:   Temp (24hrs), Av 4 °F (36 9 °C), Min:97 9 °F (36 6 °C), Max:99 3 °F (37 4 °C)    Temp:  [97 9 °F (36 6 °C)-99 3 °F (37 4 °C)] 97 9 °F (36 6 °C)  HR:  [58-78] 77  Resp:  [16-20] 18  BP: ()/(59-71) 92/59  SpO2:  [94 %-100 %] 94 %  Body mass index is 23 59 kg/m²  Input and Output Summary (last 24 hours):        Intake/Output Summary (Last 24 hours) at 2020 1812  Last data filed at 2020 1648  Gross per 24 hour   Intake 360 ml Output 3159 ml   Net -2799 ml       Physical Exam:     GENERAL:  Well-developed/nourished - improved distress from pain  HEAD:  Normocephalic - atraumatic  EYES: PERRL - EOMI   MOUTH:  Mucosa moist  NECK:  Supple - full range of motion  CARDIAC:  Rate controlled - S1/S2 positive  PULMONARY:  Clear breath sounds bilaterally - nonlabored respirations at rest  ABDOMEN:  Soft - nontender/nondistended - active bowel sounds  MUSCULOSKELETAL:  Motor strength/range of motion intact  NEUROLOGIC:  Alert/oriented at baseline  SKIN:  Chronic wrinkles/blemishes - various tattoos noted  PSYCHIATRIC:  Mood/affect stable      Additional Data:     Labs & Recent Cultures:    Results from last 7 days   Lab Units 08/07/20  0555   WBC Thousand/uL 9 62   HEMOGLOBIN g/dL 7 4*   HEMATOCRIT % 25 3*   PLATELETS Thousands/uL 436*   NEUTROS PCT % 58   LYMPHS PCT % 24   MONOS PCT % 9   EOS PCT % 6     Results from last 7 days   Lab Units 08/07/20  0555   POTASSIUM mmol/L 4 0   CHLORIDE mmol/L 106   CO2 mmol/L 27   BUN mg/dL 5   CREATININE mg/dL 0 56*   CALCIUM mg/dL 9 4                     Last 24 Hours Medication List:   Current Facility-Administered Medications   Medication Dose Route Frequency Provider Last Rate    acetaminophen  650 mg Oral Q6H PRN Yocasta Lee,       aluminum-magnesium hydroxide-simethicone  30 mL Oral Q6H PRN Leobardo Hu MD      ampicillin-sulbactam  3 g Intravenous Q6H Remberto Taylor MD 3 g (08/07/20 6953)    cholecalciferol  2,000 Units Oral Daily Leobardo Hu MD      enoxaparin  40 mg Subcutaneous Daily Leobardo Hu MD      HYDROmorphone  1 mg Intravenous Q3H PRN Yocasta Lee DO      loratadine  10 mg Oral Daily Leobardo Hu MD      metoprolol succinate  25 mg Oral Daily Leobardo Hu MD      ondansetron  4 mg Intravenous Q6H PRN Leobardo Hu MD      oxyCODONE  20 mg Oral Q12H [address] Hetul Valcesar Núñez, DO      oxyCODONE-acetaminophen  1 tablet Oral Q4H PRN Ace Hedrick, DO      James Lechuga oxyCODONE  10 mg Oral Q4H PRN Shavonne Cedillo, DO      polyethylene glycol  17 g Oral Daily Hetul Lee, DO      senna  1 tablet Oral BID Hetul Lee, DO      sertraline  25 mg Oral Daily Amina Huang MD                  ** Please Note: This note is constructed using a voice recognition dictation system  An occasional wrong word/phrase or sound-a-like substitution may have been picked up by dictation device due to the inherent limitations of voice recognition software  Read the chart carefully and recognize, using reasonable context, where substitutions may have occurred  **

## 2020-08-07 NOTE — BRIEF OP NOTE (RAD/CATH)
INTERVENTIONAL RADIOLOGY PROCEDURE NOTE    Date: 8/7/2020    Procedure: IR IMAGE GUIDED ASPIRATION / DRAINAGE     Preoperative diagnosis:   1  Pelvic abscess in female    2  Acquired immunocompromised state ([facility] )    3  Gluteal abscess    4  Acute hyponatremia    5  Rectal abscess    6  Gram-positive bacteremia    7  Rectal cancer ([facility] )    8  Malignant neoplasm of female breast, unspecified estrogen receptor status, unspecified laterality, unspecified site of breast ([facility] )    9  Cancer related pain    10  Palliative care patient    6  Metastatic rectal cancer         Postoperative diagnosis: Same  Surgeon: Lan Baeza MD     Assistant: None  No qualified resident was available  Blood loss:  0 5 mL    Specimens:  None    Findings:  8 5 mL pus removed from left buttock collection, which is now very loculated under ultrasound  Complications: None immediate      Anesthesia: concious sedation

## 2020-08-08 PROBLEM — D75.839 THROMBOCYTOSIS: Status: ACTIVE | Noted: 2020-08-08

## 2020-08-08 LAB
ANION GAP SERPL CALCULATED.3IONS-SCNC: 5 MMOL/L (ref 4–13)
BASOPHILS # BLD AUTO: 0.07 THOUSANDS/ΜL (ref 0–0.1)
BASOPHILS NFR BLD AUTO: 1 % (ref 0–1)
BUN SERPL-MCNC: 6 MG/DL (ref 5–25)
CALCIUM SERPL-MCNC: 9.4 MG/DL (ref 8.3–10.1)
CHLORIDE SERPL-SCNC: 104 MMOL/L (ref 100–108)
CO2 SERPL-SCNC: 28 MMOL/L (ref 21–32)
CREAT SERPL-MCNC: 0.6 MG/DL (ref 0.6–1.3)
EOSINOPHIL # BLD AUTO: 0.52 THOUSAND/ΜL (ref 0–0.61)
EOSINOPHIL NFR BLD AUTO: 5 % (ref 0–6)
ERYTHROCYTE [DISTWIDTH] IN BLOOD BY AUTOMATED COUNT: 22.9 % (ref 11.6–15.1)
GFR SERPL CREATININE-BSD FRML MDRD: 109 ML/MIN/1.73SQ M
GLUCOSE SERPL-MCNC: 79 MG/DL (ref 65–140)
HCT VFR BLD AUTO: 28.8 % (ref 34.8–46.1)
HGB BLD-MCNC: 8.2 G/DL (ref 11.5–15.4)
IMM GRANULOCYTES # BLD AUTO: 0.16 THOUSAND/UL (ref 0–0.2)
IMM GRANULOCYTES NFR BLD AUTO: 2 % (ref 0–2)
LYMPHOCYTES # BLD AUTO: 2.67 THOUSANDS/ΜL (ref 0.6–4.47)
LYMPHOCYTES NFR BLD AUTO: 27 % (ref 14–44)
MAGNESIUM SERPL-MCNC: 2.4 MG/DL (ref 1.6–2.6)
MCH RBC QN AUTO: 23.7 PG (ref 26.8–34.3)
MCHC RBC AUTO-ENTMCNC: 28.5 G/DL (ref 31.4–37.4)
MCV RBC AUTO: 83 FL (ref 82–98)
MONOCYTES # BLD AUTO: 0.81 THOUSAND/ΜL (ref 0.17–1.22)
MONOCYTES NFR BLD AUTO: 8 % (ref 4–12)
NEUTROPHILS # BLD AUTO: 5.81 THOUSANDS/ΜL (ref 1.85–7.62)
NEUTS SEG NFR BLD AUTO: 57 % (ref 43–75)
NRBC BLD AUTO-RTO: 0 /100 WBCS
PLATELET # BLD AUTO: 511 THOUSANDS/UL (ref 149–390)
PMV BLD AUTO: 10 FL (ref 8.9–12.7)
POTASSIUM SERPL-SCNC: 4.5 MMOL/L (ref 3.5–5.3)
RBC # BLD AUTO: 3.46 MILLION/UL (ref 3.81–5.12)
SODIUM SERPL-SCNC: 137 MMOL/L (ref 136–145)
WBC # BLD AUTO: 10.04 THOUSAND/UL (ref 4.31–10.16)

## 2020-08-08 PROCEDURE — 83735 ASSAY OF MAGNESIUM: CPT | Performed by: INTERNAL MEDICINE

## 2020-08-08 PROCEDURE — 99232 SBSQ HOSP IP/OBS MODERATE 35: CPT | Performed by: INTERNAL MEDICINE

## 2020-08-08 PROCEDURE — 99231 SBSQ HOSP IP/OBS SF/LOW 25: CPT | Performed by: INTERNAL MEDICINE

## 2020-08-08 PROCEDURE — 85025 COMPLETE CBC W/AUTO DIFF WBC: CPT | Performed by: INTERNAL MEDICINE

## 2020-08-08 PROCEDURE — 80048 BASIC METABOLIC PNL TOTAL CA: CPT | Performed by: INTERNAL MEDICINE

## 2020-08-08 RX ADMIN — SENNOSIDES 8.6 MG: 8.6 TABLET ORAL at 17:02

## 2020-08-08 RX ADMIN — OXYCODONE HYDROCHLORIDE 10 MG: 10 TABLET ORAL at 17:03

## 2020-08-08 RX ADMIN — HYDROMORPHONE HYDROCHLORIDE 1 MG: 1 INJECTION, SOLUTION INTRAMUSCULAR; INTRAVENOUS; SUBCUTANEOUS at 23:35

## 2020-08-08 RX ADMIN — OXYCODONE HYDROCHLORIDE 10 MG: 10 TABLET ORAL at 05:57

## 2020-08-08 RX ADMIN — AMPICILLIN SODIUM AND SULBACTAM SODIUM 3 G: 2; 1 INJECTION, POWDER, FOR SOLUTION INTRAMUSCULAR; INTRAVENOUS at 05:53

## 2020-08-08 RX ADMIN — HYDROMORPHONE HYDROCHLORIDE 1 MG: 1 INJECTION, SOLUTION INTRAMUSCULAR; INTRAVENOUS; SUBCUTANEOUS at 09:30

## 2020-08-08 RX ADMIN — HYDROMORPHONE HYDROCHLORIDE 1 MG: 1 INJECTION, SOLUTION INTRAMUSCULAR; INTRAVENOUS; SUBCUTANEOUS at 14:07

## 2020-08-08 RX ADMIN — AMPICILLIN SODIUM AND SULBACTAM SODIUM 3 G: 2; 1 INJECTION, POWDER, FOR SOLUTION INTRAMUSCULAR; INTRAVENOUS at 11:34

## 2020-08-08 RX ADMIN — SENNOSIDES 8.6 MG: 8.6 TABLET ORAL at 09:27

## 2020-08-08 RX ADMIN — OXYCODONE HYDROCHLORIDE 20 MG: 20 TABLET, FILM COATED, EXTENDED RELEASE ORAL at 09:28

## 2020-08-08 RX ADMIN — AMPICILLIN SODIUM AND SULBACTAM SODIUM 3 G: 2; 1 INJECTION, POWDER, FOR SOLUTION INTRAMUSCULAR; INTRAVENOUS at 17:02

## 2020-08-08 RX ADMIN — POLYETHYLENE GLYCOL 3350 17 G: 17 POWDER, FOR SOLUTION ORAL at 09:27

## 2020-08-08 RX ADMIN — OXYCODONE HYDROCHLORIDE 20 MG: 20 TABLET, FILM COATED, EXTENDED RELEASE ORAL at 20:16

## 2020-08-08 RX ADMIN — ENOXAPARIN SODIUM 40 MG: 40 INJECTION SUBCUTANEOUS at 09:29

## 2020-08-08 RX ADMIN — OXYCODONE HYDROCHLORIDE 10 MG: 10 TABLET ORAL at 11:34

## 2020-08-08 RX ADMIN — AMPICILLIN SODIUM AND SULBACTAM SODIUM 3 G: 2; 1 INJECTION, POWDER, FOR SOLUTION INTRAMUSCULAR; INTRAVENOUS at 23:41

## 2020-08-08 RX ADMIN — METOPROLOL SUCCINATE 25 MG: 25 TABLET, EXTENDED RELEASE ORAL at 09:28

## 2020-08-08 RX ADMIN — HYDROMORPHONE HYDROCHLORIDE 1 MG: 1 INJECTION, SOLUTION INTRAMUSCULAR; INTRAVENOUS; SUBCUTANEOUS at 20:15

## 2020-08-08 RX ADMIN — LORATADINE 10 MG: 10 TABLET ORAL at 09:30

## 2020-08-08 NOTE — ASSESSMENT & PLAN NOTE
Per repeat CT imaging, "Mildly enlarged complex presacral collection that may represent combination of abscess and tumor   Adjacent or contiguous left gluteal abscess is mildly increased "  Gluteal aspirate culture s/p I&D on 7/31 growing Streptococcus anginosus along with Bacteroides/Prevotella species - on IV Unasyn per Infectious Disease with progressive improvement  History of rectal cancer on chemotherapy noted  Monitor fever curve - monitor vitals and maintain hemodynamics  PRN pain control  Long and in-depth discussions with Interventional Radiology, Infectious Disease, colorectal surgery, and palliative care on 8/5 -> plan for one more attempt at IR guided aspirate drainage noted - recurrent/intermittent percutaneous draining or a permanent drain placement are not a long-term solution and will increased risk of further complications in the setting of underlying rectal cancer, including decreased quality of life, possible fistula formation, possible recurrent super infections, and need for frequent drain tube replacements - patient is aware has decided against diverting colostomy for now as previously offered by colorectal surgery -> underwent a final IR guided drainage on 8/6 yielding approximately 8 5 mL of pus (notation made of transitioned loculation now)  Continue IV Unasyn regimen per Infectious Disease until cleared for discharge

## 2020-08-08 NOTE — PROGRESS NOTES
Progress Note - Infectious Disease   Puja Fitzpatrick 50 y o  female MRN: [id number]  Unit/Bed#: [facility] 931-01 Encounter: [id number]      Impression:  1  Left gluteal abscess S/P I&D   2  Stage IV rectal carcinoma on chemotherapy     Recommendations:  Patient afebrile with normal WBC count   1  Attention has less rectal swelling and pain and now has a normal WBC count  2  Performed single drainage procedure with a 0 5 cc of pus like fluid obtained  Will check culture if fluid was sent for culture  3  Continue ampicillin/sulbactam since it appears to be having a positive affect     Antibiotics:  Stent  1  Ampicillin/sulbactam 3 g q 6 hours IV, day 9 total Rx     Subjective:  Has low level left buttock pain  Able to sit with weight placed on buttock  Denies nausea, vomiting, or diarrhea  Objective:  Vitals:  Temp:  [97 9 °F (36 6 °C)-99 3 °F (37 4 °C)] 97 9 °F (36 6 °C)  HR:  [58-78] 77  Resp:  [16-20] 18  BP: ()/(59-71) 92/59  SpO2:  [94 %-100 %] 94 %  Temp (24hrs), Av 4 °F (36 9 °C), Min:97 9 °F (36 6 °C), Max:99 3 °F (37 4 °C)  Current: Temperature: 97 9 °F (36 6 °C)    Physical Exam:     General Appearance:  Alert, chronically ill-appearing female nontoxic, no acute distress  Throat: Oropharynx moist without lesions  Lips, mucosa, and tongue normal   Neck: Supple, symmetrical, trachea midline, no adenopathy,  no tenderness/mass/nodules   Lungs:   Clear to auscultation bilaterally, no audible wheezes, rhonchi or rales; respirations unlabored  Port-A-Cath right subclavian area   Heart:  Regular rate and rhythm, S1, S2 normal, no murmur, rub or gallop   Abdomen:   Soft, mild right-sided tenderness, non-distended, positive bowel sounds    No masses, no organomegaly    No CVA tenderness   Extremities: Extremities normal, atraumatic, no clubbing, cyanosis or edema   Skin: Surgical scars, multiple tattoos, left gluteal wound tenderness         Invasive Devices     Central Venous Catheter Line Port A Cath 06/22/20 Right Chest 46 days          Peripheral Intravenous Line            Peripheral IV 08/05/20 Right;Upper;Ventral (anterior) Arm 2 days                Labs, Imaging, & Other studies:   All pertinent labs were personally reviewed  Results from last 7 days   Lab Units 08/07/20  0555 08/06/20  0452 08/05/20  0649   WBC Thousand/uL 9 62 10 15 10 55*   HEMOGLOBIN g/dL 7 4* 7 5* 7 5*   PLATELETS Thousands/uL 436* 415* 378     Results from last 7 days   Lab Units 08/07/20  0555 08/06/20  0452 08/05/20  0649   SODIUM mmol/L 138 137 138   POTASSIUM mmol/L 4 0 3 9 3 9   CHLORIDE mmol/L 106 104 105   CO2 mmol/L 27 25 29   BUN mg/dL 5 5 3*   CREATININE mg/dL 0 56* 0 49* 0 46*   EGFR ml/min/1 73sq m 111 116 119   CALCIUM mg/dL 9 4 8 5 8 4

## 2020-08-08 NOTE — ASSESSMENT & PLAN NOTE
Follows with outpatient oncology regarding chemotherapy  Per pathology in June 2020, ER/MN (+) and HER-2 (-)

## 2020-08-08 NOTE — PROGRESS NOTES
[facility] Internal Medicine - Progress Note  Patient: Moncho Fitzpatrick 50 y o  female MRN: [id number]  Unit/Bed#: [facility] 931-01 Encounter: [id number]  Primary Care Provider: Lynn Grewal MD  Date Of Visit: 08/08/20        Assessment & Plan:    * Rectal abscess  Assessment & Plan  Per repeat CT imaging, "Mildly enlarged complex presacral collection that may represent combination of abscess and tumor   Adjacent or contiguous left gluteal abscess is mildly increased "  Gluteal aspirate culture s/p I&D on 7/31 growing Streptococcus anginosus along with Bacteroides/Prevotella species - on IV Unasyn per Infectious Disease with progressive improvement  History of rectal cancer on chemotherapy noted  Monitor fever curve - monitor vitals and maintain hemodynamics  PRN pain control  Long and in-depth discussions with Interventional Radiology, Infectious Disease, colorectal surgery, and palliative care on 8/5 -> plan for one more attempt at IR guided aspirate drainage noted - recurrent/intermittent percutaneous draining or a permanent drain placement are not a long-term solution and will increased risk of further complications in the setting of underlying rectal cancer, including decreased quality of life, possible fistula formation, possible recurrent super infections, and need for frequent drain tube replacements - patient is aware has decided against diverting colostomy for now as previously offered by colorectal surgery -> underwent a final IR guided drainage on 8/6 yielding approximately 8 5 mL of pus (notation made of transitioned loculation now)  Continue IV Unasyn regimen per Infectious Disease until cleared for discharge    Metastatic rectal cancer  Assessment & Plan  With liver/lung metastasis -> s/p chemotherapy per outpatient oncology  Appreciate palliative care regarding pain control  Appreciate colorectal surgery input -> palliative/diverting colostomy has been offered but patient currently refusing  Supportive care otherwise    Breast cancer  Assessment & Plan  Follows with outpatient oncology regarding chemotherapy  Per pathology in 2020, ER/[place] (+) and HER-2 (-)    Cancer associated pain  Assessment & Plan  Appreciate palliative care input - continue analgesic regimen    Anemia of chronic disease  Assessment & Plan  Monitor H/H    Thrombocytosis   Assessment & Plan  Likely reactive due to acute medical issue(s)  Monitor platelet count        DVT Prophylaxis:  Lovenox       Patient Centered Rounds:  I have performed bedside rounds and discussed plan of care with nursing today  Discussions with Specialists or Other Care Team Provider:  see above assessments if applicable    Education and Discussions with Family / Patient:  Patient, and other family member, at bedside today  Time Spent for Care:  32 minutes  More than 50% of total time spent on counseling and coordination of care as described above  Current Length of Stay: 8 day(s)    Current Patient Status: Inpatient   Certification Statement:  Patient will continue to require additional hospital stay due to assessments as noted above  Code Status: Level 1 - Full Code        Subjective:     Seen/examined earlier in the day  Pain is better controlled today  Patient is inquiring about being cleared for discharge in the next few days  Objective:     Vitals:   Temp (24hrs), Av 2 °F (36 8 °C), Min:97 8 °F (36 6 °C), Max:98 7 °F (37 1 °C)    Temp:  [97 8 °F (36 6 °C)-98 7 °F (37 1 °C)] 98 7 °F (37 1 °C)  HR:  [62-72] 70  Resp:  [13-18] 18  BP: ()/(61-62) 101/62  SpO2:  [98 %-99 %] 98 %  Body mass index is 23 59 kg/m²  Input and Output Summary (last 24 hours):        Intake/Output Summary (Last 24 hours) at 2020 1751  Last data filed at 2020 1540  Gross per 24 hour   Intake 1060 ml   Output 2350 ml   Net -1290 ml       Physical Exam:     GENERAL:  Well-developed/nourished - improving distress from pain  HEAD:  Normocephalic - atraumatic   EYES: PERRL - EOMI   MOUTH:  Mucosa moist  NECK:  Supple - full range of motion  CARDIAC:  Rate controlled - S1/S2 positive  PULMONARY:  Clear breath sounds bilaterally - nonlabored respirations  ABDOMEN:  Soft - nontender/nondistended - active bowel sounds  MUSCULOSKELETAL:  Motor strength/range of motion intact  NEUROLOGIC:  Alert/oriented at baseline  SKIN:  Chronic wrinkles/blemishes - tattoos noted  PSYCHIATRIC:  Mood/affect pleasant today      Additional Data:     Labs & Recent Cultures:    Results from last 7 days   Lab Units 08/08/20  0538   WBC Thousand/uL 10 04   HEMOGLOBIN g/dL 8 2*   HEMATOCRIT % 28 8*   PLATELETS Thousands/uL 511*   NEUTROS PCT % 57   LYMPHS PCT % 27   MONOS PCT % 8   EOS PCT % 5     Results from last 7 days   Lab Units 08/08/20  0541   POTASSIUM mmol/L 4 5   CHLORIDE mmol/L 104   CO2 mmol/L 28   BUN mg/dL 6   CREATININE mg/dL 0 60   CALCIUM mg/dL 9 4                           Last 24 Hours Medication List:   Current Facility-Administered Medications   Medication Dose Route Frequency Provider Last Rate    acetaminophen  650 mg Oral Q6H PRN Yocasta Lee DO      aluminum-magnesium hydroxide-simethicone  30 mL Oral Q6H PRN Minus MD Myra      ampicillin-sulbactam  3 g Intravenous Q6H Ping Guillaume MD 3 g (08/08/20 1702)    cholecalciferol  2,000 Units Oral Daily Minus MD Myra      enoxaparin  40 mg Subcutaneous Daily Minus MD Myra      HYDROmorphone  1 mg Intravenous Q3H PRN Yocasta Lee DO      loratadine  10 mg Oral Daily Minus MD Myra      metoprolol succinate  25 mg Oral Daily Minus MD Myra      ondansetron  4 mg Intravenous Q6H PRN Minus MD Myra      oxyCODONE  20 mg Oral Q12H [facility] Yocasta Green DO      oxyCODONE-acetaminophen  1 tablet Oral Q4H PRN Shavonne Cedillo,       Or    oxyCODONE  10 mg Oral Q4H PRN Shavonne Cedillo, DO      polyethylene glycol  17 g Oral Daily Yocasta Lee DO      senna  1 tablet Oral BID Yocasta Green, DO      sertraline  25 mg Oral Daily Pia Trimble MD                  ** Please Note: This note is constructed using a voice recognition dictation system  An occasional wrong word/phrase or sound-a-like substitution may have been picked up by dictation device due to the inherent limitations of voice recognition software  Read the chart carefully and recognize, using reasonable context, where substitutions may have occurred  **

## 2020-08-08 NOTE — PLAN OF CARE
Problem: PAIN - ADULT  Goal: Verbalizes/displays adequate comfort level or baseline comfort level  Description: Interventions:  - Encourage patient to monitor pain and request assistance  - Assess pain using appropriate pain scale  - Administer analgesics based on type and severity of pain and evaluate response  - Implement non-pharmacological measures as appropriate and evaluate response  - Consider cultural and social influences on pain and pain management  - Notify physician/advanced practitioner if interventions unsuccessful or patient reports new pain  Outcome: Progressing     Problem: INFECTION - ADULT  Goal: Absence or prevention of progression during hospitalization  Description: INTERVENTIONS:  - Assess and monitor for signs and symptoms of infection  - Monitor lab/diagnostic results  - Monitor all insertion sites, i e  indwelling lines, tubes, and drains  - Monitor endotracheal if appropriate and nasal secretions for changes in amount and color  - [place] appropriate cooling/warming therapies per order  - Administer medications as ordered  - Instruct and encourage patient and family to use good hand hygiene technique  - Identify and instruct in appropriate isolation precautions for identified infection/condition  Outcome: Progressing     Problem: SAFETY ADULT  Goal: Patient will remain free of falls  Description: INTERVENTIONS:  - Assess patient frequently for physical needs  -  Identify cognitive and physical deficits and behaviors that affect risk of falls    -  [place] fall precautions as indicated by assessment   - Educate patient/family on patient safety including physical limitations  - Instruct patient to call for assistance with activity based on assessment  - Modify environment to reduce risk of injury  - Consider OT/PT consult to assist with strengthening/mobility  Outcome: Progressing     Problem: DISCHARGE PLANNING  Goal: Discharge to home or other facility with appropriate resources  Description: INTERVENTIONS:  - Identify barriers to discharge w/patient and caregiver  - Arrange for needed discharge resources and transportation as appropriate  - Identify discharge learning needs (meds, wound care, etc )  - Arrange for interpretive services to assist at discharge as needed  - Refer to Case Management Department for coordinating discharge planning if the patient needs post-hospital services based on physician/advanced practitioner order or complex needs related to functional status, cognitive ability, or social support system  Outcome: Progressing     Problem: Knowledge Deficit  Goal: Patient/family/caregiver demonstrates understanding of disease process, treatment plan, medications, and discharge instructions  Description: Complete learning assessment and assess knowledge base  Interventions:  - Provide teaching at level of understanding  - Provide teaching via preferred learning methods  Outcome: Progressing     Problem: Potential for Falls  Goal: Patient will remain free of falls  Description: INTERVENTIONS:  - Assess patient frequently for physical needs  -  Identify cognitive and physical deficits and behaviors that affect risk of falls    -  [place] fall precautions as indicated by assessment   - Educate patient/family on patient safety including physical limitations  - Instruct patient to call for assistance with activity based on assessment  - Modify environment to reduce risk of injury  - Consider OT/PT consult to assist with strengthening/mobility  Outcome: Progressing

## 2020-08-08 NOTE — PROGRESS NOTES
Progress Note - Infectious Disease   Puja Fitzpatrick 50 y o  female MRN: [id number]  Unit/Bed#: [facility] 931-01 Encounter: [id number]      Impression:  1  Left gluteal abscess S/P I&D   2  Stage IV rectal carcinoma on chemotherapy     Recommendations:  Patient afebrile with normal WBC count   1  Attention has less rectal swelling and pain and now has a normal WBC count  2  Performed single drainage procedure with a 0 5 cc of pus like fluid obtained yesterday  Culture was not sent  3  Could switch in a m  to p o  Augmentin 875 mg q 12 hours for 1 week and discharge tomorrow if stable  She will be followed by her oncologist, Dr Luis Castillo    Antibiotics:  Stent  1  Ampicillin/sulbactam 3 g q 6 hours IV, day 10 total Rx     Subjective:  Continues with low level left buttock pain which is still improved from previous     Able to sit with weight placed on buttock  Denies nausea, vomiting, or diarrhea  Objective:  Vitals:  Temp:  [97 8 °F (36 6 °C)-98 7 °F (37 1 °C)] 98 7 °F (37 1 °C)  HR:  [62-72] 70  Resp:  [13-18] 18  BP: ()/(61-62) 101/62  SpO2:  [98 %-99 %] 98 %  Temp (24hrs), Av 2 °F (36 8 °C), Min:97 8 °F (36 6 °C), Max:98 7 °F (37 1 °C)  Current: Temperature: 98 7 °F (37 1 °C)    Physical Exam:     General Appearance:  Alert, chronically ill-appearing female nontoxic, no acute distress  Throat: Oropharynx moist without lesions  Lips, mucosa, and tongue normal   Neck: Supple, symmetrical, trachea midline, no adenopathy,  no tenderness/mass/nodules   Lungs:   Clear to auscultation bilaterally, no audible wheezes, rhonchi or rales; respirations unlabored  Port-A-Cath right subclavian area   Heart:  Regular rate and rhythm, S1, S2 normal, no murmur, rub or gallop   Abdomen:   Soft, mild right-sided tenderness, non-distended, positive bowel sounds    No masses, no organomegaly    No CVA tenderness   Extremities: Extremities normal, atraumatic, no clubbing, cyanosis or edema   Skin: Surgical scars, multiple tattoos, left gluteal wound tenderness         Invasive Devices     Central Venous Catheter Line            Port A Cath 06/22/20 Right Chest 47 days          Peripheral Intravenous Line            Peripheral IV 08/08/20 Right Antecubital less than 1 day                Labs, Imaging, & Other studies:   All pertinent labs were personally reviewed  Results from last 7 days   Lab Units 08/08/20  0538 08/07/20  0555 08/06/20  0452   WBC Thousand/uL 10 04 9 62 10 15   HEMOGLOBIN g/dL 8 2* 7 4* 7 5*   PLATELETS Thousands/uL 511* 436* 415*     Results from last 7 days   Lab Units 08/08/20  0541 08/07/20  0555 08/06/20  0452   SODIUM mmol/L 137 138 137   POTASSIUM mmol/L 4 5 4 0 3 9   CHLORIDE mmol/L 104 106 104   CO2 mmol/L 28 27 25   BUN mg/dL 6 5 5   CREATININE mg/dL 0 60 0 56* 0 49*   EGFR ml/min/1 73sq m 109 111 116   CALCIUM mg/dL 9 4 9 4 8 5

## 2020-08-09 VITALS
HEART RATE: 68 BPM | HEIGHT: 62 IN | WEIGHT: 126.76 LBS | BODY MASS INDEX: 23.33 KG/M2 | TEMPERATURE: 98.9 F | SYSTOLIC BLOOD PRESSURE: 105 MMHG | DIASTOLIC BLOOD PRESSURE: 61 MMHG | OXYGEN SATURATION: 97 % | RESPIRATION RATE: 18 BRPM

## 2020-08-09 LAB
BASOPHILS # BLD AUTO: 0.09 THOUSANDS/ΜL (ref 0–0.1)
BASOPHILS NFR BLD AUTO: 1 % (ref 0–1)
EOSINOPHIL # BLD AUTO: 0.6 THOUSAND/ΜL (ref 0–0.61)
EOSINOPHIL NFR BLD AUTO: 6 % (ref 0–6)
ERYTHROCYTE [DISTWIDTH] IN BLOOD BY AUTOMATED COUNT: 22.9 % (ref 11.6–15.1)
HCT VFR BLD AUTO: 29 % (ref 34.8–46.1)
HGB BLD-MCNC: 8.3 G/DL (ref 11.5–15.4)
IMM GRANULOCYTES # BLD AUTO: 0.17 THOUSAND/UL (ref 0–0.2)
IMM GRANULOCYTES NFR BLD AUTO: 2 % (ref 0–2)
LYMPHOCYTES # BLD AUTO: 2.84 THOUSANDS/ΜL (ref 0.6–4.47)
LYMPHOCYTES NFR BLD AUTO: 30 % (ref 14–44)
MCH RBC QN AUTO: 23.9 PG (ref 26.8–34.3)
MCHC RBC AUTO-ENTMCNC: 28.6 G/DL (ref 31.4–37.4)
MCV RBC AUTO: 83 FL (ref 82–98)
MONOCYTES # BLD AUTO: 0.78 THOUSAND/ΜL (ref 0.17–1.22)
MONOCYTES NFR BLD AUTO: 8 % (ref 4–12)
NEUTROPHILS # BLD AUTO: 4.93 THOUSANDS/ΜL (ref 1.85–7.62)
NEUTS SEG NFR BLD AUTO: 53 % (ref 43–75)
NRBC BLD AUTO-RTO: 0 /100 WBCS
PLATELET # BLD AUTO: 511 THOUSANDS/UL (ref 149–390)
PMV BLD AUTO: 9.5 FL (ref 8.9–12.7)
RBC # BLD AUTO: 3.48 MILLION/UL (ref 3.81–5.12)
WBC # BLD AUTO: 9.41 THOUSAND/UL (ref 4.31–10.16)

## 2020-08-09 PROCEDURE — 99239 HOSP IP/OBS DSCHRG MGMT >30: CPT | Performed by: INTERNAL MEDICINE

## 2020-08-09 PROCEDURE — 85025 COMPLETE CBC W/AUTO DIFF WBC: CPT | Performed by: INTERNAL MEDICINE

## 2020-08-09 RX ORDER — AMOXICILLIN AND CLAVULANATE POTASSIUM 875; 125 MG/1; MG/1
1 TABLET, FILM COATED ORAL EVERY 12 HOURS SCHEDULED
Qty: 14 TABLET | Refills: 0 | Status: SHIPPED | OUTPATIENT
Start: 2020-08-09 | End: 2020-08-15 | Stop reason: HOSPADM

## 2020-08-09 RX ORDER — AMOXICILLIN AND CLAVULANATE POTASSIUM 875; 125 MG/1; MG/1
1 TABLET, FILM COATED ORAL EVERY 12 HOURS SCHEDULED
Qty: 14 TABLET | Refills: 0 | Status: SHIPPED | OUTPATIENT
Start: 2020-08-09 | End: 2020-08-09 | Stop reason: SDUPTHER

## 2020-08-09 RX ORDER — OXYCODONE HCL 20 MG/1
20 TABLET, FILM COATED, EXTENDED RELEASE ORAL EVERY 12 HOURS SCHEDULED
Qty: 20 TABLET | Refills: 0 | Status: ON HOLD
Start: 2020-08-09 | End: 2020-08-11 | Stop reason: SDUPTHER

## 2020-08-09 RX ADMIN — OXYCODONE HYDROCHLORIDE AND ACETAMINOPHEN 1 TABLET: 5; 325 TABLET ORAL at 05:35

## 2020-08-09 RX ADMIN — ENOXAPARIN SODIUM 40 MG: 40 INJECTION SUBCUTANEOUS at 09:47

## 2020-08-09 RX ADMIN — AMPICILLIN SODIUM AND SULBACTAM SODIUM 3 G: 2; 1 INJECTION, POWDER, FOR SOLUTION INTRAMUSCULAR; INTRAVENOUS at 05:26

## 2020-08-09 RX ADMIN — OXYCODONE HYDROCHLORIDE 20 MG: 20 TABLET, FILM COATED, EXTENDED RELEASE ORAL at 09:47

## 2020-08-09 RX ADMIN — AMPICILLIN SODIUM AND SULBACTAM SODIUM 3 G: 2; 1 INJECTION, POWDER, FOR SOLUTION INTRAMUSCULAR; INTRAVENOUS at 12:20

## 2020-08-09 RX ADMIN — LORATADINE 10 MG: 10 TABLET ORAL at 09:47

## 2020-08-09 RX ADMIN — OXYCODONE HYDROCHLORIDE 10 MG: 10 TABLET ORAL at 12:20

## 2020-08-09 RX ADMIN — HYDROMORPHONE HYDROCHLORIDE 1 MG: 1 INJECTION, SOLUTION INTRAMUSCULAR; INTRAVENOUS; SUBCUTANEOUS at 09:47

## 2020-08-09 RX ADMIN — SENNOSIDES 8.6 MG: 8.6 TABLET ORAL at 09:47

## 2020-08-09 RX ADMIN — OXYCODONE HYDROCHLORIDE 10 MG: 10 TABLET ORAL at 15:03

## 2020-08-09 RX ADMIN — POLYETHYLENE GLYCOL 3350 17 G: 17 POWDER, FOR SOLUTION ORAL at 09:47

## 2020-08-09 NOTE — DISCHARGE SUMMARY
Discharge Summary - [facility] Hospitalist Service - Internal Medicine      Patient Information: Jaun Fitzpatrick 50 y o  female MRN: [id number]  Unit/Bed#: [facility] 931-01 Encounter: [id number]    Discharging Physician / Practitioner: Donna Aguilera MD  PCP: Leona Andujar MD  Admission Date:   Admission Orders (From admission, onward)     Ordered        07/31/20 0237  Inpatient Admission (expected length of stay for this patient Order details is greater than two midnights)  Once                   Discharge Date: 08/09/20      Reason for Admission:  Fever and rectal pain       Discharge Diagnoses:     Principal Problem:    Rectal abscess    Active Problems:    Metastatic rectal cancer    Breast cancer    Cancer associated pain    Anemia of chronic disease    Thrombocytosis     Resolved Problems:    Leukocytosis    Hyponatremia    Hypokalemia      Consultations During Hospital Stay:  · Infectious Disease  · Interventional Radiology  · 10 Carissa Santacruz Course:     * Rectal abscess  Assessment & Plan  Per repeat CT imaging, "Mildly enlarged complex presacral collection that may represent combination of abscess and tumor   Adjacent or contiguous left gluteal abscess is mildly increased "  Gluteal aspirate culture s/p I&D on 7/31 growing Streptococcus anginosus along with Bacteroides/Prevotella species - on IV Unasyn per Infectious Disease with progressive improvement  History of rectal cancer on chemotherapy noted  Hemodynamically stable  PRN pain control on board  Long and in-depth discussions with Interventional Radiology, Infectious Disease, colorectal surgery, and palliative care on 8/5 -> plan for one more attempt at IR guided aspirate drainage noted - recurrent/intermittent percutaneous draining or a permanent drain placement are not a long-term solution and will increased risk of further complications in the setting of underlying rectal cancer, including decreased quality of life, possible fistula formation, possible recurrent super infections, and need for frequent drain tube replacements - patient is aware has decided against diverting colostomy for now as previously offered by colorectal surgery -> underwent a final IR guided drainage on 8/6 yielding approximately 8 5 mL of pus (notation made of transitioned loculation now)  Transition IV Unasyn regimen per Infectious Disease to oral Augmentin to finish off course - plan for discharge home today     Metastatic rectal cancer  Assessment & Plan  With liver/lung metastasis -> s/p chemotherapy per outpatient oncology  Appreciate palliative care regarding pain control  Appreciate colorectal surgery input -> palliative/diverting colostomy has been offered but patient currently refusing     Breast cancer  Assessment & Plan  Follows with outpatient oncology regarding chemotherapy  Per pathology in June 2020, ER/[place] (+) and HER-2 (-)     Cancer associated pain  Assessment & Plan  Appreciate palliative care input regarding analgesic regimen     Anemia of chronic disease  Assessment & Plan  H/H stable     Thrombocytosis   Assessment & Plan  Likely reactive due to acute medical issue(s)         Condition at Discharge: fair       Discharge Day Visit / Exam:     Vitals: Blood Pressure: 105/61 (08/09/20 0713)  Pulse: 68 (08/08/20 2209)  Temperature: 98 9 °F (37 2 °C) (08/08/20 2209)  Temp Source: Oral (08/07/20 2209)  Respirations: 18 (08/09/20 0713)  Height: 5' 2" (157 5 cm) (07/31/20 0414)  Weight - Scale: 57 5 kg (126 lb 12 2 oz) (08/09/20 0527)  SpO2: 97 % (08/08/20 2209)      Physical exam - I had a face-to-face encounter with the patient on day of discharge  Discussion with Patient and/or Family:  The patient has been advised to return to the ER immediately if any symptoms recur or worsen  Discharge instructions/Information to Patient and/or Family:   See after visit summary for information provided to patient and/or family          Provisions for Follow-Up Care:  See after visit summary for information related to follow-up care and any pertinent home health orders  Disposition:   Home      Discharge Medications:  See after visit summary for reconciled discharge medications provided to patient and/or family  Discharge Statement:  I spent 38 minutes discharging the patient  This time was spent on the day of discharge  I had direct contact with the patient on the day of discharge  Greater than 50% of the total time was spent examining patient, answering all patient questions, arranging and discussing plan of care with patient as well as directly providing post-discharge instructions  Additional time then spent on discharge activities  ** Please Note: This note is constructed using a voice recognition dictation system  An occasional wrong word/phrase or sound-a-like substitution may have been picked up by dictation device due to the inherent limitations of voice recognition software  Read the chart carefully and recognize, using reasonable context, where substitutions may have occurred  **

## 2020-08-10 ENCOUNTER — HOSPITAL ENCOUNTER (INPATIENT)
Facility: HOSPITAL | Age: 45
LOS: 5 days | Discharge: HOME | DRG: 375 | End: 2020-08-15
Attending: EMERGENCY MEDICINE | Admitting: INTERNAL MEDICINE
Payer: COMMERCIAL

## 2020-08-10 ENCOUNTER — TELEPHONE (OUTPATIENT)
Dept: PALLIATIVE MEDICINE | Facility: CLINIC | Age: 45
End: 2020-08-10

## 2020-08-10 DIAGNOSIS — C20 RECTAL CANCER (HCC): ICD-10-CM

## 2020-08-10 DIAGNOSIS — K62.5 BRIGHT RED BLOOD PER RECTUM: ICD-10-CM

## 2020-08-10 DIAGNOSIS — C50.919 MALIGNANT NEOPLASM OF FEMALE BREAST, UNSPECIFIED ESTROGEN RECEPTOR STATUS, UNSPECIFIED LATERALITY, UNSPECIFIED SITE OF BREAST (HCC): Primary | ICD-10-CM

## 2020-08-10 LAB
ABO GROUP BLD: NORMAL
ALBUMIN SERPL BCP-MCNC: 2.4 G/DL (ref 3.5–5)
ALP SERPL-CCNC: 174 U/L (ref 46–116)
ALT SERPL W P-5'-P-CCNC: 18 U/L (ref 12–78)
ANION GAP SERPL CALCULATED.3IONS-SCNC: 7 MMOL/L (ref 4–13)
APTT PPP: 38 SECONDS (ref 23–37)
AST SERPL W P-5'-P-CCNC: 59 U/L (ref 5–45)
BASOPHILS # BLD AUTO: 0.06 THOUSANDS/ΜL (ref 0–0.1)
BASOPHILS NFR BLD AUTO: 1 % (ref 0–1)
BILIRUB SERPL-MCNC: 0.23 MG/DL (ref 0.2–1)
BLD GP AB SCN SERPL QL: NEGATIVE
BUN SERPL-MCNC: 9 MG/DL (ref 5–25)
CALCIUM SERPL-MCNC: 8.8 MG/DL (ref 8.3–10.1)
CHLORIDE SERPL-SCNC: 106 MMOL/L (ref 100–108)
CO2 SERPL-SCNC: 26 MMOL/L (ref 21–32)
CREAT SERPL-MCNC: 0.63 MG/DL (ref 0.6–1.3)
EOSINOPHIL # BLD AUTO: 0.55 THOUSAND/ΜL (ref 0–0.61)
EOSINOPHIL NFR BLD AUTO: 5 % (ref 0–6)
ERYTHROCYTE [DISTWIDTH] IN BLOOD BY AUTOMATED COUNT: 22.9 % (ref 11.6–15.1)
GFR SERPL CREATININE-BSD FRML MDRD: 107 ML/MIN/1.73SQ M
GLUCOSE SERPL-MCNC: 89 MG/DL (ref 65–140)
HCT VFR BLD AUTO: 29.6 % (ref 34.8–46.1)
HGB BLD-MCNC: 8.7 G/DL (ref 11.5–15.4)
IMM GRANULOCYTES # BLD AUTO: 0.14 THOUSAND/UL (ref 0–0.2)
IMM GRANULOCYTES NFR BLD AUTO: 1 % (ref 0–2)
INR PPP: 1.05 (ref 0.84–1.19)
LYMPHOCYTES # BLD AUTO: 2.06 THOUSANDS/ΜL (ref 0.6–4.47)
LYMPHOCYTES NFR BLD AUTO: 19 % (ref 14–44)
MCH RBC QN AUTO: 24.2 PG (ref 26.8–34.3)
MCHC RBC AUTO-ENTMCNC: 29.4 G/DL (ref 31.4–37.4)
MCV RBC AUTO: 82 FL (ref 82–98)
MONOCYTES # BLD AUTO: 0.66 THOUSAND/ΜL (ref 0.17–1.22)
MONOCYTES NFR BLD AUTO: 6 % (ref 4–12)
NEUTROPHILS # BLD AUTO: 7.69 THOUSANDS/ΜL (ref 1.85–7.62)
NEUTS SEG NFR BLD AUTO: 68 % (ref 43–75)
NRBC BLD AUTO-RTO: 0 /100 WBCS
PLATELET # BLD AUTO: 535 THOUSANDS/UL (ref 149–390)
PMV BLD AUTO: 9.5 FL (ref 8.9–12.7)
POTASSIUM SERPL-SCNC: 3.8 MMOL/L (ref 3.5–5.3)
PROT SERPL-MCNC: 7.5 G/DL (ref 6.4–8.2)
PROTHROMBIN TIME: 13.7 SECONDS (ref 11.6–14.5)
RBC # BLD AUTO: 3.6 MILLION/UL (ref 3.81–5.12)
RH BLD: POSITIVE
SODIUM SERPL-SCNC: 139 MMOL/L (ref 136–145)
SPECIMEN EXPIRATION DATE: NORMAL
WBC # BLD AUTO: 11.16 THOUSAND/UL (ref 4.31–10.16)

## 2020-08-10 PROCEDURE — 86850 RBC ANTIBODY SCREEN: CPT | Performed by: EMERGENCY MEDICINE

## 2020-08-10 PROCEDURE — 99223 1ST HOSP IP/OBS HIGH 75: CPT | Performed by: INTERNAL MEDICINE

## 2020-08-10 PROCEDURE — 99285 EMERGENCY DEPT VISIT HI MDM: CPT

## 2020-08-10 PROCEDURE — 85025 COMPLETE CBC W/AUTO DIFF WBC: CPT | Performed by: EMERGENCY MEDICINE

## 2020-08-10 PROCEDURE — 36415 COLL VENOUS BLD VENIPUNCTURE: CPT | Performed by: EMERGENCY MEDICINE

## 2020-08-10 PROCEDURE — 86901 BLOOD TYPING SEROLOGIC RH(D): CPT | Performed by: EMERGENCY MEDICINE

## 2020-08-10 PROCEDURE — 86900 BLOOD TYPING SEROLOGIC ABO: CPT | Performed by: EMERGENCY MEDICINE

## 2020-08-10 PROCEDURE — 99285 EMERGENCY DEPT VISIT HI MDM: CPT | Performed by: EMERGENCY MEDICINE

## 2020-08-10 PROCEDURE — 85730 THROMBOPLASTIN TIME PARTIAL: CPT | Performed by: EMERGENCY MEDICINE

## 2020-08-10 PROCEDURE — 96375 TX/PRO/DX INJ NEW DRUG ADDON: CPT

## 2020-08-10 PROCEDURE — 85610 PROTHROMBIN TIME: CPT | Performed by: EMERGENCY MEDICINE

## 2020-08-10 PROCEDURE — 80053 COMPREHEN METABOLIC PANEL: CPT | Performed by: EMERGENCY MEDICINE

## 2020-08-10 PROCEDURE — 96365 THER/PROPH/DIAG IV INF INIT: CPT

## 2020-08-10 PROCEDURE — 96376 TX/PRO/DX INJ SAME DRUG ADON: CPT

## 2020-08-10 RX ORDER — HYDROMORPHONE HCL/PF 1 MG/ML
0.5 SYRINGE (ML) INJECTION
Status: DISCONTINUED | OUTPATIENT
Start: 2020-08-10 | End: 2020-08-15 | Stop reason: HOSPADM

## 2020-08-10 RX ORDER — HYDROMORPHONE HCL/PF 1 MG/ML
0.5 SYRINGE (ML) INJECTION ONCE
Status: COMPLETED | OUTPATIENT
Start: 2020-08-10 | End: 2020-08-10

## 2020-08-10 RX ORDER — OXYCODONE HYDROCHLORIDE AND ACETAMINOPHEN 5; 325 MG/1; MG/1
1 TABLET ORAL EVERY 4 HOURS PRN
Status: DISCONTINUED | OUTPATIENT
Start: 2020-08-10 | End: 2020-08-15 | Stop reason: HOSPADM

## 2020-08-10 RX ORDER — OXYCODONE HCL 20 MG/1
20 TABLET, FILM COATED, EXTENDED RELEASE ORAL EVERY 12 HOURS SCHEDULED
Status: DISCONTINUED | OUTPATIENT
Start: 2020-08-10 | End: 2020-08-15 | Stop reason: HOSPADM

## 2020-08-10 RX ORDER — AMOXICILLIN AND CLAVULANATE POTASSIUM 875; 125 MG/1; MG/1
1 TABLET, FILM COATED ORAL EVERY 12 HOURS SCHEDULED
Status: DISCONTINUED | OUTPATIENT
Start: 2020-08-10 | End: 2020-08-10

## 2020-08-10 RX ORDER — METOPROLOL SUCCINATE 25 MG/1
25 TABLET, EXTENDED RELEASE ORAL DAILY
Status: DISCONTINUED | OUTPATIENT
Start: 2020-08-11 | End: 2020-08-15 | Stop reason: HOSPADM

## 2020-08-10 RX ORDER — SODIUM CHLORIDE, SODIUM GLUCONATE, SODIUM ACETATE, POTASSIUM CHLORIDE, MAGNESIUM CHLORIDE, SODIUM PHOSPHATE, DIBASIC, AND POTASSIUM PHOSPHATE .53; .5; .37; .037; .03; .012; .00082 G/100ML; G/100ML; G/100ML; G/100ML; G/100ML; G/100ML; G/100ML
1000 INJECTION, SOLUTION INTRAVENOUS ONCE
Status: COMPLETED | OUTPATIENT
Start: 2020-08-10 | End: 2020-08-10

## 2020-08-10 RX ORDER — MELATONIN
2000 DAILY
Status: DISCONTINUED | OUTPATIENT
Start: 2020-08-11 | End: 2020-08-15 | Stop reason: HOSPADM

## 2020-08-10 RX ORDER — HYDROMORPHONE HCL/PF 1 MG/ML
1 SYRINGE (ML) INJECTION ONCE
Status: COMPLETED | OUTPATIENT
Start: 2020-08-10 | End: 2020-08-10

## 2020-08-10 RX ORDER — SERTRALINE HYDROCHLORIDE 25 MG/1
25 TABLET, FILM COATED ORAL DAILY
Status: DISCONTINUED | OUTPATIENT
Start: 2020-08-11 | End: 2020-08-15 | Stop reason: HOSPADM

## 2020-08-10 RX ORDER — LORATADINE 10 MG/1
10 TABLET ORAL DAILY
Status: DISCONTINUED | OUTPATIENT
Start: 2020-08-11 | End: 2020-08-15 | Stop reason: HOSPADM

## 2020-08-10 RX ORDER — PROMETHAZINE HYDROCHLORIDE 25 MG/ML
12.5 INJECTION, SOLUTION INTRAMUSCULAR; INTRAVENOUS EVERY 6 HOURS PRN
Status: DISCONTINUED | OUTPATIENT
Start: 2020-08-10 | End: 2020-08-15 | Stop reason: HOSPADM

## 2020-08-10 RX ORDER — MAGNESIUM HYDROXIDE/ALUMINUM HYDROXICE/SIMETHICONE 120; 1200; 1200 MG/30ML; MG/30ML; MG/30ML
30 SUSPENSION ORAL EVERY 6 HOURS PRN
Status: DISCONTINUED | OUTPATIENT
Start: 2020-08-10 | End: 2020-08-15 | Stop reason: HOSPADM

## 2020-08-10 RX ORDER — ONDANSETRON 2 MG/ML
4 INJECTION INTRAMUSCULAR; INTRAVENOUS ONCE
Status: COMPLETED | OUTPATIENT
Start: 2020-08-10 | End: 2020-08-10

## 2020-08-10 RX ADMIN — OXYCODONE HYDROCHLORIDE 20 MG: 20 TABLET, FILM COATED, EXTENDED RELEASE ORAL at 23:05

## 2020-08-10 RX ADMIN — HYDROMORPHONE HYDROCHLORIDE 1 MG: 1 INJECTION, SOLUTION INTRAMUSCULAR; INTRAVENOUS; SUBCUTANEOUS at 20:57

## 2020-08-10 RX ADMIN — SODIUM CHLORIDE, SODIUM GLUCONATE, SODIUM ACETATE, POTASSIUM CHLORIDE AND MAGNESIUM CHLORIDE 1000 ML: 526; 502; 368; 37; 30 INJECTION, SOLUTION INTRAVENOUS at 20:57

## 2020-08-10 RX ADMIN — ONDANSETRON 4 MG: 2 INJECTION INTRAMUSCULAR; INTRAVENOUS at 21:48

## 2020-08-10 RX ADMIN — HYDROMORPHONE HYDROCHLORIDE 0.5 MG: 1 INJECTION, SOLUTION INTRAMUSCULAR; INTRAVENOUS; SUBCUTANEOUS at 21:47

## 2020-08-10 NOTE — TELEPHONE ENCOUNTER
Oxycodone (oxycontin) 20 mg needs to be resubmitted  DC order per [facility]  Reads no print  Pt is asking Dr Trey Gonsales to send to [facility] on [address]  Apparently issue with submitting e scripts for opioids  If unable to send to [facility], pt lives near [facility] if [address] is better option

## 2020-08-10 NOTE — TELEPHONE ENCOUNTER
Pt called back to check on the status of her medication refill  Nothing has been refilled yet and the pharmacy has not been contacted either  I did inform the patient that the message has been sent to the provider

## 2020-08-11 ENCOUNTER — TRANSITIONAL CARE MANAGEMENT (OUTPATIENT)
Dept: FAMILY MEDICINE CLINIC | Facility: CLINIC | Age: 45
End: 2020-08-11

## 2020-08-11 DIAGNOSIS — G89.3 CANCER RELATED PAIN: ICD-10-CM

## 2020-08-11 DIAGNOSIS — Z51.5 PALLIATIVE CARE PATIENT: ICD-10-CM

## 2020-08-11 DIAGNOSIS — C20 RECTAL CANCER (HCC): ICD-10-CM

## 2020-08-11 LAB
FERRITIN SERPL-MCNC: 316 NG/ML (ref 8–388)
HGB BLD-MCNC: 7.8 G/DL (ref 11.5–15.4)
HGB BLD-MCNC: 7.9 G/DL (ref 11.5–15.4)
HGB BLD-MCNC: 8.5 G/DL (ref 11.5–15.4)
IRON SATN MFR SERPL: 14 %
IRON SERPL-MCNC: 26 UG/DL (ref 50–170)
TIBC SERPL-MCNC: 180 UG/DL (ref 250–450)

## 2020-08-11 PROCEDURE — 99254 IP/OBS CNSLTJ NEW/EST MOD 60: CPT | Performed by: INTERNAL MEDICINE

## 2020-08-11 PROCEDURE — 82728 ASSAY OF FERRITIN: CPT | Performed by: INTERNAL MEDICINE

## 2020-08-11 PROCEDURE — 83540 ASSAY OF IRON: CPT | Performed by: INTERNAL MEDICINE

## 2020-08-11 PROCEDURE — 99232 SBSQ HOSP IP/OBS MODERATE 35: CPT | Performed by: INTERNAL MEDICINE

## 2020-08-11 PROCEDURE — 85018 HEMOGLOBIN: CPT | Performed by: INTERNAL MEDICINE

## 2020-08-11 PROCEDURE — 83550 IRON BINDING TEST: CPT | Performed by: INTERNAL MEDICINE

## 2020-08-11 PROCEDURE — 36415 COLL VENOUS BLD VENIPUNCTURE: CPT | Performed by: INTERNAL MEDICINE

## 2020-08-11 RX ORDER — OXYCODONE HCL 20 MG/1
20 TABLET, FILM COATED, EXTENDED RELEASE ORAL EVERY 12 HOURS SCHEDULED
Qty: 20 TABLET | Refills: 0 | Status: SHIPPED | OUTPATIENT
Start: 2020-08-11 | End: 2020-08-21

## 2020-08-11 RX ORDER — OXYCODONE HCL 20 MG/1
20 TABLET, FILM COATED, EXTENDED RELEASE ORAL EVERY 12 HOURS SCHEDULED
Qty: 20 TABLET | Refills: 0
Start: 2020-08-11 | End: 2020-08-11 | Stop reason: SDUPTHER

## 2020-08-11 RX ADMIN — AMPICILLIN SODIUM AND SULBACTAM SODIUM 3 G: 2; 1 INJECTION, POWDER, FOR SOLUTION INTRAMUSCULAR; INTRAVENOUS at 17:56

## 2020-08-11 RX ADMIN — OXYCODONE HYDROCHLORIDE AND ACETAMINOPHEN 1 TABLET: 5; 325 TABLET ORAL at 17:55

## 2020-08-11 RX ADMIN — LORATADINE 10 MG: 10 TABLET ORAL at 08:50

## 2020-08-11 RX ADMIN — HYDROMORPHONE HYDROCHLORIDE 0.5 MG: 1 INJECTION, SOLUTION INTRAMUSCULAR; INTRAVENOUS; SUBCUTANEOUS at 10:49

## 2020-08-11 RX ADMIN — PROMETHAZINE HYDROCHLORIDE 12.5 MG: 25 INJECTION INTRAMUSCULAR; INTRAVENOUS at 17:55

## 2020-08-11 RX ADMIN — AMPICILLIN SODIUM AND SULBACTAM SODIUM 3 G: 2; 1 INJECTION, POWDER, FOR SOLUTION INTRAMUSCULAR; INTRAVENOUS at 22:39

## 2020-08-11 RX ADMIN — OXYCODONE HYDROCHLORIDE 20 MG: 20 TABLET, FILM COATED, EXTENDED RELEASE ORAL at 22:38

## 2020-08-11 RX ADMIN — AMPICILLIN SODIUM AND SULBACTAM SODIUM 3 G: 2; 1 INJECTION, POWDER, FOR SOLUTION INTRAMUSCULAR; INTRAVENOUS at 05:21

## 2020-08-11 RX ADMIN — HYDROMORPHONE HYDROCHLORIDE 0.5 MG: 1 INJECTION, SOLUTION INTRAMUSCULAR; INTRAVENOUS; SUBCUTANEOUS at 19:23

## 2020-08-11 RX ADMIN — AMPICILLIN SODIUM AND SULBACTAM SODIUM 3 G: 2; 1 INJECTION, POWDER, FOR SOLUTION INTRAMUSCULAR; INTRAVENOUS at 00:10

## 2020-08-11 RX ADMIN — OXYCODONE HYDROCHLORIDE AND ACETAMINOPHEN 1 TABLET: 5; 325 TABLET ORAL at 05:25

## 2020-08-11 RX ADMIN — OXYCODONE HYDROCHLORIDE 20 MG: 20 TABLET, FILM COATED, EXTENDED RELEASE ORAL at 08:50

## 2020-08-11 RX ADMIN — SERTRALINE HYDROCHLORIDE 25 MG: 25 TABLET ORAL at 08:50

## 2020-08-11 RX ADMIN — HYDROMORPHONE HYDROCHLORIDE 0.5 MG: 1 INJECTION, SOLUTION INTRAMUSCULAR; INTRAVENOUS; SUBCUTANEOUS at 14:35

## 2020-08-11 RX ADMIN — OXYCODONE HYDROCHLORIDE AND ACETAMINOPHEN 1 TABLET: 5; 325 TABLET ORAL at 01:46

## 2020-08-11 RX ADMIN — MELATONIN 2000 UNITS: at 08:50

## 2020-08-11 RX ADMIN — AMPICILLIN SODIUM AND SULBACTAM SODIUM 3 G: 2; 1 INJECTION, POWDER, FOR SOLUTION INTRAMUSCULAR; INTRAVENOUS at 11:00

## 2020-08-11 NOTE — SOCIAL WORK
Met with pt, explained CM program/CM role  LOS-1  Bundle-no  Unplanned readmission color-green  30 Day readmission-yes-rectal bleed  Resides with: and children  Type home:2   AKANKSHA:3  Bedroom located:main floor  Primary caregiver:self  ADL's/ambulation: I PTA  Drive-license but not driving presently  PCP-Parish Calle MD  Pharmacy-[facility] [address]  Afford medication-yes  HHC:[facility]  DME:no  IP Rehab:no  MH illness-   no               IP/OP care  Drug abuse:no  Alcohol abuse:no  Employed-no  Primary Contact- [phone]  POA:no  LW: no  Transportation home:family    CM reviewed d/c planning process including the following: identifying help at home, patient preference for d/c planning needs, Discharge Lounge, [facility] Meds to Bed program, availability of treatment team to discuss questions or concerns patient and/or family may have regarding understanding medications and recognizing signs and symptoms once discharged  CM also encouraged patient to follow up with all recommended appointments after discharge  Patient advised of importance for patient and family to participate in managing patients medical well being  Patient/caregiver received discharge checklist  Content reviewed  Patient/caregiver encouraged to participate in discharge plan of care prior to discharge home

## 2020-08-11 NOTE — ASSESSMENT & PLAN NOTE
Currently on oxycodone 20 mg twice daily  Continue Percocet  For breakthrough pain:  Hydrocodone 0 5 mg every 3 hours as needed  Followed by palliative care as outpatient

## 2020-08-11 NOTE — ED PROVIDER NOTES
History  Chief Complaint   Patient presents with   Vidya No or Bloody Stool     Pt reports recent discharge from hospital after abscess drainage, sent home with rx for agumentin; pt states she is now having bloody diarrhea; pt states "it looks like watery blood"; +nausea, bloating     Shana Augustin is a 24-year-old woman with active rectal cancer who was recently discharged from the hospital yesterday for drainage of presacral abscess, presenting today with several episodes of bloody diarrhea  She was discharged yesterday a prescription for Augmentin  She took the prescription as prescribed last night and this morning  This morning she began having episodes of bloody diarrhea  She had several episodes today, and then called her oncologist who instructed her to come to the emergency department  Her last episode of diarrhea was around 4:00 p m  today  She also reports feeling lightheaded and weak, some nausea no vomiting  She is prescribed metoprolol, however only takes it when needed  She measured her blood pressure last night and it was normal, did not take her metoprolol  She measured it again this morning and it was again normal, she did not take her metoprolol  She is not currently receiving chemotherapy, however was schedule to but missed her treatments because of her recent hospitalization  She has not had any surgery for rectal cancer  She has no headaches, fevers, chills, abdominal pain outside of her normal, chest pain, chest tightness, shortness of breath  Prior to Admission Medications   Prescriptions Last Dose Informant Patient Reported? Taking?    Cholecalciferol 50 MCG (2000 UT) CAPS   Yes No   Sig: Take by mouth   Ergocalciferol (VITAMIN D2 PO)   Yes No   Sig: Take by mouth   amoxicillin-clavulanate (AUGMENTIN) 875-125 mg per tablet   No No   Sig: Take 1 tablet by mouth every 12 (twelve) hours for 7 days   cetirizine (ZyrTEC) 10 mg tablet  Other (Specify) Yes No   Sig: Take 10 mg by mouth daily   metoprolol succinate (TOPROL-XL) 25 mg 24 hr tablet  Other (Specify) Yes No   Sig: Take 25 mg by mouth daily   oxyCODONE (OxyCONTIN) 20 mg 12 hr tablet   No No   Sig: Take 1 tablet (20 mg total) by mouth every 12 (twelve) hours for 10 daysMax Daily Amount: 40 mg   oxyCODONE-acetaminophen (PERCOCET) 5-325 mg per tablet   Yes No   Sig: Take 1 tablet by mouth every 4 (four) hours as needed for moderate pain   sertraline (ZOLOFT) 25 mg tablet   Yes No   Sig: Take 25 mg by mouth daily      Facility-Administered Medications: None       Past Medical History:   Diagnosis Date    History of rectal cancer 2020       Past Surgical History:   Procedure Laterality Date    BACK SURGERY      BREAST BIOPSY Right 2020    2 sites; 1 breast 1 axilla    HERNIA REPAIR      3 repairs    IR IMAGE GUIDED ASPIRATION / DRAINAGE  2020    IR IMAGE GUIDED ASPIRATION / DRAINAGE  2020    IR IMAGE GUIDED BIOPSY/ASPIRATION LIVER  2020    IR PORT PLACEMENT  2020    TUBAL LIGATION      US GUIDED BREAST BIOPSY RIGHT COMPLETE Right 2020    US GUIDED BREAST LYMPH NODE BIOPSY RIGHT Right 2020       Family History   Problem Relation Age of Onset    No Known Problems Mother     No Known Problems Sister     No Known Problems Daughter     Breast cancer Maternal Grandmother         age unknown    No Known Problems Sister     No Known Problems Daughter     Breast cancer Maternal Aunt 61    No Known Problems Maternal Aunt      I have reviewed and agree with the history as documented      E-Cigarette/Vaping    E-Cigarette Use Never User      E-Cigarette/Vaping Substances    Nicotine No     THC No     CBD No     Flavoring No     Other No     Unknown No      Social History     Tobacco Use    Smoking status: Former Smoker     Packs/day: 0 00     Last attempt to quit: 2020     Years since quittin 2    Smokeless tobacco: Never Used   Substance Use Topics    Alcohol use: Not Currently    Drug use: No        Review of Systems   Constitutional: Positive for fatigue  Negative for chills, diaphoresis and fever  Eyes: Negative for visual disturbance  Respiratory: Negative for cough and shortness of breath  Cardiovascular: Negative for chest pain and palpitations  Gastrointestinal: Positive for abdominal distention, abdominal pain, blood in stool, diarrhea and nausea  Negative for vomiting  Genitourinary: Negative for difficulty urinating, dysuria, hematuria and vaginal discharge  Musculoskeletal: Negative for arthralgias and back pain  Neurological: Positive for light-headedness  Negative for dizziness, syncope and headaches  Psychiatric/Behavioral: Negative for confusion  All other systems reviewed and are negative  Physical Exam  ED Triage Vitals   Temperature Pulse Respirations Blood Pressure SpO2   08/10/20 1947 08/10/20 1947 08/10/20 1947 08/10/20 1947 08/10/20 1947   98 4 °F (36 9 °C) 91 18 107/86 100 %      Temp Source Heart Rate Source Patient Position - Orthostatic VS BP Location FiO2 (%)   08/10/20 1947 08/10/20 1947 08/10/20 1947 08/10/20 1947 --   Oral Monitor Sitting Left arm       Pain Score       08/10/20 2023       3             Orthostatic Vital Signs  Vitals:    08/10/20 2015 08/10/20 2200 08/10/20 2315 08/11/20 0114   BP: 110/61 111/67 108/66 109/66   Pulse: 78 70 70 71   Patient Position - Orthostatic VS: Sitting Lying Lying        Physical Exam  Vitals signs reviewed  Constitutional:       General: She is not in acute distress  Appearance: Normal appearance  She is not ill-appearing, toxic-appearing or diaphoretic  HENT:      Head: Normocephalic and atraumatic  Right Ear: External ear normal    Eyes:      Pupils: Pupils are equal, round, and reactive to light  Neck:      Musculoskeletal: Normal range of motion  Cardiovascular:      Rate and Rhythm: Normal rate and regular rhythm  Pulses: Normal pulses        Heart sounds: Normal heart sounds  No murmur  Pulmonary:      Effort: Pulmonary effort is normal       Breath sounds: Normal breath sounds  Abdominal:      General: There is no distension  Palpations: Abdomen is soft  Tenderness: There is abdominal tenderness  Hernia: A hernia is present  Comments: Generalized tenderness  Genitourinary:     Comments: Rectal exam shows no gross blood  Skin:     General: Skin is warm  Capillary Refill: Capillary refill takes less than 2 seconds  Coloration: Skin is not pale  Neurological:      Mental Status: She is alert and oriented to person, place, and time           ED Medications  Medications   promethazine (PHENERGAN) injection 12 5 mg (has no administration in time range)   loratadine (CLARITIN) tablet 10 mg (has no administration in time range)   cholecalciferol (VITAMIN D3) tablet 2,000 Units (has no administration in time range)   metoprolol succinate (TOPROL-XL) 24 hr tablet 25 mg (has no administration in time range)   oxyCODONE (OxyCONTIN) 12 hr tablet 20 mg (20 mg Oral Given 8/10/20 2305)   oxyCODONE-acetaminophen (PERCOCET) 5-325 mg per tablet 1 tablet (has no administration in time range)   sertraline (ZOLOFT) tablet 25 mg (has no administration in time range)   aluminum-magnesium hydroxide-simethicone (MYLANTA) 200-200-20 mg/5 mL oral suspension 30 mL (has no administration in time range)   HYDROmorphone (DILAUDID) injection 0 5 mg (has no administration in time range)   ampicillin-sulbactam (UNASYN) 3 g in sodium chloride 0 9 % 100 mL IVPB (3 g Intravenous New Bag 8/11/20 0010)   HYDROmorphone (DILAUDID) injection 1 mg (1 mg Intravenous Given 8/10/20 2057)   multi-electrolyte (ISOLYTE-S PH 7 4) bolus 1,000 mL (0 mL Intravenous Stopped 8/10/20 2209)   ondansetron (ZOFRAN) injection 4 mg (4 mg Intravenous Given 8/10/20 2148)   HYDROmorphone (DILAUDID) injection 0 5 mg (0 5 mg Intravenous Given 8/10/20 2147)       Diagnostic Studies  Results Reviewed     Procedure Component Value Units Date/Time    Serial Hemoglobin Q8hrs [[id number]]  (Abnormal) Collected:  08/11/20 0010    Lab Status:  Final result Specimen:  Blood from Arm, Left Updated:  08/11/20 0017     Hemoglobin 7 8 g/dL     Protime-INR [[id number]]  (Normal) Collected:  08/10/20 2056    Lab Status:  Final result Specimen:  Blood from Arm, Left Updated:  08/10/20 2134     Protime 13 7 seconds      INR 1 05    APTT [[id number]]  (Abnormal) Collected:  08/10/20 2056    Lab Status:  Final result Specimen:  Blood from Arm, Left Updated:  08/10/20 2134     PTT 38 seconds     Comprehensive metabolic panel [[id number]]  (Abnormal) Collected:  08/10/20 2056    Lab Status:  Final result Specimen:  Blood from Arm, Left Updated:  08/10/20 2126     Sodium 139 mmol/L      Potassium 3 8 mmol/L      Chloride 106 mmol/L      CO2 26 mmol/L      ANION GAP 7 mmol/L      BUN 9 mg/dL      Creatinine 0 63 mg/dL      Glucose 89 mg/dL      Calcium 8 8 mg/dL      AST 59 U/L      ALT 18 U/L      Alkaline Phosphatase 174 U/L      Total Protein 7 5 g/dL      Albumin 2 4 g/dL      Total Bilirubin 0 23 mg/dL      eGFR 107 ml/min/1 73sq m     Narrative:       [place] guidelines for Chronic Kidney Disease (CKD):     Stage 1 with normal or high GFR (GFR > 90 mL/min/1 73 square meters)    Stage 2 Mild CKD (GFR = 60-89 mL/min/1 73 square meters)    Stage 3A Moderate CKD (GFR = 45-59 mL/min/1 73 square meters)    Stage 3B Moderate CKD (GFR = 30-44 mL/min/1 73 square meters)    Stage 4 Severe CKD (GFR = 15-29 mL/min/1 73 square meters)    Stage 5 End Stage CKD (GFR <15 mL/min/1 73 square meters)  Note: GFR calculation is accurate only with a steady state creatinine    CBC and differential [[id number]]  (Abnormal) Collected:  08/10/20 2056    Lab Status:  Final result Specimen:  Blood from Arm, Left Updated:  08/10/20 2111     WBC 11 16 Thousand/uL      RBC 3 60 Million/uL      Hemoglobin 8 7 g/dL Hematocrit 29 6 %      MCV 82 fL      MCH 24 2 pg      MCHC 29 4 g/dL      RDW 22 9 %      MPV 9 5 fL      Platelets 804 Thousands/uL      nRBC 0 /100 WBCs      Neutrophils Relative 68 %      Immat GRANS % 1 %      Lymphocytes Relative 19 %      Monocytes Relative 6 %      Eosinophils Relative 5 %      Basophils Relative 1 %      Neutrophils Absolute 7 69 Thousands/µL      Immature Grans Absolute 0 14 Thousand/uL      Lymphocytes Absolute 2 06 Thousands/µL      Monocytes Absolute 0 66 Thousand/µL      Eosinophils Absolute 0 55 Thousand/µL      Basophils Absolute 0 06 Thousands/µL                  No orders to display         Procedures  Procedures      ED Course       US AUDIT      Most Recent Value   Initial Alcohol Screen: US AUDIT-C    1  How often do you have a drink containing alcohol?  0 Filed at: 08/10/2020 2022   2  How many drinks containing alcohol do you have on a typical day you are drinking? 0 Filed at: 08/10/2020 2022   3a  Male UNDER 65: How often do you have five or more drinks on one occasion? 0 Filed at: 08/10/2020 2022   3b  FEMALE Any Age, or MALE 65+: How often do you have 4 or more drinks on one occassion? 0 Filed at: 08/10/2020 2022   Audit-C Score  0 Filed at: 08/10/2020 2022                  CARL/DAST-10      Most Recent Value   How many times in the past year have you    Used an illegal drug or used a prescription medication for non-medical reasons? Never Filed at: 08/10/2020 2022                              MDM  Number of Diagnoses or Management Options  Diagnosis management comments: 40-year-old woman was recently admitted for presacral abscess discharged yesterday with active rectal cancer presenting for multiple bloody stools today, currently on oral Augmentin  Concerning for lower GI bleed versus infectious diarrhea  Will evaluate with lab work  Will assess for severity of GI bleed with hemoglobin as compared to yesterday when she was hospitalized   Pain management with dilaudid, nausea managed with Zofran  1L fluid bolus given  Hemoglobin was not drastically changed yesterday, eliminating massive GI bleed  Admitted to [facility] for further workup of her bloody diarrhea           Disposition  Final diagnoses:   Malignant neoplasm of female breast, unspecified estrogen receptor status, unspecified laterality, unspecified site of breast ([facility] 75 )   Metastatic rectal cancer     Time reflects when diagnosis was documented in both MDM as applicable and the Disposition within this note     Time User Action Codes Description Comment    8/10/2020  9:45 PM Rola Irwin 21 Metastatic rectal cancer     8/10/2020  9:45 PM Viktor GLEZ Modify [C20] Metastatic rectal cancer     8/10/2020  9:45 PM Nicolasa Jaramillo Remove [C20] Metastatic rectal cancer     8/10/2020  9:45 PM Nicolasa Clint Add [C50 919] Malignant neoplasm of female breast, unspecified estrogen receptor status, unspecified laterality, unspecified site of breast ([place] [address] )     8/10/2020  9:45 PM Nicolasa Barrettyd Modify [C50 709] Malignant neoplasm of female breast, unspecified estrogen receptor status, unspecified laterality, unspecified site of breast ([place] [address] )     8/10/2020  9:45 PM Nicolasa Clint Add [C20] Metastatic rectal cancer       ED Disposition     None      Follow-up Information    None         Current Discharge Medication List      CONTINUE these medications which have NOT CHANGED    Details   amoxicillin-clavulanate (AUGMENTIN) 875-125 mg per tablet Take 1 tablet by mouth every 12 (twelve) hours for 7 days  Qty: 14 tablet, Refills: 0    Associated Diagnoses: Rectal abscess      cetirizine (ZyrTEC) 10 mg tablet Take 10 mg by mouth daily      Cholecalciferol 50 MCG (2000 UT) CAPS Take by mouth      Ergocalciferol (VITAMIN D2 PO) Take by mouth      metoprolol succinate (TOPROL-XL) 25 mg 24 hr tablet Take 25 mg by mouth daily      oxyCODONE (OxyCONTIN) 20 mg 12 hr tablet Take 1 tablet (20 mg total) by mouth every 12 (twelve) hours for 10 daysMax Daily Amount: 40 mg  Qty: 20 tablet, Refills: 0    Associated Diagnoses: Cancer related pain; Palliative care patient; Rectal cancer (HCC)      oxyCODONE-acetaminophen (PERCOCET) 5-325 mg per tablet Take 1 tablet by mouth every 4 (four) hours as needed for moderate pain      sertraline (ZOLOFT) 25 mg tablet Take 25 mg by mouth daily           No discharge procedures on file  PDMP Review       Value Time User    PDMP Reviewed  Yes 8/6/2020  2:52 PM Ave Pickett DO           ED Provider  Attending physically available and evaluated Julio C Young I managed the patient along with the ED Attending      Electronically Signed by         Ashkan Alatorre MD  08/11/20 0184

## 2020-08-11 NOTE — ASSESSMENT & PLAN NOTE
Patient recently was here for rectal abscess status post drainage and currently with new onset rectal bleeding  Monitor hemoglobin every 8 hours  Follow-up with GI and Oncology  Discussed case with Colorectal surgery  Will defer on intervention at this particular juncture  Surgery asking for general GI to evaluate for bleeding  Follow-up on labs  For flex sigmoidoscopy today

## 2020-08-11 NOTE — ASSESSMENT & PLAN NOTE
Patient recently was here for rectal abscess status post drainage and currently with new onset rectal bleeding  Monitor hemoglobin every 8 hours  Follow-up with GI and Oncology  Discussed case with Colorectal surgery  Will defer on intervention at this particular juncture  Surgery asking for general GI to evaluate for bleeding  Follow-up on labs

## 2020-08-11 NOTE — ED ATTENDING ATTESTATION
8/10/2020  Ashley LITTLE MD, saw and evaluated the patient  I have discussed the patient with the resident/non-physician practitioner and agree with the resident's/non-physician practitioner's findings, Plan of Care, and MDM as documented in the resident's/non-physician practitioner's note, except where noted  All available labs and Radiology studies were reviewed  I was present for key portions of any procedure(s) performed by the resident/non-physician practitioner and I was immediately available to provide assistance  At this point I agree with the current assessment done in the Emergency Department  I have conducted an independent evaluation of this patient a history and physical is as follows:   The patient presents for evaluation of 4-5 episodes of bloody stool started earlier today the last episode was approximately 4 hours ago the patient has a history of rectal cancer with metastatic disease to her liver she had a recent admission for a sacral abscess that was drained by IR percutaneously she was discharged on Augmentin yesterday she has chronic abdominal pain this is not changed she has had no vomiting she has no symptoms of anemia  Specifically no lightheadedness no chest pain or shortness of breath she does feel weak  Exam conjunctiva are pink her sclerae are anicteric mucous members are moist neck no JVD lungs clear heart regular abdomen is mildly tender there is abdominal wall hernia there is a surgical scar this was for back surgery  Nondistended rectal per resident no gross blood  Sacral and buttocks area no obvious fluid collections no evidence of cellulitis  Impression bright red blood per rectum likely lower    Patient will lab workup type and screen admission  ED Course         Critical Care Time  Procedures

## 2020-08-11 NOTE — UTILIZATION REVIEW
Notification of Discharge  This is a Notification of Discharge from our facility [address]  Please be advised that this patient has been discharge from our facility  Below you will find the admission and discharge date and time including the patients disposition  PRESENTATION DATE: 7/30/2020  8:59 PM  OBS ADMISSION DATE:   IP ADMISSION DATE: 7/31/20 0237   DISCHARGE DATE: 8/9/2020  3:24 PM  DISPOSITION: Home/Self Care Home/Self Care   Admission Orders listed below:  Admission Orders (From admission, onward)     Ordered        07/31/20 0237  Inpatient Admission (expected length of stay for this patient Order details is greater than two midnights)  Once                   Please contact the UR Department if additional information is required to close this patient's authorization/case  [address] [employer] Utilization Review Department  Main: [phone] x carefully listen to the prompts  All voicemails are confidential   [email or web address]  Send all requests for admission clinical reviews, approved or denied determinations and any other requests to dedicated fax number below belonging to the campus where the patient is receiving treatment   List of dedicated fax numbers:  [address] DENIALS (Administrative/Medical Necessity) [phone]   [address] (Maternity/NICU/Pediatrics) [phone]   Lorilee Baptise [phone]   Areta Sell [phone]   [address] [place] [address] [phone]   Khadijah Jest [facility] [address] [facility] [phone]   Say Armando [phone]   [address] [facility] [address]  [address] [facility] [address] [facility] [phone]

## 2020-08-11 NOTE — UTILIZATION REVIEW
Initial Clinical Review    Admission: Date/Time/Statement:   Admission Orders (From admission, onward)     Ordered        08/10/20 2148  Inpatient Admission  Once                   Orders Placed This Encounter   Procedures    Inpatient Admission     Standing Status:   Standing     Number of Occurrences:   1     Order Specific Question:   Admitting Physician     Answer:   Jil Castanon [[id number]]     Order Specific Question:   Level of Care     Answer:   Med Surg [[id number]]     Order Specific Question:   Estimated length of stay     Answer:   More than 2 Midnights     Order Specific Question:   Certification     Answer:   I certify that inpatient services are medically necessary for this patient for a duration of greater than two midnights  See H&P and MD Progress Notes for additional information about the patient's course of treatment  ED Arrival Information     Expected Arrival Acuity Means of Arrival Escorted By Service Admission Type    - 8/10/2020 19:14 Urgent Walk-In Family Member Hospitalist Urgent    Arrival Complaint    blood in urine         Chief Complaint   Patient presents with   Artist Grad or Bloody Stool     Pt reports recent discharge from hospital after abscess drainage, sent home with rx for agumentin; pt states she is now having bloody diarrhea; pt states "it looks like watery blood"; +nausea, bloating     Assessment/Plan:   Ms Faustino Szymanski is a 49 yo female who presents to the ED from home with c/o abd pain with rectal abscess  She has had several admissions for this same issue and it is inoperable r/t chemo and possibility of non-healing  She had recent IR drainage and seemed to have some resolution with antibiotics - Augmentin  Soon after last d/c on 8/9 she developed 5 episodes of bright red rectal bleeding with increasing LLQ abd pain  PMH: active rectal cancer with Mets metastatic disease and breast cancer on chemo    She is admitted to INPATIENT status with BRBPR - Hgb q 8 hr, oncology consult, GI consult, blood transfusion PRN  Cancer associated pain - home pain regimen and PRN for breakthrough  Metastatic rectal CA and Breast CA - Oncology consult  8/11 Medicine Progress Note  - switched to IV Unasyn while in hospital   No intervention at this time  Using some PRNs for analgesia        ED Triage Vitals   Temperature Pulse Respirations Blood Pressure SpO2   08/10/20 1947 08/10/20 1947 08/10/20 1947 08/10/20 1947 08/10/20 1947   98 4 °F (36 9 °C) 91 18 107/86 100 %      Temp Source Heart Rate Source Patient Position - Orthostatic VS BP Location FiO2 (%)   08/10/20 1947 08/10/20 1947 08/10/20 1947 08/10/20 1947 --   Oral Monitor Sitting Left arm       Pain Score       08/10/20 2023       3          Wt Readings from Last 1 Encounters:   08/11/20 56 6 kg (124 lb 12 5 oz)     Additional Vital Signs:     08/11/20 06:46:03   97 9 °F (36 6 °C)   55   18   107/54   72   97 %   --   --    08/11/20 01:14:46   98 7 °F (37 1 °C)   71   18   109/66   80   97 %   None (Room air)   Sitting    08/10/20 2315   --   70   16   108/66   80   96 %   None (Room air)   Lying    08/10/20 2200   --   70   16   111/67   82   98 %   None (Room air)   Lying    08/10/20 2015   --   78   16   110/61   81   99 %   None (Room air)   Sitting     Pertinent Labs/Diagnostic Test Results:     Results from last 7 days   Lab Units 08/11/20  0813 08/11/20  0010 08/10/20  2056 08/09/20  0533 08/08/20  0538 08/07/20  0555 08/06/20  0452   WBC Thousand/uL  --   --  11 16* 9 41 10 04 9 62 10 15   HEMOGLOBIN g/dL 7 9* 7 8* 8 7* 8 3* 8 2* 7 4* 7 5*   HEMATOCRIT %  --   --  29 6* 29 0* 28 8* 25 3* 25 6*   PLATELETS Thousands/uL  --   --  535* 511* 511* 436* 415*   NEUTROS ABS Thousands/µL  --   --  7 69* 4 93 5 81 5 70 6 48     Results from last 7 days   Lab Units 08/10/20  2056 08/08/20  0541 08/07/20  0555 08/06/20  0452 08/05/20  0649   SODIUM mmol/L 139 137 138 137 138   POTASSIUM mmol/L 3 8 4 5 4 0 3 9 3 9   CHLORIDE mmol/L 106 104 106 104 105   CO2 mmol/L 26 28 27 25 29   ANION GAP mmol/L 7 5 5 8 4   BUN mg/dL 9 6 5 5 3*   CREATININE mg/dL 0 63 0 60 0 56* 0 49* 0 46*   EGFR ml/min/1 73sq m 107 109 111 116 119   CALCIUM mg/dL 8 8 9 4 9 4 8 5 8 4   MAGNESIUM mg/dL  --  2 4 2 5 2 5 2 6     Results from last 7 days   Lab Units 08/10/20  2056   AST U/L 59*   ALT U/L 18   ALK PHOS U/L 174*   TOTAL PROTEIN g/dL 7 5   ALBUMIN g/dL 2 4*   TOTAL BILIRUBIN mg/dL 0 23     Results from last 7 days   Lab Units 08/10/20  2056 08/08/20  0541 08/07/20  0555 08/06/20  0452 08/05/20  0649   GLUCOSE RANDOM mg/dL 89 79 79 102 83     Results from last 7 days   Lab Units 08/10/20  2056   PROTIME seconds 13 7   INR  1 05   PTT seconds 38*     Results from last 7 days   Lab Units 08/11/20  0505   FERRITIN ng/mL 316     ED Treatment:   Medication Administration from 08/10/2020 1914 to 08/11/2020 0115    Date/Time Order Dose Route Action   08/10/2020 2057 HYDROmorphone (DILAUDID) injection 1 mg 1 mg Intravenous Given   08/10/2020 2057 multi-electrolyte (ISOLYTE-S PH 7 4) bolus 1,000 mL 1,000 mL Intravenous New Bag   08/10/2020 2148 ondansetron (ZOFRAN) injection 4 mg 4 mg Intravenous Given   08/10/2020 2147 HYDROmorphone (DILAUDID) injection 0 5 mg 0 5 mg Intravenous Given   08/10/2020 2305 oxyCODONE (OxyCONTIN) 12 hr tablet 20 mg 20 mg Oral Given   08/11/2020 0010 ampicillin-sulbactam (UNASYN) 3 g in sodium chloride 0 9 % 100 mL IVPB 3 g Intravenous New Bag        Past Medical History:   Diagnosis Date    Breast cancer ([facility] ) 06/01/2020    right side    History of rectal cancer 06/2020     Present on Admission:   Metastatic rectal cancer   Breast cancer   Cancer associated pain   Rectal abscess    Admitting Diagnosis: Blood in urine [R31 9]  Rectal cancer ([facility] ) [C20]  Malignant neoplasm of female breast, unspecified estrogen receptor status, unspecified laterality, unspecified site of breast ([facility] ) [R62 148]     Age/Sex: 50 y o  female     Admission Orders:  Scheduled Medications:  ampicillin-sulbactam, 3 g, Intravenous, Q6H  cholecalciferol, 2,000 Units, Oral, Daily  loratadine, 10 mg, Oral, Daily  metoprolol succinate, 25 mg, Oral, Daily  oxyCODONE, 20 mg, Oral, Q12H GONZÁLEZ  sertraline, 25 mg, Oral, Daily      Continuous IV Infusions:     PRN Meds:  aluminum-magnesium hydroxide-simethicone, 30 mL, Oral, Q6H PRN  HYDROmorphone, 0 5 mg, Intravenous, Q3H PRN - x 1 8/11  oxyCODONE-acetaminophen, 1 tablet, Oral, Q4H PRN - x 2 8/11  promethazine, 12 5 mg, Intramuscular, Q6H PRN    SCDs  Up w/ assist   OOB as kris   Stool record at bedside  H/H q 8 hr   Diet cl liq   IP CONSULT TO ONCOLOGY      Network Utilization Review Department  [email or web address]  ATTENTION: Please call with any questions or concerns to [phone] and carefully listen to the prompts so that you are directed to the right person  All voicemails are confidential   [facility] all requests for admission clinical reviews, approved or denied determinations and any other requests to dedicated fax number below belonging to the campus where the patient is receiving treatment   List of dedicated fax numbers for the Facilities:  [address] [place] [address] DENIALS (Administrative/Medical Necessity) [phone]   [address] [place] [address] (Maternity/NICU/Pediatrics) [phone]   [facility] [phone]     [address]  [phone]   [facility] [phone]   [address] [facility]  [phone]   [address] [address] [facility] [phone]   [address] [facility] [phone]   [address] [facility] [address]  [address] [facility] [address] [facility] [phone]

## 2020-08-11 NOTE — ASSESSMENT & PLAN NOTE
Antibiotic therapy through August 15th, 2020  Patient switched to Unasyn while in the hospital   Will switch back to Augmentin pending decision on further interventions

## 2020-08-11 NOTE — PROGRESS NOTES
Posted stool chart for patient and instructed pt that we need to record and assess each bowel movement

## 2020-08-11 NOTE — PROGRESS NOTES
Patient currently admitted but needs prescriptions sent for OxyContin   Will send to [address] as issues with prior script sent to [facility]

## 2020-08-11 NOTE — ASSESSMENT & PLAN NOTE
Patient recently was here for rectal abscess status post drainage and currently with new onset rectal bleeding  Would monitor hemoglobin every 8 hours for now  Patient sees Dr Valencia Herman for the treatment of rectal cancer and chemotherapy; would place consult  Would also place consult with GI regarding this peripheral current problem of bright red blood per rectum  (Dr Mancini Boards)  Blood transfusion consent in chart  Will also recheck CBC with metabolic profile tomorrow  Type and screen

## 2020-08-11 NOTE — PLAN OF CARE
Problem: Nutrition/Hydration-ADULT  Goal: Nutrient/Hydration intake appropriate for improving, restoring or maintaining nutritional needs  Description: Monitor and assess patient's nutrition/hydration status for malnutrition  Collaborate with interdisciplinary team and initiate plan and interventions as ordered  Monitor patient's weight and dietary intake as ordered or per policy  Utilize nutrition screening tool and intervene as necessary  Determine patient's food preferences and provide high-protein, high-caloric foods as appropriate  INTERVENTIONS:  - Monitor oral intake, urinary output, labs, and treatment plans  - Assess nutrition and hydration status and recommend course of action  - Evaluate amount of meals eaten  - Assist patient with eating if necessary   - Allow adequate time for meals  - Recommend/ encourage appropriate diets, oral nutritional supplements, and vitamin/mineral supplements  - Order, calculate, and assess calorie counts as needed  - Recommend, monitor, and adjust tube feedings and TPN/PPN based on assessed needs  - Assess need for intravenous fluids  - Provide specific nutrition/hydration education as appropriate  - Include patient/family/caregiver in decisions related to nutrition  Outcome: Progressing     Problem: Potential for Falls  Goal: Patient will remain free of falls  Description: INTERVENTIONS:  - Assess patient frequently for physical needs  -  Identify cognitive and physical deficits and behaviors that affect risk of falls    -  [place] fall precautions as indicated by assessment   - Educate patient/family on patient safety including physical limitations  - Instruct patient to call for assistance with activity based on assessment  - Modify environment to reduce risk of injury  - Consider OT/PT consult to assist with strengthening/mobility  Outcome: Progressing     Problem: INFECTION - ADULT  Goal: Absence or prevention of progression during hospitalization  Description: INTERVENTIONS:  - Assess and monitor for signs and symptoms of infection  - Monitor lab/diagnostic results  - Monitor all insertion sites, i e  indwelling lines, tubes, and drains  - Monitor endotracheal if appropriate and nasal secretions for changes in amount and color  - [place] appropriate cooling/warming therapies per order  - Administer medications as ordered  - Instruct and encourage patient and family to use good hand hygiene technique  - Identify and instruct in appropriate isolation precautions for identified infection/condition  Outcome: Progressing     Problem: SAFETY ADULT  Goal: Patient will remain free of falls  Description: INTERVENTIONS:  - Assess patient frequently for physical needs  -  Identify cognitive and physical deficits and behaviors that affect risk of falls    -  [place] fall precautions as indicated by assessment   - Educate patient/family on patient safety including physical limitations  - Instruct patient to call for assistance with activity based on assessment  - Modify environment to reduce risk of injury  - Consider OT/PT consult to assist with strengthening/mobility  Outcome: Progressing

## 2020-08-11 NOTE — ASSESSMENT & PLAN NOTE
Currently on oxycodone 20 mg twice daily  Continue Percocet  For breakthrough pain:  Hydrocodone 0 5 mg every 3 hours as needed  Followed by palliative care as outpatient  How Severe Is Your Skin Lesion?: mild Has Your Skin Lesion Been Treated?: not been treated Is This A New Presentation, Or A Follow-Up?: Skin Lesion

## 2020-08-11 NOTE — TELEPHONE ENCOUNTER
Patient admitted, please let her know OxyContin was sent to [address] for her to  at discharge

## 2020-08-11 NOTE — NURSING NOTE
Pt currently asleep, resting comfortably  No sign of distress or shortness of breath noted  Pt is on room air  Pt was awakened by family member who came in to visit  Pt stated she was nauseous and was having pain 7/10 in rectum  Pt also complained of nausea, pt given medication IM, which "was different that what I got from the ER," per patient statement  Advised patient that it was ordered IM every 6 hours  Pt verbalized understanding  Will continue to monitor

## 2020-08-11 NOTE — H&P
H&P- Toribio Martin Ny Antony 1975, 50 y o  female MRN: [id number]    Unit/Bed#: ED 23 Encounter: [id number]    Primary Care Provider: Vinh Lazcano MD   Date and time admitted to hospital: 8/10/2020  8:01 PM        * Bright red blood per rectum  Assessment & Plan  Patient recently was here for rectal abscess status post drainage and currently with new onset rectal bleeding  Would monitor hemoglobin every 8 hours for now  Patient sees Dr Steven Roque for the treatment of rectal cancer and chemotherapy; would place consult  Would also place consult with GI regarding this peripheral current problem of bright red blood per rectum  (Dr Renata Hernandez)  Blood transfusion consent in chart  Will also recheck CBC with metabolic profile tomorrow  Type and screen  Metastatic rectal cancer  Assessment & Plan  Continue and awaiting oncology consult for further opinion  Cancer associated pain  Assessment & Plan  Currently on oxycodone 20 mg twice daily  Continue Percocet  For breakthrough pain:  Hydrocodone 0 5 mg every 3 hours as needed  Breast cancer  Assessment & Plan  Oncology consult pending          VTE Prophylaxis: Pharmacologic VTE Prophylaxis contraindicated due to GI bleeding  / sequential compression device   Code Status: Level 1 - Full Code as discussed  POLST: There is no POLST form on file for this patient (pre-hospital)    Anticipated Length of Stay:  Patient will be admitted on an Inpatient basis with an anticipated length of stay of  greater than 2 midnights  Justification for Hospital Stay: Please see detailed plans noted above  Chief Complaint:     New onset rectal bleeding  History of Present Illness:  Shaneka Beltran is a 50 y o  female who has an active history of rectal cancer with Mets metastatic disease and breast cancer    She sees Oncology for chemotherapy and has been recently here several times due to increasing abdominal pain with note of rectal abscess however is in operable due to the chemotherapy and the possibility of nonhealing if it were proceeded  However recently the patient was here for rectal abscess which was status post IR drainage and seems to be resolving with antibiotics  Cultures grew:Streptococcus anginosus along with Bacteroides/Prevotella species  Initially was placed and Unasyn, she is currently on Augmentin to complete 3 more days of therapy  Soon after discharge within the next 24 hour she developed rectal bleeding of bright red blood approximately 5 episodes  Last being approximately 5 hours ago  Patient also claims to have increasing left lower quadrant abdominal pain  Review of Systems:    Constitutional:  Denies fever or chills   Eyes:  Denies change in visual acuity   HENT:  Denies nasal congestion or sore throat   Respiratory:  Denies cough or shortness of breath   Cardiovascular:  Denies chest pain or edema   GI:  Although has abdominal pain previously, there is increased sensation of such pain within the past day or so    Also with nausea    :  Denies dysuria   Musculoskeletal:  Denies back pain or joint pain   Integument:  Denies rash   Neurologic:  Denies headache, focal weakness or sensory changes   Endocrine:  Denies polyuria or polydipsia   Lymphatic:  Denies swollen glands   Psychiatric:  Denies depression or anxiety     Past Medical and Surgical History:   Past Medical History:   Diagnosis Date    History of rectal cancer 06/2020     Past Surgical History:   Procedure Laterality Date    BACK SURGERY      BREAST BIOPSY Right 06/19/2020    2 sites; 1 breast 1 axilla    HERNIA REPAIR      3 repairs    IR IMAGE GUIDED ASPIRATION / DRAINAGE  7/31/2020    IR IMAGE GUIDED ASPIRATION / DRAINAGE  8/7/2020    IR IMAGE GUIDED BIOPSY/ASPIRATION LIVER  6/17/2020    IR PORT PLACEMENT  6/22/2020    TUBAL LIGATION      US GUIDED BREAST BIOPSY RIGHT COMPLETE Right 6/19/2020    US GUIDED BREAST LYMPH NODE BIOPSY RIGHT Right 6/19/2020 Meds/Allergies:  (Not in a hospital admission)    amoxicillin-clavulanate (AUGMENTIN) 875-125 mg per tablet  Take 1 tablet by mouth every 12 (twelve) hours for 7 days  Lieutenant Amilcar MD  Reordered    Ordered as: amoxicillin-clavulanate (AUGMENTIN) 875-125 mg per tablet 1 tablet - 1 tablet, Oral, Every 12 hours scheduled, First dose on Mon 8/10/20 at 2200, For 6 days Indication: intra-abdominal infection    cetirizine (ZyrTEC) 10 mg tablet  Take 10 mg by mouth daily  Lieutenant Amilcar MD  Reordered    Ordered as: loratadine (CLARITIN) tablet 10 mg - 10 mg, Oral, Daily, First dose on Tue 8/11/20 at 0900    Cholecalciferol 50 MCG (2000 UT) CAPS  Take by mouth  Lieutenant Amilcar MD  Reordered    Ordered as: cholecalciferol (VITAMIN D3) tablet 2,000 Units - 2,000 Units, Oral, Daily, First dose on Tue 8/11/20 at 0900 25 mcg = 1000 units of cholecalciferol (Vitamin D3)     Ergocalciferol (VITAMIN D2 PO)  Take by mouth  Lieutenant Amilcar MD  Not Ordered    metoprolol succinate (TOPROL-XL) 25 mg 24 hr tablet  Take 25 mg by mouth daily  Lieutenant Amilcar MD  Reordered    Ordered as: metoprolol succinate (TOPROL-XL) 24 hr tablet 25 mg - 25 mg, Oral, Daily, First dose on Tue 8/11/20 at 0900 Hold for heart rate less than 50 beats per minute  Do not crush or chew  [facility] Hold for systolic blood pressure less than (mmHg): 110    oxyCODONE (OxyCONTIN) 20 mg 12 hr tablet  Take 1 tablet (20 mg total) by mouth every 12 (twelve) hours for 10 daysMax Daily Amount: 40 mg  Lieutenant Amilcar MD  Reordered    Ordered as: oxyCODONE (OxyCONTIN) 12 hr tablet 20 mg - 20 mg, Oral, Every 12 hours scheduled, First dose on Mon 8/10/20 at 2200, For 9 days High alert medication  Swallow whole; do not crush, moisten, dissolve, cut, break, or chew   [facility]     oxyCODONE-acetaminophen (PERCOCET) 5-325 mg per tablet  Take 1 tablet by mouth every 4 (four) hours as needed for moderate pain  Lieutenant Amilcar MD Reordered    Ordered as: oxyCODONE-acetaminophen (PERCOCET) 5-325 mg per tablet 1 tablet - 1 tablet, Oral, Every 4 hours PRN, moderate pain, Starting Mon 8/10/20 at 2152 High alert medication   [facility]     sertraline (ZOLOFT) 25 mg tablet  Take 25 mg by mouth daily  Jaye Varghese MD  Reordered    Ordered as: sertraline (ZOLOFT) tablet 25 mg - 25 mg, Oral, Daily, First dose on 20 at 0900 [facility]          Allergies: No Known Allergies  History:  Marital Status: Single   Occupation:  Currently not working  Patient Pre-hospital Living Situation:  Lives at home  Patient Pre-hospital Level of Mobility:  With ambulatory  Patient Pre-hospital Diet Restrictions:  Regular diet  Substance Use History:   Social History     Substance and Sexual Activity   Alcohol Use Not Currently     Social History     Tobacco Use   Smoking Status Former Smoker    Packs/day: 0 00    Last attempt to quit: 2020    Years since quittin 2   Smokeless Tobacco Never Used     Social History     Substance and Sexual Activity   Drug Use No       Family History:  Family History   Problem Relation Age of Onset    No Known Problems Mother     No Known Problems Sister     No Known Problems Daughter     Breast cancer Maternal Grandmother         age unknown    No Known Problems Sister     No Known Problems Daughter     Breast cancer Maternal Aunt 61    No Known Problems Maternal Aunt        Physical Exam:     Vitals:   Blood Pressure: 110/61 (08/10/20 2015)  Pulse: 78 (08/10/20 2015)  Temperature: 98 4 °F (36 9 °C) (08/10/20 1947)  Temp Source: Oral (08/10/20 1947)  Respirations: 16 (08/10/20 2015)  Height: 5' 2" (157 5 cm) (08/10/20 2023)  Weight - Scale: 57 5 kg (126 lb 10 5 oz) (08/10/20 2023)  SpO2: 99 % (08/10/20 2015)    Constitutional:  Well developed, well nourished, no acute distress, non-toxic appearance   Eyes:  PERRL, conjunctiva normal   HENT:  Atraumatic, external ears normal, nose normal, oropharynx moist, no pharyngeal exudates  Neck- normal range of motion, no tenderness, supple   Respiratory:  No respiratory distress, normal breath sounds, no rales, no wheezing   Cardiovascular:  Normal rate, normal rhythm, no murmurs, no gallops, no rubs   GI:  Soft, nondistended, normal bowel sounds, discomfort all over tender specifically at left lower quadrant no organomegaly and soft  :  No costovertebral angle tenderness   Musculoskeletal:  No edema, no tenderness, no deformities  Back- no tenderness  Integument:  Well hydrated, no rash   Lymphatic:  No lymphadenopathy noted   Neurologic:  Alert &awake, communicative, CN 2-12 normal, normal motor function, normal sensory function, no focal deficits noted   Psychiatric:  Speech and behavior appropriate       Lab Results: I have personally reviewed pertinent reports  Results from last 7 days   Lab Units 08/10/20  2056   WBC Thousand/uL 11 16*   HEMOGLOBIN g/dL 8 7*   HEMATOCRIT % 29 6*   PLATELETS Thousands/uL 535*   NEUTROS PCT % 68   LYMPHS PCT % 19   MONOS PCT % 6   EOS PCT % 5     Results from last 7 days   Lab Units 08/10/20  2056   POTASSIUM mmol/L 3 8   CHLORIDE mmol/L 106   CO2 mmol/L 26   BUN mg/dL 9   CREATININE mg/dL 0 63   CALCIUM mg/dL 8 8   ALK PHOS U/L 174*   ALT U/L 18   AST U/L 59*     Results from last 7 days   Lab Units 08/10/20  2056   INR  1 05           Imaging: I have personally reviewed pertinent reports  Ir Image Guided Aspiration / Drainage    Result Date: 8/7/2020  Narrative: Ultrasound-guided aspiration of left buttock fluid collection Clinical History: Patient with a necrotic rectal cancer which is now become infected  A pocket of infected tumor which is extending into the left gluteus muscle was previously aspirated    Conscious sedation time:15 minutes Technique: Patient was brought to the interventional radiology area and placed supine on the stretcher   After a brief ultrasound examination was performed of the left gluteal region, an area on the skin over the site was prepped, and draped in the usual sterile fashion  Lidocaine was administered to the skin and a small skin incision was made  A 5 [place] multisidehole catheter  was advanced into the collection and 8 5 mL pus was aspirated  After an attempt to milk the area, another 0 5 mL of fluid which appeared to be predominantly blood was removed  After the procedure, the catheter was removed and a bandage applied to the site  The patient tolerated the procedure well and suffered no complications  Impression: Impression: 1  Successful ultrasound-guided aspiration of a left gluteal necrotic tumor/abscess, yielding 8 5 cc pus  Workstation performed: [id number]     Ir Image Guided Aspiration / Drainage    Result Date: 7/31/2020  Narrative: Ultrasound-guided aspiration of superficial fluid collection Clinical History: Patient with metastatic colorectal cancer presenting with suspected left gluteal abscess versus necrotic tumor, for aspiration   Conscious sedation time:15 minutes Technique: Patient was brought to the interventional radiology area and placed supine on the stretcher  After a brief ultrasound examination was performed of the left buttock region, an area on the skin over the site was prepped, and draped in the usual sterile fashion  Lidocaine was administered to the skin and a small skin incision was made  A 5 [place] multisidehole catheter  was advanced into the collection and 10 mL thin purulent appearing material was aspirated  Specimens were sent for culture    After the procedure, the catheter was removed and a bandage applied to the site  The patient tolerated the procedure well and suffered no complications  Impression: Impression: 1  Successful ultrasound-guided aspiration of a left gluteal collection, yielding 10 cc of purulent appearing fluid  2  Specimens were sent to the laboratory as described above   Workstation performed: [id number]     Ct Abdomen Pelvis W Contrast    Result Date: 8/3/2020  Narrative: CT ABDOMEN AND PELVIS WITH IV CONTRAST INDICATION:   Rectal abscess  COMPARISON:  Compared with CT dated 7/30/2020  TECHNIQUE:  CT examination of the abdomen and pelvis was performed  Axial, sagittal, and coronal 2D reformatted images were created from the source data and submitted for interpretation  Radiation dose length product (DLP) for this visit:  631 69 mGy-cm   This examination, like all CT scans performed in the [facility], was performed utilizing techniques to minimize radiation dose exposure, including the use of iterative  reconstruction and automated exposure control  IV Contrast:  100 mL of iohexol (OMNIPAQUE) Enteric Contrast:  Enteric contrast was not administered  FINDINGS: ABDOMEN LOWER CHEST: Bilateral lower lobe metastases measuring up to 6 8 cm on the right right and centimeters and 7 cm on the left are stable  Partially imaged 5 4 cm lesion in the left lower lobe was mostly excluded from recent CT of the abdomen but is similar or slightly larger compared with PET/CT from June 16, 2020 LIVER/BILIARY TREE:  Stable metastatic liver lesions measuring up to 9 7 cm in the right lobe  GALLBLADDER:  No calcified gallstones  No pericholecystic inflammatory change  SPLEEN:  Unremarkable  PANCREAS:  Unremarkable  ADRENAL GLANDS:  d KIDNEYS/URETERS:  Unremarkable  No hydronephrosis  STOMACH AND BOWEL:  Stable irregular wall thickening of the rectum correlating with known cancer  Fat-containing ventral hernias without obstruction  No obstruction  APPENDIX:  No findings to suggest appendicitis  ABDOMINOPELVIC CAVITY: Gluteal abscess measuring 2 4 x 3 4 x 3 5 cm is mildly increased in size  There is an adjacent complex multiseptated presacral collection measuring 3 0 x 4 5 x 12 2 cm with solid appearing components that is mildly increased in size and may represent a combination of tumor and abscess    There is no free fluid  VESSELS:  Unremarkable for patient's age  PELVIS REPRODUCTIVE ORGANS:  Stable 3 2 cm fibroid  Complex cystic lesion in the left adnexa with mural nodules measuring up to up to 4 3 cm is stable when measured in a similar fashion  And may represent metastatic lesion  4 cm right adnexal cystic lesion is stable  URINARY BLADDER:  Unremarkable  ABDOMINAL WALL/INGUINAL REGIONS: Midline infraumbilical ventral hernia containing nonobstructed small bowel is again seen  There is a separate small bowel containing ventral hernia just above this  Additional fat-containing ventral hernia seen in the mid upper abdomen  OSSEOUS STRUCTURES:  No acute fracture or destructive osseous lesion  Status post discectomy and fusion at L4-5 and L5-S1  Impression: Stable irregular thickening of the rectum correlating with known rectal cancer  Stable large lung and liver metastases  Mildly enlarged complex presacral collection that may represent combination of abscess and tumor  Adjacent or contiguous left gluteal abscess is mildly increased  Workstation performed: [id number]     Ct Abdomen Pelvis With Contrast    Result Date: 7/31/2020  Narrative: CT ABDOMEN AND PELVIS WITH IV CONTRAST INDICATION:   Abdominal pain and fever  COMPARISON:  6/10/2020 and 7/1/2020  TECHNIQUE:  CT examination of the abdomen and pelvis was performed  Axial, sagittal, and coronal 2D reformatted images were created from the source data and submitted for interpretation  Radiation dose length product (DLP) for this visit:  291 83 mGy-cm   This examination, like all CT scans performed in the [facility], was performed utilizing techniques to minimize radiation dose exposure, including the use of iterative  reconstruction and automated exposure control  IV Contrast:  100 mL of iohexol (OMNIPAQUE) Enteric Contrast:  Enteric contrast was not administered   FINDINGS: ABDOMEN LOWER CHEST:  Masses in the bilateral lower lobes are mildly increased in size  LIVER/BILIARY TREE:  Hepatic masses measure up to 9 7 cm cyst in the right lobe and 2 3 cm in the left lobe, mildly increased in size  No clear evidence of change in number of lesions  No biliary obstruction GALLBLADDER:  No calcified gallstones  No pericholecystic inflammatory change  SPLEEN:  Unremarkable  PANCREAS:  Unremarkable  ADRENAL GLANDS:  Unremarkable  KIDNEYS/URETERS:  No pyelonephritis or obstructive uropathy  STOMACH AND BOWEL:  New rectus sheath hernia containing loops of small bowel measuring 11 x 2 5 x 8 5 cm  No evidence of bowel obstruction or incarceration  Stable upper abdominal midline fat-containing hernia measuring 4 cm  Stable wall thickening in the rectum extending to the rectosigmoid, not well evaluated in the absence of rectal or oral contrast   No evidence of colitis  APPENDIX:  No findings to suggest appendicitis  ABDOMINOPELVIC CAVITY:  Organizing, multiseptated presacral fluid collection measuring 3 3 x 2 3 x 6 7 cm  Fluid collection in the left gluteal muscle adjacent to the coccyx and presacral fluid collection, measuring 1 9 x 2 8 x 3 5 cm  VESSELS:  Patent portal and splenic veins  PELVIS REPRODUCTIVE ORGANS:  Stable 3 2 cm leiomyoma in the uterus  Adjacent smaller lesions  Cysts or follicles in the adnexa measuring up to 4 3 cm on the right and 2 9 cm on the left  Stable nodular soft tissue lesions in the left adnexa measuring up to 1 8 cm  URINARY BLADDER:  Unremarkable  ABDOMINAL WALL/INGUINAL REGIONS:  Unremarkable  OSSEOUS STRUCTURES:  L4-5 and L5-S1 anterior fusion  Normal alignment  No lytic or blastic lesion  Impression: 1  Multiseptated organizing presacral fluid collection measuring 2 3 x 2 3 x 6 7 cm  Adjacent left gluteal fluid collection measuring 1 9 x 2 8 x 3 5 cm  Findings concerning for abscesses  Surgical consultation recommended   2   Stable wall thickening in the rectum, not well evaluated in the absence of intraluminal contrast distention  3   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

## 2020-08-11 NOTE — PROGRESS NOTES
Progress Note - Elizabeth Fitzpatrick 1975, 50 y o  female MRN: [id number]    Unit/Bed#: [facility] 904-01 Encounter: [id number]    Primary Care Provider: Maria Elena Orourke MD   Date and time admitted to hospital: 8/10/2020  8:01 PM        Rectal abscess  Assessment & Plan  Antibiotic therapy through August 15th, 2020  Patient switched to Unasyn while in the hospital   Will switch back to Augmentin pending decision on further interventions  * Bright red blood per rectum  Assessment & Plan  Patient recently was here for rectal abscess status post drainage and currently with new onset rectal bleeding  Monitor hemoglobin every 8 hours  Follow-up with GI and Oncology  Discussed case with Colorectal surgery  Will defer on intervention at this particular juncture  Surgery asking for general GI to evaluate for bleeding  Follow-up on labs  Cancer associated pain  Assessment & Plan  Currently on oxycodone 20 mg twice daily  Continue Percocet  For breakthrough pain:  Hydrocodone 0 5 mg every 3 hours as needed  Followed by palliative care as outpatient  Breast cancer  Assessment & Plan  Oncology consult pending  Follows with outpatient oncology regarding chemotherapy  Per pathology in June 2020, ER/ID (+) and HER-2 (-)       Metastatic rectal cancer  Assessment & Plan  With liver/lung metastasis -> s/p chemotherapy per outpatient oncology  Patient was followed by Colorectal surgery during prior hospitalization -> palliative/diverting colostomy has been offered but patient currently refusing  She presents with bright red blood per rectum        VTE Pharmacologic Prophylaxis:   Pharmacologic: Bright red blood per rectum  Mechanical VTE Prophylaxis in Place: No    Patient Centered Rounds: I have performed bedside rounds with nursing staff today  Education and Discussions with Family / Patient:  Denies    Time Spent for Care: 45 minutes    More than 50% of total time spent on counseling and coordination of care as described above  Current Length of Stay: 1 day(s)    Current Patient Status: Inpatient   Certification Statement: The patient will continue to require additional inpatient hospital stay due to Need to monitor symptoms    Discharge Plan:  Not ready for discharge    Code Status: Level 1 - Full Code      Subjective:   No acute distress    Objective:     Vitals:   Temp (24hrs), Av 3 °F (36 8 °C), Min:97 9 °F (36 6 °C), Max:98 7 °F (37 1 °C)    Temp:  [97 9 °F (36 6 °C)-98 7 °F (37 1 °C)] 97 9 °F (36 6 °C)  HR:  [55-91] 55  Resp:  [16-18] 18  BP: (107-111)/(54-86) 107/54  SpO2:  [96 %-100 %] 97 %  Body mass index is 22 82 kg/m²  Input and Output Summary (last 24 hours): Intake/Output Summary (Last 24 hours) at 2020 1236  Last data filed at 2020 1004  Gross per 24 hour   Intake 1140 ml   Output 500 ml   Net 640 ml       Physical Exam:     Physical Exam  Constitutional:       Appearance: Normal appearance  HENT:      Head: Normocephalic and atraumatic  Nose: Nose normal    Eyes:      Extraocular Movements: Extraocular movements intact  Pupils: Pupils are equal, round, and reactive to light  Cardiovascular:      Rate and Rhythm: Normal rate and regular rhythm  Heart sounds: No murmur  Pulmonary:      Effort: Pulmonary effort is normal  No respiratory distress  Breath sounds: Normal breath sounds  Abdominal:      General: Abdomen is flat  Palpations: Abdomen is soft  Musculoskeletal:         General: No swelling or deformity  Skin:     General: Skin is warm and dry  Neurological:      General: No focal deficit present  Mental Status: She is alert  Sensory: No sensory deficit     Psychiatric:         Mood and Affect: Mood normal          Behavior: Behavior normal          Additional Data:     Labs:    Results from last 7 days   Lab Units 20  0813  08/10/20  2056   WBC Thousand/uL  --   --  11 16*   HEMOGLOBIN g/dL 7 9*   < > 8 7* HEMATOCRIT %  --   --  29 6*   PLATELETS Thousands/uL  --   --  535*   NEUTROS PCT %  --   --  68   LYMPHS PCT %  --   --  19   MONOS PCT %  --   --  6   EOS PCT %  --   --  5    < > = values in this interval not displayed  Results from last 7 days   Lab Units 08/10/20  2056   POTASSIUM mmol/L 3 8   CHLORIDE mmol/L 106   CO2 mmol/L 26   BUN mg/dL 9   CREATININE mg/dL 0 63   CALCIUM mg/dL 8 8   ALK PHOS U/L 174*   ALT U/L 18   AST U/L 59*     Results from last 7 days   Lab Units 08/10/20  2056   INR  1 05       * I Have Reviewed All Lab Data Listed Above  * Additional Pertinent Lab Tests Reviewed: All Labs Within Last 24 Hours Reviewed        Recent Cultures (last 7 days):           Last 24 Hours Medication List:   Current Facility-Administered Medications   Medication Dose Route Frequency Provider Last Rate    aluminum-magnesium hydroxide-simethicone  30 mL Oral Q6H PRN Blanca Thomason, MD      ampicillin-sulbactam  3 g Intravenous Q6H Blanca Katelin, MD 3 g (08/11/20 1100)    cholecalciferol  2,000 Units Oral Daily Blanca Katelin, MD      HYDROmorphone  0 5 mg Intravenous Q3H PRN Blanca Median, MD      loratadine  10 mg Oral Daily Blanca Median, MD      metoprolol succinate  25 mg Oral Daily Blanca Median, MD      oxyCODONE  20 mg Oral Q12H [facility] Blanca Katelin, MD      oxyCODONE-acetaminophen  1 tablet Oral Q4H PRN Blanca Median, MD      promethazine  12 5 mg Intramuscular Q6H PRN Blanca Median, MD      sertraline  25 mg Oral Daily Blanca Katelin, MD          Today, Patient Was Seen By: Pool Bridges DO    ** Please Note: Dictation voice to text software may have been used in the creation of this document   **

## 2020-08-11 NOTE — ASSESSMENT & PLAN NOTE
Currently on oxycodone 20 mg twice daily  Continue Percocet  For breakthrough pain:  Hydrocodone 0 5 mg every 3 hours as needed

## 2020-08-11 NOTE — ASSESSMENT & PLAN NOTE
With liver/lung metastasis -> s/p chemotherapy per outpatient oncology  Patient was followed by Colorectal surgery during prior hospitalization -> palliative/diverting colostomy has been offered but patient currently refusing    She presents with bright red blood per rectum

## 2020-08-11 NOTE — CONSULTS
Consult Service: Gastroenterology      PATIENT INFORMATION      Sherman Fitzpatrick 50 y o  female MRN: [id number]  [facility] Encounter: [id number]  PCP: Cresencio Mckeon MD  Date of Admission:  8/10/2020  Date of Consultation: 08/11/20  Requesting Physician: DO Eliseo Marie Isabelle Valencia is a 50 y o  old female with PMH of stage IV rectal cancer (with Mets to the lung, liver, currently receiving chemotherapy last dose in mid to late June) who presented with bright red blood per rectum    Hematochezia - likely related to rectal cancer (less likely chemotherapy-induced enterocolitis)  Hemoglobin stable at 7 9 (baseline 7 5-8 5)  Hemodynamically stable  Mild lightheadedness dizziness  Still with hematochezia in the hospital but small volume  Last colonoscopy on 06/12/2020 showed severe edematous and erythematous mucosa in the mid/ rectum with large circumferential rectal mass  EGD at that time was unremarkable  · Will monitor hemoglobin and bowel movements  · NPO at midnight  Can consider flexible sigmoidoscopy tomorrow if continued bleeding or drop in hemoglobin  Iron deficiency anemia - transferrin saturation 14% during this admission with normal ferritin of 316  · Would benefit from iron supplementation    Rectal adenocarcinoma (stage IV) - diagnosed in June of 2020 with Mets to the lung and liver  Follows with outpatient oncologist Dr Shannen Covarrubias  Has had 2 total rounds of chemotherapy (Avastin and FOLFOX), with last 1 being mid to late July of 2020  · Outpatient follow-up with oncologist      REASON FOR CONSULTATION      Bright red blood per rectum      HISTORY OF PRESENT ILLNESS      Ana Cranker is a 50 y o  old female with PMH of stage IV rectal cancer (with Mets to the lung, liver, currently receiving chemotherapy last dose in mid to late June) who presented with bright red blood per rectum    Patient states starting his states she has had a total of 6-8 bowel movements bright red blood that has noticed around the stored in the toilet water  She has no new pain, or nausea or vomiting  Does report rectal pain that has been somewhat chronic  No hematemesis or melena  She did complain of mild lightheadedness and dizziness  She is currently on chemotherapy with last dose being mid to late June  She had EGD and colonoscopy as above in assessment and plan  She does not take any NSAIDs  She is not on blood thinners  REVIEW OF SYSTEMS     A thorough 12-point review of systems has been conducted  Pertinent positives and negatives are mentioned in the history of present illness  PAST MEDICAL & SURGICAL HISTORY      Past Medical History:   Diagnosis Date    Breast cancer ([facility] [address] ) 06/01/2020    right side    History of rectal cancer 06/2020       Past Surgical History:   Procedure Laterality Date    BACK SURGERY      BREAST BIOPSY Right 06/19/2020    2 sites; 1 breast 1 axilla    HERNIA REPAIR      3 repairs    IR IMAGE GUIDED ASPIRATION / DRAINAGE  7/31/2020    IR IMAGE GUIDED ASPIRATION / DRAINAGE  8/7/2020    IR IMAGE GUIDED BIOPSY/ASPIRATION LIVER  6/17/2020    IR PORT PLACEMENT  6/22/2020    TUBAL LIGATION      US GUIDED BREAST BIOPSY RIGHT COMPLETE Right 6/19/2020    US GUIDED BREAST LYMPH NODE BIOPSY RIGHT Right 6/19/2020         MEDICATIONS & ALLERGIES       Medications:   Prior to Admission medications    Medication Sig Start Date End Date Taking?  Authorizing Provider   amoxicillin-clavulanate (AUGMENTIN) 875-125 mg per tablet Take 1 tablet by mouth every 12 (twelve) hours for 7 days 8/9/20 8/16/20 Yes Natalya Smith PA-C   cetirizine (ZyrTEC) 10 mg tablet Take 10 mg by mouth daily   Yes Historical Provider, MD   Cholecalciferol 50 MCG (2000 UT) CAPS Take by mouth   Yes Historical Provider, MD   Ergocalciferol (VITAMIN D2 PO) Take by mouth   Yes Historical Provider, MD   metoprolol succinate (TOPROL-XL) 25 mg 24 hr tablet Take 25 mg by mouth daily   Yes Historical Provider, MD   oxyCODONE-acetaminophen (PERCOCET) 5-325 mg per tablet Take 1 tablet by mouth every 4 (four) hours as needed for moderate pain   Yes Historical Provider, MD   oxyCODONE (OxyCONTIN) 20 mg 12 hr tablet Take 1 tablet (20 mg total) by mouth every 12 (twelve) hours for 10 daysMax Daily Amount: 40 mg 20  Mynor Cedillo DO   sertraline (ZOLOFT) 25 mg tablet Take 25 mg by mouth daily    Historical Provider, MD   oxyCODONE (OxyCONTIN) 20 mg 12 hr tablet Take 1 tablet (20 mg total) by mouth every 12 (twelve) hours for 10 daysMax Daily Amount: 40 mg  Patient not taking: Reported on 2020  Natalya Smith PA-C   oxyCODONE (OxyCONTIN) 20 mg 12 hr tablet Take 1 tablet (20 mg total) by mouth every 12 (twelve) hours for 10 daysMax Daily Amount: 40 mg 20  Monse Mata DO       Allergies: No Known Allergies      SOCIAL HISTORY      Marital Status: Single    Substance Use History:   Social History     Substance and Sexual Activity   Alcohol Use Not Currently     Social History     Tobacco Use   Smoking Status Former Smoker    Packs/day: 0 00    Last attempt to quit: 2020    Years since quittin 2   Smokeless Tobacco Never Used     Social History     Substance and Sexual Activity   Drug Use No         FAMILY HISTORY      Non-Contributory      PHYSICAL EXAM     Vitals:   Blood Pressure: 107/54 (20)  Pulse: 55 (20)  Temperature: 97 9 °F (36 6 °C) (20)  Temp Source: Oral (20)  Respirations: 18 (20)  Height: 5' 2" (157 5 cm) (20)  Weight - Scale: 56 6 kg (124 lb 12 5 oz) (20)  SpO2: 97 % (20)    Physical Exam:   GENERAL: NAD  HEENT:  NC/AT, no scleral icterus  CARDIAC:  RRR, +S1/S2  PULMONARY:  CTA B/L, no wheezing/rales/rhonci, non-labored breathing  ABDOMEN:  Soft, NT/ND, +BS, no rebound/guarding/rigidity  Extremities:  No edema, cyanosis, or clubbing  NEUROLOGIC:  Alert  SKIN:  No rashes or erythema  CAMILA: showed liquid brown/red stool with pain on digit insertion and external hemorrhoids      ADDITIONAL DATA     Lab Results:     Results from last 7 days   Lab Units 08/11/20  0813  08/10/20  2056   WBC Thousand/uL  --   --  11 16*   HEMOGLOBIN g/dL 7 9*   < > 8 7*   HEMATOCRIT %  --   --  29 6*   PLATELETS Thousands/uL  --   --  535*   NEUTROS PCT %  --   --  68   LYMPHS PCT %  --   --  19   MONOS PCT %  --   --  6   EOS PCT %  --   --  5    < > = values in this interval not displayed  Results from last 7 days   Lab Units 08/10/20  2056   POTASSIUM mmol/L 3 8   CHLORIDE mmol/L 106   CO2 mmol/L 26   BUN mg/dL 9   CREATININE mg/dL 0 63   CALCIUM mg/dL 8 8   ALK PHOS U/L 174*   ALT U/L 18   AST U/L 59*     Results from last 7 days   Lab Units 08/10/20  2056   INR  1 05       Imaging:    Ir Image Guided Aspiration / Drainage    Result Date: 8/7/2020  Narrative: Ultrasound-guided aspiration of left buttock fluid collection Clinical History: Patient with a necrotic rectal cancer which is now become infected  A pocket of infected tumor which is extending into the left gluteus muscle was previously aspirated    Conscious sedation time:15 minutes Technique: Patient was brought to the interventional radiology area and placed supine on the stretcher  After a brief ultrasound examination was performed of the left gluteal region, an area on the skin over the site was prepped, and draped in the usual sterile fashion  Lidocaine was administered to the skin and a small skin incision was made  A 5 [place] multisidehole catheter  was advanced into the collection and 8 5 mL pus was aspirated  After an attempt to milk the area, another 0 5 mL of fluid which appeared to be predominantly blood was removed  After the procedure, the catheter was removed and a bandage applied to the site  The patient tolerated the procedure well and suffered no complications  Impression: Impression: 1  Successful ultrasound-guided aspiration of a left gluteal necrotic tumor/abscess, yielding 8 5 cc pus  Workstation performed: [id number]     Ir Image Guided Aspiration / Drainage    Result Date: 7/31/2020  Narrative: Ultrasound-guided aspiration of superficial fluid collection Clinical History: Patient with metastatic colorectal cancer presenting with suspected left gluteal abscess versus necrotic tumor, for aspiration   Conscious sedation time:15 minutes Technique: Patient was brought to the interventional radiology area and placed supine on the stretcher  After a brief ultrasound examination was performed of the left buttock region, an area on the skin over the site was prepped, and draped in the usual sterile fashion  Lidocaine was administered to the skin and a small skin incision was made  A 5 [place] multisidehole catheter  was advanced into the collection and 10 mL thin purulent appearing material was aspirated  Specimens were sent for culture    After the procedure, the catheter was removed and a bandage applied to the site  The patient tolerated the procedure well and suffered no complications  Impression: Impression: 1  Successful ultrasound-guided aspiration of a left gluteal collection, yielding 10 cc of purulent appearing fluid  2  Specimens were sent to the laboratory as described above  Workstation performed: [id number]     Ct Abdomen Pelvis W Contrast    Result Date: 8/3/2020  Narrative: CT ABDOMEN AND PELVIS WITH IV CONTRAST INDICATION:   Rectal abscess  COMPARISON:  Compared with CT dated 7/30/2020  TECHNIQUE:  CT examination of the abdomen and pelvis was performed  Axial, sagittal, and coronal 2D reformatted images were created from the source data and submitted for interpretation  Radiation dose length product (DLP) for this visit:  631 69 mGy-cm     This examination, like all CT scans performed in the [facility], was performed utilizing techniques to minimize radiation dose exposure, including the use of iterative  reconstruction and automated exposure control  IV Contrast:  100 mL of iohexol (OMNIPAQUE) Enteric Contrast:  Enteric contrast was not administered  FINDINGS: ABDOMEN LOWER CHEST: Bilateral lower lobe metastases measuring up to 6 8 cm on the right right and centimeters and 7 cm on the left are stable  Partially imaged 5 4 cm lesion in the left lower lobe was mostly excluded from recent CT of the abdomen but is similar or slightly larger compared with PET/CT from June 16, 2020 LIVER/BILIARY TREE:  Stable metastatic liver lesions measuring up to 9 7 cm in the right lobe  GALLBLADDER:  No calcified gallstones  No pericholecystic inflammatory change  SPLEEN:  Unremarkable  PANCREAS:  Unremarkable  ADRENAL GLANDS:  d KIDNEYS/URETERS:  Unremarkable  No hydronephrosis  STOMACH AND BOWEL:  Stable irregular wall thickening of the rectum correlating with known cancer  Fat-containing ventral hernias without obstruction  No obstruction  APPENDIX:  No findings to suggest appendicitis  ABDOMINOPELVIC CAVITY: Gluteal abscess measuring 2 4 x 3 4 x 3 5 cm is mildly increased in size  There is an adjacent complex multiseptated presacral collection measuring 3 0 x 4 5 x 12 2 cm with solid appearing components that is mildly increased in size and may represent a combination of tumor and abscess  There is no free fluid  VESSELS:  Unremarkable for patient's age  PELVIS REPRODUCTIVE ORGANS:  Stable 3 2 cm fibroid  Complex cystic lesion in the left adnexa with mural nodules measuring up to up to 4 3 cm is stable when measured in a similar fashion  And may represent metastatic lesion  4 cm right adnexal cystic lesion is stable  URINARY BLADDER:  Unremarkable  ABDOMINAL WALL/INGUINAL REGIONS: Midline infraumbilical ventral hernia containing nonobstructed small bowel is again seen  There is a separate small bowel containing ventral hernia just above this  Additional fat-containing ventral hernia seen in the mid upper abdomen  OSSEOUS STRUCTURES:  No acute fracture or destructive osseous lesion  Status post discectomy and fusion at L4-5 and L5-S1  Impression: Stable irregular thickening of the rectum correlating with known rectal cancer  Stable large lung and liver metastases  Mildly enlarged complex presacral collection that may represent combination of abscess and tumor  Adjacent or contiguous left gluteal abscess is mildly increased  Workstation performed: [id number]     Ct Abdomen Pelvis With Contrast    Result Date: 7/31/2020  Narrative: CT ABDOMEN AND PELVIS WITH IV CONTRAST INDICATION:   Abdominal pain and fever  COMPARISON:  6/10/2020 and 7/1/2020  TECHNIQUE:  CT examination of the abdomen and pelvis was performed  Axial, sagittal, and coronal 2D reformatted images were created from the source data and submitted for interpretation  Radiation dose length product (DLP) for this visit:  291 83 mGy-cm   This examination, like all CT scans performed in the [facility], was performed utilizing techniques to minimize radiation dose exposure, including the use of iterative  reconstruction and automated exposure control  IV Contrast:  100 mL of iohexol (OMNIPAQUE) Enteric Contrast:  Enteric contrast was not administered  FINDINGS: ABDOMEN LOWER CHEST:  Masses in the bilateral lower lobes are mildly increased in size  LIVER/BILIARY TREE:  Hepatic masses measure up to 9 7 cm cyst in the right lobe and 2 3 cm in the left lobe, mildly increased in size  No clear evidence of change in number of lesions  No biliary obstruction GALLBLADDER:  No calcified gallstones  No pericholecystic inflammatory change  SPLEEN:  Unremarkable  PANCREAS:  Unremarkable  ADRENAL GLANDS:  Unremarkable  KIDNEYS/URETERS:  No pyelonephritis or obstructive uropathy  STOMACH AND BOWEL:  New rectus sheath hernia containing loops of small bowel measuring 11 x 2 5 x 8 5 cm  No evidence of bowel obstruction or incarceration  Stable upper abdominal midline fat-containing hernia measuring 4 cm  Stable wall thickening in the rectum extending to the rectosigmoid, not well evaluated in the absence of rectal or oral contrast   No evidence of colitis  APPENDIX:  No findings to suggest appendicitis  ABDOMINOPELVIC CAVITY:  Organizing, multiseptated presacral fluid collection measuring 3 3 x 2 3 x 6 7 cm  Fluid collection in the left gluteal muscle adjacent to the coccyx and presacral fluid collection, measuring 1 9 x 2 8 x 3 5 cm  VESSELS:  Patent portal and splenic veins  PELVIS REPRODUCTIVE ORGANS:  Stable 3 2 cm leiomyoma in the uterus  Adjacent smaller lesions  Cysts or follicles in the adnexa measuring up to 4 3 cm on the right and 2 9 cm on the left  Stable nodular soft tissue lesions in the left adnexa measuring up to 1 8 cm  URINARY BLADDER:  Unremarkable  ABDOMINAL WALL/INGUINAL REGIONS:  Unremarkable  OSSEOUS STRUCTURES:  L4-5 and L5-S1 anterior fusion  Normal alignment  No lytic or blastic lesion  Impression: 1  Multiseptated organizing presacral fluid collection measuring 2 3 x 2 3 x 6 7 cm  Adjacent left gluteal fluid collection measuring 1 9 x 2 8 x 3 5 cm  Findings concerning for abscesses  Surgical consultation recommended  2   Stable wall thickening in the rectum, not well evaluated in the absence of intraluminal contrast distention  3   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 measuring 11 x 2 5 x 8 5 cm, without evidence of bowel obstruction or incarceration  4   Mild interval increase in size of pulmonary and hepatic metastases  I personally discussed this study with Megan Hoffman on 7/30/2020 at 11:37 PM  Workstation performed: [id number]       EKG, Pathology, and Other Studies Reviewed on Admission:   · EKG: Reviewed      Counseling / Coordination of Care Time: 30 total mins spent n consult   Greater than 50% of total time spent on patient counseling and coordination of care     ** Please Note: This note is constructed using a voice recognition dictation system   **

## 2020-08-11 NOTE — ASSESSMENT & PLAN NOTE
Oncology consult pending  Follows with outpatient oncology regarding chemotherapy  Per pathology in June 2020, ER/MT (+) and HER-2 (-)

## 2020-08-11 NOTE — ASSESSMENT & PLAN NOTE
Oncology consult pending  Follows with outpatient oncology regarding chemotherapy  Per pathology in June 2020, ER/SD (+) and HER-2 (-)

## 2020-08-12 ENCOUNTER — APPOINTMENT (INPATIENT)
Dept: GASTROENTEROLOGY | Facility: HOSPITAL | Age: 45
DRG: 375 | End: 2020-08-12
Payer: COMMERCIAL

## 2020-08-12 ENCOUNTER — ANESTHESIA (INPATIENT)
Dept: GASTROENTEROLOGY | Facility: HOSPITAL | Age: 45
DRG: 375 | End: 2020-08-12
Payer: COMMERCIAL

## 2020-08-12 ENCOUNTER — ANESTHESIA EVENT (INPATIENT)
Dept: GASTROENTEROLOGY | Facility: HOSPITAL | Age: 45
DRG: 375 | End: 2020-08-12
Payer: COMMERCIAL

## 2020-08-12 LAB
HGB BLD-MCNC: 8.1 G/DL (ref 11.5–15.4)
HGB BLD-MCNC: 8.5 G/DL (ref 11.5–15.4)
HGB BLD-MCNC: 9.1 G/DL (ref 11.5–15.4)

## 2020-08-12 PROCEDURE — 45330 DIAGNOSTIC SIGMOIDOSCOPY: CPT | Performed by: INTERNAL MEDICINE

## 2020-08-12 PROCEDURE — 85018 HEMOGLOBIN: CPT | Performed by: INTERNAL MEDICINE

## 2020-08-12 PROCEDURE — 0DJD8ZZ INSPECTION OF LOWER INTESTINAL TRACT, VIA NATURAL OR ARTIFICIAL OPENING ENDOSCOPIC: ICD-10-PCS | Performed by: INTERNAL MEDICINE

## 2020-08-12 PROCEDURE — 99232 SBSQ HOSP IP/OBS MODERATE 35: CPT | Performed by: INTERNAL MEDICINE

## 2020-08-12 RX ORDER — SODIUM CHLORIDE 9 MG/ML
INJECTION, SOLUTION INTRAVENOUS CONTINUOUS PRN
Status: DISCONTINUED | OUTPATIENT
Start: 2020-08-12 | End: 2020-08-12

## 2020-08-12 RX ORDER — ONDANSETRON 2 MG/ML
4 INJECTION INTRAMUSCULAR; INTRAVENOUS ONCE
Status: COMPLETED | OUTPATIENT
Start: 2020-08-12 | End: 2020-08-12

## 2020-08-12 RX ORDER — LIDOCAINE HYDROCHLORIDE 10 MG/ML
INJECTION, SOLUTION EPIDURAL; INFILTRATION; INTRACAUDAL; PERINEURAL AS NEEDED
Status: DISCONTINUED | OUTPATIENT
Start: 2020-08-12 | End: 2020-08-12

## 2020-08-12 RX ORDER — PROPOFOL 10 MG/ML
INJECTION, EMULSION INTRAVENOUS AS NEEDED
Status: DISCONTINUED | OUTPATIENT
Start: 2020-08-12 | End: 2020-08-12

## 2020-08-12 RX ORDER — PROPOFOL 10 MG/ML
INJECTION, EMULSION INTRAVENOUS CONTINUOUS PRN
Status: DISCONTINUED | OUTPATIENT
Start: 2020-08-12 | End: 2020-08-12

## 2020-08-12 RX ADMIN — HYDROMORPHONE HYDROCHLORIDE 0.5 MG: 1 INJECTION, SOLUTION INTRAMUSCULAR; INTRAVENOUS; SUBCUTANEOUS at 19:55

## 2020-08-12 RX ADMIN — LIDOCAINE HYDROCHLORIDE 70 MG: 10 INJECTION, SOLUTION EPIDURAL; INFILTRATION; INTRACAUDAL; PERINEURAL at 15:40

## 2020-08-12 RX ADMIN — SERTRALINE HYDROCHLORIDE 25 MG: 25 TABLET ORAL at 09:27

## 2020-08-12 RX ADMIN — OXYCODONE HYDROCHLORIDE AND ACETAMINOPHEN 1 TABLET: 5; 325 TABLET ORAL at 23:50

## 2020-08-12 RX ADMIN — OXYCODONE HYDROCHLORIDE 20 MG: 20 TABLET, FILM COATED, EXTENDED RELEASE ORAL at 09:26

## 2020-08-12 RX ADMIN — PROPOFOL 80 MG: 10 INJECTION, EMULSION INTRAVENOUS at 15:40

## 2020-08-12 RX ADMIN — ONDANSETRON 4 MG: 2 INJECTION INTRAMUSCULAR; INTRAVENOUS at 15:07

## 2020-08-12 RX ADMIN — SODIUM CHLORIDE: 9 INJECTION, SOLUTION INTRAVENOUS at 15:36

## 2020-08-12 RX ADMIN — HYDROMORPHONE HYDROCHLORIDE 0.5 MG: 1 INJECTION, SOLUTION INTRAMUSCULAR; INTRAVENOUS; SUBCUTANEOUS at 09:23

## 2020-08-12 RX ADMIN — AMPICILLIN SODIUM AND SULBACTAM SODIUM 3 G: 2; 1 INJECTION, POWDER, FOR SOLUTION INTRAMUSCULAR; INTRAVENOUS at 05:14

## 2020-08-12 RX ADMIN — PROPOFOL 80 MCG/KG/MIN: 10 INJECTION, EMULSION INTRAVENOUS at 15:40

## 2020-08-12 RX ADMIN — OXYCODONE HYDROCHLORIDE 20 MG: 20 TABLET, FILM COATED, EXTENDED RELEASE ORAL at 21:40

## 2020-08-12 RX ADMIN — AMPICILLIN SODIUM AND SULBACTAM SODIUM 3 G: 2; 1 INJECTION, POWDER, FOR SOLUTION INTRAMUSCULAR; INTRAVENOUS at 17:43

## 2020-08-12 RX ADMIN — LORATADINE 10 MG: 10 TABLET ORAL at 09:28

## 2020-08-12 RX ADMIN — MELATONIN 2000 UNITS: at 09:27

## 2020-08-12 RX ADMIN — OXYCODONE HYDROCHLORIDE AND ACETAMINOPHEN 1 TABLET: 5; 325 TABLET ORAL at 12:33

## 2020-08-12 RX ADMIN — AMPICILLIN SODIUM AND SULBACTAM SODIUM 3 G: 2; 1 INJECTION, POWDER, FOR SOLUTION INTRAMUSCULAR; INTRAVENOUS at 12:20

## 2020-08-12 RX ADMIN — OXYCODONE HYDROCHLORIDE AND ACETAMINOPHEN 1 TABLET: 5; 325 TABLET ORAL at 05:19

## 2020-08-12 RX ADMIN — OXYCODONE HYDROCHLORIDE AND ACETAMINOPHEN 1 TABLET: 5; 325 TABLET ORAL at 17:51

## 2020-08-12 RX ADMIN — AMPICILLIN SODIUM AND SULBACTAM SODIUM 3 G: 2; 1 INJECTION, POWDER, FOR SOLUTION INTRAMUSCULAR; INTRAVENOUS at 23:46

## 2020-08-12 NOTE — CONSULTS
Consultation - Medical Oncology   Masoud Fitzpatrick 50 y o  female MRN: [id number]  Unit/Bed#: [facility] 904-01 Encounter: [id number]      Assessment/Plan     Assessment:      1 -  50 y  o female with stage IV rectal cancer, K-LITA mut,MSI stable,high tumor burden(pelvis ,lung ,liver), (dx6/12/20), synchronous R BRCA,ER/MA positive HER-2(-),(dx 6/19/20), s/p FOLFIRINOX x2 , (6/23/20,7/21/20),    ECOG PS 1- 2       2   Left gluteal abscess, s/p aspiration  Clinically improving  Continue antibiotics- Unasyn  3  Rectal bleeding- Related to rectal cancer  Infectious/ischemic colitis much less likely  For sigmoidoscopy tomorrow  Hg stable 7 9, not changed from baseline , hemodynamically stable  Bleeding appears to be slowing down  If bleeding cannot be controlled consider short course of radiation  History of Present Illness   Physician Requesting Consult: Sumanth Angel,   Reason for Consult / Principal Problem: Rectal cancer, rectal bleeding  HPI: Franklyn Oconnor is a 50y o  year old female with stage IV rectal cancer, K-LITA mut,MSI stable,high tumor burden(pelvis ,lung ,liver), (dx6/12/20), synchronous R BRCA,ER/MA positive HER-2(-),(dx 6/19/20), s/p FOLFIRINOX x2 , (6/23/20,7/21/20),     Presents with abdominal bloating and rectal bleeding  Recently discharged from [facility] [place] with the diagnosis of left gluteal abscess 2/2   necrotic cancer  Needle aspiration of abscess x 2, IV antibiotics with improvement  Patient opted for non-surgical management  Discharged on Augmentin  2 days ago felt bloating , fatigue, lightheadedness and passed several bloody bowel movements  Denies further pain in the gluteal area  50 y  o female Consults    Review of Systems     GENERAL:  Feeling well  Denies fatigue  Appetite good  Denies fevers, night sweats, or weight change  SKIN: Denies itchy skin or rashes, or easy bruising  HEENT: Denies vision changes or hearing changes    Denies sinus congestion or running nose or post-nasal drip  Denies nose bleeds  Denies sore mouth or sore throat  Denies hoarseness of voice  CARDIOVASCULAR: Denies chest pain or tightness, heart palpitations  Denies dizziness  RESPIRATORY: Denies SOB, dyspnea on exertion  Denies  cough, hemoptysis  GASTROINTESTINAL: as per HPI mild nausea , no vomiting  Denies swallowing difficulties, heartburn or indigestion  C/o  rectal pressure  GENITOURINARY: Denies dysuria, frequency, hematuria, or nocturia  NEUROLOGICAL: Denies headache, focal weakness, abnormal sensations  Denies changes in hearing or vision  Denies difficulty with ambulation  Denies seizures  MUSCULOSKELETAL: Denies back pain, muscle aches, joint pain or swelling  PSYCHIATRIC: Denies problems sleeping  Denies anxiety or depression        Historical Information   Past Medical History:   Diagnosis Date    Breast cancer ([facility] [address] ) 2020    right side    History of rectal cancer 2020     Past Surgical History:   Procedure Laterality Date    BACK SURGERY      BREAST BIOPSY Right 2020    2 sites; 1 breast 1 axilla    HERNIA REPAIR      3 repairs    IR IMAGE GUIDED ASPIRATION / DRAINAGE  2020    IR IMAGE GUIDED ASPIRATION / DRAINAGE  2020    IR IMAGE GUIDED BIOPSY/ASPIRATION LIVER  2020    IR PORT PLACEMENT  2020    TUBAL LIGATION      US GUIDED BREAST BIOPSY RIGHT COMPLETE Right 2020    US GUIDED BREAST LYMPH NODE BIOPSY RIGHT Right 2020     Social History   Social History     Substance and Sexual Activity   Alcohol Use Not Currently     Social History     Substance and Sexual Activity   Drug Use No     Social History     Tobacco Use   Smoking Status Former Smoker    Packs/day: 0 00    Last attempt to quit: 2020    Years since quittin 2   Smokeless Tobacco Never Used     Family History: non-contributory    Meds/Allergies   all current active meds have been reviewed  No Known Allergies    Objective   Vitals: Blood pressure 107/54, pulse 55, temperature 97 9 °F (36 6 °C), resp  rate 18, height 5' 2" (1 575 m), weight 56 6 kg (124 lb 12 5 oz), last menstrual period 03/11/2020, SpO2 97 %, not currently breastfeeding  Intake/Output Summary (Last 24 hours) at 8/11/2020 2205  Last data filed at 8/11/2020 1923  Gross per 24 hour   Intake 1300 ml   Output 700 ml   Net 600 ml     Invasive Devices     Central Venous Catheter Line            Port A Cath 06/22/20 Right Chest 50 days          Peripheral Intravenous Line            Peripheral IV 08/10/20 Left Antecubital 1 day                Physical Exam   GENERAL: Alert, oriented x 3, comfortable  INTEGUMENTARY: Skin warm, dry  No ecchymosis  No jaundice or rash, multiple tattoos  HEENT: Sclerae anicteric  Conjunctivae normal  Pupils equal and reactive to light  Oral/throat mucosa dry intact without lesions  NECK:  Supple  No palpable mass, lymphadenopathy or goiter  LYMPHATIC: No palpable lymphadenopathy in neck / axillary / inguinal areas  CHEST/LUNGS: Lung sounds clear bilaterally  No wheezing or rale, right PAC site looks good  CARDIOVASCULAR: Heart rhythm and rate regular  S1, S2  No murmur or gallop  Breast: Right breast nodularity upper outer quadrant , left breast unremarkable,  Shotty right axillary lyphadenopathy  GASTROINTESTINAL: Abdomen soft  Normal bowel sounds  Non-tender to palpation  No palpable mass or organomegaly  No ascites  Left buttock area  without  tenderness  MUSCULOSKELETAL: No peripheral edema, clubbing or cyanosis  Pedal pulse 2+ bilaterally  No tenderness to percussion over vertebral bodies  NEUROLOGICAL: alert & oriented x 3  Motor and sensory grossly intact  PSYCHIATRIC: Pt is relaxed  Appropriated mood and interaction with conversation    Lab Results:   Reviewed  Imaging Studies: I have personally reviewed pertinent reports  No new studies available    EKG, Pathology, and Other Studies: Reviewed      Code Status: Level 1 - Full Code    Counseling / Coordination of Care  Total floor / unit time spent today 45 minutes  Greater than 50% of total time was spent with the patient and / or family counseling and / or coordination of care

## 2020-08-12 NOTE — ANESTHESIA POSTPROCEDURE EVALUATION
Post-Op Assessment Note    CV Status:  Stable    Pain management: adequate     Mental Status:  Alert and awake   Hydration Status:  Euvolemic   PONV Controlled:  Controlled   Airway Patency:  Patent      Post Op Vitals Reviewed: Yes      Staff: Anesthesiologist, CRNA         No complications documented      BP      Temp      Pulse     Resp      SpO2

## 2020-08-12 NOTE — PROGRESS NOTES
Progress Note - Germaine Fitzpatrick 1975, 50 y o  female MRN: [id number]    Unit/Bed#: [facility] 904-01 Encounter: [id number]    Primary Care Provider: Cristy Benitez MD   Date and time admitted to hospital: 8/10/2020  8:01 PM        Rectal abscess  Assessment & Plan  Antibiotic therapy through August 15th, 2020  Patient switched to Unasyn while in the hospital   Will switch back to Augmentin pending decision on further interventions  * Bright red blood per rectum  Assessment & Plan  Patient recently was here for rectal abscess status post drainage and currently with new onset rectal bleeding  Monitor hemoglobin every 8 hours  Follow-up with GI and Oncology  Discussed case with Colorectal surgery  Will defer on intervention at this particular juncture  Surgery asking for general GI to evaluate for bleeding  Follow-up on labs  For flex sigmoidoscopy today  Cancer associated pain  Assessment & Plan  Currently on oxycodone 20 mg twice daily  Continue Percocet  For breakthrough pain:  Hydrocodone 0 5 mg every 3 hours as needed  Followed by palliative care as outpatient  Breast cancer  Assessment & Plan  Oncology consult pending  Follows with outpatient oncology regarding chemotherapy  Per pathology in June 2020, ER/MI (+) and HER-2 (-)       Metastatic rectal cancer  Assessment & Plan  With liver/lung metastasis -> s/p chemotherapy per outpatient oncology  Patient was followed by Colorectal surgery during prior hospitalization -> palliative/diverting colostomy has been offered but patient currently refusing  She presents with bright red blood per rectum          VTE Pharmacologic Prophylaxis:   Pharmacologic: Bleeding  Mechanical VTE Prophylaxis in Place: No    Patient Centered Rounds: I have performed bedside rounds with nursing staff today  Time Spent for Care: 15 minutes  More than 50% of total time spent on counseling and coordination of care as described above      Current Length of Stay: 2 day(s)    Current Patient Status: Inpatient   Certification Statement: The patient will continue to require additional inpatient hospital stay due to Need to monitor symptoms        Code Status: Level 1 - Full Code      Subjective:   No acute    Objective:     Vitals:   Temp (24hrs), Av 2 °F (36 8 °C), Min:98 1 °F (36 7 °C), Max:98 2 °F (36 8 °C)    Temp:  [98 1 °F (36 7 °C)-98 2 °F (36 8 °C)] 98 2 °F (36 8 °C)  HR:  [50-52] 52  Resp:  [12-18] 12  BP: (102-103)/(61) 102/61  SpO2:  [96 %] 96 %  Body mass index is 22 82 kg/m²  Input and Output Summary (last 24 hours): Intake/Output Summary (Last 24 hours) at 2020 0757  Last data filed at 2020 0300  Gross per 24 hour   Intake 160 ml   Output 650 ml   Net -490 ml       Physical Exam:     Physical Exam  Constitutional:       Appearance: Normal appearance  HENT:      Head: Normocephalic and atraumatic  Cardiovascular:      Rate and Rhythm: Normal rate and regular rhythm  Pulmonary:      Effort: Pulmonary effort is normal       Breath sounds: Normal breath sounds  Abdominal:      General: There is no distension  Tenderness: There is no abdominal tenderness  Musculoskeletal:         General: No swelling or tenderness  Skin:     General: Skin is warm and dry  Neurological:      Mental Status: She is alert  Additional Data:     Labs:    Results from last 7 days   Lab Units 20  0001  08/10/20  2056   WBC Thousand/uL  --   --  11 16*   HEMOGLOBIN g/dL 8 1*   < > 8 7*   HEMATOCRIT %  --   --  29 6*   PLATELETS Thousands/uL  --   --  535*   NEUTROS PCT %  --   --  68   LYMPHS PCT %  --   --  19   MONOS PCT %  --   --  6   EOS PCT %  --   --  5    < > = values in this interval not displayed       Results from last 7 days   Lab Units 08/10/20  2056   POTASSIUM mmol/L 3 8   CHLORIDE mmol/L 106   CO2 mmol/L 26   BUN mg/dL 9   CREATININE mg/dL 0 63   CALCIUM mg/dL 8 8   ALK PHOS U/L 174*   ALT U/L 18   AST U/L 59* Results from last 7 days   Lab Units 08/10/20  2056   INR  1 05       * I Have Reviewed All Lab Data Listed Above  * Additional Pertinent Lab Tests Reviewed: All Labs Within Last 24 Hours Reviewed      Recent Cultures (last 7 days):           Last 24 Hours Medication List:   Current Facility-Administered Medications   Medication Dose Route Frequency Provider Last Rate    aluminum-magnesium hydroxide-simethicone  30 mL Oral Q6H PRN Umm Blancas MD      ampicillin-sulbactam  3 g Intravenous Q6H Umm Blancas MD 3 g (08/12/20 0514)    cholecalciferol  2,000 Units Oral Daily Umm Blancas MD      HYDROmorphone  0 5 mg Intravenous Q3H PRN Umm Blancas MD      loratadine  10 mg Oral Daily Umm Blancas MD      metoprolol succinate  25 mg Oral Daily Umm Blancas MD      oxyCODONE  20 mg Oral Q12H [address] Umm Blancas MD      oxyCODONE-acetaminophen  1 tablet Oral Q4H PRN Umm Blancas MD      promethazine  12 5 mg Intramuscular Q6H PRN Umm Blancas MD      sertraline  25 mg Oral Daily Umm Blancas MD          Today, Patient Was Seen By: Drake Ponce DO    ** Please Note: Dictation voice to text software may have been used in the creation of this document   **

## 2020-08-12 NOTE — ANESTHESIA PREPROCEDURE EVALUATION
Procedure:  FLEXIBLE SIGMOIDOSCOPY    Relevant Problems   ANESTHESIA (within normal limits)      GI/HEPATIC   (+) Bright red blood per rectum   (+) Large bowel obstruction (HCC)   (+) Metastatic rectal cancer      GYN   (+) Breast cancer      HEMATOLOGY   (+) Anemia of chronic disease      Other   (+) Cancer associated pain        Physical Exam    Airway    Mallampati score: II  TM Distance: >3 FB  Neck ROM: full     Dental   No notable dental hx     Cardiovascular  Rhythm: regular, Rate: normal, Cardiovascular exam normal    Pulmonary  Pulmonary exam normal     Other Findings        Anesthesia Plan  ASA Score- 2     Anesthesia Type- IV sedation with anesthesia with ASA Monitors  Additional Monitors:   Airway Plan:           Plan Factors-Exercise tolerance (METS): >4 METS  Chart reviewed  Patient summary reviewed  Patient is not a current smoker  Patient did not smoke on day of surgery  Induction- intravenous  Postoperative Plan-     Informed Consent- Anesthetic plan and risks discussed with patient  I personally reviewed this patient with the CRNA  Discussed and agreed on the Anesthesia Plan with the CRNA  Kristian Simpson

## 2020-08-13 LAB
HGB BLD-MCNC: 8.4 G/DL (ref 11.5–15.4)
HGB BLD-MCNC: 8.5 G/DL (ref 11.5–15.4)
HGB BLD-MCNC: 9 G/DL (ref 11.5–15.4)

## 2020-08-13 PROCEDURE — 99232 SBSQ HOSP IP/OBS MODERATE 35: CPT | Performed by: INTERNAL MEDICINE

## 2020-08-13 PROCEDURE — 85018 HEMOGLOBIN: CPT | Performed by: INTERNAL MEDICINE

## 2020-08-13 RX ADMIN — AMPICILLIN SODIUM AND SULBACTAM SODIUM 3 G: 2; 1 INJECTION, POWDER, FOR SOLUTION INTRAMUSCULAR; INTRAVENOUS at 11:18

## 2020-08-13 RX ADMIN — OXYCODONE HYDROCHLORIDE AND ACETAMINOPHEN 1 TABLET: 5; 325 TABLET ORAL at 09:11

## 2020-08-13 RX ADMIN — LORATADINE 10 MG: 10 TABLET ORAL at 09:12

## 2020-08-13 RX ADMIN — AMPICILLIN SODIUM AND SULBACTAM SODIUM 3 G: 2; 1 INJECTION, POWDER, FOR SOLUTION INTRAMUSCULAR; INTRAVENOUS at 04:47

## 2020-08-13 RX ADMIN — HYDROMORPHONE HYDROCHLORIDE 0.5 MG: 1 INJECTION, SOLUTION INTRAMUSCULAR; INTRAVENOUS; SUBCUTANEOUS at 16:47

## 2020-08-13 RX ADMIN — AMPICILLIN SODIUM AND SULBACTAM SODIUM 3 G: 2; 1 INJECTION, POWDER, FOR SOLUTION INTRAMUSCULAR; INTRAVENOUS at 16:47

## 2020-08-13 RX ADMIN — OXYCODONE HYDROCHLORIDE AND ACETAMINOPHEN 1 TABLET: 5; 325 TABLET ORAL at 04:45

## 2020-08-13 RX ADMIN — OXYCODONE HYDROCHLORIDE 20 MG: 20 TABLET, FILM COATED, EXTENDED RELEASE ORAL at 09:11

## 2020-08-13 RX ADMIN — SERTRALINE HYDROCHLORIDE 25 MG: 25 TABLET ORAL at 09:12

## 2020-08-13 RX ADMIN — HYDROMORPHONE HYDROCHLORIDE 0.5 MG: 1 INJECTION, SOLUTION INTRAMUSCULAR; INTRAVENOUS; SUBCUTANEOUS at 20:38

## 2020-08-13 RX ADMIN — OXYCODONE HYDROCHLORIDE 20 MG: 20 TABLET, FILM COATED, EXTENDED RELEASE ORAL at 21:54

## 2020-08-13 RX ADMIN — AMPICILLIN SODIUM AND SULBACTAM SODIUM 3 G: 2; 1 INJECTION, POWDER, FOR SOLUTION INTRAMUSCULAR; INTRAVENOUS at 23:33

## 2020-08-13 RX ADMIN — OXYCODONE HYDROCHLORIDE AND ACETAMINOPHEN 1 TABLET: 5; 325 TABLET ORAL at 19:33

## 2020-08-13 RX ADMIN — OXYCODONE HYDROCHLORIDE AND ACETAMINOPHEN 1 TABLET: 5; 325 TABLET ORAL at 14:41

## 2020-08-13 NOTE — PROGRESS NOTES
Progress Note - Sherman Valencia 1975, 50 y o  female MRN: [id number]    Unit/Bed#: [facility] 904-01 Encounter: [id number]    Primary Care Provider: Cresencio Mckeon MD   Date and time admitted to hospital: 8/10/2020  8:01 PM        * Bright red blood per rectum  Assessment & Plan  Patient recently was here for rectal abscess status post drainage and currently with new onset rectal bleeding  Monitor hemoglobin every 8 hours  Follow-up with GI and Oncology  Discussed case with Colorectal surgery  Will defer on intervention at this particular juncture  Surgery asking for general GI to evaluate for bleeding  Patient status post sigmoidoscopy  No overt bleeding noted  Labs stable  Discharge planning  Rectal abscess  Assessment & Plan  Antibiotic therapy through August 15th, 2020  Patient switched to Unasyn while in the hospital   Will switch back to Augmentin pending decision on further interventions  Cancer associated pain  Assessment & Plan  Currently on oxycodone 20 mg twice daily  Continue Percocet  For breakthrough pain:  Hydrocodone 0 5 mg every 3 hours as needed  Followed by palliative care as outpatient  Note palliative Care input regarding Rx    Breast cancer  Assessment & Plan  Oncology consult pending  Follows with outpatient oncology regarding chemotherapy  Per pathology in June 2020, ER/OK (+) and HER-2 (-)       Metastatic rectal cancer  Assessment & Plan  With liver/lung metastasis -> s/p chemotherapy per outpatient oncology  Patient was followed by Colorectal surgery during prior hospitalization -> palliative/diverting colostomy has been offered but patient currently refusing  She would consider this as a last resort option  She presents with bright red blood per rectum  Fortunately her bleeding has stabilized  Etiology appears most consistent with rectal mass  Will need ongoing follow-up with Oncology and Colorectal surgery    Note input from Oncology regarding short course of radiation bleeding cannot be controlled  Fortunately, patient's appears to be stable currently  Hemoglobin appears to stable        VTE Pharmacologic Prophylaxis:   Pharmacologic: GI bleed  Mechanical VTE Prophylaxis in Place: No        Time Spent for Care: 15 minutes  More than 50% of total time spent on counseling and coordination of care as described above  Current Length of Stay: 3 day(s)    Current Patient Status: Inpatient   Certification Statement: The patient will continue to require additional inpatient hospital stay due to Need to monitor symptoms        Code Status: Level 1 - Full Code      Subjective:   No acute distress    Objective:     Vitals:   Temp (24hrs), Av 3 °F (36 8 °C), Min:98 1 °F (36 7 °C), Max:98 5 °F (36 9 °C)    Temp:  [98 1 °F (36 7 °C)-98 5 °F (36 9 °C)] 98 1 °F (36 7 °C)  HR:  [57-70] 57  Resp:  [15-19] 16  BP: ()/(51-62) 107/61  SpO2:  [95 %-98 %] 98 %  Body mass index is 22 82 kg/m²  Input and Output Summary (last 24 hours): Intake/Output Summary (Last 24 hours) at 2020 0754  Last data filed at 2020 0300  Gross per 24 hour   Intake 500 ml   Output 1500 ml   Net -1000 ml       Physical Exam:     Physical Exam  HENT:      Head: Normocephalic and atraumatic  Mouth/Throat:      Mouth: Mucous membranes are moist    Eyes:      Extraocular Movements: Extraocular movements intact  Pupils: Pupils are equal, round, and reactive to light  Pulmonary:      Effort: Pulmonary effort is normal  No respiratory distress  Breath sounds: No stridor  Abdominal:      General: There is no distension  Palpations: There is no mass  Tenderness: There is no abdominal tenderness  Hernia: No hernia is present           Additional Data:     Labs:    Results from last 7 days   Lab Units 20  0015  08/10/20  2056   WBC Thousand/uL  --   --  11 16*   HEMOGLOBIN g/dL 8 5*   < > 8 7*   HEMATOCRIT %  --   --  29 6*   PLATELETS Thousands/uL  -- --  535*   NEUTROS PCT %  --   --  68   LYMPHS PCT %  --   --  19   MONOS PCT %  --   --  6   EOS PCT %  --   --  5    < > = values in this interval not displayed  Results from last 7 days   Lab Units 08/10/20  2056   POTASSIUM mmol/L 3 8   CHLORIDE mmol/L 106   CO2 mmol/L 26   BUN mg/dL 9   CREATININE mg/dL 0 63   CALCIUM mg/dL 8 8   ALK PHOS U/L 174*   ALT U/L 18   AST U/L 59*     Results from last 7 days   Lab Units 08/10/20  2056   INR  1 05       * I Have Reviewed All Lab Data Listed Above  * Additional Pertinent Lab Tests Reviewed: All Labs Within Last 24 Hours Reviewed        Recent Cultures (last 7 days):           Last 24 Hours Medication List:   Current Facility-Administered Medications   Medication Dose Route Frequency Provider Last Rate    aluminum-magnesium hydroxide-simethicone  30 mL Oral Q6H PRN Messi Fontanez MD      ampicillin-sulbactam  3 g Intravenous Q6H Messi Fontanez MD 3 g (08/13/20 0447)    cholecalciferol  2,000 Units Oral Daily Messi Fontanez MD      HYDROmorphone  0 5 mg Intravenous Q3H PRN Messi Fontanez MD      loratadine  10 mg Oral Daily Messi Fontanez MD      metoprolol succinate  25 mg Oral Daily Messi Fontanez MD      oxyCODONE  20 mg Oral Q12H [address] Messi Fontanez MD      oxyCODONE-acetaminophen  1 tablet Oral Q4H PRN Messi Fontanez MD      promethazine  12 5 mg Intramuscular Q6H PRN Messi Fontanez MD      sertraline  25 mg Oral Daily Messi Fontanez MD          Today, Patient Was Seen By: Capri Adamson DO    ** Please Note: Dictation voice to text software may have been used in the creation of this document   **

## 2020-08-13 NOTE — ASSESSMENT & PLAN NOTE
Patient recently was here for rectal abscess status post drainage and currently with new onset rectal bleeding  Monitor hemoglobin every 8 hours  Follow-up with GI and Oncology  Discussed case with Colorectal surgery  Will defer on intervention at this particular juncture  Surgery asking for general GI to evaluate for bleeding  Patient status post sigmoidoscopy  No overt bleeding noted  Labs stable  Discharge planning

## 2020-08-13 NOTE — ASSESSMENT & PLAN NOTE
Oncology consult pending  Follows with outpatient oncology regarding chemotherapy  Per pathology in June 2020, ER/AK (+) and HER-2 (-)

## 2020-08-14 LAB
HGB BLD-MCNC: 10.1 G/DL (ref 11.5–15.4)
HGB BLD-MCNC: 8.2 G/DL (ref 11.5–15.4)
HGB BLD-MCNC: 8.9 G/DL (ref 11.5–15.4)

## 2020-08-14 PROCEDURE — 85018 HEMOGLOBIN: CPT | Performed by: INTERNAL MEDICINE

## 2020-08-14 PROCEDURE — 99232 SBSQ HOSP IP/OBS MODERATE 35: CPT | Performed by: INTERNAL MEDICINE

## 2020-08-14 RX ADMIN — AMPICILLIN SODIUM AND SULBACTAM SODIUM 3 G: 2; 1 INJECTION, POWDER, FOR SOLUTION INTRAMUSCULAR; INTRAVENOUS at 05:09

## 2020-08-14 RX ADMIN — LORATADINE 10 MG: 10 TABLET ORAL at 09:47

## 2020-08-14 RX ADMIN — HYDROMORPHONE HYDROCHLORIDE 0.5 MG: 1 INJECTION, SOLUTION INTRAMUSCULAR; INTRAVENOUS; SUBCUTANEOUS at 09:52

## 2020-08-14 RX ADMIN — AMPICILLIN SODIUM AND SULBACTAM SODIUM 3 G: 2; 1 INJECTION, POWDER, FOR SOLUTION INTRAMUSCULAR; INTRAVENOUS at 11:38

## 2020-08-14 RX ADMIN — HYDROMORPHONE HYDROCHLORIDE 0.5 MG: 1 INJECTION, SOLUTION INTRAMUSCULAR; INTRAVENOUS; SUBCUTANEOUS at 20:43

## 2020-08-14 RX ADMIN — OXYCODONE HYDROCHLORIDE 20 MG: 20 TABLET, FILM COATED, EXTENDED RELEASE ORAL at 21:40

## 2020-08-14 RX ADMIN — OXYCODONE HYDROCHLORIDE AND ACETAMINOPHEN 1 TABLET: 5; 325 TABLET ORAL at 17:35

## 2020-08-14 RX ADMIN — SERTRALINE HYDROCHLORIDE 25 MG: 25 TABLET ORAL at 09:47

## 2020-08-14 RX ADMIN — OXYCODONE HYDROCHLORIDE AND ACETAMINOPHEN 1 TABLET: 5; 325 TABLET ORAL at 08:52

## 2020-08-14 RX ADMIN — OXYCODONE HYDROCHLORIDE AND ACETAMINOPHEN 1 TABLET: 5; 325 TABLET ORAL at 00:05

## 2020-08-14 RX ADMIN — OXYCODONE HYDROCHLORIDE AND ACETAMINOPHEN 1 TABLET: 5; 325 TABLET ORAL at 12:55

## 2020-08-14 RX ADMIN — OXYCODONE HYDROCHLORIDE 20 MG: 20 TABLET, FILM COATED, EXTENDED RELEASE ORAL at 09:47

## 2020-08-14 RX ADMIN — MELATONIN 2000 UNITS: at 09:46

## 2020-08-14 RX ADMIN — AMPICILLIN SODIUM AND SULBACTAM SODIUM 3 G: 2; 1 INJECTION, POWDER, FOR SOLUTION INTRAMUSCULAR; INTRAVENOUS at 20:38

## 2020-08-14 NOTE — ASSESSMENT & PLAN NOTE
Oncology consult pending  Follows with outpatient oncology regarding chemotherapy  Per pathology in June 2020, ER/NV (+) and HER-2 (-)

## 2020-08-14 NOTE — UTILIZATION REVIEW
Continued Stay Review    Date:  8-14-20                           Current Patient Class: inpatient   Current Level of Care:  Med surg     HPI:45 y o  female initially admitted on 8-10-20  For rectal bleed after recent abscess drainage  S/p sigmoidoscopy 8/12 - no overt bleeding  Palliative diverting colostomy offered for metastatic rectal cancer however patient declines unless this is the last resort  Assessment/Plan:     Continue to monitor hb/hct  Continue iv antibiotics    Pertinent Labs/Diagnostic Results:         Results from last 7 days   Lab Units 08/14/20  0812 08/13/20  2356 08/13/20  1610 08/13/20  0849 08/13/20  0015  08/10/20  2056 08/09/20  0533 08/08/20  0538   WBC Thousand/uL  --   --   --   --   --   --  11 16* 9 41 10 04   HEMOGLOBIN g/dL 10 1* 8 2* 9 0* 8 4* 8 5*   < > 8 7* 8 3* 8 2*   HEMATOCRIT %  --   --   --   --   --   --  29 6* 29 0* 28 8*   PLATELETS Thousands/uL  --   --   --   --   --   --  535* 511* 511*   NEUTROS ABS Thousands/µL  --   --   --   --   --   --  7 69* 4 93 5 81    < > = values in this interval not displayed           Results from last 7 days   Lab Units 08/10/20  2056 08/08/20  0541   SODIUM mmol/L 139 137   POTASSIUM mmol/L 3 8 4 5   CHLORIDE mmol/L 106 104   CO2 mmol/L 26 28   ANION GAP mmol/L 7 5   BUN mg/dL 9 6   CREATININE mg/dL 0 63 0 60   EGFR ml/min/1 73sq m 107 109   CALCIUM mg/dL 8 8 9 4   MAGNESIUM mg/dL  --  2 4     Results from last 7 days   Lab Units 08/10/20  2056   AST U/L 59*   ALT U/L 18   ALK PHOS U/L 174*   TOTAL PROTEIN g/dL 7 5   ALBUMIN g/dL 2 4*   TOTAL BILIRUBIN mg/dL 0 23         Results from last 7 days   Lab Units 08/10/20  2056 08/08/20  0541   GLUCOSE RANDOM mg/dL 89 79         Results from last 7 days   Lab Units 08/10/20  2056   PROTIME seconds 13 7   INR  1 05   PTT seconds 38*       Results from last 7 days   Lab Units 08/11/20  0505   FERRITIN ng/mL 316       Vital Signs:     Vitals:    08/13/20 0705 08/13/20 1523 08/13/20 2300 08/14/20 0726   BP: 107/61 104/62 106/64 106/64   Pulse: 57 69 66 58   Resp: 16 16 17 12   Temp: 98 1 °F (36 7 °C) 97 9 °F (36 6 °C) 98 4 °F (36 9 °C) 98 2 °F (36 8 °C)   TempSrc:       SpO2: 98% 97% 97% 97%   Weight:       Height:         Medications:     Scheduled Medications:    ampicillin-sulbactam, 3 g, Intravenous, Q6H  cholecalciferol, 2,000 Units, Oral, Daily  loratadine, 10 mg, Oral, Daily  metoprolol succinate, 25 mg, Oral, Daily  oxyCODONE, 20 mg, Oral, Q12H GONZÁLEZ  sertraline, 25 mg, Oral, Daily      Continuous IV Infusions:     PRN Meds:  aluminum-magnesium hydroxide-simethicone, 30 mL, Oral, Q6H PRN  HYDROmorphone, 0 5 mg, Intravenous, Q3H PRN  oxyCODONE-acetaminophen, 1 tablet, Oral, Q4H PRN  promethazine, 12 5 mg, Intramuscular, Q6H PRN        Discharge Plan: to be determined     Network Utilization Review Department  [email or web address]  ATTENTION: Please call with any questions or concerns to [phone] and carefully listen to the prompts so that you are directed to the right person  All voicemails are confidential   Harlene Pair all requests for admission clinical reviews, approved or denied determinations and any other requests to dedicated fax number below belonging to the campus where the patient is receiving treatment   List of dedicated fax numbers for the Facilities:  [address] [place] [address] DENIALS (Administrative/Medical Necessity) [phone]   [address] [place] [address] (Maternity/NICU/Pediatrics) [phone]   Fran[facility] Greene [phone]     [address] [phone]   Toy John [phone]   [facility] [phone]   [address] [place] [address] [phone]   [facility]  [phone]   [address] [facility]  [address] [facility] [address] [facility] [phone]

## 2020-08-14 NOTE — ASSESSMENT & PLAN NOTE
Currently on oxycodone 20 mg twice daily  Continue Percocet  For breakthrough pain:  Hydrocodone 0 5 mg every 3 hours as needed  Followed by palliative care as outpatient    Note palliative Care input regarding Rx

## 2020-08-14 NOTE — ASSESSMENT & PLAN NOTE
With liver/lung metastasis -> s/p chemotherapy per outpatient oncology  Patient was followed by Colorectal surgery during prior hospitalization -> palliative/diverting colostomy has been offered but patient currently refusing  She would consider this as a last resort option  She presents with bright red blood per rectum  Fortunately her bleeding has stabilized  Etiology appears most consistent with rectal mass  Will need ongoing follow-up with Oncology and Colorectal surgery  Note input from Oncology regarding short course of radiation bleeding cannot be controlled  Fortunately, patient's appears to be stable currently    Hemoglobin appears to stable

## 2020-08-14 NOTE — DISCHARGE SUMMARY
Discharge- Jazmin Maria 1975, 50 y o  female MRN: [id number]    Unit/Bed#: [facility] 904-01 Encounter: [id number]    Primary Care Provider: Ralph Diaz MD   Date and time admitted to hospital: 8/10/2020  8:01 PM        * Bright red blood per rectum  Assessment & Plan  Patient recently was here for rectal abscess status post drainage and currently with new onset rectal bleeding  Monitor hemoglobin every 8 hours  Follow-up with GI and Oncology  Discussed case with Colorectal surgery  Will defer on intervention at this particular juncture  Surgery asking for general GI to evaluate for bleeding  Patient status post sigmoidoscopy  No overt bleeding noted  Labs stable  Discharge planning  Rectal abscess  Assessment & Plan  Antibiotic therapy through August 15th, 2020  Patient switched to Unasyn while in the hospital   Will switch back to Augmentin pending decision on further interventions  Cancer associated pain  Assessment & Plan  Currently on oxycodone 20 mg twice daily  Continue Percocet  For breakthrough pain:  Hydrocodone 0 5 mg every 3 hours as needed  Followed by palliative care as outpatient  Note palliative Care input regarding Rx    Breast cancer  Assessment & Plan  Oncology consult pending  Follows with outpatient oncology regarding chemotherapy  Per pathology in June 2020, ER/MD (+) and HER-2 (-)       Metastatic rectal cancer  Assessment & Plan  With liver/lung metastasis -> s/p chemotherapy per outpatient oncology  Patient was followed by Colorectal surgery during prior hospitalization -> palliative/diverting colostomy has been offered but patient currently refusing  She would consider this as a last resort option  She presents with bright red blood per rectum  Fortunately her bleeding has stabilized  Etiology appears most consistent with rectal mass  Will need ongoing follow-up with Oncology and Colorectal surgery    Note input from Oncology regarding short course of radiation bleeding cannot be controlled  Fortunately, patient's appears to be stable currently  Hemoglobin appears to stable                    Resolved Problems  Date Reviewed: 8/10/2020    None          Admission Date:   Admission Orders (From admission, onward)     Ordered        08/10/20 2148  Inpatient Admission  Once                     Admitting Diagnosis: Blood in urine [R31 9]  [facility] ([facility] ) [C20]  Malignant neoplasm of female breast, unspecified estrogen receptor status, unspecified laterality, unspecified site of breast ([facility] ) [C50 919]        Procedures Performed: No orders of the defined types were placed in this encounter  Summary of Hospital Course: This is a very pleasant 38-year-old female with past with history of apical cancer with metastatic cancer and history of breast cancer  The patient was recently admitted for rectal abscess with plans for antibiotics through the 15th Unfortunately she was readmitted for evaluation of rectal bleeding  By way further review she did have a scope performed  Sigmoidoscopy was attempted however due to poor bowel prep there is limited visualization  There is no acute bleeding during the time of exam though  Patient was cleared for discharge with plans for outpatient follow-up with colonoscopy with GI  · Acute blood loss anemia secondary to rectal cancer  Hemoglobin stable  Continue with antibiotics through tomorrow for rectal abscess  Monitor symptoms  · Metastatic rectal cancer  · Cancer associated pain        Condition at Discharge: fair         Discharge instructions/Information to patient and family:   See after visit summary for information provided to patient and family  Provisions for Follow-Up Care:  See after visit summary for information related to follow-up care and any pertinent home health orders        PCP: Henrry Ochoa MD    Disposition: Home    Planned Readmission: No    Discharge Statement   I spent 35 minutes discharging the patient  This time was spent on the day of discharge  I had direct contact with the patient on the day of discharge  Additional documentation is required if more than 30 minutes were spent on discharge  Discharge Medications:  See after visit summary for reconciled discharge medications provided to patient and family

## 2020-08-14 NOTE — PROGRESS NOTES
Progress Note - Vinod Dupont 1975, 50 y o  female MRN: [id number]    Unit/Bed#: [facility] 904-01 Encounter: [id number]    Primary Care Provider: Eric Kathleen MD   Date and time admitted to hospital: 8/10/2020  8:01 PM        * Bright red blood per rectum  Assessment & Plan  Patient recently was here for rectal abscess status post drainage and currently with new onset rectal bleeding  Monitor hemoglobin every 8 hours  Follow-up with GI and Oncology  Discussed case with Colorectal surgery  Will defer on intervention at this particular juncture  Surgery asking for general GI to evaluate for bleeding  Patient status post sigmoidoscopy  No overt bleeding noted  Labs stable  Discharge planning  Rectal abscess  Assessment & Plan  Antibiotic therapy through August 15th, 2020  Patient switched to Unasyn while in the hospital   Will switch back to Augmentin pending decision on further interventions  Cancer associated pain  Assessment & Plan  Currently on oxycodone 20 mg twice daily  Continue Percocet  For breakthrough pain:  Hydrocodone 0 5 mg every 3 hours as needed  Followed by palliative care as outpatient  Note palliative Care input regarding Rx    Breast cancer  Assessment & Plan  Oncology consult pending  Follows with outpatient oncology regarding chemotherapy  Per pathology in June 2020, ER/OK (+) and HER-2 (-)       Metastatic rectal cancer  Assessment & Plan  With liver/lung metastasis -> s/p chemotherapy per outpatient oncology  Patient was followed by Colorectal surgery during prior hospitalization -> palliative/diverting colostomy has been offered but patient currently refusing  She would consider this as a last resort option  She presents with bright red blood per rectum  Fortunately her bleeding has stabilized  Etiology appears most consistent with rectal mass  Will need ongoing follow-up with Oncology and Colorectal surgery    Note input from Oncology regarding short course of radiation bleeding cannot be controlled  Fortunately, patient's appears to be stable currently  Hemoglobin appears to stable            VTE Pharmacologic Prophylaxis:   Pharmacologic: GI bleed  Mechanical VTE Prophylaxis in Place: No    Patient Centered Rounds: I have performed bedside rounds with nursing staff today  Time Spent for Care: 15 minutes  More than 50% of total time spent on counseling and coordination of care as described above  Current Length of Stay: 4 day(s)    Current Patient Status: Inpatient   Certification Statement: The patient will continue to require additional inpatient hospital stay due to Need to monitor symptoms    Discharge Plan:  Need to monitor symptoms    Code Status: Level 1 - Full Code      Subjective:   No acute distress    Objective:     Vitals:   Temp (24hrs), Av 2 °F (36 8 °C), Min:97 9 °F (36 6 °C), Max:98 4 °F (36 9 °C)    Temp:  [97 9 °F (36 6 °C)-98 4 °F (36 9 °C)] 98 2 °F (36 8 °C)  HR:  [58-69] 58  Resp:  [12-17] 12  BP: (104-106)/(62-64) 106/64  SpO2:  [97 %] 97 %  Body mass index is 22 82 kg/m²  Input and Output Summary (last 24 hours): Intake/Output Summary (Last 24 hours) at 2020 0819  Last data filed at 2020 0753  Gross per 24 hour   Intake 420 ml   Output 1651 ml   Net -1231 ml       Physical Exam:     Physical Exam  Constitutional:       Appearance: Normal appearance  HENT:      Head: Normocephalic and atraumatic  Eyes:      Extraocular Movements: Extraocular movements intact  Pupils: Pupils are equal, round, and reactive to light  Neck:      Musculoskeletal: Normal range of motion and neck supple  No neck rigidity  Cardiovascular:      Rate and Rhythm: Normal rate and regular rhythm  Pulmonary:      Effort: Pulmonary effort is normal       Breath sounds: Normal breath sounds  Abdominal:      General: There is no distension  Palpations: Abdomen is soft  There is no mass     Musculoskeletal: Normal range of motion  Skin:     General: Skin is warm and dry  Neurological:      General: No focal deficit present  Mental Status: She is alert  Mental status is at baseline  Psychiatric:         Behavior: Behavior normal          Additional Data:     Labs:    Results from last 7 days   Lab Units 08/13/20  2356  08/10/20  2056   WBC Thousand/uL  --   --  11 16*   HEMOGLOBIN g/dL 8 2*   < > 8 7*   HEMATOCRIT %  --   --  29 6*   PLATELETS Thousands/uL  --   --  535*   NEUTROS PCT %  --   --  68   LYMPHS PCT %  --   --  19   MONOS PCT %  --   --  6   EOS PCT %  --   --  5    < > = values in this interval not displayed  Results from last 7 days   Lab Units 08/10/20  2056   POTASSIUM mmol/L 3 8   CHLORIDE mmol/L 106   CO2 mmol/L 26   BUN mg/dL 9   CREATININE mg/dL 0 63   CALCIUM mg/dL 8 8   ALK PHOS U/L 174*   ALT U/L 18   AST U/L 59*     Results from last 7 days   Lab Units 08/10/20  2056   INR  1 05       * I Have Reviewed All Lab Data Listed Above  * Additional Pertinent Lab Tests Reviewed:  All Labs Within Last 24 Hours Reviewed      Recent Cultures (last 7 days):           Last 24 Hours Medication List:   Current Facility-Administered Medications   Medication Dose Route Frequency Provider Last Rate    aluminum-magnesium hydroxide-simethicone  30 mL Oral Q6H PRN Oliver Glass MD      ampicillin-sulbactam  3 g Intravenous Q6H Oliver Glass MD Stopped (08/14/20 0540)    cholecalciferol  2,000 Units Oral Daily Oliver Glass MD      HYDROmorphone  0 5 mg Intravenous Q3H PRN Oliver Glass MD      loratadine  10 mg Oral Daily Oliver Glass MD      metoprolol succinate  25 mg Oral Daily Oliver Glass MD      oxyCODONE  20 mg Oral Q12H [facility] Oliver Glass MD      oxyCODONE-acetaminophen  1 tablet Oral Q4H PRN Oliver Glass MD      promethazine  12 5 mg Intramuscular Q6H PRN Oliver Glass MD      sertraline  25 mg Oral Daily Oliver Glass MD          Today, Patient Was Seen By: Hardy Moseley Anthony Jeffries, DO    ** Please Note: Dictation voice to text software may have been used in the creation of this document   **

## 2020-08-15 VITALS
RESPIRATION RATE: 17 BRPM | DIASTOLIC BLOOD PRESSURE: 73 MMHG | OXYGEN SATURATION: 96 % | HEART RATE: 61 BPM | BODY MASS INDEX: 22.96 KG/M2 | SYSTOLIC BLOOD PRESSURE: 122 MMHG | HEIGHT: 62 IN | TEMPERATURE: 98.6 F | WEIGHT: 124.78 LBS

## 2020-08-15 LAB
HGB BLD-MCNC: 8.8 G/DL (ref 11.5–15.4)
HGB BLD-MCNC: 9.2 G/DL (ref 11.5–15.4)

## 2020-08-15 PROCEDURE — 99239 HOSP IP/OBS DSCHRG MGMT >30: CPT | Performed by: INTERNAL MEDICINE

## 2020-08-15 PROCEDURE — 85018 HEMOGLOBIN: CPT | Performed by: INTERNAL MEDICINE

## 2020-08-15 RX ORDER — OXYCODONE HYDROCHLORIDE AND ACETAMINOPHEN 5; 325 MG/1; MG/1
1 TABLET ORAL EVERY 4 HOURS PRN
Qty: 40 TABLET | Refills: 0 | Status: SHIPPED | OUTPATIENT
Start: 2020-08-15 | End: 2020-08-25

## 2020-08-15 RX ADMIN — OXYCODONE HYDROCHLORIDE AND ACETAMINOPHEN 1 TABLET: 5; 325 TABLET ORAL at 02:19

## 2020-08-15 RX ADMIN — METOPROLOL SUCCINATE 25 MG: 25 TABLET, EXTENDED RELEASE ORAL at 08:34

## 2020-08-15 RX ADMIN — AMPICILLIN SODIUM AND SULBACTAM SODIUM 3 G: 2; 1 INJECTION, POWDER, FOR SOLUTION INTRAMUSCULAR; INTRAVENOUS at 08:31

## 2020-08-15 RX ADMIN — OXYCODONE HYDROCHLORIDE 20 MG: 20 TABLET, FILM COATED, EXTENDED RELEASE ORAL at 08:33

## 2020-08-15 RX ADMIN — SERTRALINE HYDROCHLORIDE 25 MG: 25 TABLET ORAL at 08:30

## 2020-08-15 RX ADMIN — OXYCODONE HYDROCHLORIDE AND ACETAMINOPHEN 1 TABLET: 5; 325 TABLET ORAL at 10:09

## 2020-08-15 RX ADMIN — AMPICILLIN SODIUM AND SULBACTAM SODIUM 3 G: 2; 1 INJECTION, POWDER, FOR SOLUTION INTRAMUSCULAR; INTRAVENOUS at 02:15

## 2020-08-15 RX ADMIN — LORATADINE 10 MG: 10 TABLET ORAL at 08:30

## 2020-08-15 RX ADMIN — MELATONIN 2000 UNITS: at 08:31

## 2020-08-17 ENCOUNTER — TELEPHONE (OUTPATIENT)
Dept: PALLIATIVE MEDICINE | Facility: CLINIC | Age: 45
End: 2020-08-17

## 2020-08-17 ENCOUNTER — TRANSITIONAL CARE MANAGEMENT (OUTPATIENT)
Dept: FAMILY MEDICINE CLINIC | Facility: CLINIC | Age: 45
End: 2020-08-17

## 2020-08-17 NOTE — UTILIZATION REVIEW
Notification of Discharge  This is a Notification of Discharge from our facility [address]  Please be advised that this patient has been discharge from our facility  Below you will find the admission and discharge date and time including the patients disposition  PRESENTATION DATE: 8/10/2020  8:01 PM  OBS ADMISSION DATE:   IP ADMISSION DATE: 8/10/20 2143   DISCHARGE DATE: 8/15/2020 12:33 PM  DISPOSITION: Home/Self Care Home/Self Care   Admission Orders listed below:  Admission Orders (From admission, onward)     Ordered        08/10/20 2148  Inpatient Admission  Once                   Please contact the UR Department if additional information is required to close this patient's authorization/case  [facility] Utilization Review Department  Main: [phone] x carefully listen to the prompts  All voicemails are confidential   [email or web address]  Send all requests for admission clinical reviews, approved or denied determinations and any other requests to dedicated fax number below belonging to the campus where the patient is receiving treatment   List of dedicated fax numbers:  [address] DENIALS (Administrative/Medical Necessity) [phone]   [address] (Maternity/NICU/Pediatrics) [phone]   Bell Mast [phone]   Rianna Iverson [phone]   Cullen Opitz [phone]   [address] [phone]   [facility]  [phone]   [address] [facility]  [address] [facility] [address] [facility] [phone]

## 2020-08-18 ENCOUNTER — LAB REQUISITION (OUTPATIENT)
Dept: LAB | Facility: HOSPITAL | Age: 45
End: 2020-08-18
Payer: COMMERCIAL

## 2020-08-18 DIAGNOSIS — D89.9 DISORDER INVOLVING THE IMMUNE MECHANISM, UNSPECIFIED (HCC): ICD-10-CM

## 2020-08-18 DIAGNOSIS — C50.919 MALIGNANT NEOPLASM OF UNSPECIFIED SITE OF UNSPECIFIED FEMALE BREAST (HCC): ICD-10-CM

## 2020-08-18 DIAGNOSIS — C20 MALIGNANT NEOPLASM OF RECTUM (HCC): ICD-10-CM

## 2020-08-18 PROCEDURE — 83519 RIA NONANTIBODY: CPT | Performed by: INTERNAL MEDICINE

## 2020-08-20 ENCOUNTER — TELEPHONE (OUTPATIENT)
Dept: PALLIATIVE MEDICINE | Facility: CLINIC | Age: 45
End: 2020-08-20

## 2020-08-20 NOTE — TELEPHONE ENCOUNTER
----- Message from Tyra Madsen MA sent at 8/19/2020 12:08 PM EDT -----  Regarding: on hospital follow up list  On 8/17 I called left message to call office to schedule appt

## 2020-08-24 ENCOUNTER — OFFICE VISIT (OUTPATIENT)
Dept: FAMILY MEDICINE CLINIC | Facility: CLINIC | Age: 45
End: 2020-08-24
Payer: COMMERCIAL

## 2020-08-24 VITALS
SYSTOLIC BLOOD PRESSURE: 116 MMHG | BODY MASS INDEX: 22.49 KG/M2 | WEIGHT: 122.2 LBS | DIASTOLIC BLOOD PRESSURE: 70 MMHG | TEMPERATURE: 97.5 F | HEART RATE: 92 BPM | OXYGEN SATURATION: 98 % | HEIGHT: 62 IN | RESPIRATION RATE: 12 BRPM

## 2020-08-24 DIAGNOSIS — C20 RECTAL CANCER (HCC): ICD-10-CM

## 2020-08-24 DIAGNOSIS — D63.8 ANEMIA OF CHRONIC DISEASE: ICD-10-CM

## 2020-08-24 DIAGNOSIS — I10 HTN (HYPERTENSION), BENIGN: ICD-10-CM

## 2020-08-24 DIAGNOSIS — Z09 HOSPITAL DISCHARGE FOLLOW-UP: Primary | ICD-10-CM

## 2020-08-24 DIAGNOSIS — G89.3 CANCER ASSOCIATED PAIN: ICD-10-CM

## 2020-08-24 DIAGNOSIS — F41.8 ANXIETY WITH DEPRESSION: ICD-10-CM

## 2020-08-24 DIAGNOSIS — E55.9 VITAMIN D DEFICIENCY: ICD-10-CM

## 2020-08-24 PROBLEM — A41.9 SEPSIS (HCC): Status: RESOLVED | Noted: 2020-07-04 | Resolved: 2020-08-24

## 2020-08-24 PROCEDURE — 3008F BODY MASS INDEX DOCD: CPT | Performed by: SURGERY

## 2020-08-24 PROCEDURE — 99495 TRANSJ CARE MGMT MOD F2F 14D: CPT | Performed by: FAMILY MEDICINE

## 2020-08-24 PROCEDURE — 1111F DSCHRG MED/CURRENT MED MERGE: CPT | Performed by: FAMILY MEDICINE

## 2020-08-24 RX ORDER — OXYCODONE HCL 20 MG/1
20 TABLET, FILM COATED, EXTENDED RELEASE ORAL EVERY 12 HOURS SCHEDULED
COMMUNITY
End: 2020-09-16 | Stop reason: HOSPADM

## 2020-08-24 RX ORDER — DOCUSATE SODIUM, 50 MG SENNOSIDES, 8.6 MG 8.6; 5 1/1; 1/1
CAPSULE, GELATIN COATED ORAL
COMMUNITY
End: 2020-09-16 | Stop reason: HOSPADM

## 2020-08-24 RX ORDER — LOPERAMIDE HYDROCHLORIDE 2 MG/1
CAPSULE ORAL
COMMUNITY
Start: 2020-06-24

## 2020-08-24 RX ORDER — CIPROFLOXACIN 500 MG/1
TABLET, FILM COATED ORAL
COMMUNITY
Start: 2020-07-23 | End: 2020-09-16 | Stop reason: HOSPADM

## 2020-08-24 RX ORDER — ONDANSETRON HYDROCHLORIDE 8 MG/1
TABLET, FILM COATED ORAL
COMMUNITY
Start: 2020-06-23 | End: 2020-09-16 | Stop reason: HOSPADM

## 2020-08-24 RX ORDER — DEXAMETHASONE 4 MG/1
TABLET ORAL
COMMUNITY
Start: 2020-06-26 | End: 2020-09-16 | Stop reason: HOSPADM

## 2020-08-24 RX ORDER — LIDOCAINE AND PRILOCAINE 25; 25 MG/G; MG/G
CREAM TOPICAL
COMMUNITY
Start: 2020-06-25

## 2020-08-24 RX ORDER — ERGOCALCIFEROL 1.25 MG/1
CAPSULE ORAL
COMMUNITY
Start: 2020-06-25

## 2020-08-24 NOTE — PROGRESS NOTES
Chief Complaint   Patient presents with    Transition of Care Management     Discharged 08/15/20  Assessment/Plan:     Cancer associated pain  [facility] palliative care  Continue oxycodone 20mg bid and percocet prn  Anemia of chronic disease  8/15/2020 hg 9 2 stable  Anxiety with depression  Stable  Continue zoloft 25mg QD per oncology  HTN (hypertension), benign  Controlled  DASH diet  Continue metoprolol 25mg QD per oncology  Vitamin D deficiency  Continue vit D [id number] weekly  Diagnoses and all orders for this visit:    Hospital discharge follow-up  Comments:  Reviewed hospital records  Metastatic rectal cancer  Comments:  [facility] oncology  Cancer associated pain    Anemia of chronic disease    Anxiety with depression    HTN (hypertension), benign    Vitamin D deficiency    Other orders  -     oxyCODONE (OxyCONTIN) 20 mg 12 hr tablet; Take 20 mg by mouth every 12 (twelve) hours  -     Sennosides-Docusate Sodium (Senna Plus) 8 6-50 MG CAPS; Take by mouth  -     Probiotic Product (PROBIOTIC PO); Take by mouth Renew life  -     dexamethasone (DECADRON) 4 mg tablet  -     Charcoal Activated (ACTIVATED CHARCOAL PO); Take by mouth  -     loperamide (IMODIUM) 2 mg capsule  -     ergocalciferol (VITAMIN D2) 50,000 units  -     ondansetron (ZOFRAN) 8 mg tablet  -     ciprofloxacin (CIPRO) 500 mg tablet  -     lidocaine-prilocaine (EMLA) cream         Subjective:     Patient ID: Les Lizarraga is a 50 y o  female  HPI    Pt is here by herself  Pt was in and out of hospital several times recently  Pt found rectal cancer metastasis to lung/liver since 6/2020  [facility] oncology Dr Kristyn Hawkins  She is on chemo therapy  Was in hospital 8/10-8/15/2020 for BRBPR  GI did sigmoidoscopy  No overt bleeding noted  Hg was stable  Got unasyn for rectal abscess  Saw palliative care for cancer pain  Discharged home  Pt states she did not have obvious rectal bleeding recently   Still has rectal pain and abdominal pain  Denies fever, Sob, CP  Cancer related pain----Rectal pain and abdominal pain  Will see palliative care OP  She is on oxycondon 20mg bid and percocet  5-325mg as needed  Breast cancer---6/2020 right breast biopsy done  [facility] oncology  Vit D deficiency---She is on vit D [id number] weekly  HTN---She is on metoprolol 25mg QD per oncology  Hold it if BP <100/60  Anxiety/Depression---Stable  She is on zoloft 25 mg QD  Cannot sleep well  Tried melatonin but she does not feel good on it  Will DW Dr Fahad Weathers this afternoon per pt  Quit smoking per pt  Quit alcohol in 1/2020  No drugs  Review of Systems   Constitutional: Negative for appetite change, chills and fever  HENT: Negative for congestion, ear pain, sinus pain and sore throat  Eyes: Negative for discharge and itching  Respiratory: Negative for apnea, cough, chest tightness, shortness of breath and wheezing  Cardiovascular: Negative for chest pain, palpitations and leg swelling  Gastrointestinal: Positive for abdominal pain  Negative for anal bleeding, constipation, diarrhea, nausea and vomiting  Endocrine: Negative for cold intolerance, heat intolerance and polyuria  Genitourinary: Negative for difficulty urinating and dysuria  Musculoskeletal: Negative for arthralgias, back pain and myalgias  Skin: Negative for rash  Neurological: Negative for dizziness and headaches  Psychiatric/Behavioral: Positive for sleep disturbance  Negative for agitation, self-injury and suicidal ideas  The patient is not nervous/anxious  Objective:     Physical Exam  Constitutional:       General: She is not in acute distress  Appearance: She is well-developed  HENT:      Head: Normocephalic  Eyes:      General:         Right eye: No discharge  Left eye: No discharge  Conjunctiva/sclera: Conjunctivae normal    Neck:      Musculoskeletal: Normal range of motion  Thyroid: No thyromegaly  Cardiovascular:      Rate and Rhythm: Normal rate and regular rhythm  Heart sounds: Normal heart sounds  No murmur  No friction rub  No gallop  Pulmonary:      Effort: Pulmonary effort is normal  No respiratory distress  Breath sounds: Normal breath sounds  No wheezing or rales  Chest:      Chest wall: No tenderness  Abdominal:      General: Bowel sounds are normal  There is distension  Palpations: Abdomen is soft  There is no mass  Tenderness: There is abdominal tenderness  There is no guarding or rebound  Musculoskeletal:         General: No swelling, tenderness or deformity  Lymphadenopathy:      Cervical: No cervical adenopathy  Neurological:      Mental Status: She is alert  Vitals:    08/24/20 0949   BP: 116/70   BP Location: Left arm   Patient Position: Sitting   Cuff Size: Adult   Pulse: 92   Resp: 12   Temp: 97 5 °F (36 4 °C)   TempSrc: Oral   SpO2: 98%   Weight: 55 4 kg (122 lb 3 2 oz)   Height: 5' 2" (1 575 m)       Transitional Care Management Review:  Les Lizarraga is a 50 y o  female here for TCM follow up  During the TCM phone call patient stated:    TCM Call (since 7/24/2020)     Date and time call was made  8/17/2020  3:37 PM    Hospital care reviewed  Records reviewed    Patient was hospitialized at  [facility]    Date of Admission  08/10/20    Date of discharge  08/15/20    Diagnosis  Bright red blood per rectum    Disposition  Home    Were the patients medications reviewed and updated  No    Current Symptoms  None      TCM Call (since 7/24/2020)     Post hospital issues  None    Should patient be enrolled in anticoag monitoring? Yes    Scheduled for follow up? Yes    Not clinically warranted  Currently admitted      Patients specialists  Other (comment)    Did you obtain your prescribed medications  Yes    Do you need help managing your prescriptions or medications  No    Is transportation to your appointment needed  No    I have advised the patient to call PCP with any new or worsening symptoms  LEONARD Bernard    Living Arrangements  Spouse or Significiant other; Children    Support System  Spouse;  Children    The type of support provided  Emotional; Physical    Do you have social support  Yes, as much as I need    Are you recieving any outpatient services  No    Are you recieving home care services  No    Are you using any community resources  No    Current waiver services  No    Have you fallen in the last 12 months  No    Interperter language line needed  No    Counseling  Patient    Counseling topics  instructions for management          Dontae Hightower MD

## 2020-08-26 ENCOUNTER — TRANSCRIBE ORDERS (OUTPATIENT)
Dept: ADMINISTRATIVE | Facility: HOSPITAL | Age: 45
End: 2020-08-26

## 2020-08-26 ENCOUNTER — HOSPITAL ENCOUNTER (OUTPATIENT)
Dept: RADIOLOGY | Facility: HOSPITAL | Age: 45
Discharge: HOME | End: 2020-08-26
Attending: INTERNAL MEDICINE
Payer: COMMERCIAL

## 2020-08-26 DIAGNOSIS — N17.8 ACUTE RENAL FAILURE WITH PATHOLOGICAL LESION IN KIDNEY (HCC): ICD-10-CM

## 2020-08-26 DIAGNOSIS — C20 MALIGNANT NEOPLASM OF RECTUM (HCC): Primary | ICD-10-CM

## 2020-08-26 DIAGNOSIS — C50.411 MALIGNANT NEOPLASM OF UPPER-OUTER QUADRANT OF RIGHT FEMALE BREAST, UNSPECIFIED ESTROGEN RECEPTOR STATUS (HCC): ICD-10-CM

## 2020-08-26 DIAGNOSIS — C78.7 SECONDARY MALIGNANT NEOPLASM OF LIVER (HCC): ICD-10-CM

## 2020-08-26 DIAGNOSIS — C20 MALIGNANT NEOPLASM OF RECTUM (HCC): ICD-10-CM

## 2020-08-26 LAB — NSE SERPL IA-MCNC: 40.9 NG/ML (ref 0–12.5)

## 2020-08-26 PROCEDURE — 76856 US EXAM PELVIC COMPLETE: CPT

## 2020-08-26 PROCEDURE — 76700 US EXAM ABDOM COMPLETE: CPT

## 2020-08-27 ENCOUNTER — HOSPITAL ENCOUNTER (INPATIENT)
Facility: HOSPITAL | Age: 45
LOS: 20 days | Discharge: HOME HEALTH | DRG: 329 | End: 2020-09-16
Attending: EMERGENCY MEDICINE | Admitting: INTERNAL MEDICINE
Payer: COMMERCIAL

## 2020-08-27 ENCOUNTER — APPOINTMENT (EMERGENCY)
Dept: RADIOLOGY | Facility: HOSPITAL | Age: 45
DRG: 329 | End: 2020-08-27
Payer: COMMERCIAL

## 2020-08-27 DIAGNOSIS — Z51.5 PALLIATIVE CARE PATIENT: ICD-10-CM

## 2020-08-27 DIAGNOSIS — A41.9 SEPSIS (HCC): ICD-10-CM

## 2020-08-27 DIAGNOSIS — R10.9 ABDOMINAL PAIN: ICD-10-CM

## 2020-08-27 DIAGNOSIS — C20 RECTAL CANCER (HCC): ICD-10-CM

## 2020-08-27 DIAGNOSIS — K59.03 DRUG-INDUCED CONSTIPATION: ICD-10-CM

## 2020-08-27 DIAGNOSIS — N83.8 OVARIAN MASS: ICD-10-CM

## 2020-08-27 DIAGNOSIS — L02.31 ABSCESS, GLUTEAL, LEFT: ICD-10-CM

## 2020-08-27 DIAGNOSIS — G89.3 CANCER ASSOCIATED PAIN: ICD-10-CM

## 2020-08-27 DIAGNOSIS — R97.1 ELEVATED CA-125: ICD-10-CM

## 2020-08-27 DIAGNOSIS — K94.09 COLOSTOMY PROLAPSE (HCC): Primary | ICD-10-CM

## 2020-08-27 DIAGNOSIS — L02.31 GLUTEAL ABSCESS: ICD-10-CM

## 2020-08-27 DIAGNOSIS — K56.600 PARTIAL BOWEL OBSTRUCTION (HCC): ICD-10-CM

## 2020-08-27 LAB
ALBUMIN SERPL BCP-MCNC: 2.5 G/DL (ref 3.5–5)
ALP SERPL-CCNC: 277 U/L (ref 46–116)
ALT SERPL W P-5'-P-CCNC: 13 U/L (ref 12–78)
ANION GAP SERPL CALCULATED.3IONS-SCNC: 6 MMOL/L (ref 4–13)
ANISOCYTOSIS BLD QL SMEAR: PRESENT
APTT PPP: 36 SECONDS (ref 23–37)
AST SERPL W P-5'-P-CCNC: 37 U/L (ref 5–45)
ATRIAL RATE: 90 BPM
BASOPHILS # BLD MANUAL: 0 THOUSAND/UL (ref 0–0.1)
BASOPHILS NFR MAR MANUAL: 0 % (ref 0–1)
BILIRUB SERPL-MCNC: 0.33 MG/DL (ref 0.2–1)
BILIRUB UR QL STRIP: NEGATIVE
BUN SERPL-MCNC: 6 MG/DL (ref 5–25)
CALCIUM SERPL-MCNC: 8 MG/DL (ref 8.3–10.1)
CHLORIDE SERPL-SCNC: 103 MMOL/L (ref 100–108)
CLARITY UR: CLEAR
CO2 SERPL-SCNC: 24 MMOL/L (ref 21–32)
COLOR UR: YELLOW
CREAT SERPL-MCNC: 0.46 MG/DL (ref 0.6–1.3)
EOSINOPHIL # BLD MANUAL: 0.7 THOUSAND/UL (ref 0–0.4)
EOSINOPHIL NFR BLD MANUAL: 4 % (ref 0–6)
ERYTHROCYTE [DISTWIDTH] IN BLOOD BY AUTOMATED COUNT: 21.7 % (ref 11.6–15.1)
GFR SERPL CREATININE-BSD FRML MDRD: 119 ML/MIN/1.73SQ M
GLUCOSE SERPL-MCNC: 101 MG/DL (ref 65–140)
GLUCOSE UR STRIP-MCNC: NEGATIVE MG/DL
HCT VFR BLD AUTO: 29.4 % (ref 34.8–46.1)
HELMET CELLS BLD QL SMEAR: PRESENT
HGB BLD-MCNC: 9 G/DL (ref 11.5–15.4)
HGB UR QL STRIP.AUTO: NEGATIVE
INR PPP: 1.22 (ref 0.84–1.19)
KETONES UR STRIP-MCNC: NEGATIVE MG/DL
LACTATE SERPL-SCNC: 1.3 MMOL/L (ref 0.5–2)
LEUKOCYTE ESTERASE UR QL STRIP: NEGATIVE
LIPASE SERPL-CCNC: 35 U/L (ref 73–393)
LYMPHOCYTES # BLD AUTO: 0.87 THOUSAND/UL (ref 0.6–4.47)
LYMPHOCYTES # BLD AUTO: 5 % (ref 14–44)
MCH RBC QN AUTO: 24.7 PG (ref 26.8–34.3)
MCHC RBC AUTO-ENTMCNC: 30.6 G/DL (ref 31.4–37.4)
MCV RBC AUTO: 81 FL (ref 82–98)
MONOCYTES # BLD AUTO: 0.7 THOUSAND/UL (ref 0–1.22)
MONOCYTES NFR BLD: 4 % (ref 4–12)
NEUTROPHILS # BLD MANUAL: 15.17 THOUSAND/UL (ref 1.85–7.62)
NEUTS BAND NFR BLD MANUAL: 3 % (ref 0–8)
NEUTS SEG NFR BLD AUTO: 84 % (ref 43–75)
NITRITE UR QL STRIP: NEGATIVE
NRBC BLD AUTO-RTO: 0 /100 WBCS
OVALOCYTES BLD QL SMEAR: PRESENT
P AXIS: 32 DEGREES
PH UR STRIP.AUTO: 6.5 [PH]
PLATELET # BLD AUTO: 197 THOUSANDS/UL (ref 149–390)
PLATELET BLD QL SMEAR: ADEQUATE
PMV BLD AUTO: 10.4 FL (ref 8.9–12.7)
POIKILOCYTOSIS BLD QL SMEAR: PRESENT
POLYCHROMASIA BLD QL SMEAR: PRESENT
POTASSIUM SERPL-SCNC: 3.3 MMOL/L (ref 3.5–5.3)
PR INTERVAL: 136 MS
PROCALCITONIN SERPL-MCNC: 1.19 NG/ML
PROCALCITONIN SERPL-MCNC: 1.2 NG/ML
PROT SERPL-MCNC: 6.7 G/DL (ref 6.4–8.2)
PROT UR STRIP-MCNC: NEGATIVE MG/DL
PROTHROMBIN TIME: 15.4 SECONDS (ref 11.6–14.5)
QRS AXIS: 56 DEGREES
QRSD INTERVAL: 86 MS
QT INTERVAL: 318 MS
QTC INTERVAL: 389 MS
RBC # BLD AUTO: 3.65 MILLION/UL (ref 3.81–5.12)
RBC MORPH BLD: PRESENT
SODIUM SERPL-SCNC: 133 MMOL/L (ref 136–145)
SP GR UR STRIP.AUTO: 1.04 (ref 1–1.03)
T WAVE AXIS: 33 DEGREES
TOTAL CELLS COUNTED SPEC: 100
UROBILINOGEN UR QL STRIP.AUTO: 0.2 E.U./DL
VENTRICULAR RATE: 90 BPM
WBC # BLD AUTO: 17.44 THOUSAND/UL (ref 4.31–10.16)

## 2020-08-27 PROCEDURE — 36415 COLL VENOUS BLD VENIPUNCTURE: CPT | Performed by: EMERGENCY MEDICINE

## 2020-08-27 PROCEDURE — 99223 1ST HOSP IP/OBS HIGH 75: CPT | Performed by: INTERNAL MEDICINE

## 2020-08-27 PROCEDURE — 74177 CT ABD & PELVIS W/CONTRAST: CPT

## 2020-08-27 PROCEDURE — 87040 BLOOD CULTURE FOR BACTERIA: CPT | Performed by: EMERGENCY MEDICINE

## 2020-08-27 PROCEDURE — 83690 ASSAY OF LIPASE: CPT | Performed by: EMERGENCY MEDICINE

## 2020-08-27 PROCEDURE — 85730 THROMBOPLASTIN TIME PARTIAL: CPT | Performed by: EMERGENCY MEDICINE

## 2020-08-27 PROCEDURE — 80053 COMPREHEN METABOLIC PANEL: CPT | Performed by: EMERGENCY MEDICINE

## 2020-08-27 PROCEDURE — 93005 ELECTROCARDIOGRAM TRACING: CPT

## 2020-08-27 PROCEDURE — 93010 ELECTROCARDIOGRAM REPORT: CPT | Performed by: INTERNAL MEDICINE

## 2020-08-27 PROCEDURE — 99254 IP/OBS CNSLTJ NEW/EST MOD 60: CPT | Performed by: INTERNAL MEDICINE

## 2020-08-27 PROCEDURE — 83605 ASSAY OF LACTIC ACID: CPT | Performed by: EMERGENCY MEDICINE

## 2020-08-27 PROCEDURE — 81003 URINALYSIS AUTO W/O SCOPE: CPT | Performed by: EMERGENCY MEDICINE

## 2020-08-27 PROCEDURE — 87076 CULTURE ANAEROBE IDENT EACH: CPT | Performed by: EMERGENCY MEDICINE

## 2020-08-27 PROCEDURE — 99285 EMERGENCY DEPT VISIT HI MDM: CPT | Performed by: EMERGENCY MEDICINE

## 2020-08-27 PROCEDURE — 96361 HYDRATE IV INFUSION ADD-ON: CPT

## 2020-08-27 PROCEDURE — 87077 CULTURE AEROBIC IDENTIFY: CPT | Performed by: EMERGENCY MEDICINE

## 2020-08-27 PROCEDURE — 85027 COMPLETE CBC AUTOMATED: CPT | Performed by: EMERGENCY MEDICINE

## 2020-08-27 PROCEDURE — 96376 TX/PRO/DX INJ SAME DRUG ADON: CPT

## 2020-08-27 PROCEDURE — 96375 TX/PRO/DX INJ NEW DRUG ADDON: CPT

## 2020-08-27 PROCEDURE — 85007 BL SMEAR W/DIFF WBC COUNT: CPT | Performed by: EMERGENCY MEDICINE

## 2020-08-27 PROCEDURE — 85610 PROTHROMBIN TIME: CPT | Performed by: EMERGENCY MEDICINE

## 2020-08-27 PROCEDURE — G1004 CDSM NDSC: HCPCS

## 2020-08-27 PROCEDURE — 99285 EMERGENCY DEPT VISIT HI MDM: CPT

## 2020-08-27 PROCEDURE — 84145 PROCALCITONIN (PCT): CPT | Performed by: EMERGENCY MEDICINE

## 2020-08-27 PROCEDURE — 96374 THER/PROPH/DIAG INJ IV PUSH: CPT

## 2020-08-27 PROCEDURE — 87181 SC STD AGAR DILUTION PER AGT: CPT | Performed by: EMERGENCY MEDICINE

## 2020-08-27 RX ORDER — HYDROMORPHONE HCL/PF 1 MG/ML
0.5 SYRINGE (ML) INJECTION ONCE
Status: COMPLETED | OUTPATIENT
Start: 2020-08-27 | End: 2020-08-27

## 2020-08-27 RX ORDER — ACETAMINOPHEN 325 MG/1
975 TABLET ORAL ONCE
Status: COMPLETED | OUTPATIENT
Start: 2020-08-27 | End: 2020-08-27

## 2020-08-27 RX ORDER — OXYCODONE HCL 20 MG/1
20 TABLET, FILM COATED, EXTENDED RELEASE ORAL EVERY 12 HOURS SCHEDULED
Status: DISCONTINUED | OUTPATIENT
Start: 2020-08-27 | End: 2020-08-28

## 2020-08-27 RX ORDER — METOPROLOL SUCCINATE 25 MG/1
25 TABLET, EXTENDED RELEASE ORAL DAILY
Status: DISCONTINUED | OUTPATIENT
Start: 2020-08-27 | End: 2020-09-07

## 2020-08-27 RX ORDER — SERTRALINE HYDROCHLORIDE 25 MG/1
25 TABLET, FILM COATED ORAL DAILY
Status: DISCONTINUED | OUTPATIENT
Start: 2020-08-27 | End: 2020-09-16 | Stop reason: HOSPADM

## 2020-08-27 RX ORDER — OXYCODONE HYDROCHLORIDE 10 MG/1
10 TABLET ORAL EVERY 4 HOURS PRN
Status: DISCONTINUED | OUTPATIENT
Start: 2020-08-27 | End: 2020-08-30

## 2020-08-27 RX ORDER — ONDANSETRON 2 MG/ML
4 INJECTION INTRAMUSCULAR; INTRAVENOUS ONCE
Status: COMPLETED | OUTPATIENT
Start: 2020-08-27 | End: 2020-08-27

## 2020-08-27 RX ORDER — ONDANSETRON 2 MG/ML
4 INJECTION INTRAMUSCULAR; INTRAVENOUS EVERY 4 HOURS PRN
Status: DISCONTINUED | OUTPATIENT
Start: 2020-08-27 | End: 2020-09-16 | Stop reason: HOSPADM

## 2020-08-27 RX ORDER — ONDANSETRON 4 MG/1
4 TABLET, ORALLY DISINTEGRATING ORAL ONCE
Status: COMPLETED | OUTPATIENT
Start: 2020-08-27 | End: 2020-08-27

## 2020-08-27 RX ORDER — HYDROMORPHONE HCL/PF 1 MG/ML
0.5 SYRINGE (ML) INJECTION
Status: DISCONTINUED | OUTPATIENT
Start: 2020-08-27 | End: 2020-08-27

## 2020-08-27 RX ORDER — LORATADINE 10 MG/1
10 TABLET ORAL DAILY
Status: DISCONTINUED | OUTPATIENT
Start: 2020-08-27 | End: 2020-09-16 | Stop reason: HOSPADM

## 2020-08-27 RX ORDER — HYDROMORPHONE HCL/PF 1 MG/ML
0.5 SYRINGE (ML) INJECTION EVERY 2 HOUR PRN
Status: DISCONTINUED | OUTPATIENT
Start: 2020-08-27 | End: 2020-09-02

## 2020-08-27 RX ORDER — OXYCODONE HYDROCHLORIDE 10 MG/1
10 TABLET ORAL EVERY 4 HOURS PRN
Status: DISCONTINUED | OUTPATIENT
Start: 2020-08-27 | End: 2020-08-27

## 2020-08-27 RX ORDER — OXYCODONE HYDROCHLORIDE AND ACETAMINOPHEN 5; 325 MG/1; MG/1
1 TABLET ORAL EVERY 4 HOURS PRN
Status: DISCONTINUED | OUTPATIENT
Start: 2020-08-27 | End: 2020-08-30

## 2020-08-27 RX ORDER — OXYCODONE HYDROCHLORIDE 5 MG/1
5 TABLET ORAL EVERY 4 HOURS PRN
Status: DISCONTINUED | OUTPATIENT
Start: 2020-08-27 | End: 2020-08-27

## 2020-08-27 RX ADMIN — OXYCODONE HYDROCHLORIDE 10 MG: 10 TABLET ORAL at 22:38

## 2020-08-27 RX ADMIN — METRONIDAZOLE 500 MG: 500 INJECTION, SOLUTION INTRAVENOUS at 21:28

## 2020-08-27 RX ADMIN — SODIUM CHLORIDE 1000 ML: 0.9 INJECTION, SOLUTION INTRAVENOUS at 01:58

## 2020-08-27 RX ADMIN — OXYCODONE HYDROCHLORIDE 20 MG: 20 TABLET, FILM COATED, EXTENDED RELEASE ORAL at 21:00

## 2020-08-27 RX ADMIN — ONDANSETRON 4 MG: 2 INJECTION INTRAMUSCULAR; INTRAVENOUS at 05:22

## 2020-08-27 RX ADMIN — METRONIDAZOLE 500 MG: 500 INJECTION, SOLUTION INTRAVENOUS at 15:35

## 2020-08-27 RX ADMIN — ENOXAPARIN SODIUM 40 MG: 40 INJECTION SUBCUTANEOUS at 08:50

## 2020-08-27 RX ADMIN — CEFEPIME HYDROCHLORIDE 2000 MG: 2 INJECTION, POWDER, FOR SOLUTION INTRAVENOUS at 08:44

## 2020-08-27 RX ADMIN — OXYCODONE HYDROCHLORIDE 20 MG: 20 TABLET, FILM COATED, EXTENDED RELEASE ORAL at 08:52

## 2020-08-27 RX ADMIN — HYDROMORPHONE HYDROCHLORIDE 0.5 MG: 1 INJECTION, SOLUTION INTRAMUSCULAR; INTRAVENOUS; SUBCUTANEOUS at 05:22

## 2020-08-27 RX ADMIN — IOHEXOL 100 ML: 350 INJECTION, SOLUTION INTRAVENOUS at 02:44

## 2020-08-27 RX ADMIN — HYDROMORPHONE HYDROCHLORIDE 0.5 MG: 1 INJECTION, SOLUTION INTRAMUSCULAR; INTRAVENOUS; SUBCUTANEOUS at 10:33

## 2020-08-27 RX ADMIN — ONDANSETRON 4 MG: 2 INJECTION INTRAMUSCULAR; INTRAVENOUS at 15:31

## 2020-08-27 RX ADMIN — METRONIDAZOLE 500 MG: 500 INJECTION, SOLUTION INTRAVENOUS at 06:09

## 2020-08-27 RX ADMIN — ONDANSETRON 4 MG: 4 TABLET, ORALLY DISINTEGRATING ORAL at 01:53

## 2020-08-27 RX ADMIN — HYDROMORPHONE HYDROCHLORIDE 0.5 MG: 1 INJECTION, SOLUTION INTRAMUSCULAR; INTRAVENOUS; SUBCUTANEOUS at 01:53

## 2020-08-27 RX ADMIN — SERTRALINE HYDROCHLORIDE 25 MG: 25 TABLET ORAL at 08:52

## 2020-08-27 RX ADMIN — ACETAMINOPHEN 975 MG: 325 TABLET, FILM COATED ORAL at 01:42

## 2020-08-27 RX ADMIN — CEFEPIME HYDROCHLORIDE 2000 MG: 2 INJECTION, POWDER, FOR SOLUTION INTRAVENOUS at 20:53

## 2020-08-27 RX ADMIN — LORATADINE 10 MG: 10 TABLET ORAL at 08:52

## 2020-08-27 RX ADMIN — HYDROMORPHONE HYDROCHLORIDE 0.5 MG: 1 INJECTION, SOLUTION INTRAMUSCULAR; INTRAVENOUS; SUBCUTANEOUS at 19:09

## 2020-08-27 RX ADMIN — OXYCODONE HYDROCHLORIDE AND ACETAMINOPHEN 1 TABLET: 5; 325 TABLET ORAL at 15:35

## 2020-08-27 RX ADMIN — OXYCODONE HYDROCHLORIDE 10 MG: 10 TABLET ORAL at 08:51

## 2020-08-27 RX ADMIN — ONDANSETRON 4 MG: 2 INJECTION INTRAMUSCULAR; INTRAVENOUS at 08:48

## 2020-08-27 NOTE — ASSESSMENT & PLAN NOTE
· PDMP reviewed  · Continue OxyContin 20 mg q 12 hours  · Additionally further pain management p r n  as per adult acute pain management  · Patient has been noting recent increase in cancer-related pain particularly after chemotherapy, was scheduled to see palliative care team 08/28/2020    Will obtain consult while inpatient

## 2020-08-27 NOTE — CONSULTS
Consultation - [address] [place] [address] Ferdinand Fitzpatrick 50 y o  female MRN: [id number]  Unit/Bed#: [facility] 903-01 Encounter: [id number]      Assessment/Plan     Assessment:  Patient Active Problem List   Diagnosis    Large bowel obstruction ([address] )    Metastatic rectal cancer    Breast cancer    Abdominal pain    Anemia of chronic disease    Gram-positive bacteremia    Rectal abscess    Cancer associated pain    Thrombocytosis     Bright red blood per rectum    Carpal tunnel syndrome, bilateral    Disc degeneration, lumbar    Muscle spasm    Chronic left SI joint pain    Myofascial pain syndrome    Nicotine dependence    Postlaminectomy syndrome, lumbar    Neck pain    Abnormal cytological findings in specimens from other organs, systems and tissues    Anxiety with depression    HTN (hypertension), benign    Vitamin D deficiency       Plan:  1  Symptom management:    Continue OxyContin 20 mg BID   Increase availability of IV Dilaudid 0 5 mg to q2H PRN breakthrough pain   Link orders:    Percocet 5/325 q4H PRN moderate pain    OxyIR 10 mg q4H PRN severe pain   Bowel regimen to prevent OIC   ? Zoloft- will need to clarify if she is actually taking this medication at home    2  Goals of care: Long discussion with patient about next steps  She has spoken with Dr Cherie Sykes and given recurrent admission for same issue- she is amenable to proceeding with diverting colostomy  I updated Dr Symone Montiel via TT  3  Psychosocial support: Time spent providing supportive listening  History of Present Illness   Physician Requesting Consult: Teresa Fenton MD  Reason for Consult / Principal Problem: pain   Hx and PE limited by: NA  HPI: Iftikhar Vaughn is a 50y o  year old female who is known to [facility] as she follows with our team for cancer related pain in setting of stage IV rectal cancer   Patient presented with worsening pain and imaging re-demonstrated gluteal/coccygeal abscess versus necrotic tumor with partial obstruction  She was seen by CRS in ED and is considering diverting ostomy at this time  Previously declined this intervention as she worried about it interrupting her chemotherapeutic regimen  Patient continuing to have abdominal pressure and rectal pain  She finds it difficult to find a comfortable position  She gets some relief with the IV dilaudid and has continued on her home regimen of oxycontin and percocet  Patient recently spoke with her primary oncologist who has encouraged her to pursue diverting colostomy  I agreed with this recommendation and have reached out to CRS for the same  Inpatient consult to Palliative Care  Consult performed by: Kelsey Arcos DO  Consult ordered by: Raphael Seaman DO          Review of Systems   Constitutional: Positive for activity change and fatigue  Respiratory: Negative for shortness of breath  Gastrointestinal: Positive for abdominal pain and rectal pain  All other systems reviewed and are negative        Historical Information   Past Medical History:   Diagnosis Date    Breast cancer [address] [address] ) 06/01/2020    right side    History of rectal cancer 06/2020    Sepsis ([facility] [address] ) 7/4/2020     Past Surgical History:   Procedure Laterality Date    BACK SURGERY      BREAST BIOPSY Right 06/19/2020    2 sites; 1 breast 1 axilla    HERNIA REPAIR      3 repairs    IR BIOPSY LIVER MASS  6/17/2020    IR IMAGE GUIDED ASPIRATION / DRAINAGE  7/31/2020    IR IMAGE GUIDED ASPIRATION / DRAINAGE  8/7/2020    IR PORT PLACEMENT  6/22/2020    TUBAL LIGATION      US GUIDED BREAST BIOPSY RIGHT COMPLETE Right 6/19/2020    US GUIDED BREAST LYMPH NODE BIOPSY RIGHT Right 6/19/2020     E-Cigarette/Vaping    E-Cigarette Use Never User      E-Cigarette/Vaping Substances    Nicotine No     THC No     CBD No     Flavoring No     Other No     Unknown No      Social History     Socioeconomic History    Marital status: /Civil Union     Spouse name: None    Number of children: None    Years of education: None    Highest education level: None   Occupational History    None   Social Needs    Financial resource strain: None    Food insecurity     Worry: None     Inability: None    Transportation needs     Medical: None     Non-medical: None   Tobacco Use    Smoking status: Former Smoker     Packs/day: 0 00     Last attempt to quit: 2020     Years since quittin 3    Smokeless tobacco: Never Used   Substance and Sexual Activity    Alcohol use: Not Currently    Drug use: No    Sexual activity: None   Lifestyle    Physical activity     Days per week: None     Minutes per session: None    Stress: None   Relationships    Social connections     Talks on phone: None     Gets together: None     Attends [employer] service: None     Active member of club or organization: None     Attends meetings of clubs or organizations: None     Relationship status: None    Intimate partner violence     Fear of current or ex partner: None     Emotionally abused: None     Physically abused: None     Forced sexual activity: None   Other Topics Concern    None   Social History Narrative    None     Family History   Problem Relation Age of Onset    No Known Problems Mother     No Known Problems Sister     No Known Problems Daughter     Breast cancer Maternal Grandmother         age unknown    No Known Problems Sister     No Known Problems Daughter     Breast cancer Maternal Aunt 61    No Known Problems Maternal Aunt        Meds/Allergies   all current active meds have been reviewed and current meds:   Current Facility-Administered Medications   Medication Dose Route Frequency    cefepime (MAXIPIME) 2,000 mg in dextrose 5 % 50 mL IVPB  2,000 mg Intravenous Q12H    enoxaparin (LOVENOX) subcutaneous injection 40 mg  40 mg Subcutaneous Daily    HYDROmorphone (DILAUDID) injection 0 5 mg  0 5 mg Intravenous Q3H PRN    loratadine (CLARITIN) tablet 10 mg  10 mg Oral Daily    metoprolol succinate (TOPROL-XL) 24 hr tablet 25 mg  25 mg Oral Daily    metroNIDAZOLE (FLAGYL) IVPB (premix) 500 mg 100 mL  500 mg Intravenous Q8H    ondansetron (ZOFRAN) injection 4 mg  4 mg Intravenous Q4H PRN    oxyCODONE (OxyCONTIN) 12 hr tablet 20 mg  20 mg Oral Q12H [address]    oxyCODONE (ROXICODONE) immediate release tablet 10 mg  10 mg Oral Q4H PRN    oxyCODONE (ROXICODONE) IR tablet 5 mg  5 mg Oral Q4H PRN    sertraline (ZOLOFT) tablet 25 mg  25 mg Oral Daily       Palliative Care Medications: see plan    Allergies   Allergen Reactions    Augmentin [Amoxicillin-Pot Clavulanate] Confusion and Drowsiness       Objective     Physical Exam  Vitals signs and nursing note reviewed  Constitutional:       General: She is not in acute distress  HENT:      Head: Normocephalic and atraumatic  Right Ear: External ear normal       Left Ear: External ear normal    Eyes:      General:         Right eye: No discharge  Left eye: No discharge  Cardiovascular:      Rate and Rhythm: Normal rate  Pulmonary:      Effort: Pulmonary effort is normal  No respiratory distress  Abdominal:      General: There is no distension  Palpations: Abdomen is soft  Skin:     Coloration: Skin is pale  Neurological:      General: No focal deficit present  Mental Status: She is oriented to person, place, and time  Psychiatric:         Mood and Affect: Mood normal          Behavior: Behavior normal          Thought Content: Thought content normal          Judgment: Judgment normal          Lab Results:   I have personally reviewed pertinent labs  , CBC:   Lab Results   Component Value Date    WBC 17 44 (H) 08/27/2020    HGB 9 0 (L) 08/27/2020    HCT 29 4 (L) 08/27/2020    MCV 81 (L) 08/27/2020     08/27/2020    MCH 24 7 (L) 08/27/2020    MCHC 30 6 (L) 08/27/2020    RDW 21 7 (H) 08/27/2020    MPV 10 4 08/27/2020    NRBC 0 08/27/2020   , CMP:   Lab Results   Component Value Date    SODIUM 133 (L) 08/27/2020    K 3 3 (L) 08/27/2020     08/27/2020    CO2 24 08/27/2020    BUN 6 08/27/2020    CREATININE 0 46 (L) 08/27/2020    CALCIUM 8 0 (L) 08/27/2020    AST 37 08/27/2020    ALT 13 08/27/2020    ALKPHOS 277 (H) 08/27/2020    EGFR 119 08/27/2020   , PT/PTT:  Lab Results   Component Value Date    PTT 36 08/27/2020     Imaging Studies: I have personally reviewed pertinent reports  EKG, Pathology, and Other Studies: I have personally reviewed pertinent reports  Code Status: Level 1 - Full Code  Advance Directive and Living Will:      Power of :    POLST:      Counseling / Coordination of Care  Total floor / unit time spent today 75+ minutes  Greater than 50% of total time was spent with the patient and / or family counseling and / or coordination of care  A description of the counseling / coordination of care: chart review, medication review with changes, supportive listening

## 2020-08-27 NOTE — ASSESSMENT & PLAN NOTE
· With liver/lung metastases s/p chemotherapy per outpatient Oncology  · CT abdomen/pelvis revealing concern for persistent partially obstructing mass and will appreciate Colorectal surgery recommendations  · Palliative/diverting colostomy had previously been offered by colorectal surgery however patient is currently refusing, defer indication to colorectal surgery team

## 2020-08-27 NOTE — SEPSIS NOTE
Sepsis Note   Abisai Fitzpatrick 50 y o  female MRN: [id number]  Unit/Bed#: ED 08 Encounter: [id number]      qSOFA     Row Name 08/27/20 0110 08/27/20 0057             Altered mental status GCS < 15  0         Respiratory Rate > / =22    1       Systolic BP < / =015    0       Q Sofa Score  1  1           Initial Sepsis Screening     Row Name 08/27/20 0244                Is the patient's history suggestive of a new or worsening infection? (!) Yes (Proceed) Fever with abdominal pain, recent perirectal abscess  -IS        Suspected source of infection  urinary tract infection;acute abdominal infection  -IS        Are two or more of the following signs & symptoms of infection both present and new to the patient? (!) Yes (Proceed)  -IS        Indicate SIRS criteria  Leukocytosis (WBC > 05923 IJL); Tachycardia > 90 bpm  -IS        If the answer is yes to both questions, suspicion of sepsis is present          If severe sepsis is present AND tissue hypoperfusion perists in the hour after fluid resuscitation or lactate > 4, the patient meets criteria for SEPTIC SHOCK          Are any of the following organ dysfunction criteria present within 6 hours of suspected infection and SIRS criteria that are NOT considered to be chronic conditions? No  -IS        Organ dysfunction          Date of presentation of severe sepsis          Time of presentation of severe sepsis          Tissue hypoperfusion persists in the hour after crystalloid fluid administration, evidenced, by either:          Was hypotension present within one hour of the conclusion of crystalloid fluid administration?   No  -IS        Date of presentation of septic shock          Time of presentation of septic shock            User Key  (r) = Recorded By, (t) = Taken By, (c) = Cosigned By    [address] Name Provider Carl Vallecillo MD Resident

## 2020-08-27 NOTE — PROGRESS NOTES
Colorectal surgery attending note  Full note/consultation to follow  Patient well known from prior admissions  She has ongoing issues with her rectal tumor she is clinically not obstructed  Currently presents for abdominal pain back pain  We again discussed colostomy for palliation  We discussed the decreasing fecal burden to her tumor wall and the long-term the decrease her obstructive symptoms as well as decrease her pelvic sepsis symptoms  No surgical intervention will completely decrease her pelvic sepsis symptoms  She is again unwilling to agreed to colostomy at this point in time  She has no urgent indication for colostomy in this can be pursued as an elective time in the future

## 2020-08-27 NOTE — CASE MANAGEMENT
Met with pt, explained CM program/CM role  LOS-1  Bundle-no  Unplanned readmission color-green  30 Day readmission-yes-rectal abscess, sepsis  Resides with: and children  Type home:2   AKANKSHA:3  Bedroom located:main floor  Primary caregiver:self  ADL's/ambulation: I PTA  Drive-license but not driving presently  PCP-Parish Lindquist MD  Pharmacy-[facility] [address]  Afford medication-yes  HHC:[facility]  DME:no  IP Rehab:no  MH illness-   no               IP/OP care  Drug abuse:no  Alcohol abuse:no  Employed-no  Primary Contact- [phone]  POA:no  LW: no  Transportation home:family     CM reviewed d/c planning process including the following: identifying help at home, patient preference for d/c planning needs, Discharge Lounge, [facility] Meds to Bed program, availability of treatment team to discuss questions or concerns patient and/or family may have regarding understanding medications and recognizing signs and symptoms once discharged  CM also encouraged patient to follow up with all recommended appointments after discharge  Patient advised of importance for patient and family to participate in managing patients medical well being      Patient/caregiver received discharge checklist  Content reviewed  Patient/caregiver encouraged to participate in discharge plan of care prior to discharge home

## 2020-08-27 NOTE — UTILIZATION REVIEW
Initial Clinical Review    Admission: Date/Time/Statement:   Admission Orders (From admission, onward)     Ordered        08/27/20 0618  Inpatient Admission  Once                   Orders Placed This Encounter   Procedures    Inpatient Admission     Standing Status:   Standing     Number of Occurrences:   1     Order Specific Question:   Admitting Physician     Answer:   Consuelo Carty [[id number]]     Order Specific Question:   Level of Care     Answer:   Med Surg [[id number]]     Order Specific Question:   Estimated length of stay     Answer:   More than 2 Midnights     Order Specific Question:   Certification     Answer:   I certify that inpatient services are medically necessary for this patient for a duration of greater than two midnights  See H&P and MD Progress Notes for additional information about the patient's course of treatment  ED Arrival Information     Expected Arrival Acuity Means of Arrival Escorted By Service Admission Type    - 8/27/2020 00:39 Urgent Walk-In Self Hospitalist Urgent    Arrival Complaint    Abdominal pain, Constipation        Chief Complaint   Patient presents with    Abdominal Pain     Pt stated that she had abdominal pain since her chemo treatment last week  Stated that normally gets belly pain after treatments  History of rectal cancer, pain there and feels as though she has an abscess as well  Assessment/Plan: 51 yo F with a pmh of rectal adenocarcinoma with mets to the liver and lung, on chemotherapy   She had a recent admission for a rectal abscess treated with IV abx' and IR guided aspirate drainage, as well as an additional hospitalization for rectal bleeding  She has been offered palliative diverting colostomy but has declined the procedure  She presents today with increased abdominal pain along with a fever and chills with worsening abdominal pain and left gluteal pain   CT showed partially obstructing colonic mass, along the left gluteal collection suspicious for recurrent abscess  She is admitted inpatient for  treatment of the rectal abscess  and cancer associated pain  Palliative care - 8/27 - cancer related pain - pending    Colorectal Surgery - 8/27 - CT concerning for partial bowel obstruction -  Pt with ongoing issues with her rectal tumor, recommended colostomy for palliation she is not agreeable to this at this time  Concerned her chemo is not compatible with the surgery  No urgent indication for surgery at this time       ED Triage Vitals   Temperature Pulse Respirations Blood Pressure SpO2   08/27/20 0057 08/27/20 0057 08/27/20 0057 08/27/20 0057 08/27/20 0057   98 4 °F (36 9 °C) 94 22 111/60 98 %      Temp Source Heart Rate Source Patient Position - Orthostatic VS BP Location FiO2 (%)   08/27/20 0057 08/27/20 0057 08/27/20 0300 08/27/20 0300 --   Oral Monitor Lying Right arm       Pain Score       08/27/20 0057       8          Wt Readings from Last 1 Encounters:   08/27/20 55 3 kg (122 lb)     Additional Vital Signs:   Date/Time   Temp   Pulse   Resp   BP   MAP (mmHg)   SpO2   O2 Device   Patient Position - Orthostatic VS    08/27/20 07:04:15   97 7 °F (36 5 °C)   71   18   99/63   75   98 %          08/27/20 0510      69   19   113/67      95 %   None (Room air)   Lying    08/27/20 0300      80   20   111/58   78   94 %   None (Room air)   Lying    08/27/20 0106   100 1 °F (37 8 °C)                               Pertinent Labs/Diagnostic Test Results:       Results from last 7 days   Lab Units 08/27/20  0156   WBC Thousand/uL 17 44*   HEMOGLOBIN g/dL 9 0*   HEMATOCRIT % 29 4*   PLATELETS Thousands/uL 197   BANDS PCT % 3         Results from last 7 days   Lab Units 08/27/20  0156   SODIUM mmol/L 133*   POTASSIUM mmol/L 3 3*   CHLORIDE mmol/L 103   CO2 mmol/L 24   ANION GAP mmol/L 6   BUN mg/dL 6   CREATININE mg/dL 0 46*   EGFR ml/min/1 73sq m 119   CALCIUM mg/dL 8 0*     Results from last 7 days   Lab Units 08/27/20  0156   AST U/L 37   ALT U/L 13 ALK PHOS U/L 277*   TOTAL PROTEIN g/dL 6 7   ALBUMIN g/dL 2 5*   TOTAL BILIRUBIN mg/dL 0 33         Results from last 7 days   Lab Units 08/27/20  0156   GLUCOSE RANDOM mg/dL 101     Results from last 7 days   Lab Units 08/27/20  0156   PROTIME seconds 15 4*   INR  1 22*   PTT seconds 36         Results from last 7 days   Lab Units 08/27/20  0506 08/27/20  0156   PROCALCITONIN ng/ml 1 19* 1 20*     Results from last 7 days   Lab Units 08/27/20  0156   LACTIC ACID mmol/L 1 3     Results from last 7 days   Lab Units 08/27/20  0156   LIPASE u/L 35*       Results from last 7 days   Lab Units 08/27/20  0320   CLARITY UA  Clear   COLOR UA  Yellow   SPEC GRAV UA  1 039*   PH UA  6 5   GLUCOSE UA mg/dl Negative   KETONES UA mg/dl Negative   BLOOD UA  Negative   PROTEIN UA mg/dl Negative   NITRITE UA  Negative   BILIRUBIN UA  Negative   UROBILINOGEN UA E U /dl 0 2   LEUKOCYTES UA  Negative     Results from last 7 days   Lab Units 08/27/20  0156   TOTAL COUNTED  100     CT A & P - 8/27 - There is a large rectal mass   The colon is diffusely distended proximal to this mass, raising concern that the rectal mass may be at least partially obstructing     Left gluteal collection suspicious for recurrent abscess   A complex collection is again seen immediately adjacent and extending anterior to the coccyx and into the presacral region   The presacral component is larger in AP and transverse dimensions when compared with August 3, 2020   This could be due to necrotic tumor, abscess, or a combination  There is an approximately 8 x 7 6 x 4 9 cm complex mixed solid and cystic mass within the anterior left hemipelvis   This is larger compared with August 3, 2020   This may represent a metastatic lesion or could be ovarian in origin  There is extensive hepatic metastatic disease, progressed somewhat compared with August 3, 2020     Pulmonary metastatic disease at the visualized lung bases demonstrates some interval progression compared with August 3, 2020  There is a small amount of ascites, increased compared with August 3, 2020  US Abd - 8/26 - 1   Multiple liver metastases  2   Small amount of ascites  US Pelvis - 8/26 - Cystic and solid left adnexal mass measuring 7 1 x 8 0 x 4 8 cm   Differential includes primary ovarian lesion versus metastatic rectal carcinoma  Uterine leiomyoma       ED Treatment:   Medication Administration from 08/27/2020 0038 to 08/27/2020 8183       Date/Time Order Dose Route Action Comments     08/27/2020 0142 acetaminophen (TYLENOL) tablet 975 mg 975 mg Oral Given      08/27/2020 0153 ondansetron (ZOFRAN-ODT) dispersible tablet 4 mg 4 mg Oral Given      08/27/2020 0153 HYDROmorphone (DILAUDID) injection 0 5 mg 0 5 mg Intravenous Given      08/27/2020 0158 sodium chloride 0 9 % bolus 1,000 mL 1,000 mL Intravenous New Bag      08/27/2020 0244 iohexol (OMNIPAQUE) 350 MG/ML injection (MULTI-DOSE) 100 mL 100 mL Intravenous Given      08/27/2020 0522 HYDROmorphone (DILAUDID) injection 0 5 mg 0 5 mg Intravenous Given      08/27/2020 0522 ondansetron (ZOFRAN) injection 4 mg 4 mg Intravenous Given      08/27/2020 7776 metroNIDAZOLE (FLAGYL) IVPB (premix) 500 mg 100 mL 500 mg Intravenous New Bag         Past Medical History:   Diagnosis Date    Breast cancer ([facility] ) 06/01/2020    right side    History of rectal cancer 06/2020    Sepsis ([facility] ) 7/4/2020     Present on Admission:   Rectal abscess   Cancer associated pain   Metastatic rectal cancer      Admitting Diagnosis: Rectal cancer ([place] [address] ) [C20]  Abdominal pain [R10 9]  Gluteal abscess [L02 31]  Partial bowel obstruction ([place] [address] ) [K56 600]  [facility]eps[facility] ) [A41 9]  Age/Sex: 50 y o  female  Admission Orders:  Scheduled Medications:    cefepime, 2,000 mg, Intravenous, Q12H  enoxaparin, 40 mg, Subcutaneous, Daily  loratadine, 10 mg, Oral, Daily  metoprolol succinate, 25 mg, Oral, Daily  metroNIDAZOLE, 500 mg, Intravenous, Q8H  oxyCODONE, 20 mg, Oral, Q12H [address]  sertraline, 25 mg, Oral, Daily      Continuous IV Infusions:     PRN Meds:  HYDROmorphone, 0 5 mg, Intravenous, Q3H PRN - 8/27 x 2  ondansetron, 4 mg, Intravenous, Q4H PRN - 8/27 x 1  oxyCODONE, 10 mg, Oral, Q4H PRN - 8/27 x 1  oxyCODONE, 5 mg, Oral, Q4H PRN      Nursing Orders -  VS - up with assistance - SCD's to [facility] - diet NPO     Network Utilization Review Department  [email or web address]  ATTENTION: Please call with any questions or concerns to [phone] and carefully listen to the prompts so that you are directed to the right person  All voicemails are confidential   Kelly Rodriguez all requests for admission clinical reviews, approved or denied determinations and any other requests to dedicated fax number below belonging to the campus where the patient is receiving treatment   List of dedicated fax numbers for the Facilities:  [address] DENIALS (Administrative/Medical Necessity) [phone]   [address] (Maternity/NICU/Pediatrics) [phone]   Odette Ang [phone]   [facility] [phone]   [facility] [phone]   [facility] [phone]   [address] [place] [address] [phone]   Magedangelo Ortiz [phone] [address] [facility] [address]  [address] [facility] [address] [facility] [phone]

## 2020-08-27 NOTE — ED ATTENDING ATTESTATION
8/27/2020  I, Shiva Omalley MD, saw and evaluated the patient  I have discussed the patient with the resident/non-physician practitioner and agree with the resident's/non-physician practitioner's findings, Plan of Care, and MDM as documented in the resident's/non-physician practitioner's note, except where noted  All available labs and Radiology studies were reviewed  I was present for key portions of any procedure(s) performed by the resident/non-physician practitioner and I was immediately available to provide assistance  At this point I agree with the current assessment done in the Emergency Department  I have conducted an independent evaluation of this patient a history and physical is as follows:    OA: 49 y/o f with metastatic rectal cancer who presents with fever, rectal and abdominal pain  Recent admission for IR drainage of simone-rectal abscess  Recent chemo and notes that after therapy often develops fevers and nausea  No cp/sob  No cough/congestions  NO urinary sxms  PE, uncomfortable appearing f,NC/AT, mildly dry MM, anciteric, neck supple/FROM, RR, lungs CTAb, abs diffusely ttp, obvious and palpable but reducible midline hernias, no obvious rectal abscess on visual exam but with ttp over L simone-rectal region,, - edema, - calf ttp, intact pulses, AAO   A/p concern intra-abdominal/rectal infection post chemo, broad labs, imaging, IVF hydration, discuss with colorectal, treat accordingly and admit    ED Course         Critical Care Time  Procedures

## 2020-08-27 NOTE — ASSESSMENT & PLAN NOTE
· Presented with increased abdominal and gluteal pain, recent admission for rectal abscess  · CT abdomen/pelvis concerning for recurrence of abscess, patient was complaining of fevers at home additionally leukocytosis is noted  · Otherwise patient hemodynamically stable  · Initiated on cefepime and Flagyl in ED and will continue  · Follow up results of blood cultures x2  · Appreciate surgical oncology recommendation  · Trend WBC, temperature curve, hemodynamics

## 2020-08-27 NOTE — H&P
H&P- Abisai Macias Erik West 1975, 50 y o  female MRN: [id number]    Unit/Bed#: [facility] 903-01 Encounter: [id number]    Primary Care Provider: Abdoul Valentine MD   Date and time admitted to hospital: 8/27/2020 12:51 AM        * Rectal abscess  Assessment & Plan  · Presented with increased abdominal and gluteal pain, recent admission for rectal abscess  · CT abdomen/pelvis concerning for recurrence of abscess, patient was complaining of fevers at home additionally leukocytosis is noted  · Otherwise patient hemodynamically stable  · Initiated on cefepime and Flagyl in ED and will continue  · Follow up results of blood cultures x2  · Appreciate surgical oncology recommendation  · Trend WBC, temperature curve, hemodynamics    Cancer associated pain  Assessment & Plan  · PDMP reviewed  · Continue OxyContin 20 mg q 12 hours  · Additionally further pain management p r n  as per adult acute pain management  · Patient has been noting recent increase in cancer-related pain particularly after chemotherapy, was scheduled to see palliative care team 08/28/2020  Will obtain consult while inpatient    Metastatic rectal cancer  Assessment & Plan  · With liver/lung metastases s/p chemotherapy per outpatient Oncology  · CT abdomen/pelvis revealing concern for persistent partially obstructing mass and will appreciate Colorectal surgery recommendations  · Palliative/diverting colostomy had previously been offered by colorectal surgery however patient is currently refusing, defer indication to colorectal surgery team      VTE Prophylaxis: Enoxaparin (Lovenox)  / sequential compression device   Code Status: Level 1 - Full Code per patient  POLST: POLST form is not discussed and not completed at this time  Anticipated Length of Stay:  Patient will be admitted on an Inpatient basis with an anticipated length of stay of  Greater than 2 midnights  Justification for Hospital Stay: Please see detailed plans noted above      Chief Complaint: Abdominal gluteal pain, fevers  History of Present Illness:  Sylvia Gilbert is a 50 y o  female who has a past medical history significant for rectal adenocarcinoma with metastases to liver and lung currently receiving chemotherapy and with associated cancer-related pain  With recent admissions at this hospital 1st for rectal abscess treated initially with IV antibiotics and s/p IR guided aspirate drainage, and with additional hospitalization for rectal bleeding  Has been offered palliative/diverting colostomy by colorectal surgery team, however patient continues to decline  Received last chemotherapy approximately 1 week ago, and following this had noted increased abdominal pain with associated nausea  This evening, additionally noted fevers to 101° F and chills, and worsening of abdominal pain along with left gluteal pain, prompting presentation  She denied any chest pain/pressure, shortness of breath, diarrhea, or dysuria  She underwent CT abdomen/pelvis revealing partially obstructing colonic mass, along with left gluteal collection suspicious for recurrent abscess  Was seen in ED by colorectal surgery team, and final recommendations are pending however currently advised pain control and antibiotics  At time my evaluation, patient lying in bed in no acute distress, continues to note abdominal pain but describes as 4/10 at this time, and notes improvement in nausea      Review of Systems:    Constitutional:  Endorses fever and chills   Eyes:  Denies change in visual acuity   HENT:  Denies nasal congestion or sore throat   Respiratory:  Denies cough or shortness of breath   Cardiovascular:  Denies chest pain or edema   GI:  Denies  vomiting, bloody stools or diarrhea but endorses abdominal pain and nausea  :  Denies dysuria   Musculoskeletal:  Denies back pain or joint pain but endorses gluteal pain  Integument:  Denies rash   Neurologic:  Denies headache, focal weakness or sensory changes Endocrine:  Denies polyuria or polydipsia   Lymphatic:  Denies swollen glands   Psychiatric:  Denies depression or anxiety     Past Medical and Surgical History:   Past Medical History:   Diagnosis Date    [facility]    right side    History of rectal cancer 2020    [facility] 2020     Past Surgical History:   Procedure Laterality Date    BACK SURGERY      BREAST BIOPSY Right 2020    2 sites; 1 breast 1 axilla    HERNIA REPAIR      3 repairs    IR BIOPSY LIVER MASS  2020    IR IMAGE GUIDED ASPIRATION / DRAINAGE  2020    IR IMAGE GUIDED ASPIRATION / DRAINAGE  2020    IR PORT PLACEMENT  2020    TUBAL LIGATION      US GUIDED BREAST BIOPSY RIGHT COMPLETE Right 2020    US GUIDED BREAST LYMPH NODE BIOPSY RIGHT Right 2020       Meds/Allergies:  Medications Prior to Admission   Medication    cetirizine (ZyrTEC) 10 mg tablet    Charcoal Activated (ACTIVATED CHARCOAL PO)    ciprofloxacin (CIPRO) 500 mg tablet    dexamethasone (DECADRON) 4 mg tablet    ergocalciferol (VITAMIN D2) 50,000 units    lidocaine-prilocaine (EMLA) cream    loperamide (IMODIUM) 2 mg capsule    metoprolol succinate (TOPROL-XL) 25 mg 24 hr tablet    ondansetron (ZOFRAN) 8 mg tablet    oxyCODONE (OxyCONTIN) 20 mg 12 hr tablet    Probiotic Product (PROBIOTIC PO)    Sennosides-Docusate Sodium (Senna Plus) 8 6-50 MG CAPS    sertraline (ZOLOFT) 25 mg tablet       Allergies:    Allergies   Allergen Reactions    Augmentin [Amoxicillin-Pot Clavulanate] Confusion and Drowsiness     History:  Marital Status: /Civil Union     Substance Use History:   Social History     Substance and Sexual Activity   Alcohol Use Not Currently     Social History     Tobacco Use   Smoking Status Former Smoker    Packs/day: 0 00    Last attempt to quit: 2020    Years since quittin 3   Smokeless Tobacco Never Used     Social History     Substance and Sexual Activity   Drug Use No       Family History:  Family History   Problem Relation Age of Onset    No Known Problems Mother     No Known Problems Sister     No Known Problems Daughter     Breast cancer Maternal Grandmother         age unknown    No Known Problems Sister     No Known Problems Daughter     Breast cancer Maternal Aunt 61    No Known Problems Maternal Aunt        Physical Exam:     Vitals:   Blood Pressure: 99/63 (08/27/20 0704)  Pulse: 71 (08/27/20 0704)  Temperature: 97 7 °F (36 5 °C) (08/27/20 0704)  Temp Source: Oral (08/27/20 0106)  Respirations: 18 (08/27/20 0704)  Weight - Scale: 55 3 kg (122 lb) (08/27/20 0057)  SpO2: 98 % (08/27/20 0704)    Constitutional:  Well developed, well nourished, no acute distress, non-toxic appearance   Eyes:  PERRL, conjunctiva normal   HENT:  Atraumatic, external ears normal, nose normal, oropharynx moist, no pharyngeal exudates  Neck- normal range of motion, no tenderness, supple   Respiratory:  No respiratory distress, normal breath sounds, no rales, no wheezing   Cardiovascular:  Normal rate, normal rhythm, no murmurs, no gallops, no rubs   GI:  Soft, nondistended, normal bowel sounds, mild diffuse discomfort to palpation, no organomegaly, large reducible hernia, no rebound, no guarding   :  No costovertebral angle tenderness   Musculoskeletal:  No edema, tenderness to palpation of left gluteal region, no deformities  Back- no tenderness  Integument:  Well hydrated, no rash   Lymphatic:  No lymphadenopathy noted   Neurologic:  Alert &awake, communicative, CN 2-12 normal, normal motor function, normal sensory function, no focal deficits noted   Psychiatric:  Speech and behavior appropriate       Lab Results: I have personally reviewed pertinent reports        Results from last 7 days   Lab Units 08/27/20  0156   WBC Thousand/uL 17 44*   HEMOGLOBIN g/dL 9 0*   HEMATOCRIT % 29 4*   PLATELETS Thousands/uL 197   LYMPHO PCT % 5*   MONO PCT % 4   EOS PCT % 4     Results from last 7 days   Lab Units 08/27/20  0156   POTASSIUM mmol/L 3 3*   CHLORIDE mmol/L 103   CO2 mmol/L 24   BUN mg/dL 6   CREATININE mg/dL 0 46*   CALCIUM mg/dL 8 0*   ALK PHOS U/L 277*   ALT U/L 13   AST U/L 37     Results from last 7 days   Lab Units 08/27/20  0156   INR  1 22*       Imaging: I have personally reviewed pertinent reports  Us Abdomen Complete    Result Date: 8/26/2020  Narrative: ABDOMEN ULTRASOUND, COMPLETE INDICATION:   C20: Malignant neoplasm of rectum C78 7: Secondary malignant neoplasm of liver and intrahepatic bile duct C50 411: Malignant neoplasm of upper-outer quadrant of right female breast N17 8: Other acute kidney failure  28-year-old female with history of rectal carcinoma metastatic to lung and liver  Abdominal pain following chemotherapy  COMPARISON: CT abdomen and pelvis from August 3, 2020  TECHNIQUE:   Real-time ultrasound of the abdomen was performed with a curvilinear transducer with both volumetric sweeps and still imaging techniques  FINDINGS: PANCREAS: Imaged portions normal with no evidence of mass or fluid collection  Main pancreatic duct not dilated  AORTA AND IVC:  Normal for patient's age  No aortic aneurysm  LIVER: Size:  Top normal in size  The liver measures 18 1 cm in the midclavicular line  Contour:  Surface contour is smooth  Parenchyma:  Multiple primarily hyperechoic masses, corresponding to metastases demonstrated on recent CT  The largest is in the right lobe, measuring 11 1 x 9 0 x 9 5 cm  GALLBLADDER: No gallstones  Gallbladder wall normal in thickness, measuring  2  mm  No pericholecystic fluid  Sonographer reports no sonographic Marie's sign  BILE DUCTS: CBD normal in caliber, measuring  4 mm  No intra-hepatic bile duct dilatation  KIDNEYS: Right kidney measures 12 1 x 5 3 cm  Cortex normal in thickness and echogenicity  No masses  No calculus or hydronephrosis  Left kidney measures 12 8 x 4 3 cm  Cortex normal in thickness and echogenicity  No masses   No calculus or hydronephrosis  SPLEEN: Normal in size, measuring 12 2 cm in length  Normal echogenicity  No mass or perisplenic collection  ASCITES:  Small amount of perihepatic ascites  Impression: 1  Multiple liver metastases  2   Small amount of ascites  Workstation performed: [id number]     Us Pelvis Complete Non Ob    Result Date: 8/26/2020  Narrative: PELVIC ULTRASOUND, COMPLETE INDICATION:  50years old  C20: Malignant neoplasm of rectum C78 7: Secondary malignant neoplasm of liver and intrahepatic bile duct C50 411: Malignant neoplasm of upper-outer quadrant of right female breast N17 8: Other acute kidney failure  COMPARISON: 5/27/2005 TECHNIQUE:   Transabdominal pelvic ultrasound was performed in sagittal and transverse planes with a curvilinear transducer  Endovaginal imaging was deferred  Imaging included volumetric sweeps as well as traditional still imaging technique  FINDINGS: UTERUS: The uterus is anteverted in position, measuring 8 3 x 3 9 x 5 2 cm  Right posterior leiomyoma measuring 2 7 x 2 5 x 3 3 cm is noted  The cervix shows no suspicious abnormality  ENDOMETRIUM:  Normal caliber of 4 mm  Homogeneous and normal in appearance  OVARIES/ADNEXA: Right ovary:  2 9 x 1 8 x 1 5 cm  No suspicious right ovarian abnormality  Doppler flow within normal limits  Cystic and solid left adnexal mass measuring 7 1 x 8 0 x 4 8 cm is identified  No suspicious adnexal mass or loculated collections  There is no free fluid  Impression:  Cystic and solid left adnexal mass measuring 7 1 x 8 0 x 4 8 cm  Differential includes primary ovarian lesion versus metastatic rectal carcinoma  Uterine leiomyoma  Workstation performed: [id number]     Ir Image Guided Aspiration / Drainage    Result Date: 8/7/2020  Narrative: Ultrasound-guided aspiration of left buttock fluid collection Clinical History: Patient with a necrotic rectal cancer which is now become infected   A pocket of infected tumor which is extending into the left gluteus muscle was previously aspirated    Conscious sedation time:15 minutes Technique: Patient was brought to the interventional radiology area and placed supine on the stretcher  After a brief ultrasound examination was performed of the left gluteal region, an area on the skin over the site was prepped, and draped in the usual sterile fashion  Lidocaine was administered to the skin and a small skin incision was made  A 5 [place] multisidehole catheter  was advanced into the collection and 8 5 mL pus was aspirated  After an attempt to milk the area, another 0 5 mL of fluid which appeared to be predominantly blood was removed  After the procedure, the catheter was removed and a bandage applied to the site  The patient tolerated the procedure well and suffered no complications  Impression: Impression: 1  Successful ultrasound-guided aspiration of a left gluteal necrotic tumor/abscess, yielding 8 5 cc pus  Workstation performed: [id number]     Ir Image Guided Aspiration / Drainage    Result Date: 7/31/2020  Narrative: Ultrasound-guided aspiration of superficial fluid collection Clinical History: Patient with metastatic colorectal cancer presenting with suspected left gluteal abscess versus necrotic tumor, for aspiration   Conscious sedation time:15 minutes Technique: Patient was brought to the interventional radiology area and placed supine on the stretcher  After a brief ultrasound examination was performed of the left buttock region, an area on the skin over the site was prepped, and draped in the usual sterile fashion  Lidocaine was administered to the skin and a small skin incision was made  A 5 [place] multisidehole catheter  was advanced into the collection and 10 mL thin purulent appearing material was aspirated  Specimens were sent for culture    After the procedure, the catheter was removed and a bandage applied to the site   The patient tolerated the procedure well and suffered no complications  Impression: Impression: 1  Successful ultrasound-guided aspiration of a left gluteal collection, yielding 10 cc of purulent appearing fluid  2  Specimens were sent to the laboratory as described above  Workstation performed: [id number]     Ct Abdomen Pelvis With Contrast    Result Date: 8/27/2020  Narrative: CT ABDOMEN AND PELVIS WITH IV CONTRAST INDICATION:   Abdominal pain, fever  Rectal pain, abdominal pain, nausea, dysuria, urinary frequency and urgency, chest pain, fever, chills  Abdominal tenderness in the right upper quadrant and suprapubic area on physical examination  Recent admission  for IR drainage of necrotic rectal cancer which had become superinfected/perirectal abscess  History of metastatic rectal carcinoma, on chemotherapy, last chemotherapy 6 days ago  History of right breast carcinoma  COMPARISON:  CT dated August 3, 2020 and report of ultrasound guided aspiration left gluteal region dated August 7, 2020  TECHNIQUE:  CT examination of the abdomen and pelvis was performed  Axial, sagittal, and coronal 2D reformatted images were created from the source data and submitted for interpretation  Radiation dose length product (DLP) for this visit:  357 14 mGy-cm   This examination, like all CT scans performed in the [facility], was performed utilizing techniques to minimize radiation dose exposure, including the use of iterative  reconstruction and automated exposure control  IV Contrast:  100 mL of iohexol (OMNIPAQUE) Enteric Contrast:  Enteric contrast was not administered  FINDINGS: ABDOMEN LOWER CHEST:  Large metastatic masses are again evident within the visualized posterior lower lobes of the lung  The mass within the posterolateral aspect of the left lower lobe of the lung is larger compared with August 3, 2020, presently measuring up to 7 4 cm whereas it previously measured up to 7 cm    The mass in the posterior right lower lobe of the lung appears similar, presently measuring 6 7 cm transverse dimension (previously 6 6 cm)  Multiple pulmonary nodules are also present at the visualized bases lung bases  These appear increased in size and number when compared with August 3, 2020  LIVER/BILIARY TREE:  Extensive hepatic metastatic disease is again evident  This has progressed somewhat since August 3, 2020  For example, a mass within the anterior liver on series 2 image 14 presently measures 1 7 cm whereas it previously measured 1 2 cm  A mass in the left lobe of the liver on image 19 presently measures 3 1 cm whereas it previously measured 2 6 cm  A mass within the anterior liver on image 28 presently measures 6 8 cm whereas it previously measured 6 1 cm  The largest mass within the posterior right lobe of the liver is similar in size, measuring approximately 10 3 cm  GALLBLADDER:  No calcified gallstones  No pericholecystic inflammatory change  SPLEEN:  Unremarkable  PANCREAS:  Unremarkable  ADRENAL GLANDS:  Unremarkable  KIDNEYS/URETERS:  Unremarkable  No hydronephrosis  STOMACH AND BOWEL:  There is a large heterogeneous ill-defined rectal mass  The colon is diffusely distended and contains a large amount of stool, raising concern that the rectal mass may be at least partially obstructing  The cecum measures up to 8 6 cm  Portions of the colon contain formed stool however other portions contain semisolid stool and liquid  There are some mildly dilated fluid-filled small bowel loops, measuring up to 2 3 cm diameter  Some bowel loops are again seen extending into a ventral abdominal wall hernia  Ascites is also seen extending into the ventral abdominal wall hernias  APPENDIX:  No findings to suggest appendicitis  ABDOMINOPELVIC CAVITY:  There is a small amount of ascites, increased in amount compared with August 3, 2020    A fluid collection is again seen within the medial aspect of the left gluteal musculature, measuring approximately 2 7 x 1 7 cm (series 2 image  74)  On the prior study this measured 3 1 x 2 1 cm  There is a an adjacent complex multi-septated collection extending anterior to the coccyx and into the presacral region; although the craniocaudal length/extent appears similar, this appears larger in the AP and transverse dimensions compared to the prior study, presently measuring up to 4 7 x 4 cm in the presacral region (series 2 image 69)  As previously stated, this may represent a combination of necrotic tumor and abscess  There is an approximately 3 cm enhancing mass in the right hemipelvis on series 2 image 70, on the prior study this measured 2 7 cm  There is a complex mixed solid and cystic mass within the anterior left hemipelvis on series 2 image 66  This is larger compared to  the prior study, presently measuring 8 x 7 6 x 4 9 cm, whereas it measured 4 6 x 6 1 x 4 7 cm on August 3, 2020  As previously stated, this may represent a metastatic lesion or could be ovarian in origin  There is no pneumoperitoneum  VESSELS:  Unremarkable for patient's age  PELVIS REPRODUCTIVE ORGANS:  See above  URINARY BLADDER:  Unremarkable  ABDOMINAL WALL/INGUINAL REGIONS:  As described above  OSSEOUS STRUCTURES:  Prior anterior spinal fusion L4-S1  Impression: There is a large rectal mass  The colon is diffusely distended proximal to this mass, raising concern that the rectal mass may be at least partially obstructing  Please see above discussion  Surgical consultation is recommended  Left gluteal collection suspicious for recurrent abscess  A complex collection is again seen immediately adjacent and extending anterior to the coccyx and into the presacral region  The presacral component is larger in AP and transverse dimensions when  compared with August 3, 2020  This could be due to necrotic tumor, abscess, or a combination  There is an approximately 8 x 7 6 x 4 9 cm complex mixed solid and cystic mass within the anterior left hemipelvis  This is larger compared with August 3, 2020  This may represent a metastatic lesion or could be ovarian in origin  There is extensive hepatic metastatic disease, progressed somewhat compared with August 3, 2020  Pulmonary metastatic disease at the visualized lung bases demonstrates some interval progression compared with August 3, 2020  There is a small amount of ascites, increased compared with August 3, 2020  Other findings as described above, please see discussion  I personally discussed this study with Linn Núñez on 8/27/2020 at 3:59 AM  Workstation performed: [id number]     Ct Abdomen Pelvis W Contrast    Result Date: 8/3/2020  Narrative: CT ABDOMEN AND PELVIS WITH IV CONTRAST INDICATION:   Rectal abscess  COMPARISON:  Compared with CT dated 7/30/2020  TECHNIQUE:  CT examination of the abdomen and pelvis was performed  Axial, sagittal, and coronal 2D reformatted images were created from the source data and submitted for interpretation  Radiation dose length product (DLP) for this visit:  631 69 mGy-cm   This examination, like all CT scans performed in the [facility], was performed utilizing techniques to minimize radiation dose exposure, including the use of iterative  reconstruction and automated exposure control  IV Contrast:  100 mL of iohexol (OMNIPAQUE) Enteric Contrast:  Enteric contrast was not administered  FINDINGS: ABDOMEN LOWER CHEST: Bilateral lower lobe metastases measuring up to 6 8 cm on the right right and centimeters and 7 cm on the left are stable  Partially imaged 5 4 cm lesion in the left lower lobe was mostly excluded from recent CT of the abdomen but is similar or slightly larger compared with PET/CT from June 16, 2020 LIVER/BILIARY TREE:  Stable metastatic liver lesions measuring up to 9 7 cm in the right lobe  GALLBLADDER:  No calcified gallstones  No pericholecystic inflammatory change  SPLEEN:  Unremarkable  PANCREAS:  Unremarkable   ADRENAL GLANDS:  d KIDNEYS/URETERS:  Unremarkable  No hydronephrosis  STOMACH AND BOWEL:  Stable irregular wall thickening of the rectum correlating with known cancer  Fat-containing ventral hernias without obstruction  No obstruction  APPENDIX:  No findings to suggest appendicitis  ABDOMINOPELVIC CAVITY: Gluteal abscess measuring 2 4 x 3 4 x 3 5 cm is mildly increased in size  There is an adjacent complex multiseptated presacral collection measuring 3 0 x 4 5 x 12 2 cm with solid appearing components that is mildly increased in size and may represent a combination of tumor and abscess  There is no free fluid  VESSELS:  Unremarkable for patient's age  PELVIS REPRODUCTIVE ORGANS:  Stable 3 2 cm fibroid  Complex cystic lesion in the left adnexa with mural nodules measuring up to up to 4 3 cm is stable when measured in a similar fashion  And may represent metastatic lesion  4 cm right adnexal cystic lesion is stable  URINARY BLADDER:  Unremarkable  ABDOMINAL WALL/INGUINAL REGIONS: Midline infraumbilical ventral hernia containing nonobstructed small bowel is again seen  There is a separate small bowel containing ventral hernia just above this  Additional fat-containing ventral hernia seen in the mid upper abdomen  OSSEOUS STRUCTURES:  No acute fracture or destructive osseous lesion  Status post discectomy and fusion at L4-5 and L5-S1  Impression: Stable irregular thickening of the rectum correlating with known rectal cancer  Stable large lung and liver metastases  Mildly enlarged complex presacral collection that may represent combination of abscess and tumor  Adjacent or contiguous left gluteal abscess is mildly increased  Workstation performed: [id number]     Ct Abdomen Pelvis With Contrast    Result Date: 7/31/2020  Narrative: CT ABDOMEN AND PELVIS WITH IV CONTRAST INDICATION:   Abdominal pain and fever  COMPARISON:  6/10/2020 and 7/1/2020  TECHNIQUE:  CT examination of the abdomen and pelvis was performed   Axial, sagittal, and coronal 2D reformatted images were created from the source data and submitted for interpretation  Radiation dose length product (DLP) for this visit:  291 83 mGy-cm   This examination, like all CT scans performed in the [facility], was performed utilizing techniques to minimize radiation dose exposure, including the use of iterative  reconstruction and automated exposure control  IV Contrast:  100 mL of iohexol (OMNIPAQUE) Enteric Contrast:  Enteric contrast was not administered  FINDINGS: ABDOMEN LOWER CHEST:  Masses in the bilateral lower lobes are mildly increased in size  LIVER/BILIARY TREE:  Hepatic masses measure up to 9 7 cm cyst in the right lobe and 2 3 cm in the left lobe, mildly increased in size  No clear evidence of change in number of lesions  No biliary obstruction GALLBLADDER:  No calcified gallstones  No pericholecystic inflammatory change  SPLEEN:  Unremarkable  PANCREAS:  Unremarkable  ADRENAL GLANDS:  Unremarkable  KIDNEYS/URETERS:  No pyelonephritis or obstructive uropathy  STOMACH AND BOWEL:  New rectus sheath hernia containing loops of small bowel measuring 11 x 2 5 x 8 5 cm  No evidence of bowel obstruction or incarceration  Stable upper abdominal midline fat-containing hernia measuring 4 cm  Stable wall thickening in the rectum extending to the rectosigmoid, not well evaluated in the absence of rectal or oral contrast   No evidence of colitis  APPENDIX:  No findings to suggest appendicitis  ABDOMINOPELVIC CAVITY:  Organizing, multiseptated presacral fluid collection measuring 3 3 x 2 3 x 6 7 cm  Fluid collection in the left gluteal muscle adjacent to the coccyx and presacral fluid collection, measuring 1 9 x 2 8 x 3 5 cm  VESSELS:  Patent portal and splenic veins  PELVIS REPRODUCTIVE ORGANS:  Stable 3 2 cm leiomyoma in the uterus  Adjacent smaller lesions  Cysts or follicles in the adnexa measuring up to 4 3 cm on the right and 2 9 cm on the left  Stable nodular soft tissue lesions in the left adnexa measuring up to 1 8 cm  URINARY BLADDER:  Unremarkable  ABDOMINAL WALL/INGUINAL REGIONS:  Unremarkable  OSSEOUS STRUCTURES:  L4-5 and L5-S1 anterior fusion  Normal alignment  No lytic or blastic lesion  Impression: 1  Multiseptated organizing presacral fluid collection measuring 2 3 x 2 3 x 6 7 cm  Adjacent left gluteal fluid collection measuring 1 9 x 2 8 x 3 5 cm  Findings concerning for abscesses  Surgical consultation recommended  2   Stable wall thickening in the rectum, not well evaluated in the absence of intraluminal contrast distention  3   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

## 2020-08-27 NOTE — ED PROVIDER NOTES
History  Chief Complaint   Patient presents with    Abdominal Pain     Pt stated that she had abdominal pain since her chemo treatment last week  Stated that normally gets belly pain after treatments  History of rectal cancer, pain there and feels as though she has an abscess as well  Ly Titus is a 78-year-old woman with past medical history significant for active rectal cancer currently on chemotherapy, recent perirectal abscess, presenting with abdominal and rectal pain  She last completed chemotherapy 6 days ago  Five days ago she started to develop rectal and abdominal pain  Since that time it is worsened  She states that she normally has abdominal and rectal pain after she receives chemotherapy, however this is unbearable and worsened usual   She describes her abdominal pain and rectal pain as a pressure  They do not radiate  She has difficulty stating what improves or worsens pain  She is taking her normal pain medication which is oxycodone, last took home pain medication around 4:00 p m  She has not taking anything else to treat her pain  She also reports fevers and chills, nausea  Burning when she defecates and urinates  Urinary frequency and urgency  She cannot state if she has had bloody or cloudy urine  She reports some occasional chest pain which she describes as pressure, does not radiate  She has had no blood in her stool  She states her stool have been both formed and loose  She also has electric back pain, which is normal for her after she receives chemotherapy  No headache, lightheadedness, dizziness, shortness of breath, vomiting, diarrhea  Prior to Admission Medications   Prescriptions Last Dose Informant Patient Reported? Taking?    Charcoal Activated (ACTIVATED CHARCOAL PO)  Self Yes No   Sig: Take by mouth   Probiotic Product (PROBIOTIC PO)  Self Yes No   Sig: Take by mouth Renew life   Sennosides-Docusate Sodium (Senna Plus) 8 6-50 MG CAPS  Self Yes No Sig: Take by mouth   cetirizine (ZyrTEC) 10 mg tablet  Self Yes No   Sig: Take 10 mg by mouth daily   ciprofloxacin (CIPRO) 500 mg tablet  Self Yes No   dexamethasone (DECADRON) 4 mg tablet  Self Yes No   ergocalciferol (VITAMIN D2) 50,000 units  Self Yes No   lidocaine-prilocaine (EMLA) cream  Self Yes No   loperamide (IMODIUM) 2 mg capsule  Self Yes No   metoprolol succinate (TOPROL-XL) 25 mg 24 hr tablet  Self Yes No   Sig: Take 25 mg by mouth daily   ondansetron (ZOFRAN) 8 mg tablet  Self Yes No   oxyCODONE (OxyCONTIN) 20 mg 12 hr tablet  Self Yes No   Sig: Take 20 mg by mouth every 12 (twelve) hours   sertraline (ZOLOFT) 25 mg tablet  Self Yes No   Sig: Take 25 mg by mouth daily      Facility-Administered Medications: None       Past Medical History:   Diagnosis Date    Breast cancer ([facility] ) 06/01/2020    right side    History of rectal cancer 06/2020    Sepsis ([facility] ) 7/4/2020       Past Surgical History:   Procedure Laterality Date    BACK SURGERY      BREAST BIOPSY Right 06/19/2020    2 sites; 1 breast 1 axilla    HERNIA REPAIR      3 repairs    IR BIOPSY LIVER MASS  6/17/2020    IR IMAGE GUIDED ASPIRATION / DRAINAGE  7/31/2020    IR IMAGE GUIDED ASPIRATION / DRAINAGE  8/7/2020    IR PORT PLACEMENT  6/22/2020    TUBAL LIGATION      US GUIDED BREAST BIOPSY RIGHT COMPLETE Right 6/19/2020    US GUIDED BREAST LYMPH NODE BIOPSY RIGHT Right 6/19/2020       Family History   Problem Relation Age of Onset    No Known Problems Mother     No Known Problems Sister     No Known Problems Daughter     Breast cancer Maternal Grandmother         age unknown    No Known Problems Sister     No Known Problems Daughter     Breast cancer Maternal Aunt 61    No Known Problems Maternal Aunt      I have reviewed and agree with the history as documented      E-Cigarette/Vaping    E-Cigarette Use Never User      E-Cigarette/Vaping Substances    Nicotine No     THC No     CBD No     Flavoring No     Other No  Unknown No      Social History     Tobacco Use    Smoking status: Former Smoker     Packs/day: 0 00     Last attempt to quit: 2020     Years since quittin 3    Smokeless tobacco: Never Used   Substance Use Topics    Alcohol use: Not Currently    Drug use: No        Review of Systems   Constitutional: Positive for chills and fever  Respiratory: Negative for shortness of breath  Cardiovascular: Positive for chest pain  Negative for palpitations  Gastrointestinal: Positive for abdominal pain, nausea and rectal pain  Negative for abdominal distention, anal bleeding, blood in stool and vomiting  Genitourinary: Positive for dysuria, frequency, pelvic pain and urgency  Musculoskeletal: Positive for back pain  Neurological: Negative for dizziness, weakness, light-headedness, numbness and headaches  All other systems reviewed and are negative  Physical Exam  ED Triage Vitals [20]   Temperature Pulse Respirations Blood Pressure SpO2   98 4 °F (36 9 °C) 94 22 111/60 98 %      Temp Source Heart Rate Source Patient Position - Orthostatic VS BP Location FiO2 (%)   Oral Monitor -- -- --      Pain Score       8             Orthostatic Vital Signs  Vitals:    20   BP: 111/60   Pulse: 94       Physical Exam  Vitals signs reviewed  Constitutional:       General: She is not in acute distress  Appearance: She is well-developed  She is ill-appearing  She is not diaphoretic  HENT:      Head: Normocephalic and atraumatic  Eyes:      General: No scleral icterus  Cardiovascular:      Rate and Rhythm: Normal rate and regular rhythm  Heart sounds: No murmur  Pulmonary:      Effort: Pulmonary effort is normal  No respiratory distress  Breath sounds: Normal breath sounds  No wheezing  Abdominal:      General: There is no distension  Palpations: Abdomen is soft  There is hepatomegaly  Tenderness:  There is abdominal tenderness in the right upper quadrant and suprapubic area  There is no right CVA tenderness, left CVA tenderness or guarding  Negative signs include Marie's sign and McBurney's sign  Hernia: A hernia is present  Hernia is present in the ventral area  Genitourinary:     Comments: No gross blood on rectal exam  Exquisite rectal pain on exam  No hemorrhoids or masses present  Skin:     Comments: Extremely warm to touch  Neurological:      Mental Status: She is alert           ED Medications  Medications   acetaminophen (TYLENOL) tablet 975 mg (975 mg Oral Given 8/27/20 0142)   ondansetron (ZOFRAN-ODT) dispersible tablet 4 mg (4 mg Oral Given 8/27/20 0153)   HYDROmorphone (DILAUDID) injection 0 5 mg (0 5 mg Intravenous Given 8/27/20 0153)   sodium chloride 0 9 % bolus 1,000 mL (1,000 mL Intravenous New Bag 8/27/20 0158)   iohexol (OMNIPAQUE) 350 MG/ML injection (MULTI-DOSE) 100 mL (100 mL Intravenous Given 8/27/20 0244)       Diagnostic Studies  Results Reviewed     Procedure Component Value Units Date/Time    Procalcitonin with AM Reflex [[id number]]  (Abnormal) Collected:  08/27/20 0156    Lab Status:  Final result Specimen:  Blood from Arm, Left Updated:  08/27/20 0243     Procalcitonin 1 20 ng/ml     Procalcitonin Reflex [[id number]]     Lab Status:  No result Specimen:  Blood     Comprehensive metabolic panel [[id number]]  (Abnormal) Collected:  08/27/20 0156    Lab Status:  Final result Specimen:  Blood from Arm, Left Updated:  08/27/20 0232     Sodium 133 mmol/L      Potassium 3 3 mmol/L      Chloride 103 mmol/L      CO2 24 mmol/L      ANION GAP 6 mmol/L      BUN 6 mg/dL      Creatinine 0 46 mg/dL      Glucose 101 mg/dL      Calcium 8 0 mg/dL      AST 37 U/L      ALT 13 U/L      Alkaline Phosphatase 277 U/L      Total Protein 6 7 g/dL      Albumin 2 5 g/dL      Total Bilirubin 0 33 mg/dL      eGFR 119 ml/min/1 73sq m     Narrative:       [place] guidelines for Chronic Kidney Disease (CKD):     Stage 1 with normal or high GFR (GFR > 90 mL/min/1 73 square meters)    Stage 2 Mild CKD (GFR = 60-89 mL/min/1 73 square meters)    Stage 3A Moderate CKD (GFR = 45-59 mL/min/1 73 square meters)    Stage 3B Moderate CKD (GFR = 30-44 mL/min/1 73 square meters)    Stage 4 Severe CKD (GFR = 15-29 mL/min/1 73 square meters)    Stage 5 End Stage CKD (GFR <15 mL/min/1 73 square meters)  Note: GFR calculation is accurate only with a steady state creatinine    Lactic acid [[id number]]  (Normal) Collected:  08/27/20 0156    Lab Status:  Final result Specimen:  Blood from Arm, Left Updated:  08/27/20 0231     LACTIC ACID 1 3 mmol/L     Narrative:       Result may be elevated if tourniquet was used during collection  Protime-INR [[id number]]  (Abnormal) Collected:  08/27/20 0156    Lab Status:  Final result Specimen:  Blood from Arm, Left Updated:  08/27/20 0230     Protime 15 4 seconds      INR 1 22    APTT [[id number]]  (Normal) Collected:  08/27/20 0156    Lab Status:  Final result Specimen:  Blood from Arm, Left Updated:  08/27/20 0230     PTT 36 seconds     Lipase [[id number]]  (Abnormal) Collected:  08/27/20 0156    Lab Status:  Final result Specimen:  Blood from Arm, Left Updated:  08/27/20 0226     Lipase 35 u/L     CBC and differential [[id number]]  (Abnormal) Collected:  08/27/20 0156    Lab Status:  Preliminary result Specimen:  Blood from Arm, Left Updated:  08/27/20 0213     WBC 17 44 Thousand/uL      RBC 3 65 Million/uL      Hemoglobin 9 0 g/dL      Hematocrit 29 4 %      MCV 81 fL      MCH 24 7 pg      MCHC 30 6 g/dL      RDW 21 7 %      MPV 10 4 fL      Platelets 970 Thousands/uL     Blood culture #2 [[id number]] Collected:  08/27/20 0156    Lab Status:   In process Specimen:  Blood from Line, Venous Updated:  08/27/20 0204    Blood culture #1 [[id number]] Collected:  08/27/20 0157    Lab Status:  No result Specimen:  Blood from Line, Venous     UA w Reflex to Microscopic w Reflex to Culture [[id number]]     Lab Status:  No result Specimen:  Urine, Clean Catch                  CT abdomen pelvis with contrast    (Results Pending)         Procedures  Procedures      ED Course                           Initial Sepsis Screening     Row Name 08/27/20 0244                Is the patient's history suggestive of a new or worsening infection? (!) Yes (Proceed) Fever with abdominal pain, recent perirectal abscess  -IS        Suspected source of infection  urinary tract infection;acute abdominal infection  -IS        Are two or more of the following signs & symptoms of infection both present and new to the patient? (!) Yes (Proceed)  -IS        Indicate SIRS criteria  Leukocytosis (WBC > 00406 IJL); Tachycardia > 90 bpm  -IS        If the answer is yes to both questions, suspicion of sepsis is present          If severe sepsis is present AND tissue hypoperfusion perists in the hour after fluid resuscitation or lactate > 4, the patient meets criteria for SEPTIC SHOCK          Are any of the following organ dysfunction criteria present within 6 hours of suspected infection and SIRS criteria that are NOT considered to be chronic conditions? No  -IS        Organ dysfunction          Date of presentation of severe sepsis          Time of presentation of severe sepsis          Tissue hypoperfusion persists in the hour after crystalloid fluid administration, evidenced, by either:          Was hypotension present within one hour of the conclusion of crystalloid fluid administration? No  -IS        Date of presentation of septic shock          Time of presentation of septic shock            User Key  (r) = Recorded By, (t) = Taken By, (c) = Cosigned By    [address] Name Provider Adriana Kimball MD Resident                      MDM  Number of Diagnoses or Management Options  Diagnosis management comments: 49 yo woman presenting with abdominal and rectal pain, and fever   Differential includes but is not limited to: perirectal abscess, diverticulitis, UTI, pyelonephritis, ACS, pancreatitis, sepsis  Ovarian torsion unlikely given that pt had pelvic ultrasound yesterday in the setting of the same abdominal pain with normal Doppler flow  US abdomen yesterday showed no cholecystitis nor cholelithiasis  Given Tylenol, Zofran, and Dilaudid for symptom management  1L NS bolus given for mildly low blood pressure, potential sepsis  Sepsis panel ordered, CT abdomen pelvis, ECG, lipase  Patient reassessed  Blood pressure improved to 529 systolic  Pain and nausea now manageable  Patient signed out to senior resident  Pending CT read, will start broad spectrum antibiotics  Continue pain management with Dilaudid  Disposition  Final diagnoses:   None     ED Disposition     None      Follow-up Information    None         Patient's Medications   Discharge Prescriptions    No medications on file     No discharge procedures on file  PDMP Review       Value Time User    PDMP Reviewed  Yes 8/6/2020  2:52 PM Sharlene José DO           ED Provider  Attending physically available and evaluated Erich Gonzalez  I managed the patient along with the ED Attending      Electronically Signed by         Natalya Harrington MD  08/27/20 4399

## 2020-08-27 NOTE — CASE MANAGEMENT
Due to the patient's high risk of readmission, "Access to Care" and "[facility] Facility" sheets were explained and given to the patient and/or caregiver

## 2020-08-27 NOTE — CONSULTS
Consultation - Colorectal Surgery  Zoey Fitzpatrick 50 y o  female MRN: [id number]  Unit/Bed#: ED 08 Encounter: [id number]        Assessment/Plan     Assessment:  50 F w/ Stage IV rectal cancer, gluteal/coccygeal abscess vs necrotic tumor, and CT findings concerning for partial bowel obstruction  Plan:  Admit to Medicine for pain control  ABX  Assessment of chemotherapeutics with compatibility with surgery  History of Present Illness     HPI:  Fernanda Dobbs is a 50 y o  female who presents with gradually worsening abdominal pain since finishing up her last chemo regimen on Friday  She said she usually has pain after finishing his regimen was done pain was worse than before  Appetite is poor at baseline and no noticeable changes this time around  Patient has been having bowel movements daily and passing flatus  Stool quality has been variable but this has been going on for some time  She has not noticed any blood in her stool for some time  In previous admissions with the last being in beginning of August   Patient had similar obstructive symptoms but there was concern that she was on Avastin  There seems to be a discrepancy in what she in her chemo regimen  In addition, the patient did not want to have been a diverting ostomy done to relieve symptoms  This time, patient seems more open to the idea because she does not want this to keep occurring  Review of Systems   Constitutional: Positive for activity change, fatigue and fever  Negative for appetite change  HENT: Negative  Eyes: Negative  Respiratory: Negative  Cardiovascular: Negative  Gastrointestinal: Positive for abdominal pain, nausea and rectal pain  Negative for anal bleeding and blood in stool  Endocrine: Negative  Genitourinary: Negative for difficulty urinating  Musculoskeletal: Negative  Skin: Negative  Allergic/Immunologic: Negative  Neurological: Negative  Hematological: Negative  Psychiatric/Behavioral: Negative  Historical Information   Past Medical History:   Diagnosis Date    Breast cancer ([facility] [address] ) 2020    right side    History of rectal cancer 2020    Sepsis ([facility] [address] ) 2020     Past Surgical History:   Procedure Laterality Date    BACK SURGERY      BREAST BIOPSY Right 2020    2 sites; 1 breast 1 axilla    HERNIA REPAIR      3 repairs    IR BIOPSY LIVER MASS  2020    IR IMAGE GUIDED ASPIRATION / DRAINAGE  2020    IR IMAGE GUIDED ASPIRATION / DRAINAGE  2020    IR PORT PLACEMENT  2020    TUBAL LIGATION      US GUIDED BREAST BIOPSY RIGHT COMPLETE Right 2020    US GUIDED BREAST LYMPH NODE BIOPSY RIGHT Right 2020     Social History   Social History     Substance and Sexual Activity   Alcohol Use Not Currently     Social History     Substance and Sexual Activity   Drug Use No     Social History     Tobacco Use   Smoking Status Former Smoker    Packs/day: 0 00    Last attempt to quit: 2020    Years since quittin 3   Smokeless Tobacco Never Used     Family History:   Family History   Problem Relation Age of Onset    No Known Problems Mother     No Known Problems Sister     No Known Problems Daughter     Breast cancer Maternal Grandmother         age unknown    No Known Problems Sister     No Known Problems Daughter     Breast cancer Maternal Aunt 61    No Known Problems Maternal Aunt        Meds/Allergies   PTA meds:   Prior to Admission Medications   Prescriptions Last Dose Informant Patient Reported? Taking?    Charcoal Activated (ACTIVATED CHARCOAL PO)  Self Yes No   Sig: Take by mouth   Probiotic Product (PROBIOTIC PO)  Self Yes No   Sig: Take by mouth Renew life   Sennosides-Docusate Sodium (Senna Plus) 8 6-50 MG CAPS  Self Yes No   Sig: Take by mouth   cetirizine (ZyrTEC) 10 mg tablet  Self Yes No   Sig: Take 10 mg by mouth daily   ciprofloxacin (CIPRO) 500 mg tablet  Self Yes No   dexamethasone (DECADRON) 4 mg tablet  Self Yes No   ergocalciferol (VITAMIN D2) 50,000 units  Self Yes No   lidocaine-prilocaine (EMLA) cream  Self Yes No   loperamide (IMODIUM) 2 mg capsule  Self Yes No   metoprolol succinate (TOPROL-XL) 25 mg 24 hr tablet  Self Yes No   Sig: Take 25 mg by mouth daily   ondansetron (ZOFRAN) 8 mg tablet  Self Yes No   oxyCODONE (OxyCONTIN) 20 mg 12 hr tablet  Self Yes No   Sig: Take 20 mg by mouth every 12 (twelve) hours   sertraline (ZOLOFT) 25 mg tablet  Self Yes No   Sig: Take 25 mg by mouth daily      Facility-Administered Medications: None     Allergies   Allergen Reactions    Augmentin [Amoxicillin-Pot Clavulanate] Confusion and Drowsiness       Objective   First Vitals:   Blood Pressure: 111/60 (08/27/20 0057)  Pulse: 94 (08/27/20 0057)  Temperature: 98 4 °F (36 9 °C) (08/27/20 0057)  Temp Source: Oral (08/27/20 0057)  Respirations: 22 (08/27/20 0057)  Weight - Scale: 55 3 kg (122 lb) (08/27/20 0057)  SpO2: 98 % (08/27/20 0057)    Current Vitals:   Blood Pressure: 113/67 (08/27/20 0510)  Pulse: 69 (08/27/20 0510)  Temperature: 100 1 °F (37 8 °C) (08/27/20 0106)  Temp Source: Oral (08/27/20 0106)  Respirations: 19 (08/27/20 0510)  Weight - Scale: 55 3 kg (122 lb) (08/27/20 0057)  SpO2: 95 % (08/27/20 0510)      Intake/Output Summary (Last 24 hours) at 8/27/2020 0602  Last data filed at 8/27/2020 0351  Gross per 24 hour   Intake 1000 ml   Output    Net 1000 ml       Invasive Devices     Central Venous Catheter Line            Port A Cath 06/22/20 Right Chest 65 days          Peripheral Intravenous Line            Peripheral IV 08/27/20 Left Antecubital less than 1 day                Physical Exam  Constitutional:       Appearance: Normal appearance  HENT:      Head: Normocephalic        Right Ear: External ear normal       Left Ear: External ear normal       Nose: Nose normal       Mouth/Throat:      Mouth: Mucous membranes are moist    Eyes:      Pupils: Pupils are equal, round, and reactive to light  Cardiovascular:      Rate and Rhythm: Normal rate  Pulses: Normal pulses  Pulmonary:      Effort: Pulmonary effort is normal    Abdominal:      Comments: Tender in the suprapubic region to palpation  Ventral hernia present -reducible  No guarding no rebound  Nondistended  Genitourinary:     Comments: Pain to palpation on the left buttock the entire length of the gluteal crease  No crepitus or fluctuance or erythema noted  Musculoskeletal: Normal range of motion  Skin:     General: Skin is warm  Neurological:      General: No focal deficit present  Mental Status: She is alert  Psychiatric:         Mood and Affect: Mood normal          Behavior: Behavior normal            Lab Results:   I have personally reviewed pertinent lab results  , CBC:   Lab Results   Component Value Date    WBC 17 44 (H) 08/27/2020    HGB 9 0 (L) 08/27/2020    HCT 29 4 (L) 08/27/2020    MCV 81 (L) 08/27/2020     08/27/2020    MCH 24 7 (L) 08/27/2020    MCHC 30 6 (L) 08/27/2020    RDW 21 7 (H) 08/27/2020    MPV 10 4 08/27/2020    NRBC 0 08/27/2020   , CMP:   Lab Results   Component Value Date    SODIUM 133 (L) 08/27/2020    K 3 3 (L) 08/27/2020     08/27/2020    CO2 24 08/27/2020    BUN 6 08/27/2020    CREATININE 0 46 (L) 08/27/2020    CALCIUM 8 0 (L) 08/27/2020    AST 37 08/27/2020    ALT 13 08/27/2020    ALKPHOS 277 (H) 08/27/2020    EGFR 119 08/27/2020     Imaging: I have personally reviewed pertinent reports  CT abdomen pelvis with contrast   Final Result by Marlin Groves MD (08/27 7898)      There is a large rectal mass  The colon is diffusely distended proximal to this mass, raising concern that the rectal mass may be at least partially obstructing  Please see above discussion  Surgical consultation is recommended  Left gluteal collection suspicious for recurrent abscess    A complex collection is again seen immediately adjacent and extending anterior to the coccyx and into the presacral region  The presacral component is larger in AP and transverse dimensions when    compared with August 3, 2020  This could be due to necrotic tumor, abscess, or a combination  There is an approximately 8 x 7 6 x 4 9 cm complex mixed solid and cystic mass within the anterior left hemipelvis  This is larger compared with August 3, 2020  This may represent a metastatic lesion or could be ovarian in origin  There is extensive hepatic metastatic disease, progressed somewhat compared with August 3, 2020  Pulmonary metastatic disease at the visualized lung bases demonstrates some interval progression compared with August 3, 2020  There is a small amount of ascites, increased compared with August 3, 2020  Other findings as described above, please see discussion  I personally discussed this study with Margie Villa on 8/27/2020 at 3:59 AM                      Workstation performed: [id number]             EKG, Pathology, and Other Studies: I have personally reviewed pertinent reports        Code Status: Prior  Advance Directive and Living Will:      Power of :    POLST:

## 2020-08-28 PROBLEM — L02.31 ABSCESS, GLUTEAL, LEFT: Status: ACTIVE | Noted: 2020-07-31

## 2020-08-28 PROBLEM — R78.81 GRAM-NEGATIVE BACTEREMIA: Status: ACTIVE | Noted: 2020-08-28

## 2020-08-28 PROCEDURE — 99232 SBSQ HOSP IP/OBS MODERATE 35: CPT | Performed by: INTERNAL MEDICINE

## 2020-08-28 PROCEDURE — 99232 SBSQ HOSP IP/OBS MODERATE 35: CPT | Performed by: PHYSICIAN ASSISTANT

## 2020-08-28 PROCEDURE — 99255 IP/OBS CONSLTJ NEW/EST HI 80: CPT | Performed by: INTERNAL MEDICINE

## 2020-08-28 PROCEDURE — 87040 BLOOD CULTURE FOR BACTERIA: CPT | Performed by: PHYSICIAN ASSISTANT

## 2020-08-28 RX ORDER — OXYCODONE HCL 20 MG/1
20 TABLET, FILM COATED, EXTENDED RELEASE ORAL EVERY 8 HOURS SCHEDULED
Status: DISCONTINUED | OUTPATIENT
Start: 2020-08-28 | End: 2020-08-30

## 2020-08-28 RX ORDER — LIDOCAINE 40 MG/G
CREAM TOPICAL 4 TIMES DAILY
Status: DISCONTINUED | OUTPATIENT
Start: 2020-08-28 | End: 2020-09-16 | Stop reason: HOSPADM

## 2020-08-28 RX ORDER — ACETAMINOPHEN 325 MG/1
650 TABLET ORAL ONCE
Status: COMPLETED | OUTPATIENT
Start: 2020-08-28 | End: 2020-08-28

## 2020-08-28 RX ADMIN — ACETAMINOPHEN 650 MG: 325 TABLET, FILM COATED ORAL at 23:42

## 2020-08-28 RX ADMIN — LORATADINE 10 MG: 10 TABLET ORAL at 09:18

## 2020-08-28 RX ADMIN — CEFEPIME HYDROCHLORIDE 2000 MG: 2 INJECTION, POWDER, FOR SOLUTION INTRAVENOUS at 09:19

## 2020-08-28 RX ADMIN — HYDROMORPHONE HYDROCHLORIDE 0.5 MG: 1 INJECTION, SOLUTION INTRAMUSCULAR; INTRAVENOUS; SUBCUTANEOUS at 10:31

## 2020-08-28 RX ADMIN — METRONIDAZOLE 500 MG: 500 INJECTION, SOLUTION INTRAVENOUS at 15:23

## 2020-08-28 RX ADMIN — METRONIDAZOLE 500 MG: 500 INJECTION, SOLUTION INTRAVENOUS at 22:26

## 2020-08-28 RX ADMIN — HYDROMORPHONE HYDROCHLORIDE 0.5 MG: 1 INJECTION, SOLUTION INTRAMUSCULAR; INTRAVENOUS; SUBCUTANEOUS at 20:00

## 2020-08-28 RX ADMIN — OXYCODONE HYDROCHLORIDE 10 MG: 10 TABLET ORAL at 18:39

## 2020-08-28 RX ADMIN — METRONIDAZOLE 500 MG: 500 INJECTION, SOLUTION INTRAVENOUS at 05:59

## 2020-08-28 RX ADMIN — HYDROMORPHONE HYDROCHLORIDE 0.5 MG: 1 INJECTION, SOLUTION INTRAMUSCULAR; INTRAVENOUS; SUBCUTANEOUS at 07:35

## 2020-08-28 RX ADMIN — OXYCODONE HYDROCHLORIDE 10 MG: 10 TABLET ORAL at 12:29

## 2020-08-28 RX ADMIN — LIDOCAINE: 4 CREAM TOPICAL at 22:32

## 2020-08-28 RX ADMIN — HYDROMORPHONE HYDROCHLORIDE 0.5 MG: 1 INJECTION, SOLUTION INTRAMUSCULAR; INTRAVENOUS; SUBCUTANEOUS at 15:23

## 2020-08-28 RX ADMIN — SERTRALINE HYDROCHLORIDE 25 MG: 25 TABLET ORAL at 09:18

## 2020-08-28 RX ADMIN — OXYCODONE HYDROCHLORIDE 20 MG: 20 TABLET, FILM COATED, EXTENDED RELEASE ORAL at 09:17

## 2020-08-28 RX ADMIN — LIDOCAINE: 4 CREAM TOPICAL at 19:51

## 2020-08-28 RX ADMIN — OXYCODONE HYDROCHLORIDE 10 MG: 10 TABLET ORAL at 05:54

## 2020-08-28 RX ADMIN — OXYCODONE HYDROCHLORIDE 20 MG: 20 TABLET, FILM COATED, EXTENDED RELEASE ORAL at 22:25

## 2020-08-28 RX ADMIN — CEFEPIME HYDROCHLORIDE 2000 MG: 2 INJECTION, POWDER, FOR SOLUTION INTRAVENOUS at 19:52

## 2020-08-28 NOTE — PROGRESS NOTES
Progress Note - Beverley Carter 1975, 50 y o  female MRN: [id number]    Unit/Bed#: [facility] 903-01 Encounter: [id number]    Primary Care Provider: Janis Meadows MD   Date and time admitted to hospital: 8/27/2020 12:51 AM        * Abscess, gluteal, left  Assessment & Plan  · Presented with increased abdominal and gluteal pain, recent admission for rectal abscess  · CT abdomen/pelvis concerning for recurrence of abscess, patient was complaining of fevers at home additionally leukocytosis is noted  · Pt now with gram negative bacteremia  · Continue cefepime and Flagyl for now  · Will discuss with surgery regarding definite plans for the abscess  Gram-negative bacteremia  Assessment & Plan  1/2 blood culture on admission positive for gram-negative rods  Continue cefepime for now  Follow-up final results  Cancer associated pain  Assessment & Plan  · PDMP reviewed  · Continue OxyContin 20 mg q 12 hours  · Pt said her pain well controlled  · Appreciate palliative care input    Metastatic rectal cancer  Assessment & Plan  · With liver/lung metastases s/p chemotherapy per outpatient Oncology  · CT abdomen/pelvis revealing concern for persistent partially obstructing mass and will appreciate surgery recommendations  · Patient was initially refusing surgery but after discussion with palliative Care she is now agreeable for diverting colostomy  Surgery tentatively for Monday  · Palliative care following for pain control  VTE Pharmacologic Prophylaxis:   Pharmacologic: Enoxaparin (Lovenox)  Mechanical VTE Prophylaxis in Place: Yes    Patient Centered Rounds: I have performed bedside rounds with nursing staff today  Discussions with Specialists or Other Care Team Provider: Palliative, surgery    Education and Discussions with Family / Patient: pt    Time Spent for Care: 30 minutes  More than 50% of total time spent on counseling and coordination of care as described above      Current Length of Stay: 1 day(s)    Current Patient Status: Inpatient   Certification Statement: The patient will continue to require additional inpatient hospital stay due to above    Discharge Plan: pending OR Monday    Code Status: Level 1 - Full Code      Subjective:   Pt seen and examined by me this morning  Pt complains of persistent pain in her rectal but well controlled with current regimen  Objective:     Vitals:   Temp (24hrs), Av 5 °F (37 5 °C), Min:98 9 °F (37 2 °C), Max:100 1 °F (37 8 °C)    Temp:  [98 9 °F (37 2 °C)-100 1 °F (37 8 °C)] 98 9 °F (37 2 °C)  HR:  [78-95] 88  Resp:  [18-19] 19  BP: (106-115)/(60-63) 106/60  SpO2:  [99 %] 99 %  Body mass index is 22 31 kg/m²  Input and Output Summary (last 24 hours): Intake/Output Summary (Last 24 hours) at 2020 1336  Last data filed at 2020 1256  Gross per 24 hour   Intake 340 ml   Output 0 ml   Net 340 ml       Physical Exam:     Physical Exam    Constitutional: Pt appears comfortable  Not in any acute distress  Cardiovascular: Normal rate, regular rhythm, normal heart sounds  No murmur heard  Pulmonary/Chest: Effort normal, air entry b/l equal  No respiratory distress  Pt has no wheezes or crackles  Abdominal: Soft  Non-distended, Non-tender  Bowel sounds are normal    Musculoskeletal: Normal range of motion  Neurological: awake, alert  Moving all extremities spontaneously  Psychiatric: normal mood and affect        Additional Data:     Labs:    Results from last 7 days   Lab Units 20  0156   WBC Thousand/uL 17 44*   HEMOGLOBIN g/dL 9 0*   HEMATOCRIT % 29 4*   PLATELETS Thousands/uL 197   BANDS PCT % 3   LYMPHO PCT % 5*   MONO PCT % 4   EOS PCT % 4     Results from last 7 days   Lab Units 20  0156   SODIUM mmol/L 133*   POTASSIUM mmol/L 3 3*   CHLORIDE mmol/L 103   CO2 mmol/L 24   BUN mg/dL 6   CREATININE mg/dL 0 46*   ANION GAP mmol/L 6   CALCIUM mg/dL 8 0*   ALBUMIN g/dL 2 5*   TOTAL BILIRUBIN mg/dL 0 33   ALK PHOS U/L 277*   ALT U/L 13   AST U/L 37   GLUCOSE RANDOM mg/dL 101     Results from last 7 days   Lab Units 08/27/20  0156   INR  1 22*             Results from last 7 days   Lab Units 08/27/20  0506 08/27/20  0156   LACTIC ACID mmol/L  --  1 3   PROCALCITONIN ng/ml 1 19* 1 20*           * I Have Reviewed All Lab Data Listed Above  * Additional Pertinent Lab Tests Reviewed: [address] Admission Reviewed    Imaging:    Imaging Reports Reviewed Today Include:   Imaging Personally Reviewed by Myself Includes:    Recent Cultures (last 7 days):     Results from last 7 days   Lab Units 08/28/20  0717 08/27/20  0156   BLOOD CULTURE  Received in Microbiology Lab  Culture in Progress  --    GRAM STAIN RESULT   --  Gram negative rods*       Last 24 Hours Medication List:   Current Facility-Administered Medications   Medication Dose Route Frequency Provider Last Rate    cefepime  2,000 mg Intravenous Q12H Mary Braver, DO 2,000 mg (08/28/20 0919)    enoxaparin  40 mg Subcutaneous Daily Mary Braver, DO      HYDROmorphone  0 5 mg Intravenous Q2H PRN Shavonne M Bendas, DO      loratadine  10 mg Oral Daily Mary Braver, DO      metoprolol succinate  25 mg Oral Daily Mary Braver, DO      metroNIDAZOLE  500 mg Intravenous Q8H Mary Braver,  mg (08/28/20 0559)    ondansetron  4 mg Intravenous Q4H PRN Mary Braver, DO      oxyCODONE  20 mg Oral Q12H [address] Mary Braver, DO      oxyCODONE  10 mg Oral Q4H PRN Daviddiana Jefferson DO      Or    oxyCODONE-acetaminophen  1 tablet Oral Q4H PRN Shavonne M Bendas, DO      sertraline  25 mg Oral Daily Mary Braver, DO          Today, Patient Was Seen By: Elisa Briones MD    ** Please Note: Dictation voice to text software may have been used in the creation of this document   **

## 2020-08-28 NOTE — ASSESSMENT & PLAN NOTE
· PDMP reviewed  · Continue OxyContin 20 mg q 12 hours  · Pt said her pain well controlled  · Appreciate palliative care input

## 2020-08-28 NOTE — ASSESSMENT & PLAN NOTE
· Presented with increased abdominal and gluteal pain, recent admission for rectal abscess  · CT abdomen/pelvis concerning for recurrence of abscess, patient was complaining of fevers at home additionally leukocytosis is noted  · Pt now with gram negative bacteremia  · Continue cefepime and Flagyl for now  · Will discuss with surgery regarding definite plans for the abscess

## 2020-08-28 NOTE — ASSESSMENT & PLAN NOTE
· With liver/lung metastases s/p chemotherapy per outpatient Oncology  · CT abdomen/pelvis revealing concern for persistent partially obstructing mass and will appreciate surgery recommendations  · Patient was initially refusing surgery but after discussion with palliative Care she is now agreeable for diverting colostomy  Surgery tentatively for Monday  · Palliative care following for pain control

## 2020-08-28 NOTE — UTILIZATION REVIEW
Notification of Inpatient Admission/Inpatient Authorization Request   This is a Notification of Inpatient Admission for [address]  Be advised that this patient was admitted to our facility under Inpatient Status  Contact Ashley Ding at [phone] for additional admission information  [facility]  DEDICATED -[phone]  Patient Name:   Colette Donnelly   YOB: 1975       [address]   [address]:   Nancie Bob  Tax ID: [id number]  NPI: [id number] Attending Provider/NPI:  Phone:  Address: Christian Caldwell [[id number]]   [phone]  Same as Facility   Place of Service Code: 24 Place of Service Name:  [address] [place] [address]   Start Date: 8/27/20 7193 Discharge Date & Time: No discharge date for patient encounter  Type of Admission: Inpatient Status Discharge Disposition (if discharged): Home/Self Care   Patient Diagnoses: Rectal cancer ([address] ) [C20]  Abdominal pain [R10 9]  Gluteal abscess [L02 31]  Partial bowel obstruction ([address] ) [K56 600]  Sepsis ([facility] [address] ) [A41 9]     Orders: Admission Orders (From admission, onward)     Ordered        08/27/20 0618  Inpatient Admission  Once                    Assigned Utilization Review Contact: Ashley Ding  Utilization   Network Utilization Review Department  Phone: [phone]; Fax [phone]  Email: [email or web address]   ATTENTION PAYERS: Please call the assigned Utilization  directly with any questions or concerns ALL voicemails in the department are confidential  Send all requests for admission clinical reviews, approved or denied determinations and any other requests to dedicated fax number belonging to the campus where the patient is receiving treatment

## 2020-08-28 NOTE — ASSESSMENT & PLAN NOTE
1/2 blood culture on admission positive for gram-negative rods  Continue cefepime for now  Follow-up final results

## 2020-08-28 NOTE — WOUND OSTOMY CARE
Progress Note- Ostomy  Beverley Fitzpatrick 50 y o  female  [id number]  [facility] 9[facility]        Assessment:  Patient is a 50year old female who presented with complaints of increasing abdominal and gluteal pain  Patient just recently admitted for rectal abscess, patient has history of stage IV rectal cancer, following CT scan she is admitted with concerns for partial bowel obstruction  Plans for OR on Monday by Dr Gisselle Peres with creation of loop transverse colostomy  She is being seen by ET nurse for stoma site marking, she is awake, alert well aware of upcoming surgery, patient states she is very familiar with 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  Reviewed type of stoma being planned, difference between a stoma originating from small intestine vs larger intestine, patient with no question however is receptive to all teaching  Abdomen assessed in three different position  Supine sitting and standing, patient noted with large hernia just around and below umbilical line  Marked two site, first site is at hier LUQ, and second site was marked on her RUQ, both site away from herniated area  Patient is very thin with narrow torso, has old surgical incision to mid abdomen  Patient provided with educational information, encourage reading and asking questions, patient made aware wound care nurses will continue following post op for education and ostomy care  Plan: We will see patient on Tuesday for POD # 1 evaluation of stoma, pouch and setting up for continue education  Vitals:    08/28/20 1533   BP: 105/63   Pulse:    Resp: 20   Temp:    SpO2:          Port A Cath 06/22/20 Right Chest (Active)   Access Date 08/24/20 08/27/20 0851   Site Assessment Clean;Dry; Intact 08/27/20 2000   Line Status Flushed;Blood return noted; Saline locked 08/27/20 2000   Dressing Type Chlorhexidine dressing 08/27/20 2000   Dressing Status Clean;Dry; Intact 08/27/20 2000   Flush Performed Yes 08/27/20 0851   De-Accessed Date 08/28/20 08/28/20 0944   De-Accessed Time 0930 08/28/20 0944   De-accessed by: Jeanne Flores RN 08/28/20 0944   Number of days: 79       Peripheral IV 08/27/20 Left Antecubital (Active)   Site Assessment Clean;Dry; Intact 08/27/20 2000   Dressing Type Transparent 08/27/20 2000   Line Status Flushed 08/27/20 2000   Dressing Status Clean;Dry; Intact 08/27/20 2000   Dressing Change Due 08/31/20 08/27/20 0851   Reason Not Rotated Not due 08/27/20 0851   Number of days: 1         Wound care to continue following, please call ext [phone] or [phone] with questions or concerns          Charles Dela Cruz RN, BSN, [facility]

## 2020-08-28 NOTE — QUICK NOTE
Patient is agreeable now to have a colostomy  She is scheduled for Monday August 31st, 2020 for loop transverse colostomy  Wound Care was consulted to have ostomy marking done

## 2020-08-28 NOTE — PROGRESS NOTES
Progress note - Palliative and Supportive Care   Hong Fitzpatrick 50 y o  female [id number]    Assessment:  Patient Active Problem List   Diagnosis    Large bowel obstruction (HCC)    Metastatic rectal cancer    Breast cancer    Abdominal pain    Anemia of chronic disease    Gram-positive bacteremia    Abscess, gluteal, left    Cancer associated pain    Thrombocytosis     Bright red blood per rectum    Carpal tunnel syndrome, bilateral    Disc degeneration, lumbar    Muscle spasm    Chronic left SI joint pain    Myofascial pain syndrome    Nicotine dependence    Postlaminectomy syndrome, lumbar    Neck pain    Abnormal cytological findings in specimens from other organs, systems and tissues    Anxiety with depression    HTN (hypertension), benign    Vitamin D deficiency    Gram-negative bacteremia     Plan:  1  Symptom management -   Cancer associated pain   - oxycontin 20mg BID --> increase to [facility]   - percocet 5/325mg PO Q4H PRN moderate pain   - oxycodone IR 10mg pO Q4H PRN severe pain   - dilaudid 0 5mg IV Q2H PRN breakthrough pain   - lidocaine cream QID   - bowel regimen to prevent OIC    2  Goals - level 1 full code   - Ongoing medical and surgical optimization as indicated   - patient is agreeable to diverting colostomy, planned for 8/31  Code Status: full - Level 1   Decisional apparatus:  Patient is competent on my exam today  Advance Directive / Living Will / POLST:  None on file    Interval history:       24H Pain History  - scores 3-9/10 in past 24H  - Location: rectal/perineum  - Quality: burning  - Alleviation: IV dilaudid  - Exacerbation: movement, toileting   - 8 PRNs  -      Patient reports feeling like oxycodone is less effective, but is agreeable to increasing oxycontin  She asks for topical cream to self-apply  Looking forward to colostomy hoping this will ultimately help her symptoms       MEDICATIONS / ALLERGIES:     all current active meds have been reviewed    Allergies   Allergen Reactions    Augmentin [Amoxicillin-Pot Clavulanate] Confusion and Drowsiness       OBJECTIVE:    Physical Exam  Physical Exam  HENT:      Head: Normocephalic and atraumatic  Eyes:      Conjunctiva/sclera: Conjunctivae normal    Cardiovascular:      Rate and Rhythm: Normal rate  Pulmonary:      Effort: Pulmonary effort is normal  No respiratory distress  Abdominal:      General: There is no distension  Tenderness: There is no guarding  Skin:     General: Skin is warm and dry  Findings: No erythema (on buttock)  Neurological:      General: No focal deficit present  Mental Status: She is alert  Mental status is at baseline  Psychiatric:         Mood and Affect: Mood normal          Behavior: Behavior normal          Thought Content: Thought content normal          Judgment: Judgment normal          Lab Results: I have personally reviewed pertinent labs  Imaging Studies: reviewed pertinent studies  EKG, Pathology, and Other Studies: reviewed pertinent studies    Counseling / Coordination of Care    Total floor / unit time spent today 25+ minutes  Greater than 50% of total time was spent with the patient and / or family counseling and / or coordination of care  A description of the counseling / coordination of care: time spent providing supportive listening, discussing symptom management

## 2020-08-28 NOTE — PLAN OF CARE
Problem: Potential for Falls  Goal: Patient will remain free of falls  Description: INTERVENTIONS:  - Assess patient frequently for physical needs  -  Identify cognitive and physical deficits and behaviors that affect risk of falls    -  [place] fall precautions as indicated by assessment   - Educate patient/family on patient safety including physical limitations  - Instruct patient to call for assistance with activity based on assessment  - Modify environment to reduce risk of injury  - Consider OT/PT consult to assist with strengthening/mobility  Outcome: Progressing     Problem: GASTROINTESTINAL - ADULT  Goal: Minimal or absence of nausea and/or vomiting  Description: INTERVENTIONS:  - Administer IV fluids if ordered to ensure adequate hydration  - Maintain NPO status until nausea and vomiting are resolved  - Nasogastric tube if ordered  - Administer ordered antiemetic medications as needed  - Provide nonpharmacologic comfort measures as appropriate  - Advance diet as tolerated, if ordered  - Consider nutrition services referral to assist patient with adequate nutrition and appropriate food choices  Outcome: Progressing  Goal: Maintains or returns to baseline bowel function  Description: INTERVENTIONS:  - Assess bowel function  - Encourage oral fluids to ensure adequate hydration  - Administer IV fluids if ordered to ensure adequate hydration  - Administer ordered medications as needed  - Encourage mobilization and activity  - Consider nutritional services referral to assist patient with adequate nutrition and appropriate food choices  Outcome: Progressing  Goal: Maintains adequate nutritional intake  Description: INTERVENTIONS:  - Monitor percentage of each meal consumed  - Identify factors contributing to decreased intake, treat as appropriate  - Assist with meals as needed  - Monitor I&O, weight, and lab values if indicated  - Obtain nutrition services referral as needed  Outcome: Progressing

## 2020-08-29 LAB
ANION GAP SERPL CALCULATED.3IONS-SCNC: 9 MMOL/L (ref 4–13)
BUN SERPL-MCNC: 3 MG/DL (ref 5–25)
CALCIUM SERPL-MCNC: 8.2 MG/DL (ref 8.3–10.1)
CHLORIDE SERPL-SCNC: 99 MMOL/L (ref 100–108)
CO2 SERPL-SCNC: 25 MMOL/L (ref 21–32)
CREAT SERPL-MCNC: 0.49 MG/DL (ref 0.6–1.3)
ERYTHROCYTE [DISTWIDTH] IN BLOOD BY AUTOMATED COUNT: 20.7 % (ref 11.6–15.1)
GFR SERPL CREATININE-BSD FRML MDRD: 116 ML/MIN/1.73SQ M
GLUCOSE SERPL-MCNC: 108 MG/DL (ref 65–140)
HCT VFR BLD AUTO: 26.4 % (ref 34.8–46.1)
HGB BLD-MCNC: 8.2 G/DL (ref 11.5–15.4)
MCH RBC QN AUTO: 25.1 PG (ref 26.8–34.3)
MCHC RBC AUTO-ENTMCNC: 31.1 G/DL (ref 31.4–37.4)
MCV RBC AUTO: 81 FL (ref 82–98)
PLATELET # BLD AUTO: 163 THOUSANDS/UL (ref 149–390)
PMV BLD AUTO: 10.9 FL (ref 8.9–12.7)
POTASSIUM SERPL-SCNC: 2.9 MMOL/L (ref 3.5–5.3)
RBC # BLD AUTO: 3.27 MILLION/UL (ref 3.81–5.12)
SODIUM SERPL-SCNC: 133 MMOL/L (ref 136–145)
WBC # BLD AUTO: 14.38 THOUSAND/UL (ref 4.31–10.16)

## 2020-08-29 PROCEDURE — 85027 COMPLETE CBC AUTOMATED: CPT | Performed by: INTERNAL MEDICINE

## 2020-08-29 PROCEDURE — 99232 SBSQ HOSP IP/OBS MODERATE 35: CPT | Performed by: INTERNAL MEDICINE

## 2020-08-29 PROCEDURE — 80048 BASIC METABOLIC PNL TOTAL CA: CPT | Performed by: INTERNAL MEDICINE

## 2020-08-29 RX ORDER — GABAPENTIN 300 MG/1
300 CAPSULE ORAL 2 TIMES DAILY
Status: DISCONTINUED | OUTPATIENT
Start: 2020-08-29 | End: 2020-08-30

## 2020-08-29 RX ORDER — POTASSIUM CHLORIDE 14.9 MG/ML
20 INJECTION INTRAVENOUS
Status: DISCONTINUED | OUTPATIENT
Start: 2020-08-29 | End: 2020-08-29

## 2020-08-29 RX ORDER — POTASSIUM CHLORIDE 20 MEQ/1
40 TABLET, EXTENDED RELEASE ORAL 2 TIMES DAILY
Status: COMPLETED | OUTPATIENT
Start: 2020-08-29 | End: 2020-08-30

## 2020-08-29 RX ADMIN — POTASSIUM CHLORIDE 40 MEQ: 1500 TABLET, EXTENDED RELEASE ORAL at 11:31

## 2020-08-29 RX ADMIN — OXYCODONE HYDROCHLORIDE 10 MG: 10 TABLET ORAL at 03:08

## 2020-08-29 RX ADMIN — HYDROMORPHONE HYDROCHLORIDE 0.5 MG: 1 INJECTION, SOLUTION INTRAMUSCULAR; INTRAVENOUS; SUBCUTANEOUS at 11:25

## 2020-08-29 RX ADMIN — LIDOCAINE: 4 CREAM TOPICAL at 11:25

## 2020-08-29 RX ADMIN — CEFEPIME HYDROCHLORIDE 2000 MG: 2 INJECTION, POWDER, FOR SOLUTION INTRAVENOUS at 19:50

## 2020-08-29 RX ADMIN — OXYCODONE HYDROCHLORIDE 10 MG: 10 TABLET ORAL at 17:26

## 2020-08-29 RX ADMIN — METRONIDAZOLE 500 MG: 500 INJECTION, SOLUTION INTRAVENOUS at 06:00

## 2020-08-29 RX ADMIN — OXYCODONE HYDROCHLORIDE 20 MG: 20 TABLET, FILM COATED, EXTENDED RELEASE ORAL at 05:57

## 2020-08-29 RX ADMIN — METRONIDAZOLE 500 MG: 500 INJECTION, SOLUTION INTRAVENOUS at 14:58

## 2020-08-29 RX ADMIN — LIDOCAINE: 4 CREAM TOPICAL at 08:37

## 2020-08-29 RX ADMIN — METRONIDAZOLE 500 MG: 500 INJECTION, SOLUTION INTRAVENOUS at 22:01

## 2020-08-29 RX ADMIN — GABAPENTIN 300 MG: 300 CAPSULE ORAL at 17:26

## 2020-08-29 RX ADMIN — OXYCODONE HYDROCHLORIDE 20 MG: 20 TABLET, FILM COATED, EXTENDED RELEASE ORAL at 14:55

## 2020-08-29 RX ADMIN — OXYCODONE HYDROCHLORIDE 10 MG: 10 TABLET ORAL at 08:41

## 2020-08-29 RX ADMIN — CEFEPIME HYDROCHLORIDE 2000 MG: 2 INJECTION, POWDER, FOR SOLUTION INTRAVENOUS at 08:37

## 2020-08-29 RX ADMIN — ONDANSETRON 4 MG: 2 INJECTION INTRAMUSCULAR; INTRAVENOUS at 08:00

## 2020-08-29 RX ADMIN — OXYCODONE HYDROCHLORIDE 20 MG: 20 TABLET, FILM COATED, EXTENDED RELEASE ORAL at 22:00

## 2020-08-29 RX ADMIN — GABAPENTIN 300 MG: 300 CAPSULE ORAL at 12:37

## 2020-08-29 RX ADMIN — OXYCODONE HYDROCHLORIDE 10 MG: 10 TABLET ORAL at 12:37

## 2020-08-29 RX ADMIN — SERTRALINE HYDROCHLORIDE 25 MG: 25 TABLET ORAL at 08:37

## 2020-08-29 RX ADMIN — POTASSIUM CHLORIDE 40 MEQ: 1500 TABLET, EXTENDED RELEASE ORAL at 17:26

## 2020-08-29 RX ADMIN — LORATADINE 10 MG: 10 TABLET ORAL at 08:37

## 2020-08-29 RX ADMIN — HYDROMORPHONE HYDROCHLORIDE 0.5 MG: 1 INJECTION, SOLUTION INTRAMUSCULAR; INTRAVENOUS; SUBCUTANEOUS at 20:01

## 2020-08-29 RX ADMIN — ENOXAPARIN SODIUM 40 MG: 40 INJECTION SUBCUTANEOUS at 08:37

## 2020-08-29 NOTE — PROGRESS NOTES
Progress Note - Infectious Disease   Puja Fitzpatrick 50 y o  female MRN: [id number]  Unit/Bed#: [facility] 903-01 Encounter: [id number]      Impression:  1  Recurrent left gluteal abscess with Bacteroides thetaiotaomicron bacteremia  2  Stage IV metastatic rectal carcinoma on chemotherapy    Recommendations:  Patient had low-grade temperature elevation earlier but is currently afebrile  1  Blood culture isolate showing Bacteroides thetaiotaomicron which should be susceptible to metronidazole  2  We will continue metronidazole and continue cefepime in preparation for her colostomy surgery and possible drainage of her recurrent left gluteal abscess  Antibiotics:  1  Cefepime 2 g q 12 hours IV, day 3 Rx  2  Metronidazole 500 mg q 8 hours IV, day 3 Rx     Subjective:  She has her baseline buttock discomfort when she puts pressure on it  Denies fevers, chills, or sweats  Denies nausea, vomiting, or diarrhea  Objective:  Vitals:  Temp:  [98 1 °F (36 7 °C)-100 6 °F (38 1 °C)] 99 1 °F (37 3 °C)  HR:  [80-89] 80  Resp:  [18-20] 20  BP: ()/(60-63) 99/60  SpO2:  [95 %-98 %] 95 %  Temp (24hrs), Av 2 °F (37 3 °C), Min:98 1 °F (36 7 °C), Max:100 6 °F (38 1 °C)  Current: Temperature: 99 1 °F (37 3 °C)    Physical Exam:     General Appearance:  Alert, appearing chronically ill nontoxic, no acute distress  Eyes:  Conjunctiva pale   Throat: Oropharynx moist without lesions  Lips, mucosa, and tongue normal   Neck: Supple, symmetrical, trachea midline, no adenopathy,  no tenderness/mass/nodules   Lungs:   Clear to auscultation bilaterally, no audible wheezes, rhonchi or rales; respirations unlabored   Heart:  Regular rate and rhythm, S1, S2 normal, no murmur, rub or gallop   Abdomen:   Soft, right-sided tenderness, abdominal wall hernia,, non-distended, positive bowel sounds    No masses, no organomegaly    No CVA tenderness   Extremities: Tattoos   Skin: Tattoos, right subclavian PAC, left gluteal wound with tenderness            Invasive Devices     Central Venous Catheter Line            Port A Cath 06/22/20 Right Chest 68 days          Peripheral Intravenous Line            Peripheral IV 08/27/20 Left Antecubital 2 days                Labs, Imaging, & Other studies:   All pertinent labs were personally reviewed  Results from last 7 days   Lab Units 08/29/20  0448 08/27/20  0156   WBC Thousand/uL 14 38* 17 44*   HEMOGLOBIN g/dL 8 2* 9 0*   PLATELETS Thousands/uL 163 197     Results from last 7 days   Lab Units 08/29/20  0448 08/27/20  0156   SODIUM mmol/L 133* 133*   POTASSIUM mmol/L 2 9* 3 3*   CHLORIDE mmol/L 99* 103   CO2 mmol/L 25 24   BUN mg/dL 3* 6   CREATININE mg/dL 0 49* 0 46*   EGFR ml/min/1 73sq m 116 119   CALCIUM mg/dL 8 2* 8 0*   AST U/L  --  37   ALT U/L  --  13   ALK PHOS U/L  --  277*     Results from last 7 days   Lab Units 08/28/20  0717 08/27/20  0156   BLOOD CULTURE  No Growth at 24 hrs   Bacteroides thetaiotaomicron group*   GRAM STAIN RESULT   --  Gram negative rods*

## 2020-08-29 NOTE — PROGRESS NOTES
Progress Note - Sherman Valencia 1975, 50 y o  female MRN: [id number]    Unit/Bed#: [facility] 903-01 Encounter: [id number]    Primary Care Provider: Cresencio Mckeon MD   Date and time admitted to hospital: 8/27/2020 12:51 AM        * Abscess, gluteal, left  Assessment & Plan  · Presented with increased abdominal and gluteal pain, recent admission for rectal abscess  · CT abdomen/pelvis concerning for recurrence of abscess, patient was complaining of fevers at home additionally leukocytosis is noted  · Pt now with gram negative bacteremia  · Continue cefepime and Flagyl for now  · Discussed with Dr Dante Wilson yesterday - recommends holding off on IR consult for drainage/aspiration  Gram-negative bacteremia  Assessment & Plan  1/2 blood culture on admission positive for gram-negative rods  Continue cefepime for now  Follow-up final results  Cancer associated pain  Assessment & Plan  · PDMP reviewed  · Continue OxyContin 20 mg q 12 hours  · Pt said her pain well controlled  · Appreciate palliative care input    Metastatic rectal cancer  Assessment & Plan  · With liver/lung metastases s/p chemotherapy per outpatient Oncology  · CT abdomen/pelvis revealing concern for persistent partially obstructing mass and will appreciate surgery recommendations  · Patient was initially refusing surgery but after discussion with palliative Care she is now agreeable for diverting colostomy  Surgery tentatively for Monday  · Palliative care following for pain control  VTE Pharmacologic Prophylaxis:   Pharmacologic: Enoxaparin (Lovenox)  Mechanical VTE Prophylaxis in Place: Yes    Patient Centered Rounds: I have performed bedside rounds with nursing staff today  Discussions with Specialists or Other Care Team Provider:     Education and Discussions with Family / Patient: pt and mother at bedside    Time Spent for Care: 30 minutes    More than 50% of total time spent on counseling and coordination of care as described above     Current Length of Stay: 2 day(s)    Current Patient Status: Inpatient   Certification Statement: The patient will continue to require additional inpatient hospital stay due to above    Discharge Plan: pending OR on Monday    Code Status: Level 1 - Full Code      Subjective:   Pt seen and examined by me this morning  Pt denied any new complaints  Pain controlled  Objective:     Vitals:   Temp (24hrs), Av 1 °F (37 3 °C), Min:98 1 °F (36 7 °C), Max:100 6 °F (38 1 °C)    Temp:  [98 1 °F (36 7 °C)-100 6 °F (38 1 °C)] 98 8 °F (37 1 °C)  HR:  [88-89] 88  Resp:  [18-20] 18  BP: (101-105)/(61-63) 101/61  SpO2:  [98 %] 98 %  Body mass index is 22 31 kg/m²  Input and Output Summary (last 24 hours): Intake/Output Summary (Last 24 hours) at 2020 1314  Last data filed at 2020 0846  Gross per 24 hour   Intake 800 ml   Output 0 ml   Net 800 ml       Physical Exam:     Physical Exam    Constitutional: Pt appears comfortable  Not in any acute distress  Cardiovascular: Normal rate, regular rhythm, normal heart sounds  No murmur heard  Pulmonary/Chest: Effort normal, air entry b/l equal  No respiratory distress  Pt has no wheezes or crackles  Abdominal: Soft  Non-distended, mildly TTP in lower abdomen  Bowel sounds are normal    Musculoskeletal: Normal range of motion  Neurological: awake, alert  Moving all extremities spontaneously  Psychiatric: normal mood and affect          Additional Data:     Labs:    Results from last 7 days   Lab Units 208 20  0156   WBC Thousand/uL 14 38* 17 44*   HEMOGLOBIN g/dL 8 2* 9 0*   HEMATOCRIT % 26 4* 29 4*   PLATELETS Thousands/uL 163 197   BANDS PCT %  --  3   LYMPHO PCT %  --  5*   MONO PCT %  --  4   EOS PCT %  --  4     Results from last 7 days   Lab Units 208 20  0156   SODIUM mmol/L 133* 133*   POTASSIUM mmol/L 2 9* 3 3*   CHLORIDE mmol/L 99* 103   CO2 mmol/L 25 24   BUN mg/dL 3* 6   CREATININE mg/dL 0 49* 0 46* ANION GAP mmol/L 9 6   CALCIUM mg/dL 8 2* 8 0*   ALBUMIN g/dL  --  2 5*   TOTAL BILIRUBIN mg/dL  --  0 33   ALK PHOS U/L  --  277*   ALT U/L  --  13   AST U/L  --  37   GLUCOSE RANDOM mg/dL 108 101     Results from last 7 days   Lab Units 08/27/20  0156   INR  1 22*             Results from last 7 days   Lab Units 08/27/20  0506 08/27/20  0156   LACTIC ACID mmol/L  --  1 3   PROCALCITONIN ng/ml 1 19* 1 20*           * I Have Reviewed All Lab Data Listed Above  * Additional Pertinent Lab Tests Reviewed:  [address] Admission Reviewed    Imaging:    Imaging Reports Reviewed Today Include:   Imaging Personally Reviewed by Myself Includes:    Recent Cultures (last 7 days):     Results from last 7 days   Lab Units 08/28/20  0717 08/27/20  0156   BLOOD CULTURE  No Growth at 24 hrs   --    GRAM STAIN RESULT   --  Gram negative rods*       Last 24 Hours Medication List:   Current Facility-Administered Medications   Medication Dose Route Frequency Provider Last Rate    cefepime  2,000 mg Intravenous Q12H Evlyn Salle, DO 2,000 mg (08/29/20 0837)    enoxaparin  40 mg Subcutaneous Daily Evlyn Salle, DO      gabapentin  300 mg Oral BID Sheryl Henning MD      HYDROmorphone  0 5 mg Intravenous Q2H PRN Saenz Iron Bendas, DO      lidocaine   Topical 4x Daily Melania Stuart PA-C      loratadine  10 mg Oral Daily Evlyn Salle, DO      metoprolol succinate  25 mg Oral Daily Evlyn Salle, DO      metroNIDAZOLE  500 mg Intravenous Q8H Evlyn Salle, DO Stopped (08/29/20 0630)    ondansetron  4 mg Intravenous Q4H PRN Evlyn Salle, DO      oxyCODONE  20 mg Oral Q8H [address] Melania Stuart PA-C      oxyCODONE  10 mg Oral Q4H PRN Shavonne M Bendas, DO      Or    oxyCODONE-acetaminophen  1 tablet Oral Q4H PRN Shavonne M Bendas, DO      potassium chloride  40 mEq Oral BID Celia Campoverde MD      sertraline  25 mg Oral Daily Evlyn Salle, DO          Today, Patient Was Seen By: Celia Campoverde MD    ** Please Note: Dictation voice to text software may have been used in the creation of this document   **

## 2020-08-29 NOTE — CONSULTS
Consultation - Infectious Disease   Puja Fitzpatrick 50 y o  female MRN: [id number]  Unit/Bed#: [facility] 903-01 Encounter: [id number]      Inpatient consult to Infectious Diseases  Consult performed by: Javi Garcia MD  Consult ordered by: Walker Hale PA-C          IMPRESSION & RECOMMENDATIONS:   Impression:  1  Recurrent left gluteal abscess with gram-negative bacteremia  2  Stage IV metastatic rectal carcinoma on chemotherapy    Recommendations:    Discuss with the primary service  1  Check results of blood culture for gram-negative isolate identification and susceptibilities as well as follow-up blood culture done today  2  Pending above continue cefepime 2 g q 12 hours IV and metronidazole 500 mg q 8 hours IV  3  Colostomy surgery scheduled for 8/31 with possible I&D and washout of left gluteal abscess       HISTORY OF PRESENT ILLNESS:    Reason for Consult:  Gram-negative bacteremia  HPI: Mook Agosto is a 50y o  year old female with stage IV metastatic rectal carcinoma on chemotherapy who was well known to me from her prior admission now readmitted 8/27 with fever rectal in abdominal pain  Patient was last here from 08/10/20-8/15/20 with  rectal bleeding  Previously she had been here from 7/30/20-8/9/20 with fever and rectal pain after a prior admission with Gemella mobillorum bacteremia  At that time she was found to have a left gluteal and pelvic abscess and underwent IR drainage which on culture revealed Streptococcus anginosus  She was treated with ampicillin sulbactam and was eventually placed on oral Augmentin  Diverting colostomy surgery was considered the patient preferred to not undergo the procedure at that time  During this admission a CT scan was concerning for a recurrent left gluteal abscess  A blood culture from 08/27 is revealing gram-negative rods    On admission the place shunt was placed on cefepime 2 g q 12 hours IV and metronidazole 500 mg q 8h IV which both continue currently  She is now agreeable to a colostomy  She has had a low-grade temperature elevation and is currently afebrile  Her WBC count is elevated  Review of Systems pertinent findings include abdominal pain, buttock pain with pressure, weakness, fever with diaphoresis, decreased appetite  A [id number] point system-based review of systems is otherwise negative  PAST MEDICAL HISTORY:  Past Medical History:   Diagnosis Date    [facility] 2020    right side    History of rectal cancer 2020    Sepsis [facility] ) 2020     Past Surgical History:   Procedure Laterality Date    BACK SURGERY      BREAST BIOPSY Right 2020    2 sites; 1 breast 1 axilla    HERNIA REPAIR      3 repairs    IR BIOPSY LIVER MASS  2020    IR IMAGE GUIDED ASPIRATION / DRAINAGE  2020    IR IMAGE GUIDED ASPIRATION / DRAINAGE  2020    IR PORT PLACEMENT  2020    TUBAL LIGATION      US GUIDED BREAST BIOPSY RIGHT COMPLETE Right 2020    US GUIDED BREAST LYMPH NODE BIOPSY RIGHT Right 2020       FAMILY HISTORY:  Non-contributory    SOCIAL HISTORY:  Social History   /Civil Union  Social History     Substance and Sexual Activity   Alcohol Use Not Currently     Social History     Substance and Sexual Activity   Drug Use No     Social History     Tobacco Use   Smoking Status Former Smoker    Packs/day: 0 00    Last attempt to quit: 2020    Years since quittin 3   Smokeless Tobacco Never Used       ALLERGIES:  Allergies   Allergen Reactions    Augmentin [Amoxicillin-Pot Clavulanate] Confusion and Drowsiness       MEDICATIONS:  All current active medications have been reviewed        PHYSICAL EXAM:  Temp:  [98 9 °F (37 2 °C)] 98 9 °F (37 2 °C)  HR:  [88-95] 88  Resp:  [19-20] 20  BP: (105-109)/(60-63) 105/63  Temp (24hrs), Av 9 °F (37 2 °C), Min:98 9 °F (37 2 °C), Max:98 9 °F (37 2 °C)  Current: Temperature: 98 9 °F (37 2 °C)    Intake/Output Summary (Last 24 hours) at 8/28/2020 2010  Last data filed at 8/28/2020 1911  Gross per 24 hour   Intake 700 ml   Output 0 ml   Net 700 ml       General Appearance:  Appearing chronically, nontoxic, and in no distress, appears stated age   Head:  Normocephalic, without obvious abnormality, atraumatic   Eyes:  PERRL, conjunctiva pink and sclera anicteric, both eyes   Nose: Nares normal, mucosa normal, no drainage   Throat: Oropharynx moist without lesions; lips, mucosa, and tongue normal; teeth and gums normal   Neck: Supple, symmetrical, trachea midline, no adenopathy, no tenderness/mass/nodules   Back:   Symmetric, no curvature, ROM normal, no CVA tenderness   Lungs:   Clear to auscultation bilaterally, no audible wheezes, rhonchi and rales, respirations unlabored   Chest Wall:  No tenderness or deformity, Port-A-Cath right subclavian area   Heart:  Regular rate and rhythm, S1, S2 normal, no murmur, rub or gallop   Abdomen:   Soft, right-sided-tenderness, abdominal wall hernia non-distended, positive bowel sounds, no masses, no organomegaly    No CVA tenderness   Extremities: Tattoos   Skin: Multiple tattoos and surgical scars, left gluteal wound tenderness   Neurologic: Alert and oriented times 3, extremity strength 5/5 and symmetric           Invasive Devices:   Port A Cath 06/22/20 Right Chest (Active)   Access Date 08/24/20 08/27/20 0851   Site Assessment Clean;Dry; Intact 08/27/20 2000   Line Status Flushed;Blood return noted; Saline locked 08/27/20 2000   Dressing Type Chlorhexidine dressing 08/27/20 2000   Dressing Status Clean;Dry; Intact 08/27/20 2000   Flush Performed Yes 08/27/20 3454   De-Accessed Date 08/28/20 08/28/20 0944   De-Accessed Time 0930 08/28/20 0944   De-accessed by: Arnav Wilkes RN 08/28/20 0944       Peripheral IV 08/27/20 Left Antecubital (Active)   Site Assessment Clean; Intact;Dry 08/28/20 1555   Dressing Type Transparent 08/28/20 1555   Line Status Flushed; Infusing 08/28/20 1555   Dressing Status Clean;Dry; Intact 08/28/20 1555   Dressing Change Due 08/31/20 08/27/20 0851   Reason Not Rotated Not due 08/27/20 0851       LABS, IMAGING, & OTHER STUDIES:  Lab Results:      I have personally reviewed pertinent labs  Results from last 7 days   Lab Units 08/27/20  0156   WBC Thousand/uL 17 44*   HEMOGLOBIN g/dL 9 0*   PLATELETS Thousands/uL 197     Results from last 7 days   Lab Units 08/27/20  0156   SODIUM mmol/L 133*   POTASSIUM mmol/L 3 3*   CHLORIDE mmol/L 103   CO2 mmol/L 24   BUN mg/dL 6   CREATININE mg/dL 0 46*   EGFR ml/min/1 73sq m 119   CALCIUM mg/dL 8 0*   AST U/L 37   ALT U/L 13   ALK PHOS U/L 277*     Results from last 7 days   Lab Units 08/28/20  0717 08/27/20  0156   BLOOD CULTURE  Received in Microbiology Lab  Culture in Progress  --    GRAM STAIN RESULT   --  Gram negative rods*       Imaging Studies:   I have personally reviewed pertinent imaging study reports and images in PACS  EKG, Pathology, and Other Studies:   I have personally reviewed pertinent reports

## 2020-08-29 NOTE — ASSESSMENT & PLAN NOTE
· Presented with increased abdominal and gluteal pain, recent admission for rectal abscess  · CT abdomen/pelvis concerning for recurrence of abscess, patient was complaining of fevers at home additionally leukocytosis is noted  · Pt now with gram negative bacteremia  · Continue cefepime and Flagyl for now  · Discussed with Dr Jaziel Cooper yesterday - recommends holding off on IR consult for drainage/aspiration

## 2020-08-29 NOTE — PROGRESS NOTES
Progress Note - Palliative & Supportive Care  Puja Fitzpatrick  50 y o   female  PPHP 903/PPHP 903-01   MRN: [id number]  Encounter: [id number]     ASSESSMENT:    Patient Active Problem List   Diagnosis    Large bowel obstruction ([address] )    Metastatic rectal cancer    Breast cancer    Abdominal pain    Anemia of chronic disease    Gram-positive bacteremia    Abscess, gluteal, left    Cancer associated pain    Thrombocytosis     Bright red blood per rectum    Carpal tunnel syndrome, bilateral    Disc degeneration, lumbar    Muscle spasm    Chronic left SI joint pain    Myofascial pain syndrome    Nicotine dependence    Postlaminectomy syndrome, lumbar    Neck pain    Abnormal cytological findings in specimens from other organs, systems and tissues    Anxiety with depression    HTN (hypertension), benign    Vitamin D deficiency    Gram-negative bacteremia     PLAN:    1  Goals:    Continue full cares w/o limit   Ongoing medical and surgical optimization as indicated   Patient agreeable to diverting colostomy, planned for 8/31     2  Social Support:   Supportive listening provided   Normalized experience of patient/family   Provided anxiety containment    3  Symptom management:  · Starting gabapentin 300mg BID as adjunct analgesia  · Continue oxycontin 20mg TID ATC  · Continue Percocet 5/325mg PO Q4H PRN moderate pain  · Continue oxycodone IR 10mg pO Q4H PRN severe pain  · Continue hydromorphone 0 5mg IV Q2H PRN breakthrough pain  · Continue lidocaine cream QID (though patient feels this has been ineffective)  · Can alternate heating pad / cooling pad  · Continue bowel regimen  Code status: Level 1 - Full Code   Decisional apparatus:  Patient does have capacity to make medical decisions on my exam today  We appreciate the opportunity to participate in this patient's care  We will continue to follow   Please do not hesitate to contact our on-call provider through our clinic answering service at [phone] should you have acute symptom control concerns  INTERVAL HISTORY:    Patient continues to c/o burning rectal and perineal pain which can be severe  Pain is alleviated by rest / LLR or RLR positioning, worse when seated upright or with some movements  Worse w/ toileting  Patient feels the topical agent (LMX) has not offered improvement of her pain  She had some success in the past w/ cold or heat  Patient says she is happy that "my fever broke overnight" (max temp 100 6F at 2210h)  Patient would like to give the increase in oxyER (from 40mg/day to 60mg/day) "one more day" to see if improvement in pain occurs  She is willing to try adjunct analgesia (gabapentin)  Patient noted to be hypokalemic to 2 9 today and is s/p K-DUR      MEDICATIONS / ALLERGIES:  all current active meds have been reviewed and current meds:   Current Facility-Administered Medications   Medication Dose Route Frequency    cefepime (MAXIPIME) 2,000 mg in dextrose 5 % 50 mL IVPB  2,000 mg Intravenous Q12H    enoxaparin (LOVENOX) subcutaneous injection 40 mg  40 mg Subcutaneous Daily    gabapentin (NEURONTIN) capsule 300 mg  300 mg Oral BID    HYDROmorphone (DILAUDID) injection 0 5 mg  0 5 mg Intravenous Q2H PRN    lidocaine (LMX) 4 % cream   Topical 4x Daily    loratadine (CLARITIN) tablet 10 mg  10 mg Oral Daily    metoprolol succinate (TOPROL-XL) 24 hr tablet 25 mg  25 mg Oral Daily    metroNIDAZOLE (FLAGYL) IVPB (premix) 500 mg 100 mL  500 mg Intravenous Q8H    ondansetron (ZOFRAN) injection 4 mg  4 mg Intravenous Q4H PRN    oxyCODONE (OxyCONTIN) 12 hr tablet 20 mg  20 mg Oral Q8H [facility]    oxyCODONE (ROXICODONE) immediate release tablet 10 mg  10 mg Oral Q4H PRN    Or    oxyCODONE-acetaminophen (PERCOCET) 5-325 mg per tablet 1 tablet  1 tablet Oral Q4H PRN    potassium chloride (K-DUR,KLOR-CON) CR tablet 40 mEq  40 mEq Oral BID    sertraline (ZOLOFT) tablet 25 mg  25 mg Oral Daily       Allergies Allergen Reactions    Augmentin [Amoxicillin-Pot Clavulanate] Confusion and Drowsiness       OBJECTIVE:  BP 99/60   Pulse 80   Temp 99 1 °F (37 3 °C)   Resp 20   Wt 55 3 kg (122 lb)   SpO2 95%   BMI 22 31 kg/m²   Nursing notes and vital signs reviewed  Physical Exam:  Constitutional: Thin but well-developed adult female  In discomfort  No acute emotional distress  Head: Normocephalic and atraumatic  Eyes: EOM are normal  No ocular discharge  No scleral icterus  Neck: No visible adenopathy or masses  Respiratory: Effort normal  No stridor  No respiratory distress  Gastrointestinal: No abdominal distension  Musculoskeletal: No edema  Neurological: Alert, oriented and appropriately conversant  Skin: Dry, no diaphoresis  Psychiatric: Displays a normal mood and affect  Behavior, judgement and thought content appear normal      Lab Results: I have personally reviewed pertinent labs  CBC:  Lab Results   Component Value Date    WBC 14 38 (H) 08/29/2020    HGB 8 2 (L) 08/29/2020    HCT 26 4 (L) 08/29/2020    MCV 81 (L) 08/29/2020     08/29/2020    MCH 25 1 (L) 08/29/2020    MCHC 31 1 (L) 08/29/2020    RDW 20 7 (H) 08/29/2020    MPV 10 9 08/29/2020     CMP:  Lab Results   Component Value Date    SODIUM 133 (L) 08/29/2020    K 2 9 (L) 08/29/2020    CL 99 (L) 08/29/2020    CO2 25 08/29/2020    BUN 3 (L) 08/29/2020    CREATININE 0 49 (L) 08/29/2020    CALCIUM 8 2 (L) 08/29/2020    EGFR 116 08/29/2020     Albumin:  0   Lab Value Date/Time    ALB 2 5 (L) 08/27/2020 0156     Imaging Studies: I have personally reviewed pertinent reports  EKG, Pathology, and Other Studies: I have personally reviewed pertinent reports  Counseling / Coordination of Care: Total floor / unit time spent today 25+ minutes  Greater than 50% of total time was spent with the patient and / or family counseling and / or coordination of care   A description of the counseling / coordination of care: symptom assessment and management, medication review and adjustment, psychosocial support, chart review, imaging review, lab review  Tremayne Guidry MD  [facility]      Portions of this document may have been created using dictation software and as such some "sound alike" terms may have been generated by the system  Do not hesitate to contact me with any questions or clarifications

## 2020-08-30 ENCOUNTER — ANESTHESIA EVENT (INPATIENT)
Dept: PERIOP | Facility: HOSPITAL | Age: 45
DRG: 329 | End: 2020-08-30
Payer: COMMERCIAL

## 2020-08-30 LAB
ABO GROUP BLD: NORMAL
ANION GAP SERPL CALCULATED.3IONS-SCNC: 6 MMOL/L (ref 4–13)
BLD GP AB SCN SERPL QL: NEGATIVE
BUN SERPL-MCNC: 4 MG/DL (ref 5–25)
CALCIUM SERPL-MCNC: 7.9 MG/DL (ref 8.3–10.1)
CHLORIDE SERPL-SCNC: 96 MMOL/L (ref 100–108)
CO2 SERPL-SCNC: 30 MMOL/L (ref 21–32)
CREAT SERPL-MCNC: 0.49 MG/DL (ref 0.6–1.3)
GFR SERPL CREATININE-BSD FRML MDRD: 116 ML/MIN/1.73SQ M
GLUCOSE SERPL-MCNC: 142 MG/DL (ref 65–140)
MAGNESIUM SERPL-MCNC: 2 MG/DL (ref 1.6–2.6)
POTASSIUM SERPL-SCNC: 3.2 MMOL/L (ref 3.5–5.3)
RH BLD: POSITIVE
SODIUM SERPL-SCNC: 132 MMOL/L (ref 136–145)
SPECIMEN EXPIRATION DATE: NORMAL

## 2020-08-30 PROCEDURE — 99232 SBSQ HOSP IP/OBS MODERATE 35: CPT | Performed by: INTERNAL MEDICINE

## 2020-08-30 PROCEDURE — 80048 BASIC METABOLIC PNL TOTAL CA: CPT | Performed by: INTERNAL MEDICINE

## 2020-08-30 PROCEDURE — 99231 SBSQ HOSP IP/OBS SF/LOW 25: CPT | Performed by: INTERNAL MEDICINE

## 2020-08-30 PROCEDURE — 86900 BLOOD TYPING SEROLOGIC ABO: CPT | Performed by: SURGERY

## 2020-08-30 PROCEDURE — 86901 BLOOD TYPING SEROLOGIC RH(D): CPT | Performed by: SURGERY

## 2020-08-30 PROCEDURE — 86850 RBC ANTIBODY SCREEN: CPT | Performed by: SURGERY

## 2020-08-30 PROCEDURE — 83735 ASSAY OF MAGNESIUM: CPT | Performed by: INTERNAL MEDICINE

## 2020-08-30 RX ORDER — GABAPENTIN 300 MG/1
300 CAPSULE ORAL 3 TIMES DAILY
Status: DISCONTINUED | OUTPATIENT
Start: 2020-08-30 | End: 2020-09-16 | Stop reason: HOSPADM

## 2020-08-30 RX ORDER — SODIUM CHLORIDE, SODIUM LACTATE, POTASSIUM CHLORIDE, CALCIUM CHLORIDE 600; 310; 30; 20 MG/100ML; MG/100ML; MG/100ML; MG/100ML
75 INJECTION, SOLUTION INTRAVENOUS CONTINUOUS
Status: DISCONTINUED | OUTPATIENT
Start: 2020-08-30 | End: 2020-09-01

## 2020-08-30 RX ORDER — ERYTHROMYCIN 250 MG/1
1000 TABLET, COATED ORAL ONCE
Status: COMPLETED | OUTPATIENT
Start: 2020-08-30 | End: 2020-08-30

## 2020-08-30 RX ORDER — NEOMYCIN SULFATE 500 MG/1
1000 TABLET ORAL ONCE
Status: DISCONTINUED | OUTPATIENT
Start: 2020-08-30 | End: 2020-08-30

## 2020-08-30 RX ORDER — METRONIDAZOLE 500 MG/1
1000 TABLET ORAL ONCE
Status: DISCONTINUED | OUTPATIENT
Start: 2020-08-30 | End: 2020-08-30

## 2020-08-30 RX ORDER — ERYTHROMYCIN 250 MG/1
1000 TABLET, COATED ORAL ONCE
Status: COMPLETED | OUTPATIENT
Start: 2020-08-31 | End: 2020-08-31

## 2020-08-30 RX ORDER — CEFAZOLIN SODIUM 2 G/50ML
2000 SOLUTION INTRAVENOUS
Status: DISCONTINUED | OUTPATIENT
Start: 2020-08-31 | End: 2020-08-31

## 2020-08-30 RX ORDER — OXYCODONE HCL 20 MG/1
40 TABLET, FILM COATED, EXTENDED RELEASE ORAL EVERY 12 HOURS SCHEDULED
Status: DISCONTINUED | OUTPATIENT
Start: 2020-08-30 | End: 2020-09-09

## 2020-08-30 RX ORDER — METRONIDAZOLE 500 MG/1
1000 TABLET ORAL ONCE
Status: DISCONTINUED | OUTPATIENT
Start: 2020-08-31 | End: 2020-08-30

## 2020-08-30 RX ORDER — NEOMYCIN SULFATE 500 MG/1
1000 TABLET ORAL ONCE
Status: COMPLETED | OUTPATIENT
Start: 2020-08-30 | End: 2020-08-30

## 2020-08-30 RX ORDER — POLYETHYLENE GLYCOL 3350 17 G/17G
238 POWDER, FOR SOLUTION ORAL ONCE
Status: COMPLETED | OUTPATIENT
Start: 2020-08-30 | End: 2020-08-30

## 2020-08-30 RX ORDER — OXYCODONE HYDROCHLORIDE 10 MG/1
10 TABLET ORAL EVERY 4 HOURS PRN
Status: DISCONTINUED | OUTPATIENT
Start: 2020-08-30 | End: 2020-09-09

## 2020-08-30 RX ORDER — NEOMYCIN SULFATE 500 MG/1
1000 TABLET ORAL ONCE
Status: COMPLETED | OUTPATIENT
Start: 2020-08-31 | End: 2020-08-31

## 2020-08-30 RX ORDER — NEOMYCIN SULFATE 500 MG/1
1000 TABLET ORAL ONCE
Status: DISCONTINUED | OUTPATIENT
Start: 2020-08-31 | End: 2020-08-30

## 2020-08-30 RX ORDER — SODIUM CHLORIDE, SODIUM LACTATE, POTASSIUM CHLORIDE, CALCIUM CHLORIDE 600; 310; 30; 20 MG/100ML; MG/100ML; MG/100ML; MG/100ML
84 INJECTION, SOLUTION INTRAVENOUS CONTINUOUS
Status: DISCONTINUED | OUTPATIENT
Start: 2020-08-31 | End: 2020-08-30

## 2020-08-30 RX ADMIN — CEFEPIME HYDROCHLORIDE 2000 MG: 2 INJECTION, POWDER, FOR SOLUTION INTRAVENOUS at 07:34

## 2020-08-30 RX ADMIN — NEOMYCIN SULFATE 1000 MG: 500 TABLET ORAL at 18:37

## 2020-08-30 RX ADMIN — LIDOCAINE 1 APPLICATION: 4 CREAM TOPICAL at 10:27

## 2020-08-30 RX ADMIN — METRONIDAZOLE 500 MG: 500 INJECTION, SOLUTION INTRAVENOUS at 21:55

## 2020-08-30 RX ADMIN — POTASSIUM CHLORIDE 40 MEQ: 1500 TABLET, EXTENDED RELEASE ORAL at 18:37

## 2020-08-30 RX ADMIN — METRONIDAZOLE 500 MG: 500 INJECTION, SOLUTION INTRAVENOUS at 05:48

## 2020-08-30 RX ADMIN — BISACODYL 10 MG: 5 TABLET ORAL at 20:24

## 2020-08-30 RX ADMIN — NEOMYCIN SULFATE 1000 MG: 500 TABLET ORAL at 21:54

## 2020-08-30 RX ADMIN — METRONIDAZOLE 500 MG: 500 INJECTION, SOLUTION INTRAVENOUS at 13:30

## 2020-08-30 RX ADMIN — LIDOCAINE 1 APPLICATION: 4 CREAM TOPICAL at 13:33

## 2020-08-30 RX ADMIN — POTASSIUM CHLORIDE 40 MEQ: 1500 TABLET, EXTENDED RELEASE ORAL at 07:26

## 2020-08-30 RX ADMIN — HYDROMORPHONE HYDROCHLORIDE 0.5 MG: 1 INJECTION, SOLUTION INTRAMUSCULAR; INTRAVENOUS; SUBCUTANEOUS at 13:29

## 2020-08-30 RX ADMIN — LORATADINE 10 MG: 10 TABLET ORAL at 07:27

## 2020-08-30 RX ADMIN — LIDOCAINE: 4 CREAM TOPICAL at 21:49

## 2020-08-30 RX ADMIN — OXYCODONE HYDROCHLORIDE 15 MG: 10 TABLET ORAL at 12:20

## 2020-08-30 RX ADMIN — OXYCODONE HYDROCHLORIDE 20 MG: 20 TABLET, FILM COATED, EXTENDED RELEASE ORAL at 05:47

## 2020-08-30 RX ADMIN — ERYTHROMYCIN 1000 MG: 250 TABLET, FILM COATED ORAL at 18:37

## 2020-08-30 RX ADMIN — GABAPENTIN 300 MG: 300 CAPSULE ORAL at 07:27

## 2020-08-30 RX ADMIN — GABAPENTIN 300 MG: 300 CAPSULE ORAL at 20:24

## 2020-08-30 RX ADMIN — OXYCODONE HYDROCHLORIDE 15 MG: 10 TABLET ORAL at 16:07

## 2020-08-30 RX ADMIN — OXYCODONE HYDROCHLORIDE 10 MG: 10 TABLET ORAL at 04:37

## 2020-08-30 RX ADMIN — CEFEPIME HYDROCHLORIDE 2000 MG: 2 INJECTION, POWDER, FOR SOLUTION INTRAVENOUS at 20:31

## 2020-08-30 RX ADMIN — SERTRALINE HYDROCHLORIDE 25 MG: 25 TABLET ORAL at 07:26

## 2020-08-30 RX ADMIN — ERYTHROMYCIN 1000 MG: 250 TABLET, FILM COATED ORAL at 21:50

## 2020-08-30 RX ADMIN — HYDROMORPHONE HYDROCHLORIDE 0.5 MG: 1 INJECTION, SOLUTION INTRAMUSCULAR; INTRAVENOUS; SUBCUTANEOUS at 21:49

## 2020-08-30 RX ADMIN — METOPROLOL SUCCINATE 25 MG: 25 TABLET, EXTENDED RELEASE ORAL at 07:26

## 2020-08-30 RX ADMIN — HYDROMORPHONE HYDROCHLORIDE 0.5 MG: 1 INJECTION, SOLUTION INTRAMUSCULAR; INTRAVENOUS; SUBCUTANEOUS at 10:27

## 2020-08-30 RX ADMIN — POLYETHYLENE GLYCOL 3350 238 G: 17 POWDER, FOR SOLUTION ORAL at 17:02

## 2020-08-30 RX ADMIN — HYDROMORPHONE HYDROCHLORIDE 0.5 MG: 1 INJECTION, SOLUTION INTRAMUSCULAR; INTRAVENOUS; SUBCUTANEOUS at 17:08

## 2020-08-30 RX ADMIN — LIDOCAINE 1 APPLICATION: 4 CREAM TOPICAL at 18:38

## 2020-08-30 RX ADMIN — OXYCODONE HYDROCHLORIDE 40 MG: 20 TABLET, FILM COATED, EXTENDED RELEASE ORAL at 20:23

## 2020-08-30 RX ADMIN — SODIUM CHLORIDE, SODIUM LACTATE, POTASSIUM CHLORIDE, AND CALCIUM CHLORIDE 100 ML/HR: .6; .31; .03; .02 INJECTION, SOLUTION INTRAVENOUS at 18:15

## 2020-08-30 RX ADMIN — HYDROMORPHONE HYDROCHLORIDE 0.5 MG: 1 INJECTION, SOLUTION INTRAMUSCULAR; INTRAVENOUS; SUBCUTANEOUS at 07:25

## 2020-08-30 RX ADMIN — BISACODYL 10 MG: 5 TABLET ORAL at 17:02

## 2020-08-30 RX ADMIN — GABAPENTIN 300 MG: 300 CAPSULE ORAL at 16:06

## 2020-08-30 RX ADMIN — ENOXAPARIN SODIUM 40 MG: 40 INJECTION SUBCUTANEOUS at 07:27

## 2020-08-30 NOTE — ASSESSMENT & PLAN NOTE
1/2 blood culture on admission positive for Bacteroids thetaiotaomicron  Continue cefepime and Flagyl for now

## 2020-08-30 NOTE — PROGRESS NOTES
Progress Note - Palliative & Supportive Care  Puja Fitzpatrick  50 y o   female  PPHP 903/PPHP 903-01   MRN: [id number]  Encounter: [id number]     ASSESSMENT:    Patient Active Problem List   Diagnosis    Large bowel obstruction ([address] )    Metastatic rectal cancer    Breast cancer    Abdominal pain    Anemia of chronic disease    Gram-positive bacteremia    Abscess, gluteal, left    Cancer associated pain    Thrombocytosis     Bright red blood per rectum    Carpal tunnel syndrome, bilateral    Disc degeneration, lumbar    Muscle spasm    Chronic left SI joint pain    Myofascial pain syndrome    Nicotine dependence    Postlaminectomy syndrome, lumbar    Neck pain    Abnormal cytological findings in specimens from other organs, systems and tissues    Anxiety with depression    HTN (hypertension), benign    Vitamin D deficiency    Gram-negative bacteremia     PLAN:    1  Goals:    Plan for diverting loop colostomy on 8/31/20; initially laparoscopic but may be converted to open   Continue full cares w/o limit   Ongoing medical and surgical optimization as indicated  2  Social Support:   Supportive listening provided   Provided anxiety containment    3  Symptom management:  · Increasing gabapentin to 300mg TID  · Increasing oxycontin to 40mg BID ATC (increase from 20mg TID)  · Altered oxycodone IR to 10-15mg pO Q4H PRN moderate/severe pain  · Stopped Percocet  · Continue hydromorphone 0 5mg IV Q2H PRN breakthrough pain  · Continue lidocaine cream QID  · Can alternate heating pad / cooling pad  · Continue bowel prep  Code status: Level 1 - Full Code   Decisional apparatus:  Patient does have capacity to make medical decisions on my exam today  We appreciate the opportunity to participate in this patient's care  We will continue to follow   Please do not hesitate to contact our on-call provider through our clinic answering service at [phone] should you have acute symptom control concerns  INTERVAL HISTORY:    Patient continues to have burning rectal and gluteal pain; can be severe depending on position or movement  Worse w/ toileting  Current analgesic regimen provides some relief but patient is amenable to increasing  She is tolerating gabapentin  Patient continues bowel prep in anticipation of tomorrow's surgery      MEDICATIONS / ALLERGIES:  all current active meds have been reviewed and current meds:   Current Facility-Administered Medications   Medication Dose Route Frequency    bisacodyl (DULCOLAX) EC tablet 10 mg  10 mg Oral Once    bisacodyl (DULCOLAX) EC tablet 10 mg  10 mg Oral Once    [START ON 8/31/2020] ceFAZolin (ANCEF) IVPB (premix) 2,000 mg 50 mL  2,000 mg Intravenous On Call To OR    cefepime (MAXIPIME) 2,000 mg in dextrose 5 % 50 mL IVPB  2,000 mg Intravenous Q12H    enoxaparin (LOVENOX) subcutaneous injection 40 mg  40 mg Subcutaneous Daily    gabapentin (NEURONTIN) capsule 300 mg  300 mg Oral TID    HYDROmorphone (DILAUDID) injection 0 5 mg  0 5 mg Intravenous Q2H PRN    [START ON 8/31/2020] lactated ringers infusion  84 mL/hr Intravenous Continuous    lidocaine (LMX) 4 % cream   Topical 4x Daily    loratadine (CLARITIN) tablet 10 mg  10 mg Oral Daily    metoprolol succinate (TOPROL-XL) 24 hr tablet 25 mg  25 mg Oral Daily    metroNIDAZOLE (FLAGYL) IVPB (premix) 500 mg 100 mL  500 mg Intravenous Q8H    ondansetron (ZOFRAN) injection 4 mg  4 mg Intravenous Q4H PRN    oxyCODONE (OxyCONTIN) 12 hr tablet 40 mg  40 mg Oral Q12H [place]    oxyCODONE (ROXICODONE) immediate release tablet 10 mg  10 mg Oral Q4H PRN    oxyCODONE (ROXICODONE) IR tablet 15 mg  15 mg Oral Q4H PRN    polyethylene glycol (GLYCOLAX) bowel prep 238 g  238 g Oral Once    potassium chloride (K-DUR,KLOR-CON) CR tablet 40 mEq  40 mEq Oral BID    sertraline (ZOLOFT) tablet 25 mg  25 mg Oral Daily       Allergies   Allergen Reactions    Augmentin [Amoxicillin-Pot Clavulanate] Confusion and Drowsiness       OBJECTIVE:  BP 99/59   Pulse 81   Temp 98 9 °F (37 2 °C) (Oral)   Resp 18   Wt 55 3 kg (122 lb)   SpO2 96%   BMI 22 31 kg/m²   Nursing notes and vital signs reviewed  Physical Exam:  Constitutional: Thin, well-developed adult female in discomfort  In no acute emotional distress  Head: Normocephalic and atraumatic  Eyes: EOM are normal  No ocular discharge  No scleral icterus  Neck: No visible adenopathy or masses  Respiratory: Effort normal  No stridor  No respiratory distress  Gastrointestinal: No abdominal distension  Musculoskeletal: No edema  Neurological: Alert, oriented and appropriately conversant  Skin: Dry, no diaphoresis  Psychiatric: Displays a normal mood and affect  Behavior, judgement and thought content appear normal      Lab Results: I have personally reviewed pertinent labs  CMP:  Lab Results   Component Value Date    SODIUM 132 (L) 08/30/2020    K 3 2 (L) 08/30/2020    CL 96 (L) 08/30/2020    CO2 30 08/30/2020    BUN 4 (L) 08/30/2020    CREATININE 0 49 (L) 08/30/2020    CALCIUM 7 9 (L) 08/30/2020    EGFR 116 08/30/2020     Albumin:  0   Lab Value Date/Time    ALB 2 5 (L) 08/27/2020 0156     Imaging Studies: I have personally reviewed pertinent reports  EKG, Pathology, and Other Studies: I have personally reviewed pertinent reports  Counseling / Coordination of Care: Total floor / unit time spent today 30 minutes  Greater than 50% of total time was spent with the patient and / or family counseling and / or coordination of care  A description of the counseling / coordination of care: symptom assessment and management, medication review and adjustment, psychosocial support, chart review, imaging review, lab review      Jackie Malave MD  [facility]      Portions of this document may have been created using dictation software and as such some "sound alike" terms may have been generated by the system  Do not hesitate to contact me with any questions or clarifications

## 2020-08-30 NOTE — PROGRESS NOTES
Progress Note - Radha Velasco 1975, 50 y o  female MRN: [id number]    Unit/Bed#: [facility] 903-01 Encounter: [id number]    Primary Care Provider: Devika Cazares MD   Date and time admitted to hospital: 8/27/2020 12:51 AM        * Abscess, gluteal, left  Assessment & Plan  · Presented with increased abdominal and gluteal pain, recent admission for rectal abscess  · CT abdomen/pelvis concerning for recurrence of abscess, patient was complaining of fevers at home additionally leukocytosis is noted  · Pt now with gram negative bacteremia  · Continue cefepime and Flagyl for now  · Discussed with Dr Sari Leyden - recommends holding off on IR consult for drainage/aspiration  Gram-negative bacteremia  Assessment & Plan  1/2 blood culture on admission positive for Bacteroids thetaiotaomicron  Continue cefepime and Flagyl for now  Cancer associated pain  Assessment & Plan  · PDMP reviewed  · Continue OxyContin 20 mg q 12 hours  · Pt said her pain well controlled  · Appreciate palliative care input    Metastatic rectal cancer  Assessment & Plan  · With liver/lung metastases s/p chemotherapy per outpatient Oncology  · CT abdomen/pelvis revealing concern for persistent partially obstructing mass and will appreciate surgery recommendations  · Patient was initially refusing surgery but after discussion with palliative Care she is now agreeable for diverting colostomy  Surgery tentatively for Monday  · Palliative care following for pain control  VTE Pharmacologic Prophylaxis:   Pharmacologic: Enoxaparin (Lovenox)  Mechanical VTE Prophylaxis in Place: Yes    Patient Centered Rounds: I have performed bedside rounds with nursing staff today  Discussions with Specialists or Other Care Team Provider:     Education and Discussions with Family / Patient: pt    Time Spent for Care: 30 minutes  More than 50% of total time spent on counseling and coordination of care as described above      Current Length of Stay: 3 day(s)    Current Patient Status: Inpatient   Certification Statement: The patient will continue to require additional inpatient hospital stay due to above    Discharge Plan: pending OR tomorrow    Code Status: Level 1 - Full Code      Subjective:   Pt seen and examined by me this morning  Pt has no new complaints  Continues to have lower abdominal and gluteal pain  Objective:     Vitals:   Temp (24hrs), Av 4 °F (37 4 °C), Min:98 9 °F (37 2 °C), Max:100 1 °F (37 8 °C)    Temp:  [98 9 °F (37 2 °C)-100 1 °F (37 8 °C)] 98 9 °F (37 2 °C)  HR:  [80-89] 81  Resp:  [18-20] 18  BP: (99)/(59-60) 99/59  SpO2:  [95 %-97 %] 96 %  Body mass index is 22 31 kg/m²  Input and Output Summary (last 24 hours): Intake/Output Summary (Last 24 hours) at 2020 1336  Last data filed at 2020 9127  Gross per 24 hour   Intake 840 ml   Output 0 ml   Net 840 ml       Physical Exam:     Physical Exam    Constitutional: Pt appears comfortable  Not in any acute distress  Cardiovascular: Normal rate, regular rhythm, normal heart sounds  No murmur heard  Pulmonary/Chest: Effort normal, air entry b/l equal  No respiratory distress  Pt has no wheezes or crackles  Abdominal: Soft  Non-distended, Non-tender  Bowel sounds are normal    Musculoskeletal: Normal range of motion  Neurological: awake, alert  Moving all extremities spontaneously  Psychiatric: normal mood and affect       Additional Data:     Labs:    Results from last 7 days   Lab Units 20  0448 20  0156   WBC Thousand/uL 14 38* 17 44*   HEMOGLOBIN g/dL 8 2* 9 0*   HEMATOCRIT % 26 4* 29 4*   PLATELETS Thousands/uL 163 197   BANDS PCT %  --  3   LYMPHO PCT %  --  5*   MONO PCT %  --  4   EOS PCT %  --  4     Results from last 7 days   Lab Units 20  0927  20  0156   SODIUM mmol/L 132*   < > 133*   POTASSIUM mmol/L 3 2*   < > 3 3*   CHLORIDE mmol/L 96*   < > 103   CO2 mmol/L 30   < > 24   BUN mg/dL 4*   < > 6   CREATININE mg/dL 0 49*   < > 0 46*   ANION GAP mmol/L 6   < > 6   CALCIUM mg/dL 7 9*   < > 8 0*   ALBUMIN g/dL  --   --  2 5*   TOTAL BILIRUBIN mg/dL  --   --  0 33   ALK PHOS U/L  --   --  277*   ALT U/L  --   --  13   AST U/L  --   --  37   GLUCOSE RANDOM mg/dL 142*   < > 101    < > = values in this interval not displayed  Results from last 7 days   Lab Units 08/27/20  0156   INR  1 22*             Results from last 7 days   Lab Units 08/27/20  0506 08/27/20  0156   LACTIC ACID mmol/L  --  1 3   PROCALCITONIN ng/ml 1 19* 1 20*           * I Have Reviewed All Lab Data Listed Above  * Additional Pertinent Lab Tests Reviewed: [address] Admission Reviewed    Imaging:    Imaging Reports Reviewed Today Include:   Imaging Personally Reviewed by Myself Includes:     Recent Cultures (last 7 days):     Results from last 7 days   Lab Units 08/28/20  0717 08/27/20  0156   BLOOD CULTURE  No Growth at 48 hrs   Bacteroides thetaiotaomicron group*   GRAM STAIN RESULT   --  Gram negative rods*       Last 24 Hours Medication List:   Current Facility-Administered Medications   Medication Dose Route Frequency Provider Last Rate    bisacodyl  10 mg Oral Once Joel Velasco MD      bisacodyl  10 mg Oral Once Joel Velasco MD     [facility] [START ON 8/31/2020] cefazolin  2,000 mg Intravenous On Call To OR Joel Velasco MD      cefepime  2,000 mg Intravenous Q12H [place] Roberts, DO 2,000 mg (08/30/20 0734)    enoxaparin  40 mg Subcutaneous Daily [place] Roberts, DO      gabapentin  300 mg Oral TID Chapis Kapadia MD      HYDROmorphone  0 5 mg Intravenous Q2H PRN Margeret Romberg Bendas, DO      [START ON 8/31/2020] lactated ringers  84 mL/hr Intravenous Continuous Joel Velasco MD      lidocaine   Topical 4x Daily Melania Bunch PA-C      loratadine  10 mg Oral Daily [place] Roberts, DO      metoprolol succinate  25 mg Oral Daily [place] Roberts, DO      metroNIDAZOLE  500 mg Intravenous Q8H [place],  mg (08/30/20 1330)    ondansetron  4 mg Intravenous Q4H PRN Nora Tejeda DO      oxyCODONE  40 mg Oral Q12H [address] Magdalene Ingram MD      oxyCODONE  10 mg Oral Q4H PRN Magdalene Ingram MD      oxyCODONE  15 mg Oral Q4H PRN Magdalene Ingram, MD      polyethylene glycol  238 g Oral Once Sebastian Wolf MD      potassium chloride  40 mEq Oral BID Nora Servin MD      sertraline  25 mg Oral Daily Nora Tejeda DO          Today, Patient Was Seen By: Nora Servin MD    ** Please Note: Dictation voice to text software may have been used in the creation of this document   **

## 2020-08-30 NOTE — ASSESSMENT & PLAN NOTE
· Presented with increased abdominal and gluteal pain, recent admission for rectal abscess  · CT abdomen/pelvis concerning for recurrence of abscess, patient was complaining of fevers at home additionally leukocytosis is noted  · Pt now with gram negative bacteremia  · Continue cefepime and Flagyl for now  · Discussed with Dr Symone Montiel - recommends holding off on IR consult for drainage/aspiration

## 2020-08-30 NOTE — PROGRESS NOTES
Progress Note - Colorectal Surgery   Jazmin Fitzpatrick 50 y o  female MRN: [id number]  [facility] Encounter: [id number]    Assessment:    50 F w/ Stage IV rectal cancer, gluteal/coccygeal abscess vs necrotic tumor, and CT findings concerning for partial bowel obstruction  VSS, Afebrile      Plan: Bowel Prep today  Plan for laparoscopic possible open diverting loop colostomy on 08/31  Primary per Medicine    Subjective/Objective     Subjective:   No acute events overnight  Having Bowel function  Pain improving over the week  Discussed plan for surgery tomorrow  Objective:    Blood pressure 99/60, pulse 89, temperature 99 6 °F (37 6 °C), temperature source Oral, resp  rate 20, weight 55 3 kg (122 lb), SpO2 97 %, not currently breastfeeding  ,Body mass index is 22 31 kg/m²  Intake/Output Summary (Last 24 hours) at 8/30/2020 0510  Last data filed at 8/30/2020 0300  Gross per 24 hour   Intake 910 ml   Output 0 ml   Net 910 ml       Invasive Devices     Central Venous Catheter Line            Port A Cath 06/22/20 Right Chest 68 days          Peripheral Intravenous Line            Peripheral IV 08/27/20 Left Antecubital 3 days                Physical Exam:   Gen:  NAD  HEENT: normocephalic, atraumatic  Neck supple  CV: well perfused, pulses palpable  Lungs: Normal respiratory effort  Abd: soft, nt/nd, hernia present  Skin: warm/ dry  Extremities: no peripheral edema  Neuro:  AxO x3      Results from last 7 days   Lab Units 08/29/20  0448 08/27/20  0156   WBC Thousand/uL 14 38* 17 44*   HEMOGLOBIN g/dL 8 2* 9 0*   HEMATOCRIT % 26 4* 29 4*   PLATELETS Thousands/uL 163 197     Results from last 7 days   Lab Units 08/29/20  0448 08/27/20  0156   POTASSIUM mmol/L 2 9* 3 3*   CHLORIDE mmol/L 99* 103   CO2 mmol/L 25 24   BUN mg/dL 3* 6   CREATININE mg/dL 0 49* 0 46*   CALCIUM mg/dL 8 2* 8 0*     Results from last 7 days   Lab Units 08/27/20  0156   INR  1 22*   PTT seconds 36

## 2020-08-30 NOTE — PLAN OF CARE
Problem: Potential for Falls  Goal: Patient will remain free of falls  Description: INTERVENTIONS:  - Assess patient frequently for physical needs  -  Identify cognitive and physical deficits and behaviors that affect risk of falls    -  [place] fall precautions as indicated by assessment   - Educate patient/family on patient safety including physical limitations  - Instruct patient to call for assistance with activity based on assessment  - Modify environment to reduce risk of injury  - Consider OT/PT consult to assist with strengthening/mobility  Outcome: Progressing     Problem: GASTROINTESTINAL - ADULT  Goal: Minimal or absence of nausea and/or vomiting  Description: INTERVENTIONS:  - Administer IV fluids if ordered to ensure adequate hydration  - Maintain NPO status until nausea and vomiting are resolved  - Nasogastric tube if ordered  - Administer ordered antiemetic medications as needed  - Provide nonpharmacologic comfort measures as appropriate  - Advance diet as tolerated, if ordered  - Consider nutrition services referral to assist patient with adequate nutrition and appropriate food choices  Outcome: Progressing  Goal: Maintains or returns to baseline bowel function  Description: INTERVENTIONS:  - Assess bowel function  - Encourage oral fluids to ensure adequate hydration  - Administer IV fluids if ordered to ensure adequate hydration  - Administer ordered medications as needed  - Encourage mobilization and activity  - Consider nutritional services referral to assist patient with adequate nutrition and appropriate food choices  Outcome: Progressing  Goal: Maintains adequate nutritional intake  Description: INTERVENTIONS:  - Monitor percentage of each meal consumed  - Identify factors contributing to decreased intake, treat as appropriate  - Assist with meals as needed  - Monitor I&O, weight, and lab values if indicated  - Obtain nutrition services referral as needed  Outcome: Progressing

## 2020-08-30 NOTE — ANESTHESIA PREPROCEDURE EVALUATION
Procedure:  COLOSTOMY LAPAROSCOPIC (N/A Abdomen)    Recently admitted with gluteal abscess and gram negative mercy bacteriemia, current ABX therapy cefepime and flagyl  Seen by palliative care taking 20 mg OxyContin BID for pain    Denies the following: CP/SOB with exertion, COPD, asthma, ARIES, active GERD, stroke/TIA, seizures    Relevant Problems   CARDIO   (+) HTN (hypertension), benign      GI/HEPATIC  Rectal cancer with metastatic spread to liver and lung, recieved chemotherapy   (+) Bright red blood per rectum   (+) Large bowel obstruction (HCC)   (+) Metastatic rectal cancer      GYN   (+) Breast cancer      HEMATOLOGY   (+) Anemia of chronic disease      MUSCULOSKELETAL   (+) Chronic left SI joint pain   (+) Disc degeneration, lumbar   (+) Myofascial pain syndrome      NEURO/PSYCH   (+) Anxiety with depression   (+) Chronic left SI joint pain   (+) Myofascial pain syndrome        Physical Exam    Airway    Mallampati score: II  TM Distance: >3 FB  Neck ROM: full     Dental   No notable dental hx     Cardiovascular  Rhythm: regular, Rate: normal,     Pulmonary  Breath sounds clear to auscultation,     Other Findings        Anesthesia Plan  ASA Score- 3     Anesthesia Type- general with ASA Monitors  Additional Monitors:   Airway Plan: ETT  Plan Factors-Exercise tolerance (METS): >4 METS  Chart reviewed  EKG reviewed  Imaging results reviewed  Existing labs reviewed  Patient summary reviewed  Patient is not a current smoker  Patient not instructed to abstain from smoking on day of procedure  Patient did not smoke on day of surgery  Obstructive sleep apnea risk education given perioperatively  Induction- intravenous  Postoperative Plan- Plan for postoperative opioid use  Informed Consent- Anesthetic plan and risks discussed with patient  I personally reviewed this patient with the CRNA  Discussed and agreed on the Anesthesia Plan with the CRNA  Cade Haney

## 2020-08-31 ENCOUNTER — ANESTHESIA (INPATIENT)
Dept: PERIOP | Facility: HOSPITAL | Age: 45
DRG: 329 | End: 2020-08-31
Payer: COMMERCIAL

## 2020-08-31 LAB
ANION GAP SERPL CALCULATED.3IONS-SCNC: 8 MMOL/L (ref 4–13)
BUN SERPL-MCNC: 4 MG/DL (ref 5–25)
CALCIUM SERPL-MCNC: 8.5 MG/DL (ref 8.3–10.1)
CHLORIDE SERPL-SCNC: 99 MMOL/L (ref 100–108)
CO2 SERPL-SCNC: 27 MMOL/L (ref 21–32)
CREAT SERPL-MCNC: 0.44 MG/DL (ref 0.6–1.3)
ERYTHROCYTE [DISTWIDTH] IN BLOOD BY AUTOMATED COUNT: 20.4 % (ref 11.6–15.1)
FUNGUS SPEC CULT: NORMAL
GFR SERPL CREATININE-BSD FRML MDRD: 121 ML/MIN/1.73SQ M
GLUCOSE SERPL-MCNC: 61 MG/DL (ref 65–140)
HCT VFR BLD AUTO: 24.3 % (ref 34.8–46.1)
HGB BLD-MCNC: 7.3 G/DL (ref 11.5–15.4)
INR PPP: 1.33 (ref 0.84–1.19)
MCH RBC QN AUTO: 24.7 PG (ref 26.8–34.3)
MCHC RBC AUTO-ENTMCNC: 30 G/DL (ref 31.4–37.4)
MCV RBC AUTO: 82 FL (ref 82–98)
PLATELET # BLD AUTO: 232 THOUSANDS/UL (ref 149–390)
PMV BLD AUTO: 10.1 FL (ref 8.9–12.7)
POTASSIUM SERPL-SCNC: 3.4 MMOL/L (ref 3.5–5.3)
PROTHROMBIN TIME: 16.5 SECONDS (ref 11.6–14.5)
RBC # BLD AUTO: 2.96 MILLION/UL (ref 3.81–5.12)
SODIUM SERPL-SCNC: 134 MMOL/L (ref 136–145)
WBC # BLD AUTO: 14.76 THOUSAND/UL (ref 4.31–10.16)

## 2020-08-31 PROCEDURE — 88342 IMHCHEM/IMCYTCHM 1ST ANTB: CPT | Performed by: PATHOLOGY

## 2020-08-31 PROCEDURE — 44188 LAP COLOSTOMY: CPT | Performed by: COLON & RECTAL SURGERY

## 2020-08-31 PROCEDURE — 80048 BASIC METABOLIC PNL TOTAL CA: CPT | Performed by: INTERNAL MEDICINE

## 2020-08-31 PROCEDURE — 88341 IMHCHEM/IMCYTCHM EA ADD ANTB: CPT | Performed by: PATHOLOGY

## 2020-08-31 PROCEDURE — 88305 TISSUE EXAM BY PATHOLOGIST: CPT | Performed by: PATHOLOGY

## 2020-08-31 PROCEDURE — 0D1L4Z4 BYPASS TRANSVERSE COLON TO CUTANEOUS, PERCUTANEOUS ENDOSCOPIC APPROACH: ICD-10-PCS | Performed by: COLON & RECTAL SURGERY

## 2020-08-31 PROCEDURE — 85027 COMPLETE CBC AUTOMATED: CPT | Performed by: SURGERY

## 2020-08-31 PROCEDURE — 49203 PR EXCISION/DESTRUCTION OPEN ABDOMINAL TUMORS 5 CM: CPT | Performed by: COLON & RECTAL SURGERY

## 2020-08-31 PROCEDURE — 99232 SBSQ HOSP IP/OBS MODERATE 35: CPT | Performed by: INTERNAL MEDICINE

## 2020-08-31 PROCEDURE — 85610 PROTHROMBIN TIME: CPT | Performed by: SURGERY

## 2020-08-31 RX ORDER — KETOROLAC TROMETHAMINE 30 MG/ML
INJECTION, SOLUTION INTRAMUSCULAR; INTRAVENOUS AS NEEDED
Status: DISCONTINUED | OUTPATIENT
Start: 2020-08-31 | End: 2020-08-31

## 2020-08-31 RX ORDER — HYDROMORPHONE HCL 110MG/55ML
PATIENT CONTROLLED ANALGESIA SYRINGE INTRAVENOUS AS NEEDED
Status: DISCONTINUED | OUTPATIENT
Start: 2020-08-31 | End: 2020-08-31

## 2020-08-31 RX ORDER — POTASSIUM CHLORIDE 29.8 MG/ML
40 INJECTION INTRAVENOUS ONCE
Status: DISCONTINUED | OUTPATIENT
Start: 2020-08-31 | End: 2020-08-31

## 2020-08-31 RX ORDER — KETAMINE HCL IN NACL, ISO-OSM 100MG/10ML
SYRINGE (ML) INJECTION AS NEEDED
Status: DISCONTINUED | OUTPATIENT
Start: 2020-08-31 | End: 2020-08-31

## 2020-08-31 RX ORDER — MAGNESIUM HYDROXIDE 1200 MG/15ML
LIQUID ORAL AS NEEDED
Status: DISCONTINUED | OUTPATIENT
Start: 2020-08-31 | End: 2020-08-31 | Stop reason: HOSPADM

## 2020-08-31 RX ORDER — FENTANYL CITRATE 50 UG/ML
INJECTION, SOLUTION INTRAMUSCULAR; INTRAVENOUS AS NEEDED
Status: DISCONTINUED | OUTPATIENT
Start: 2020-08-31 | End: 2020-08-31

## 2020-08-31 RX ORDER — GLYCOPYRROLATE 0.2 MG/ML
INJECTION INTRAMUSCULAR; INTRAVENOUS AS NEEDED
Status: DISCONTINUED | OUTPATIENT
Start: 2020-08-31 | End: 2020-08-31

## 2020-08-31 RX ORDER — PROPOFOL 10 MG/ML
INJECTION, EMULSION INTRAVENOUS AS NEEDED
Status: DISCONTINUED | OUTPATIENT
Start: 2020-08-31 | End: 2020-08-31

## 2020-08-31 RX ORDER — ROCURONIUM BROMIDE 10 MG/ML
INJECTION, SOLUTION INTRAVENOUS AS NEEDED
Status: DISCONTINUED | OUTPATIENT
Start: 2020-08-31 | End: 2020-08-31

## 2020-08-31 RX ORDER — ONDANSETRON 2 MG/ML
4 INJECTION INTRAMUSCULAR; INTRAVENOUS ONCE AS NEEDED
Status: DISCONTINUED | OUTPATIENT
Start: 2020-08-31 | End: 2020-08-31 | Stop reason: HOSPADM

## 2020-08-31 RX ORDER — LEVOFLOXACIN 5 MG/ML
500 INJECTION, SOLUTION INTRAVENOUS ONCE
Status: COMPLETED | OUTPATIENT
Start: 2020-08-31 | End: 2020-08-31

## 2020-08-31 RX ORDER — NEOSTIGMINE METHYLSULFATE 1 MG/ML
INJECTION INTRAVENOUS AS NEEDED
Status: DISCONTINUED | OUTPATIENT
Start: 2020-08-31 | End: 2020-08-31

## 2020-08-31 RX ORDER — MIDAZOLAM HYDROCHLORIDE 2 MG/2ML
INJECTION, SOLUTION INTRAMUSCULAR; INTRAVENOUS AS NEEDED
Status: DISCONTINUED | OUTPATIENT
Start: 2020-08-31 | End: 2020-08-31

## 2020-08-31 RX ORDER — SODIUM CHLORIDE 9 MG/ML
INJECTION, SOLUTION INTRAVENOUS CONTINUOUS PRN
Status: DISCONTINUED | OUTPATIENT
Start: 2020-08-31 | End: 2020-08-31

## 2020-08-31 RX ORDER — PROMETHAZINE HYDROCHLORIDE 25 MG/ML
25 INJECTION, SOLUTION INTRAMUSCULAR; INTRAVENOUS ONCE
Status: COMPLETED | OUTPATIENT
Start: 2020-08-31 | End: 2020-08-31

## 2020-08-31 RX ORDER — LIDOCAINE HYDROCHLORIDE 10 MG/ML
INJECTION, SOLUTION EPIDURAL; INFILTRATION; INTRACAUDAL; PERINEURAL AS NEEDED
Status: DISCONTINUED | OUTPATIENT
Start: 2020-08-31 | End: 2020-08-31

## 2020-08-31 RX ORDER — HYDROMORPHONE HCL/PF 1 MG/ML
0.5 SYRINGE (ML) INJECTION
Status: DISCONTINUED | OUTPATIENT
Start: 2020-08-31 | End: 2020-08-31 | Stop reason: HOSPADM

## 2020-08-31 RX ORDER — DEXAMETHASONE SODIUM PHOSPHATE 10 MG/ML
INJECTION, SOLUTION INTRAMUSCULAR; INTRAVENOUS AS NEEDED
Status: DISCONTINUED | OUTPATIENT
Start: 2020-08-31 | End: 2020-08-31

## 2020-08-31 RX ORDER — POTASSIUM CHLORIDE 14.9 MG/ML
20 INJECTION INTRAVENOUS
Status: COMPLETED | OUTPATIENT
Start: 2020-08-31 | End: 2020-08-31

## 2020-08-31 RX ADMIN — HYDROMORPHONE HYDROCHLORIDE 1 MG: 2 INJECTION, SOLUTION INTRAMUSCULAR; INTRAVENOUS; SUBCUTANEOUS at 12:54

## 2020-08-31 RX ADMIN — ROCURONIUM BROMIDE 50 MG: 10 INJECTION, SOLUTION INTRAVENOUS at 12:40

## 2020-08-31 RX ADMIN — HYDROMORPHONE HYDROCHLORIDE 0.5 MG: 1 INJECTION, SOLUTION INTRAMUSCULAR; INTRAVENOUS; SUBCUTANEOUS at 18:24

## 2020-08-31 RX ADMIN — OXYCODONE HYDROCHLORIDE 40 MG: 20 TABLET, FILM COATED, EXTENDED RELEASE ORAL at 20:24

## 2020-08-31 RX ADMIN — METRONIDAZOLE 500 MG: 500 INJECTION, SOLUTION INTRAVENOUS at 06:28

## 2020-08-31 RX ADMIN — NEOMYCIN SULFATE 1000 MG: 500 TABLET ORAL at 01:17

## 2020-08-31 RX ADMIN — MIDAZOLAM 2 MG: 1 INJECTION INTRAMUSCULAR; INTRAVENOUS at 12:26

## 2020-08-31 RX ADMIN — HYDROMORPHONE HYDROCHLORIDE 0.5 MG: 1 INJECTION, SOLUTION INTRAMUSCULAR; INTRAVENOUS; SUBCUTANEOUS at 22:51

## 2020-08-31 RX ADMIN — METRONIDAZOLE 500 MG: 500 INJECTION, SOLUTION INTRAVENOUS at 22:53

## 2020-08-31 RX ADMIN — CEFEPIME HYDROCHLORIDE 2000 MG: 2 INJECTION, POWDER, FOR SOLUTION INTRAVENOUS at 20:25

## 2020-08-31 RX ADMIN — LIDOCAINE: 4 CREAM TOPICAL at 08:58

## 2020-08-31 RX ADMIN — GABAPENTIN 300 MG: 300 CAPSULE ORAL at 17:20

## 2020-08-31 RX ADMIN — SODIUM CHLORIDE: 0.9 INJECTION, SOLUTION INTRAVENOUS at 12:30

## 2020-08-31 RX ADMIN — FENTANYL CITRATE 50 MCG: 50 INJECTION, SOLUTION INTRAMUSCULAR; INTRAVENOUS at 12:54

## 2020-08-31 RX ADMIN — PHENYLEPHRINE HYDROCHLORIDE 100 MCG: 10 INJECTION INTRAVENOUS at 13:05

## 2020-08-31 RX ADMIN — HYDROMORPHONE HYDROCHLORIDE 0.5 MG: 1 INJECTION, SOLUTION INTRAMUSCULAR; INTRAVENOUS; SUBCUTANEOUS at 15:18

## 2020-08-31 RX ADMIN — METRONIDAZOLE 500 MG: 500 INJECTION, SOLUTION INTRAVENOUS at 12:15

## 2020-08-31 RX ADMIN — HYDROMORPHONE HYDROCHLORIDE 0.5 MG: 1 INJECTION, SOLUTION INTRAMUSCULAR; INTRAVENOUS; SUBCUTANEOUS at 09:45

## 2020-08-31 RX ADMIN — PHENYLEPHRINE HYDROCHLORIDE 100 MCG: 10 INJECTION INTRAVENOUS at 12:58

## 2020-08-31 RX ADMIN — Medication 25 MG: at 12:54

## 2020-08-31 RX ADMIN — FENTANYL CITRATE 25 MCG: 50 INJECTION, SOLUTION INTRAMUSCULAR; INTRAVENOUS at 12:35

## 2020-08-31 RX ADMIN — GLYCOPYRROLATE 0.3 MG: 0.2 INJECTION, SOLUTION INTRAMUSCULAR; INTRAVENOUS at 13:46

## 2020-08-31 RX ADMIN — ROCURONIUM BROMIDE 10 MG: 10 INJECTION, SOLUTION INTRAVENOUS at 13:19

## 2020-08-31 RX ADMIN — PROPOFOL 100 MG: 10 INJECTION, EMULSION INTRAVENOUS at 12:39

## 2020-08-31 RX ADMIN — OXYCODONE HYDROCHLORIDE 15 MG: 10 TABLET ORAL at 01:25

## 2020-08-31 RX ADMIN — CEFEPIME HYDROCHLORIDE 2000 MG: 2 INJECTION, POWDER, FOR SOLUTION INTRAVENOUS at 08:53

## 2020-08-31 RX ADMIN — ONDANSETRON 4 MG: 2 INJECTION INTRAMUSCULAR; INTRAVENOUS at 06:26

## 2020-08-31 RX ADMIN — LEVOFLOXACIN: 5 INJECTION, SOLUTION INTRAVENOUS at 12:15

## 2020-08-31 RX ADMIN — PHENYLEPHRINE HYDROCHLORIDE 200 MCG: 10 INJECTION INTRAVENOUS at 12:39

## 2020-08-31 RX ADMIN — LIDOCAINE: 4 CREAM TOPICAL at 17:20

## 2020-08-31 RX ADMIN — SODIUM CHLORIDE, SODIUM LACTATE, POTASSIUM CHLORIDE, AND CALCIUM CHLORIDE 100 ML/HR: .6; .31; .03; .02 INJECTION, SOLUTION INTRAVENOUS at 08:56

## 2020-08-31 RX ADMIN — DEXAMETHASONE SODIUM PHOSPHATE 4 MG: 10 INJECTION, SOLUTION INTRAMUSCULAR; INTRAVENOUS at 12:54

## 2020-08-31 RX ADMIN — POTASSIUM CHLORIDE 20 MEQ: 14.9 INJECTION, SOLUTION INTRAVENOUS at 11:42

## 2020-08-31 RX ADMIN — HYDROMORPHONE HYDROCHLORIDE 1 MG: 2 INJECTION, SOLUTION INTRAMUSCULAR; INTRAVENOUS; SUBCUTANEOUS at 13:21

## 2020-08-31 RX ADMIN — KETOROLAC TROMETHAMINE 15 MG: 30 INJECTION, SOLUTION INTRAMUSCULAR at 13:44

## 2020-08-31 RX ADMIN — PHENYLEPHRINE HYDROCHLORIDE 25 MCG/MIN: 10 INJECTION INTRAVENOUS at 12:59

## 2020-08-31 RX ADMIN — ONDANSETRON 4 MG: 2 INJECTION INTRAMUSCULAR; INTRAVENOUS at 01:40

## 2020-08-31 RX ADMIN — POTASSIUM CHLORIDE 20 MEQ: 14.9 INJECTION, SOLUTION INTRAVENOUS at 09:41

## 2020-08-31 RX ADMIN — PROMETHAZINE HYDROCHLORIDE 25 MG: 25 INJECTION INTRAMUSCULAR; INTRAVENOUS at 09:39

## 2020-08-31 RX ADMIN — GABAPENTIN 300 MG: 300 CAPSULE ORAL at 20:25

## 2020-08-31 RX ADMIN — ONDANSETRON 4 MG: 2 INJECTION INTRAMUSCULAR; INTRAVENOUS at 13:24

## 2020-08-31 RX ADMIN — LIDOCAINE HYDROCHLORIDE 50 MG: 10 INJECTION, SOLUTION EPIDURAL; INFILTRATION; INTRACAUDAL; PERINEURAL at 12:39

## 2020-08-31 RX ADMIN — FENTANYL CITRATE 25 MCG: 50 INJECTION, SOLUTION INTRAMUSCULAR; INTRAVENOUS at 12:39

## 2020-08-31 RX ADMIN — ERYTHROMYCIN 1000 MG: 250 TABLET, FILM COATED ORAL at 01:19

## 2020-08-31 RX ADMIN — NEOSTIGMINE METHYLSULFATE 2.5 MG: 1 INJECTION, SOLUTION INTRAVENOUS at 13:46

## 2020-08-31 RX ADMIN — SODIUM CHLORIDE, SODIUM LACTATE, POTASSIUM CHLORIDE, AND CALCIUM CHLORIDE 75 ML/HR: .6; .31; .03; .02 INJECTION, SOLUTION INTRAVENOUS at 22:57

## 2020-08-31 RX ADMIN — SODIUM CHLORIDE, SODIUM LACTATE, POTASSIUM CHLORIDE, AND CALCIUM CHLORIDE: .6; .31; .03; .02 INJECTION, SOLUTION INTRAVENOUS at 12:30

## 2020-08-31 NOTE — ASSESSMENT & PLAN NOTE
· Presented with increased abdominal and gluteal pain, recent admission for rectal abscess  · CT abdomen/pelvis concerning for recurrence of abscess, patient was complaining of fevers at home additionally leukocytosis is noted  · Pt now with gram negative bacteremia  · Continue cefepime and Flagyl for now  · Discussed with Dr Jolynn Soria - recommends holding off on IR consult for drainage/aspiration

## 2020-08-31 NOTE — ANESTHESIA POSTPROCEDURE EVALUATION
Post-Op Assessment Note    CV Status:  Stable  Pain Score: 0    Pain management: adequate     Mental Status:  Alert and awake   Hydration Status:  Euvolemic   PONV Controlled:  Controlled   Airway Patency:  Patent      Post Op Vitals Reviewed: Yes      Staff: Anesthesiologist         No complications documented      BP   95/56   Temp (P) 98 4 °F (36 9 °C) (08/31/20 1408)    Pulse  60   Resp (P) 13 (08/31/20 1408)    SpO2   100

## 2020-08-31 NOTE — PROGRESS NOTES
Progress Note - Infectious Disease   Puja Fitzpatrick 50 y o  female MRN: [id number]  Unit/Bed#: [facility] 903-01 Encounter: [id number]      Impression:  1  Recurrent left gluteal abscess with Bacteroides thetaiotaomicron bacteremia  2  Stage IV metastatic rectal carcinoma on chemotherapy    Recommendations:  Patient had low-grade temperature elevation earlier but is currently afebrile  1  Blood culture isolate showing Bacteroides thetaiotaomicron which is susceptible to metronidazole  2  We will continue metronidazole and continue cefepime in preparation for her colostomy surgery tomorrow and possible drainage of her recurrent left gluteal abscess  Antibiotics:  1  Cefepime 2 g q 12 hours IV, day 4 Rx  2  Metronidazole 500 mg q 8 hours IV, day 4 Rx     Subjective:  She has increased left buttock discomfort  Denies fevers, chills, or sweats  Denies nausea, vomiting, or diarrhea  Objective:  Vitals:  Temp:  [98 9 °F (37 2 °C)-100 1 °F (37 8 °C)] 99 6 °F (37 6 °C)  HR:  [78-89] 78  Resp:  [18-20] 20  BP: (96-99)/(58-60) 96/58  SpO2:  [96 %-99 %] 99 %  Temp (24hrs), Av 6 °F (37 6 °C), Min:98 9 °F (37 2 °C), Max:100 1 °F (37 8 °C)  Current: Temperature: 99 6 °F (37 6 °C)    Physical Exam:     General Appearance:  Alert, appearing chronically ill nontoxic, no acute distress  Eyes:  Conjunctiva pale   Throat: Oropharynx moist without lesions  Lips, mucosa, and tongue normal   Neck: Supple, symmetrical, trachea midline, no adenopathy,  no tenderness/mass/nodules   Lungs:   Clear to auscultation bilaterally, no audible wheezes, rhonchi or rales; respirations unlabored   Heart:  Regular rate and rhythm, S1, S2 normal, no murmur, rub or gallop   Abdomen:   Soft, right-sided tenderness, abdominal wall hernia,, non-distended, positive bowel sounds  No masses, no organomegaly    No CVA tenderness   Extremities: Tattoos   Skin: Tattoos, right subclavian PAC, left gluteal wound with tenderness            Invasive Devices     Central Venous Catheter Line            Port A Cath 06/22/20 Right Chest 69 days          Peripheral Intravenous Line            Peripheral IV 08/27/20 Left Antecubital 3 days                Labs, Imaging, & Other studies:   All pertinent labs were personally reviewed  Results from last 7 days   Lab Units 08/29/20  0448 08/27/20  0156   WBC Thousand/uL 14 38* 17 44*   HEMOGLOBIN g/dL 8 2* 9 0*   PLATELETS Thousands/uL 163 197     Results from last 7 days   Lab Units 08/30/20  0927 08/29/20  0448 08/27/20  0156   SODIUM mmol/L 132* 133* 133*   POTASSIUM mmol/L 3 2* 2 9* 3 3*   CHLORIDE mmol/L 96* 99* 103   CO2 mmol/L 30 25 24   BUN mg/dL 4* 3* 6   CREATININE mg/dL 0 49* 0 49* 0 46*   EGFR ml/min/1 73sq m 116 116 119   CALCIUM mg/dL 7 9* 8 2* 8 0*   AST U/L  --   --  37   ALT U/L  --   --  13   ALK PHOS U/L  --   --  277*     Results from last 7 days   Lab Units 08/28/20  0717 08/27/20  0156   BLOOD CULTURE  No Growth at 48 hrs   Bacteroides thetaiotaomicron group*   GRAM STAIN RESULT   --  Gram negative rods*

## 2020-08-31 NOTE — OP NOTE
OPERATIVE REPORT  PATIENT NAME: Nii Coulter    :  1975  MRN: [id number]  Pt Location: [facility] OR ROOM 14    SURGERY DATE: 2020    Surgeon(s) and Role:     * Briana Sung MD - Primary     * Lorraine Saez MD - Assisting    Preop Diagnosis:  Abdominal pain [R10 9]    Post-Op Diagnosis Codes:     * Abdominal pain [R10 9]    Procedure(s) (LRB):  COLOSTOMY LAPAROSCOPIC (N/A)    Specimen(s):  ID Type Source Tests Collected by Time Destination   1 : Omental Nodule Tissue Omentum TISSUE EXAM Briana Sung MD 2020 1324        Estimated Blood Loss:   Minimal    Drains:  Colostomy LUQ (Active)   Stomal Appliance 1 piece 20 1340   Number of days: 0       Urethral Catheter Latex;Straight-tip 16 Fr  (Active)   Number of days: 0       Anesthesia Type:   General    Operative Indications:  Abdominal pain [R10 9]    Operative Findings:  Multiple liver lesions  Few nodules peritoneum and the omentum  Plan of these was biopsied  Left adnexal mass and this was left in situ  Minimal redundancy to the sigmoid colon  Redundant transverse colon that was used for colostomy  Complications:   None    Procedure and Technique:  After preoperative identification in the preoperative holding area the patient was taken the operating room placed in the supine position  After satisfactory induction of general endotracheal anesthesia appropriate placement of anesthesia monitors, patient was secured to the operating room table  Patient was prepped and draped in usual sterile fashion  Antibiotics were given prior to incision  Time-out was undertaken procedure begun  Left upper quadrant incision was made  This was then used to elevate the skin  5 mm trocar was placed  Abdomen is insufflated with carbon dioxide to 15 mmHg  Abdomen is inspected for signs of injury upon entry  None were seen  Separate trocars were placed in the left lower quadrant x2  Exploration was performed    There is a large adnexal mass attached to the rectum and sigmoid colon  As this was around the site of her perforation and this was not deemed resectable this was left in situ  There are few small areas in the lower abdomen for studding of the peritoneum of was seen  Minimal ascites was noted in the pelvis  There were some small adhesions to the patient's upper abdominal hernia repair  This was just noted to be omentum  These were taken down sharply there just omentum and made it more difficult to mobilize our colon  Sigmoid colon was relatively densely adherent to the mass so decided to mobilize distal transverse colon  We now prepared to mobilize our transverse colon  The greater omentum was passed cephalad in the greater omentum was transected off of the distal portion of the transverse colon  This was done until this could be freely mobilized to our incisions  There is an omental nodule at this level  This was excised and brought out our incision  This was sent off the field as specimen omental nodule  We now made a circular incision over the left-sided rectus fascia  Muscle-splitting incision was made in the posterior sheath was opened  Our transverse colon was then brought out through this incision was approximately 2 fingerbreadths in diameter  We checked laparoscopically to ensure that this was oriented properly with proximal and distal orientations  The abdomen is then desufflated we prepared to mature the stoma  A single firing of the PASCALE 75 was used to transect the most distal side of our transverse colon  The colon was then opened and this was sutured as an end-loop stoma  The proximal side was matured using 3 0 Vicryl  This gave excellent eversion with good apposition to our mucous fistula  The area was pink and well vascularized  No significant contamination was noted  Remaining ports were closed using 4 O Monocryl with ex of thin    Patient was awaken from anesthesia and transferred to recovery room in stable condition       I was present for the entire procedure    Patient Disposition:  PACU     SIGNATURE: Tanya Caicedo MD  DATE: August 31, 2020  TIME: 1:51 PM

## 2020-08-31 NOTE — PLAN OF CARE
Problem: Potential for Falls  Goal: Patient will remain free of falls  Description: INTERVENTIONS:  - Assess patient frequently for physical needs  -  Identify cognitive and physical deficits and behaviors that affect risk of falls    -  [place] fall precautions as indicated by assessment   - Educate patient/family on patient safety including physical limitations  - Instruct patient to call for assistance with activity based on assessment  - Modify environment to reduce risk of injury  - Consider OT/PT consult to assist with strengthening/mobility  8/31/2020 0752 by Sharyn Lundberg RN  Outcome: Progressing  8/31/2020 0752 by Sharyn Lundberg RN  Outcome: Progressing     Problem: GASTROINTESTINAL - ADULT  Goal: Minimal or absence of nausea and/or vomiting  Description: INTERVENTIONS:  - Administer IV fluids if ordered to ensure adequate hydration  - Maintain NPO status until nausea and vomiting are resolved  - Nasogastric tube if ordered  - Administer ordered antiemetic medications as needed  - Provide nonpharmacologic comfort measures as appropriate  - Advance diet as tolerated, if ordered  - Consider nutrition services referral to assist patient with adequate nutrition and appropriate food choices  8/31/2020 0752 by Sharyn Lundberg RN  Outcome: Progressing  8/31/2020 0752 by Sharyn Lundberg RN  Outcome: Progressing  Goal: Maintains or returns to baseline bowel function  Description: INTERVENTIONS:  - Assess bowel function  - Encourage oral fluids to ensure adequate hydration  - Administer IV fluids if ordered to ensure adequate hydration  - Administer ordered medications as needed  - Encourage mobilization and activity  - Consider nutritional services referral to assist patient with adequate nutrition and appropriate food choices  8/31/2020 0752 by Sharyn Lundberg RN  Outcome: Progressing  8/31/2020 0752 by Sharyn Lundberg RN  Outcome: Progressing  Goal: Maintains adequate nutritional intake  Description: INTERVENTIONS:  - Monitor percentage of each meal consumed  - Identify factors contributing to decreased intake, treat as appropriate  - Assist with meals as needed  - Monitor I&O, weight, and lab values if indicated  - Obtain nutrition services referral as needed  8/31/2020 5102 by Aracelis Pulido RN  Outcome: Progressing  8/31/2020 0752 by Aracelis Pulido RN  Outcome: Progressing

## 2020-08-31 NOTE — PROGRESS NOTES
Progress Note - Colorectal Surgery   Puja Fitzpatrick 50 y o  female MRN: [id number]  Unit/Bed#: [facility] 903-01 Encounter: [id number]    Assessment:  50 F w/ Stage IV rectal cancer, gluteal/coccygeal abscess vs necrotic tumor, and CT findings concerning for partial bowel obstruction      AVSS  Uo-5 unmeasured    Plan:  S/p bowel prep yesterday  Plan for OR today for laparoscopic possible open loop transverse colostomy  Care per primary team    Subjective/Objective   Chief Complaint: no complaints    Subjective: No acute overnight events  Multiple bowel movements yesterday with bowel prep  No nausea or vomiting  Pain is well controlled  Denies any subjective fevers/chills  Objective: Physical Exam  Vitals signs and nursing note reviewed  Constitutional:       General: She is not in acute distress  Appearance: She is well-developed  HENT:      Head: Normocephalic and atraumatic  Eyes:      General: No scleral icterus  Cardiovascular:      Rate and Rhythm: Normal rate and regular rhythm  Pulmonary:      Effort: Pulmonary effort is normal       Breath sounds: Normal breath sounds  Abdominal:      General: There is no distension  Palpations: Abdomen is soft  Tenderness: There is no abdominal tenderness  There is no guarding or rebound  Hernia: A hernia (midline, easily reducible) is present  Musculoskeletal:      Right lower leg: No edema  Left lower leg: No edema  Skin:     General: Skin is warm  Capillary Refill: Capillary refill takes less than 2 seconds  Neurological:      Mental Status: She is alert and oriented to person, place, and time  Psychiatric:         Mood and Affect: Mood normal          Thought Content: Thought content normal          Blood pressure 96/57, pulse 76, temperature 98 3 °F (36 8 °C), temperature source Oral, resp  rate 18, weight 55 3 kg (122 lb), SpO2 98 %, not currently breastfeeding  ,Body mass index is 22 31 kg/m²        Intake/Output Summary (Last 24 hours) at 8/31/2020 8224  Last data filed at 8/31/2020 0101  Gross per 24 hour   Intake 690 ml   Output 8 ml   Net 682 ml       Invasive Devices     Central Venous Catheter Line            Port A Cath 06/22/20 Right Chest 69 days          Peripheral Intravenous Line            Peripheral IV 08/27/20 Left Antecubital 4 days                    Lab, Imaging and other studies:I have personally reviewed pertinent lab results      VTE Pharmacologic Prophylaxis: Enoxaparin (Lovenox)  VTE Mechanical Prophylaxis: sequential compression device

## 2020-08-31 NOTE — PROGRESS NOTES
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  2  Stage IV metastatic rectal carcinoma on chemotherapy S/P laparoscopic colostomy POD 0    Recommendations:  Patient seen postoperatively after lap colostomy  She is afebrile and had a moderately elevated WBC count earlier  1  Blood culture isolate  showing Bacteroides thetaiotaomicron which is susceptible to metronidazole  Repeat blood culture  is negative so far  2  Continue metronidazole and continue cefepime postoperatively   Antibiotics:  1  Cefepime 2 g q 12 hours IV, day 5 Rx  2  Metronidazole 500 mg q 8 hours IV, day 5 Rx     Subjective:  She is doing well postoperatively without any pain  Denies fevers, chills, or sweats  Denies nausea, vomiting, or diarrhea  Objective:  Vitals:  Temp:  [96 8 °F (36 °C)-98 5 °F (36 9 °C)] 97 8 °F (36 6 °C)  HR:  [58-76] 58  Resp:  [13-20] 16  BP: (92-99)/(53-62) 93/57  SpO2:  [98 %-100 %] 98 %  Temp (24hrs), Av °F (36 7 °C), Min:96 8 °F (36 °C), Max:98 5 °F (36 9 °C)  Current: Temperature: 97 8 °F (36 6 °C)    Physical Exam:     General Appearance:  Alert, appearing chronically ill nontoxic, no acute distress  Eyes:  Conjunctiva pale   Throat: Oropharynx moist without lesions  Lips, mucosa, and tongue normal   Neck: Supple, symmetrical, trachea midline, no adenopathy,  no tenderness/mass/nodules   Lungs:   Clear to auscultation bilaterally, no audible wheezes, rhonchi or rales; respirations unlabored   Heart:  Regular rate and rhythm, S1, S2 normal, no murmur, rub or gallop   Abdomen:   Fresh colostomy non-distended, positive bowel sounds  No masses, no organomegaly    No CVA tenderness, Rosa catheter   Extremities: Tattoos   Skin: Tattoos, right subclavian PAC, left gluteal wound without tenderness  Karrie Nissen Invasive Devices     Central Venous Catheter Line            Port A Cath 06/22/20 Right Chest 70 days          Peripheral Intravenous Line            Peripheral IV 08/31/20 Left Wrist less than 1 day          Drain            Colostomy LUQ less than 1 day    Urethral Catheter Latex;Straight-tip 16 Fr  less than 1 day                Labs, Imaging, & Other studies:   All pertinent labs were personally reviewed  Results from last 7 days   Lab Units 08/31/20  0603 08/29/20 0448 08/27/20  0156   WBC Thousand/uL 14 76* 14 38* 17 44*   HEMOGLOBIN g/dL 7 3* 8 2* 9 0*   PLATELETS Thousands/uL 232 163 197     Results from last 7 days   Lab Units 08/31/20  0603 08/30/20  0927 08/29/20 0448 08/27/20  0156   SODIUM mmol/L 134* 132* 133* 133*   POTASSIUM mmol/L 3 4* 3 2* 2 9* 3 3*   CHLORIDE mmol/L 99* 96* 99* 103   CO2 mmol/L 27 30 25 24   BUN mg/dL 4* 4* 3* 6   CREATININE mg/dL 0 44* 0 49* 0 49* 0 46*   EGFR ml/min/1 73sq m 121 116 116 119   CALCIUM mg/dL 8 5 7 9* 8 2* 8 0*   AST U/L  --   --   --  37   ALT U/L  --   --   --  13   ALK PHOS U/L  --   --   --  277*     Results from last 7 days   Lab Units 08/28/20  0717 08/27/20  0156   BLOOD CULTURE  No Growth at 72 hrs   Bacteroides thetaiotaomicron group*   GRAM STAIN RESULT   --  Gram negative rods*  Gram positive rods*

## 2020-08-31 NOTE — QUICK NOTE
Post op note colorectal  Marycarmen Fitzpatrick 50 y o  female MRN: [id number]  Unit/Bed#: [facility] 903-01 Encounter: [id number]    Assessment:  49 yo F s/p laparoscopic colostomy    Patient is doing well postoperatively  AVSS  Pain well controlled  Tolerating clears with no nausea or vomiting  Plan:  Pain control  DVT ppx-lovenox  Clears  LR @ 75 cc/hr    Subjective/Objective   Chief Complaint: post op check    Subjective: Doing well post operatively no complaints  Tolerating clears  No nausea or vomiting  Objective: Physical Exam  Vitals signs and nursing note reviewed  Constitutional:       General: She is not in acute distress  Appearance: She is well-developed  HENT:      Head: Normocephalic and atraumatic  Eyes:      General: No scleral icterus  Cardiovascular:      Rate and Rhythm: Normal rate and regular rhythm  Pulmonary:      Effort: Pulmonary effort is normal       Breath sounds: Normal breath sounds  Abdominal:      General: There is no distension  Palpations: Abdomen is soft  Tenderness: There is abdominal tenderness (around stoma)  There is no guarding or rebound  Hernia: A hernia is present  Comments: Stoma is pink/healthy  No output or gas in bag   Skin:     General: Skin is warm  Capillary Refill: Capillary refill takes less than 2 seconds  Neurological:      Mental Status: She is alert and oriented to person, place, and time  Blood pressure 93/57, pulse 58, temperature 97 8 °F (36 6 °C), resp  rate 16, weight 55 3 kg (122 lb), SpO2 98 %, not currently breastfeeding  ,Body mass index is 22 31 kg/m²        Intake/Output Summary (Last 24 hours) at 8/31/2020 1725  Last data filed at 8/31/2020 1438  Gross per 24 hour   Intake 920 ml   Output 143 ml   Net 777 ml       Invasive Devices     Central Venous Catheter Line            Port A Cath 06/22/20 Right Chest 70 days          Peripheral Intravenous Line            Peripheral IV 08/31/20 Left Wrist less than 1 day          Drain            Colostomy LUQ less than 1 day    Urethral Catheter Latex;Straight-tip 16 Fr  less than 1 day

## 2020-08-31 NOTE — PROGRESS NOTES
Progress Note - Sherman Valencia 1975, 50 y o  female MRN: [id number]    Unit/Bed#: [facility] Encounter: [id number]    Primary Care Provider: Cresencio Mckeon MD   Date and time admitted to hospital: 8/27/2020 12:51 AM        * Abscess, gluteal, left  Assessment & Plan  · Presented with increased abdominal and gluteal pain, recent admission for rectal abscess  · CT abdomen/pelvis concerning for recurrence of abscess, patient was complaining of fevers at home additionally leukocytosis is noted  · Pt now with gram negative bacteremia  · Continue cefepime and Flagyl for now  · Discussed with Dr Dante Wilson - recommends holding off on IR consult for drainage/aspiration  Gram-negative bacteremia  Assessment & Plan  1/2 blood culture on admission positive for Bacteroids thetaiotaomicron  Continue cefepime and Flagyl for now  Cancer associated pain  Assessment & Plan  · PDMP reviewed  · Continue Oxydone prn  · Pt said her pain well controlled  · Appreciate palliative care input    Metastatic rectal cancer  Assessment & Plan  · With liver/lung metastases s/p chemotherapy per outpatient Oncology  · CT abdomen/pelvis revealing concern for persistent partially obstructing mass and will appreciate surgery recommendations  · Patient was initially refusing surgery but after discussion with palliative Care she is now agreeable for diverting colostomy  Surgery tentatively for Monday  · Palliative care following for pain control  VTE Pharmacologic Prophylaxis:   Pharmacologic: Enoxaparin (Lovenox)  Mechanical VTE Prophylaxis in Place: Yes    Patient Centered Rounds: I have performed bedside rounds with nursing staff today  Discussions with Specialists or Other Care Team Provider:     Education and Discussions with Family / Patient: pt    Time Spent for Care: 30 minutes  More than 50% of total time spent on counseling and coordination of care as described above      Current Length of Stay: 4 day(s)    Current Patient Status: Inpatient   Certification Statement: The patient will continue to require additional inpatient hospital stay due to above    Discharge Plan:  Once cleared from surgical standpoint    Code Status: Level 1 - Full Code      Subjective:   Pt seen and examined by me this morning  Pt complained of nausea  Objective:     Vitals:   Temp (24hrs), Av 3 °F (36 8 °C), Min:96 8 °F (36 °C), Max:99 6 °F (37 6 °C)    Temp:  [96 8 °F (36 °C)-99 6 °F (37 6 °C)] 96 8 °F (36 °C)  HR:  [64-78] 64  Resp:  [18-20] 18  BP: (96)/(57-59) 96/59  SpO2:  [98 %-100 %] 100 %  Body mass index is 22 31 kg/m²  Input and Output Summary (last 24 hours): Intake/Output Summary (Last 24 hours) at 2020 1320  Last data filed at 2020 1313  Gross per 24 hour   Intake 720 ml   Output 8 ml   Net 712 ml       Physical Exam:     Physical Exam    Constitutional: Pt appears comfortable  Not in any acute distress  Cardiovascular: Normal rate, regular rhythm, normal heart sounds  No murmur heard  Pulmonary/Chest: Effort normal, air entry b/l equal  No respiratory distress  Pt has no wheezes or crackles  Abdominal: Soft  Non-distended Bowel sounds are normal    Neurological: awake, alert  Moving all extremities spontaneously  Psychiatric: normal mood and affect  Additional Data:     Labs:    Results from last 7 days   Lab Units 2003  20  0156   WBC Thousand/uL 14 76*   < > 17 44*   HEMOGLOBIN g/dL 7 3*   < > 9 0*   HEMATOCRIT % 24 3*   < > 29 4*   PLATELETS Thousands/uL 232   < > 197   BANDS PCT %  --   --  3   LYMPHO PCT %  --   --  5*   MONO PCT %  --   --  4   EOS PCT %  --   --  4    < > = values in this interval not displayed       Results from last 7 days   Lab Units 2003  20  0156   SODIUM mmol/L 134*   < > 133*   POTASSIUM mmol/L 3 4*   < > 3 3*   CHLORIDE mmol/L 99*   < > 103   CO2 mmol/L 27   < > 24   BUN mg/dL 4*   < > 6   CREATININE mg/dL 0 44*   < > 0 46*   ANION GAP mmol/L 8 < > 6   CALCIUM mg/dL 8 5   < > 8 0*   ALBUMIN g/dL  --   --  2 5*   TOTAL BILIRUBIN mg/dL  --   --  0 33   ALK PHOS U/L  --   --  277*   ALT U/L  --   --  13   AST U/L  --   --  37   GLUCOSE RANDOM mg/dL 61*   < > 101    < > = values in this interval not displayed  Results from last 7 days   Lab Units 08/31/20  0606   INR  1 33*             Results from last 7 days   Lab Units 08/27/20  0506 08/27/20  0156   LACTIC ACID mmol/L  --  1 3   PROCALCITONIN ng/ml 1 19* 1 20*           * I Have Reviewed All Lab Data Listed Above  * Additional Pertinent Lab Tests Reviewed: [address] Admission Reviewed    Imaging:    Imaging Reports Reviewed Today Include:   Imaging Personally Reviewed by Myself Includes:    Recent Cultures (last 7 days):     Results from last 7 days   Lab Units 08/28/20  0717 08/27/20  0156   BLOOD CULTURE  No Growth at 72 hrs   Bacteroides thetaiotaomicron group*   GRAM STAIN RESULT   --  Gram negative rods*  Gram positive rods*       Last 24 Hours Medication List:   Current Facility-Administered Medications   Medication Dose Route Frequency Provider Last Rate    [MAR Hold] cefepime  2,000 mg Intravenous Q12H Abraham Shan, DO 2,000 mg (08/31/20 0853)    [MAR Hold] enoxaparin  40 mg Subcutaneous Daily Abraham Shan, DO      [facility] Hold] gabapentin  300 mg Oral TID Roseline Massey MD      [facility] Hold] HYDROmorphone  0 5 mg Intravenous Q2H PRN Elwyn Heimlich M Bendas, DO      lactated ringers  100 mL/hr Intravenous Continuous Learjoe Jay  mL/hr (08/31/20 0856)   [place] [EARLENE Hold] lidocaine   Topical 4x Daily Melania Meyer PA-C      [MAR Hold] loratadine  10 mg Oral Daily Abraham Shan, DO      [facility] Hold] metoprolol succinate  25 mg Oral Daily Abraham Shan, DO      [facility] Hold] metroNIDAZOLE  500 mg Intravenous Q8H Abraham Shan,  mg (08/31/20 2540)    metroNIDAZOLE  500 mg Intravenous Once Khanh Castorena MD      [facility] Hold] ondansetron  4 mg Intravenous Q4H PRN Cowan Patches, DO      [facility] Hold] oxyCODONE  40 mg Oral Q12H Sondra Duran MD      [facility] Hold] oxyCODONE  10 mg Oral Q4H PRN Kristen Pollack MD      [facility] Hold] oxyCODONE  15 mg Oral Q4H PRN Kristen Pollack MD      potassium chloride  20 mEq Intravenous Ross Loo MD 20 mEq (08/31/20 1142)    [MAR Hold] sertraline  25 mg Oral Daily Cowan Patches, DO       Facility-Administered Medications Ordered in Other Encounters   Medication Dose Route Frequency Provider Last Rate    dexamethasone (PF)    PRN Michael Reyna MD      fentanyl citrate (PF)   Intravenous PRN Michael Reyna MD      HYDROmorphone    PRN Michael Reyna MD      Ketamine HCl   Intravenous PRN Michael Reyna MD      lidocaine (PF)    PRN Michael Reyna MD      midazolam   Intravenous PRN Michael Reyna MD      phenylephrine (KYLEIGH-SYNEPHRINE) 50 mg (STANDARD CONCENTRATION) in sodium chloride 0 9% 250 mL    Continuous PRN Michael Reyna MD Stopped (08/31/20 1315)    phenylephrine    PRN Michael Reyna MD      propofol   Intravenous PRN Michael Reyna MD      ROCuronium    PRN Michael Reyna MD      sodium chloride    Continuous PRN Michael Reyna MD          Today, Patient Was Seen By: Isadora Hall MD    ** Please Note: Dictation voice to text software may have been used in the creation of this document   **

## 2020-09-01 LAB
ANION GAP SERPL CALCULATED.3IONS-SCNC: 8 MMOL/L (ref 4–13)
ANISOCYTOSIS BLD QL SMEAR: PRESENT
BASOPHILS # BLD MANUAL: 0 THOUSAND/UL (ref 0–0.1)
BASOPHILS NFR MAR MANUAL: 0 % (ref 0–1)
BUN SERPL-MCNC: 7 MG/DL (ref 5–25)
CALCIUM SERPL-MCNC: 8.4 MG/DL (ref 8.3–10.1)
CHLORIDE SERPL-SCNC: 104 MMOL/L (ref 100–108)
CO2 SERPL-SCNC: 25 MMOL/L (ref 21–32)
CREAT SERPL-MCNC: 0.28 MG/DL (ref 0.6–1.3)
EOSINOPHIL # BLD MANUAL: 0 THOUSAND/UL (ref 0–0.4)
EOSINOPHIL NFR BLD MANUAL: 0 % (ref 0–6)
ERYTHROCYTE [DISTWIDTH] IN BLOOD BY AUTOMATED COUNT: 21 % (ref 11.6–15.1)
GFR SERPL CREATININE-BSD FRML MDRD: 140 ML/MIN/1.73SQ M
GLUCOSE SERPL-MCNC: 108 MG/DL (ref 65–140)
HCT VFR BLD AUTO: 24.5 % (ref 34.8–46.1)
HGB BLD-MCNC: 7.2 G/DL (ref 11.5–15.4)
HYPERCHROMIA BLD QL SMEAR: PRESENT
LYMPHOCYTES # BLD AUTO: 0.48 THOUSAND/UL (ref 0.6–4.47)
LYMPHOCYTES # BLD AUTO: 4 % (ref 14–44)
MAGNESIUM SERPL-MCNC: 2 MG/DL (ref 1.6–2.6)
MCH RBC QN AUTO: 24.3 PG (ref 26.8–34.3)
MCHC RBC AUTO-ENTMCNC: 29.4 G/DL (ref 31.4–37.4)
MCV RBC AUTO: 83 FL (ref 82–98)
MICROCYTES BLD QL AUTO: PRESENT
MONOCYTES # BLD AUTO: 0.12 THOUSAND/UL (ref 0–1.22)
MONOCYTES NFR BLD: 1 % (ref 4–12)
MYELOCYTES NFR BLD MANUAL: 1 % (ref 0–1)
NEUTROPHILS # BLD MANUAL: 10.84 THOUSAND/UL (ref 1.85–7.62)
NEUTS BAND NFR BLD MANUAL: 2 % (ref 0–8)
NEUTS SEG NFR BLD AUTO: 89 % (ref 43–75)
NRBC BLD AUTO-RTO: 0 /100 WBCS
OVALOCYTES BLD QL SMEAR: PRESENT
PLATELET # BLD AUTO: 268 THOUSANDS/UL (ref 149–390)
PLATELET BLD QL SMEAR: ADEQUATE
PMV BLD AUTO: 10.5 FL (ref 8.9–12.7)
POIKILOCYTOSIS BLD QL SMEAR: PRESENT
POLYCHROMASIA BLD QL SMEAR: PRESENT
POTASSIUM SERPL-SCNC: 3.9 MMOL/L (ref 3.5–5.3)
RBC # BLD AUTO: 2.96 MILLION/UL (ref 3.81–5.12)
RBC MORPH BLD: PRESENT
SODIUM SERPL-SCNC: 137 MMOL/L (ref 136–145)
VARIANT LYMPHS # BLD AUTO: 3 %
WBC # BLD AUTO: 11.91 THOUSAND/UL (ref 4.31–10.16)

## 2020-09-01 PROCEDURE — 85027 COMPLETE CBC AUTOMATED: CPT | Performed by: SURGERY

## 2020-09-01 PROCEDURE — 83735 ASSAY OF MAGNESIUM: CPT | Performed by: SURGERY

## 2020-09-01 PROCEDURE — 99232 SBSQ HOSP IP/OBS MODERATE 35: CPT | Performed by: INTERNAL MEDICINE

## 2020-09-01 PROCEDURE — 80048 BASIC METABOLIC PNL TOTAL CA: CPT | Performed by: SURGERY

## 2020-09-01 PROCEDURE — 85007 BL SMEAR W/DIFF WBC COUNT: CPT | Performed by: SURGERY

## 2020-09-01 RX ORDER — CALCIUM CARBONATE 200(500)MG
500 TABLET,CHEWABLE ORAL 3 TIMES DAILY PRN
Status: DISCONTINUED | OUTPATIENT
Start: 2020-09-01 | End: 2020-09-16 | Stop reason: HOSPADM

## 2020-09-01 RX ADMIN — HYDROMORPHONE HYDROCHLORIDE 0.5 MG: 1 INJECTION, SOLUTION INTRAMUSCULAR; INTRAVENOUS; SUBCUTANEOUS at 22:53

## 2020-09-01 RX ADMIN — OXYCODONE HYDROCHLORIDE 15 MG: 10 TABLET ORAL at 16:15

## 2020-09-01 RX ADMIN — LIDOCAINE: 4 CREAM TOPICAL at 17:07

## 2020-09-01 RX ADMIN — CALCIUM CARBONATE (ANTACID) CHEW TAB 500 MG 500 MG: 500 CHEW TAB at 11:24

## 2020-09-01 RX ADMIN — OXYCODONE HYDROCHLORIDE 40 MG: 20 TABLET, FILM COATED, EXTENDED RELEASE ORAL at 20:16

## 2020-09-01 RX ADMIN — HYDROMORPHONE HYDROCHLORIDE 0.5 MG: 1 INJECTION, SOLUTION INTRAMUSCULAR; INTRAVENOUS; SUBCUTANEOUS at 11:17

## 2020-09-01 RX ADMIN — CEFEPIME HYDROCHLORIDE 2000 MG: 2 INJECTION, POWDER, FOR SOLUTION INTRAVENOUS at 20:16

## 2020-09-01 RX ADMIN — GABAPENTIN 300 MG: 300 CAPSULE ORAL at 20:16

## 2020-09-01 RX ADMIN — CALCIUM CARBONATE (ANTACID) CHEW TAB 500 MG 500 MG: 500 CHEW TAB at 20:16

## 2020-09-01 RX ADMIN — LIDOCAINE: 4 CREAM TOPICAL at 21:14

## 2020-09-01 RX ADMIN — GABAPENTIN 300 MG: 300 CAPSULE ORAL at 16:15

## 2020-09-01 RX ADMIN — SERTRALINE HYDROCHLORIDE 25 MG: 25 TABLET ORAL at 08:35

## 2020-09-01 RX ADMIN — METRONIDAZOLE 500 MG: 500 INJECTION, SOLUTION INTRAVENOUS at 21:15

## 2020-09-01 RX ADMIN — CEFEPIME HYDROCHLORIDE 2000 MG: 2 INJECTION, POWDER, FOR SOLUTION INTRAVENOUS at 08:35

## 2020-09-01 RX ADMIN — GABAPENTIN 300 MG: 300 CAPSULE ORAL at 08:36

## 2020-09-01 RX ADMIN — LORATADINE 10 MG: 10 TABLET ORAL at 08:35

## 2020-09-01 RX ADMIN — METRONIDAZOLE 500 MG: 500 INJECTION, SOLUTION INTRAVENOUS at 06:40

## 2020-09-01 RX ADMIN — OXYCODONE HYDROCHLORIDE 40 MG: 20 TABLET, FILM COATED, EXTENDED RELEASE ORAL at 08:36

## 2020-09-01 RX ADMIN — LIDOCAINE: 4 CREAM TOPICAL at 08:41

## 2020-09-01 RX ADMIN — METRONIDAZOLE 500 MG: 500 INJECTION, SOLUTION INTRAVENOUS at 13:33

## 2020-09-01 RX ADMIN — LIDOCAINE: 4 CREAM TOPICAL at 11:20

## 2020-09-01 RX ADMIN — ENOXAPARIN SODIUM 40 MG: 40 INJECTION SUBCUTANEOUS at 08:41

## 2020-09-01 RX ADMIN — OXYCODONE HYDROCHLORIDE 15 MG: 10 TABLET ORAL at 08:40

## 2020-09-01 NOTE — WOUND OSTOMY CARE
Progress Note- Ostomy  Jessica Sernait 50 y o  female  [id number]  [facility]        Assessment:  Patient is seen for assessment of her new diverting loop colostomy which was created 8/31/2020   Patient seen while sitting in her chair with family at bedside  Educational materials and supplies left at bedside  Will continue to see patient in hospital and provide education to patient        Subjective:  Stoma is pink and budded  No output is currently noted in the pouch  1 piece pouch is currently intact      Objective:  Stoma measures approximately 2 inches            Will continue to follow while in hospital         Colostomy LUQ (Active)   Stomal Appliance 1 piece 09/01/20 1310   Stoma Assessment Budded;[place] 09/01/20 1310   Stoma size (in) 2 in 09/01/20 1310   Stoma Shape Oval 09/01/20 1310   Peristomal Assessment TRACIE 09/01/20 1310   Output (mL) 0 mL 08/31/20 2251   Number of days: 1

## 2020-09-01 NOTE — PROGRESS NOTES
Progress Note - Colorectal Surgery   Puja Fitzpatrick 50 y o  female MRN: [id number]  Unit/Bed#: [facility] 903-01 Encounter: [id number]    Assessment:  49 yo F s/p laparoscopic colostomy on 8/31    Patient is doing well    Plan:   Consider advancing diet   Dc mitchell in the morning   Ambulation, IS    Subjective/Objective     Subjective:   NAD, pain is well controlled, tolerating liquid diet, bowel sweat in stoma    Objective:    Blood pressure 102/60, pulse 69, temperature 97 8 °F (36 6 °C), resp  rate 20, weight 55 3 kg (122 lb), SpO2 97 %, not currently breastfeeding  ,Body mass index is 22 31 kg/m²        Intake/Output Summary (Last 24 hours) at 9/1/2020 0719  Last data filed at 9/1/2020 0316  Gross per 24 hour   Intake 2270 83 ml   Output 1585 ml   Net 685 83 ml       Invasive Devices     Central Venous Catheter Line            Port A Cath 06/22/20 Right Chest 70 days          Peripheral Intravenous Line            Peripheral IV 08/31/20 Left Wrist less than 1 day          Drain            Colostomy LUQ less than 1 day    Urethral Catheter Latex;Straight-tip 16 Fr  less than 1 day                Physical Exam:   Gen:  NAD  CV:  warm, well-perfused  Lungs: nl effort  Abd:  soft, NT/ND, incision cdi, stoma pink, minimum bowel sweat  Ext:  no CCE  Neuro: A&Ox3     Results from last 7 days   Lab Units 09/01/20  0454 08/31/20  0603 08/29/20  0448   WBC Thousand/uL 11 91* 14 76* 14 38*   HEMOGLOBIN g/dL 7 2* 7 3* 8 2*   HEMATOCRIT % 24 5* 24 3* 26 4*   PLATELETS Thousands/uL 268 232 163     Results from last 7 days   Lab Units 09/01/20  0454 08/31/20  0603 08/30/20  0927   POTASSIUM mmol/L 3 9 3 4* 3 2*   CHLORIDE mmol/L 104 99* 96*   CO2 mmol/L 25 27 30   BUN mg/dL 7 4* 4*   CREATININE mg/dL 0 28* 0 44* 0 49*   CALCIUM mg/dL 8 4 8 5 7 9*     Results from last 7 days   Lab Units 08/31/20  0606 08/27/20  0156   INR  1 33* 1 22*   PTT seconds  --  36

## 2020-09-01 NOTE — NURSING NOTE
Small amount of clear drainage noted on bed pad overnight, TRACIE if drainage is from rectum vs mitchell  [place] with slim notified and made aware  White sx made aware on am rounds  No new orders at this time  Pt denies tenderness distention or discomfort to bladder or mitchell, mitchell draining clear yellow urine into mitchell bag  Pt Bladder scanned for 94 mls  Will continue to monitor

## 2020-09-01 NOTE — CASE MANAGEMENT
A post acute care recommendation was made by your care team for Gaurangalvarez 76  Discussed Freedom of Choice with patient    Prefers [facility] whom she has had in the past   Referral entered in [facility]

## 2020-09-01 NOTE — UTILIZATION REVIEW
Continued Stay Review    Date: 9/1                         Current Patient Class: Inpatient Current Level of Care: Med Surg    HPI:45 y o  female initially admitted on 8/27    Assessment/Plan: Left gluteal abscess, Gram-negative bacteremia, Metastatic rectal cancer  S/p Laparoscopic Colostomy on 8/31  Consider advancing diet from clear liquid  Dc mitchell in am 9/2  Incentive spirometry  Continue Iv antibiotics   Fluid d/c today    Pertinent Labs/Diagnostic Results:       Results from last 7 days   Lab Units 09/01/20  0454 08/31/20  0603 08/29/20 0448 08/27/20  0156   WBC Thousand/uL 11 91* 14 76* 14 38* 17 44*   HEMOGLOBIN g/dL 7 2* 7 3* 8 2* 9 0*   HEMATOCRIT % 24 5* 24 3* 26 4* 29 4*   PLATELETS Thousands/uL 268 232 163 197   BANDS PCT % 2  --   --  3         Results from last 7 days   Lab Units 09/01/20  0454 08/31/20  0603 08/30/20 0927 08/29/20 0448 08/27/20  0156   SODIUM mmol/L 137 134* 132* 133* 133*   POTASSIUM mmol/L 3 9 3 4* 3 2* 2 9* 3 3*   CHLORIDE mmol/L 104 99* 96* 99* 103   CO2 mmol/L 25 27 30 25 24   ANION GAP mmol/L 8 8 6 9 6   BUN mg/dL 7 4* 4* 3* 6   CREATININE mg/dL 0 28* 0 44* 0 49* 0 49* 0 46*   EGFR ml/min/1 73sq m 140 121 116 116 119   CALCIUM mg/dL 8 4 8 5 7 9* 8 2* 8 0*   MAGNESIUM mg/dL 2 0  --  2 0  --   --      Results from last 7 days   Lab Units 08/27/20  0156   AST U/L 37   ALT U/L 13   ALK PHOS U/L 277*   TOTAL PROTEIN g/dL 6 7   ALBUMIN g/dL 2 5*   TOTAL BILIRUBIN mg/dL 0 33         Results from last 7 days   Lab Units 09/01/20  0454 08/31/20  0603 08/30/20 0927 08/29/20 0448 08/27/20  0156   GLUCOSE RANDOM mg/dL 108 61* 142* 108 101     Results from last 7 days   Lab Units 08/31/20  0606 08/27/20  0156   PROTIME seconds 16 5* 15 4*   INR  1 33* 1 22*   PTT seconds  --  36         Results from last 7 days   Lab Units 08/27/20  0506 08/27/20  0156   PROCALCITONIN ng/ml 1 19* 1 20*     Results from last 7 days   Lab Units 08/27/20  0156   LACTIC ACID mmol/L 1 3       Results from last 7 days   Lab Units 08/27/20  0156   LIPASE u/L 35*       Results from last 7 days   Lab Units 08/27/20  0320   CLARITY UA  Clear   COLOR UA  Yellow   SPEC GRAV UA  1 039*   PH UA  6 5   GLUCOSE UA mg/dl Negative   KETONES UA mg/dl Negative   BLOOD UA  Negative   PROTEIN UA mg/dl Negative   NITRITE UA  Negative   BILIRUBIN UA  Negative   UROBILINOGEN UA E U /dl 0 2   LEUKOCYTES UA  Negative     Results from last 7 days   Lab Units 08/28/20  0717 08/27/20  0156   BLOOD CULTURE  No Growth After 4 Days  Bacteroides thetaiotaomicron group*   GRAM STAIN RESULT   --  Gram negative rods*  Gram positive rods*     Results from last 7 days   Lab Units 08/27/20  0156   TOTAL COUNTED  100     Vital Signs:   09/01/20 07:00:15   97 9 °F (36 6 °C)   72   20   102/60   74   98 %            None (Room air       Medications:   Scheduled Medications:  cefepime, 2,000 mg, Intravenous, Q12H  enoxaparin, 40 mg, Subcutaneous, Daily  gabapentin, 300 mg, Oral, TID  lidocaine, , Topical, 4x Daily  loratadine, 10 mg, Oral, Daily  metoprolol succinate, 25 mg, Oral, Daily  metroNIDAZOLE, 500 mg, Intravenous, Q8H  oxyCODONE, 40 mg, Oral, Q12H GONZÁLEZ  sertraline, 25 mg, Oral, Daily      Continuous IV Infusions:    lactated ringers infusion    Rate: 75 mL/hr Dose: 75 mL/hr  Freq: Continuous Route: IV  Indications of Use: IV Hydration  Last Dose: Stopped (09/01/20 0835)  Start: 08/30/20 1745 End: 09/01/20 0733     PRN Meds:  calcium carbonate, 500 mg, Oral, TID PRN  HYDROmorphone, 0 5 mg, Intravenous, Q2H PRN 9/1 x1  ondansetron, 4 mg, Intravenous, Q4H PRN  oxyCODONE, 10 mg, Oral, Q4H PRN  oxyCODONE, 15 mg, Oral, Q4H PRN 9/1 x2      Discharge Plan: Home with  VNA when medically cleared  Network Utilization Review Department  [email or web address]  ATTENTION: Please call with any questions or concerns to [phone] and carefully listen to the prompts so that you are directed to the right person   All voicemails are confidential   [facility] all requests for admission clinical reviews, approved or denied determinations and any other requests to dedicated fax number below belonging to the campus where the patient is receiving treatment   List of dedicated fax numbers for the Facilities:  [address] [facility] [address] DENIALS (Administrative/Medical Necessity) [phone]   [address] (Maternity/NICU/Pediatrics) [phone] [address] [facility] [phone]   [facility]icia Notice [phone]   Jhony Rain [phone]   Daryel Ranjith [phone]   [address] [facility] [address] [place] [address] [phone]   [facility]  [phone]   [address] [facility]  [address] [facility] [address] [facility] [phone]

## 2020-09-01 NOTE — PROGRESS NOTES
Progress Note - Infectious Disease   Puja Fitzpatrick 50 y o  female MRN: [id number]  Unit/Bed#: [facility] 903-01 Encounter: [id number]      Impression:  1  Recurrent left gluteal abscess with Bacteroides thetaiotaomicron bacteremia  2  Stage IV metastatic rectal carcinoma on chemotherapy S/P laparoscopic colostomy POD 1     Recommendations:    She is afebrile and had a modestly elevated but declining WBC count   1  Blood culture isolate  showing Bacteroides thetaiotaomicron which is susceptible to metronidazole  Repeat blood culture  is negative  2 Continue metronidazole and continue cefepime postoperatively   Antibiotics:  1  Cefepime 2 g q 12 hours IV, day 6 Rx  2  Metronidazole 500 mg q 8 hours IV, day 6 Rx     Subjective:  She continues to do well with minimal operative site pain and some continued left buttock pain  Denies fevers, chills, or sweats  Denies nausea, vomiting, or diarrhea  Objective:  Vitals:  Temp:  [97 8 °F (36 6 °C)-98 4 °F (36 9 °C)] 98 4 °F (36 9 °C)  HR:  [69-72] 70  Resp:  [18-20] 20  BP: (102-103)/(60-61) 103/61  SpO2:  [97 %-99 %] 99 %  Temp (24hrs), Av °F (36 7 °C), Min:97 8 °F (36 6 °C), Max:98 4 °F (36 9 °C)  Current: Temperature: 98 4 °F (36 9 °C)    Physical Exam:     General Appearance:  Alert, appearing chronically ill nontoxic, no acute distress  Eyes:  Conjunctiva pale   Throat: Oropharynx moist without lesions  Lips, mucosa, and tongue normal   Neck: Supple, symmetrical, trachea midline, no adenopathy,  no tenderness/mass/nodules   Lungs:   Clear to auscultation bilaterally, no audible wheezes, rhonchi or rales; respirations unlabored   Heart:  Regular rate and rhythm, S1, S2 normal, no murmur, rub or gallop   Abdomen:   Colostomy site is clean with mild tenderness non-distended, positive bowel sounds  No masses, no organomegaly    No CVA tenderness,    Extremities: Tattoos   Skin: Tattoos, right subclavian PAC, left gluteal wound with tenderness  , colostomy as above  Invasive Devices     Central Venous Catheter Line            Port A Cath 06/22/20 Right Chest 71 days          Peripheral Intravenous Line            Peripheral IV 08/31/20 Left Wrist 1 day          Drain            Colostomy LUQ 1 day                Labs, Imaging, & Other studies:   All pertinent labs were personally reviewed  Results from last 7 days   Lab Units 09/01/20  0454 08/31/20  0603 08/29/20  0448   WBC Thousand/uL 11 91* 14 76* 14 38*   HEMOGLOBIN g/dL 7 2* 7 3* 8 2*   PLATELETS Thousands/uL 268 232 163     Results from last 7 days   Lab Units 09/01/20  0454 08/31/20  0603 08/30/20  0927  08/27/20  0156   SODIUM mmol/L 137 134* 132*   < > 133*   POTASSIUM mmol/L 3 9 3 4* 3 2*   < > 3 3*   CHLORIDE mmol/L 104 99* 96*   < > 103   CO2 mmol/L 25 27 30   < > 24   BUN mg/dL 7 4* 4*   < > 6   CREATININE mg/dL 0 28* 0 44* 0 49*   < > 0 46*   EGFR ml/min/1 73sq m 140 121 116   < > 119   CALCIUM mg/dL 8 4 8 5 7 9*   < > 8 0*   AST U/L  --   --   --   --  37   ALT U/L  --   --   --   --  13   ALK PHOS U/L  --   --   --   --  277*    < > = values in this interval not displayed  Results from last 7 days   Lab Units 08/28/20  0717 08/27/20  0156   BLOOD CULTURE  No Growth After 4 Days   Bacteroides thetaiotaomicron group*   GRAM STAIN RESULT   --  Gram negative rods*  Gram positive rods*

## 2020-09-01 NOTE — PLAN OF CARE
Problem: Potential for Falls  Goal: Patient will remain free of falls  Description: INTERVENTIONS:  - Assess patient frequently for physical needs  -  Identify cognitive and physical deficits and behaviors that affect risk of falls    -  [place] fall precautions as indicated by assessment   - Educate patient/family on patient safety including physical limitations  - Instruct patient to call for assistance with activity based on assessment  - Modify environment to reduce risk of injury  - Consider OT/PT consult to assist with strengthening/mobility  Outcome: Progressing     Problem: GASTROINTESTINAL - ADULT  Goal: Minimal or absence of nausea and/or vomiting  Description: INTERVENTIONS:  - Administer IV fluids if ordered to ensure adequate hydration  - Maintain NPO status until nausea and vomiting are resolved  - Nasogastric tube if ordered  - Administer ordered antiemetic medications as needed  - Provide nonpharmacologic comfort measures as appropriate  - Advance diet as tolerated, if ordered  - Consider nutrition services referral to assist patient with adequate nutrition and appropriate food choices  Outcome: Progressing  Goal: Maintains or returns to baseline bowel function  Description: INTERVENTIONS:  - Assess bowel function  - Encourage oral fluids to ensure adequate hydration  - Administer IV fluids if ordered to ensure adequate hydration  - Administer ordered medications as needed  - Encourage mobilization and activity  - Consider nutritional services referral to assist patient with adequate nutrition and appropriate food choices  Outcome: Progressing  Goal: Maintains adequate nutritional intake  Description: INTERVENTIONS:  - Monitor percentage of each meal consumed  - Identify factors contributing to decreased intake, treat as appropriate  - Assist with meals as needed  - Monitor I&O, weight, and lab values if indicated  - Obtain nutrition services referral as needed  Outcome: Progressing

## 2020-09-02 LAB
ANION GAP SERPL CALCULATED.3IONS-SCNC: 5 MMOL/L (ref 4–13)
BACTERIA BLD CULT: ABNORMAL
BACTERIA BLD CULT: ABNORMAL
BACTERIA BLD CULT: NORMAL
BUN SERPL-MCNC: 7 MG/DL (ref 5–25)
CALCIUM SERPL-MCNC: 8.4 MG/DL (ref 8.3–10.1)
CHLORIDE SERPL-SCNC: 104 MMOL/L (ref 100–108)
CO2 SERPL-SCNC: 29 MMOL/L (ref 21–32)
CREAT SERPL-MCNC: 0.49 MG/DL (ref 0.6–1.3)
ERYTHROCYTE [DISTWIDTH] IN BLOOD BY AUTOMATED COUNT: 21.2 % (ref 11.6–15.1)
GFR SERPL CREATININE-BSD FRML MDRD: 116 ML/MIN/1.73SQ M
GLUCOSE SERPL-MCNC: 95 MG/DL (ref 65–140)
GRAM STN SPEC: ABNORMAL
GRAM STN SPEC: ABNORMAL
HCT VFR BLD AUTO: 25 % (ref 34.8–46.1)
HGB BLD-MCNC: 7.5 G/DL (ref 11.5–15.4)
MCH RBC QN AUTO: 24.7 PG (ref 26.8–34.3)
MCHC RBC AUTO-ENTMCNC: 30 G/DL (ref 31.4–37.4)
MCV RBC AUTO: 82 FL (ref 82–98)
NRBC BLD AUTO-RTO: 0 /100 WBCS
PLATELET # BLD AUTO: 326 THOUSANDS/UL (ref 149–390)
PMV BLD AUTO: 9.8 FL (ref 8.9–12.7)
POTASSIUM SERPL-SCNC: 3.6 MMOL/L (ref 3.5–5.3)
RBC # BLD AUTO: 3.04 MILLION/UL (ref 3.81–5.12)
SODIUM SERPL-SCNC: 138 MMOL/L (ref 136–145)
WBC # BLD AUTO: 12.94 THOUSAND/UL (ref 4.31–10.16)

## 2020-09-02 PROCEDURE — 85027 COMPLETE CBC AUTOMATED: CPT | Performed by: INTERNAL MEDICINE

## 2020-09-02 PROCEDURE — 99232 SBSQ HOSP IP/OBS MODERATE 35: CPT | Performed by: INTERNAL MEDICINE

## 2020-09-02 PROCEDURE — 80048 BASIC METABOLIC PNL TOTAL CA: CPT | Performed by: INTERNAL MEDICINE

## 2020-09-02 RX ORDER — HYDROMORPHONE HCL/PF 1 MG/ML
0.5 SYRINGE (ML) INJECTION EVERY 4 HOURS PRN
Status: DISCONTINUED | OUTPATIENT
Start: 2020-09-02 | End: 2020-09-09

## 2020-09-02 RX ORDER — ACETAMINOPHEN 325 MG/1
975 TABLET ORAL ONCE
Status: COMPLETED | OUTPATIENT
Start: 2020-09-02 | End: 2020-09-02

## 2020-09-02 RX ORDER — HYDROMORPHONE HYDROCHLORIDE 4 MG/1
4 TABLET ORAL EVERY 4 HOURS PRN
Status: DISCONTINUED | OUTPATIENT
Start: 2020-09-02 | End: 2020-09-09

## 2020-09-02 RX ADMIN — HYDROMORPHONE HYDROCHLORIDE 0.5 MG: 1 INJECTION, SOLUTION INTRAMUSCULAR; INTRAVENOUS; SUBCUTANEOUS at 09:47

## 2020-09-02 RX ADMIN — CEFEPIME HYDROCHLORIDE 2000 MG: 2 INJECTION, POWDER, FOR SOLUTION INTRAVENOUS at 07:59

## 2020-09-02 RX ADMIN — SODIUM CHLORIDE, SODIUM LACTATE, POTASSIUM CHLORIDE, AND CALCIUM CHLORIDE 500 ML: .6; .31; .03; .02 INJECTION, SOLUTION INTRAVENOUS at 07:19

## 2020-09-02 RX ADMIN — GABAPENTIN 300 MG: 300 CAPSULE ORAL at 08:41

## 2020-09-02 RX ADMIN — LIDOCAINE: 4 CREAM TOPICAL at 08:41

## 2020-09-02 RX ADMIN — METRONIDAZOLE 500 MG: 500 INJECTION, SOLUTION INTRAVENOUS at 06:27

## 2020-09-02 RX ADMIN — SERTRALINE HYDROCHLORIDE 25 MG: 25 TABLET ORAL at 08:41

## 2020-09-02 RX ADMIN — OXYCODONE HYDROCHLORIDE 15 MG: 10 TABLET ORAL at 11:49

## 2020-09-02 RX ADMIN — OXYCODONE HYDROCHLORIDE 15 MG: 10 TABLET ORAL at 17:12

## 2020-09-02 RX ADMIN — METRONIDAZOLE 500 MG: 500 INJECTION, SOLUTION INTRAVENOUS at 21:09

## 2020-09-02 RX ADMIN — OXYCODONE HYDROCHLORIDE 15 MG: 10 TABLET ORAL at 06:36

## 2020-09-02 RX ADMIN — ENOXAPARIN SODIUM 40 MG: 40 INJECTION SUBCUTANEOUS at 08:40

## 2020-09-02 RX ADMIN — GABAPENTIN 300 MG: 300 CAPSULE ORAL at 17:12

## 2020-09-02 RX ADMIN — OXYCODONE HYDROCHLORIDE 40 MG: 20 TABLET, FILM COATED, EXTENDED RELEASE ORAL at 08:40

## 2020-09-02 RX ADMIN — CALCIUM CARBONATE (ANTACID) CHEW TAB 500 MG 500 MG: 500 CHEW TAB at 11:51

## 2020-09-02 RX ADMIN — GABAPENTIN 300 MG: 300 CAPSULE ORAL at 21:07

## 2020-09-02 RX ADMIN — HYDROMORPHONE HYDROCHLORIDE 0.5 MG: 1 INJECTION, SOLUTION INTRAMUSCULAR; INTRAVENOUS; SUBCUTANEOUS at 14:33

## 2020-09-02 RX ADMIN — METRONIDAZOLE 500 MG: 500 INJECTION, SOLUTION INTRAVENOUS at 14:34

## 2020-09-02 RX ADMIN — OXYCODONE HYDROCHLORIDE 40 MG: 20 TABLET, FILM COATED, EXTENDED RELEASE ORAL at 21:07

## 2020-09-02 RX ADMIN — LORATADINE 10 MG: 10 TABLET ORAL at 08:40

## 2020-09-02 RX ADMIN — ACETAMINOPHEN 975 MG: 325 TABLET, FILM COATED ORAL at 22:55

## 2020-09-02 NOTE — ASSESSMENT & PLAN NOTE
Presented with increased abdominal and gluteal pain - recent admission for similar abscess noted  Associated gram-negative bacteremia present (see below)  Continue IV Cefepime/Flagyl - await decision to transition to oral antibiotic regimen and full duration per Infectious Disease

## 2020-09-02 NOTE — ASSESSMENT & PLAN NOTE
Blood culture from 8/27 growing Bacteroides species - subsequent culture from 8/28 negative  Continue IV Cefepime/Flagyl per Infectious Disease  See plan for associated gluteal abscess above

## 2020-09-02 NOTE — PROGRESS NOTES
[facility] - Progress Note  Patient: Rosi Fitzpatrick 50 y o  female MRN: [id number]  Unit/Bed#: [facility] 903-01 Encounter: [id number]  Primary Care Provider: John Grossman MD  Date Of Visit: 09/01/20        Assessment & Plan:    * Recurrent left gluteal abscess  Assessment & Plan  Presented with increased abdominal and gluteal pain - recent admission for similar abscess noted  Associated gram-negative bacteremia present (see below)  Continue IV Cefepime/Flagyl per Infectious Disease    Metastatic rectal cancer  Assessment & Plan  With liver/lung metastases s/p chemotherapy per outpatient oncology  CT abdomen/pelvis revealing concern for persistent partially obstructing mass - previously refused surgical intervention but agreeable and this hospitalization -> s/p colostomy on 8/31 (POD #1)  PRN pain control and supportive care    Gram-negative bacteremia  Assessment & Plan  Blood culture from 8/27 growing Bacteroides species - subsequent culture from 8/28 negative  Continue IV Cefepime/Flagyl per Infectious Disease  See plan for associated gluteal abscess above    Cancer associated pain  Assessment & Plan  Appreciate palliative care input regarding analgesic regimen    Leukocytosis  Assessment & Plan  Likely reactive due to acute medical issue(s)  Monitor WBC count - improved    Hypokalemia  Assessment & Plan  Monitor/replete potassium deficiency    Anemia of chronic disease  Assessment & Plan  Monitor H/H  Monitor for postoperative blood loss        DVT Prophylaxis:  Lovenox      Patient Centered Rounds:  I have performed bedside rounds and discussed plan of care with nursing today  Discussions with Specialists or Other Care Team Provider:  see above assessments if applicable    Education and Discussions with Family / Patient:  Patient at bedside, who will self-update   Time Spent for Care:  32 minutes    More than 50% of total time spent on counseling and coordination of care as described above     Current Length of Stay: 5 day(s)    Current Patient Status: Inpatient   Certification Statement:  Patient will continue to require additional hospital stay due to assessments as noted above  Code Status: Level 1 - Full Code        Subjective:     Seen/examined earlier in the day  Sitting upright in a chair at the time of my encounter  States that her rectal pain has improved today  Objective:     Vitals:   Temp (24hrs), Av °F (36 7 °C), Min:97 8 °F (36 6 °C), Max:98 4 °F (36 9 °C)    Temp:  [97 8 °F (36 6 °C)-98 4 °F (36 9 °C)] 98 4 °F (36 9 °C)  HR:  [69-72] 70  Resp:  [18-20] 20  BP: (102-103)/(60-61) 103/61  SpO2:  [97 %-99 %] 99 %  Body mass index is 22 31 kg/m²  Input and Output Summary (last 24 hours):        Intake/Output Summary (Last 24 hours) at 2020  Last data filed at 2020 1817  Gross per 24 hour   Intake 2433 33 ml   Output 1600 ml   Net 833 33 ml       Physical Exam:     GENERAL:  Improved weakness/fatigue  HEAD:  Normocephalic - atraumatic  EYES: PERRL - EOMI   MOUTH:  Mucosa moist  NECK:  Supple - full range of motion  CARDIAC:  Rate controlled - S1/S2 positive  PULMONARY:  Fairly clear to auscultation - nonlabored respirations  ABDOMEN:  Soft - nontender/nondistended - active bowel sounds - colostomy in place - incision sites C/D/I  MUSCULOSKELETAL:  Motor strength/range of motion intact  NEUROLOGIC:  Alert/oriented at baseline  SKIN:  Chronic wrinkles/blemishes - various tattoos noted  PSYCHIATRIC:  Mood/affect pleasant today      Additional Data:     Labs & Recent Cultures:    Results from last 7 days   Lab Units 20  0454   WBC Thousand/uL 11 91*   HEMOGLOBIN g/dL 7 2*   HEMATOCRIT % 24 5*   PLATELETS Thousands/uL 268   BANDS PCT % 2   LYMPHO PCT % 4*   MONO PCT % 1*   EOS PCT % 0     Results from last 7 days   Lab Units 20  0454  20  0156   POTASSIUM mmol/L 3 9   < > 3 3*   CHLORIDE mmol/L 104   < > 103   CO2 mmol/L 25   < > 24   BUN mg/dL 7   < > 6   CREATININE mg/dL 0 28*   < > 0 46*   CALCIUM mg/dL 8 4   < > 8 0*   ALK PHOS U/L  --   --  277*   ALT U/L  --   --  13   AST U/L  --   --  37    < > = values in this interval not displayed  Results from last 7 days   Lab Units 08/31/20  0606   INR  1 33*             Results from last 7 days   Lab Units 08/27/20  0506 08/27/20  0156   LACTIC ACID mmol/L  --  1 3   PROCALCITONIN ng/ml 1 19* 1 20*         Results from last 7 days   Lab Units 08/28/20  0717 08/27/20  0156   BLOOD CULTURE  No Growth After 4 Days  Bacteroides thetaiotaomicron group*   GRAM STAIN RESULT   --  Gram negative rods*  Gram positive rods*         Last 24 Hours Medication List:   Current Facility-Administered Medications   Medication Dose Route Frequency Provider Last Rate    calcium carbonate  500 mg Oral TID PRN Acacia Cheung MD      cefepime  2,000 mg Intravenous Q12H Boo Swan MD 2,000 mg (09/01/20 2016)    enoxaparin  40 mg Subcutaneous Daily Boo Swan MD      gabapentin  300 mg Oral TID Boo Swan MD      HYDROmorphone  0 5 mg Intravenous Q2H PRN Boo Swan MD      lidocaine   Topical 4x Daily Boo Swan MD      loratadine  10 mg Oral Daily Boo Swan MD      metoprolol succinate  25 mg Oral Daily Boo Swan MD      metroNIDAZOLE  500 mg Intravenous Gabriel Balbuena  mg (09/01/20 1333)    ondansetron  4 mg Intravenous Q4H PRN Boo Swan MD      oxyCODONE  40 mg Oral Q12H [address] Marlon Diaz MD      oxyCODONE  10 mg Oral Q4H PRN Boo Swan MD      oxyCODONE  15 mg Oral Q4H PRN Boo Swan MD      sertraline  25 mg Oral Daily Boo Swan MD                  ** Please Note: This note is constructed using a voice recognition dictation system    An occasional wrong word/phrase or sound-a-like substitution may have been picked up by dictation device due to the inherent limitations of voice recognition software  Read the chart carefully and recognize, using reasonable context, where substitutions may have occurred  **

## 2020-09-02 NOTE — WOUND OSTOMY CARE
Progress Note- Ostomy  Mary Fitzpatrick 50 y o  female  [id number]  [facility]        Assessment:  Patient is seen for colostomy follow up and education  Patient is familiar with caring for colostomies from her previous work experience  Patient educated about types of pouches, diet, exercise, and daily care and maintenance  Patient expresses understanding  Process of pouch change explained to patient  Patient's pouch to be changed on Friday with patient        Subjective:  Stoma is pink and budded  Brown liquid output is noted in the pouch  1 piece pouch is currently intact      Objective:  Stoma measures approximately 2 1/2" and is round      Colostomy LUQ (Active)   Stomal Appliance 1 piece 09/01/20 1310   Stoma Assessment Budded;[place] 09/01/20 1310   Stoma size (in) 2 in 09/01/20 1310   Stoma Shape Oval 09/01/20 1310   Peristomal Assessment TRACIE 09/01/20 1310   Output (mL) 0 mL 09/02/20 0300   Number of days: 2

## 2020-09-02 NOTE — PROGRESS NOTES
Progress Note - Infectious Disease   Puja Fitzpatrick 50 y o  female MRN: [id number]  Unit/Bed#: [facility] 903-01 Encounter: [id number]      Impression:  1  Recurrent left gluteal abscess with Bacteroides thetaiotaomicron bacteremia  2  Stage IV metastatic rectal carcinoma on chemotherapy S/P laparoscopic colostomy POD 1     Recommendations:    She is afebrile and had a modestly elevated  WBC count   1  Blood culture isolate  showing Bacteroides thetaiotaomicron which is susceptible to metronidazole  Repeat blood culture  is negative  2 Continue metronidazole and discontinue cefepime  Could try switching metronidazole to p o  tomorrow  Antibiotics:  1  Metronidazole 500 mg q 8 hours IV, day 7 of 10 Rx     Subjective:  She continues to do well with minimal operative site pain and less left buttock pain  Denies fevers, chills, or sweats  Denies nausea, vomiting, or diarrhea  Objective:  Vitals:  Temp:  [96 7 °F (35 9 °C)-98 4 °F (36 9 °C)] 98 4 °F (36 9 °C)  HR:  [58-98] 72  Resp:  [18-20] 20  BP: ()/(45-68) 92/50  SpO2:  [91 %-99 %] 91 %  Temp (24hrs), Av 7 °F (36 5 °C), Min:96 7 °F (35 9 °C), Max:98 4 °F (36 9 °C)  Current: Temperature: 98 4 °F (36 9 °C)    Physical Exam:     General Appearance:  Alert, appearing chronically ill nontoxic, no acute distress  Comfortably sitting in chair  Eyes:  Conjunctiva pale   Throat: Oropharynx moist without lesions  Lips, mucosa, and tongue normal   Neck: Supple, symmetrical, trachea midline, no adenopathy,  no tenderness/mass/nodules   Lungs:   Clear to auscultation bilaterally, no audible wheezes, rhonchi or rales; respirations unlabored   Heart:  Regular rate and rhythm, S1, S2 normal, no murmur, rub or gallop   Abdomen:   Colostomy site is clean with mild tenderness non-distended, positive bowel sounds    No masses, no organomegaly    No CVA tenderness,    Extremities: Tattoos   Skin: Tattoos, right subclavian PAC, left gluteal wound with tenderness  , colostomy as above  Invasive Devices     Central Venous Catheter Line            Port A Cath 06/22/20 Right Chest 72 days          Peripheral Intravenous Line            Peripheral IV 09/02/20 Left;Ventral (anterior) Forearm less than 1 day          Drain            Colostomy LUQ 2 days                Labs, Imaging, & Other studies:   All pertinent labs were personally reviewed  Results from last 7 days   Lab Units 09/02/20 0458 09/01/20 0454 08/31/20  0603   WBC Thousand/uL 12 94* 11 91* 14 76*   HEMOGLOBIN g/dL 7 5* 7 2* 7 3*   PLATELETS Thousands/uL 326 268 232     Results from last 7 days   Lab Units 09/02/20 0458 09/01/20  0454 08/31/20  0603  08/27/20  0156   SODIUM mmol/L 138 137 134*   < > 133*   POTASSIUM mmol/L 3 6 3 9 3 4*   < > 3 3*   CHLORIDE mmol/L 104 104 99*   < > 103   CO2 mmol/L 29 25 27   < > 24   BUN mg/dL 7 7 4*   < > 6   CREATININE mg/dL 0 49* 0 28* 0 44*   < > 0 46*   EGFR ml/min/1 73sq m 116 140 121   < > 119   CALCIUM mg/dL 8 4 8 4 8 5   < > 8 0*   AST U/L  --   --   --   --  37   ALT U/L  --   --   --   --  13   ALK PHOS U/L  --   --   --   --  277*    < > = values in this interval not displayed  Results from last 7 days   Lab Units 08/28/20  0717 08/27/20  0156   BLOOD CULTURE  No Growth After 5 Days   Bacteroides thetaiotaomicron group*  Dermacoccus nishphillipmiyaensis*   GRAM STAIN RESULT   --  Gram negative rods*  Gram positive rods*

## 2020-09-02 NOTE — PROGRESS NOTES
Progress Note -    Jazmin Fitzpatrick 50 y o  female MRN: [id number]  Unit/Bed#: [facility] 903-01 Encounter: [id number]    Assessment/Plan:  50 y o  female with advanced rectal cancer s/p laparoscopic colostomy done on 8/31   - stable vital signs in room air   - tolerating PO intake: gas and small amounts of stool in stoma bag   - continue cefepime and flagyl for gluteal abscess  - other management per primary team     Subjective:   - no new complaints  - pain control said to be satisfactory  - denies any nausea or vomiting  Objective:     Vitals: Temp:  [97 9 °F (36 6 °C)-98 4 °F (36 9 °C)] 97 9 °F (36 6 °C)  HR:  [58-72] 58  Resp:  [20] 20  BP: ()/(45-61) 97/55  Body mass index is 22 31 kg/m²  I/O       08/31 0701 - 09/01 0700 09/01 0701 - 09/02 0700    P  O  720 720    I V  (mL/kg) 1450 8 (26 2) 722 5 (13 1)    IV Piggyback 100     Total Intake(mL/kg) 2270 8 (41 1) 1442 5 (26 1)    Urine (mL/kg/hr) 1575 (1 2) 1600 (1 2)    Stool 0 0    Blood 10     Total Output 1585 1600    Net +685 8 -157 5          Unmeasured Urine Occurrence  4 x          Physical Exam:  GEN: NAD  HEENT: MMM  CV: RRR  Lung: Normal effort  Ab: Soft, appropriately tender around stoma site; gas and small amounts of stool noted in stoma bag  Extrem: No CCE  Neuro: A+Ox3    Lab, Imaging and other studies:   I have personally reviewed pertinent reports    , CBC with diff:   Lab Results   Component Value Date    WBC 12 94 (H) 09/02/2020    HGB 7 5 (L) 09/02/2020    HCT 25 0 (L) 09/02/2020    MCV 82 09/02/2020     09/02/2020    MCH 24 7 (L) 09/02/2020    MCHC 30 0 (L) 09/02/2020    RDW 21 2 (H) 09/02/2020    MPV 9 8 09/02/2020    NRBC 0 09/02/2020   , BMP/CMP:   Lab Results   Component Value Date    SODIUM 138 09/02/2020    K 3 6 09/02/2020     09/02/2020    CO2 29 09/02/2020    BUN 7 09/02/2020    CREATININE 0 49 (L) 09/02/2020    CALCIUM 8 4 09/02/2020    EGFR 116 09/02/2020   , Magnesium: No components found for: MAG  VTE Pharmacologic Prophylaxis: Sequential compression device (Venodyne)  and Enoxaparin (Lovenox)  VTE Mechanical Prophylaxis: sequential compression device

## 2020-09-02 NOTE — PROGRESS NOTES
[facility] - Progress Note  Patient: Masoud Fitzpatrick 50 y o  female MRN: [id number]  Unit/Bed#: [facility] 903-01 Encounter: [id number]  Primary Care Provider: Shelia Victor MD  Date Of Visit: 09/02/20        Assessment & Plan:    * Recurrent left gluteal abscess  Assessment & Plan  Presented with increased abdominal and gluteal pain - recent admission for similar abscess noted  Associated gram-negative bacteremia present (see below)  Continue IV Cefepime/Flagyl - await decision to transition to oral antibiotic regimen and full duration per Infectious Disease    Metastatic rectal cancer  Assessment & Plan  With liver/lung metastases s/p chemotherapy per outpatient oncology  CT abdomen/pelvis revealing concern for persistent partially obstructing mass - previously refused surgical intervention but agreeable and this hospitalization -> s/p colostomy on 8/31 (POD #2) - cleared for discharge from a colorectal surgery standpoint  PRN pain control and supportive care    Gram-negative bacteremia  Assessment & Plan  Blood culture from 8/27 growing Bacteroides species - subsequent culture from 8/28 negative  Continue IV Cefepime/Flagyl per Infectious Disease  See plan for associated gluteal abscess above    Cancer associated pain  Assessment & Plan  Appreciate palliative care input regarding analgesic regimen    Leukocytosis  Assessment & Plan  Likely reactive due to acute medical issue(s)  Monitor WBC count - waxing/waning    Hypokalemia  Assessment & Plan  Monitor/replete potassium deficiency  Normalized today    Anemia of chronic disease  Assessment & Plan  Monitor H/H  Monitor for postoperative blood loss        DVT Prophylaxis:  Lovenox       Patient Centered Rounds:  I have performed bedside rounds and discussed plan of care with nursing today      Discussions with Specialists or Other Care Team Provider:  see above assessments if applicable    Education and Discussions with Family / Patient:  Patient at bedside today, who will self update   Time Spent for Care:  32 minutes  More than 50% of total time spent on counseling and coordination of care as described above  Current Length of Stay: 6 day(s)    Current Patient Status: Inpatient   Certification Statement:  Patient will continue to require additional hospital stay due to assessments as noted above  Code Status: Level 1 - Full Code        Subjective:     Seen/examined earlier in the morning  States she has mildly worsening pain compared to yesterday  Denies fever/chills currently  Appetite is sufficient and she is having output in her colostomy  Objective:     Vitals:   Temp (24hrs), Av 7 °F (36 5 °C), Min:96 7 °F (35 9 °C), Max:98 4 °F (36 9 °C)    Temp:  [96 7 °F (35 9 °C)-98 4 °F (36 9 °C)] 98 4 °F (36 9 °C)  HR:  [58-98] 72  Resp:  [18-20] 20  BP: ()/(45-68) 92/50  SpO2:  [91 %-99 %] 91 %  Body mass index is 22 31 kg/m²  Input and Output Summary (last 24 hours):        Intake/Output Summary (Last 24 hours) at 2020 1739  Last data filed at 2020 1504  Gross per 24 hour   Intake 1000 ml   Output 1550 ml   Net -550 ml       Physical Exam:     GENERAL:  Well-developed/nourished - no immediate distress at rest  HEAD:  Normocephalic - atraumatic  EYES: PERRL - EOMI   MOUTH:  Mucosa moist  NECK:  Supple - full range of motion  CARDIAC:  Regular rate/rhythm - S1/S2 positive  PULMONARY:  Clear breath sounds bilaterally - nonlabored respirations  ABDOMEN:  Soft - nontender/nondistended - active bowel sounds - colostomy in place  MUSCULOSKELETAL:  Motor strength/range of motion intact  NEUROLOGIC:  Alert/oriented at baseline  SKIN:  chronic wrinkles/blemishes - tattoos noted  PSYCHIATRIC:  Mood/affect pleasant      Additional Data:     Labs & Recent Cultures:    Results from last 7 days   Lab Units 20  0458 20  0454   WBC Thousand/uL 12 94* 11 91*   HEMOGLOBIN g/dL 7 5* 7 2*   HEMATOCRIT % 25 0* 24 5* PLATELETS Thousands/uL 326 268   BANDS PCT %  --  2   LYMPHO PCT %  --  4*   MONO PCT %  --  1*   EOS PCT %  --  0     Results from last 7 days   Lab Units 09/02/20  0458  08/27/20  0156   POTASSIUM mmol/L 3 6   < > 3 3*   CHLORIDE mmol/L 104   < > 103   CO2 mmol/L 29   < > 24   BUN mg/dL 7   < > 6   CREATININE mg/dL 0 49*   < > 0 46*   CALCIUM mg/dL 8 4   < > 8 0*   ALK PHOS U/L  --   --  277*   ALT U/L  --   --  13   AST U/L  --   --  37    < > = values in this interval not displayed  Results from last 7 days   Lab Units 08/31/20  0606   INR  1 33*             Results from last 7 days   Lab Units 08/27/20  0506 08/27/20  0156   LACTIC ACID mmol/L  --  1 3   PROCALCITONIN ng/ml 1 19* 1 20*         Results from last 7 days   Lab Units 08/28/20  0717 08/27/20  0156   BLOOD CULTURE  No Growth After 5 Days   Bacteroides thetaiotaomicron group*  Dermacoccus nishinomiyaensis*   GRAM STAIN RESULT   --  Gram negative rods*  Gram positive rods*         Last 24 Hours Medication List:   Current Facility-Administered Medications   Medication Dose Route Frequency Provider Last Rate    calcium carbonate  500 mg Oral TID PRN Pradeep Pete MD      cefepime  2,000 mg Intravenous Q12H Laura Solano MD Stopped (09/02/20 0830)    enoxaparin  40 mg Subcutaneous Daily Laura Solano MD      gabapentin  300 mg Oral TID Laura Solano MD      HYDROmorphone  4 mg Oral Q4H PRN Rodolfo Cedillo DO      Or    HYDROmorphone  0 5 mg Intravenous Q4H PRN Jessymeredith Landon DO      lidocaine   Topical 4x Daily Laura Solano MD      loratadine  10 mg Oral Daily Laura Solano MD      metoprolol succinate  25 mg Oral Daily Laura Solano MD      metroNIDAZOLE  500 mg Intravenous Roro Angulo MD Stopped (09/02/20 8867)    ondansetron  4 mg Intravenous Q4H PRN Laura Solano MD      oxyCODONE  40 mg Oral Q12H Roro Angulo MD      oxyCODONE  10 mg Oral Q4H PRN Laith Jimenez MD      oxyCODONE  15 mg Oral Q4H PRN Laith Jimenez MD      sertraline  25 mg Oral Daily Laith Jimenez MD                ** Please Note: This note is constructed using a voice recognition dictation system  An occasional wrong word/phrase or sound-a-like substitution may have been picked up by dictation device due to the inherent limitations of voice recognition software  Read the chart carefully and recognize, using reasonable context, where substitutions may have occurred  **

## 2020-09-02 NOTE — ASSESSMENT & PLAN NOTE
With liver/lung metastases s/p chemotherapy per outpatient oncology  CT abdomen/pelvis revealing concern for persistent partially obstructing mass - previously refused surgical intervention but agreeable and this hospitalization -> s/p colostomy on 8/31 (POD #2) - cleared for discharge from a colorectal surgery standpoint  PRN pain control and supportive care

## 2020-09-02 NOTE — ASSESSMENT & PLAN NOTE
With liver/lung metastases s/p chemotherapy per outpatient oncology  CT abdomen/pelvis revealing concern for persistent partially obstructing mass - previously refused surgical intervention but agreeable and this hospitalization -> s/p colostomy on 8/31 (POD #1)  PRN pain control and supportive care

## 2020-09-02 NOTE — PLAN OF CARE
Problem: Potential for Falls  Goal: Patient will remain free of falls  Description: INTERVENTIONS:  - Assess patient frequently for physical needs  -  Identify cognitive and physical deficits and behaviors that affect risk of falls    -  [place] fall precautions as indicated by assessment   - Educate patient/family on patient safety including physical limitations  - Instruct patient to call for assistance with activity based on assessment  - Modify environment to reduce risk of injury  - Consider OT/PT consult to assist with strengthening/mobility  Outcome: Progressing     Problem: GASTROINTESTINAL - ADULT  Goal: Minimal or absence of nausea and/or vomiting  Description: INTERVENTIONS:  - Administer IV fluids if ordered to ensure adequate hydration  - Maintain NPO status until nausea and vomiting are resolved  - Nasogastric tube if ordered  - Administer ordered antiemetic medications as needed  - Provide nonpharmacologic comfort measures as appropriate  - Advance diet as tolerated, if ordered  - Consider nutrition services referral to assist patient with adequate nutrition and appropriate food choices  Outcome: Progressing  Goal: Maintains or returns to baseline bowel function  Description: INTERVENTIONS:  - Assess bowel function  - Encourage oral fluids to ensure adequate hydration  - Administer IV fluids if ordered to ensure adequate hydration  - Administer ordered medications as needed  - Encourage mobilization and activity  - Consider nutritional services referral to assist patient with adequate nutrition and appropriate food choices  Outcome: Progressing  Goal: Maintains adequate nutritional intake  Description: INTERVENTIONS:  - Monitor percentage of each meal consumed  - Identify factors contributing to decreased intake, treat as appropriate  - Assist with meals as needed  - Monitor I&O, weight, and lab values if indicated  - Obtain nutrition services referral as needed  Outcome: Progressing

## 2020-09-02 NOTE — ASSESSMENT & PLAN NOTE
Presented with increased abdominal and gluteal pain - recent admission for similar abscess noted  Associated gram-negative bacteremia present (see below)  Continue IV Cefepime/Flagyl per Infectious Disease

## 2020-09-03 ENCOUNTER — APPOINTMENT (INPATIENT)
Dept: RADIOLOGY | Facility: HOSPITAL | Age: 45
DRG: 329 | End: 2020-09-03
Payer: COMMERCIAL

## 2020-09-03 PROBLEM — A41.9 SEPSIS (HCC): Status: ACTIVE | Noted: 2020-08-04

## 2020-09-03 LAB
ANION GAP SERPL CALCULATED.3IONS-SCNC: 6 MMOL/L (ref 4–13)
BUN SERPL-MCNC: 5 MG/DL (ref 5–25)
CALCIUM SERPL-MCNC: 8.8 MG/DL (ref 8.3–10.1)
CHLORIDE SERPL-SCNC: 101 MMOL/L (ref 100–108)
CO2 SERPL-SCNC: 30 MMOL/L (ref 21–32)
CREAT SERPL-MCNC: 0.52 MG/DL (ref 0.6–1.3)
ERYTHROCYTE [DISTWIDTH] IN BLOOD BY AUTOMATED COUNT: 22 % (ref 11.6–15.1)
GFR SERPL CREATININE-BSD FRML MDRD: 114 ML/MIN/1.73SQ M
GLUCOSE SERPL-MCNC: 84 MG/DL (ref 65–140)
HCT VFR BLD AUTO: 29.7 % (ref 34.8–46.1)
HGB BLD-MCNC: 8.5 G/DL (ref 11.5–15.4)
LACTATE SERPL-SCNC: 1.7 MMOL/L (ref 0.5–2)
MCH RBC QN AUTO: 24.4 PG (ref 26.8–34.3)
MCHC RBC AUTO-ENTMCNC: 28.6 G/DL (ref 31.4–37.4)
MCV RBC AUTO: 85 FL (ref 82–98)
NRBC BLD AUTO-RTO: 0 /100 WBCS
PLATELET # BLD AUTO: 400 THOUSANDS/UL (ref 149–390)
PMV BLD AUTO: 10.1 FL (ref 8.9–12.7)
POTASSIUM SERPL-SCNC: 3.7 MMOL/L (ref 3.5–5.3)
PROCALCITONIN SERPL-MCNC: 0.26 NG/ML
RBC # BLD AUTO: 3.48 MILLION/UL (ref 3.81–5.12)
SODIUM SERPL-SCNC: 137 MMOL/L (ref 136–145)
WBC # BLD AUTO: 15.6 THOUSAND/UL (ref 4.31–10.16)

## 2020-09-03 PROCEDURE — 80048 BASIC METABOLIC PNL TOTAL CA: CPT | Performed by: INTERNAL MEDICINE

## 2020-09-03 PROCEDURE — G1004 CDSM NDSC: HCPCS

## 2020-09-03 PROCEDURE — 83605 ASSAY OF LACTIC ACID: CPT | Performed by: INTERNAL MEDICINE

## 2020-09-03 PROCEDURE — 85027 COMPLETE CBC AUTOMATED: CPT | Performed by: INTERNAL MEDICINE

## 2020-09-03 PROCEDURE — 99232 SBSQ HOSP IP/OBS MODERATE 35: CPT | Performed by: INTERNAL MEDICINE

## 2020-09-03 PROCEDURE — NC001 PR NO CHARGE: Performed by: NURSE PRACTITIONER

## 2020-09-03 PROCEDURE — 0J9930Z DRAINAGE OF BUTTOCK SUBCUTANEOUS TISSUE AND FASCIA WITH DRAINAGE DEVICE, PERCUTANEOUS APPROACH: ICD-10-PCS | Performed by: RADIOLOGY

## 2020-09-03 PROCEDURE — 87040 BLOOD CULTURE FOR BACTERIA: CPT | Performed by: INTERNAL MEDICINE

## 2020-09-03 PROCEDURE — 99233 SBSQ HOSP IP/OBS HIGH 50: CPT | Performed by: INTERNAL MEDICINE

## 2020-09-03 PROCEDURE — 74177 CT ABD & PELVIS W/CONTRAST: CPT

## 2020-09-03 PROCEDURE — 84145 PROCALCITONIN (PCT): CPT | Performed by: INTERNAL MEDICINE

## 2020-09-03 RX ORDER — METRONIDAZOLE 500 MG/1
500 TABLET ORAL EVERY 8 HOURS SCHEDULED
Status: DISCONTINUED | OUTPATIENT
Start: 2020-09-03 | End: 2020-09-08

## 2020-09-03 RX ORDER — OXYCODONE HCL 40 MG/1
40 TABLET, FILM COATED, EXTENDED RELEASE ORAL EVERY 12 HOURS SCHEDULED
Qty: 20 TABLET | Refills: 0 | Status: SHIPPED | OUTPATIENT
Start: 2020-09-03 | End: 2020-09-11

## 2020-09-03 RX ORDER — OXYCODONE HYDROCHLORIDE 15 MG/1
15 TABLET ORAL EVERY 4 HOURS PRN
Qty: 30 TABLET | Refills: 0 | Status: SHIPPED | OUTPATIENT
Start: 2020-09-03 | End: 2020-09-11

## 2020-09-03 RX ORDER — HYDROMORPHONE HYDROCHLORIDE 4 MG/1
TABLET ORAL
Qty: 8 TABLET | Refills: 0 | Status: SHIPPED | OUTPATIENT
Start: 2020-09-03 | End: 2020-09-11 | Stop reason: HOSPADM

## 2020-09-03 RX ADMIN — CEFEPIME HYDROCHLORIDE 2000 MG: 2 INJECTION, POWDER, FOR SOLUTION INTRAVENOUS at 23:15

## 2020-09-03 RX ADMIN — METRONIDAZOLE 500 MG: 500 TABLET ORAL at 23:14

## 2020-09-03 RX ADMIN — OXYCODONE HYDROCHLORIDE 15 MG: 10 TABLET ORAL at 07:35

## 2020-09-03 RX ADMIN — OXYCODONE HYDROCHLORIDE 15 MG: 10 TABLET ORAL at 20:16

## 2020-09-03 RX ADMIN — LIDOCAINE: 4 CREAM TOPICAL at 23:16

## 2020-09-03 RX ADMIN — METRONIDAZOLE 500 MG: 500 INJECTION, SOLUTION INTRAVENOUS at 15:31

## 2020-09-03 RX ADMIN — LIDOCAINE 1 APPLICATION: 4 CREAM TOPICAL at 17:02

## 2020-09-03 RX ADMIN — LIDOCAINE: 4 CREAM TOPICAL at 12:42

## 2020-09-03 RX ADMIN — METRONIDAZOLE 500 MG: 500 INJECTION, SOLUTION INTRAVENOUS at 05:12

## 2020-09-03 RX ADMIN — GABAPENTIN 300 MG: 300 CAPSULE ORAL at 15:59

## 2020-09-03 RX ADMIN — OXYCODONE HYDROCHLORIDE 40 MG: 20 TABLET, FILM COATED, EXTENDED RELEASE ORAL at 20:16

## 2020-09-03 RX ADMIN — IOHEXOL 100 ML: 350 INJECTION, SOLUTION INTRAVENOUS at 10:28

## 2020-09-03 RX ADMIN — OXYCODONE HYDROCHLORIDE 40 MG: 20 TABLET, FILM COATED, EXTENDED RELEASE ORAL at 09:26

## 2020-09-03 RX ADMIN — OXYCODONE HYDROCHLORIDE 15 MG: 10 TABLET ORAL at 12:43

## 2020-09-03 RX ADMIN — GABAPENTIN 300 MG: 300 CAPSULE ORAL at 20:16

## 2020-09-03 RX ADMIN — ENOXAPARIN SODIUM 40 MG: 40 INJECTION SUBCUTANEOUS at 09:28

## 2020-09-03 RX ADMIN — CEFEPIME HYDROCHLORIDE 2000 MG: 2 INJECTION, POWDER, FOR SOLUTION INTRAVENOUS at 10:53

## 2020-09-03 RX ADMIN — LORATADINE 10 MG: 10 TABLET ORAL at 09:26

## 2020-09-03 RX ADMIN — SERTRALINE HYDROCHLORIDE 25 MG: 25 TABLET ORAL at 09:28

## 2020-09-03 RX ADMIN — GABAPENTIN 300 MG: 300 CAPSULE ORAL at 09:28

## 2020-09-03 RX ADMIN — HYDROMORPHONE HYDROCHLORIDE 4 MG: 4 TABLET ORAL at 15:29

## 2020-09-03 NOTE — ASSESSMENT & PLAN NOTE
With liver/lung metastases s/p chemotherapy per outpatient oncology  CT abdomen/pelvis on 8/27 revealing concern for persistent partially obstructing mass - previously refused surgical intervention but agreeable and this hospitalization -> s/p colostomy on 8/31 (POD #3)  PRN pain control and supportive care  Repeat CT imaging today noted a complex left adnexal mass suspicious for a primary ovarian lesion versus metastatic lesion -> on previous CT scan, this was mention as a cystic lesion -> check CA-125 marker

## 2020-09-03 NOTE — PROGRESS NOTES
Progress note - Palliative and Supportive Care   Vanessa Fitzpatrick 50 y o  female [id number]    Assessment:    -   Patient Active Problem List   Diagnosis    Large bowel obstruction ([address] )    Metastatic rectal cancer    Breast cancer    Abdominal pain    Anemia of chronic disease    Gram-positive bacteremia    Recurrent left gluteal abscess    Cancer associated pain    Possible evolving sepsis    Hypokalemia    Thrombocytosis     Bright red blood per rectum    Carpal tunnel syndrome, bilateral    Disc degeneration, lumbar    Muscle spasm    Chronic left SI joint pain    Myofascial pain syndrome    Nicotine dependence    Postlaminectomy syndrome, lumbar    Neck pain    Abnormal cytological findings in specimens from other organs, systems and tissues    Anxiety with depression    HTN (hypertension), benign    Vitamin D deficiency    Gram-negative bacteremia         Plan:  1  Symptom management -    - Continue OxyContin 40 mg BID- script to [facility]   - Continue PRN OxyIR 15 mg- script to [facility]   - Will trial PO Dilaudid for pain crises to prevent ER admissions- script for 8 tablets sent to pharmacy and discussed with patient how to use appropriately  - Continue adjuncts with Gabapentin, Tylenol, Lidocaine cream   - bowel regimen per CRS    2  Goals - Full cares  3  Psychosocial support- time spent providing supportive listening  Interval history:       Patient developed a fever and has been started on antibiotics  Okay with pain control at this time  Discussed outpatient pain regimen and sent scripts to pharmacy       MEDICATIONS / ALLERGIES:     all current active meds have been reviewed and current meds:   Current Facility-Administered Medications   Medication Dose Route Frequency    calcium carbonate (TUMS) chewable tablet 500 mg  500 mg Oral TID PRN    cefepime (MAXIPIME) 2,000 mg in dextrose 5 % 50 mL IVPB  2,000 mg Intravenous Q12H    enoxaparin (LOVENOX) subcutaneous injection 40 mg  40 mg Subcutaneous Daily    gabapentin (NEURONTIN) capsule 300 mg  300 mg Oral TID    HYDROmorphone (DILAUDID) tablet 4 mg  4 mg Oral Q4H PRN    Or    HYDROmorphone (DILAUDID) injection 0 5 mg  0 5 mg Intravenous Q4H PRN    lidocaine (LMX) 4 % cream   Topical 4x Daily    loratadine (CLARITIN) tablet 10 mg  10 mg Oral Daily    metoprolol succinate (TOPROL-XL) 24 hr tablet 25 mg  25 mg Oral Daily    metroNIDAZOLE (FLAGYL) IVPB (premix) 500 mg 100 mL  500 mg Intravenous Q8H    ondansetron (ZOFRAN) injection 4 mg  4 mg Intravenous Q4H PRN    oxyCODONE (OxyCONTIN) 12 hr tablet 40 mg  40 mg Oral Q12H GONZÁLEZ    oxyCODONE (ROXICODONE) immediate release tablet 10 mg  10 mg Oral Q4H PRN    oxyCODONE (ROXICODONE) IR tablet 15 mg  15 mg Oral Q4H PRN    sertraline (ZOLOFT) tablet 25 mg  25 mg Oral Daily       Allergies   Allergen Reactions    Augmentin [Amoxicillin-Pot Clavulanate] Confusion and Drowsiness       OBJECTIVE:    Physical Exam  Physical Exam  Vitals signs and nursing note reviewed  Constitutional:       General: She is not in acute distress  HENT:      Head: Normocephalic and atraumatic  Right Ear: External ear normal       Left Ear: External ear normal    Eyes:      General:         Right eye: No discharge  Left eye: No discharge  Cardiovascular:      Rate and Rhythm: Normal rate  Abdominal:      General: There is no distension  Palpations: Abdomen is soft  Skin:     Coloration: Skin is pale  Neurological:      General: No focal deficit present  Mental Status: She is oriented to person, place, and time  Mental status is at baseline  Lab Results:   I have personally reviewed pertinent labs  , CBC:   Lab Results   Component Value Date    WBC 15 60 (H) 09/03/2020    HGB 8 5 (L) 09/03/2020    HCT 29 7 (L) 09/03/2020    MCV 85 09/03/2020     (H) 09/03/2020    MCH 24 4 (L) 09/03/2020    MCHC 28 6 (L) 09/03/2020    RDW 22 0 (H) 09/03/2020    MPV 10 1 09/03/2020    NRBC 0 09/03/2020   , PT/PTT:No results found for: PT, PTT  Imaging Studies: reviewed  EKG, Pathology, and Other Studies: reviewed    Counseling / Coordination of Care    Total floor / unit time spent today 25+ minutes  Greater than 50% of total time was spent with the patient and / or family counseling and / or coordination of care  A description of the counseling / coordination of care: chart review, medication review, supportive listening

## 2020-09-03 NOTE — PROGRESS NOTES
[address] Internal Medicine - Progress Note  Patient: Vinod Fitzpatrick 50 y o  female MRN: [id number]  Unit/Bed#: [facility] 903-01 Encounter: [id number]  Primary Care Provider: Eric Kathleen MD  Date Of Visit: 09/03/20        Assessment & Plan:    * Recurrent left gluteal abscess  Assessment & Plan  Presented with increased abdominal and gluteal pain - recent admission for similar abscess noted  Associated gram-negative bacteremia present (see below)  Continue Flagyl - IV Cefepime resumed today  Developed a high-grade fever overnight and worsened WBC count today  Repeat CT imaging today revealing "Large rectal mass again noted consistent with known carcinoma  Townsend Calvin Townsend Calvin Status post decompressive transverse colostomy, new since the prior study  Townsend Calvin Townsend Calvin Complex mass in the presacral/precoccygeal region is again noted measuring 3 8 x 8 cm concerning for direct extension of the patient's rectal cancer to the presacral space with possible superinfection  Townsend Calvin Townsend Calvin There is also continuity of this process with the medial left gluteal musculature and an area which was previously drained, suspicious for superinfection of necrotic tumor/abscess, overall measuring 2 2 x 3 3 cm  Townsend Calvin Townsend Calvin Extensive metastatic disease to the lungs and liver  Townsend Calvin Townsend Calvin Complex left adnexal mass which could be a primary ovarian lesion versus a metastatic lesion  Townsend Calvin Townsend Calvin The overall appearance is suspicious for neoplasia  Townsend Calvin Townsend Calvin Ventral abdominal wall hernia/hernias with ascites  Townsend Calvin Townsend Calvin Additional stable findings as noted "  Will appreciate IR evaluation regarding drainage    Metastatic rectal cancer  Assessment & Plan  With liver/lung metastases s/p chemotherapy per outpatient oncology  CT abdomen/pelvis on 8/27 revealing concern for persistent partially obstructing mass - previously refused surgical intervention but agreeable and this hospitalization -> s/p colostomy on 8/31 (POD #3)  PRN pain control and supportive care  Repeat CT imaging today noted a complex left adnexal mass suspicious for a primary ovarian lesion versus metastatic lesion -> on previous CT scan, this was mention as a cystic lesion -> check CA-125 marker    Gram-negative bacteremia  Assessment & Plan  Blood culture from 8/27 growing Bacteroides species - subsequent culture from 8/28 negative  IV Cefepime discontinued yesterday, however, in light of high-grade fever overnight, CT findings, and increased WBC count today, will resume - continue Flagyl as well per Infectious Disease  See plan for associated gluteal abscess above    Cancer associated pain  Assessment & Plan  Appreciate palliative care input regarding analgesic regimen    Possible evolving sepsis  Assessment & Plan  Initially with leukocytosis (worsened today) - developed a high-grade fever of 101 1° last night  Mild procalcitonin bump today at 0 26 - lactic acid normal  Recheck blood cultures today  Continue Flagyl and resume IV Cefepime  See plan for gluteal abscess    Hypokalemia  Assessment & Plan  Monitor/replete potassium deficiency  Normalized    Anemia of chronic disease  Assessment & Plan  Monitor H/H  Monitor for postoperative blood loss        DVT Prophylaxis:  Lovenox       Patient Centered Rounds:  I have performed bedside rounds and discussed plan of care with nursing today  Discussions with Specialists or Other Care Team Provider:  see above assessments if applicable    Education and Discussions with Family / Patient: Patient at bedside, who will self-update   Time Spent for Care:  32 minutes  More than 50% of total time spent on counseling and coordination of care as described above  Current Length of Stay: 7 day(s)    Current Patient Status: Inpatient   Certification Statement:  Patient will continue to require additional hospital stay due to assessments as noted above  Code Status: Level 1 - Full Code        Subjective:     Seen/examined earlier in the morning  Sitting upright in a chair at the time encounter    States her buttock pain is a little worse today exacerbated by sitting upright and mildly improved with laying flat  Objective:     Vitals:   Temp (24hrs), Av 7 °F (37 1 °C), Min:97 5 °F (36 4 °C), Max:101 1 °F (38 4 °C)    Temp:  [97 5 °F (36 4 °C)-101 1 °F (38 4 °C)] 97 9 °F (36 6 °C)  HR:  [69-78] 69  Resp:  [20] 20  BP: ()/(50-90) 118/90  SpO2:  [91 %-95 %] 95 %  Body mass index is 22 31 kg/m²  Input and Output Summary (last 24 hours): Intake/Output Summary (Last 24 hours) at 9/3/2020 1401  Last data filed at 9/3/2020 1331  Gross per 24 hour   Intake 780 ml   Output 2400 ml   Net -1620 ml       Physical Exam:     GENERAL:  Well-developed/nourished - mild distress from intermittent pain  HEAD:  Normocephalic - atraumatic  EYES: PERRL - EOMI   MOUTH:  Mucosa moist  NECK:  Supple - full range of motion  CARDIAC:  Regular rate/rhythm - S1/S2 positive  PULMONARY:  Clear breath sounds bilaterally - nonlabored respirations  ABDOMEN:  Soft - nontender/nondistended - active bowel sounds  MUSCULOSKELETAL:  Motor strength/range of motion intact  NEUROLOGIC:  Alert/oriented x3  SKIN:  Chronic wrinkles/blemishes - tattoos noted  PSYCHIATRIC:  Mood/affect stable      Additional Data:     Labs & Recent Cultures:    Results from last 7 days   Lab Units 20  0441  20  0454   WBC Thousand/uL 15 60*   < > 11 91*   HEMOGLOBIN g/dL 8 5*   < > 7 2*   HEMATOCRIT % 29 7*   < > 24 5*   PLATELETS Thousands/uL 400*   < > 268   BANDS PCT %  --   --  2   LYMPHO PCT %  --   --  4*   MONO PCT %  --   --  1*   EOS PCT %  --   --  0    < > = values in this interval not displayed       Results from last 7 days   Lab Units 20  0441   POTASSIUM mmol/L 3 7   CHLORIDE mmol/L 101   CO2 mmol/L 30   BUN mg/dL 5   CREATININE mg/dL 0 52*   CALCIUM mg/dL 8 8     Results from last 7 days   Lab Units 20  0606   INR  1 33*             Results from last 7 days   Lab Units 20  0947   LACTIC ACID mmol/L 1 7   PROCALCITONIN ng/ml 0 26* Results from last 7 days   Lab Units 09/03/20  0947 08/28/20  0717   BLOOD CULTURE  Received in Microbiology Lab  Culture in Progress  Received in Microbiology Lab  Culture in Progress  No Growth After 5 Days  Last 24 Hours Medication List:   Current Facility-Administered Medications   Medication Dose Route Frequency Provider Last Rate    calcium carbonate  500 mg Oral TID PRN Sid Sanon MD      cefepime  2,000 mg Intravenous Q12H Sid Sanon MD 2,000 mg (09/03/20 1053)    enoxaparin  40 mg Subcutaneous Daily Horacio Ramos MD      gabapentin  300 mg Oral TID Horacio Ramos MD      HYDROmorphone  4 mg Oral Q4H PRN Juan Cedillo DO      Or    HYDROmorphone  0 5 mg Intravenous Q4H PRN Juany Zarate DO      lidocaine   Topical 4x Daily Horacio Ramos MD      loratadine  10 mg Oral Daily Horacio Ramos MD      metoprolol succinate  25 mg Oral Daily Horacio Ramos MD      metroNIDAZOLE  500 mg Intravenous Radha Johnson  mg (09/03/20 0512)    ondansetron  4 mg Intravenous Q4H PRN Horacio Ramos MD      oxyCODONE  40 mg Oral Q12H [address] Roro Nelson MD      oxyCODONE  10 mg Oral Q4H PRN Horacio Ramos MD      oxyCODONE  15 mg Oral Q4H PRN Horacio Ramos MD      sertraline  25 mg Oral Daily Horacio Ramos MD                  ** Please Note: This note is constructed using a voice recognition dictation system  An occasional wrong word/phrase or sound-a-like substitution may have been picked up by dictation device due to the inherent limitations of voice recognition software  Read the chart carefully and recognize, using reasonable context, where substitutions may have occurred  **

## 2020-09-03 NOTE — ASSESSMENT & PLAN NOTE
Presented with increased abdominal and gluteal pain - recent admission for similar abscess noted  Associated gram-negative bacteremia present (see below)  Continue Flagyl - IV Cefepime resumed today  Developed a high-grade fever overnight and worsened WBC count today  Repeat CT imaging today revealing "Large rectal mass again noted consistent with known carcinoma  Ward Manifold Ward Manifold Status post decompressive transverse colostomy, new since the prior study  Ward Manifold Ward Manifold Complex mass in the presacral/precoccygeal region is again noted measuring 3 8 x 8 cm concerning for direct extension of the patient's rectal cancer to the presacral space with possible superinfection  Ward Manifold Ward Manifold There is also continuity of this process with the medial left gluteal musculature and an area which was previously drained, suspicious for superinfection of necrotic tumor/abscess, overall measuring 2 2 x 3 3 cm  Ward Manifold Ward Manifold Extensive metastatic disease to the lungs and liver  Ward Manifold Ward Manifold Complex left adnexal mass which could be a primary ovarian lesion versus a metastatic lesion  Ward Manifold Ward Manifold The overall appearance is suspicious for neoplasia  Ward Manifold Ward Manifold Ventral abdominal wall hernia/hernias with ascites  Ward Manifold Ward Manifold Additional stable findings as noted "  Will appreciate IR evaluation regarding drainage

## 2020-09-03 NOTE — ASSESSMENT & PLAN NOTE
Initially with leukocytosis (worsened today) - developed a high-grade fever of 101 1° last night  Mild procalcitonin bump today at 0 26 - lactic acid normal  Recheck blood cultures today  Continue Flagyl and resume IV Cefepime  See plan for gluteal abscess

## 2020-09-03 NOTE — PLAN OF CARE
Problem: Potential for Falls  Goal: Patient will remain free of falls  Description: INTERVENTIONS:  - Assess patient frequently for physical needs  -  Identify cognitive and physical deficits and behaviors that affect risk of falls    -  [place] fall precautions as indicated by assessment   - Educate patient/family on patient safety including physical limitations  - Instruct patient to call for assistance with activity based on assessment  - Modify environment to reduce risk of injury  - Consider OT/PT consult to assist with strengthening/mobility  Outcome: Progressing     Problem: GASTROINTESTINAL - ADULT  Goal: Minimal or absence of nausea and/or vomiting  Description: INTERVENTIONS:  - Administer IV fluids if ordered to ensure adequate hydration  - Maintain NPO status until nausea and vomiting are resolved  - Nasogastric tube if ordered  - Administer ordered antiemetic medications as needed  - Provide nonpharmacologic comfort measures as appropriate  - Advance diet as tolerated, if ordered  - Consider nutrition services referral to assist patient with adequate nutrition and appropriate food choices  Outcome: Progressing  Goal: Maintains or returns to baseline bowel function  Description: INTERVENTIONS:  - Assess bowel function  - Encourage oral fluids to ensure adequate hydration  - Administer IV fluids if ordered to ensure adequate hydration  - Administer ordered medications as needed  - Encourage mobilization and activity  - Consider nutritional services referral to assist patient with adequate nutrition and appropriate food choices  Outcome: Progressing  Goal: Maintains adequate nutritional intake  Description: INTERVENTIONS:  - Monitor percentage of each meal consumed  - Identify factors contributing to decreased intake, treat as appropriate  - Assist with meals as needed  - Monitor I&O, weight, and lab values if indicated  - Obtain nutrition services referral as needed  Outcome: Progressing

## 2020-09-03 NOTE — ASSESSMENT & PLAN NOTE
Blood culture from 8/27 growing Bacteroides species - subsequent culture from 8/28 negative  IV Cefepime discontinued yesterday, however, in light of high-grade fever overnight, CT findings, and increased WBC count today, will resume - continue Flagyl as well per Infectious Disease  See plan for associated gluteal abscess above

## 2020-09-03 NOTE — TELEMEDICINE
INTERPROFESSIONAL (PHONE) [address] [facility] 50 y o  female MRN: [id number]  Unit/Bed#: [facility] 903-01 Encounter: [id number]    IR has been consulted to evaluate the patient, determine the appropriate procedure, and whether or not a procedure can and should be performed regarding the care of Huy Jain  We were consulted by SLIM concerning recurrent gluteal abscess, and to possibly perform a left gluteal aspiration/drainage if medically appropriate for the patient  IP Consult to IR  Consult performed by: ZACH Gomez Mai  Consult ordered by: Monika Saxena MD        09/03/20      Assessment/Recommendation:     29-year-old female well-known to interventional radiology with stage IV colon cancer presenting with a recurrence of gluteal abscess  It was previously aspirated on 07/31 and 8/7  Since previously seen by IR, patient has undergone a diverting colostomy  Please refer to Dr Gladys Brennan for  Will perform aspiration/drainage with patient after we have a discussion with her regarding her goals and if she is comfortable having a drain, most likely for life  - NPO after midnight  - plan for IR guided drainage catheter/aspiration on 09/04      Total time spent in review of data, discussion with requesting provider and rendering advice was 25 minutes  Patient or appropriate family member was verbally informed by SLIM of this consultative service on their behalf to provide more timely access to specialty care in lieu of an in person consultation  They were informed it is a billable service unless the it was determined an in person follow up was medically necessary by me within the next 14 days at which time only the in person consult would be billed  Verbal consent was obtained  Thank you for allowing Interventional Radiology to participate in the care of Huy Jain  Please don't hesitate to call or TigerText us with any questions       Jason Fernandez CRNP

## 2020-09-04 ENCOUNTER — APPOINTMENT (INPATIENT)
Dept: RADIOLOGY | Facility: HOSPITAL | Age: 45
DRG: 329 | End: 2020-09-04
Payer: COMMERCIAL

## 2020-09-04 ENCOUNTER — TELEPHONE (OUTPATIENT)
Dept: OTHER | Facility: HOSPITAL | Age: 45
End: 2020-09-04

## 2020-09-04 LAB
ANION GAP SERPL CALCULATED.3IONS-SCNC: 5 MMOL/L (ref 4–13)
ANISOCYTOSIS BLD QL SMEAR: PRESENT
BASOPHILS # BLD MANUAL: 0.13 THOUSAND/UL (ref 0–0.1)
BASOPHILS NFR MAR MANUAL: 1 % (ref 0–1)
BUN SERPL-MCNC: 6 MG/DL (ref 5–25)
CALCIUM SERPL-MCNC: 8.4 MG/DL (ref 8.3–10.1)
CANCER AG125 SERPL-ACNC: 189.9 U/ML (ref 0–30)
CHLORIDE SERPL-SCNC: 100 MMOL/L (ref 100–108)
CO2 SERPL-SCNC: 30 MMOL/L (ref 21–32)
CREAT SERPL-MCNC: 0.44 MG/DL (ref 0.6–1.3)
EOSINOPHIL # BLD MANUAL: 0.67 THOUSAND/UL (ref 0–0.4)
EOSINOPHIL NFR BLD MANUAL: 5 % (ref 0–6)
ERYTHROCYTE [DISTWIDTH] IN BLOOD BY AUTOMATED COUNT: 21.8 % (ref 11.6–15.1)
GFR SERPL CREATININE-BSD FRML MDRD: 121 ML/MIN/1.73SQ M
GLUCOSE SERPL-MCNC: 89 MG/DL (ref 65–140)
HCT VFR BLD AUTO: 27.9 % (ref 34.8–46.1)
HGB BLD-MCNC: 8 G/DL (ref 11.5–15.4)
HYPERCHROMIA BLD QL SMEAR: PRESENT
LYMPHOCYTES # BLD AUTO: 0.67 THOUSAND/UL (ref 0.6–4.47)
LYMPHOCYTES # BLD AUTO: 5 % (ref 14–44)
MCH RBC QN AUTO: 24.3 PG (ref 26.8–34.3)
MCHC RBC AUTO-ENTMCNC: 28.7 G/DL (ref 31.4–37.4)
MCV RBC AUTO: 85 FL (ref 82–98)
MONOCYTES # BLD AUTO: 0 THOUSAND/UL (ref 0–1.22)
MONOCYTES NFR BLD: 0 % (ref 4–12)
NEUTROPHILS # BLD MANUAL: 11.35 THOUSAND/UL (ref 1.85–7.62)
NEUTS SEG NFR BLD AUTO: 85 % (ref 43–75)
NRBC BLD AUTO-RTO: 0 /100 WBCS
OVALOCYTES BLD QL SMEAR: PRESENT
PLATELET # BLD AUTO: 393 THOUSANDS/UL (ref 149–390)
PLATELET BLD QL SMEAR: ADEQUATE
PMV BLD AUTO: 9.5 FL (ref 8.9–12.7)
POLYCHROMASIA BLD QL SMEAR: PRESENT
POTASSIUM SERPL-SCNC: 3.4 MMOL/L (ref 3.5–5.3)
PROCALCITONIN SERPL-MCNC: 0.25 NG/ML
RBC # BLD AUTO: 3.29 MILLION/UL (ref 3.81–5.12)
RBC MORPH BLD: PRESENT
SODIUM SERPL-SCNC: 135 MMOL/L (ref 136–145)
VARIANT LYMPHS # BLD AUTO: 4 %
WBC # BLD AUTO: 13.35 THOUSAND/UL (ref 4.31–10.16)

## 2020-09-04 PROCEDURE — 99152 MOD SED SAME PHYS/QHP 5/>YRS: CPT | Performed by: RADIOLOGY

## 2020-09-04 PROCEDURE — 99232 SBSQ HOSP IP/OBS MODERATE 35: CPT | Performed by: INTERNAL MEDICINE

## 2020-09-04 PROCEDURE — 84145 PROCALCITONIN (PCT): CPT | Performed by: INTERNAL MEDICINE

## 2020-09-04 PROCEDURE — 87205 SMEAR GRAM STAIN: CPT | Performed by: RADIOLOGY

## 2020-09-04 PROCEDURE — 10160 PNXR ASPIR ABSC HMTMA BULLA: CPT | Performed by: RADIOLOGY

## 2020-09-04 PROCEDURE — 85007 BL SMEAR W/DIFF WBC COUNT: CPT | Performed by: INTERNAL MEDICINE

## 2020-09-04 PROCEDURE — 86304 IMMUNOASSAY TUMOR CA 125: CPT | Performed by: INTERNAL MEDICINE

## 2020-09-04 PROCEDURE — 99232 SBSQ HOSP IP/OBS MODERATE 35: CPT | Performed by: PHYSICIAN ASSISTANT

## 2020-09-04 PROCEDURE — 87070 CULTURE OTHR SPECIMN AEROBIC: CPT | Performed by: RADIOLOGY

## 2020-09-04 PROCEDURE — 80048 BASIC METABOLIC PNL TOTAL CA: CPT | Performed by: INTERNAL MEDICINE

## 2020-09-04 PROCEDURE — 76942 ECHO GUIDE FOR BIOPSY: CPT | Performed by: RADIOLOGY

## 2020-09-04 PROCEDURE — 85027 COMPLETE CBC AUTOMATED: CPT | Performed by: INTERNAL MEDICINE

## 2020-09-04 PROCEDURE — NC001 PR NO CHARGE: Performed by: INTERNAL MEDICINE

## 2020-09-04 PROCEDURE — 87075 CULTR BACTERIA EXCEPT BLOOD: CPT | Performed by: RADIOLOGY

## 2020-09-04 RX ORDER — FENTANYL CITRATE 50 UG/ML
INJECTION, SOLUTION INTRAMUSCULAR; INTRAVENOUS CODE/TRAUMA/SEDATION MEDICATION
Status: COMPLETED | OUTPATIENT
Start: 2020-09-04 | End: 2020-09-04

## 2020-09-04 RX ORDER — ALBUMIN (HUMAN) 12.5 G/50ML
12.5 SOLUTION INTRAVENOUS 2 TIMES DAILY
Status: COMPLETED | OUTPATIENT
Start: 2020-09-04 | End: 2020-09-05

## 2020-09-04 RX ORDER — MIDAZOLAM HYDROCHLORIDE 2 MG/2ML
INJECTION, SOLUTION INTRAMUSCULAR; INTRAVENOUS CODE/TRAUMA/SEDATION MEDICATION
Status: COMPLETED | OUTPATIENT
Start: 2020-09-04 | End: 2020-09-04

## 2020-09-04 RX ORDER — POTASSIUM CHLORIDE 20 MEQ/1
40 TABLET, EXTENDED RELEASE ORAL 2 TIMES DAILY
Status: DISPENSED | OUTPATIENT
Start: 2020-09-04 | End: 2020-09-05

## 2020-09-04 RX ADMIN — POTASSIUM CHLORIDE 40 MEQ: 1500 TABLET, EXTENDED RELEASE ORAL at 17:29

## 2020-09-04 RX ADMIN — METRONIDAZOLE 500 MG: 500 TABLET ORAL at 21:40

## 2020-09-04 RX ADMIN — CEFEPIME HYDROCHLORIDE 2000 MG: 2 INJECTION, POWDER, FOR SOLUTION INTRAVENOUS at 09:27

## 2020-09-04 RX ADMIN — OXYCODONE HYDROCHLORIDE 15 MG: 10 TABLET ORAL at 00:43

## 2020-09-04 RX ADMIN — FENTANYL CITRATE 50 MCG: 50 INJECTION, SOLUTION INTRAMUSCULAR; INTRAVENOUS at 12:52

## 2020-09-04 RX ADMIN — OXYCODONE HYDROCHLORIDE 40 MG: 20 TABLET, FILM COATED, EXTENDED RELEASE ORAL at 09:27

## 2020-09-04 RX ADMIN — GABAPENTIN 300 MG: 300 CAPSULE ORAL at 16:17

## 2020-09-04 RX ADMIN — FENTANYL CITRATE 50 MCG: 50 INJECTION, SOLUTION INTRAMUSCULAR; INTRAVENOUS at 12:42

## 2020-09-04 RX ADMIN — ALBUMIN (HUMAN) 12.5 G: 0.25 INJECTION, SOLUTION INTRAVENOUS at 21:41

## 2020-09-04 RX ADMIN — SERTRALINE HYDROCHLORIDE 25 MG: 25 TABLET ORAL at 09:27

## 2020-09-04 RX ADMIN — GABAPENTIN 300 MG: 300 CAPSULE ORAL at 09:27

## 2020-09-04 RX ADMIN — METRONIDAZOLE 500 MG: 500 TABLET ORAL at 16:16

## 2020-09-04 RX ADMIN — MIDAZOLAM 1 MG: 1 INJECTION INTRAMUSCULAR; INTRAVENOUS at 12:43

## 2020-09-04 RX ADMIN — OXYCODONE HYDROCHLORIDE 15 MG: 10 TABLET ORAL at 22:33

## 2020-09-04 RX ADMIN — MIDAZOLAM 1 MG: 1 INJECTION INTRAMUSCULAR; INTRAVENOUS at 12:53

## 2020-09-04 RX ADMIN — OXYCODONE HYDROCHLORIDE 40 MG: 20 TABLET, FILM COATED, EXTENDED RELEASE ORAL at 21:40

## 2020-09-04 RX ADMIN — MIDAZOLAM 1 MG: 1 INJECTION INTRAMUSCULAR; INTRAVENOUS at 13:01

## 2020-09-04 RX ADMIN — LIDOCAINE: 4 CREAM TOPICAL at 18:26

## 2020-09-04 RX ADMIN — ALBUMIN (HUMAN) 12.5 G: 0.25 INJECTION, SOLUTION INTRAVENOUS at 14:56

## 2020-09-04 RX ADMIN — FENTANYL CITRATE 50 MCG: 50 INJECTION, SOLUTION INTRAMUSCULAR; INTRAVENOUS at 13:01

## 2020-09-04 RX ADMIN — LORATADINE 10 MG: 10 TABLET ORAL at 09:27

## 2020-09-04 RX ADMIN — METRONIDAZOLE 500 MG: 500 TABLET ORAL at 05:48

## 2020-09-04 RX ADMIN — OXYCODONE HYDROCHLORIDE 10 MG: 10 TABLET ORAL at 18:26

## 2020-09-04 RX ADMIN — CEFEPIME HYDROCHLORIDE 2000 MG: 2 INJECTION, POWDER, FOR SOLUTION INTRAVENOUS at 21:52

## 2020-09-04 RX ADMIN — OXYCODONE HYDROCHLORIDE 15 MG: 10 TABLET ORAL at 05:55

## 2020-09-04 RX ADMIN — GABAPENTIN 300 MG: 300 CAPSULE ORAL at 21:40

## 2020-09-04 RX ADMIN — LIDOCAINE: 4 CREAM TOPICAL at 09:30

## 2020-09-04 NOTE — PROGRESS NOTES
[facility] - Progress Note  Patient: Tereza Fitzpatrick 50 y o  female MRN: [id number]  Unit/Bed#: [facility] 903-01 Encounter: [id number]  Primary Care Provider: Mil Del Castillo MD  Date Of Visit: 09/04/20        Assessment & Plan:    * Recurrent left gluteal abscess  Assessment & Plan  Presented with increased abdominal and gluteal pain - recent admission for similar abscess noted  Associated gram-negative bacteremia present (see below)  Continue Flagyl/Cefepime - PRN pain control  Developed a high-grade fever on the night of 9/2 with waxing/waning leukocytosis  Repeat CT imaging on 9/3 revealing "Large rectal mass again noted consistent with known carcinoma  Barb Blend Barb Blend Status post decompressive transverse colostomy, new since the prior study  Barb Blend Barb Blend Complex mass in the presacral/precoccygeal region is again noted measuring 3 8 x 8 cm concerning for direct extension of the patient's rectal cancer to the presacral space with possible superinfection  Barb Blend Barb Blend There is also continuity of this process with the medial left gluteal musculature and an area which was previously drained, suspicious for superinfection of necrotic tumor/abscess, overall measuring 2 2 x 3 3 cm  Barb Blend Barb Blend Extensive metastatic disease to the lungs and liver  Barb Blend Barb Blend Complex left adnexal mass which could be a primary ovarian lesion versus a metastatic lesion  Barb Blend Barb Blend The overall appearance is suspicious for neoplasia  Barb Blend Barb Blend Ventral abdominal wall hernia/hernias with ascites  Barb Blend Barb Blend Additional stable findings as noted "  Status post IR guided drainage today approximately 8 mL of purulent fluid -> culture pending    Metastatic rectal cancer  Assessment & Plan  With liver/lung metastases s/p chemotherapy per outpatient oncology  CT abdomen/pelvis on 8/27 revealing concern for persistent partially obstructing mass - previously refused surgical intervention but agreeable and this hospitalization -> s/p colostomy on 8/31 (POD #4)  PRN pain control and supportive care  Repeat CT imaging on 9/3 noted a complex left adnexal mass suspicious for a primary ovarian lesion versus metastatic lesion -> on previous CT scan, this was mentioned as a cystic lesion -> CA-125 tumor marker elevated pending oncology evaluation    Gram-negative bacteremia  Assessment & Plan  Blood culture from 8/27 growing Bacteroides species - subsequent culture from 8/28 negative - preliminary cultures from 9/3 are negative  Continue IV Cefepime w/ PO Flagyl per Infectious Disease  See plan for associated gluteal abscess above    Cancer associated pain  Assessment & Plan  Appreciate palliative care input regarding analgesic regimen    Possible evolving sepsis  Assessment & Plan  With waxing/waning leukocytosis and a high-grade fever of 101 1° on the night of 9/2  Mild procalcitonin bump at 0 26 -> normalized today -> lactic acid normal  Repeat blood cultures from 9/3 are preliminarily negative  Continue Flagyl and IV Cefepime  See plan for gluteal abscess    Hypokalemia  Assessment & Plan  Monitor/replete potassium deficiency    Anemia of chronic disease  Assessment & Plan  Monitor H/H  Monitor for postoperative blood loss        DVT Prophylaxis:  Lovenox       Patient Centered Rounds:  I have performed bedside rounds and discussed plan of care with nursing today  Discussions with Specialists or Other Care Team Provider:  see above assessments if applicable    Education and Discussions with Family / Patient:  Patient at bedside, who will self-update   Time Spent for Care:  32 minutes  More than 50% of total time spent on counseling and coordination of care as described above  Current Length of Stay: 8 day(s)    Current Patient Status: Inpatient   Certification Statement:  Patient will continue to require additional hospital stay due to assessments as noted above  Code Status: Level 1 - Full Code        Subjective:     Seen/examined earlier today after returning from interventional radiology suite    Upon my encounter, she was still drowsy from analgesia  Objective:     Vitals:   Temp (24hrs), Av 9 °F (37 2 °C), Min:98 4 °F (36 9 °C), Max:99 5 °F (37 5 °C)    Temp:  [98 4 °F (36 9 °C)-99 5 °F (37 5 °C)] 98 4 °F (36 9 °C)  HR:  [] 105  Resp:  [12-20] 14  BP: ()/(46-64) 93/56  SpO2:  [93 %-100 %] 96 %  Body mass index is 22 31 kg/m²  Input and Output Summary (last 24 hours): Intake/Output Summary (Last 24 hours) at 2020 1710  Last data filed at 2020 1242  Gross per 24 hour   Intake 320 ml   Output 1875 ml   Net -1555 ml       Physical Exam:     GENERAL:  Well-developed/nourished - weak/fatigued  HEAD:  Normocephalic - atraumatic  EYES: PERRL - EOMI   MOUTH:  Mucosa moist  NECK:  Supple - full range of motion  CARDIAC:  Regular rate/rhythm - S1/S2 positive  PULMONARY:  Clear breath sounds bilaterally - nonlabored respirations  ABDOMEN:  Soft - nontender/nondistended - active bowel sounds  MUSCULOSKELETAL:  Motor strength/range of motion intact - gluteal pain noted  NEUROLOGIC:  Drowsy at the time of my encounter  SKIN:  Chronic wrinkles/blemishes - various tattoos noted       Additional Data:     Labs & Recent Cultures:    Results from last 7 days   Lab Units 20  0625  20  0454   WBC Thousand/uL 13 35*   < > 11 91*   HEMOGLOBIN g/dL 8 0*   < > 7 2*   HEMATOCRIT % 27 9*   < > 24 5*   PLATELETS Thousands/uL 393*   < > 268   BANDS PCT %  --   --  2   LYMPHO PCT % 5*  --  4*   MONO PCT % 0*  --  1*   EOS PCT % 5  --  0    < > = values in this interval not displayed       Results from last 7 days   Lab Units 20  0625   POTASSIUM mmol/L 3 4*   CHLORIDE mmol/L 100   CO2 mmol/L 30   BUN mg/dL 6   CREATININE mg/dL 0 44*   CALCIUM mg/dL 8 4     Results from last 7 days   Lab Units 20  0606   INR  1 33*             Results from last 7 days   Lab Units 20  0625 20  0947   LACTIC ACID mmol/L  --  1 7   PROCALCITONIN ng/ml 0 25 0 26*         Results from last 7 days   Lab Units 09/03/20  0947   BLOOD CULTURE  No Growth at 24 hrs  No Growth at 24 hrs  Last 24 Hours Medication List:   Current Facility-Administered Medications   Medication Dose Route Frequency Provider Last Rate    albumin human  12 5 g Intravenous BID Symone Machuca MD      calcium carbonate  500 mg Oral TID PRN Symone Machuca MD      cefepime  2,000 mg Intravenous Q12H Symone Machuca MD 2,000 mg (09/04/20 0927)    enoxaparin  40 mg Subcutaneous Daily Johnny Moraes MD      gabapentin  300 mg Oral TID Johnny Moraes MD      HYDROmorphone  4 mg Oral Q4H PRN Unruly Cedillo DO      Or    HYDROmorphone  0 5 mg Intravenous Q4H PRN Chiquis Mattson DO      lidocaine   Topical 4x Daily Johnny Moraes MD      loratadine  10 mg Oral Daily Johnny Moraes MD      metoprolol succinate  25 mg Oral Daily Johnny Moraes MD      metroNIDAZOLE  500 mg Oral [facility] Rosalinda Lopez MD      ondansetron  4 mg Intravenous Q4H PRN Johnny Moraes MD      oxyCODONE  40 mg Oral Q12H [address] Marlon Brown MD      oxyCODONE  10 mg Oral Q4H PRN Johnny Moraes MD      oxyCODONE  15 mg Oral Q4H PRN Johnny Moraes MD      potassium chloride  40 mEq Oral BID Symone Machuca MD      sertraline  25 mg Oral Daily Johnny Moraes MD                  ** Please Note: This note is constructed using a voice recognition dictation system  An occasional wrong word/phrase or sound-a-like substitution may have been picked up by dictation device due to the inherent limitations of voice recognition software  Read the chart carefully and recognize, using reasonable context, where substitutions may have occurred  **

## 2020-09-04 NOTE — ASSESSMENT & PLAN NOTE
With liver/lung metastases s/p chemotherapy per outpatient oncology  CT abdomen/pelvis on 8/27 revealing concern for persistent partially obstructing mass - previously refused surgical intervention but agreeable and this hospitalization -> s/p colostomy on 8/31 (POD #4)  PRN pain control and supportive care  Repeat CT imaging on 9/3 noted a complex left adnexal mass suspicious for a primary ovarian lesion versus metastatic lesion -> on previous CT scan, this was mentioned as a cystic lesion -> CA-125 tumor marker elevated pending oncology evaluation

## 2020-09-04 NOTE — BRIEF OP NOTE (RAD/CATH)
INTERVENTIONAL RADIOLOGY PROCEDURE NOTE    Date: 9/4/2020    Procedure: Procedure name not found  Preoperative diagnosis:   1  Abdominal pain    2  Gluteal abscess    3  Sepsis ([address] )    4  Partial bowel obstruction ([address] )    5  Metastatic rectal cancer    6  Palliative care patient    7  Cancer associated pain    8  Elevated CA-125    9  Ovarian mass         Postoperative diagnosis: Same  Surgeon: Lisa Khan MD     Assistant: None  No qualified resident was available  Blood loss:  None    Specimens:  Aerobic and anaerobic culture     Findings:  Small left parasacral collection aspirated using ultrasound guidance with 5 [place] multi sidehole catheter  8 cc of purulent-appearing fluid was aspirated  No residual fluid component was seen in this region post aspiration  Complications: None immediate      Anesthesia: Conscious sedation

## 2020-09-04 NOTE — PROGRESS NOTES
Progress note - Palliative and Supportive Care   Masoud Fitzpatrick 50 y o  female [id number]    Assessment:  Patient Active Problem List   Diagnosis    Large bowel obstruction ([address] )    Metastatic rectal cancer    Breast cancer    Abdominal pain    Anemia of chronic disease    Gram-positive bacteremia    Recurrent left gluteal abscess    Cancer associated pain    Possible evolving sepsis    Hypokalemia    Thrombocytosis     Bright red blood per rectum    Carpal tunnel syndrome, bilateral    Disc degeneration, lumbar    Muscle spasm    Chronic left SI joint pain    Myofascial pain syndrome    Nicotine dependence    Postlaminectomy syndrome, lumbar    Neck pain    Abnormal cytological findings in specimens from other organs, systems and tissues    Anxiety with depression    HTN (hypertension), benign    Vitamin D deficiency    Gram-negative bacteremia     Plan:  1  Symptom management -    - Continue OxyContin 40 mg BID-    - Continue PRN OxyIR 15 mg-    - Will trial PO Dilaudid for pain crises to prevent ER admissions    - Continue adjuncts with Gabapentin, Tylenol, Lidocaine cream   - bowel regimen per CRS   - Scripts sent to [facility] for oxycontin, oxyIR, and dilaudid    2  Goals - Full cares without limits     Code Status: full - Level 1   Decisional apparatus:  Patient is competent on my exam today  Advance Directive / Living Will / POLST:  None on file, recommend completion on an outpatient basis    Palliative care will see on an as needed basis over the weekend, call with questions, concerns, or uncontrolled pain  Patient will benefit from outpatient follow-up with palliative care  Interval history:       24H Pain History  - scores 5-8/10 in past 24H  - Location: rectal/abscess  - Alleviation: current regimen  - Exacerbation: movement  - 4 PRNs  -     Patient reports pain is reasonably well controlled with current regimen   She is hopeful that IR drainage of abscess will provide additional relief  No other complaints  MEDICATIONS / ALLERGIES:     all current active meds have been reviewed    Allergies   Allergen Reactions    Augmentin [Amoxicillin-Pot Clavulanate] Confusion and Drowsiness       OBJECTIVE:    Physical Exam  Physical Exam  Constitutional:       Appearance: Normal appearance  HENT:      Head: Normocephalic and atraumatic  Cardiovascular:      Rate and Rhythm: Normal rate  Pulmonary:      Effort: Pulmonary effort is normal    Abdominal:      Comments: Ostomy in place   Skin:     General: Skin is warm and dry  Neurological:      Mental Status: She is alert and oriented to person, place, and time  Mental status is at baseline  Psychiatric:         Mood and Affect: Mood normal          Behavior: Behavior normal          Thought Content: Thought content normal          Judgment: Judgment normal          Lab Results:   I have personally reviewed pertinent labs  , CBC:   Lab Results   Component Value Date    WBC 13 35 (H) 09/04/2020    HGB 8 0 (L) 09/04/2020    HCT 27 9 (L) 09/04/2020    MCV 85 09/04/2020     (H) 09/04/2020    MCH 24 3 (L) 09/04/2020    MCHC 28 7 (L) 09/04/2020    RDW 21 8 (H) 09/04/2020    MPV 9 5 09/04/2020    NRBC 0 09/04/2020   , CMP:   Lab Results   Component Value Date    SODIUM 135 (L) 09/04/2020    K 3 4 (L) 09/04/2020     09/04/2020    CO2 30 09/04/2020    BUN 6 09/04/2020    CREATININE 0 44 (L) 09/04/2020    CALCIUM 8 4 09/04/2020    EGFR 121 09/04/2020     Imaging Studies: reviewed pertinent studies  EKG, Pathology, and Other Studies: reviewed pertinent studies

## 2020-09-04 NOTE — SEDATION DOCUMENTATION
Patient tolerated gluteal abscess drain without difficultly  Verbal report given to timothy at ext 1160    Best rest start time 1310

## 2020-09-04 NOTE — ASSESSMENT & PLAN NOTE
Presented with increased abdominal and gluteal pain - recent admission for similar abscess noted  Associated gram-negative bacteremia present (see below)  Continue Flagyl/Cefepime - PRN pain control  Developed a high-grade fever on the night of 9/2 with waxing/waning leukocytosis  Repeat CT imaging on 9/3 revealing "Large rectal mass again noted consistent with known carcinoma  Omayra Nissen Omayra Nissen Status post decompressive transverse colostomy, new since the prior study  Omayra Nissen Omayra Nissen Complex mass in the presacral/precoccygeal region is again noted measuring 3 8 x 8 cm concerning for direct extension of the patient's rectal cancer to the presacral space with possible superinfection  Omayra Nissen Omayra Nissen There is also continuity of this process with the medial left gluteal musculature and an area which was previously drained, suspicious for superinfection of necrotic tumor/abscess, overall measuring 2 2 x 3 3 cm  Omayra Nissen Omayra Nissen Extensive metastatic disease to the lungs and liver  Omayra Nissen Omayra Nissen Complex left adnexal mass which could be a primary ovarian lesion versus a metastatic lesion  Omayra Nissen Omayra Nissen The overall appearance is suspicious for neoplasia  Omayra Nissen Omayra Nissen Ventral abdominal wall hernia/hernias with ascites  Omayra Nissen Omayra Nissen Additional stable findings as noted "  Status post IR guided drainage today approximately 8 mL of purulent fluid -> culture pending

## 2020-09-04 NOTE — MALNUTRITION/BMI
This medical record reflects one or more clinical indicators suggestive of malnutrition and/or morbid obesity  Malnutrition Findings:   Malnutrition type: Chronic illness  Degree of Malnutrition: Other severe protein calorie malnutrition(Severe Pro/mansi malnutrition r/t Ca dx as evidenced by 12% unintentional wt loss since 6/2/20, consuming < 75% energy intake compared to estimated energy needs > 1 month  Treated with diet)  Malnutrition Characteristics: Inadequate energy, Weight loss    BMI Findings: Body mass index is 22 31 kg/m²  See Nutrition note dated 9/4/20 for additional details  Completed nutrition assessment is viewable in the nutrition documentation

## 2020-09-04 NOTE — PROGRESS NOTES
H&P reviewed  Patient has undergone diverting colostomy on 08/31/2020  I had a long discussion with the patient once again regarding her infected collection invading her left gluteal musculature  The small collection which appears to be in direct communication with a necrotic presacral mass  Her last percutaneous aspiration was on 08/07 with resolution of her pain  She is unable to tell me exactly when her pain recurred though states that was after her last chemotherapy treatment  Blood culture from 8/27 once again bacteriodes  Repeat cultures are no growth so far  We discussed options once again in the setting of recurrent bacteremia and pain which typically would be an indication for percutaneous drain placement  However, her collection is in direct communication with a necrotic mass and therefore the endpoint for drain removal may never be reached  The possibility of secondary infection also exists  Whether or not the drain would be curative of infection even with diverting colostomy is also uncertain particularly if the necrotic mass itself is superinfected which is likely  She does not wish to have a drain if possible  We agreed to repeat aspiration today since her last aspiration was about a month ago  If she would have rapid recurrence of either bacteremia or symptoms, she may consider drain placement at that time which she understands may be long-term or lifelong  /60   Pulse 64   Temp 98 8 °F (37 1 °C) (Oral)   Resp 18   Wt 55 3 kg (122 lb)   SpO2 98%   BMI 22 31 kg/m²     Prior imaging was reviewed  Plan for percutaneous aspiration with sedation  Will send sample for culture  Informed written consent was obtained      Dorian Cheatham MD

## 2020-09-04 NOTE — ASSESSMENT & PLAN NOTE
With waxing/waning leukocytosis and a high-grade fever of 101 1° on the night of 9/2  Mild procalcitonin bump at 0 26 -> normalized today -> lactic acid normal  Repeat blood cultures from 9/3 are preliminarily negative  Continue Flagyl and IV Cefepime  See plan for gluteal abscess

## 2020-09-04 NOTE — PLAN OF CARE
Problem: Potential for Falls  Goal: Patient will remain free of falls  Description: INTERVENTIONS:  - Assess patient frequently for physical needs  -  Identify cognitive and physical deficits and behaviors that affect risk of falls    -  [place] fall precautions as indicated by assessment   - Educate patient/family on patient safety including physical limitations  - Instruct patient to call for assistance with activity based on assessment  - Modify environment to reduce risk of injury  - Consider OT/PT consult to assist with strengthening/mobility  Outcome: Progressing     Problem: GASTROINTESTINAL - ADULT  Goal: Minimal or absence of nausea and/or vomiting  Description: INTERVENTIONS:  - Administer IV fluids if ordered to ensure adequate hydration  - Maintain NPO status until nausea and vomiting are resolved  - Nasogastric tube if ordered  - Administer ordered antiemetic medications as needed  - Provide nonpharmacologic comfort measures as appropriate  - Advance diet as tolerated, if ordered  - Consider nutrition services referral to assist patient with adequate nutrition and appropriate food choices  Outcome: Progressing  Goal: Maintains or returns to baseline bowel function  Description: INTERVENTIONS:  - Assess bowel function  - Encourage oral fluids to ensure adequate hydration  - Administer IV fluids if ordered to ensure adequate hydration  - Administer ordered medications as needed  - Encourage mobilization and activity  - Consider nutritional services referral to assist patient with adequate nutrition and appropriate food choices  Outcome: Progressing  Goal: Maintains adequate nutritional intake  Description: INTERVENTIONS:  - Monitor percentage of each meal consumed  - Identify factors contributing to decreased intake, treat as appropriate  - Assist with meals as needed  - Monitor I&O, weight, and lab values if indicated  - Obtain nutrition services referral as needed  Outcome: Progressing

## 2020-09-04 NOTE — ASSESSMENT & PLAN NOTE
Blood culture from 8/27 growing Bacteroides species - subsequent culture from 8/28 negative - preliminary cultures from 9/3 are negative  Continue IV Cefepime w/ PO Flagyl per Infectious Disease  See plan for associated gluteal abscess above

## 2020-09-04 NOTE — PROGRESS NOTES
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  2  Stage IV metastatic rectal carcinoma on chemotherapy S/P laparoscopic colostomy POD 2     Recommendations:    She has a T-max of a 99 5 ° with an elevated  but decreased WBC count 13,350   Case was discussed with her oncologist Dr Lambert   1  Blood culture isolate  showing Bacteroides thetaiotaomicron which is susceptible to metronidazole  Repeat blood culture  is negative  Blood cultures x2 from 9/3 are negative so far  2  Continue metronidazole to p o  500 mg q 8 hours   3  Continue cefepime 2 g q 12 hours IV  4  Repeat CT scan done  There is again a large pre sacral, pre coccygeal complex mass as well as continuity with the medial left gluteal musculature that was suspicious for a abscess/necrotic tumor  IR evaluation noted and appreciated  The area was aspirated with 8 cc of purulent material obtained and sent for culture  Will check results  Possibility of further fusion drainage noted  5  Patient complains of abdominal bloating with fluid retention that extensive her thighs  If this continues to be an issue would have colorectal re-evaluate   Antibiotics:  1  Metronidazole 500 mg q 8 hours p o , day 9 of 10 Rx   2  Cefepime 2 g q 12 hours IV, day 2 Rx     Subjective:  Complains of abdominal bloating with intermittent discomfort and fluid retention that extends to her thighs   Denies chills, or sweats  Denies nausea, vomiting, or diarrhea        Objective:  Vitals:  Temp:  [98 4 °F (36 9 °C)-99 5 °F (37 5 °C)] 98 4 °F (36 9 °C)  HR:  [] 105  Resp:  [12-20] 14  BP: ()/(46-64) 93/56  SpO2:  [93 %-100 %] 96 %  Temp (24hrs), Av 9 °F (37 2 °C), Min:98 4 °F (36 9 °C), Max:99 5 °F (37 5 °C)  Current: Temperature: 98 4 °F (36 9 °C)    Physical Exam: General Appearance:  Alert, appearing chronically ill nontoxic, no acute distress     Eyes:  Conjunctiva pale   Throat: Oropharynx moist without lesions  Lips, mucosa, and tongue normal   Neck: Supple, symmetrical, trachea midline, no adenopathy,  no tenderness/mass/nodules   Lungs:   Clear to auscultation bilaterally, no audible wheezes, rhonchi or rales; respirations unlabored   Heart:  Regular rate and rhythm, S1, S2 normal, no murmur, rub or gallop   Abdomen:   Colostomy site is clean with mild tenderness now has abdominal distention, positive bowel sounds  No masses, no organomegaly    No CVA tenderness,    Extremities: Tattoos, 1+ proximal lower extremity edema   Skin: Tattoos, right subclavian PAC, left gluteal wound with tenderness  , colostomy as above  Invasive Devices     Central Venous Catheter Line            Port A Cath 06/22/20 Right Chest 74 days          Peripheral Intravenous Line            Peripheral IV 09/04/20 Right Forearm less than 1 day          Drain            Colostomy LUQ 4 days                Labs, Imaging, & Other studies:   All pertinent labs were personally reviewed  Results from last 7 days   Lab Units 09/04/20  0625 09/03/20 0441 09/02/20  0458   WBC Thousand/uL 13 35* 15 60* 12 94*   HEMOGLOBIN g/dL 8 0* 8 5* 7 5*   PLATELETS Thousands/uL 393* 400* 326     Results from last 7 days   Lab Units 09/04/20  0625 09/03/20  0441 09/02/20  0458   SODIUM mmol/L 135* 137 138   POTASSIUM mmol/L 3 4* 3 7 3 6   CHLORIDE mmol/L 100 101 104   CO2 mmol/L 30 30 29   BUN mg/dL 6 5 7   CREATININE mg/dL 0 44* 0 52* 0 49*   EGFR ml/min/1 73sq m 121 114 116   CALCIUM mg/dL 8 4 8 8 8 4     Results from last 7 days   Lab Units 09/03/20  0947   BLOOD CULTURE  No Growth at 24 hrs  No Growth at 24 hrs

## 2020-09-04 NOTE — PROGRESS NOTES
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  2  Stage IV metastatic rectal carcinoma on chemotherapy S/P laparoscopic colostomy POD 2     Recommendations:    She has a T-max of a 100° with an elevated  WBC count 15,360     1  Blood culture isolate  showing Bacteroides thetaiotaomicron which is susceptible to metronidazole  Repeat blood culture  is negative  2  Switch metronidazole to p o  500 mg q 8 hours   3  Agree with reinstituting cefepime 2 g q 12 hours IV  4  Repeat CT scan done  There is again a large pre sacral, pre coccygeal complex mass as well as continuity with the medial left gluteal musculature that was suspicious for a abscess/necrotic tumor  This area needs to be drained    Antibiotics:  1  Metronidazole 500 mg q 8 hours p o , day 8 of 10 Rx   2  Cefepime 2 g q 12 hours IV     Subjective:  Has fever as well as some increased pain over her left buttock and sacral area    Denies chills, or sweats  Denies nausea, vomiting, or diarrhea  Objective:  Vitals:  Temp:  [97 5 °F (36 4 °C)-101 1 °F (38 4 °C)] 99 2 °F (37 3 °C)  HR:  [69-78] 75  Resp:  [18-20] 18  BP: ()/(55-90) 107/59  SpO2:  [95 %-96 %] 96 %  Temp (24hrs), Av 9 °F (37 2 °C), Min:97 5 °F (36 4 °C), Max:101 1 °F (38 4 °C)  Current: Temperature: 99 2 °F (37 3 °C)    Physical Exam:     General Appearance:  Alert, appearing chronically ill nontoxic, no acute distress  Comfortably sitting in chair  Eyes:  Conjunctiva pale   Throat: Oropharynx moist without lesions    Lips, mucosa, and tongue normal   Neck: Supple, symmetrical, trachea midline, no adenopathy,  no tenderness/mass/nodules   Lungs:   Clear to auscultation bilaterally, no audible wheezes, rhonchi or rales; respirations unlabored   Heart:  Regular rate and rhythm, S1, S2 normal, no murmur, rub or gallop   Abdomen:   Colostomy site is clean with mild tenderness now has abdominal distention, positive bowel sounds  No masses, no organomegaly    No CVA tenderness,    Extremities: Tattoos   Skin: Tattoos, right subclavian PAC, left gluteal wound with tenderness  , colostomy as above  Invasive Devices     Central Venous Catheter Line            Port A Cath 06/22/20 Right Chest 73 days          Peripheral Intravenous Line            Peripheral IV 09/02/20 Left;Ventral (anterior) Forearm 1 day          Drain            Colostomy LUQ 3 days                Labs, Imaging, & Other studies:   All pertinent labs were personally reviewed  Results from last 7 days   Lab Units 09/03/20  0441 09/02/20  0458 09/01/20  0454   WBC Thousand/uL 15 60* 12 94* 11 91*   HEMOGLOBIN g/dL 8 5* 7 5* 7 2*   PLATELETS Thousands/uL 400* 326 268     Results from last 7 days   Lab Units 09/03/20  0441 09/02/20  0458 09/01/20  0454   SODIUM mmol/L 137 138 137   POTASSIUM mmol/L 3 7 3 6 3 9   CHLORIDE mmol/L 101 104 104   CO2 mmol/L 30 29 25   BUN mg/dL 5 7 7   CREATININE mg/dL 0 52* 0 49* 0 28*   EGFR ml/min/1 73sq m 114 116 140   CALCIUM mg/dL 8 8 8 4 8 4     Results from last 7 days   Lab Units 09/03/20  0947 08/28/20  0717   BLOOD CULTURE  Received in Microbiology Lab  Culture in Progress  Received in Microbiology Lab  Culture in Progress  No Growth After 5 Days

## 2020-09-05 PROBLEM — E43 SEVERE PROTEIN-CALORIE MALNUTRITION (HCC): Status: ACTIVE | Noted: 2020-09-05

## 2020-09-05 LAB
ANION GAP SERPL CALCULATED.3IONS-SCNC: 5 MMOL/L (ref 4–13)
BUN SERPL-MCNC: 5 MG/DL (ref 5–25)
CALCIUM SERPL-MCNC: 8.7 MG/DL (ref 8.3–10.1)
CHLORIDE SERPL-SCNC: 102 MMOL/L (ref 100–108)
CO2 SERPL-SCNC: 29 MMOL/L (ref 21–32)
CREAT SERPL-MCNC: 0.5 MG/DL (ref 0.6–1.3)
ERYTHROCYTE [DISTWIDTH] IN BLOOD BY AUTOMATED COUNT: 22.2 % (ref 11.6–15.1)
GFR SERPL CREATININE-BSD FRML MDRD: 116 ML/MIN/1.73SQ M
GLUCOSE SERPL-MCNC: 86 MG/DL (ref 65–140)
HCT VFR BLD AUTO: 29.1 % (ref 34.8–46.1)
HGB BLD-MCNC: 8.6 G/DL (ref 11.5–15.4)
MCH RBC QN AUTO: 24.7 PG (ref 26.8–34.3)
MCHC RBC AUTO-ENTMCNC: 29.6 G/DL (ref 31.4–37.4)
MCV RBC AUTO: 84 FL (ref 82–98)
NRBC BLD AUTO-RTO: 0 /100 WBCS
PLATELET # BLD AUTO: 454 THOUSANDS/UL (ref 149–390)
PMV BLD AUTO: 9.8 FL (ref 8.9–12.7)
POTASSIUM SERPL-SCNC: 3.8 MMOL/L (ref 3.5–5.3)
RBC # BLD AUTO: 3.48 MILLION/UL (ref 3.81–5.12)
SODIUM SERPL-SCNC: 136 MMOL/L (ref 136–145)
WBC # BLD AUTO: 12.36 THOUSAND/UL (ref 4.31–10.16)

## 2020-09-05 PROCEDURE — 80048 BASIC METABOLIC PNL TOTAL CA: CPT | Performed by: INTERNAL MEDICINE

## 2020-09-05 PROCEDURE — 85027 COMPLETE CBC AUTOMATED: CPT | Performed by: INTERNAL MEDICINE

## 2020-09-05 PROCEDURE — 99231 SBSQ HOSP IP/OBS SF/LOW 25: CPT | Performed by: INTERNAL MEDICINE

## 2020-09-05 PROCEDURE — 99232 SBSQ HOSP IP/OBS MODERATE 35: CPT | Performed by: INTERNAL MEDICINE

## 2020-09-05 RX ORDER — ACETAMINOPHEN 325 MG/1
650 TABLET ORAL EVERY 6 HOURS PRN
Status: DISCONTINUED | OUTPATIENT
Start: 2020-09-05 | End: 2020-09-16 | Stop reason: HOSPADM

## 2020-09-05 RX ADMIN — NYSTATIN 500000 UNITS: 100000 SUSPENSION ORAL at 21:37

## 2020-09-05 RX ADMIN — CEFEPIME HYDROCHLORIDE 2000 MG: 2 INJECTION, POWDER, FOR SOLUTION INTRAVENOUS at 21:37

## 2020-09-05 RX ADMIN — OXYCODONE HYDROCHLORIDE 40 MG: 20 TABLET, FILM COATED, EXTENDED RELEASE ORAL at 08:55

## 2020-09-05 RX ADMIN — ALBUMIN (HUMAN) 12.5 G: 0.25 INJECTION, SOLUTION INTRAVENOUS at 08:54

## 2020-09-05 RX ADMIN — LIDOCAINE: 4 CREAM TOPICAL at 08:59

## 2020-09-05 RX ADMIN — NYSTATIN 500000 UNITS: 100000 SUSPENSION ORAL at 14:14

## 2020-09-05 RX ADMIN — OXYCODONE HYDROCHLORIDE 10 MG: 10 TABLET ORAL at 05:23

## 2020-09-05 RX ADMIN — METRONIDAZOLE 500 MG: 500 TABLET ORAL at 21:38

## 2020-09-05 RX ADMIN — CEFEPIME HYDROCHLORIDE 2000 MG: 2 INJECTION, POWDER, FOR SOLUTION INTRAVENOUS at 09:22

## 2020-09-05 RX ADMIN — HYDROMORPHONE HYDROCHLORIDE 4 MG: 4 TABLET ORAL at 23:01

## 2020-09-05 RX ADMIN — SERTRALINE HYDROCHLORIDE 25 MG: 25 TABLET ORAL at 08:55

## 2020-09-05 RX ADMIN — LIDOCAINE: 4 CREAM TOPICAL at 21:38

## 2020-09-05 RX ADMIN — LORATADINE 10 MG: 10 TABLET ORAL at 08:55

## 2020-09-05 RX ADMIN — GABAPENTIN 300 MG: 300 CAPSULE ORAL at 16:31

## 2020-09-05 RX ADMIN — METRONIDAZOLE 500 MG: 500 TABLET ORAL at 14:14

## 2020-09-05 RX ADMIN — OXYCODONE HYDROCHLORIDE 10 MG: 10 TABLET ORAL at 19:36

## 2020-09-05 RX ADMIN — GABAPENTIN 300 MG: 300 CAPSULE ORAL at 08:55

## 2020-09-05 RX ADMIN — OXYCODONE HYDROCHLORIDE 40 MG: 20 TABLET, FILM COATED, EXTENDED RELEASE ORAL at 21:37

## 2020-09-05 RX ADMIN — METRONIDAZOLE 500 MG: 500 TABLET ORAL at 05:24

## 2020-09-05 RX ADMIN — LIDOCAINE: 4 CREAM TOPICAL at 17:36

## 2020-09-05 RX ADMIN — GABAPENTIN 300 MG: 300 CAPSULE ORAL at 21:37

## 2020-09-05 RX ADMIN — ENOXAPARIN SODIUM 40 MG: 40 INJECTION SUBCUTANEOUS at 08:54

## 2020-09-05 RX ADMIN — LIDOCAINE: 4 CREAM TOPICAL at 11:32

## 2020-09-05 RX ADMIN — OXYCODONE HYDROCHLORIDE 15 MG: 10 TABLET ORAL at 11:31

## 2020-09-05 RX ADMIN — NYSTATIN 500000 UNITS: 100000 SUSPENSION ORAL at 17:37

## 2020-09-05 NOTE — ASSESSMENT & PLAN NOTE
With waxing/waning leukocytosis and a high-grade fever of 101 1° on the night of 9/2  Mild procalcitonin bump at 0 26 subsequently normalized -> lactic acid normal  Repeat blood cultures from 9/3 are preliminarily negative  Continue Flagyl and IV Cefepime  See plan for gluteal abscess

## 2020-09-05 NOTE — PLAN OF CARE
Problem: Potential for Falls  Goal: Patient will remain free of falls  Description: INTERVENTIONS:  - Assess patient frequently for physical needs  -  Identify cognitive and physical deficits and behaviors that affect risk of falls    -  Leopold fall precautions as indicated by assessment   - Educate patient/family on patient safety including physical limitations  - Instruct patient to call for assistance with activity based on assessment  - Modify environment to reduce risk of injury  - Consider OT/PT consult to assist with strengthening/mobility  Outcome: Progressing     Problem: GASTROINTESTINAL - ADULT  Goal: Minimal or absence of nausea and/or vomiting  Description: INTERVENTIONS:  - Administer IV fluids if ordered to ensure adequate hydration  - Maintain NPO status until nausea and vomiting are resolved  - Nasogastric tube if ordered  - Administer ordered antiemetic medications as needed  - Provide nonpharmacologic comfort measures as appropriate  - Advance diet as tolerated, if ordered  - Consider nutrition services referral to assist patient with adequate nutrition and appropriate food choices  Outcome: Progressing  Goal: Maintains or returns to baseline bowel function  Description: INTERVENTIONS:  - Assess bowel function  - Encourage oral fluids to ensure adequate hydration  - Administer IV fluids if ordered to ensure adequate hydration  - Administer ordered medications as needed  - Encourage mobilization and activity  - Consider nutritional services referral to assist patient with adequate nutrition and appropriate food choices  Outcome: Progressing  Goal: Maintains adequate nutritional intake  Description: INTERVENTIONS:  - Monitor percentage of each meal consumed  - Identify factors contributing to decreased intake, treat as appropriate  - Assist with meals as needed  - Monitor I&O, weight, and lab values if indicated  - Obtain nutrition services referral as needed  Outcome: Progressing     Problem: Nutrition/Hydration-ADULT  Goal: Nutrient/Hydration intake appropriate for improving, restoring or maintaining nutritional needs  Description: Monitor and assess patient's nutrition/hydration status for malnutrition  Collaborate with interdisciplinary team and initiate plan and interventions as ordered  Monitor patient's weight and dietary intake as ordered or per policy  Utilize nutrition screening tool and intervene as necessary  Determine patient's food preferences and provide high-protein, high-caloric foods as appropriate       INTERVENTIONS:  - Monitor oral intake, urinary output, labs, and treatment plans  - Assess nutrition and hydration status and recommend course of action  - Evaluate amount of meals eaten  - Assist patient with eating if necessary   - Allow adequate time for meals  - Recommend/ encourage appropriate diets, oral nutritional supplements, and vitamin/mineral supplements  - Order, calculate, and assess calorie counts as needed  - Recommend, monitor, and adjust tube feedings and TPN/PPN based on assessed needs  - Assess need for intravenous fluids  - Provide specific nutrition/hydration education as appropriate  - Include patient/family/caregiver in decisions related to nutrition  Outcome: Progressing

## 2020-09-05 NOTE — PROGRESS NOTES
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  2  Stage IV metastatic rectal carcinoma on chemotherapy S/P laparoscopic colostomy POD 2     Recommendations:    She is afebrile (although when she took her own temperature earlier it was 100 5°) with an elevated  but decreased WBC count 12,360   Case was discussed with her oncologist   [facility]    1  Blood culture isolate  showing Bacteroides thetaiotaomicron which is susceptible to metronidazole  Repeat blood culture  is negative  Blood cultures x2 from 9/3 are negative so far  2  Continue metronidazole to p o  500 mg q 8 hours   3  Continue cefepime 2 g q 12 hours IV  4  Repeat CT scan showed again a large pre sacral, pre coccygeal complex mass as well as continuity with the medial left gluteal musculature that was suspicious for a abscess/necrotic tumor  IR evaluation noted and appreciated  The area was aspirated  with 8 cc of purulent material obtained and sent for culture which is now showing gram-positive cocci in pairs  Will check results  Possibility of further  drainage noted  5  Patient complains of abdominal bloating with fluid retention that extensive her thighs  If this continues to be an issue would have colorectal re-evaluate   Antibiotics:  1  Metronidazole 500 mg q 8 hours p o , day 10 of 10 Rx   2  Cefepime 2 g q 12 hours IV, day 3 Rx     Subjective:  Still has abdominal bloating with decreased intermittent discomfort and fluid retention that extends to her lower extremities  Denies chills, or sweats  Denies nausea, vomiting, or diarrhea        Objective:  Vitals:  Temp:  [98 3 °F (36 8 °C)-98 7 °F (37 1 °C)] 98 3 °F (36 8 °C)  HR:  [74-79] 79  Resp:  [18-22] 18  BP: ()/(58-59) 97/59  SpO2:  [95 %-96 %] 96 %  Temp (24hrs), Av 5 °F (36 9 °C), Min:98 3 °F (36 8 °C), Max:98 7 °F (37 1 °C)  Current: Temperature: 98 3 °F (36 8 °C)    Physical Exam:     General Appearance:  Alert, appearing chronically ill nontoxic, no acute distress     Eyes:  Conjunctiva pale   Throat: Oropharynx moist without lesions  Lips, mucosa, and tongue normal   Neck: Supple, symmetrical, trachea midline, no adenopathy,  no tenderness/mass/nodules   Lungs:   Clear to auscultation bilaterally, no audible wheezes, rhonchi or rales; respirations unlabored   Heart:  Regular rate and rhythm, S1, S2 normal, no murmur, rub or gallop   Abdomen:   Colostomy site is clean with mild tenderness now has abdominal distention, positive bowel sounds  No masses, no organomegaly    No CVA tenderness,    Extremities: Tattoos, 2+ proximal lower extremity edema   Skin: Tattoos, right subclavian PAC, left gluteal wound with tenderness  , colostomy as above  Invasive Devices     Central Venous Catheter Line            Port A Cath 06/22/20 Right Chest 75 days          Peripheral Intravenous Line            Peripheral IV 09/04/20 Right Forearm 1 day          Drain            Colostomy LUQ 5 days                Labs, Imaging, & Other studies:   All pertinent labs were personally reviewed  Results from last 7 days   Lab Units 09/05/20  0527 09/04/20  0625 09/03/20  0441   WBC Thousand/uL 12 36* 13 35* 15 60*   HEMOGLOBIN g/dL 8 6* 8 0* 8 5*   PLATELETS Thousands/uL 454* 393* 400*     Results from last 7 days   Lab Units 09/05/20  0527 09/04/20  0625 09/03/20  0441   SODIUM mmol/L 136 135* 137   POTASSIUM mmol/L 3 8 3 4* 3 7   CHLORIDE mmol/L 102 100 101   CO2 mmol/L 29 30 30   BUN mg/dL 5 6 5   CREATININE mg/dL 0 50* 0 44* 0 52*   EGFR ml/min/1 73sq m 116 121 114   CALCIUM mg/dL 8 7 8 4 8 8     Results from last 7 days   Lab Units 09/04/20  1311 09/03/20  0947   BLOOD CULTURE   --  No Growth at 48 hrs  No Growth at 48 hrs     GRAM STAIN RESULT  3+ Polys*  1+ Gram positive cocci in pairs*  -- BODY FLUID CULTURE, STERILE  No growth  --

## 2020-09-05 NOTE — PROGRESS NOTES
[facility] Internal Medicine - Progress Note  Patient: Cachorro Fitzpatrick 50 y o  female MRN: [id number]  Unit/Bed#: [facility] 903-01 Encounter: [id number]  Primary Care Provider: Symone Russell MD  Date Of Visit: 09/05/20        Assessment & Plan:    * Recurrent left gluteal abscess  Assessment & Plan  Presented with increased abdominal and gluteal pain - recent admission for similar abscess noted  Associated gram-negative bacteremia present (see below)  Continue Flagyl/Cefepime - PRN pain control  Developed a high-grade fever on the night of 9/2 with waxing/waning leukocytosis  Repeat CT imaging on 9/3 revealing "Large rectal mass again noted consistent with known carcinoma  Josesito Po Josesito Po Status post decompressive transverse colostomy, new since the prior study  Josesito Po Josesito Po Complex mass in the presacral/precoccygeal region is again noted measuring 3 8 x 8 cm concerning for direct extension of the patient's rectal cancer to the presacral space with possible superinfection  Josesito Po Josesito Po There is also continuity of this process with the medial left gluteal musculature and an area which was previously drained, suspicious for superinfection of necrotic tumor/abscess, overall measuring 2 2 x 3 3 cm  Josesito Po Josesito Po Extensive metastatic disease to the lungs and liver  Josesito Po Josesito Po Complex left adnexal mass which could be a primary ovarian lesion versus a metastatic lesion  Josesito Po Josesito Po The overall appearance is suspicious for neoplasia  Josesito Po Josesito Po Ventral abdominal wall hernia/hernias with ascites  Josesito Po Josesito Po Additional stable findings as noted "  Status post IR guided drainage on 9/4 approximately 8 mL of purulent fluid -> await final fluid culture    Metastatic rectal cancer  Assessment & Plan  With liver/lung metastases s/p chemotherapy per outpatient oncology  CT abdomen/pelvis on 8/27 revealing concern for persistent partially obstructing mass - previously refused surgical intervention but agreeable and this hospitalization -> s/p colostomy on 8/31 (POD #5)  PRN pain control and supportive care  Repeat CT imaging on 9/3 noted a complex left adnexal mass suspicious for a primary ovarian lesion versus metastatic lesion -> on previous CT scan, this was mentioned as a cystic lesion -> CA-125 tumor marker elevated, however, per oncology, ovarian mass more likely represents further metastasis rather than another primary malignancy    Bacteroides bacteremia  Assessment & Plan  Blood culture from 8/27 growing Bacteroides species - subsequent culture from 8/28 negative - preliminary cultures from 9/3 have remain negative thus far  Continue IV Cefepime w/ PO Flagyl per Infectious Disease  See plan for associated gluteal abscess above    Cancer associated pain  Assessment & Plan  Appreciate palliative care input regarding analgesic regimen    Possible evolving sepsis  Assessment & Plan  With waxing/waning leukocytosis and a high-grade fever of 101 1° on the night of 9/2  Mild procalcitonin bump at 0 26 subsequently normalized -> lactic acid normal  Repeat blood cultures from 9/3 are preliminarily negative  Continue Flagyl and IV Cefepime  See plan for gluteal abscess    Hypokalemia  Assessment & Plan  Monitor/replete potassium deficiency    Anemia of chronic disease  Assessment & Plan  Monitor H/H  Monitor for postoperative blood loss        DVT Prophylaxis:  Lovenox       Patient Centered Rounds:  I have performed bedside rounds and discussed plan of care with nursing today  Discussions with Specialists or Other Care Team Provider:  see above assessments if applicable    Education and Discussions with Family / Patient:  Patient, and mother/daughter bedside today  Time Spent for Care:  32 minutes  More than 50% of total time spent on counseling and coordination of care as described above  Current Length of Stay: 9 day(s)    Current Patient Status: Inpatient   Certification Statement:  Patient will continue to require additional hospital stay due to assessments as noted above      Code Status: Level 1 - Full Code        Subjective:     Seen/examined earlier in the morning  Sitting upright in a chair stating that her pain is waxing/waning but improved from yesterday  She also notes more frequent output in her colostomy  Denying fever/chills this time  Objective:     Vitals:   Temp (24hrs), Av 5 °F (36 9 °C), Min:98 3 °F (36 8 °C), Max:98 7 °F (37 1 °C)    Temp:  [98 3 °F (36 8 °C)-98 7 °F (37 1 °C)] 98 3 °F (36 8 °C)  HR:  [74-79] 79  Resp:  [18-22] 18  BP: ()/(58-59) 97/59  SpO2:  [95 %-96 %] 96 %  Body mass index is 22 31 kg/m²  Input and Output Summary (last 24 hours): Intake/Output Summary (Last 24 hours) at 2020 1905  Last data filed at 2020 1811  Gross per 24 hour   Intake 1030 ml   Output 1300 ml   Net -270 ml       Physical Exam:     GENERAL:  Well-developed/nourished - improved distress from pain today  HEAD:  Normocephalic - atraumatic  EYES: PERRL - EOMI   MOUTH:  Mucosa moist  NECK:  Supple - full range of motion  CARDIAC:  Regular rate/rhythm - S1/S2 positive  PULMONARY:  Clear breath sounds bilaterally - nonlabored respirations  ABDOMEN:  Soft - nontender/nondistended - active bowel sounds - colostomy intact with output  MUSCULOSKELETAL:  Motor strength/range of motion intact  NEUROLOGIC:  Alert/oriented at baseline  SKIN:  Chronic wrinkles/blemishes - various tattoos noted  PSYCHIATRIC:  Mood/affect pleasant      Additional Data:     Labs & Recent Cultures:    Results from last 7 days   Lab Units 20  0527 20  0625  20  0454   WBC Thousand/uL 12 36* 13 35*   < > 11 91*   HEMOGLOBIN g/dL 8 6* 8 0*   < > 7 2*   HEMATOCRIT % 29 1* 27 9*   < > 24 5*   PLATELETS Thousands/uL 454* 393*   < > 268   BANDS PCT %  --   --   --  2   LYMPHO PCT %  --  5*  --  4*   MONO PCT %  --  0*  --  1*   EOS PCT %  --  5  --  0    < > = values in this interval not displayed       Results from last 7 days   Lab Units 20  0527   POTASSIUM mmol/L 3 8   CHLORIDE mmol/L 102 CO2 mmol/L 29   BUN mg/dL 5   CREATININE mg/dL 0 50*   CALCIUM mg/dL 8 7     Results from last 7 days   Lab Units 08/31/20  0606   INR  1 33*             Results from last 7 days   Lab Units 09/04/20  0625 09/03/20  0947   LACTIC ACID mmol/L  --  1 7   PROCALCITONIN ng/ml 0 25 0 26*         Results from last 7 days   Lab Units 09/04/20  1311 09/03/20  0947   BLOOD CULTURE   --  No Growth at 48 hrs  No Growth at 48 hrs  GRAM STAIN RESULT  3+ Polys*  1+ Gram positive cocci in pairs*  --    BODY FLUID CULTURE, STERILE  No growth  --          Last 24 Hours Medication List:   Current Facility-Administered Medications   Medication Dose Route Frequency Provider Last Rate    calcium carbonate  500 mg Oral TID PRN Monika Saxena MD      cefepime  2,000 mg Intravenous Q12H Monika Saxena MD 2,000 mg (09/05/20 0922)    enoxaparin  40 mg Subcutaneous Daily Daniela Mireles MD      gabapentin  300 mg Oral TID Daniela Mireles MD      HYDROmorphone  4 mg Oral Q4H PRN Felicia Cedillo DO      Or    HYDROmorphone  0 5 mg Intravenous Q4H PRN Eliel Gan DO      lidocaine   Topical 4x Daily Daniela Mireles MD      loratadine  10 mg Oral Daily Daniela Mireles MD      metoprolol succinate  25 mg Oral Daily Daniela Mireles MD      metroNIDAZOLE  500 mg Oral [facility] Seth Rodrigez MD      nystatin  500,000 Units Swish & Swallow 4x Daily Monika Saxena MD      ondansetron  4 mg Intravenous Q4H PRN Daniela Mireles MD      oxyCODONE  40 mg Oral Q12H [address] Roro Nelson MD      oxyCODONE  10 mg Oral Q4H PRN Daniela Mireles MD      oxyCODONE  15 mg Oral Q4H PRN Daniela Mireles MD      sertraline  25 mg Oral Daily Daniela Mireles MD                  ** Please Note: This note is constructed using a voice recognition dictation system    An occasional wrong word/phrase or sound-a-like substitution may have been picked up by dictation device due to the inherent limitations of voice recognition software  Read the chart carefully and recognize, using reasonable context, where substitutions may have occurred  **

## 2020-09-05 NOTE — ASSESSMENT & PLAN NOTE
Presented with increased abdominal and gluteal pain - recent admission for similar abscess noted  Associated gram-negative bacteremia present (see below)  Continue Flagyl/Cefepime - PRN pain control  Developed a high-grade fever on the night of 9/2 with waxing/waning leukocytosis  Repeat CT imaging on 9/3 revealing "Large rectal mass again noted consistent with known carcinoma  Cade Goodrock Cade Goody Status post decompressive transverse colostomy, new since the prior study  Cade Goody Cade Goody Complex mass in the presacral/precoccygeal region is again noted measuring 3 8 x 8 cm concerning for direct extension of the patient's rectal cancer to the presacral space with possible superinfection  Cade Goodrock Cade Goody There is also continuity of this process with the medial left gluteal musculature and an area which was previously drained, suspicious for superinfection of necrotic tumor/abscess, overall measuring 2 2 x 3 3 cm  Cade Goody Cade Goody Extensive metastatic disease to the lungs and liver  Cade Goody Cade Goody Complex left adnexal mass which could be a primary ovarian lesion versus a metastatic lesion  Cade Goody Cade Goody The overall appearance is suspicious for neoplasia  Cade Goody Cade Goody Ventral abdominal wall hernia/hernias with ascites  Cade Goody Cade Goody Additional stable findings as noted "  Status post IR guided drainage on 9/4 approximately 8 mL of purulent fluid -> await final fluid culture

## 2020-09-05 NOTE — ASSESSMENT & PLAN NOTE
Blood culture from 8/27 growing Bacteroides species - subsequent culture from 8/28 negative - preliminary cultures from 9/3 have remain negative thus far  Continue IV Cefepime w/ PO Flagyl per Infectious Disease  See plan for associated gluteal abscess above

## 2020-09-05 NOTE — CONSULTS
Consultation - Medical Oncology   Kristen Fitzpatrick 50 y o  female MRN: [id number]  Unit/Bed#: [facility] 903-01 Encounter: [id number]      Assessment/Plan          1  50 y  o female with stage IV rectal cancer, K-LITA mut,MSI stable,high tumor burden(pelvis ,lung ,liver), (dx6/12/20), synchronous R BRCA,ER/AR positive HER-2(-),(dx 6/19/20), admitted with uncontrolled perirectal pain secondary to recurrent left gluteal abscess and Bacteroides thetaiotaomicron bacteriemia  Cycle 3 FOLFIRINOX, day 17   ECOG PS 1- 2  S/p diversion colostomy 8/31/2020  S/p gluteal abscess aspiration today- C/S pending  Follow up by surgery- may need surgical drainage  Continue antibiotics as per ID    Unfortunately the patient has  refractory disease with rising tumor markers and progression on imaging studies despite chemotherapy  Will need different chemotherapy regimen  2   Enlarging left adnexal mass  Despite elevation of CA - 125 this is most likely rectal metastasis rather than synchronous primary  Other metastatic site (liver) is c/w rectal primary  Will follow response with second line regimen           3  Cancer pain- controlled by Palliative Care       4  Anemia secondary to cancer and chemotherapy , iron deficiency 2 to chronic rectal  bleed  Stable       5  Synchronous at least clinical stage II right breast cancer , ER,AR  positive, HER -2 negative,being followed       6  Weight loss, dehydration secondary to ongoing GI symptoms, iv fluids,  nutritional support  History of Present Illness   Physician Requesting Consult: Geovanny Mccord MD  Reason for Consult / Principal Problem: HPI: Oneida Monroe is a 50y o  year old female who presents with 50 y old female with stage IV CRC was admitted with recurrent , perirectal pain, pressure , which she developed one week post third cycle of chemotherapy  Felt tired but had no nausea , vomiting or fever  Underwent diversion colostomy with symptom improvement  Developped recurrent perirectal pain few days after surgery and CT confirmed recurrent left gluteal abscess  Blood C/S positive for Bacteroides  Review of Systems   GENERAL:  Complains of fatigue and somnolence  Appetite good  Low grade fever Denies, night sweats, or weight change  SKIN: Denies itchy skin or rashes, or easy bruising  HEENT: Denies vision changes or hearing changes  Denies sinus congestion or running nose or post-nasal drip  Denies nose bleeds  Denies sore mouth or sore throat  Denies hoarseness of voice  CARDIOVASCULAR: Denies chest pain or tightness, heart palpitations  Denies dizziness  RESPIRATORY: Denies SOB, dyspnea on exertion  Denies  cough, hemoptysis  GASTROINTESTINAL: as per HPI Denies nausea / vomiting  Denies swallowing difficulties, heartburn or indigestion  Denies black stools or blood in stools  Loose stools few, passing gas  GENITOURINARY: Denies dysuria, frequency, hematuria, or nocturia  NEUROLOGICAL: Denies headache, focal weakness, abnormal sensations  Denies changes in hearing or vision  Denies difficulty with ambulation  Denies seizures  MUSCULOSKELETAL: Denies back pain, muscle aches, joint pain or swelling  Complains of b/l edema  PSYCHIATRIC: Denies problems sleeping    Denies anxiety or depression    Historical Information   Past Medical History:   Diagnosis Date    Breast cancer ([facility] ) 06/01/2020    right side    History of rectal cancer 06/2020    Sepsis ([facility] ) 7/4/2020     Past Surgical History:   Procedure Laterality Date    BACK SURGERY      BREAST BIOPSY Right 06/19/2020    2 sites; 1 breast 1 axilla    HERNIA REPAIR      3 repairs    IR BIOPSY LIVER MASS  6/17/2020    IR DRAINAGE TUBE PLACEMENT  9/4/2020    IR IMAGE GUIDED ASPIRATION / DRAINAGE  7/31/2020    IR IMAGE GUIDED ASPIRATION / DRAINAGE  8/7/2020    IR PORT PLACEMENT  6/22/2020    MA LAP, SURG COLOSTOMY N/A 8/31/2020    Procedure: COLOSTOMY LAPAROSCOPIC;  Surgeon: Karen Francois Jez Lamar MD;  [facility]: [facility];  Service: Colorectal    TUBAL LIGATION      US GUIDED BREAST BIOPSY RIGHT COMPLETE Right 2020    US GUIDED BREAST LYMPH NODE BIOPSY RIGHT Right 2020     Social History   Social History     Substance and Sexual Activity   Alcohol Use Not Currently     Social History     Substance and Sexual Activity   Drug Use No     Social History     Tobacco Use   Smoking Status Former Smoker    Packs/day: 0 00    Last attempt to quit: 2020    Years since quittin 3   Smokeless Tobacco Never Used     Family History: non-contributory    Meds/Allergies   all current active meds have been reviewed  Allergies   Allergen Reactions    Augmentin [Amoxicillin-Pot Clavulanate] Confusion and Drowsiness       Objective   Vitals: Blood pressure 101/58, pulse 74, temperature 98 5 °F (36 9 °C), resp  rate 22, weight 55 3 kg (122 lb), SpO2 95 %, not currently breastfeeding  Intake/Output Summary (Last 24 hours) at 2020 0038  Last data filed at 2020 2222  Gross per 24 hour   Intake 350 ml   Output 1600 ml   Net -1250 ml     Invasive Devices     Central Venous Catheter Line            Port A Cath 20 Right Chest 74 days          Peripheral Intravenous Line            Peripheral IV 20 Right Forearm less than 1 day          Drain            Colostomy LUQ 4 days                Physical Exam   GENERAL: Alert, oriented to name, time and place  In no acute distress  INTEGUMENTARY: Skin warm, dry  No ecchymosis  No jaundice or rash, multiple tattoos  HEENT: Sclerae anicteric  Conjunctivae normal  Pupils equal and reactive to light  Oral/throat mucosa moist, intact without lesions  NECK:  Supple  No palpable mass, lymphadenopathy or goiter  LYMPHATIC: No palpable lymphadenopathy in neck / axillary / inguinal areas  CHEST/LUNGS: Lung sounds clear bilaterally  No wheezing or rale, right PAC site looks good  CARDIOVASCULAR: Heart rhythm and rate regular    S1, S2  No murmur or gallop  Breast: Right breast nodularity upper outer quadrant , left breast unremarkable,  Shotty right axillary lyphadenopathy  GASTROINTESTINAL: Abdomen soft  Mildly distended  Normal bowel sounds  Non-tender to palpation  No palpable mass or organomegaly  No ascites  Left buttock area non tender  Mid abdomen colostomy  Liver 3 cm below RCM    MUSCULOSKELETAL: B/l  peripheral edema , L>R,   Pedal pulse 2+ bilaterally  +1 presacral edema  NEUROLOGICAL: alert & oriented x 3  Motor and sensory grossly intact  Somnolent  PSYCHIATRIC:   Appropriate mood and interaction with conversation         Lab Results: Reviewed  Imaging Studies: I have personally reviewed pertinent films in PACS  EKG, Pathology, and Other Studies: I have personally reviewed pertinent reports  VTE Prophylaxis: Lovenox    Code Status: Level 1 - Full Code    Counseling / Coordination of Care  Total floor / unit time spent today 45 minutes  Greater than 50% of total time was spent with the patient and / or family counseling and / or coordination of care

## 2020-09-05 NOTE — ASSESSMENT & PLAN NOTE
With liver/lung metastases s/p chemotherapy per outpatient oncology  CT abdomen/pelvis on 8/27 revealing concern for persistent partially obstructing mass - previously refused surgical intervention but agreeable and this hospitalization -> s/p colostomy on 8/31 (POD #5)  PRN pain control and supportive care  Repeat CT imaging on 9/3 noted a complex left adnexal mass suspicious for a primary ovarian lesion versus metastatic lesion -> on previous CT scan, this was mentioned as a cystic lesion -> CA-125 tumor marker elevated, however, per oncology, ovarian mass more likely represents further metastasis rather than another primary malignancy

## 2020-09-06 LAB
ANION GAP SERPL CALCULATED.3IONS-SCNC: 4 MMOL/L (ref 4–13)
BASOPHILS # BLD AUTO: 0.05 THOUSANDS/ΜL (ref 0–0.1)
BASOPHILS NFR BLD AUTO: 1 % (ref 0–1)
BUN SERPL-MCNC: 3 MG/DL (ref 5–25)
CALCIUM SERPL-MCNC: 8.1 MG/DL (ref 8.3–10.1)
CHLORIDE SERPL-SCNC: 103 MMOL/L (ref 100–108)
CO2 SERPL-SCNC: 30 MMOL/L (ref 21–32)
CREAT SERPL-MCNC: 0.48 MG/DL (ref 0.6–1.3)
EOSINOPHIL # BLD AUTO: 0.58 THOUSAND/ΜL (ref 0–0.61)
EOSINOPHIL NFR BLD AUTO: 6 % (ref 0–6)
ERYTHROCYTE [DISTWIDTH] IN BLOOD BY AUTOMATED COUNT: 21.8 % (ref 11.6–15.1)
GFR SERPL CREATININE-BSD FRML MDRD: 117 ML/MIN/1.73SQ M
GLUCOSE SERPL-MCNC: 87 MG/DL (ref 65–140)
HCT VFR BLD AUTO: 25.5 % (ref 34.8–46.1)
HGB BLD-MCNC: 7.7 G/DL (ref 11.5–15.4)
IMM GRANULOCYTES # BLD AUTO: 0.24 THOUSAND/UL (ref 0–0.2)
IMM GRANULOCYTES NFR BLD AUTO: 2 % (ref 0–2)
LYMPHOCYTES # BLD AUTO: 1.75 THOUSANDS/ΜL (ref 0.6–4.47)
LYMPHOCYTES NFR BLD AUTO: 17 % (ref 14–44)
MCH RBC QN AUTO: 25.1 PG (ref 26.8–34.3)
MCHC RBC AUTO-ENTMCNC: 30.2 G/DL (ref 31.4–37.4)
MCV RBC AUTO: 83 FL (ref 82–98)
MONOCYTES # BLD AUTO: 0.99 THOUSAND/ΜL (ref 0.17–1.22)
MONOCYTES NFR BLD AUTO: 9 % (ref 4–12)
NEUTROPHILS # BLD AUTO: 6.94 THOUSANDS/ΜL (ref 1.85–7.62)
NEUTS SEG NFR BLD AUTO: 65 % (ref 43–75)
NRBC BLD AUTO-RTO: 0 /100 WBCS
PLATELET # BLD AUTO: 368 THOUSANDS/UL (ref 149–390)
PMV BLD AUTO: 9.7 FL (ref 8.9–12.7)
POTASSIUM SERPL-SCNC: 3.5 MMOL/L (ref 3.5–5.3)
RBC # BLD AUTO: 3.07 MILLION/UL (ref 3.81–5.12)
SODIUM SERPL-SCNC: 137 MMOL/L (ref 136–145)
WBC # BLD AUTO: 10.55 THOUSAND/UL (ref 4.31–10.16)

## 2020-09-06 PROCEDURE — 99232 SBSQ HOSP IP/OBS MODERATE 35: CPT | Performed by: INTERNAL MEDICINE

## 2020-09-06 PROCEDURE — 80048 BASIC METABOLIC PNL TOTAL CA: CPT | Performed by: INTERNAL MEDICINE

## 2020-09-06 PROCEDURE — 85025 COMPLETE CBC W/AUTO DIFF WBC: CPT | Performed by: INTERNAL MEDICINE

## 2020-09-06 PROCEDURE — 99231 SBSQ HOSP IP/OBS SF/LOW 25: CPT | Performed by: INTERNAL MEDICINE

## 2020-09-06 RX ADMIN — METRONIDAZOLE 500 MG: 500 TABLET ORAL at 06:02

## 2020-09-06 RX ADMIN — SERTRALINE HYDROCHLORIDE 25 MG: 25 TABLET ORAL at 08:32

## 2020-09-06 RX ADMIN — CEFEPIME HYDROCHLORIDE 2000 MG: 2 INJECTION, POWDER, FOR SOLUTION INTRAVENOUS at 09:20

## 2020-09-06 RX ADMIN — METRONIDAZOLE 500 MG: 500 TABLET ORAL at 21:52

## 2020-09-06 RX ADMIN — LIDOCAINE: 4 CREAM TOPICAL at 18:20

## 2020-09-06 RX ADMIN — GABAPENTIN 300 MG: 300 CAPSULE ORAL at 08:32

## 2020-09-06 RX ADMIN — LIDOCAINE: 4 CREAM TOPICAL at 21:54

## 2020-09-06 RX ADMIN — NYSTATIN 500000 UNITS: 100000 SUSPENSION ORAL at 12:04

## 2020-09-06 RX ADMIN — CEFEPIME HYDROCHLORIDE 2000 MG: 2 INJECTION, POWDER, FOR SOLUTION INTRAVENOUS at 20:38

## 2020-09-06 RX ADMIN — GABAPENTIN 300 MG: 300 CAPSULE ORAL at 16:11

## 2020-09-06 RX ADMIN — OXYCODONE HYDROCHLORIDE 40 MG: 20 TABLET, FILM COATED, EXTENDED RELEASE ORAL at 20:36

## 2020-09-06 RX ADMIN — ENOXAPARIN SODIUM 40 MG: 40 INJECTION SUBCUTANEOUS at 08:32

## 2020-09-06 RX ADMIN — GABAPENTIN 300 MG: 300 CAPSULE ORAL at 21:52

## 2020-09-06 RX ADMIN — NYSTATIN 500000 UNITS: 100000 SUSPENSION ORAL at 08:32

## 2020-09-06 RX ADMIN — OXYCODONE HYDROCHLORIDE 15 MG: 10 TABLET ORAL at 20:36

## 2020-09-06 RX ADMIN — OXYCODONE HYDROCHLORIDE 10 MG: 10 TABLET ORAL at 06:02

## 2020-09-06 RX ADMIN — OXYCODONE HYDROCHLORIDE 40 MG: 20 TABLET, FILM COATED, EXTENDED RELEASE ORAL at 08:32

## 2020-09-06 RX ADMIN — NYSTATIN 500000 UNITS: 100000 SUSPENSION ORAL at 21:52

## 2020-09-06 RX ADMIN — OXYCODONE HYDROCHLORIDE 10 MG: 10 TABLET ORAL at 16:11

## 2020-09-06 RX ADMIN — OXYCODONE HYDROCHLORIDE 10 MG: 10 TABLET ORAL at 12:09

## 2020-09-06 RX ADMIN — LIDOCAINE: 4 CREAM TOPICAL at 08:37

## 2020-09-06 RX ADMIN — LORATADINE 10 MG: 10 TABLET ORAL at 08:32

## 2020-09-06 RX ADMIN — NYSTATIN 500000 UNITS: 100000 SUSPENSION ORAL at 18:21

## 2020-09-06 RX ADMIN — METRONIDAZOLE 500 MG: 500 TABLET ORAL at 14:33

## 2020-09-06 RX ADMIN — LIDOCAINE: 4 CREAM TOPICAL at 12:04

## 2020-09-06 NOTE — ASSESSMENT & PLAN NOTE
BMI of 21 51 in the setting of metastatic malignancy and decreased appetite/oral intake  On Ensure nutritional supplementation with meals

## 2020-09-06 NOTE — UTILIZATION REVIEW
Continued Stay Review    Date: 9/6/2020                          Current Patient Class:  Inpatient   Current Level of Care:  Med Surg    HPI:45 y o  female initially admitted on 8/27/2020 with left gluteal abscess,  Gram negative bacteremia, Hx of metastatic  Stage 4 rectal cancer  S/p laparoscopic diverting colostomy on 8/31  Assessment/Plan:  Blood culture isolate 8/27 showing Bacteriodes thetaiotaomicron, repeat Blood Cx 8/28 is negative, x 2 cultures form 9/3 are negative so far  [plan to continue Flagyl po q8 - and Cefepime iv q12- s/p IR tune placement and aspiration  Patient complains of abdominal bloating with fluid retention extending to her lower extremities  Sh reports more frequent output in her colostomy  Medical Oncology 9/5 - Pt with refractory disease with rising tumor markers and progression on imaging studies despite chemotherapy  Will need a different chemotherapy regimen       Pertinent Labs/Diagnostic Results:       Results from last 7 days   Lab Units 09/06/20  0601 09/05/20  0527 09/04/20  0625 09/03/20  0441 09/02/20  0458 09/01/20  0454   WBC Thousand/uL 10 55* 12 36* 13 35* 15 60* 12 94* 11 91*   HEMOGLOBIN g/dL 7 7* 8 6* 8 0* 8 5* 7 5* 7 2*   HEMATOCRIT % 25 5* 29 1* 27 9* 29 7* 25 0* 24 5*   PLATELETS Thousands/uL 368 454* 393* 400* 326 268   NEUTROS ABS Thousands/µL 6 94  --   --   --   --   --    BANDS PCT %  --   --   --   --   --  2         Results from last 7 days   Lab Units 09/06/20  0601 09/05/20  0527 09/04/20 0625 09/03/20 0441 09/02/20  0458 09/01/20  0454   SODIUM mmol/L 137 136 135* 137 138 137   POTASSIUM mmol/L 3 5 3 8 3 4* 3 7 3 6 3 9   CHLORIDE mmol/L 103 102 100 101 104 104   CO2 mmol/L 30 29 30 30 29 25   ANION GAP mmol/L 4 5 5 6 5 8   BUN mg/dL 3* 5 6 5 7 7   CREATININE mg/dL 0 48* 0 50* 0 44* 0 52* 0 49* 0 28*   EGFR ml/min/1 73sq m 117 116 121 114 116 140   CALCIUM mg/dL 8 1* 8 7 8 4 8 8 8 4 8 4   MAGNESIUM mg/dL  --   --   --   --   --  2 0       Results from last 7 days   Lab Units 09/06/20  0601 09/05/20  0527 09/04/20  0625 09/03/20  0441 09/02/20  0458 09/01/20  0454 08/31/20  0603   GLUCOSE RANDOM mg/dL 87 86 89 84 95 108 61*     Results from last 7 days   Lab Units 08/31/20  0606   PROTIME seconds 16 5*   INR  1 33*     Results from last 7 days   Lab Units 09/04/20  0625 09/03/20  0947   PROCALCITONIN ng/ml 0 25 0 26*     Results from last 7 days   Lab Units 09/03/20  0947   LACTIC ACID mmol/L 1 7     Results from last 7 days   Lab Units 09/04/20  1311 09/03/20  0947   BLOOD CULTURE   --  No Growth at 72 hrs  No Growth at 72 hrs  GRAM STAIN RESULT  3+ Polys*  1+ Gram positive cocci in pairs*  --    BODY FLUID CULTURE, STERILE  No growth  --      IR drainage tube placed - 9/4 -Recurrent left gluteal collection -  Successful image-guided percutaneous aspiration of recurrent left gluteal collection yielding 8 cc of purulent fluid with no residual fluid identified sonographically in this location   Ct A & P - 9/3 - 1   Large rectal mass again noted consistent with known carcinoma  2   Status post decompressive transverse colostomy, new since the prior study  3   Complex mass in the presacral/precoccygeal region is again noted measuring 3 8 x 8 cm concerning for direct extension of the patient's rectal cancer to the presacral space with possible superinfection  Valeen Parkinson is also continuity of this process with   the medial left gluteal musculature and an area which was previously drained, suspicious for superinfection of necrotic tumor/abscess, overall measuring 2 2 x 3 3 cm  4   Extensive metastatic disease to the lungs and liver  5   Complex left adnexal mass which could be a primary ovarian lesion versus a metastatic lesion   The overall appearance is suspicious for neoplasia  6   Ventral abdominal wall hernia/hernias with ascites  7   Additional stable findings as noted           Vital Signs:   Time   Temp   Pulse   Resp   BP   MAP (mmHg)   SpO2 Calculated FIO2 (%) - Nasal Cannula   Nasal Cannula O2 Flow Rate (L/min)   O2 Device    09/06/20 0837            98/61                   09/06/20 0730      Binh Boyle            None (Room air)    09/06/20 0700   98 5 °F (36 9 °C)   69   18   121/69      96 %             09/05/20 22:07:23   99 1 °F (37 3 °C)   75   18   102/71   81   98 %             09/05/20 1927            102/74   83                09/05/20 1926   99 6 °F (37 6 °C)                            09/05/20 14:16:56   98 3 °F (36 8 °C)   79   18   97/59   72   96 %             09/05/20 07:42:19   98 7 °F (37 1 °C)      18   100/59   73                09/05/20 0715                           None (Room air)          Medications:   Scheduled Medications:  cefepime, 2,000 mg, Intravenous, Q12H  enoxaparin, 40 mg, Subcutaneous, Daily  gabapentin, 300 mg, Oral, TID  lidocaine, , Topical, 4x Daily  loratadine, 10 mg, Oral, Daily  metoprolol succinate, 25 mg, Oral, Daily  metroNIDAZOLE, 500 mg, Oral, Q8H GONZÁLEZ  nystatin, 500,000 Units, Swish & Swallow, 4x Daily  oxyCODONE, 40 mg, Oral, Q12H GONZÁLEZ  sertraline, 25 mg, Oral, Daily      Continuous IV Infusions:     PRN Meds:  acetaminophen, 650 mg, Oral, Q6H PRN  calcium carbonate, 500 mg, Oral, TID PRN  HYDROmorphone, 4 mg, Oral, Q4H PRN - 9/5 x 1     Or  HYDROmorphone, 0 5 mg, Intravenous, Q4H PRN  ondansetron, 4 mg, Intravenous, Q4H PRN  oxyCODONE, 10 mg, Oral, Q4H PRN - 9/5 x2 - 9/6 x 2   oxyCODONE, 15 mg, Oral, Q4H PRN - 9/5 x 1        Discharge Plan: D     Network Utilization Review Department  [email or web address]  ATTENTION: Please call with any questions or concerns to [phone] and carefully listen to the prompts so that you are directed to the right person   All voicemails are confidential   Yulia Bee all requests for admission clinical reviews, approved or denied determinations and any other requests to dedicated fax number below belonging to the campus where the patient is receiving treatment   List of dedicated fax numbers for the Facilities:  [address] DENIALS (Administrative/Medical Necessity) [phone]   [address] (Maternity/NICU/Pediatrics) [phone]   [place] [phone]   Jamas Piggs [phone] [address] [facility] [address] [phone]   Levell Pleasure [phone]   [address] [facility] [address] [facility] [phone]   [facility] [phone]   [address] [facility] [address]  [address] [facility] [address] [facility] [phone]

## 2020-09-06 NOTE — PROGRESS NOTES
[facility] Internal Medicine - Progress Note  Patient: Jazmin Fitzpatrick 50 y o  female MRN: [id number]  Unit/Bed#: [facility] 903-01 Encounter: [id number]  Primary Care Provider: Ralph Diaz MD  Date Of Visit: 09/06/20        Assessment & Plan:    * Recurrent left gluteal abscess  Assessment & Plan  Presented with increased abdominal and gluteal pain - recent admission for similar abscess noted  Associated gram-negative bacteremia present (see below)  Continue Flagyl/Cefepime - PRN pain control  Developed a high-grade fever on the night of 9/2 with waxing/waning leukocytosis  Repeat CT imaging on 9/3 revealing "Large rectal mass again noted consistent with known carcinoma  Bethel Kappa [place] Kappa Status post decompressive transverse colostomy, new since the prior study  Bethel Gore [place] Gore Complex mass in the presacral/precoccygeal region is again noted measuring 3 8 x 8 cm concerning for direct extension of the patient's rectal cancer to the presacral space with possible superinfection  Bethel Gore Bethel Kappa There is also continuity of this process with the medial left gluteal musculature and an area which was previously drained, suspicious for superinfection of necrotic tumor/abscess, overall measuring 2 2 x 3 3 cm  Bethel Gore [place] Gore Extensive metastatic disease to the lungs and liver  Bethel Gore [place] Gore Complex left adnexal mass which could be a primary ovarian lesion versus a metastatic lesion  [place] Gore Bethel Gore The overall appearance is suspicious for neoplasia  Bethel Gore [place] Gore Ventral abdominal wall hernia/hernias with ascites  Bethel Gore [place] Gore Additional stable findings as noted "  Status post IR guided drainage on 9/4 approximately 8 mL of purulent fluid -> await final fluid culture    Metastatic rectal cancer  Assessment & Plan  With liver/lung metastases s/p chemotherapy per outpatient oncology  CT abdomen/pelvis on 8/27 revealing concern for persistent partially obstructing mass - previously refused surgical intervention but agreeable and this hospitalization -> s/p colostomy on 8/31 (POD #6)  PRN pain control and supportive care  Repeat CT imaging on 9/3 noted a complex left adnexal mass suspicious for a primary ovarian lesion versus metastatic lesion -> on previous CT scan, this was mentioned as a cystic lesion -> CA-125 tumor marker elevated, however, per oncology, ovarian mass more likely represents further metastasis rather than another primary malignancy    Bacteroides bacteremia  Assessment & Plan  Blood culture from 8/27 growing Bacteroides species - subsequent culture from 8/28 negative - preliminary cultures from 9/3 are still negative  Continue IV Cefepime w/ PO Flagyl per Infectious Disease  See plan for associated gluteal abscess above    Cancer associated pain  Assessment & Plan  Appreciate palliative care input regarding analgesic regimen    Possible evolving sepsis  Assessment & Plan  With waxing/waning leukocytosis and a high-grade fever of 101 1° on the night of 9/2  Mild procalcitonin bump at 0 26 subsequently normalized -> lactic acid normal  Repeat blood cultures from 9/3 are preliminarily negative  Continue Flagyl and IV Cefepime  See plan for gluteal abscess    Hypokalemia  Assessment & Plan  Monitor/replete potassium deficiency    Anemia of chronic disease  Assessment & Plan  Monitor H/H  Monitor for postoperative blood loss - transfuse if hemoglobin < 7    Severe protein-calorie malnutrition   Assessment & Plan  BMI of 21 51 in the setting of metastatic malignancy and decreased appetite/oral intake  On Ensure nutritional supplementation with meals        DVT Prophylaxis:  Lovenox       Patient Centered Rounds:  I have performed bedside rounds and discussed plan of care with nursing today  Discussions with Specialists or Other Care Team Provider:  see above assessments if applicable    Education and Discussions with Family / Patient:  Patient at bedside, who will self-update family today  Time Spent for Care:  32 minutes  More than 50% of total time spent on counseling and coordination of care as described above      Current Length of Stay: 10 day(s)    Current Patient Status: Inpatient   Certification Statement:  Patient will continue to require additional hospital stay due to assessments as noted above  Code Status: Level 1 - Full Code        Subjective:     No new complaints this time  States she feels better today  Pain is controlled at the time my encounter  She notes that her abdominal swelling has improved today  Objective:     Vitals:   Temp (24hrs), Av 2 °F (37 3 °C), Min:98 5 °F (36 9 °C), Max:99 6 °F (37 6 °C)    Temp:  [98 5 °F (36 9 °C)-99 6 °F (37 6 °C)] 99 6 °F (37 6 °C)  HR:  [66-75] 66  Resp:  [18] 18  BP: ()/(61-74) 108/66  SpO2:  [96 %-99 %] 99 %  Body mass index is 21 51 kg/m²  Input and Output Summary (last 24 hours):        Intake/Output Summary (Last 24 hours) at 2020 1600  Last data filed at 2020 1128  Gross per 24 hour   Intake 920 ml   Output 2075 ml   Net -1155 ml       Physical Exam:     GENERAL:  Waxing/waning but improved distress from pain today  HEAD:  Normocephalic - atraumatic  EYES: PERRL - EOMI   MOUTH:  Mucosa moist  NECK:  Supple - full range of motion  CARDIAC:  Rate controlled - S1/S2 positive  PULMONARY:  Fairly clear to auscultation - nonlabored respirations  ABDOMEN:  Soft - colostomy intact with output - active bowel sounds  MUSCULOSKELETAL:  Motor strength/range of motion intact  NEUROLOGIC:  Alert/oriented at baseline  SKIN:  Chronic wrinkles/blemishes - various tattoos noted  PSYCHIATRIC:  Mood/affect stable      Additional Data:     Labs & Recent Cultures:    Results from last 7 days   Lab Units 20  0601  20  0454   WBC Thousand/uL 10 55*   < > 11 91*   HEMOGLOBIN g/dL 7 7*   < > 7 2*   HEMATOCRIT % 25 5*   < > 24 5*   PLATELETS Thousands/uL 368   < > 268   BANDS PCT %  --   --  2   NEUTROS PCT % 65  --   --    LYMPHS PCT % 17  --   --    LYMPHO PCT   --    < > 4*   MONOS PCT % 9  --   --    MONO PCT   --    < > 1*   EOS PCT % 6   < > 0    < > = values in this interval not displayed  Results from last 7 days   Lab Units 09/06/20  0601   POTASSIUM mmol/L 3 5   CHLORIDE mmol/L 103   CO2 mmol/L 30   BUN mg/dL 3*   CREATININE mg/dL 0 48*   CALCIUM mg/dL 8 1*     Results from last 7 days   Lab Units 08/31/20  0606   INR  1 33*             Results from last 7 days   Lab Units 09/04/20  0625 09/03/20  0947   LACTIC ACID mmol/L  --  1 7   PROCALCITONIN ng/ml 0 25 0 26*         Results from last 7 days   Lab Units 09/04/20  1311 09/03/20  0947   BLOOD CULTURE   --  No Growth at 72 hrs  No Growth at 72 hrs     GRAM STAIN RESULT  3+ Polys*  1+ Gram positive cocci in pairs*  --    BODY FLUID CULTURE, STERILE  No growth  --          Last 24 Hours Medication List:   Current Facility-Administered Medications   Medication Dose Route Frequency Provider Last Rate    acetaminophen  650 mg Oral Q6H PRN ZACH Blandon      calcium carbonate  500 mg Oral TID PRN Donna Aguilera MD      cefepime  2,000 mg Intravenous Q12H Donna Aguilera MD 2,000 mg (09/06/20 0920)    enoxaparin  40 mg Subcutaneous Daily Laith Jimenez MD      gabapentin  300 mg Oral TID Laith Jimenez MD      HYDROmorphone  4 mg Oral Q4H PRN Ada Cedillo DO      Or    HYDROmorphone  0 5 mg Intravenous Q4H PRN Flakito Stark DO      lidocaine   Topical 4x Daily Laith Jimenez MD      loratadine  10 mg Oral Daily Laith Jimenez MD      metoprolol succinate  25 mg Oral Daily Laith Jimenez MD      metroNIDAZOLE  500 mg Oral [facility] Rebeka Ramos MD      nystatin  500,000 Units Swish & Swallow 4x Daily Donna Aguilera MD      ondansetron  4 mg Intravenous Q4H PRN Laith Jimenez MD      oxyCODONE  40 mg Oral Q12H [address] Roro Nelson MD      oxyCODONE  10 mg Oral Q4H PRN Laith Jimenez MD      oxyCODONE  15 mg Oral Q4H PRN Laith Jimenez MD      sertraline  25 mg Oral Daily Laith Jimenez MD                  ** Please Note: This note is constructed using a voice recognition dictation system  An occasional wrong word/phrase or sound-a-like substitution may have been picked up by dictation device due to the inherent limitations of voice recognition software  Read the chart carefully and recognize, using reasonable context, where substitutions may have occurred  **

## 2020-09-06 NOTE — PLAN OF CARE
Problem: Potential for Falls  Goal: Patient will remain free of falls  Description: INTERVENTIONS:  - Assess patient frequently for physical needs  -  Identify cognitive and physical deficits and behaviors that affect risk of falls    -  [place] fall precautions as indicated by assessment   - Educate patient/family on patient safety including physical limitations  - Instruct patient to call for assistance with activity based on assessment  - Modify environment to reduce risk of injury  - Consider OT/PT consult to assist with strengthening/mobility  Outcome: Progressing     Problem: GASTROINTESTINAL - ADULT  Goal: Minimal or absence of nausea and/or vomiting  Description: INTERVENTIONS:  - Administer IV fluids if ordered to ensure adequate hydration  - Maintain NPO status until nausea and vomiting are resolved  - Nasogastric tube if ordered  - Administer ordered antiemetic medications as needed  - Provide nonpharmacologic comfort measures as appropriate  - Advance diet as tolerated, if ordered  - Consider nutrition services referral to assist patient with adequate nutrition and appropriate food choices  Outcome: Progressing  Goal: Maintains or returns to baseline bowel function  Description: INTERVENTIONS:  - Assess bowel function  - Encourage oral fluids to ensure adequate hydration  - Administer IV fluids if ordered to ensure adequate hydration  - Administer ordered medications as needed  - Encourage mobilization and activity  - Consider nutritional services referral to assist patient with adequate nutrition and appropriate food choices  Outcome: Progressing  Goal: Maintains adequate nutritional intake  Description: INTERVENTIONS:  - Monitor percentage of each meal consumed  - Identify factors contributing to decreased intake, treat as appropriate  - Assist with meals as needed  - Monitor I&O, weight, and lab values if indicated  - Obtain nutrition services referral as needed  Outcome: Progressing     Problem: Nutrition/Hydration-ADULT  Goal: Nutrient/Hydration intake appropriate for improving, restoring or maintaining nutritional needs  Description: Monitor and assess patient's nutrition/hydration status for malnutrition  Collaborate with interdisciplinary team and initiate plan and interventions as ordered  Monitor patient's weight and dietary intake as ordered or per policy  Utilize nutrition screening tool and intervene as necessary  Determine patient's food preferences and provide high-protein, high-caloric foods as appropriate       INTERVENTIONS:  - Monitor oral intake, urinary output, labs, and treatment plans  - Assess nutrition and hydration status and recommend course of action  - Evaluate amount of meals eaten  - Assist patient with eating if necessary   - Allow adequate time for meals  - Recommend/ encourage appropriate diets, oral nutritional supplements, and vitamin/mineral supplements  - Order, calculate, and assess calorie counts as needed  - Recommend, monitor, and adjust tube feedings and TPN/PPN based on assessed needs  - Assess need for intravenous fluids  - Provide specific nutrition/hydration education as appropriate  - Include patient/family/caregiver in decisions related to nutrition  Outcome: Progressing

## 2020-09-06 NOTE — PROGRESS NOTES
Progress Note - Infectious Disease   Puja Fitzpatrick 50 y o  female MRN: [id number]  Unit/Bed#: [facility] 903-01 Encounter: [id number]      Impression:  1  Recurrent left gluteal abscess with Bacteroides thetaiotaomicron bacteremia  2  Stage IV metastatic rectal carcinoma on chemotherapy S/P laparoscopic colostomy POD 3     Recommendations:    She is afebrile T-max 99 6° with minimally elevated but decreased WBC count 10,550       1  Blood culture isolate  showing Bacteroides thetaiotaomicron which is susceptible to metronidazole dermclinton montilla  Repeat blood culture  is negative  Blood cultures x2 from 9/3 are negative so far  2  Continue metronidazole to p o  500 mg q 8 hours   3  Continue cefepime 2 g q 12 hours IV  4  Repeat CT scan showed again a large pre sacral, pre coccygeal complex mass as well as continuity with the medial left gluteal musculature that was suspicious for a abscess/necrotic tumor  IR evaluation noted and appreciated  The area was aspirated  with 8 cc of purulent material obtained and sent for culture which is now showing gram-positive cocci in pairs  Will check results  Possibility of further  drainage noted  5  Her abdominal bloating and lower extremity swelling has decreased today   Antibiotics:  1  Metronidazole 500 mg q 8 hours p o , day 11  Rx   2  Cefepime 2 g q 12 hours IV, day 4 Rx     Subjective:  Some discomfort at the lateral aspects bilaterally of abdomen  Less bloating and swelling  Denies chills, or sweats  Denies nausea, vomiting, or diarrhea  Objective:  Vitals:  Temp:  [98 5 °F (36 9 °C)-99 6 °F (37 6 °C)] 99 6 °F (37 6 °C)  HR:  [66-75] 66  Resp:  [18] 18  BP: ()/(61-74) 108/66  SpO2:  [96 %-99 %] 99 %  Temp (24hrs), Av 2 °F (37 3 °C), Min:98 5 °F (36 9 °C), Max:99 6 °F (37 6 °C)  Current: Temperature: 99 6 °F (37 6 °C)    Physical Exam:     General Appearance:  Alert, appearing chronically ill nontoxic, no acute distress  Collette Givens Eyes:  Conjunctiva pale   Throat: Oropharynx moist without lesions  Lips, mucosa, and tongue normal   Neck: Supple, symmetrical, trachea midline, no adenopathy,  no tenderness/mass/nodules   Lungs:   Clear to auscultation bilaterally, no audible wheezes, rhonchi or rales; respirations unlabored   Heart:  Regular rate and rhythm, S1, S2 normal, no murmur, rub or gallop   Abdomen:   Colostomy site is clean with mild tenderness has abdominal distention, positive bowel sounds  No masses, no organomegaly    No CVA tenderness,    Extremities: Tattoos, 2+ proximal lower extremity edema   Skin: Tattoos, right subclavian PAC, left gluteal wound with tenderness  , colostomy as above  Invasive Devices     Central Venous Catheter Line            Port A Cath 06/22/20 Right Chest 76 days          Peripheral Intravenous Line            Peripheral IV 09/06/20 Left Forearm less than 1 day          Drain            Colostomy LUQ 6 days                Labs, Imaging, & Other studies:   All pertinent labs were personally reviewed  Results from last 7 days   Lab Units 09/06/20  0601 09/05/20  0527 09/04/20  0625   WBC Thousand/uL 10 55* 12 36* 13 35*   HEMOGLOBIN g/dL 7 7* 8 6* 8 0*   PLATELETS Thousands/uL 368 454* 393*     Results from last 7 days   Lab Units 09/06/20  0601 09/05/20  0527 09/04/20  0625   SODIUM mmol/L 137 136 135*   POTASSIUM mmol/L 3 5 3 8 3 4*   CHLORIDE mmol/L 103 102 100   CO2 mmol/L 30 29 30   BUN mg/dL 3* 5 6   CREATININE mg/dL 0 48* 0 50* 0 44*   EGFR ml/min/1 73sq m 117 116 121   CALCIUM mg/dL 8 1* 8 7 8 4     Results from last 7 days   Lab Units 09/04/20  1311 09/03/20  0947   BLOOD CULTURE   --  No Growth at 72 hrs  No Growth at 72 hrs     GRAM STAIN RESULT  3+ Polys*  1+ Gram positive cocci in pairs*  --    BODY FLUID CULTURE, STERILE  No growth  --

## 2020-09-06 NOTE — ASSESSMENT & PLAN NOTE
With liver/lung metastases s/p chemotherapy per outpatient oncology  CT abdomen/pelvis on 8/27 revealing concern for persistent partially obstructing mass - previously refused surgical intervention but agreeable and this hospitalization -> s/p colostomy on 8/31 (POD #6)  PRN pain control and supportive care  Repeat CT imaging on 9/3 noted a complex left adnexal mass suspicious for a primary ovarian lesion versus metastatic lesion -> on previous CT scan, this was mentioned as a cystic lesion -> CA-125 tumor marker elevated, however, per oncology, ovarian mass more likely represents further metastasis rather than another primary malignancy

## 2020-09-06 NOTE — ASSESSMENT & PLAN NOTE
Blood culture from 8/27 growing Bacteroides species - subsequent culture from 8/28 negative - preliminary cultures from 9/3 are still negative  Continue IV Cefepime w/ PO Flagyl per Infectious Disease  See plan for associated gluteal abscess above

## 2020-09-06 NOTE — ASSESSMENT & PLAN NOTE
Presented with increased abdominal and gluteal pain - recent admission for similar abscess noted  Associated gram-negative bacteremia present (see below)  Continue Flagyl/Cefepime - PRN pain control  Developed a high-grade fever on the night of 9/2 with waxing/waning leukocytosis  Repeat CT imaging on 9/3 revealing "Large rectal mass again noted consistent with known carcinoma  Wayne Teran Wayne Teran Status post decompressive transverse colostomy, new since the prior study  Rafa Teran Rafa Teran Complex mass in the presacral/precoccygeal region is again noted measuring 3 8 x 8 cm concerning for direct extension of the patient's rectal cancer to the presacral space with possible superinfection  Wayne Teran Wayne Teran There is also continuity of this process with the medial left gluteal musculature and an area which was previously drained, suspicious for superinfection of necrotic tumor/abscess, overall measuring 2 2 x 3 3 cm  Rafa Teran Wayne Teran Extensive metastatic disease to the lungs and liver  Rafa Teran Wayne Teran Complex left adnexal mass which could be a primary ovarian lesion versus a metastatic lesion  Rafa Teran Wayne Teran The overall appearance is suspicious for neoplasia  Wayne Teran Rafa Teran Ventral abdominal wall hernia/hernias with ascites  Wayne Teran Wayne Teran Additional stable findings as noted "  Status post IR guided drainage on 9/4 approximately 8 mL of purulent fluid -> await final fluid culture

## 2020-09-06 NOTE — PLAN OF CARE
Problem: Potential for Falls  Goal: Patient will remain free of falls  Description: INTERVENTIONS:  - Assess patient frequently for physical needs  -  Identify cognitive and physical deficits and behaviors that affect risk of falls    -  [place] fall precautions as indicated by assessment   - Educate patient/family on patient safety including physical limitations  - Instruct patient to call for assistance with activity based on assessment  - Modify environment to reduce risk of injury  - Consider OT/PT consult to assist with strengthening/mobility  9/6/2020 0741 by Hellen Peña RN  Outcome: Progressing  9/5/2020 1744 by Hellen Peña RN  Outcome: Progressing     Problem: GASTROINTESTINAL - ADULT  Goal: Minimal or absence of nausea and/or vomiting  Description: INTERVENTIONS:  - Administer IV fluids if ordered to ensure adequate hydration  - Maintain NPO status until nausea and vomiting are resolved  - Nasogastric tube if ordered  - Administer ordered antiemetic medications as needed  - Provide nonpharmacologic comfort measures as appropriate  - Advance diet as tolerated, if ordered  - Consider nutrition services referral to assist patient with adequate nutrition and appropriate food choices  9/6/2020 0741 by Hellen Peña RN  Outcome: Progressing  9/5/2020 1744 by Hellen Peña RN  Outcome: Progressing  Goal: Maintains or returns to baseline bowel function  Description: INTERVENTIONS:  - Assess bowel function  - Encourage oral fluids to ensure adequate hydration  - Administer IV fluids if ordered to ensure adequate hydration  - Administer ordered medications as needed  - Encourage mobilization and activity  - Consider nutritional services referral to assist patient with adequate nutrition and appropriate food choices  9/6/2020 0741 by Hellen Peña RN  Outcome: Progressing  9/5/2020 1744 by Hellen Peña RN  Outcome: Progressing  Goal: Maintains adequate nutritional intake  Description: INTERVENTIONS:  - Monitor percentage of each meal consumed  - Identify factors contributing to decreased intake, treat as appropriate  - Assist with meals as needed  - Monitor I&O, weight, and lab values if indicated  - Obtain nutrition services referral as needed  9/6/2020 0741 by Hellen Peña RN  Outcome: Progressing  9/5/2020 1744 by Hellen Peña RN  Outcome: Progressing     Problem: Nutrition/Hydration-ADULT  Goal: Nutrient/Hydration intake appropriate for improving, restoring or maintaining nutritional needs  Description: Monitor and assess patient's nutrition/hydration status for malnutrition  Collaborate with interdisciplinary team and initiate plan and interventions as ordered  Monitor patient's weight and dietary intake as ordered or per policy  Utilize nutrition screening tool and intervene as necessary  Determine patient's food preferences and provide high-protein, high-caloric foods as appropriate       INTERVENTIONS:  - Monitor oral intake, urinary output, labs, and treatment plans  - Assess nutrition and hydration status and recommend course of action  - Evaluate amount of meals eaten  - Assist patient with eating if necessary   - Allow adequate time for meals  - Recommend/ encourage appropriate diets, oral nutritional supplements, and vitamin/mineral supplements  - Order, calculate, and assess calorie counts as needed  - Recommend, monitor, and adjust tube feedings and TPN/PPN based on assessed needs  - Assess need for intravenous fluids  - Provide specific nutrition/hydration education as appropriate  - Include patient/family/caregiver in decisions related to nutrition  9/6/2020 0741 by Hellen Peña RN  Outcome: Progressing  9/5/2020 1744 by Hellen Peña RN  Outcome: Progressing

## 2020-09-07 PROBLEM — E87.6 HYPOKALEMIA: Status: RESOLVED | Noted: 2020-08-04 | Resolved: 2020-09-07

## 2020-09-07 LAB
ANION GAP SERPL CALCULATED.3IONS-SCNC: 5 MMOL/L (ref 4–13)
BACTERIA SPEC ANAEROBE CULT: NO GROWTH
BUN SERPL-MCNC: 3 MG/DL (ref 5–25)
CALCIUM SERPL-MCNC: 8.2 MG/DL (ref 8.3–10.1)
CHLORIDE SERPL-SCNC: 102 MMOL/L (ref 100–108)
CO2 SERPL-SCNC: 29 MMOL/L (ref 21–32)
CREAT SERPL-MCNC: 0.4 MG/DL (ref 0.6–1.3)
ERYTHROCYTE [DISTWIDTH] IN BLOOD BY AUTOMATED COUNT: 21.8 % (ref 11.6–15.1)
GFR SERPL CREATININE-BSD FRML MDRD: 124 ML/MIN/1.73SQ M
GLUCOSE SERPL-MCNC: 84 MG/DL (ref 65–140)
HCT VFR BLD AUTO: 25.2 % (ref 34.8–46.1)
HCT VFR BLD AUTO: 26.5 % (ref 34.8–46.1)
HGB BLD-MCNC: 7.4 G/DL (ref 11.5–15.4)
HGB BLD-MCNC: 7.9 G/DL (ref 11.5–15.4)
MCH RBC QN AUTO: 24.4 PG (ref 26.8–34.3)
MCHC RBC AUTO-ENTMCNC: 29.4 G/DL (ref 31.4–37.4)
MCV RBC AUTO: 83 FL (ref 82–98)
NRBC BLD AUTO-RTO: 0 /100 WBCS
PLATELET # BLD AUTO: 333 THOUSANDS/UL (ref 149–390)
PMV BLD AUTO: 9.6 FL (ref 8.9–12.7)
POTASSIUM SERPL-SCNC: 3.7 MMOL/L (ref 3.5–5.3)
RBC # BLD AUTO: 3.03 MILLION/UL (ref 3.81–5.12)
SODIUM SERPL-SCNC: 136 MMOL/L (ref 136–145)
WBC # BLD AUTO: 10.76 THOUSAND/UL (ref 4.31–10.16)

## 2020-09-07 PROCEDURE — 99231 SBSQ HOSP IP/OBS SF/LOW 25: CPT | Performed by: INTERNAL MEDICINE

## 2020-09-07 PROCEDURE — 99232 SBSQ HOSP IP/OBS MODERATE 35: CPT | Performed by: INTERNAL MEDICINE

## 2020-09-07 PROCEDURE — 85018 HEMOGLOBIN: CPT | Performed by: PHYSICIAN ASSISTANT

## 2020-09-07 PROCEDURE — 80048 BASIC METABOLIC PNL TOTAL CA: CPT | Performed by: INTERNAL MEDICINE

## 2020-09-07 PROCEDURE — 85014 HEMATOCRIT: CPT | Performed by: PHYSICIAN ASSISTANT

## 2020-09-07 PROCEDURE — 85027 COMPLETE CBC AUTOMATED: CPT | Performed by: INTERNAL MEDICINE

## 2020-09-07 RX ADMIN — OXYCODONE HYDROCHLORIDE 40 MG: 20 TABLET, FILM COATED, EXTENDED RELEASE ORAL at 21:04

## 2020-09-07 RX ADMIN — OXYCODONE HYDROCHLORIDE 15 MG: 10 TABLET ORAL at 06:22

## 2020-09-07 RX ADMIN — METRONIDAZOLE 500 MG: 500 TABLET ORAL at 06:22

## 2020-09-07 RX ADMIN — METRONIDAZOLE 500 MG: 500 TABLET ORAL at 14:34

## 2020-09-07 RX ADMIN — GABAPENTIN 300 MG: 300 CAPSULE ORAL at 09:00

## 2020-09-07 RX ADMIN — CEFEPIME HYDROCHLORIDE 2000 MG: 2 INJECTION, POWDER, FOR SOLUTION INTRAVENOUS at 09:00

## 2020-09-07 RX ADMIN — OXYCODONE HYDROCHLORIDE 10 MG: 10 TABLET ORAL at 11:51

## 2020-09-07 RX ADMIN — NYSTATIN 500000 UNITS: 100000 SUSPENSION ORAL at 11:50

## 2020-09-07 RX ADMIN — OXYCODONE HYDROCHLORIDE 40 MG: 20 TABLET, FILM COATED, EXTENDED RELEASE ORAL at 09:00

## 2020-09-07 RX ADMIN — ENOXAPARIN SODIUM 40 MG: 40 INJECTION SUBCUTANEOUS at 08:59

## 2020-09-07 RX ADMIN — LIDOCAINE: 4 CREAM TOPICAL at 11:50

## 2020-09-07 RX ADMIN — METRONIDAZOLE 500 MG: 500 TABLET ORAL at 21:04

## 2020-09-07 RX ADMIN — LIDOCAINE: 4 CREAM TOPICAL at 17:04

## 2020-09-07 RX ADMIN — OXYCODONE HYDROCHLORIDE 10 MG: 10 TABLET ORAL at 22:40

## 2020-09-07 RX ADMIN — SERTRALINE HYDROCHLORIDE 25 MG: 25 TABLET ORAL at 09:00

## 2020-09-07 RX ADMIN — NYSTATIN 500000 UNITS: 100000 SUSPENSION ORAL at 08:59

## 2020-09-07 RX ADMIN — LIDOCAINE: 4 CREAM TOPICAL at 09:01

## 2020-09-07 RX ADMIN — HYDROMORPHONE HYDROCHLORIDE 4 MG: 4 TABLET ORAL at 09:48

## 2020-09-07 RX ADMIN — CEFEPIME HYDROCHLORIDE 2000 MG: 2 INJECTION, POWDER, FOR SOLUTION INTRAVENOUS at 21:04

## 2020-09-07 RX ADMIN — LIDOCAINE: 4 CREAM TOPICAL at 21:05

## 2020-09-07 RX ADMIN — ACETAMINOPHEN 650 MG: 325 TABLET, FILM COATED ORAL at 17:10

## 2020-09-07 RX ADMIN — NYSTATIN 500000 UNITS: 100000 SUSPENSION ORAL at 21:04

## 2020-09-07 RX ADMIN — OXYCODONE HYDROCHLORIDE 15 MG: 10 TABLET ORAL at 17:04

## 2020-09-07 RX ADMIN — LORATADINE 10 MG: 10 TABLET ORAL at 09:00

## 2020-09-07 RX ADMIN — GABAPENTIN 300 MG: 300 CAPSULE ORAL at 21:05

## 2020-09-07 RX ADMIN — GABAPENTIN 300 MG: 300 CAPSULE ORAL at 17:04

## 2020-09-07 RX ADMIN — NYSTATIN 500000 UNITS: 100000 SUSPENSION ORAL at 17:04

## 2020-09-07 NOTE — ASSESSMENT & PLAN NOTE
Blood culture from 8/27 growing Bacteroides species - subsequent culture from 8/28 negative - preliminary cultures from 9/3 remaining negative thus far  Continue IV Cefepime w/ PO Flagyl per Infectious Disease  See plan for associated gluteal abscess above

## 2020-09-07 NOTE — PROGRESS NOTES
[facility] - Progress Note  Patient: Rosi Fitzpatrick 50 y o  female MRN: [id number]  Unit/Bed#: [facility] 903-01 Encounter: [id number]  Primary Care Provider: John Grossman MD  Date Of Visit: 09/07/20        Assessment & Plan:    * Recurrent left gluteal abscess  Assessment & Plan  Presented with increased abdominal and gluteal pain - recent admission for similar abscess noted  Associated gram-negative bacteremia present (see below)  Continue Flagyl/Cefepime - PRN pain control  Developed a high-grade fever on the night of 9/2 with waxing/waning leukocytosis  Repeat CT imaging on 9/3 revealing "Large rectal mass again noted consistent with known carcinoma  Leila Delta Leila Delta Status post decompressive transverse colostomy, new since the prior study  Leila Delta Leila Delta Complex mass in the presacral/precoccygeal region is again noted measuring 3 8 x 8 cm concerning for direct extension of the patient's rectal cancer to the presacral space with possible superinfection  Leila Delta Leila Delta There is also continuity of this process with the medial left gluteal musculature and an area which was previously drained, suspicious for superinfection of necrotic tumor/abscess, overall measuring 2 2 x 3 3 cm  Leila Delta Leial Delta Extensive metastatic disease to the lungs and liver  Leila Delta Leila Delta Complex left adnexal mass which could be a primary ovarian lesion versus a metastatic lesion  Leila Delta Leila Delta The overall appearance is suspicious for neoplasia  Leila Delta Leila Delta Ventral abdominal wall hernia/hernias with ascites  Leila Delta Leila Delta Additional stable findings as noted "  Status post IR guided drainage on 9/4 approximately 8 mL of purulent fluid -> await final fluid culture (preliminary growing gram-positive cocci in pairs)    Metastatic rectal cancer  Assessment & Plan  With liver/lung metastases s/p chemotherapy per outpatient oncology  CT abdomen/pelvis on 8/27 revealing concern for persistent partially obstructing mass - previously refused surgical intervention but agreeable and this hospitalization -> s/p colostomy on 8/31 (POD #7)  PRN pain control and supportive care  Repeat CT imaging on 9/3 noted a complex left adnexal mass suspicious for a primary ovarian lesion versus metastatic lesion -> on previous CT scan, this was mentioned as a cystic lesion -> CA-125 tumor marker elevated, however, per oncology, ovarian mass more likely represents further metastasis rather than another primary malignancy    Bacteroides bacteremia  Assessment & Plan  Blood culture from 8/27 growing Bacteroides species - subsequent culture from 8/28 negative - preliminary cultures from 9/3 remaining negative thus far  Continue IV Cefepime w/ PO Flagyl per Infectious Disease  See plan for associated gluteal abscess above    Cancer associated pain  Assessment & Plan  Appreciate palliative care input regarding analgesic regimen    Possible evolving sepsis  Assessment & Plan  With waxing/waning leukocytosis and a high-grade fever of 101 1° on the night of 9/2  Mild procalcitonin bump at 0 26 subsequently normalized -> lactic acid normal  Repeat blood cultures from 9/3 remain negative thus  Continue Flagyl and IV Cefepime  See plan for gluteal abscess    Hypokalemiaresolved as of 9/7/2020  Assessment & Plan  Monitor/replete potassium deficiency  Normalized on 9/5    Anemia of chronic disease  Assessment & Plan  Monitor H/H  Monitor for postoperative blood loss - transfuse if hemoglobin < 7    Severe protein-calorie malnutrition  Assessment & Plan  BMI of 21 51 in the setting of metastatic malignancy and decreased appetite/oral intake  On Ensure nutritional supplementation with meals        DVT Prophylaxis:  Lovenox       Patient Centered Rounds:  I have performed bedside rounds and discussed plan of care with nursing today  Discussions with Specialists or Other Care Team Provider:  see above assessments if applicable    Education and Discussions with Family / Patient:  Patient, and mother, at bedside today  Time Spent for Care:  32 minutes    More than 50% of total time spent on counseling and coordination of care as described above  Current Length of Stay: 11 day(s)    Current Patient Status: Inpatient   Certification Statement:  Patient will continue to require additional hospital stay due to assessments as noted above  Code Status: Level 1 - Full Code        Subjective:     No new complaints this time  Pain is better controlled today  Denying fever/chills  Objective:     Vitals:   Temp (24hrs), Av 7 °F (37 1 °C), Min:98 3 °F (36 8 °C), Max:99 1 °F (37 3 °C)    Temp:  [98 3 °F (36 8 °C)-99 1 °F (37 3 °C)] 98 3 °F (36 8 °C)  HR:  [65-78] 78  Resp:  [18] 18  BP: ()/(63-68) 108/63  SpO2:  [96 %-100 %] 100 %  Body mass index is 21 51 kg/m²  Input and Output Summary (last 24 hours):        Intake/Output Summary (Last 24 hours) at 2020 1602  Last data filed at 2020 1401  Gross per 24 hour   Intake 380 ml   Output 3200 ml   Net -2820 ml       Physical Exam:     GENERAL:  Improving distress from pain  HEAD:  Normocephalic - atraumatic  EYES: PERRL - EOMI   MOUTH:  Mucosa moist  NECK:  Supple - full range of motion  CARDIAC:  Rate controlled - S1/S2 positive  PULMONARY:  Clear breath sounds bilaterally - nonlabored respirations  ABDOMEN:  Soft - colostomy intact with output - active bowel sounds  MUSCULOSKELETAL:  Motor strength/range of motion intact  NEUROLOGIC:  Alert/oriented at baseline  SKIN:  Chronic wrinkles/blemishes - various tattoos noted  PSYCHIATRIC:  Mood/affect stable      Additional Data:     Labs & Recent Cultures:    Results from last 7 days   Lab Units 20  0552 20  0601  20  0454   WBC Thousand/uL 10 76* 10 55*   < > 11 91*   HEMOGLOBIN g/dL 7 4* 7 7*   < > 7 2*   HEMATOCRIT % 25 2* 25 5*   < > 24 5*   PLATELETS Thousands/uL 333 368   < > 268   BANDS PCT %  --   --   --  2   NEUTROS PCT %  --  65  --   --    LYMPHS PCT %  --  17  --   --    LYMPHO PCT   --   --    < > 4*   MONOS PCT %  --  9  --   --    MONO PCT   --   --    < > 1*   EOS PCT %  --  6   < > 0    < > = values in this interval not displayed  Results from last 7 days   Lab Units 09/07/20  0552   POTASSIUM mmol/L 3 7   CHLORIDE mmol/L 102   CO2 mmol/L 29   BUN mg/dL 3*   CREATININE mg/dL 0 40*   CALCIUM mg/dL 8 2*                 Results from last 7 days   Lab Units 09/04/20  0625 09/03/20  0947   LACTIC ACID mmol/L  --  1 7   PROCALCITONIN ng/ml 0 25 0 26*         Results from last 7 days   Lab Units 09/04/20  1311 09/03/20  0947   BLOOD CULTURE   --  No Growth After 4 Days  No Growth After 4 Days  GRAM STAIN RESULT  3+ Polys*  1+ Gram positive cocci in pairs*  --    BODY FLUID CULTURE, STERILE  No growth  --          Last 24 Hours Medication List:   Current Facility-Administered Medications   Medication Dose Route Frequency Provider Last Rate    acetaminophen  650 mg Oral Q6H PRN ZACH Heath      calcium carbonate  500 mg Oral TID PRN Aminta Delaney MD      cefepime  2,000 mg Intravenous Q12H Aminta Delaney MD Stopped (09/07/20 0930)    enoxaparin  40 mg Subcutaneous Daily Charanjit Pierce MD      gabapentin  300 mg Oral TID Charanjit Pierce MD      HYDROmorphone  4 mg Oral Q4H PRN Margeret Romberg Bendas, DO      Or    HYDROmorphone  0 5 mg Intravenous Q4H PRN Creiliana Delvis, DO      lidocaine   Topical 4x Daily Charanjit Pierce MD      loratadine  10 mg Oral Daily Charanjit Pierce MD      metroNIDAZOLE  500 mg Oral [facility] Braxton Sosa MD      nystatin  500,000 Units Swish & Swallow 4x Daily Aminta Delaney MD      ondansetron  4 mg Intravenous Q4H PRN Charanjit Pierce MD      oxyCODONE  40 mg Oral Q12H [address] Marlon Pacheco MD      oxyCODONE  10 mg Oral Q4H PRN Charanjit Pierce MD      oxyCODONE  15 mg Oral Q4H PRN Charanjit Pierce MD      sertraline  25 mg Oral Daily Charanjit Pierce MD                  ** Please Note: This note is constructed using a voice recognition dictation system  An occasional wrong word/phrase or sound-a-like substitution may have been picked up by dictation device due to the inherent limitations of voice recognition software  Read the chart carefully and recognize, using reasonable context, where substitutions may have occurred  **

## 2020-09-07 NOTE — ASSESSMENT & PLAN NOTE
With waxing/waning leukocytosis and a high-grade fever of 101 1° on the night of 9/2  Mild procalcitonin bump at 0 26 subsequently normalized -> lactic acid normal  Repeat blood cultures from 9/3 remain negative thus  Continue Flagyl and IV Cefepime  See plan for gluteal abscess

## 2020-09-07 NOTE — WOUND OSTOMY CARE
Progress Note- Ostomy  Amado Fitzpatrick 50 y o  female  [id number]  [facility] 903-01        Assessment:  Patient is seen for colostomy follow up and guided bag change  Patient able to perform a bag change with minimal assistance  Patient able to do 90% of the change  Patient states that she feels comfortable with the change and caring for her colostomy  Subjective:  Stoma is pink and budded  Brown formed stool is present in the pouch  Peristomal skin is intact    Objective:  Stoma measures 2 1/4"        Will continue to follow while inpatient              Colostomy LUQ (Active)   Stomal Appliance 1 piece;Clean;Dry; Intact 09/07/20 0901   Stoma Assessment Budded;[place] 09/07/20 0901   Stoma size (in) 2 25 in 09/07/20 0901   Stoma Shape Round;Budded 09/07/20 0901   Peristomal Assessment Clean; Intact 09/07/20 0901   Treatment Bag change 09/07/20 0901   Output (mL) 300 mL 09/07/20 0552   Number of days: 7

## 2020-09-07 NOTE — NURSING NOTE
Pt requesting staff minimize overnight interruptions to medical necessary reasons only  Explained hospital bi-hourly rounding policy overnight to pt and pt replied "I am a light sleeper and when someone comes in, it wakes me up  If I need anything overnight, I prefer to call you instead "  Call bell provided to pt and curtain and door closed as per pt request also

## 2020-09-07 NOTE — ASSESSMENT & PLAN NOTE
Presented with increased abdominal and gluteal pain - recent admission for similar abscess noted  Associated gram-negative bacteremia present (see below)  Continue Flagyl/Cefepime - PRN pain control  Developed a high-grade fever on the night of 9/2 with waxing/waning leukocytosis  Repeat CT imaging on 9/3 revealing "Large rectal mass again noted consistent with known carcinoma  Mariana Abraham Status post decompressive transverse colostomy, new since the prior study  Mariana Abraham Complex mass in the presacral/precoccygeal region is again noted measuring 3 8 x 8 cm concerning for direct extension of the patient's rectal cancer to the presacral space with possible superinfection  Mariana Abraham There is also continuity of this process with the medial left gluteal musculature and an area which was previously drained, suspicious for superinfection of necrotic tumor/abscess, overall measuring 2 2 x 3 3 cm  Mariana Abraham Extensive metastatic disease to the lungs and liver  Mariana Abraham Complex left adnexal mass which could be a primary ovarian lesion versus a metastatic lesion  Mariana Abraham The overall appearance is suspicious for neoplasia  Mariana Abraham Ventral abdominal wall hernia/hernias with ascites  Mariana Abraham Additional stable findings as noted "  Status post IR guided drainage on 9/4 approximately 8 mL of purulent fluid -> await final fluid culture (preliminary growing gram-positive cocci in pairs)

## 2020-09-07 NOTE — PROGRESS NOTES
Progress Note - Infectious Disease   Puja Fitzpatrick 50 y o  female MRN: [id number]  Unit/Bed#: [facility] 903-01 Encounter: [id number]      Impression:  1  Recurrent left gluteal abscess with Bacteroides thetaiotaomicron bacteremia  2  Stage IV metastatic rectal carcinoma on chemotherapy S/P laparoscopic colostomy POD 34    Recommendations:    She is afebrile T-max 100 8° with minimally elevated  WBC count 10,760       1  Blood culture isolate  showing Bacteroides thetaiotaomicron which is susceptible to metronidazole papaus eladia  Repeat blood culture  is negative  Blood cultures x2 from 9/3 are negative so far  2  Continue metronidazole to p o  500 mg q 8 hours   3  Continue cefepime 2 g q 12 hours IV  4  Repeat CT scan showed again a large pre sacral, pre coccygeal complex mass as well as continuity with the medial left gluteal musculature that was suspicious for a abscess/necrotic tumor  IR evaluation noted and appreciated  The area was aspirated  with 8 cc of purulent material obtained and sent for culture which is now showing gram-positive cocci in pairs  Will check results  Possibility of further  drainage noted  5  She has some increased seropurulent drainage from her buttock area  6  At some point we may need to trial discontinuing her antibiotics   Antibiotics:  1  Metronidazole 500 mg q 8 hours p o , day 12  Rx   2  Cefepime 2 g q 12 hours IV, day 5 Rx     Subjective:  Has seropurulent left buttock drainage   Less bloating and swelling  Denies chills, or sweats  Denies nausea, vomiting, or diarrhea        Objective:  Vitals:  Temp:  [98 3 °F (36 8 °C)-100 8 °F (38 2 °C)] 98 9 °F (37 2 °C)  HR:  [65-78] 78  Resp:  [18] 18  BP: ()/(63-68) 108/63  SpO2:  [96 %-100 %] 100 %  Temp (24hrs), Av 1 °F (37 3 °C), Min:98 3 °F (36 8 °C), Max:100 8 °F (38 2 °C)  Current: Temperature: 98 9 °F (37 2 °C)    Physical Exam:     General Appearance:  Alert, appearing chronically ill nontoxic, no acute distress     Eyes:  Conjunctiva pale   Throat: Oropharynx moist without lesions  Lips, mucosa, and tongue normal   Neck: Supple, symmetrical, trachea midline, no adenopathy,  no tenderness/mass/nodules   Lungs:   Clear to auscultation bilaterally, no audible wheezes, rhonchi or rales; respirations unlabored   Heart:  Regular rate and rhythm, S1, S2 normal, no murmur, rub or gallop   Abdomen:   Colostomy site is clean with mild tenderness has abdominal distention, positive bowel sounds  No masses, no organomegaly    No CVA tenderness,    Extremities: Tattoos, 2+ proximal lower extremity edema   Skin: Tattoos, right subclavian PAC, left gluteal wound with seropurulent drainage and some tenderness  , colostomy as above  Invasive Devices     Central Venous Catheter Line            Port A Cath 06/22/20 Right Chest 77 days          Peripheral Intravenous Line            Peripheral IV 09/06/20 Left Forearm 1 day          Drain            Colostomy LUQ 7 days                Labs, Imaging, & Other studies:   All pertinent labs were personally reviewed  Results from last 7 days   Lab Units 09/07/20  0552 09/06/20  0601 09/05/20  0527   WBC Thousand/uL 10 76* 10 55* 12 36*   HEMOGLOBIN g/dL 7 4* 7 7* 8 6*   PLATELETS Thousands/uL 333 368 454*     Results from last 7 days   Lab Units 09/07/20  0552 09/06/20  0601 09/05/20  0527   SODIUM mmol/L 136 137 136   POTASSIUM mmol/L 3 7 3 5 3 8   CHLORIDE mmol/L 102 103 102   CO2 mmol/L 29 30 29   BUN mg/dL 3* 3* 5   CREATININE mg/dL 0 40* 0 48* 0 50*   EGFR ml/min/1 73sq m 124 117 116   CALCIUM mg/dL 8 2* 8 1* 8 7     Results from last 7 days   Lab Units 09/04/20  1311 09/03/20  0947   BLOOD CULTURE   --  No Growth After 4 Days  No Growth After 4 Days     GRAM STAIN RESULT  3+ Polys*  1+ Gram positive cocci in pairs*  --    BODY FLUID CULTURE, STERILE  No growth  --

## 2020-09-07 NOTE — ASSESSMENT & PLAN NOTE
With liver/lung metastases s/p chemotherapy per outpatient oncology  CT abdomen/pelvis on 8/27 revealing concern for persistent partially obstructing mass - previously refused surgical intervention but agreeable and this hospitalization -> s/p colostomy on 8/31 (POD #7)  PRN pain control and supportive care  Repeat CT imaging on 9/3 noted a complex left adnexal mass suspicious for a primary ovarian lesion versus metastatic lesion -> on previous CT scan, this was mentioned as a cystic lesion -> CA-125 tumor marker elevated, however, per oncology, ovarian mass more likely represents further metastasis rather than another primary malignancy

## 2020-09-08 LAB
ANION GAP SERPL CALCULATED.3IONS-SCNC: 5 MMOL/L (ref 4–13)
BACTERIA BLD CULT: NORMAL
BACTERIA BLD CULT: NORMAL
BACTERIA SPEC BFLD CULT: NO GROWTH
BASOPHILS # BLD AUTO: 0.07 THOUSANDS/ΜL (ref 0–0.1)
BASOPHILS NFR BLD AUTO: 1 % (ref 0–1)
BUN SERPL-MCNC: 3 MG/DL (ref 5–25)
CALCIUM SERPL-MCNC: 8.5 MG/DL (ref 8.3–10.1)
CHLORIDE SERPL-SCNC: 102 MMOL/L (ref 100–108)
CO2 SERPL-SCNC: 29 MMOL/L (ref 21–32)
CREAT SERPL-MCNC: 0.33 MG/DL (ref 0.6–1.3)
EOSINOPHIL # BLD AUTO: 0.67 THOUSAND/ΜL (ref 0–0.61)
EOSINOPHIL NFR BLD AUTO: 6 % (ref 0–6)
ERYTHROCYTE [DISTWIDTH] IN BLOOD BY AUTOMATED COUNT: 21.6 % (ref 11.6–15.1)
GFR SERPL CREATININE-BSD FRML MDRD: 133 ML/MIN/1.73SQ M
GLUCOSE SERPL-MCNC: 83 MG/DL (ref 65–140)
GRAM STN SPEC: ABNORMAL
GRAM STN SPEC: ABNORMAL
HCT VFR BLD AUTO: 25.8 % (ref 34.8–46.1)
HGB BLD-MCNC: 7.6 G/DL (ref 11.5–15.4)
IMM GRANULOCYTES # BLD AUTO: 0.25 THOUSAND/UL (ref 0–0.2)
IMM GRANULOCYTES NFR BLD AUTO: 2 % (ref 0–2)
LYMPHOCYTES # BLD AUTO: 1.77 THOUSANDS/ΜL (ref 0.6–4.47)
LYMPHOCYTES NFR BLD AUTO: 15 % (ref 14–44)
MCH RBC QN AUTO: 24.8 PG (ref 26.8–34.3)
MCHC RBC AUTO-ENTMCNC: 29.5 G/DL (ref 31.4–37.4)
MCV RBC AUTO: 84 FL (ref 82–98)
MONOCYTES # BLD AUTO: 1.11 THOUSAND/ΜL (ref 0.17–1.22)
MONOCYTES NFR BLD AUTO: 9 % (ref 4–12)
NEUTROPHILS # BLD AUTO: 8.08 THOUSANDS/ΜL (ref 1.85–7.62)
NEUTS SEG NFR BLD AUTO: 67 % (ref 43–75)
NRBC BLD AUTO-RTO: 0 /100 WBCS
PLATELET # BLD AUTO: 369 THOUSANDS/UL (ref 149–390)
PMV BLD AUTO: 10.1 FL (ref 8.9–12.7)
POTASSIUM SERPL-SCNC: 3.7 MMOL/L (ref 3.5–5.3)
RBC # BLD AUTO: 3.07 MILLION/UL (ref 3.81–5.12)
SODIUM SERPL-SCNC: 136 MMOL/L (ref 136–145)
WBC # BLD AUTO: 11.95 THOUSAND/UL (ref 4.31–10.16)

## 2020-09-08 PROCEDURE — 99233 SBSQ HOSP IP/OBS HIGH 50: CPT | Performed by: INTERNAL MEDICINE

## 2020-09-08 PROCEDURE — 99231 SBSQ HOSP IP/OBS SF/LOW 25: CPT | Performed by: INTERNAL MEDICINE

## 2020-09-08 PROCEDURE — 80048 BASIC METABOLIC PNL TOTAL CA: CPT | Performed by: INTERNAL MEDICINE

## 2020-09-08 PROCEDURE — 99232 SBSQ HOSP IP/OBS MODERATE 35: CPT | Performed by: GENERAL PRACTICE

## 2020-09-08 PROCEDURE — 85025 COMPLETE CBC W/AUTO DIFF WBC: CPT | Performed by: INTERNAL MEDICINE

## 2020-09-08 RX ADMIN — HYDROMORPHONE HYDROCHLORIDE 4 MG: 4 TABLET ORAL at 14:11

## 2020-09-08 RX ADMIN — OXYCODONE HYDROCHLORIDE 15 MG: 10 TABLET ORAL at 17:45

## 2020-09-08 RX ADMIN — NYSTATIN 500000 UNITS: 100000 SUSPENSION ORAL at 21:28

## 2020-09-08 RX ADMIN — OXYCODONE HYDROCHLORIDE 40 MG: 20 TABLET, FILM COATED, EXTENDED RELEASE ORAL at 21:29

## 2020-09-08 RX ADMIN — METRONIDAZOLE 500 MG: 500 TABLET ORAL at 05:34

## 2020-09-08 RX ADMIN — NYSTATIN 500000 UNITS: 100000 SUSPENSION ORAL at 08:54

## 2020-09-08 RX ADMIN — ENOXAPARIN SODIUM 40 MG: 40 INJECTION SUBCUTANEOUS at 08:54

## 2020-09-08 RX ADMIN — LORATADINE 10 MG: 10 TABLET ORAL at 08:54

## 2020-09-08 RX ADMIN — LIDOCAINE: 4 CREAM TOPICAL at 12:12

## 2020-09-08 RX ADMIN — GABAPENTIN 300 MG: 300 CAPSULE ORAL at 17:38

## 2020-09-08 RX ADMIN — LIDOCAINE: 4 CREAM TOPICAL at 08:56

## 2020-09-08 RX ADMIN — OXYCODONE HYDROCHLORIDE 40 MG: 20 TABLET, FILM COATED, EXTENDED RELEASE ORAL at 08:55

## 2020-09-08 RX ADMIN — GABAPENTIN 300 MG: 300 CAPSULE ORAL at 21:29

## 2020-09-08 RX ADMIN — METRONIDAZOLE 500 MG: 500 TABLET ORAL at 14:11

## 2020-09-08 RX ADMIN — NYSTATIN 500000 UNITS: 100000 SUSPENSION ORAL at 12:12

## 2020-09-08 RX ADMIN — NYSTATIN 500000 UNITS: 100000 SUSPENSION ORAL at 17:38

## 2020-09-08 RX ADMIN — GABAPENTIN 300 MG: 300 CAPSULE ORAL at 08:54

## 2020-09-08 RX ADMIN — LIDOCAINE: 4 CREAM TOPICAL at 17:37

## 2020-09-08 RX ADMIN — SERTRALINE HYDROCHLORIDE 25 MG: 25 TABLET ORAL at 08:54

## 2020-09-08 RX ADMIN — CEFEPIME HYDROCHLORIDE 2000 MG: 2 INJECTION, POWDER, FOR SOLUTION INTRAVENOUS at 10:05

## 2020-09-08 RX ADMIN — OXYCODONE HYDROCHLORIDE 15 MG: 10 TABLET ORAL at 12:12

## 2020-09-08 RX ADMIN — OXYCODONE HYDROCHLORIDE 10 MG: 10 TABLET ORAL at 07:39

## 2020-09-08 NOTE — PROGRESS NOTES
Progress note - Palliative and Supportive Care   Abisai Fitzpatrick 50 y o  female [id number]    Assessment:      Patient Active Problem List   Diagnosis    Large bowel obstruction ([address] )    Metastatic rectal cancer    Breast cancer    Abdominal pain    Anemia of chronic disease    Gram-positive bacteremia    Recurrent left gluteal abscess    Cancer associated pain    Possible evolving sepsis    Thrombocytosis     Bright red blood per rectum    Carpal tunnel syndrome, bilateral    Disc degeneration, lumbar    Muscle spasm    Chronic left SI joint pain    Myofascial pain syndrome    Nicotine dependence    Postlaminectomy syndrome, lumbar    Neck pain    Abnormal cytological findings in specimens from other organs, systems and tissues    Anxiety with depression    HTN (hypertension), benign    Vitamin D deficiency    Bacteroides bacteremia    Severe protein-calorie malnutrition           Plan:  1  Symptom management:  Continue w/ OxyContin 40 mg q12h  PRNs  · Dilaudid 4 mg q4h  PO PRN for breakthrough pain or Dilaudid 0 5 mg q4h PRN 2n line for breakthrough pain   · Roxicodone 10 mg q4h PO PRN for mod pain  · Roxicodone IR  15 mg q4h PO PRN for severe pain  Also Gabapentin, Tylenol, Lidocaine  GI PRN w/ TUMS and Zofran 4mg IV PRN    -  Bowel consider Senna PRN in light pain meds if clinically indicated given colostomy bag     2  Goals - Full care w/out limitations      Code Status: Full - Level 1   Decisional apparatus:  Patient is competent on my exam today  Advance Directive / Living Will / POLST:  None - completion outpatient w/ PCP                recommended    Interval history:       24h Pain hx:  Score: 4-7  Location: Rectal  Alleviated: current pain med regiment  PRN: Roxicodone 15 mg x2, Roxicodone 10 mg x3, Dilaudid 4 mg x1  OME: 226    Patient seen and examined at bedside -  POD #8 s/p colostomy (8/31) & POD #4 s/p IR guided drainage of rectal abscess   Todd Guillermo reports have a fever and self limited blood tinged mucous from rectum overnight - SLIM notified close monitoring indicated  Of note patient was febrile on 9/7 at 1710: 100 8 - repeat temp wnl  Patient reports adequate pain control since Oxycodone was increased to 40 mg BID  She endorses having a good appetite, BM trough colostomy bag soft consistency non-bloody  No other complains      MEDICATIONS / ALLERGIES:     Current Facility-Administered Medications   Medication Dose Route Frequency Provider Last Rate Last Dose    acetaminophen (TYLENOL) tablet 650 mg  650 mg Oral Q6H PRN ZACH Huggins   650 mg at 09/07/20 1710    calcium carbonate (TUMS) chewable tablet 500 mg  500 mg Oral TID PRN Anil Branch MD   500 mg at 09/02/20 1151    cefepime (MAXIPIME) 2,000 mg in dextrose 5 % 50 mL IVPB  2,000 mg Intravenous Q12H Anil Branch  mL/hr at 09/07/20 2104 2,000 mg at 09/07/20 2104    enoxaparin (LOVENOX) subcutaneous injection 40 mg  40 mg Subcutaneous Daily Radha Rivers MD   40 mg at 09/08/20 1963    gabapentin (NEURONTIN) capsule 300 mg  300 mg Oral TID Radha Rivers MD   300 mg at 09/08/20 0854    HYDROmorphone (DILAUDID) tablet 4 mg  4 mg Oral Q4H PRN Misa Cedillo DO   4 mg at 09/07/20 1726    Or    HYDROmorphone (DILAUDID) injection 0 5 mg  0 5 mg Intravenous Q4H PRN Shavonne Cedillo DO        lidocaine (LMX) 4 % cream   Topical 4x Daily Radha Rivers MD        loratadine (CLARITIN) tablet 10 mg  10 mg Oral Daily Radha Rivers MD   10 mg at 09/08/20 0854    metroNIDAZOLE (FLAGYL) tablet 500 mg  500 mg Oral [facility] Angie Tanner MD   500 mg at 09/08/20 0534    nystatin (MYCOSTATIN) oral suspension 500,000 Units  500,000 Units Swish & Swallow 4x Daily Anil Branch MD   500,000 Units at 09/08/20 0854    ondansetron (ZOFRAN) injection 4 mg  4 mg Intravenous Q4H PRN Radha Rivers MD   4 mg at 08/31/20 1324    oxyCODONE (OxyCONTIN) 12 hr tablet 40 mg  40 mg Oral Q12H [facility] Román Zelaya MD   40 mg at 09/08/20 0855    oxyCODONE (ROXICODONE) immediate release tablet 10 mg  10 mg Oral Q4H PRN Laith Jimenez MD   10 mg at 09/08/20 0739    oxyCODONE (ROXICODONE) IR tablet 15 mg  15 mg Oral Q4H PRN Laith Jimenez MD   15 mg at 09/07/20 1704    sertraline (ZOLOFT) tablet 25 mg  25 mg Oral Daily Laith Jimenez MD   25 mg at 09/08/20 8001     all current active meds have been reviewed    Allergies   Allergen Reactions    Augmentin [Amoxicillin-Pot Clavulanate] Confusion and Drowsiness       OBJECTIVE:    Vitals:    09/08/20 0716   BP: 104/66   Pulse: 67   Resp: 19   Temp: 99 3 °F (37 4 °C)   SpO2: 98%       Physical Exam  Constitutional:       General: She is not in acute distress  HENT:      Head: Normocephalic and atraumatic  Mouth/Throat:      Mouth: Mucous membranes are moist    Eyes:      Conjunctiva/sclera: Conjunctivae normal    Cardiovascular:      Rate and Rhythm: Normal rate and regular rhythm  Heart sounds: No murmur  Pulmonary:      Effort: No respiratory distress  Abdominal:      General: Abdomen is flat  Bowel sounds are normal  There is no distension  Tenderness: There is no abdominal tenderness  There is no guarding  Musculoskeletal:         General: No swelling  Skin:     General: Skin is warm and dry  Neurological:      General: No focal deficit present  Mental Status: She is alert  Psychiatric:         Mood and Affect: Mood normal          Lab Results: I have personally reviewed pertinent labs     Recent Results (from the past 24 hour(s))   Hemoglobin and hematocrit, blood    Collection Time: 09/07/20  9:23 PM   Result Value Ref Range    Hemoglobin 7 9 (L) 11 5 - 15 4 g/dL    Hematocrit 26 5 (L) 34 8 - 46 1 %   Basic metabolic panel    Collection Time: 09/08/20  6:02 AM   Result Value Ref Range    Sodium 136 136 - 145 mmol/L    Potassium 3 7 3 5 - 5 3 mmol/L    Chloride 102 100 - 108 mmol/L    CO2 29 21 - 32 mmol/L ANION GAP 5 4 - 13 mmol/L    BUN 3 (L) 5 - 25 mg/dL    Creatinine 0 33 (L) 0 60 - 1 30 mg/dL    Glucose 83 65 - 140 mg/dL    Calcium 8 5 8 3 - 10 1 mg/dL    eGFR 133 ml/min/1 73sq m   CBC and differential    Collection Time: 09/08/20  6:02 AM   Result Value Ref Range    WBC 11 95 (H) 4 31 - 10 16 Thousand/uL    RBC 3 07 (L) 3 81 - 5 12 Million/uL    Hemoglobin 7 6 (L) 11 5 - 15 4 g/dL    Hematocrit 25 8 (L) 34 8 - 46 1 %    MCV 84 82 - 98 fL    MCH 24 8 (L) 26 8 - 34 3 pg    MCHC 29 5 (L) 31 4 - 37 4 g/dL    RDW 21 6 (H) 11 6 - 15 1 %    MPV 10 1 8 9 - 12 7 fL    Platelets 633 090 - 633 Thousands/uL    nRBC 0 /100 WBCs    Neutrophils Relative 67 43 - 75 %    Immat GRANS % 2 0 - 2 %    Lymphocytes Relative 15 14 - 44 %    Monocytes Relative 9 4 - 12 %    Eosinophils Relative 6 0 - 6 %    Basophils Relative 1 0 - 1 %    Neutrophils Absolute 8 08 (H) 1 85 - 7 62 Thousands/µL    Immature Grans Absolute 0 25 (H) 0 00 - 0 20 Thousand/uL    Lymphocytes Absolute 1 77 0 60 - 4 47 Thousands/µL    Monocytes Absolute 1 11 0 17 - 1 22 Thousand/µL    Eosinophils Absolute 0 67 (H) 0 00 - 0 61 Thousand/µL    Basophils Absolute 0 07 0 00 - 0 10 Thousands/µL     Imaging Studies:   Ct Abdomen Pelvis W Contrast    Result Date: 9/3/2020  1  Large rectal mass again noted consistent with known carcinoma  2   Status post decompressive transverse colostomy, new since the prior study  3   Complex mass in the presacral/precoccygeal region is again noted measuring 3 8 x 8 cm concerning for direct extension of the patient's rectal cancer to the presacral space with possible superinfection  There is also continuity of this process with the medial left gluteal musculature and an area which was previously drained, suspicious for superinfection of necrotic tumor/abscess, overall measuring 2 2 x 3 3 cm  4   Extensive metastatic disease to the lungs and liver   5   Complex left adnexal mass which could be a primary ovarian lesion versus a metastatic lesion  The overall appearance is suspicious for neoplasia  6   Ventral abdominal wall hernia/hernias with ascites  7   Additional stable findings as noted  The study was marked in [facility] for immediate notification  Workstation performed: [id number]     IR Drainage Tube Placement    Result Date: 9/4/2020  Impression: Successful image-guided percutaneous aspiration of recurrent left gluteal collection yielding 8 cc of purulent fluid with no residual fluid identified sonographically in this location  Workstation performed: [id number]     EKG, Pathology, and Other Studies: Reviewed    Counseling / Coordination of Care    Total floor / unit time spent today 20 minutes  Greater than 50% of total time was spent with the patient and / or family counseling and / or coordination of care   A description of the counseling / coordination of care: current plan & expectations

## 2020-09-08 NOTE — ASSESSMENT & PLAN NOTE
With liver/lung metastases s/p chemotherapy per outpatient oncology  CT abdomen/pelvis on 8/27 revealing concern for persistent partially obstructing mass - previously refused surgical intervention but agreeable and this hospitalization -> s/p colostomy on 8/31    PRN pain control and supportive care  Repeat CT imaging on 9/3 noted a complex left adnexal mass suspicious for a primary ovarian lesion versus metastatic lesion -> on previous CT scan, this was mentioned as a cystic lesion -> CA-125 tumor marker elevated, however, per oncology, ovarian mass more likely represents further metastasis rather than another primary malignancy  Complaining of rectal bleeding today with mucous production, colorectal surgery thinks this is from tumor, continue to watch  If there is large amount of blood notify surgery   Also, obtain h/h and infuse

## 2020-09-08 NOTE — PLAN OF CARE
Problem: Potential for Falls  Goal: Patient will remain free of falls  Description: INTERVENTIONS:  - Assess patient frequently for physical needs  -  Identify cognitive and physical deficits and behaviors that affect risk of falls    -  [place] fall precautions as indicated by assessment   - Educate patient/family on patient safety including physical limitations  - Instruct patient to call for assistance with activity based on assessment  - Modify environment to reduce risk of injury  - Consider OT/PT consult to assist with strengthening/mobility  Outcome: Progressing     Problem: GASTROINTESTINAL - ADULT  Goal: Minimal or absence of nausea and/or vomiting  Description: INTERVENTIONS:  - Administer IV fluids if ordered to ensure adequate hydration  - Maintain NPO status until nausea and vomiting are resolved  - Nasogastric tube if ordered  - Administer ordered antiemetic medications as needed  - Provide nonpharmacologic comfort measures as appropriate  - Advance diet as tolerated, if ordered  - Consider nutrition services referral to assist patient with adequate nutrition and appropriate food choices  Outcome: Progressing  Goal: Maintains or returns to baseline bowel function  Description: INTERVENTIONS:  - Assess bowel function  - Encourage oral fluids to ensure adequate hydration  - Administer IV fluids if ordered to ensure adequate hydration  - Administer ordered medications as needed  - Encourage mobilization and activity  - Consider nutritional services referral to assist patient with adequate nutrition and appropriate food choices  Outcome: Progressing  Goal: Maintains adequate nutritional intake  Description: INTERVENTIONS:  - Monitor percentage of each meal consumed  - Identify factors contributing to decreased intake, treat as appropriate  - Assist with meals as needed  - Monitor I&O, weight, and lab values if indicated  - Obtain nutrition services referral as needed  Outcome: Progressing     Problem: Nutrition/Hydration-ADULT  Goal: Nutrient/Hydration intake appropriate for improving, restoring or maintaining nutritional needs  Description: Monitor and assess patient's nutrition/hydration status for malnutrition  Collaborate with interdisciplinary team and initiate plan and interventions as ordered  Monitor patient's weight and dietary intake as ordered or per policy  Utilize nutrition screening tool and intervene as necessary  Determine patient's food preferences and provide high-protein, high-caloric foods as appropriate       INTERVENTIONS:  - Monitor oral intake, urinary output, labs, and treatment plans  - Assess nutrition and hydration status and recommend course of action  - Evaluate amount of meals eaten  - Assist patient with eating if necessary   - Allow adequate time for meals  - Recommend/ encourage appropriate diets, oral nutritional supplements, and vitamin/mineral supplements  - Order, calculate, and assess calorie counts as needed  - Recommend, monitor, and adjust tube feedings and TPN/PPN based on assessed needs  - Assess need for intravenous fluids  - Provide specific nutrition/hydration education as appropriate  - Include patient/family/caregiver in decisions related to nutrition  Outcome: Progressing

## 2020-09-08 NOTE — QUICK NOTE
Noticed some bleeding per rectum last night; has not worsened since onset  Bright red blood at symptomatic onset, small to moderate amounts; last episode said to be dark/altered and contained some clot  No associated palpitations, dizziness or fainting spells  Hb stable between 7 6-7 7  Stoma continues to remain functional   Explanation given to patient that intermittent, low volume bleeding can be attributed to tumoral necrosis  PLAN:  - continue pain management per palliative team recs  - continue to monitor Hb as needed

## 2020-09-08 NOTE — NURSING NOTE
Called to pt's room  Pt noticed a small amount of blood from rectum while in the bathroom  To this writer, the fluid appeared to be blood-tinged mucus  I notified SLIM  Per SLIM will just monitor for now

## 2020-09-08 NOTE — PROGRESS NOTES
Progress Note - Infectious Disease   Puja Fitzpatrick 50 y o  female MRN: [id number]  Unit/Bed#: [facility] 903-01 Encounter: [id number]      Impression:  1  Recurrent left gluteal abscess with Bacteroides thetaiotaomicron bacteremia  2  Stage IV metastatic rectal carcinoma on chemotherapy S/P laparoscopic colostomy POD 5    Recommendations:    She is afebrile T-max 99 6° with minimally elevated  WBC count 11,950       1  Blood culture isolate  showing Bacteroides thetaiotaomicron which is susceptible to metronidazole dermsoledadccus eladia  Repeat blood culture  is negative  Blood cultures x2 from 9/3 are finalized negative  2  Continue metronidazole to p o  500 mg q 8 hours   3  Continue cefepime 2 g q 12 hours IV  4  Repeat CT scan showed again a large pre sacral, pre coccygeal complex mass as well as continuity with the medial left gluteal musculature that was suspicious for a abscess/necrotic tumor  IR evaluation noted and appreciated  The area was aspirated  with 8 cc of purulent material obtained and sent for culture which is shows gram-positive cocci in pairs on Gram stain but has a final culture that is negative  Possibility of further  drainage noted  5  She has some increased seropurulent drainage from her buttock area  6  Will try discontinuing antibiotics and observing  Antibiotics:  1  None  Subjective:  Has continued seropurulent left buttock drainage with some increased discomfort over the area   Less bloating and swelling  Denies chills, or sweats  Denies nausea, vomiting, or diarrhea        Objective:  Vitals:  Temp:  [98 4 °F (36 9 °C)-99 6 °F (37 6 °C)] 99 6 °F (37 6 °C)  HR:  [62-75] 75  Resp:  [18-19] 18  BP: ()/(57-66) 99/57  SpO2:  [97 %-98 %] 97 %  Temp (24hrs), Av 1 °F (37 3 °C), Min:98 4 °F (36 9 °C), Max:99 6 °F (37 6 °C)  Current: Temperature: 99 6 °F (37 6 °C)    Physical Exam:     General Appearance:  Alert, appearing chronically ill nontoxic, no acute distress     Eyes:  Conjunctiva pale   Throat: Oropharynx moist without lesions  Lips, mucosa, and tongue normal   Neck: Supple, symmetrical, trachea midline, no adenopathy,  no tenderness/mass/nodules   Lungs:   Clear to auscultation bilaterally, no audible wheezes, rhonchi or rales; respirations unlabored   Heart:  Regular rate and rhythm, S1, S2 normal, no murmur, rub or gallop   Abdomen:   Colostomy site is clean with mild tenderness has abdominal distention, positive bowel sounds  No masses, no organomegaly    No CVA tenderness,    Extremities: Tattoos, 2+ proximal lower extremity edema   Skin: Tattoos, right subclavian PAC, left gluteal wound with seropurulent drainage and some tenderness  , colostomy as above  Invasive Devices     Central Venous Catheter Line            Port A Cath 06/22/20 Right Chest 78 days          Peripheral Intravenous Line            Peripheral IV 09/06/20 Left Forearm 2 days          Drain            Colostomy LUQ 8 days                Labs, Imaging, & Other studies:   All pertinent labs were personally reviewed  Results from last 7 days   Lab Units 09/08/20  0602 09/07/20  2123 09/07/20  0552 09/06/20  0601   WBC Thousand/uL 11 95*  --  10 76* 10 55*   HEMOGLOBIN g/dL 7 6* 7 9* 7 4* 7 7*   PLATELETS Thousands/uL 369  --  333 368     Results from last 7 days   Lab Units 09/08/20  0602 09/07/20  0552 09/06/20  0601   SODIUM mmol/L 136 136 137   POTASSIUM mmol/L 3 7 3 7 3 5   CHLORIDE mmol/L 102 102 103   CO2 mmol/L 29 29 30   BUN mg/dL 3* 3* 3*   CREATININE mg/dL 0 33* 0 40* 0 48*   EGFR ml/min/1 73sq m 133 124 117   CALCIUM mg/dL 8 5 8 2* 8 1*     Results from last 7 days   Lab Units 09/04/20  1311 09/03/20  0947   BLOOD CULTURE   --  No Growth After 5 Days  No Growth After 5 Days     GRAM STAIN RESULT  3+ Polys*  1+ Gram positive cocci in pairs*  --    BODY FLUID CULTURE, STERILE  No growth  --

## 2020-09-08 NOTE — ASSESSMENT & PLAN NOTE
Presented with increased abdominal and gluteal pain - recent admission for similar abscess noted  Associated gram-negative bacteremia present (see below)  Continue Flagyl/Cefepime - PRN pain control  Developed a high-grade fever on the night of 9/2 with waxing/waning leukocytosis  Repeat CT imaging on 9/3 revealing "Large rectal mass again noted consistent with known carcinoma  Lajean Cassette Lajean Cassette Status post decompressive transverse colostomy, new since the prior study  Lajean Cassette Lajean Cassette Complex mass in the presacral/precoccygeal region is again noted measuring 3 8 x 8 cm concerning for direct extension of the patient's rectal cancer to the presacral space with possible superinfection  Lajean Cassette Lajean Cassette There is also continuity of this process with the medial left gluteal musculature and an area which was previously drained, suspicious for superinfection of necrotic tumor/abscess, overall measuring 2 2 x 3 3 cm  Lajean Cassette Lajean Cassette Extensive metastatic disease to the lungs and liver  Lajean Cassette Lajean Cassette Complex left adnexal mass which could be a primary ovarian lesion versus a metastatic lesion  Lajean Cassette Lajean Cassette The overall appearance is suspicious for neoplasia  Lajean Cassette Lajean Cassette Ventral abdominal wall hernia/hernias with ascites  Lajean Cassette Lajean Cassette Additional stable findings as noted "  Status post IR guided drainage on 9/4 approximately 8 mL of purulent fluid -> await final fluid culture (preliminary growing gram-positive cocci in pairs)

## 2020-09-08 NOTE — PROGRESS NOTES
Progress Note - Hong Fitzpatrick 1975, 50 y o  female MRN: [id number]    Unit/Bed#: [facility] 903-01 Encounter: [id number]    Primary Care Provider: Jennifer Muniz MD   Date and time admitted to hospital: 8/27/2020 12:51 AM        Severe protein-calorie malnutrition  Assessment & Plan  BMI of 21 51 in the setting of metastatic malignancy and decreased appetite/oral intake  On Ensure nutritional supplementation with meals    Bacteroides bacteremia  Assessment & Plan  Blood culture from 8/27 grow  Bacteroides species - subsequent culture from 8/28 negative - preliminary cultures from 9/3 remaining negative thus far  Continue IV Cefepime w/ PO Flagyl per Infectious Disease  See plan for associated gluteal abscess above    Possible evolving sepsis  Assessment & Plan  With waxing/waning leukocytosis and a high-grade fever of 101 1° on the night of 9/2  Mild procalcitonin bump at 0 26 subsequently normalized -> lactic acid normal  Repeat blood cultures from 9/3 remain negative thus  Continue Flagyl and IV Cefepime  See plan for gluteal abscess    Cancer associated pain  Assessment & Plan  Appreciate palliative care input regarding analgesic regimen    Anemia of chronic disease  Assessment & Plan  Monitor H/H  Monitor for postoperative blood loss - transfuse if hemoglobin < 7    Metastatic rectal cancer  Assessment & Plan  With liver/lung metastases s/p chemotherapy per outpatient oncology  CT abdomen/pelvis on 8/27 revealing concern for persistent partially obstructing mass - previously refused surgical intervention but agreeable and this hospitalization -> s/p colostomy on 8/31    PRN pain control and supportive care  Repeat CT imaging on 9/3 noted a complex left adnexal mass suspicious for a primary ovarian lesion versus metastatic lesion -> on previous CT scan, this was mentioned as a cystic lesion -> CA-125 tumor marker elevated, however, per oncology, ovarian mass more likely represents further metastasis rather than another primary malignancy  Complaining of rectal bleeding today with mucous production, colorectal surgery thinks this is from tumor, continue to watch  If there is large amount of blood notify surgery  Also, obtain h/h and infuse     * Recurrent left gluteal abscess  Assessment & Plan  Presented with increased abdominal and gluteal pain - recent admission for similar abscess noted  Associated gram-negative bacteremia present (see below)  Continue Flagyl/Cefepime - PRN pain control  Developed a high-grade fever on the night of 9/2 with waxing/waning leukocytosis  Repeat CT imaging on 9/3 revealing "Large rectal mass again noted consistent with known carcinoma  Barb Blend Barb Blend Status post decompressive transverse colostomy, new since the prior study  Barb Blend Barb Blend Complex mass in the presacral/precoccygeal region is again noted measuring 3 8 x 8 cm concerning for direct extension of the patient's rectal cancer to the presacral space with possible superinfection  Barb Blend Barb Blend There is also continuity of this process with the medial left gluteal musculature and an area which was previously drained, suspicious for superinfection of necrotic tumor/abscess, overall measuring 2 2 x 3 3 cm  Barb Blend Barb Blend Extensive metastatic disease to the lungs and liver  Barb Blend Barb Blend Complex left adnexal mass which could be a primary ovarian lesion versus a metastatic lesion  Barb Blend Barb Blend The overall appearance is suspicious for neoplasia  Barb Blend Barb Blend Ventral abdominal wall hernia/hernias with ascites  Barb Blend Barb Blend Additional stable findings as noted "  Status post IR guided drainage on 9/4 approximately 8 mL of purulent fluid -> await final fluid culture (preliminary growing gram-positive cocci in pairs)           VTE Pharmacologic Prophylaxis:   Pharmacologic: Enoxaparin (Lovenox)  Mechanical VTE Prophylaxis in Place: Yes    Patient Centered Rounds: I have performed bedside rounds with nursing staff today      Discussions with Specialists or Other Care Team Provider: colorectal surgery     Education and Discussions with Family / Patient: patient regarding bleeding     Time Spent for Care: 45 minutes  More than 50% of total time spent on counseling and coordination of care as described above  Current Length of Stay: 12 day(s)    Current Patient Status: Inpatient   Certification Statement: The patient will continue to require additional inpatient hospital stay due to antibiotic treatment, bleeding from rectum     Discharge Plan: pending treatment with antibiotics 3    Code Status: Level 1 - Full Code      Subjective:   Patient is having mucous and blood in from rectal area  She is concerned, amount is very little  This might be from tumor  Objective:     Vitals:   Temp (24hrs), Av 4 °F (37 4 °C), Min:98 4 °F (36 9 °C), Max:100 8 °F (38 2 °C)    Temp:  [98 4 °F (36 9 °C)-100 8 °F (38 2 °C)] 99 3 °F (37 4 °C)  HR:  [62-67] 67  Resp:  [18-19] 19  BP: (104-113)/(57-66) 104/66  SpO2:  [98 %] 98 %  Body mass index is 21 51 kg/m²  Input and Output Summary (last 24 hours): Intake/Output Summary (Last 24 hours) at 2020 1624  Last data filed at 2020 1404  Gross per 24 hour   Intake 800 ml   Output 2250 ml   Net -1450 ml       Physical Exam:     Physical Exam  Vitals signs and nursing note reviewed  HENT:      Head: Normocephalic and atraumatic  Mouth/Throat:      Mouth: Mucous membranes are dry  Pharynx: Oropharynx is clear  No oropharyngeal exudate or posterior oropharyngeal erythema  Eyes:      General: No scleral icterus  Right eye: No discharge  Left eye: No discharge  Extraocular Movements: Extraocular movements intact  Conjunctiva/sclera: Conjunctivae normal       Pupils: Pupils are equal, round, and reactive to light  Cardiovascular:      Pulses: Normal pulses  Heart sounds: Normal heart sounds  No murmur  Pulmonary:      Effort: Pulmonary effort is normal  No respiratory distress  Breath sounds: Normal breath sounds  No stridor  No wheezing, rhonchi or rales  Chest:      Chest wall: No tenderness  Abdominal:      General: Abdomen is flat  Bowel sounds are normal       Palpations: Abdomen is soft  Musculoskeletal: Normal range of motion  General: Swelling present  No tenderness, deformity or signs of injury  Right lower leg: Edema present  Left lower leg: Edema present  Skin:     General: Skin is warm and dry  Neurological:      General: No focal deficit present  Mental Status: She is alert and oriented to person, place, and time  Mental status is at baseline  Psychiatric:         Mood and Affect: Mood normal          Behavior: Behavior normal          Thought Content: Thought content normal          Additional Data:     Labs:    Results from last 7 days   Lab Units 09/08/20  0602   WBC Thousand/uL 11 95*   HEMOGLOBIN g/dL 7 6*   HEMATOCRIT % 25 8*   PLATELETS Thousands/uL 369   NEUTROS PCT % 67   LYMPHS PCT % 15   MONOS PCT % 9   EOS PCT % 6     Results from last 7 days   Lab Units 09/08/20  0602   POTASSIUM mmol/L 3 7   CHLORIDE mmol/L 102   CO2 mmol/L 29   BUN mg/dL 3*   CREATININE mg/dL 0 33*   CALCIUM mg/dL 8 5           * I Have Reviewed All Lab Data Listed Above  * Additional Pertinent Lab Tests Reviewed: [address] Admission Reviewed    Imaging:    Imaging Reports Reviewed Today Include:    Imaging Personally Reviewed by Myself Includes:       Recent Cultures (last 7 days):     Results from last 7 days   Lab Units 09/04/20  1311 09/03/20  0947   BLOOD CULTURE   --  No Growth After 5 Days  No Growth After 5 Days     GRAM STAIN RESULT  3+ Polys*  1+ Gram positive cocci in pairs*  --    BODY FLUID CULTURE, STERILE  No growth  --        Last 24 Hours Medication List:   Current Facility-Administered Medications   Medication Dose Route Frequency Provider Last Rate    acetaminophen  650 mg Oral Q6H PRN Jerry Blood, CRNP      calcium carbonate  500 mg Oral TID PRN Kandice Cortez MD      cefepime  2,000 mg Intravenous Q12H Robyn Ernandez MD 2,000 mg (09/08/20 1005)    enoxaparin  40 mg Subcutaneous Daily Michael Hayes MD      gabapentin  300 mg Oral TID Michael Hayes MD      HYDROmorphone  4 mg Oral Q4H PRN Jonah Cedillo DO      Or    HYDROmorphone  0 5 mg Intravenous Q4H PRN Sharlene José DO      lidocaine   Topical 4x Daily Michael Hayes MD      loratadine  10 mg Oral Daily Michael Hayes MD      metroNIDAZOLE  500 mg Oral [facility] Frank Gan MD      nystatin  500,000 Units Swish & Swallow 4x Daily Robyn Ernandez MD      ondansetron  4 mg Intravenous Q4H PRN Michael Hayes MD      oxyCODONE  40 mg Oral Q12H [address] Roro Nelson MD      oxyCODONE  10 mg Oral Q4H PRN Michael Hayes MD      oxyCODONE  15 mg Oral Q4H PRN Michael Hayes MD      sertraline  25 mg Oral Daily Michael Hayes MD          Today, Patient Was Seen By: Reena Nolasco MD    ** Please Note: Dictation voice to text software may have been used in the creation of this document   **

## 2020-09-09 ENCOUNTER — APPOINTMENT (INPATIENT)
Dept: RADIOLOGY | Facility: HOSPITAL | Age: 45
DRG: 329 | End: 2020-09-09
Payer: COMMERCIAL

## 2020-09-09 LAB
ANION GAP SERPL CALCULATED.3IONS-SCNC: 6 MMOL/L (ref 4–13)
BASOPHILS # BLD AUTO: 0.06 THOUSANDS/ΜL (ref 0–0.1)
BASOPHILS NFR BLD AUTO: 1 % (ref 0–1)
BUN SERPL-MCNC: 3 MG/DL (ref 5–25)
CALCIUM SERPL-MCNC: 8.3 MG/DL (ref 8.3–10.1)
CHLORIDE SERPL-SCNC: 102 MMOL/L (ref 100–108)
CO2 SERPL-SCNC: 28 MMOL/L (ref 21–32)
CREAT SERPL-MCNC: 0.34 MG/DL (ref 0.6–1.3)
EOSINOPHIL # BLD AUTO: 0.58 THOUSAND/ΜL (ref 0–0.61)
EOSINOPHIL NFR BLD AUTO: 5 % (ref 0–6)
ERYTHROCYTE [DISTWIDTH] IN BLOOD BY AUTOMATED COUNT: 21.8 % (ref 11.6–15.1)
GFR SERPL CREATININE-BSD FRML MDRD: 131 ML/MIN/1.73SQ M
GLUCOSE SERPL-MCNC: 82 MG/DL (ref 65–140)
HCT VFR BLD AUTO: 26.4 % (ref 34.8–46.1)
HGB BLD-MCNC: 7.6 G/DL (ref 11.5–15.4)
IMM GRANULOCYTES # BLD AUTO: 0.22 THOUSAND/UL (ref 0–0.2)
IMM GRANULOCYTES NFR BLD AUTO: 2 % (ref 0–2)
LYMPHOCYTES # BLD AUTO: 2.08 THOUSANDS/ΜL (ref 0.6–4.47)
LYMPHOCYTES NFR BLD AUTO: 16 % (ref 14–44)
MCH RBC QN AUTO: 24.4 PG (ref 26.8–34.3)
MCHC RBC AUTO-ENTMCNC: 28.8 G/DL (ref 31.4–37.4)
MCV RBC AUTO: 85 FL (ref 82–98)
MONOCYTES # BLD AUTO: 1.38 THOUSAND/ΜL (ref 0.17–1.22)
MONOCYTES NFR BLD AUTO: 11 % (ref 4–12)
NEUTROPHILS # BLD AUTO: 8.54 THOUSANDS/ΜL (ref 1.85–7.62)
NEUTS SEG NFR BLD AUTO: 65 % (ref 43–75)
NRBC BLD AUTO-RTO: 0 /100 WBCS
PLATELET # BLD AUTO: 374 THOUSANDS/UL (ref 149–390)
PMV BLD AUTO: 10.1 FL (ref 8.9–12.7)
POTASSIUM SERPL-SCNC: 3.8 MMOL/L (ref 3.5–5.3)
RBC # BLD AUTO: 3.11 MILLION/UL (ref 3.81–5.12)
SODIUM SERPL-SCNC: 136 MMOL/L (ref 136–145)
WBC # BLD AUTO: 12.86 THOUSAND/UL (ref 4.31–10.16)

## 2020-09-09 PROCEDURE — 99231 SBSQ HOSP IP/OBS SF/LOW 25: CPT | Performed by: INTERNAL MEDICINE

## 2020-09-09 PROCEDURE — 80048 BASIC METABOLIC PNL TOTAL CA: CPT | Performed by: INTERNAL MEDICINE

## 2020-09-09 PROCEDURE — 85025 COMPLETE CBC W/AUTO DIFF WBC: CPT | Performed by: INTERNAL MEDICINE

## 2020-09-09 PROCEDURE — G1004 CDSM NDSC: HCPCS

## 2020-09-09 PROCEDURE — 99232 SBSQ HOSP IP/OBS MODERATE 35: CPT | Performed by: INTERNAL MEDICINE

## 2020-09-09 PROCEDURE — 74177 CT ABD & PELVIS W/CONTRAST: CPT

## 2020-09-09 PROCEDURE — 99233 SBSQ HOSP IP/OBS HIGH 50: CPT | Performed by: INTERNAL MEDICINE

## 2020-09-09 RX ORDER — HYDROMORPHONE HCL/PF 1 MG/ML
1 SYRINGE (ML) INJECTION EVERY 4 HOURS PRN
Status: DISCONTINUED | OUTPATIENT
Start: 2020-09-09 | End: 2020-09-16

## 2020-09-09 RX ORDER — SENNOSIDES 8.6 MG
1 TABLET ORAL
Status: DISCONTINUED | OUTPATIENT
Start: 2020-09-09 | End: 2020-09-16 | Stop reason: HOSPADM

## 2020-09-09 RX ORDER — POLYETHYLENE GLYCOL 3350 17 G/17G
17 POWDER, FOR SOLUTION ORAL DAILY PRN
Status: DISCONTINUED | OUTPATIENT
Start: 2020-09-09 | End: 2020-09-16 | Stop reason: HOSPADM

## 2020-09-09 RX ORDER — OXYCODONE HYDROCHLORIDE 10 MG/1
20 TABLET ORAL EVERY 4 HOURS PRN
Status: DISCONTINUED | OUTPATIENT
Start: 2020-09-09 | End: 2020-09-16 | Stop reason: HOSPADM

## 2020-09-09 RX ORDER — OXYCODONE HCL 20 MG/1
40 TABLET, FILM COATED, EXTENDED RELEASE ORAL EVERY 8 HOURS SCHEDULED
Status: DISCONTINUED | OUTPATIENT
Start: 2020-09-09 | End: 2020-09-16 | Stop reason: HOSPADM

## 2020-09-09 RX ADMIN — SENNOSIDES 8.6 MG: 8.6 TABLET, FILM COATED ORAL at 21:16

## 2020-09-09 RX ADMIN — OXYCODONE HYDROCHLORIDE 40 MG: 20 TABLET, FILM COATED, EXTENDED RELEASE ORAL at 08:38

## 2020-09-09 RX ADMIN — NYSTATIN 500000 UNITS: 100000 SUSPENSION ORAL at 18:53

## 2020-09-09 RX ADMIN — LIDOCAINE: 4 CREAM TOPICAL at 21:19

## 2020-09-09 RX ADMIN — GABAPENTIN 300 MG: 300 CAPSULE ORAL at 08:37

## 2020-09-09 RX ADMIN — ENOXAPARIN SODIUM 40 MG: 40 INJECTION SUBCUTANEOUS at 08:38

## 2020-09-09 RX ADMIN — LIDOCAINE: 4 CREAM TOPICAL at 12:25

## 2020-09-09 RX ADMIN — OXYCODONE HYDROCHLORIDE 40 MG: 20 TABLET, FILM COATED, EXTENDED RELEASE ORAL at 14:42

## 2020-09-09 RX ADMIN — LIDOCAINE: 4 CREAM TOPICAL at 08:39

## 2020-09-09 RX ADMIN — NYSTATIN 500000 UNITS: 100000 SUSPENSION ORAL at 21:16

## 2020-09-09 RX ADMIN — GABAPENTIN 300 MG: 300 CAPSULE ORAL at 16:24

## 2020-09-09 RX ADMIN — OXYCODONE HYDROCHLORIDE 10 MG: 10 TABLET ORAL at 05:11

## 2020-09-09 RX ADMIN — NYSTATIN 500000 UNITS: 100000 SUSPENSION ORAL at 08:38

## 2020-09-09 RX ADMIN — OXYCODONE HYDROCHLORIDE 20 MG: 10 TABLET ORAL at 22:51

## 2020-09-09 RX ADMIN — IOHEXOL 85 ML: 350 INJECTION, SOLUTION INTRAVENOUS at 18:09

## 2020-09-09 RX ADMIN — LORATADINE 10 MG: 10 TABLET ORAL at 08:37

## 2020-09-09 RX ADMIN — OXYCODONE HYDROCHLORIDE 20 MG: 10 TABLET ORAL at 16:24

## 2020-09-09 RX ADMIN — HYDROMORPHONE HYDROCHLORIDE 1 MG: 1 INJECTION, SOLUTION INTRAMUSCULAR; INTRAVENOUS; SUBCUTANEOUS at 19:00

## 2020-09-09 RX ADMIN — NYSTATIN 500000 UNITS: 100000 SUSPENSION ORAL at 12:25

## 2020-09-09 RX ADMIN — LIDOCAINE: 4 CREAM TOPICAL at 18:53

## 2020-09-09 RX ADMIN — OXYCODONE HYDROCHLORIDE 40 MG: 20 TABLET, FILM COATED, EXTENDED RELEASE ORAL at 21:16

## 2020-09-09 RX ADMIN — SERTRALINE HYDROCHLORIDE 25 MG: 25 TABLET ORAL at 08:38

## 2020-09-09 RX ADMIN — GABAPENTIN 300 MG: 300 CAPSULE ORAL at 21:16

## 2020-09-09 RX ADMIN — OXYCODONE HYDROCHLORIDE 15 MG: 10 TABLET ORAL at 10:25

## 2020-09-09 NOTE — PROGRESS NOTES
Progress note - Palliative and Supportive Care   Vanessa Fitzpatrick 50 y o  female [id number]    Assessment:    -   Patient Active Problem List   Diagnosis    Large bowel obstruction ([address] )    Metastatic rectal cancer    Breast cancer    Abdominal pain    Anemia of chronic disease    Gram-positive bacteremia    Recurrent left gluteal abscess    Cancer associated pain    Possible evolving sepsis    Thrombocytosis     Bright red blood per rectum    Carpal tunnel syndrome, bilateral    Disc degeneration, lumbar    Muscle spasm    Chronic left SI joint pain    Myofascial pain syndrome    Nicotine dependence    Postlaminectomy syndrome, lumbar    Neck pain    Abnormal cytological findings in specimens from other organs, systems and tissues    Anxiety with depression    HTN (hypertension), benign    Vitamin D deficiency    Bacteroides bacteremia    Severe protein-calorie malnutrition         Plan:  1  Symptom management -    - Increase OxyContin to 40 mg TID   - Increase OxyIR to 20 mg q4h PRN   - Increase IV dilaudid to 1 mg q4H PRN   - Continue adjuncts with Gabapentin, Tylenol, Lidocaine cream   - bowel regimen per CRS   - Discussed care with interventional team- plan to repeat CT scan of the abdomen and pelvis- consideration for cryo-ablation if no further evidence of abscess formation     2  Goals - Full cares  3  Psychosocial support- time spent providing supportive listening  Interval history:       Patient describing worsening pain  All seems to be stemming from necrotic tumor in rectum  She is very worried that she will not be able to control her pain at discharge       MEDICATIONS / ALLERGIES:     all current active meds have been reviewed and current meds:   Current Facility-Administered Medications   Medication Dose Route Frequency    acetaminophen (TYLENOL) tablet 650 mg  650 mg Oral Q6H PRN    calcium carbonate (TUMS) chewable tablet 500 mg  500 mg Oral TID PRN    enoxaparin (LOVENOX) subcutaneous injection 40 mg  40 mg Subcutaneous Daily    gabapentin (NEURONTIN) capsule 300 mg  300 mg Oral TID    HYDROmorphone (DILAUDID) tablet 4 mg  4 mg Oral Q4H PRN    Or    HYDROmorphone (DILAUDID) injection 0 5 mg  0 5 mg Intravenous Q4H PRN    lidocaine (LMX) 4 % cream   Topical 4x Daily    loratadine (CLARITIN) tablet 10 mg  10 mg Oral Daily    nystatin (MYCOSTATIN) oral suspension 500,000 Units  500,000 Units Swish & Swallow 4x Daily    ondansetron (ZOFRAN) injection 4 mg  4 mg Intravenous Q4H PRN    oxyCODONE (OxyCONTIN) 12 hr tablet 40 mg  40 mg Oral Q12H [address]    oxyCODONE (ROXICODONE) immediate release tablet 10 mg  10 mg Oral Q4H PRN    oxyCODONE (ROXICODONE) IR tablet 15 mg  15 mg Oral Q4H PRN    sertraline (ZOLOFT) tablet 25 mg  25 mg Oral Daily       Allergies   Allergen Reactions    Augmentin [Amoxicillin-Pot Clavulanate] Confusion and Drowsiness       OBJECTIVE:    Physical Exam  Physical Exam  Vitals signs and nursing note reviewed  Constitutional:       General: She is not in acute distress  Comments: Appears uncomfortable   HENT:      Head: Normocephalic and atraumatic  Right Ear: External ear normal       Left Ear: External ear normal    Eyes:      General:         Right eye: No discharge  Left eye: No discharge  Cardiovascular:      Rate and Rhythm: Normal rate  Abdominal:      General: There is no distension  Palpations: Abdomen is soft  Comments: + hernia +ostomy   Musculoskeletal:      Right lower leg: No edema  Left lower leg: No edema  Skin:     Coloration: Skin is pale  Neurological:      General: No focal deficit present  Mental Status: She is oriented to person, place, and time  Mental status is at baseline  Psychiatric:         Mood and Affect: Mood normal          Behavior: Behavior normal          Thought Content: Thought content normal          Lab Results:   I have personally reviewed pertinent labs  , CBC: Lab Results   Component Value Date    WBC 12 86 (H) 09/09/2020    HGB 7 6 (L) 09/09/2020    HCT 26 4 (L) 09/09/2020    MCV 85 09/09/2020     09/09/2020    MCH 24 4 (L) 09/09/2020    MCHC 28 8 (L) 09/09/2020    RDW 21 8 (H) 09/09/2020    MPV 10 1 09/09/2020    NRBC 0 09/09/2020   , PT/PTT:No results found for: PT, PTT  Imaging Studies: reviewed  EKG, Pathology, and Other Studies: reviewed    Counseling / Coordination of Care    Total floor / unit time spent today 25+ minutes  Greater than 50% of total time was spent with the patient and / or family counseling and / or coordination of care  A description of the counseling / coordination of care: chart review, medication review, supportive listening

## 2020-09-09 NOTE — ASSESSMENT & PLAN NOTE
Blood culture from 8/27 grow  Bacteroides species - subsequent culture from 8/28 negative - preliminary cultures from 9/3 remaining negative thus far  Continue IV Cefepime w/ PO Flagyl per Infectious Disease  See plan for associated gluteal abscess above

## 2020-09-09 NOTE — ASSESSMENT & PLAN NOTE
Presented with increased abdominal and gluteal pain - recent admission for similar abscess noted  Associated gram-negative bacteremia present (see below)  Continue Flagyl/Cefepime - PRN pain control  Developed a high-grade fever on the night of 9/2 with waxing/waning leukocytosis  Repeat CT imaging on 9/3 revealing "Large rectal mass again noted consistent with known carcinoma  Omayra Nissen Omayra Nissen Status post decompressive transverse colostomy, new since the prior study  Omayra Nissen Omayra Nissen Complex mass in the presacral/precoccygeal region is again noted measuring 3 8 x 8 cm concerning for direct extension of the patient's rectal cancer to the presacral space with possible superinfection  Omayra Nissen Omayra Nissen There is also continuity of this process with the medial left gluteal musculature and an area which was previously drained, suspicious for superinfection of necrotic tumor/abscess, overall measuring 2 2 x 3 3 cm  Omayra Nissen Omayra Nissen Extensive metastatic disease to the lungs and liver  Omayra Nissen Omayra Nissen Complex left adnexal mass which could be a primary ovarian lesion versus a metastatic lesion  Omayra Nissen Omayra Nissen The overall appearance is suspicious for neoplasia  Omayra Nissen Omayra Nissen Ventral abdominal wall hernia/hernias with ascites  Omayra Nissen Omayra Nissen Additional stable findings as noted "  Status post IR guided drainage on 9/4 approximately 8 mL of purulent fluid -> await final fluid culture (preliminary growing gram-positive cocci in pairs)

## 2020-09-09 NOTE — PROGRESS NOTES
Progress Note - Jazmin Fitzpatrick 1975, 50 y o  female MRN: [id number]    Unit/Bed#: [facility] 903-01 Encounter: [id number]    Primary Care Provider: Ralph Diaz MD   Date and time admitted to hospital: 8/27/2020 12:51 AM        Severe protein-calorie malnutrition  Assessment & Plan  BMI of 21 51 in the setting of metastatic malignancy and decreased appetite/oral intake  On Ensure nutritional supplementation with meals    Bacteroides bacteremia  Assessment & Plan  Blood culture from 8/27 grow  Bacteroides species - subsequent culture from 8/28 negative - preliminary cultures from 9/3 remaining negative thus far  Continue IV Cefepime w/ PO Flagyl per Infectious Disease  See plan for associated gluteal abscess above    Possible evolving sepsis  Assessment & Plan  With waxing/waning leukocytosis and a high-grade fever of 101 1° on the night of 9/2  Mild procalcitonin bump at 0 26 subsequently normalized -> lactic acid normal  Repeat blood cultures from 9/3 remain negative thus  Continue Flagyl and IV Cefepime  See plan for gluteal abscess    Cancer associated pain  Assessment & Plan  Appreciate palliative care input regarding analgesic regimen    Anemia of chronic disease  Assessment & Plan  Monitor H/H  Monitor for postoperative blood loss - transfuse if hemoglobin < 7    Metastatic rectal cancer  Assessment & Plan  With liver/lung metastases s/p chemotherapy per outpatient oncology  CT abdomen/pelvis on 8/27 revealing concern for persistent partially obstructing mass - previously refused surgical intervention but agreeable and this hospitalization -> s/p colostomy on 8/31    PRN pain control and supportive care  Repeat CT imaging on 9/3 noted a complex left adnexal mass suspicious for a primary ovarian lesion versus metastatic lesion -> on previous CT scan, this was mentioned as a cystic lesion -> CA-125 tumor marker elevated, however, per oncology, ovarian mass more likely represents further metastasis rather than another primary malignancy  Complaining of rectal bleeding today with mucous production, colorectal surgery thinks this is from tumor, continue to watch  If there is large amount of blood notify surgery  Also, obtain h/h remains stable  CT of abdomen and pelvis ordered for today  * Recurrent left gluteal abscess  Assessment & Plan  Presented with increased abdominal and gluteal pain - recent admission for similar abscess noted  Associated gram-negative bacteremia present (see below)  Continue Flagyl/Cefepime - PRN pain control  Developed a high-grade fever on the night of 9/2 with waxing/waning leukocytosis  Repeat CT imaging on 9/3 revealing "Large rectal mass again noted consistent with known carcinoma  Mateo Moralezton Mateo Davis Status post decompressive transverse colostomy, new since the prior study  Mateo Davis Complex mass in the presacral/precoccygeal region is again noted measuring 3 8 x 8 cm concerning for direct extension of the patient's rectal cancer to the presacral space with possible superinfection  Mateo Davis There is also continuity of this process with the medial left gluteal musculature and an area which was previously drained, suspicious for superinfection of necrotic tumor/abscess, overall measuring 2 2 x 3 3 cm  Mateo Davis Extensive metastatic disease to the lungs and liver  Mateo Moralezalonso Davis Complex left adnexal mass which could be a primary ovarian lesion versus a metastatic lesion  Mateo Davis The overall appearance is suspicious for neoplasia  Mateo Davis Ventral abdominal wall hernia/hernias with ascites  Mateo Davis Additional stable findings as noted "  Status post IR guided drainage on 9/4 approximately 8 mL of purulent fluid -> await final fluid culture (preliminary growing gram-positive cocci in pairs)           VTE Pharmacologic Prophylaxis:   Pharmacologic: Enoxaparin (Lovenox)  Mechanical VTE Prophylaxis in Place: Yes    Patient Centered Rounds: I have performed bedside rounds with nursing staff today      Discussions with Specialists or Other Care Team Provider:colorectal surgery, palliative care    Education and Discussions with Family / Patient: patient      Time Spent for Care: 45 minutes  More than 50% of total time spent on counseling and coordination of care as described above  Current Length of Stay: 13 day(s)    Current Patient Status: Inpatient   Certification Statement: The patient will continue to require additional inpatient hospital stay due to pain rectal area    Discharge Plan: pending results of ct scan from today    Code Status: Level 1 - Full Code      Subjective:   Patient is still having a lot of pain in groin area  Objective:     Vitals:   Temp (24hrs), Av 9 °F (37 2 °C), Min:98 9 °F (37 2 °C), Max:98 9 °F (37 2 °C)    Temp:  [98 9 °F (37 2 °C)] 98 9 °F (37 2 °C)  HR:  [69-70] 69  Resp:  [16] 16  BP: (111-125)/(59-64) 125/59  SpO2:  [95 %] 95 %  Body mass index is 21 51 kg/m²  Input and Output Summary (last 24 hours): Intake/Output Summary (Last 24 hours) at 2020 1823  Last data filed at 2020 1500  Gross per 24 hour   Intake 1410 ml   Output 3201 ml   Net -1791 ml       Physical Exam:     Physical Exam    Additional Data:     Labs:    Results from last 7 days   Lab Units 20  0502   WBC Thousand/uL 12 86*   HEMOGLOBIN g/dL 7 6*   HEMATOCRIT % 26 4*   PLATELETS Thousands/uL 374   NEUTROS PCT % 65   LYMPHS PCT % 16   MONOS PCT % 11   EOS PCT % 5     Results from last 7 days   Lab Units 20  0502   POTASSIUM mmol/L 3 8   CHLORIDE mmol/L 102   CO2 mmol/L 28   BUN mg/dL 3*   CREATININE mg/dL 0 34*   CALCIUM mg/dL 8 3           * I Have Reviewed All Lab Data Listed Above  * Additional Pertinent Lab Tests Reviewed: [address] Admission Reviewed    Imaging:    Imaging Reports Reviewed Today Include:    Imaging Personally Reviewed by Myself Includes:      Recent Cultures (last 7 days):     Results from last 7 days   Lab Units 20  1311 20  0947   BLOOD CULTURE   --  No Growth After 5 Days    No Growth After 5 Days  GRAM STAIN RESULT  3+ Polys*  1+ Gram positive cocci in pairs*  --    BODY FLUID CULTURE, STERILE  No growth  --        Last 24 Hours Medication List:   Current Facility-Administered Medications   Medication Dose Route Frequency Provider Last Rate    acetaminophen  650 mg Oral Q6H PRN ZACH Wynn      calcium carbonate  500 mg Oral TID PRN Opal Lang MD      enoxaparin  40 mg Subcutaneous Daily Mera Gonzales MD      gabapentin  300 mg Oral TID Mera Gonzales MD      HYDROmorphone  1 mg Intravenous Q4H PRN Theola Jointer HIPOLITO Cedillo, DO      iohexol  50 mL Oral 90 min pre-procedure Rommel Saez MD      lidocaine   Topical 4x Daily Mera Gonzales MD      loratadine  10 mg Oral Daily Mera Gonzales MD      nystatin  500,000 Units Swish & Swallow 4x Daily Opal Lang MD      ondansetron  4 mg Intravenous Q4H PRN Mera Gonzales MD      oxyCODONE  40 mg Oral [facility] Shavonne Curtis, DO      oxyCODONE  20 mg Oral Q4H PRN Shavonne Cedillo, DO      polyethylene glycol  17 g Oral Daily PRN Shavonne Cedillo, DO      senna  1 tablet Oral HS Shavonne Cedillo, DO      sertraline  25 mg Oral Daily Mear Gonzales MD          Today, Patient Was Seen By: Ashley Montiel MD    ** Please Note: Dictation voice to text software may have been used in the creation of this document   **

## 2020-09-09 NOTE — ASSESSMENT & PLAN NOTE
With liver/lung metastases s/p chemotherapy per outpatient oncology  CT abdomen/pelvis on 8/27 revealing concern for persistent partially obstructing mass - previously refused surgical intervention but agreeable and this hospitalization -> s/p colostomy on 8/31    PRN pain control and supportive care  Repeat CT imaging on 9/3 noted a complex left adnexal mass suspicious for a primary ovarian lesion versus metastatic lesion -> on previous CT scan, this was mentioned as a cystic lesion -> CA-125 tumor marker elevated, however, per oncology, ovarian mass more likely represents further metastasis rather than another primary malignancy  Complaining of rectal bleeding today with mucous production, colorectal surgery thinks this is from tumor, continue to watch  If there is large amount of blood notify surgery  Also, obtain h/h remains stable  CT of abdomen and pelvis ordered for today

## 2020-09-09 NOTE — PLAN OF CARE
Problem: Potential for Falls  Goal: Patient will remain free of falls  Description: INTERVENTIONS:  - Assess patient frequently for physical needs  -  Identify cognitive and physical deficits and behaviors that affect risk of falls    -  [place] fall precautions as indicated by assessment   - Educate patient/family on patient safety including physical limitations  - Instruct patient to call for assistance with activity based on assessment  - Modify environment to reduce risk of injury  - Consider OT/PT consult to assist with strengthening/mobility  Outcome: Progressing     Problem: GASTROINTESTINAL - ADULT  Goal: Minimal or absence of nausea and/or vomiting  Description: INTERVENTIONS:  - Administer IV fluids if ordered to ensure adequate hydration  - Maintain NPO status until nausea and vomiting are resolved  - Nasogastric tube if ordered  - Administer ordered antiemetic medications as needed  - Provide nonpharmacologic comfort measures as appropriate  - Advance diet as tolerated, if ordered  - Consider nutrition services referral to assist patient with adequate nutrition and appropriate food choices  Outcome: Progressing  Goal: Maintains or returns to baseline bowel function  Description: INTERVENTIONS:  - Assess bowel function  - Encourage oral fluids to ensure adequate hydration  - Administer IV fluids if ordered to ensure adequate hydration  - Administer ordered medications as needed  - Encourage mobilization and activity  - Consider nutritional services referral to assist patient with adequate nutrition and appropriate food choices  Outcome: Progressing  Goal: Maintains adequate nutritional intake  Description: INTERVENTIONS:  - Monitor percentage of each meal consumed  - Identify factors contributing to decreased intake, treat as appropriate  - Assist with meals as needed  - Monitor I&O, weight, and lab values if indicated  - Obtain nutrition services referral as needed  Outcome: Progressing     Problem: Nutrition/Hydration-ADULT  Goal: Nutrient/Hydration intake appropriate for improving, restoring or maintaining nutritional needs  Description: Monitor and assess patient's nutrition/hydration status for malnutrition  Collaborate with interdisciplinary team and initiate plan and interventions as ordered  Monitor patient's weight and dietary intake as ordered or per policy  Utilize nutrition screening tool and intervene as necessary  Determine patient's food preferences and provide high-protein, high-caloric foods as appropriate       INTERVENTIONS:  - Monitor oral intake, urinary output, labs, and treatment plans  - Assess nutrition and hydration status and recommend course of action  - Evaluate amount of meals eaten  - Assist patient with eating if necessary   - Allow adequate time for meals  - Recommend/ encourage appropriate diets, oral nutritional supplements, and vitamin/mineral supplements  - Order, calculate, and assess calorie counts as needed  - Recommend, monitor, and adjust tube feedings and TPN/PPN based on assessed needs  - Assess need for intravenous fluids  - Provide specific nutrition/hydration education as appropriate  - Include patient/family/caregiver in decisions related to nutrition  Outcome: Progressing

## 2020-09-09 NOTE — PLAN OF CARE
Problem: Potential for Falls  Goal: Patient will remain free of falls  Description: INTERVENTIONS:  - Assess patient frequently for physical needs  -  Identify cognitive and physical deficits and behaviors that affect risk of falls    -  [place] fall precautions as indicated by assessment   - Educate patient/family on patient safety including physical limitations  - Instruct patient to call for assistance with activity based on assessment  - Modify environment to reduce risk of injury  - Consider OT/PT consult to assist with strengthening/mobility  Outcome: Progressing     Problem: GASTROINTESTINAL - ADULT  Goal: Minimal or absence of nausea and/or vomiting  Description: INTERVENTIONS:  - Administer IV fluids if ordered to ensure adequate hydration  - Maintain NPO status until nausea and vomiting are resolved  - Nasogastric tube if ordered  - Administer ordered antiemetic medications as needed  - Provide nonpharmacologic comfort measures as appropriate  - Advance diet as tolerated, if ordered  - Consider nutrition services referral to assist patient with adequate nutrition and appropriate food choices  Outcome: Progressing  Goal: Maintains or returns to baseline bowel function  Description: INTERVENTIONS:  - Assess bowel function  - Encourage oral fluids to ensure adequate hydration  - Administer IV fluids if ordered to ensure adequate hydration  - Administer ordered medications as needed  - Encourage mobilization and activity  - Consider nutritional services referral to assist patient with adequate nutrition and appropriate food choices  Outcome: Progressing  Goal: Maintains adequate nutritional intake  Description: INTERVENTIONS:  - Monitor percentage of each meal consumed  - Identify factors contributing to decreased intake, treat as appropriate  - Assist with meals as needed  - Monitor I&O, weight, and lab values if indicated  - Obtain nutrition services referral as needed  Outcome: Progressing     Problem: Nutrition/Hydration-ADULT  Goal: Nutrient/Hydration intake appropriate for improving, restoring or maintaining nutritional needs  Description: Monitor and assess patient's nutrition/hydration status for malnutrition  Collaborate with interdisciplinary team and initiate plan and interventions as ordered  Monitor patient's weight and dietary intake as ordered or per policy  Utilize nutrition screening tool and intervene as necessary  Determine patient's food preferences and provide high-protein, high-caloric foods as appropriate       INTERVENTIONS:  - Monitor oral intake, urinary output, labs, and treatment plans  - Assess nutrition and hydration status and recommend course of action  - Evaluate amount of meals eaten  - Assist patient with eating if necessary   - Allow adequate time for meals  - Recommend/ encourage appropriate diets, oral nutritional supplements, and vitamin/mineral supplements  - Order, calculate, and assess calorie counts as needed  - Recommend, monitor, and adjust tube feedings and TPN/PPN based on assessed needs  - Assess need for intravenous fluids  - Provide specific nutrition/hydration education as appropriate  - Include patient/family/caregiver in decisions related to nutrition  Outcome: Progressing

## 2020-09-10 LAB
ANION GAP SERPL CALCULATED.3IONS-SCNC: 4 MMOL/L (ref 4–13)
BASOPHILS # BLD AUTO: 0.07 THOUSANDS/ΜL (ref 0–0.1)
BASOPHILS NFR BLD AUTO: 1 % (ref 0–1)
BUN SERPL-MCNC: 3 MG/DL (ref 5–25)
CALCIUM SERPL-MCNC: 8.4 MG/DL (ref 8.3–10.1)
CHLORIDE SERPL-SCNC: 104 MMOL/L (ref 100–108)
CO2 SERPL-SCNC: 28 MMOL/L (ref 21–32)
CREAT SERPL-MCNC: 0.35 MG/DL (ref 0.6–1.3)
EOSINOPHIL # BLD AUTO: 0.57 THOUSAND/ΜL (ref 0–0.61)
EOSINOPHIL NFR BLD AUTO: 5 % (ref 0–6)
ERYTHROCYTE [DISTWIDTH] IN BLOOD BY AUTOMATED COUNT: 21.5 % (ref 11.6–15.1)
GFR SERPL CREATININE-BSD FRML MDRD: 130 ML/MIN/1.73SQ M
GLUCOSE SERPL-MCNC: 81 MG/DL (ref 65–140)
HCT VFR BLD AUTO: 27 % (ref 34.8–46.1)
HGB BLD-MCNC: 7.7 G/DL (ref 11.5–15.4)
IMM GRANULOCYTES # BLD AUTO: 0.18 THOUSAND/UL (ref 0–0.2)
IMM GRANULOCYTES NFR BLD AUTO: 2 % (ref 0–2)
LYMPHOCYTES # BLD AUTO: 2.2 THOUSANDS/ΜL (ref 0.6–4.47)
LYMPHOCYTES NFR BLD AUTO: 18 % (ref 14–44)
MCH RBC QN AUTO: 24.5 PG (ref 26.8–34.3)
MCHC RBC AUTO-ENTMCNC: 28.5 G/DL (ref 31.4–37.4)
MCV RBC AUTO: 86 FL (ref 82–98)
MONOCYTES # BLD AUTO: 1.4 THOUSAND/ΜL (ref 0.17–1.22)
MONOCYTES NFR BLD AUTO: 11 % (ref 4–12)
NEUTROPHILS # BLD AUTO: 7.96 THOUSANDS/ΜL (ref 1.85–7.62)
NEUTS SEG NFR BLD AUTO: 63 % (ref 43–75)
NRBC BLD AUTO-RTO: 0 /100 WBCS
PLATELET # BLD AUTO: 376 THOUSANDS/UL (ref 149–390)
PMV BLD AUTO: 10.2 FL (ref 8.9–12.7)
POTASSIUM SERPL-SCNC: 3.8 MMOL/L (ref 3.5–5.3)
RBC # BLD AUTO: 3.14 MILLION/UL (ref 3.81–5.12)
SODIUM SERPL-SCNC: 136 MMOL/L (ref 136–145)
WBC # BLD AUTO: 12.38 THOUSAND/UL (ref 4.31–10.16)

## 2020-09-10 PROCEDURE — 99232 SBSQ HOSP IP/OBS MODERATE 35: CPT | Performed by: FAMILY MEDICINE

## 2020-09-10 PROCEDURE — 80048 BASIC METABOLIC PNL TOTAL CA: CPT | Performed by: INTERNAL MEDICINE

## 2020-09-10 PROCEDURE — 85025 COMPLETE CBC W/AUTO DIFF WBC: CPT | Performed by: INTERNAL MEDICINE

## 2020-09-10 PROCEDURE — 99233 SBSQ HOSP IP/OBS HIGH 50: CPT | Performed by: INTERNAL MEDICINE

## 2020-09-10 PROCEDURE — 99231 SBSQ HOSP IP/OBS SF/LOW 25: CPT | Performed by: INTERNAL MEDICINE

## 2020-09-10 RX ADMIN — GABAPENTIN 300 MG: 300 CAPSULE ORAL at 21:40

## 2020-09-10 RX ADMIN — LIDOCAINE: 4 CREAM TOPICAL at 18:51

## 2020-09-10 RX ADMIN — ENOXAPARIN SODIUM 40 MG: 40 INJECTION SUBCUTANEOUS at 10:14

## 2020-09-10 RX ADMIN — OXYCODONE HYDROCHLORIDE 20 MG: 10 TABLET ORAL at 16:05

## 2020-09-10 RX ADMIN — LIDOCAINE: 4 CREAM TOPICAL at 21:40

## 2020-09-10 RX ADMIN — GABAPENTIN 300 MG: 300 CAPSULE ORAL at 16:05

## 2020-09-10 RX ADMIN — OXYCODONE HYDROCHLORIDE 20 MG: 10 TABLET ORAL at 07:54

## 2020-09-10 RX ADMIN — OXYCODONE HYDROCHLORIDE 40 MG: 20 TABLET, FILM COATED, EXTENDED RELEASE ORAL at 13:59

## 2020-09-10 RX ADMIN — OXYCODONE HYDROCHLORIDE 40 MG: 20 TABLET, FILM COATED, EXTENDED RELEASE ORAL at 21:40

## 2020-09-10 RX ADMIN — SENNOSIDES 8.6 MG: 8.6 TABLET, FILM COATED ORAL at 21:40

## 2020-09-10 RX ADMIN — NYSTATIN 500000 UNITS: 100000 SUSPENSION ORAL at 10:14

## 2020-09-10 RX ADMIN — GABAPENTIN 300 MG: 300 CAPSULE ORAL at 10:14

## 2020-09-10 RX ADMIN — OXYCODONE HYDROCHLORIDE 40 MG: 20 TABLET, FILM COATED, EXTENDED RELEASE ORAL at 05:37

## 2020-09-10 RX ADMIN — LIDOCAINE: 4 CREAM TOPICAL at 10:15

## 2020-09-10 RX ADMIN — OXYCODONE HYDROCHLORIDE 20 MG: 10 TABLET ORAL at 21:40

## 2020-09-10 RX ADMIN — OXYCODONE HYDROCHLORIDE 20 MG: 10 TABLET ORAL at 11:46

## 2020-09-10 RX ADMIN — LORATADINE 10 MG: 10 TABLET ORAL at 10:14

## 2020-09-10 RX ADMIN — SERTRALINE HYDROCHLORIDE 25 MG: 25 TABLET ORAL at 10:14

## 2020-09-10 NOTE — ASSESSMENT & PLAN NOTE
Presented with increased abdominal and gluteal pain - recent admission for similar abscess noted  Associated gram-negative bacteremia present (see below)  Continue Flagyl/Cefepime - PRN pain control  Developed a high-grade fever on the night of 9/2 with waxing/waning leukocytosis  Repeat CT imaging on 9/3 revealing "Large rectal mass again noted consistent with known carcinoma  Melvin Post Status post decompressive transverse colostomy, new since the prior study  Melvin Post Complex mass in the presacral/precoccygeal region is again noted measuring 3 8 x 8 cm concerning for direct extension of the patient's rectal cancer to the presacral space with possible superinfection  Melvin Post There is also continuity of this process with the medial left gluteal musculature and an area which was previously drained, suspicious for superinfection of necrotic tumor/abscess, overall measuring 2 2 x 3 3 cm  Melvin Post Extensive metastatic disease to the lungs and liver  Melvin Post Complex left adnexal mass which could be a primary ovarian lesion versus a metastatic lesion  Melvin Post The overall appearance is suspicious for neoplasia  Melvin Post Ventral abdominal wall hernia/hernias with ascites  Melvin Post Additional stable findings as noted "  Status post IR guided drainage on 9/4 approximately 8 mL of purulent fluid -> await final fluid culture (preliminary growing gram-positive cocci in pairs)

## 2020-09-10 NOTE — PROGRESS NOTES
Patient assisted with colostomy appliance change  Patient reports that she is having a significant amount of pain coming from left side of stoma  Appliance prepared to ensure no pressure placed in the area of pain  Stoma appears to have a large blister  Dr Lalit Muller notified rounded with nurse to view stoma

## 2020-09-10 NOTE — WOUND OSTOMY CARE
Progress Note- Ostomy  Chrystal Frankel Willhoit 50 y o  female  [id number]  [facility] 903-[facility] 903-01        Assessment:  Patient seen for continued ostomy care and teaching, or arrival to bed side patient noted out of bed ambulating in her room preparing for a shower  Patient changed pouch prior to wound care nurses arrival, pouch appears to have been applied correctly, noted good seal and patient is using strip paste correctly to even, uneven skin surface  Stoma remains edematous, well budded, red and moist; measures approximately 3 inches, patient currently using 4 inch barrier which she used last pouch this morning during change  Review with patient how apply pouch and daily, patient is well versed in ostomy care and is able to repeat given information, patient is aware stoma swelling will continued to decreased over the next 6 weeks, meanwhile patient she will used a 4 inch barrier  Patient states she feels confident enough with self care, does not believe she needs additional teaching days however will need supplies for discharge home with  Patient provided with extra pouches, skin prep as well as stoma strip paste  Plan:  Wound care is signing off, patient made aware we will available to answer questions as she needs  Vitals:    09/10/20 0701   BP: 115/72   Pulse: 81   Resp: 18   Temp: 98 3 °F (36 8 °C)   SpO2: 100%       Colostomy LUQ (Active)   Stomal Appliance 1 piece;Clean;Dry; Intact 09/09/20 2116   Stoma Assessment Budded;[place] 09/09/20 2116   Stoma size (in) 2 25 in 09/07/20 0901   Stoma Shape Round;Budded 09/09/20 2116   Peristomal Assessment TRACIE 09/09/20 2116   Treatment Bag change 09/07/20 0901   Output (mL) 300 mL 09/10/20 0701   Number of days: 10         Wound care signing off, please call ext [phone] or [phone] with questions or concerns          Yoana Lucero, RN, BSN, [facility]

## 2020-09-10 NOTE — PROGRESS NOTES
Progress Note - Infectious Disease   Puja Fitzpartick 50 y o  female MRN: [id number]  Unit/Bed#: [facility] 903-01 Encounter: [id number]      Impression:  1  Recurrent left gluteal abscess with Bacteroides thetaiotaomicron bacteremia  2  Stage IV metastatic rectal carcinoma on chemotherapy S/P laparoscopic colostomy POD 7    Recommendations:    She is afebrile  with modestly elevated  WBC count 12,380       1  Blood culture isolate  showing Bacteroides thetaiotaomicron which is susceptible to metronidazole dermsoledadccus eladia  Repeat blood culture  is negative  Blood cultures x2 from 9/3 are finalized negative  2  Observe off antibiotics  3  Repeat CT  scan noted which is essentially unchanged  There still is an ill-defined rim enhancing area in the left piriform sinus that extends into the gluteal musculature and presacral space that is likely related to carcinoma with abscess formation  Oncology to comment on whether any other invasive procedure need be done    Antibiotics:  1  None  Subjective:  Has continued seropurulent left buttock drainage with increased discomfort and swelling over the area  Denies chills, or sweats  Denies nausea, vomiting, or diarrhea  Objective:  Vitals:  Temp:  [97 8 °F (36 6 °C)-99 5 °F (37 5 °C)] 97 8 °F (36 6 °C)  HR:  [64-81] 76  Resp:  [16-18] 18  BP: (101-115)/(59-72) 109/61  SpO2:  [98 %-100 %] 100 %  Temp (24hrs), Av 5 °F (36 9 °C), Min:97 8 °F (36 6 °C), Max:99 5 °F (37 5 °C)  Current: Temperature: 97 8 °F (36 6 °C)    Physical Exam:     General Appearance:  Alert, appearing chronically ill nontoxic, no acute distress     Eyes:  Conjunctiva pale   Throat: Oropharynx moist without lesions    Lips, mucosa, and tongue normal   Neck: Supple, symmetrical, trachea midline, no adenopathy,  no tenderness/mass/nodules   Lungs:   Clear to auscultation bilaterally, no audible wheezes, rhonchi or rales; respirations unlabored   Heart:  Regular rate and rhythm, S1, S2 normal, no murmur, rub or gallop   Abdomen:   Colostomy site is clean with mild tenderness has abdominal distention, positive bowel sounds  No masses, no organomegaly    No CVA tenderness,    Extremities: Tattoos, 2+ proximal lower extremity edema   Skin: Tattoos, right subclavian PAC, left gluteal wound with seropurulent drainage and some tenderness  , colostomy as above           Invasive Devices     Central Venous Catheter Line            Port A Cath 06/22/20 Right Chest 80 days          Peripheral Intravenous Line            Peripheral IV 09/10/20 Right;Ventral (anterior) Forearm less than 1 day          Drain            Colostomy LUQ 10 days                Labs, Imaging, & Other studies:   All pertinent labs were personally reviewed  Results from last 7 days   Lab Units 09/10/20  0539 09/09/20  0502 09/08/20  0602   WBC Thousand/uL 12 38* 12 86* 11 95*   HEMOGLOBIN g/dL 7 7* 7 6* 7 6*   PLATELETS Thousands/uL 376 374 369     Results from last 7 days   Lab Units 09/10/20  0539 09/09/20  0502 09/08/20  0602   SODIUM mmol/L 136 136 136   POTASSIUM mmol/L 3 8 3 8 3 7   CHLORIDE mmol/L 104 102 102   CO2 mmol/L 28 28 29   BUN mg/dL 3* 3* 3*   CREATININE mg/dL 0 35* 0 34* 0 33*   EGFR ml/min/1 73sq m 130 131 133   CALCIUM mg/dL 8 4 8 3 8 5     Results from last 7 days   Lab Units 09/04/20  1311   GRAM STAIN RESULT  3+ Polys*  1+ Gram positive cocci in pairs*   BODY FLUID CULTURE, STERILE  No growth

## 2020-09-10 NOTE — UTILIZATION REVIEW
Continued Stay Review    Date: 9/10/2020                         Current Patient Class:  Inpatient   Current Level of Care:  Med Surg     HPI:45 y o  female initially admitted on 8/27/2020  With bacteroides bacteremia, anemia, cancer associated pain  Metastatic rectal cancer  recurrent left gluteal abscess    Assessment/Plan: Infectious disease note on 9/9 showed Blood cultures from 9/3 negative- abx's on hold - to observe off   Pt with continued seropurulent left buttock drainage with increased discomfort over the area  - consideration for cryo- ablation  Continue to monitor H & H stable at 7 7   WBC 12 38    Pain management - Meds adjusted on 9/9              - Increase OxyContin to 40 mg TID              - Increase OxyIR to 20 mg q4h PRN              - Increase IV dilaudid to 1 mg q4H PRN              - Continue adjuncts with Gabapentin, Tylenol, Lidocaine cream    Pertinent Labs/Diagnostic Results:       Results from last 7 days   Lab Units 09/10/20  0539 09/09/20  0502 09/08/20  0602 09/07/20  2123 09/07/20  0552   WBC Thousand/uL 12 38* 12 86* 11 95*  --  10 76*   HEMOGLOBIN g/dL 7 7* 7 6* 7 6* 7 9* 7 4*   HEMATOCRIT % 27 0* 26 4* 25 8* 26 5* 25 2*   PLATELETS Thousands/uL 376 374 369  --  333   NEUTROS ABS Thousands/µL 7 96* 8 54* 8 08*  --   --          Results from last 7 days   Lab Units 09/10/20  0539 09/09/20  0502 09/08/20  0602 09/07/20  0552 09/06/20  0601   SODIUM mmol/L 136 136 136 136 137   POTASSIUM mmol/L 3 8 3 8 3 7 3 7 3 5   CHLORIDE mmol/L 104 102 102 102 103   CO2 mmol/L 28 28 29 29 30   ANION GAP mmol/L 4 6 5 5 4   BUN mg/dL 3* 3* 3* 3* 3*   CREATININE mg/dL 0 35* 0 34* 0 33* 0 40* 0 48*   EGFR ml/min/1 73sq m 130 131 133 124 117   CALCIUM mg/dL 8 4 8 3 8 5 8 2* 8 1*     Results from last 7 days   Lab Units 09/10/20  0539 09/09/20  0502 09/08/20  0602 09/07/20  0552 09/06/20  0601 09/05/20  0527 09/04/20  0625   GLUCOSE RANDOM mg/dL 81 82 83 84 87 86 89       Results from last 7 days Lab Units 09/04/20  0625   PROCALCITONIN ng/ml 0 25     Results from last 7 days   Lab Units 09/04/20  1311   GRAM STAIN RESULT  3+ Polys*  1+ Gram positive cocci in pairs*   BODY FLUID CULTURE, STERILE  No growth     CT A & P - 9/9 - 1   No significant interval change in the large infiltrative rectal mass with liver, pulmonary and left adnexal metastasis since September 3, 2020      2   Ill-defined rim-enhancing area in the region of left pyriform sinus extending into the gluteal musculature and presacral space approximately measuring 4 8 x 4 3 x 10 8 cm, likely related to necrosis and superinfection of the infiltrative rectal   cancer with probable abscess formation   No significant interval change from September 3, 2020 when measured similarly     3   Diverting transverse colostomy with parastomal hernias as above, similar to prior exam      Vital Signs:   Date/Time   Temp   Pulse   Resp   BP   MAP (mmHg)   SpO2   O2 Device    09/10/20 0701   98 3 °F (36 8 °C)   81   18   115/72      100 %       09/09/20 2300   99 5 °F (37 5 °C)   64   16   101/59      98 %       09/09/20 2115                     None (Room air)    09/09/20 1500   98 9 °F (37 2 °C)   69   16   125/59      95 %       09/09/20 0830                     None (Room air)    09/09/20 0511   98 9 °F (37 2 °C)   70   16   111/64                   Medications:   Scheduled Medications:  enoxaparin, 40 mg, Subcutaneous, Daily  gabapentin, 300 mg, Oral, TID  iohexol, 50 mL, Oral, 90 min pre-procedure  lidocaine, , Topical, 4x Daily  loratadine, 10 mg, Oral, Daily  nystatin, 500,000 Units, Swish & Swallow, 4x Daily  oxyCODONE, 40 mg, Oral, Q8H GONZÁLEZ  senna, 1 tablet, Oral, HS  sertraline, 25 mg, Oral, Daily      Continuous IV Infusions:     PRN Meds:  acetaminophen, 650 mg, Oral, Q6H PRN  calcium carbonate, 500 mg, Oral, TID PRN  HYDROmorphone, 1 mg, Intravenous, Q4H PRN- 9/9 x 1  ondansetron, 4 mg, Intravenous, Q4H PRN  oxyCODONE, 20 mg, Oral, Q4H PRN  - 9/9 x 2 - 9/10 x 1  Oxycodone 15 mg po prn - 9/9 x 1  Oxycodone 10 mg po prn  - 9/9 x 1  polyethylene glycol, 17 g, Oral, Daily PRN    Nursing orders - VS - incentive spirometry - Knee high compressive stockings - I & O q 4 - Aqua K pad - up with assistance - diet regular house     Discharge Plan: TBD     Network Utilization Review Department  [email or web address]  ATTENTION: Please call with any questions or concerns to [phone] and carefully listen to the prompts so that you are directed to the right person  All voicemails are confidential   Lani Grand all requests for admission clinical reviews, approved or denied determinations and any other requests to dedicated fax number below belonging to the campus where the patient is receiving treatment   List of dedicated fax numbers for the Facilities:  [address] [place] [address] DENIALS (Administrative/Medical Necessity) [phone]   [address] (Maternity/NICU/Pediatrics) [phone]   Katerine Vaughn [phone]   [facility] [phone]   [place] [phone]   Pari Recinos [phone]   [address] [phone]   [facility]  [phone]   [address] [facility], [address]  [address] [facility] [address] [facility] [phone]

## 2020-09-10 NOTE — PROGRESS NOTES
Progress note - Palliative and Supportive Care   Sherman Fitzpatrick 50 y o  female [id number]    Assessment:     Patient Active Problem List   Diagnosis    Large bowel obstruction ([address] )    Metastatic rectal cancer    Breast cancer    Abdominal pain    Anemia of chronic disease    Gram-positive bacteremia    Recurrent left gluteal abscess    Cancer associated pain    Possible evolving sepsis    Thrombocytosis     Bright red blood per rectum    Carpal tunnel syndrome, bilateral    Disc degeneration, lumbar    Muscle spasm    Chronic left SI joint pain    Myofascial pain syndrome    Nicotine dependence    Postlaminectomy syndrome, lumbar    Neck pain    Abnormal cytological findings in specimens from other organs, systems and tissues    Anxiety with depression    HTN (hypertension), benign    Vitamin D deficiency    Bacteroides bacteremia    Severe protein-calorie malnutrition         Plan:  1  Symptom management    - Continue current regimen: OxyContin to 40 mg TID, OxyIR to 20 mg q4h PRN, IV dilaudid to 1 mg q4H  PRN    2  Goals - pain management         Interval history:  Ms Isabelle Valencia is resting comfortable, NAD  She does not have any complaints regarding her pain this am  She reports after her change in medications for increasing pain, she is now back to her baseline 4/10 pain and is feeling more comfortable   Her medications were increased yesterday for worsening pain (OxyContin to 40 mg TID, OxyIR to 20 mg q4h PRN, IV dilaudid to 1 mg q4H PRN)   In the past 24 hours 0900 (9/9) to 0900 today she has used est = 312 5 OME    MEDICATIONS / ALLERGIES:     all current active meds have been reviewed and current meds:   Current Facility-Administered Medications   Medication Dose Route Frequency    acetaminophen (TYLENOL) tablet 650 mg  650 mg Oral Q6H PRN    calcium carbonate (TUMS) chewable tablet 500 mg  500 mg Oral TID PRN    enoxaparin (LOVENOX) subcutaneous injection 40 mg  40 mg Subcutaneous Daily    gabapentin (NEURONTIN) capsule 300 mg  300 mg Oral TID    HYDROmorphone (DILAUDID) injection 1 mg  1 mg Intravenous Q4H PRN    iohexol (OMNIPAQUE) 240 MG/ML solution 50 mL  50 mL Oral 90 min pre-procedure    lidocaine (LMX) 4 % cream   Topical 4x Daily    loratadine (CLARITIN) tablet 10 mg  10 mg Oral Daily    nystatin (MYCOSTATIN) oral suspension 500,000 Units  500,000 Units Swish & Swallow 4x Daily    ondansetron (ZOFRAN) injection 4 mg  4 mg Intravenous Q4H PRN    oxyCODONE (OxyCONTIN) 12 hr tablet 40 mg  40 mg Oral Q8H [address]    oxyCODONE (ROXICODONE) immediate release tablet 20 mg  20 mg Oral Q4H PRN    polyethylene glycol (MIRALAX) packet 17 g  17 g Oral Daily PRN    senna (SENOKOT) tablet 8 6 mg  1 tablet Oral HS    sertraline (ZOLOFT) tablet 25 mg  25 mg Oral Daily       Allergies   Allergen Reactions    Augmentin [Amoxicillin-Pot Clavulanate] Confusion and Drowsiness       OBJECTIVE:    Physical Exam  Physical Exam  Vitals signs and nursing note reviewed  Constitutional:       General: She is not in acute distress  Comments: Cachetic   HENT:      Head: Normocephalic and atraumatic  Nose: Nose normal       Mouth/Throat:      Mouth: Mucous membranes are dry  Neck:      Comments: No visible adenopathy  Cardiovascular:      Rate and Rhythm: Normal rate and regular rhythm  Pulmonary:      Effort: Pulmonary effort is normal    Abdominal:      General: Bowel sounds are normal       Comments: Colostomy L side of abdomen, patient is having pain at site, surgical team notified by nursing   Skin:     Coloration: Skin is pale  Neurological:      Mental Status: She is alert  Psychiatric:         Mood and Affect: Mood normal          Behavior: Behavior normal          Lab Results:   I have personally reviewed pertinent labs  , CBC:   Lab Results   Component Value Date    WBC 12 38 (H) 09/10/2020    HGB 7 7 (L) 09/10/2020    HCT 27 0 (L) 09/10/2020    MCV 86 09/10/2020  09/10/2020    MCH 24 5 (L) 09/10/2020    MCHC 28 5 (L) 09/10/2020    RDW 21 5 (H) 09/10/2020    MPV 10 2 09/10/2020    NRBC 0 09/10/2020   , CMP:   Lab Results   Component Value Date    SODIUM 136 09/10/2020    K 3 8 09/10/2020     09/10/2020    CO2 28 09/10/2020    BUN 3 (L) 09/10/2020    CREATININE 0 35 (L) 09/10/2020    CALCIUM 8 4 09/10/2020    EGFR 130 09/10/2020   , BMP:  Lab Results   Component Value Date    SODIUM 136 09/10/2020    K 3 8 09/10/2020     09/10/2020    CO2 28 09/10/2020    BUN 3 (L) 09/10/2020    CREATININE 0 35 (L) 09/10/2020    GLUC 81 09/10/2020    CALCIUM 8 4 09/10/2020    AGAP 4 09/10/2020    EGFR 130 09/10/2020     Counseling / Coordination of Care    Total floor / unit time spent today 15 minutes  Greater than 50% of total time was spent with the patient and / or family counseling and / or coordination of care  A description of the counseling / coordination of care: discussing palliative care, pain management, care goals, current concerns

## 2020-09-10 NOTE — PLAN OF CARE
Problem: Potential for Falls  Goal: Patient will remain free of falls  Description: INTERVENTIONS:  - Assess patient frequently for physical needs  -  Identify cognitive and physical deficits and behaviors that affect risk of falls    -  [place] fall precautions as indicated by assessment   - Educate patient/family on patient safety including physical limitations  - Instruct patient to call for assistance with activity based on assessment  - Modify environment to reduce risk of injury  - Consider OT/PT consult to assist with strengthening/mobility  Outcome: Progressing     Problem: GASTROINTESTINAL - ADULT  Goal: Minimal or absence of nausea and/or vomiting  Description: INTERVENTIONS:  - Administer IV fluids if ordered to ensure adequate hydration  - Maintain NPO status until nausea and vomiting are resolved  - Nasogastric tube if ordered  - Administer ordered antiemetic medications as needed  - Provide nonpharmacologic comfort measures as appropriate  - Advance diet as tolerated, if ordered  - Consider nutrition services referral to assist patient with adequate nutrition and appropriate food choices  Outcome: Progressing  Goal: Maintains or returns to baseline bowel function  Description: INTERVENTIONS:  - Assess bowel function  - Encourage oral fluids to ensure adequate hydration  - Administer IV fluids if ordered to ensure adequate hydration  - Administer ordered medications as needed  - Encourage mobilization and activity  - Consider nutritional services referral to assist patient with adequate nutrition and appropriate food choices  Outcome: Progressing  Goal: Maintains adequate nutritional intake  Description: INTERVENTIONS:  - Monitor percentage of each meal consumed  - Identify factors contributing to decreased intake, treat as appropriate  - Assist with meals as needed  - Monitor I&O, weight, and lab values if indicated  - Obtain nutrition services referral as needed  Outcome: Progressing     Problem: Nutrition/Hydration-ADULT  Goal: Nutrient/Hydration intake appropriate for improving, restoring or maintaining nutritional needs  Description: Monitor and assess patient's nutrition/hydration status for malnutrition  Collaborate with interdisciplinary team and initiate plan and interventions as ordered  Monitor patient's weight and dietary intake as ordered or per policy  Utilize nutrition screening tool and intervene as necessary  Determine patient's food preferences and provide high-protein, high-caloric foods as appropriate       INTERVENTIONS:  - Monitor oral intake, urinary output, labs, and treatment plans  - Assess nutrition and hydration status and recommend course of action  - Evaluate amount of meals eaten  - Assist patient with eating if necessary   - Allow adequate time for meals  - Recommend/ encourage appropriate diets, oral nutritional supplements, and vitamin/mineral supplements  - Order, calculate, and assess calorie counts as needed  - Recommend, monitor, and adjust tube feedings and TPN/PPN based on assessed needs  - Assess need for intravenous fluids  - Provide specific nutrition/hydration education as appropriate  - Include patient/family/caregiver in decisions related to nutrition  Outcome: Progressing

## 2020-09-10 NOTE — ASSESSMENT & PLAN NOTE
With liver/lung metastases s/p chemotherapy per outpatient oncology  CT abdomen/pelvis on 8/27 revealing concern for persistent partially obstructing mass - previously refused surgical intervention but agreeable and this hospitalization -> s/p colostomy on 8/31    PRN pain control and supportive care  Repeat CT imaging on 9/3 noted a complex left adnexal mass suspicious for a primary ovarian lesion versus metastatic lesion -> on previous CT scan, this was mentioned as a cystic lesion -> CA-125 tumor marker elevated, however, per oncology, ovarian mass more likely represents further metastasis rather than another primary malignancy  Complaining of rectal bleeding today with mucous production, colorectal surgery thinks this is from tumor, continue to watch  If there is large amount of blood notify surgery  Also, obtain h/h remains stable  CT of abdomen and pelvis similar to previous, still has mass with possible abscess/necrosis  Updated patient  Colostomy is swollen with blisters noted new, she is complaining of pain on the left side of colon

## 2020-09-10 NOTE — PROGRESS NOTES
Progress Note - Infectious Disease   Puja Fitzpatrick 50 y o  female MRN: [id number]  Unit/Bed#: [facility] 903-01 Encounter: [id number]      Impression:  1  Recurrent left gluteal abscess with Bacteroides thetaiotaomicron bacteremia  2  Stage IV metastatic rectal carcinoma on chemotherapy S/P laparoscopic colostomy POD 6    Recommendations:    She is afebrile  with modestly elevated  WBC count 12,860       1  Blood culture isolate  showing Bacteroides thetaiotaomicron which is susceptible to metronidazole dermsoledadccus eladia  Repeat blood culture  is negative  Blood cultures x2 from 9/3 are finalized negative  2  Observe off antibiotics  3  Repeat CT scan noted which is essentially unchanged  There still is an ill-defined rim enhancing area in the left piriform sinus that extends into the gluteal musculature and presacral space that is likely related to carcinoma with abscess formation    Antibiotics:  1  None  Subjective:  Has continued seropurulent left buttock drainage with some increased discomfort over the area   Less bloating and swelling  Denies chills, or sweats  Denies nausea, vomiting, or diarrhea  Objective:  Vitals:  Temp:  [98 9 °F (37 2 °C)] 98 9 °F (37 2 °C)  HR:  [69-70] 69  Resp:  [16] 16  BP: (111-125)/(59-64) 125/59  SpO2:  [95 %] 95 %  Temp (24hrs), Av 9 °F (37 2 °C), Min:98 9 °F (37 2 °C), Max:98 9 °F (37 2 °C)  Current: Temperature: 98 9 °F (37 2 °C)    Physical Exam:     General Appearance:  Alert, appearing chronically ill nontoxic, no acute distress     Eyes:  Conjunctiva pale   Throat: Oropharynx moist without lesions    Lips, mucosa, and tongue normal   Neck: Supple, symmetrical, trachea midline, no adenopathy,  no tenderness/mass/nodules   Lungs:   Clear to auscultation bilaterally, no audible wheezes, rhonchi or rales; respirations unlabored   Heart:  Regular rate and rhythm, S1, S2 normal, no murmur, rub or gallop   Abdomen:   Colostomy site is clean with mild tenderness has abdominal distention, positive bowel sounds  No masses, no organomegaly    No CVA tenderness,    Extremities: Tattoos, 2+ proximal lower extremity edema   Skin: Tattoos, right subclavian PAC, left gluteal wound with seropurulent drainage and some tenderness  , colostomy as above  Invasive Devices     Central Venous Catheter Line            Port A Cath 06/22/20 Right Chest 79 days          Peripheral Intravenous Line            Peripheral IV 09/06/20 Left Forearm 3 days          Drain            Colostomy LUQ 9 days                Labs, Imaging, & Other studies:   All pertinent labs were personally reviewed  Results from last 7 days   Lab Units 09/09/20  0502 09/08/20  0602 09/07/20  2123 09/07/20  0552   WBC Thousand/uL 12 86* 11 95*  --  10 76*   HEMOGLOBIN g/dL 7 6* 7 6* 7 9* 7 4*   PLATELETS Thousands/uL 374 369  --  333     Results from last 7 days   Lab Units 09/09/20  0502 09/08/20  0602 09/07/20  0552   SODIUM mmol/L 136 136 136   POTASSIUM mmol/L 3 8 3 7 3 7   CHLORIDE mmol/L 102 102 102   CO2 mmol/L 28 29 29   BUN mg/dL 3* 3* 3*   CREATININE mg/dL 0 34* 0 33* 0 40*   EGFR ml/min/1 73sq m 131 133 124   CALCIUM mg/dL 8 3 8 5 8 2*     Results from last 7 days   Lab Units 09/04/20  1311 09/03/20  0947   BLOOD CULTURE   --  No Growth After 5 Days  No Growth After 5 Days     GRAM STAIN RESULT  3+ Polys*  1+ Gram positive cocci in pairs*  --    BODY FLUID CULTURE, STERILE  No growth  --

## 2020-09-10 NOTE — QUICK NOTE
Called to evaluate patient's stoma at bedside  Nurse noting blister formation on stoma  After evaluation, small area, located at 11 o'clock, appearing with small blister-like formation  Stoma appears edematous, red, healthy, viable, and functioning with stool and air present in bag  Discussed with wound care nurse as edema diminishes, blister will reabsorb  No acute concerns at this time  Will be available as needed      Richard Matos PA-C

## 2020-09-10 NOTE — PROGRESS NOTES
Progress Note - Samantha Fitzpatrick 1975, 50 y o  female MRN: [id number]    Unit/Bed#: [facility] 903-01 Encounter: [id number]    Primary Care Provider: Mary Dahl MD   Date and time admitted to hospital: 8/27/2020 12:51 AM        Severe protein-calorie malnutrition  Assessment & Plan  BMI of 21 51 in the setting of metastatic malignancy and decreased appetite/oral intake  On Ensure nutritional supplementation with meals    Bacteroides bacteremia  Assessment & Plan  Blood culture from 8/27 grow  Bacteroides species - subsequent culture from 8/28 negative - preliminary cultures from 9/3 remaining negative thus far  Continue IV Cefepime w/ PO Flagyl per Infectious Disease  See plan for associated gluteal abscess above    Possible evolving sepsis  Assessment & Plan  With waxing/waning leukocytosis and a high-grade fever of 101 1° on the night of 9/2  Mild procalcitonin bump at 0 26 subsequently normalized -> lactic acid normal  Repeat blood cultures from 9/3 remain negative thus  Continue Flagyl and IV Cefepime  See plan for gluteal abscess    Cancer associated pain  Assessment & Plan  Appreciate palliative care input regarding analgesic regimen    Anemia of chronic disease  Assessment & Plan  Monitor H/H  Monitor for postoperative blood loss - transfuse if hemoglobin < 7    Metastatic rectal cancer  Assessment & Plan  With liver/lung metastases s/p chemotherapy per outpatient oncology  CT abdomen/pelvis on 8/27 revealing concern for persistent partially obstructing mass - previously refused surgical intervention but agreeable and this hospitalization -> s/p colostomy on 8/31    PRN pain control and supportive care  Repeat CT imaging on 9/3 noted a complex left adnexal mass suspicious for a primary ovarian lesion versus metastatic lesion -> on previous CT scan, this was mentioned as a cystic lesion -> CA-125 tumor marker elevated, however, per oncology, ovarian mass more likely represents further metastasis rather than another primary malignancy  Complaining of rectal bleeding today with mucous production, colorectal surgery thinks this is from tumor, continue to watch  If there is large amount of blood notify surgery  Also, obtain h/h remains stable  CT of abdomen and pelvis similar to previous, still has mass with possible abscess/necrosis  Updated patient  Colostomy is swollen with blisters noted new, she is complaining of pain on the left side of colon  * Recurrent left gluteal abscess  Assessment & Plan  Presented with increased abdominal and gluteal pain - recent admission for similar abscess noted  Associated gram-negative bacteremia present (see below)  Continue Flagyl/Cefepime - PRN pain control  Developed a high-grade fever on the night of 9/2 with waxing/waning leukocytosis  Repeat CT imaging on 9/3 revealing "Large rectal mass again noted consistent with known carcinoma  Janelle Prows Janelle Prows Status post decompressive transverse colostomy, new since the prior study  Janelle Prows Janelle Prows Complex mass in the presacral/precoccygeal region is again noted measuring 3 8 x 8 cm concerning for direct extension of the patient's rectal cancer to the presacral space with possible superinfection  Janelle Prows Janelle Prows There is also continuity of this process with the medial left gluteal musculature and an area which was previously drained, suspicious for superinfection of necrotic tumor/abscess, overall measuring 2 2 x 3 3 cm  Janelle Prows Janelle Prows Extensive metastatic disease to the lungs and liver  Janelle Prows Janelle Prows Complex left adnexal mass which could be a primary ovarian lesion versus a metastatic lesion  Janelle Prows Janelle Prows The overall appearance is suspicious for neoplasia  Janelle Prows Janelle Prows Ventral abdominal wall hernia/hernias with ascites  Janelle Prows Janelle Prows Additional stable findings as noted "  Status post IR guided drainage on 9/4 approximately 8 mL of purulent fluid -> await final fluid culture (preliminary growing gram-positive cocci in pairs)           VTE Pharmacologic Prophylaxis:   Pharmacologic: Enoxaparin (Lovenox)  Mechanical VTE Prophylaxis in Place: Yes    Patient Centered Rounds: I have performed bedside rounds with nursing staff today  Discussions with Specialists or Other Care Team Provider: medicine     Education and Discussions with Family / Patient: patient     Time Spent for Care: 45 minutes  More than 50% of total time spent on counseling and coordination of care as described above  Current Length of Stay: 14 day(s)    Current Patient Status: Inpatient   Certification Statement: The patient will continue to require additional inpatient hospital stay due to colostomy, off of antibiotics now, might be discharge tomorrow, if surgery clears     Discharge Plan: pending above     Code Status: Level 1 - Full Code      Subjective:   Patient has swelling of colostomy with blisters that is new  Objective:     Vitals:   Temp (24hrs), Av 9 °F (37 2 °C), Min:98 3 °F (36 8 °C), Max:99 5 °F (37 5 °C)    Temp:  [98 3 °F (36 8 °C)-99 5 °F (37 5 °C)] 98 3 °F (36 8 °C)  HR:  [64-81] 81  Resp:  [16-18] 18  BP: (101-115)/(59-72) 115/72  SpO2:  [98 %-100 %] 100 %  Body mass index is 21 51 kg/m²  Input and Output Summary (last 24 hours): Intake/Output Summary (Last 24 hours) at 9/10/2020 1506  Last data filed at 9/10/2020 1443  Gross per 24 hour   Intake 740 ml   Output 4100 ml   Net -3360 ml       Physical Exam:     Physical Exam  Vitals signs and nursing note reviewed  Exam conducted with a chaperone present  Constitutional:       General: She is not in acute distress  Appearance: Normal appearance  She is obese  HENT:      Head: Normocephalic and atraumatic  Right Ear: There is no impacted cerumen  Nose: Nose normal  No congestion or rhinorrhea  Mouth/Throat:      Mouth: Mucous membranes are dry  Pharynx: No oropharyngeal exudate or posterior oropharyngeal erythema  Eyes:      General: No scleral icterus  Right eye: No discharge  Left eye: No discharge        Extraocular Movements: Extraocular movements intact  Conjunctiva/sclera: Conjunctivae normal       Pupils: Pupils are equal, round, and reactive to light  Neck:      Musculoskeletal: Normal range of motion and neck supple  No neck rigidity or muscular tenderness  Vascular: No carotid bruit  Cardiovascular:      Rate and Rhythm: Tachycardia present  Pulses: Normal pulses  Heart sounds: Normal heart sounds  No murmur  No friction rub  Abdominal:      General: Abdomen is flat  Bowel sounds are normal       Palpations: Abdomen is soft  Musculoskeletal: Normal range of motion  General: No swelling, tenderness, deformity or signs of injury  Lymphadenopathy:      Cervical: No cervical adenopathy  Neurological:      General: No focal deficit present  Mental Status: She is alert  Mental status is at baseline  She is disoriented  Cranial Nerves: No cranial nerve deficit  Sensory: No sensory deficit  Motor: No weakness  Coordination: Coordination normal       Gait: Gait normal       Deep Tendon Reflexes: Reflexes normal    Psychiatric:         Mood and Affect: Mood normal          Thought Content: Thought content normal          Additional Data:     Labs:    Results from last 7 days   Lab Units 09/10/20  0539   WBC Thousand/uL 12 38*   HEMOGLOBIN g/dL 7 7*   HEMATOCRIT % 27 0*   PLATELETS Thousands/uL 376   NEUTROS PCT % 63   LYMPHS PCT % 18   MONOS PCT % 11   EOS PCT % 5     Results from last 7 days   Lab Units 09/10/20  0539   POTASSIUM mmol/L 3 8   CHLORIDE mmol/L 104   CO2 mmol/L 28   BUN mg/dL 3*   CREATININE mg/dL 0 35*   CALCIUM mg/dL 8 4           * I Have Reviewed All Lab Data Listed Above  * Additional Pertinent Lab Tests Reviewed:  [address] Admission Reviewed    Imaging:    Imaging Reports Reviewed Today Include:    Imaging Personally Reviewed by Myself Includes:       Recent Cultures (last 7 days):     Results from last 7 days   Lab Units 09/04/20  1311   GRAM STAIN RESULT  3+ Polys*  1+ Gram positive cocci in pairs*   BODY FLUID CULTURE, STERILE  No growth       Last 24 Hours Medication List:   Current Facility-Administered Medications   Medication Dose Route Frequency Provider Last Rate    acetaminophen  650 mg Oral Q6H PRN ZACH Barraza      calcium carbonate  500 mg Oral TID PRN Karla Wood MD      enoxaparin  40 mg Subcutaneous Daily Theo Yo MD      gabapentin  300 mg Oral TID Theo Yo MD      HYDROmorphone  1 mg Intravenous Q4H PRN Tanvi Cedillo DO      iohexol  50 mL Oral 90 min pre-procedure Mike Groves MD      lidocaine   Topical 4x Daily Theo Yo MD      loratadine  10 mg Oral Daily Theo Yo MD      nystatin  500,000 Units Swish & Swallow 4x Daily Karla Wood MD      ondansetron  4 mg Intravenous Q4H PRN Theo Yo MD      oxyCODONE  40 mg Oral [facility] Shavonne Owens,       oxyCODONE  20 mg Oral Q4H PRN Shavonne Cedillo,       polyethylene glycol  17 g Oral Daily PRN Cathleen Moon, DO      senna  1 tablet Oral HS Shavonne Cedillo,       sertraline  25 mg Oral Daily Theo Yo MD          Today, Patient Was Seen By: Dashawn Pierre MD    ** Please Note: Dictation voice to text software may have been used in the creation of this document   **

## 2020-09-11 ENCOUNTER — APPOINTMENT (INPATIENT)
Dept: RADIOLOGY | Facility: HOSPITAL | Age: 45
DRG: 329 | End: 2020-09-11
Attending: RADIOLOGY
Payer: COMMERCIAL

## 2020-09-11 LAB
ANION GAP SERPL CALCULATED.3IONS-SCNC: 6 MMOL/L (ref 4–13)
BASOPHILS # BLD AUTO: 0.09 THOUSANDS/ΜL (ref 0–0.1)
BASOPHILS NFR BLD AUTO: 1 % (ref 0–1)
BUN SERPL-MCNC: 3 MG/DL (ref 5–25)
CALCIUM SERPL-MCNC: 8.7 MG/DL (ref 8.3–10.1)
CHLORIDE SERPL-SCNC: 101 MMOL/L (ref 100–108)
CO2 SERPL-SCNC: 29 MMOL/L (ref 21–32)
CREAT SERPL-MCNC: 0.5 MG/DL (ref 0.6–1.3)
EOSINOPHIL # BLD AUTO: 0.57 THOUSAND/ΜL (ref 0–0.61)
EOSINOPHIL NFR BLD AUTO: 4 % (ref 0–6)
ERYTHROCYTE [DISTWIDTH] IN BLOOD BY AUTOMATED COUNT: 21.3 % (ref 11.6–15.1)
GFR SERPL CREATININE-BSD FRML MDRD: 116 ML/MIN/1.73SQ M
GLUCOSE SERPL-MCNC: 85 MG/DL (ref 65–140)
HCT VFR BLD AUTO: 27.8 % (ref 34.8–46.1)
HGB BLD-MCNC: 8.2 G/DL (ref 11.5–15.4)
IMM GRANULOCYTES # BLD AUTO: 0.2 THOUSAND/UL (ref 0–0.2)
IMM GRANULOCYTES NFR BLD AUTO: 2 % (ref 0–2)
LYMPHOCYTES # BLD AUTO: 2.26 THOUSANDS/ΜL (ref 0.6–4.47)
LYMPHOCYTES NFR BLD AUTO: 18 % (ref 14–44)
MCH RBC QN AUTO: 25.3 PG (ref 26.8–34.3)
MCHC RBC AUTO-ENTMCNC: 29.5 G/DL (ref 31.4–37.4)
MCV RBC AUTO: 86 FL (ref 82–98)
MONOCYTES # BLD AUTO: 1.42 THOUSAND/ΜL (ref 0.17–1.22)
MONOCYTES NFR BLD AUTO: 11 % (ref 4–12)
NEUTROPHILS # BLD AUTO: 8.29 THOUSANDS/ΜL (ref 1.85–7.62)
NEUTS SEG NFR BLD AUTO: 64 % (ref 43–75)
NRBC BLD AUTO-RTO: 0 /100 WBCS
PLATELET # BLD AUTO: 446 THOUSANDS/UL (ref 149–390)
PMV BLD AUTO: 10.2 FL (ref 8.9–12.7)
POTASSIUM SERPL-SCNC: 3.8 MMOL/L (ref 3.5–5.3)
RBC # BLD AUTO: 3.24 MILLION/UL (ref 3.81–5.12)
SODIUM SERPL-SCNC: 136 MMOL/L (ref 136–145)
WBC # BLD AUTO: 12.83 THOUSAND/UL (ref 4.31–10.16)

## 2020-09-11 PROCEDURE — 10030 IMG GID FLU COLL DRG SFT TIS: CPT

## 2020-09-11 PROCEDURE — 99153 MOD SED SAME PHYS/QHP EA: CPT

## 2020-09-11 PROCEDURE — C1729 CATH, DRAINAGE: HCPCS

## 2020-09-11 PROCEDURE — C1769 GUIDE WIRE: HCPCS

## 2020-09-11 PROCEDURE — 99152 MOD SED SAME PHYS/QHP 5/>YRS: CPT | Performed by: RADIOLOGY

## 2020-09-11 PROCEDURE — 85025 COMPLETE CBC W/AUTO DIFF WBC: CPT | Performed by: INTERNAL MEDICINE

## 2020-09-11 PROCEDURE — 99232 SBSQ HOSP IP/OBS MODERATE 35: CPT | Performed by: INTERNAL MEDICINE

## 2020-09-11 PROCEDURE — 0J993ZZ DRAINAGE OF BUTTOCK SUBCUTANEOUS TISSUE AND FASCIA, PERCUTANEOUS APPROACH: ICD-10-PCS | Performed by: RADIOLOGY

## 2020-09-11 PROCEDURE — 49406 IMAGE CATH FLUID PERI/RETRO: CPT | Performed by: RADIOLOGY

## 2020-09-11 PROCEDURE — 80048 BASIC METABOLIC PNL TOTAL CA: CPT | Performed by: INTERNAL MEDICINE

## 2020-09-11 PROCEDURE — 99233 SBSQ HOSP IP/OBS HIGH 50: CPT | Performed by: INTERNAL MEDICINE

## 2020-09-11 PROCEDURE — 99152 MOD SED SAME PHYS/QHP 5/>YRS: CPT

## 2020-09-11 RX ORDER — OXYCODONE HCL 40 MG/1
40 TABLET, FILM COATED, EXTENDED RELEASE ORAL EVERY 8 HOURS SCHEDULED
Qty: 45 TABLET | Refills: 0 | Status: SHIPPED | OUTPATIENT
Start: 2020-09-11 | End: 2020-09-11

## 2020-09-11 RX ORDER — SODIUM CHLORIDE 9 MG/ML
3 INJECTION INTRAVENOUS DAILY
Qty: 30 SYRINGE | Refills: 3 | Status: ON HOLD | OUTPATIENT
Start: 2020-09-11 | End: 2020-10-10 | Stop reason: SDUPTHER

## 2020-09-11 RX ORDER — MIDAZOLAM HYDROCHLORIDE 2 MG/2ML
INJECTION, SOLUTION INTRAMUSCULAR; INTRAVENOUS CODE/TRAUMA/SEDATION MEDICATION
Status: COMPLETED | OUTPATIENT
Start: 2020-09-11 | End: 2020-09-11

## 2020-09-11 RX ORDER — FENTANYL CITRATE 50 UG/ML
INJECTION, SOLUTION INTRAMUSCULAR; INTRAVENOUS CODE/TRAUMA/SEDATION MEDICATION
Status: COMPLETED | OUTPATIENT
Start: 2020-09-11 | End: 2020-09-11

## 2020-09-11 RX ORDER — OXYCODONE HYDROCHLORIDE 60 MG/1
60 TABLET, FILM COATED, EXTENDED RELEASE ORAL EVERY 12 HOURS SCHEDULED
Qty: 60 TABLET | Refills: 0 | Status: SHIPPED | OUTPATIENT
Start: 2020-09-11 | End: 2020-10-11

## 2020-09-11 RX ORDER — ONDANSETRON HYDROCHLORIDE 8 MG/1
8 TABLET, FILM COATED ORAL
Qty: 45 TABLET | Refills: 1 | Status: ON HOLD | OUTPATIENT
Start: 2020-09-11 | End: 2020-11-09 | Stop reason: SDUPTHER

## 2020-09-11 RX ORDER — OXYCODONE HYDROCHLORIDE 20 MG/1
20 TABLET ORAL EVERY 4 HOURS PRN
Qty: 90 TABLET | Refills: 0 | Status: ON HOLD | OUTPATIENT
Start: 2020-09-11 | End: 2020-10-13 | Stop reason: SDUPTHER

## 2020-09-11 RX ADMIN — LORATADINE 10 MG: 10 TABLET ORAL at 09:10

## 2020-09-11 RX ADMIN — GABAPENTIN 300 MG: 300 CAPSULE ORAL at 17:48

## 2020-09-11 RX ADMIN — MIDAZOLAM 1 MG: 1 INJECTION INTRAMUSCULAR; INTRAVENOUS at 15:09

## 2020-09-11 RX ADMIN — FENTANYL CITRATE 50 MCG: 50 INJECTION, SOLUTION INTRAMUSCULAR; INTRAVENOUS at 15:09

## 2020-09-11 RX ADMIN — LIDOCAINE: 4 CREAM TOPICAL at 21:03

## 2020-09-11 RX ADMIN — OXYCODONE HYDROCHLORIDE 20 MG: 10 TABLET ORAL at 09:05

## 2020-09-11 RX ADMIN — OXYCODONE HYDROCHLORIDE 40 MG: 20 TABLET, FILM COATED, EXTENDED RELEASE ORAL at 21:01

## 2020-09-11 RX ADMIN — LIDOCAINE 1 APPLICATION: 4 CREAM TOPICAL at 09:09

## 2020-09-11 RX ADMIN — ENOXAPARIN SODIUM 40 MG: 40 INJECTION SUBCUTANEOUS at 09:10

## 2020-09-11 RX ADMIN — LIDOCAINE: 4 CREAM TOPICAL at 17:49

## 2020-09-11 RX ADMIN — SENNOSIDES 8.6 MG: 8.6 TABLET, FILM COATED ORAL at 21:01

## 2020-09-11 RX ADMIN — MIDAZOLAM 1 MG: 1 INJECTION INTRAMUSCULAR; INTRAVENOUS at 15:24

## 2020-09-11 RX ADMIN — MIDAZOLAM 1 MG: 1 INJECTION INTRAMUSCULAR; INTRAVENOUS at 15:18

## 2020-09-11 RX ADMIN — FENTANYL CITRATE 50 MCG: 50 INJECTION, SOLUTION INTRAMUSCULAR; INTRAVENOUS at 15:33

## 2020-09-11 RX ADMIN — NYSTATIN 500000 UNITS: 100000 SUSPENSION ORAL at 09:10

## 2020-09-11 RX ADMIN — LIDOCAINE: 4 CREAM TOPICAL at 12:36

## 2020-09-11 RX ADMIN — GABAPENTIN 300 MG: 300 CAPSULE ORAL at 09:10

## 2020-09-11 RX ADMIN — OXYCODONE HYDROCHLORIDE 20 MG: 10 TABLET ORAL at 17:47

## 2020-09-11 RX ADMIN — OXYCODONE HYDROCHLORIDE 40 MG: 20 TABLET, FILM COATED, EXTENDED RELEASE ORAL at 05:29

## 2020-09-11 RX ADMIN — HYDROMORPHONE HYDROCHLORIDE 1 MG: 1 INJECTION, SOLUTION INTRAMUSCULAR; INTRAVENOUS; SUBCUTANEOUS at 12:29

## 2020-09-11 RX ADMIN — MIDAZOLAM 1 MG: 1 INJECTION INTRAMUSCULAR; INTRAVENOUS at 15:33

## 2020-09-11 RX ADMIN — GABAPENTIN 300 MG: 300 CAPSULE ORAL at 21:01

## 2020-09-11 RX ADMIN — OXYCODONE HYDROCHLORIDE 20 MG: 10 TABLET ORAL at 23:58

## 2020-09-11 RX ADMIN — SERTRALINE HYDROCHLORIDE 25 MG: 25 TABLET ORAL at 09:10

## 2020-09-11 NOTE — PROGRESS NOTES
H&P reviewed  /70   Pulse 75   Temp 98 5 °F (36 9 °C) (Oral)   Resp 18   Ht 5' 2" (1 575 m)   Wt 53 3 kg (117 lb 9 5 oz)   SpO2 97%   BMI 21 51 kg/m²     Prior imaging was reviewed  Small persistent left gluteal collection in communication with necrotic presacral mass  Repeat blood cultures have been negative  We have been asked to place a drain into this  It is quite small but has not resolved after several aspirations  She is agreeable to have drain placed today  We have had previous conversation about implications of drain placement with possibility that it may be long-term or lifelong  Will avoid placing drain directly into the presacral mass today which appears mostly necrotic  Procedure has been discussed by my colleague Dr Jayson Schmidt with Dr Francisco J Hoff earlier today  Informed written consent was obtained      Drea Gutierrez MD

## 2020-09-11 NOTE — CONSULTS
Consultation - Radiation Oncology   Jennifer Fitzpatrick 50 y o  female MRN: [id number]  Unit/Bed#: [facility] 903-01 Encounter: [id number]        History of Present Illness   Physician Requesting Consult: Carlos Arizmendi MD  Reason for Consult / Principal Problem:  Stage IV metastatic rectal carcinoma in addition to synchronous right breast carcinoma with at least clinical stage II disease  Hx and PE limited by:  No limitations  HPI: Kush Knight is a 50y o  year old female who presents with newly diagnosed stage IV metastatic rectal carcinoma in addition to a synchronous right breast carcinoma with at least clinical stage II disease  She has metastatic disease to the pelvis, liver, and lungs  She has been receiving chemotherapy with Dr Joel Reinoso with FOLFIRINOX  She has a large infiltrative rectal mass extending in the presacral region involving the left pelvic sidewall  There was an area extending into the left gluteal musculature and presacral space that represents necrotic tumor along with superimposed abscess  Patient was admitted to the hospital with worsening perirectal/pelvic pain  She is status post diverting colostomy August 31, 2020  Due to her both liver and lung metastasis, resection of the primary was not recommended  She has been receiving antibiotics  She is scheduled for IR drainage of the presacral mass later today  She is being followed by palliative care and pain is under better control now  She is seen today for consultation to consider palliative radiation therapy to the pelvic/presacral region  She denies any previous history of any radiation therapy  Consults    Review of Systems 14 point review of systems is negative except for as noted in history of the present illness  Historical Information   Previous Oncology History:  As per history of present illness    Past Medical History:   Diagnosis Date    Breast cancer ([facility] [address] ) 06/01/2020    right side    History of rectal cancer 2020    Sepsis ([facility] [address] ) 2020     Past Surgical History:   Procedure Laterality Date    BACK SURGERY      BREAST BIOPSY Right 2020    2 sites; 1 breast 1 axilla    HERNIA REPAIR      3 repairs    IR BIOPSY LIVER MASS  2020    IR DRAINAGE TUBE PLACEMENT  2020    IR DRAINAGE TUBE PLACEMENT  2020    IR IMAGE GUIDED ASPIRATION / DRAINAGE  2020    IR IMAGE GUIDED ASPIRATION / DRAINAGE  2020    IR PORT PLACEMENT  2020    DC LAP, SURG COLOSTOMY N/A 2020    Procedure: COLOSTOMY LAPAROSCOPIC;  Surgeon: Kristine Mendoza MD;  Location: [facility];  Service: Colorectal    TUBAL LIGATION      US GUIDED BREAST BIOPSY RIGHT COMPLETE Right 2020    US GUIDED BREAST LYMPH NODE BIOPSY RIGHT Right 2020     Family History   Problem Relation Age of Onset    No Known Problems Mother     No Known Problems Sister     No Known Problems Daughter     Breast cancer Maternal Grandmother         age unknown    No Known Problems Sister     No Known Problems Daughter     Breast cancer Maternal Aunt 61    No Known Problems Maternal Aunt      Social History   Social History     Substance and Sexual Activity   Alcohol Use Not Currently     Social History     Substance and Sexual Activity   Drug Use No     Social History     Tobacco Use   Smoking Status Former Smoker    Packs/day: 0 00    Last attempt to quit: 2020    Years since quittin 3   Smokeless Tobacco Never Used       Meds/Allergies   all current active meds have been reviewed    Allergies   Allergen Reactions    Augmentin [Amoxicillin-Pot Clavulanate] Confusion and Drowsiness       Objective     Intake/Output Summary (Last 24 hours) at 2020 1825  Last data filed at 2020 1815  Gross per 24 hour   Intake 600 ml   Output 2433 ml   Net -1833 ml     Invasive Devices     Central Venous Catheter Line            Port A Cath 20 Right Chest 81 days          Peripheral Intravenous Line Peripheral IV 09/11/20 Dorsal (posterior); Left Forearm less than 1 day          Drain            Colostomy LUQ 11 days    Closed/Suction Drain Left Buttock Bulb 8 5 Fr  less than 1 day              Physical Exam  Vitals signs and nursing note reviewed  Constitutional:       General: She is not in acute distress  Appearance: She is well-developed  She is not diaphoretic  HENT:      Head: Normocephalic and atraumatic  Mouth/Throat:      Pharynx: No oropharyngeal exudate  Eyes:      General: No scleral icterus  Conjunctiva/sclera: Conjunctivae normal       Pupils: Pupils are equal, round, and reactive to light  Neck:      Musculoskeletal: Normal range of motion and neck supple  Thyroid: No thyromegaly  Trachea: No tracheal deviation  Cardiovascular:      Rate and Rhythm: Normal rate and regular rhythm  Heart sounds: Normal heart sounds  Pulmonary:      Effort: Pulmonary effort is normal  No respiratory distress  Breath sounds: Normal breath sounds  No wheezing or rales  Chest:      Chest wall: No tenderness  Abdominal:      General: Bowel sounds are normal  There is no distension  Palpations: Abdomen is soft  There is no mass  Tenderness: There is no abdominal tenderness  There is no guarding or rebound  Musculoskeletal: Normal range of motion  General: No tenderness  Lymphadenopathy:      Cervical: No cervical adenopathy  Upper Body:      Right upper body: No supraclavicular adenopathy  Left upper body: No supraclavicular adenopathy  Lower Body: No right inguinal adenopathy  No left inguinal adenopathy  Skin:     General: Skin is warm and dry  Coloration: Skin is not pale  Findings: No erythema or rash  Neurological:      Mental Status: She is alert and oriented to person, place, and time  Cranial Nerves: No cranial nerve deficit        Coordination: Coordination normal    Psychiatric:         Behavior: Behavior normal          Thought Content: Thought content normal          Judgment: Judgment normal        Lab Results: I have personally reviewed pertinent reports  Imaging Studies: I have personally reviewed pertinent reports  and I have personally reviewed pertinent films in PACS  EKG, Pathology, and Other Studies: I have personally reviewed pertinent reports  Assessment/Plan     Assessment and Plan:  Boris Bruno is a 50y o  year old female who presents with newly diagnosed stage IV metastatic rectal carcinoma in addition to a synchronous right breast carcinoma with at least clinical stage II disease  She has metastatic disease to the pelvis, liver, and lungs  She has been receiving chemotherapy with Dr Niles Rodriguez with FOLFIRINOX  She has a large infiltrative rectal mass extending in the presacral region involving the left pelvic sidewall  There was an area extending into the left gluteal musculature and presacral space that represents necrotic tumor along with superimposed abscess  Patient was admitted to the hospital with worsening perirectal/pelvic pain  She is status post diverting colostomy August 31, 2020  Due to her both liver and lung metastasis, resection of the primary was not recommended  She has been receiving antibiotics  She is scheduled for IR drainage of the presacral mass later today  She is being followed by palliative care and pain is under better control now  She is seen today for consultation to consider palliative radiation therapy to the pelvic/presacral region  She denies any previous history of any radiation therapy  Patient seen for consultation today and her imaging studies were reviewed  Despite chemotherapy with FOLFIRINOX, she appears to have progressive disease    She has large bulky pulmonary as well as liver metastasis in addition to her large infiltrative primary rectal mass with extension into the left pelvic sidewall, left gluteal and presacral regions  Agree with IR drainage of the presacral abscess/mass today and plans are to leave drain intact  I would not recommend any local radiation therapy to the pelvis at this time, since this would delay additional chemotherapy  She needs chemotherapy to treat all of her sites of disease  This was discussed today with the patient as well as Dr Niles Rodriguez who plans to start her on a new/different chemotherapy regimen  Code Status: Level 1 - Full Code  Advance Directive and Living Will:      Power of :    POLST:      Counseling / Coordination of Care  Total floor / unit time spent today 50 minutes  Greater than 50% of total time was spent with the patient and / or family counseling and / or coordination of care  A description of the counseling / coordination of care: See Above

## 2020-09-11 NOTE — PROGRESS NOTES
Progress Note - Jennifer Fitzpatrick 1975, 50 y o  female MRN: [id number]    Unit/Bed#: [facility] 903-01 Encounter: [id number]    Primary Care Provider: Toni Perry MD   Date and time admitted to hospital: 8/27/2020 12:51 AM        Severe protein-calorie malnutrition  Assessment & Plan  BMI of 21 51 in the setting of metastatic malignancy and decreased appetite/oral intake  On Ensure nutritional supplementation with meals    Bacteroides bacteremia  Assessment & Plan  Blood culture from 8/27 grow  Bacteroides species - subsequent culture from 8/28 negative - preliminary cultures from 9/3 remaining negative thus far  Continue IV Cefepime w/ PO Flagyl per Infectious Disease  See plan for associated gluteal abscess above    Possible evolving sepsis  Assessment & Plan  With waxing/waning leukocytosis and a high-grade fever of 101 1° on the night of 9/2  Mild procalcitonin bump at 0 26 subsequently normalized -> lactic acid normal  Repeat blood cultures from 9/3 remain negative thus  Continue Flagyl and IV Cefepime  See plan for gluteal abscess    Cancer associated pain  Assessment & Plan  Appreciate palliative care input regarding analgesic regimen    Anemia of chronic disease  Assessment & Plan  Monitor H/H  Monitor for postoperative blood loss - transfuse if hemoglobin < 7    Metastatic rectal cancer  Assessment & Plan  With liver/lung metastases s/p chemotherapy per outpatient oncology  CT abdomen/pelvis on 8/27 revealing concern for persistent partially obstructing mass - previously refused surgical intervention but agreeable and this hospitalization -> s/p colostomy on 8/31    PRN pain control and supportive care  Repeat CT imaging on 9/3 noted a complex left adnexal mass suspicious for a primary ovarian lesion versus metastatic lesion -> on previous CT scan, this was mentioned as a cystic lesion -> CA-125 tumor marker elevated, however, per oncology, ovarian mass more likely represents further metastasis rather than another primary malignancy  Complaining of rectal bleeding today with mucous production, colorectal surgery thinks this is from tumor, continue to watch  If there is large amount of blood notify surgery  Also, obtain h/h remains stable  CT of abdomen and pelvis similar to previous, still has mass with possible abscess/necrosis  Updated patient  Colostomy is swollen with blisters noted new, she is complaining of pain on the left side of colon  Colorectal surgery no need for intervention at this time  Abscess in the rectum-went for IR intervention  * Recurrent left gluteal abscess  Assessment & Plan  Presented with increased abdominal and gluteal pain - recent admission for similar abscess noted  Associated gram-negative bacteremia present (see below)  Continue Flagyl/Cefepime - PRN pain control  Developed a high-grade fever on the night of 9/2 with waxing/waning leukocytosis  Repeat CT imaging on 9/3 revealing "Large rectal mass again noted consistent with known carcinoma  [place] [facility] Mumtaz Simple Status post decompressive transverse colostomy, new since the prior study  [place] [facility] Complex mass in the presacral/precoccygeal region is again noted measuring 3 8 x 8 cm concerning for direct extension of the patient's rectal cancer to the presacral space with possible superinfection  [place] Simple Mumtaz Simple There is also continuity of this process with the medial left gluteal musculature and an area which was previously drained, suspicious for superinfection of necrotic tumor/abscess, overall measuring 2 2 x 3 3 cm  [place] [facility] Mum[facility]z Simple Extensive metastatic disease to the lungs and liver  [place] Simple Mumtaz Simple Complex left adnexal mass which could be a primary ovarian lesion versus a metastatic lesion  [place] Simple [place] Simple The overall appearance is suspicious for neoplasia  [place] Simple [place] Simple Ventral abdominal wall hernia/hernias with ascites  [place] Simple [place] Simple Additional stable findings as noted "  Status post IR guided drainage on 9/4 approximately 8 mL of purulent fluid -> await final fluid culture (preliminary growing gram-positive cocci in pairs)           VTE Pharmacologic Prophylaxis:   Pharmacologic: Enoxaparin (Lovenox)  Mechanical VTE Prophylaxis in Place: Yes    Patient Centered Rounds: I have performed bedside rounds with nursing staff today  Discussions with Specialists or Other Care Team Provider: ID, colorectal surgery,     Education and Discussions with Family / Patient: patient regarding plan for now    Time Spent for Care: 45 minutes  More than 50% of total time spent on counseling and coordination of care as described above  Current Length of Stay: 15 day(s)    Current Patient Status: Inpatient   Certification Statement: The patient will continue to require additional inpatient hospital stay due to IR intervention, need culture results    Discharge Plan: as above     Code Status: Level 1 - Full Code      Subjective:   Patient is doing well  She has pain in the pelvic area    Objective:     Vitals:   Temp (24hrs), Av 8 °F (37 1 °C), Min:98 5 °F (36 9 °C), Max:99 °F (37 2 °C)    Temp:  [98 5 °F (36 9 °C)-99 °F (37 2 °C)] 98 5 °F (36 9 °C)  HR:  [66-75] 75  Resp:  [18] 18  BP: ()/(44-70) 91/54  SpO2:  [95 %-100 %] 95 %  Body mass index is 21 51 kg/m²  Input and Output Summary (last 24 hours): Intake/Output Summary (Last 24 hours) at 2020 1657  Last data filed at 2020 1540  Gross per 24 hour   Intake 420 ml   Output 2433 ml   Net -2013 ml       Physical Exam:     Physical Exam  Vitals signs and nursing note reviewed  Exam conducted with a chaperone present  HENT:      Nose: No congestion or rhinorrhea  Eyes:      General: No scleral icterus  Right eye: No discharge  Left eye: No discharge  Cardiovascular:      Rate and Rhythm: Tachycardia present  Pulmonary:      Effort: Pulmonary effort is normal  No respiratory distress  Breath sounds: No rhonchi  Abdominal:      General: Abdomen is flat  Bowel sounds are normal       Palpations: Abdomen is soft     Musculoskeletal: General: Swelling and tenderness present  Right lower leg: Edema present  Left lower leg: Edema present  Skin:     General: Skin is warm and dry  Neurological:      General: No focal deficit present  Mental Status: She is alert and oriented to person, place, and time  Cranial Nerves: No cranial nerve deficit  Motor: No weakness  Additional Data:     Labs:    Results from last 7 days   Lab Units 09/11/20  0555   WBC Thousand/uL 12 83*   HEMOGLOBIN g/dL 8 2*   HEMATOCRIT % 27 8*   PLATELETS Thousands/uL 446*   NEUTROS PCT % 64   LYMPHS PCT % 18   MONOS PCT % 11   EOS PCT % 4     Results from last 7 days   Lab Units 09/11/20  0555   POTASSIUM mmol/L 3 8   CHLORIDE mmol/L 101   CO2 mmol/L 29   BUN mg/dL 3*   CREATININE mg/dL 0 50*   CALCIUM mg/dL 8 7           * I Have Reviewed All Lab Data Listed Above  * Additional Pertinent Lab Tests Reviewed:  [place] Admission Reviewed    Imaging:    Imaging Reports Reviewed Today Include:    Imaging Personally Reviewed by Myself Includes:      Recent Cultures (last 7 days):           Last 24 Hours Medication List:   Current Facility-Administered Medications   Medication Dose Route Frequency Provider Last Rate    acetaminophen  650 mg Oral Q6H PRN ZACH Smalls      calcium carbonate  500 mg Oral TID PRN Alcides Pruitt MD      enoxaparin  40 mg Subcutaneous Daily Jeremy Barraza MD      gabapentin  300 mg Oral TID Jeremy Barraza MD      HYDROmorphone  1 mg Intravenous Q4H PRN Darya Cedillo DO      iohexol  50 mL Oral 90 min pre-procedure Eleuterio Cantor MD      lidocaine   Topical 4x Daily Jeremy Barraza MD      loratadine  10 mg Oral Daily Jeremy Barraza MD      nystatin  500,000 Units Swish & Swallow 4x Daily Alcides Pruitt MD      ondansetron  4 mg Intravenous Q4H PRN Jeremy Barraza MD      oxyCODONE  40 mg Oral [facility] Shavonne Cedillo DO      oxyCODONE  20 mg Oral Q4H PRN Jonah Cedillo, DO      polyethylene glycol  17 g Oral Daily PRN Sharlene José, DO      senna  1 tablet Oral HS Shavonne Cedillo, DO      sertraline  25 mg Oral Daily Michael Hayes MD          Today, Patient Was Seen By: Reena Nolasco MD    ** Please Note: Dictation voice to text software may have been used in the creation of this document   **

## 2020-09-11 NOTE — PROGRESS NOTES
Progress Note - Jennifer Fitzpatrick 50 y o  female MRN: [id number]    Unit/Bed#: [facility] [place] 903-01 Encounter: [id number]      Assessment & Plan  Present on Admission:   Recurrent left gluteal abscess   Cancer associated pain   Metastatic rectal cancer   Anemia of chronic disease      C/o worsening of swelling and pain over the left buttock and left upper thigh area after an episode of rectal bleeding with ongoing intermittent serous rectal discharge  Intermittent serous drainage from rectum  Afebrile  Good po  Remains fully ambulatory with good PS ECOG 0-1  Pain controlled  CT scan from yesterday without significant change  Discussed with Dr Jen Espino- no indications for surgical intervention  Will discuss with IR regarding catheter drainage in order to prevent recurrent bacteriemia and to palliate abscess  Review of Systems:     GENERAL: Feeling well  Denies fatigue  Denies fevers, chills or night sweats  Denies appetite changes or weight change  SKIN: Denies rashes, easy bruising, pruritus, or hair loss  HEENT: Denies headache, vision changes or hearing changes  Denies sinus congestion or running nose or post-nasal drip  Denies nose bleeds or dry mouth  Denies mouth sores or sore throat  Denies hoarseness of voice  CARDIOVASCULAR: Denies chest pain or tightness, palpitations  Denies dizziness  RESPIRATORY: Denies SOB, dyspnea on exertion, wheezing, cough, or hemoptysis  Dara Soulier GASTROINTESTINAL: Denies nausea / vomiting  Denies abdominal pain  Denies swallowing difficulties or indigestion or bloating  Denies black stools or bloody stools, diarrhea / constipation  GENITOURINARY: Denies dysuria, frequency, hematuria, or nocturia  NEUROLOGICAL: Denies headache, focal weakness or abnormal sensations  Denies changes in hearing or vision  Denies difficulty with ambulating or balance problems  Denies seizures  MUSCULOSKELETAL: Denies back pain or joint pain or swelling   Denies muscle aches or weakness  Fully ambulatory  PSYCHIATRIC: Denies problems in sleeping  Denies anxiety or depression  Denies coping difficulties         Current Facility-Administered Medications:     acetaminophen (TYLENOL) tablet 650 mg, 650 mg, Oral, Q6H PRN, ZACH Mccracken, 650 mg at 09/07/20 1710    calcium carbonate (TUMS) chewable tablet 500 mg, 500 mg, Oral, TID PRN, Adrianne Muñiz MD, 500 mg at 09/02/20 1151    enoxaparin (LOVENOX) subcutaneous injection 40 mg, 40 mg, Subcutaneous, Daily, Himanshu Wells MD, 40 mg at 09/10/20 1014    gabapentin (NEURONTIN) capsule 300 mg, 300 mg, Oral, TID, Himanshu Wells MD, 300 mg at 09/10/20 2140    [DISCONTINUED] HYDROmorphone (DILAUDID) tablet 4 mg, 4 mg, Oral, Q4H PRN, 4 mg at 09/08/20 1411 **OR** HYDROmorphone (DILAUDID) injection 1 mg, 1 mg, Intravenous, Q4H PRN, Shavonne Cedillo DO, 1 mg at 09/09/20 1900    iohexol (OMNIPAQUE) 240 MG/ML solution 50 mL, 50 mL, Oral, 90 min pre-procedure, Marleny Souza MD    lidocaine (LMX) 4 % cream, , Topical, 4x Daily, Himanshu Wells MD    loratadine (CLARITIN) tablet 10 mg, 10 mg, Oral, Daily, Himanshu Wells MD, 10 mg at 09/10/20 1014    nystatin (MYCOSTATIN) oral suspension 500,000 Units, 500,000 Units, Swish & Swallow, 4x Daily, Adrianne Muñiz MD, 500,000 Units at 09/10/20 1014    ondansetron (ZOFRAN) injection 4 mg, 4 mg, Intravenous, Q4H PRN, Himanshu Wells MD, 4 mg at 08/31/20 1324    oxyCODONE (OxyCONTIN) 12 hr tablet 40 mg, 40 mg, Oral, Q8H [address], Shavonne Cedillo DO, 40 mg at 09/10/20 2140    oxyCODONE (ROXICODONE) immediate release tablet 20 mg, 20 mg, Oral, Q4H PRN, Shavonne Cedillo DO, 20 mg at 09/10/20 2140    polyethylene glycol (MIRALAX) packet 17 g, 17 g, Oral, Daily PRN, Shavonne Cedillo DO    senna (SENOKOT) tablet 8 6 mg, 1 tablet, Oral, HS, Shavonne Cedillo DO, 8 6 mg at 09/10/20 2140    sertraline (ZOLOFT) tablet 25 mg, 25 mg, Oral, Daily, Himanshu Wells MD, 25 mg at 09/10/20 1014      Vitals: Blood pressure 109/61, pulse 76, temperature 97 8 °F (36 6 °C), temperature source Oral, resp  rate 18, height 5' 2" (1 575 m), weight 53 3 kg (117 lb 9 5 oz), SpO2 100 %, not currently breastfeeding  ,Body mass index is 21 51 kg/m²  Intake/Output Summary (Last 24 hours) at 9/10/2020 2231  Last data filed at 9/10/2020 1855  Gross per 24 hour   Intake 820 ml   Output 3780 ml   Net -2960 ml       Physical Exam:     GENERAL: Alert & oriented  comfortable, in no acute distress  SKIN:  Normal skin color  Warm and dry  No rashes, ecchymosis or lesions  HEENT:  Sclera anicteric  Conjunctivae clear  PERRL  Oral and throat mucosa moist, clear without lesions or ulcers  NECK: Supple, and symmetrical  Trachea midline  No palpable mass or adenopathies  LYMPH NODES:  No palpable lymphadenopathy in cervical, supraclavicular, axillary node stations bilaterally  Respiratory: Lung sounds clear to auscultation bilaterally  No wheezing or rales  CV: Regular rate and rhythm  S1 S2  No murmurs or click  ABDOMEN: Soft, non-tender; bowel sounds normal; no palpable masses or  organomegaly  No ascities  Colostomy site clear  Edema and mild tenderness left buttock  Edema left upper thigh  MUSCULAR-SKELETAL: Normal joint motions  EXTREMITIES: No peripheral / ankle edema bilaterally  No redness or tenderness in the calves or thighs  Peripheral pulses 2+ and symmetric bilaterally  Normal strength, fully ambulatory  NEURO: Alert & oriented x3  Motor and sensory are grossly intact   No focal neuro-deficit  PSYCH: Normal affect  Relaxed mood  Appropriate with conversations  No results displayed because visit has over 200 results            Invasive Devices     Central Venous Catheter Line            Port A Cath 06/22/20 Right Chest 80 days          Peripheral Intravenous Line            Peripheral IV 09/10/20 Right;Ventral (anterior) Forearm less than 1 day          Drain            Colostomy LUQ 10 days                            Lab, Imaging and other studies: I have personally reviewed pertinent reports      VTE Pharmacologic Prophylaxis:   VTE Mechanical Prophylaxis:

## 2020-09-11 NOTE — BRIEF OP NOTE (RAD/CATH)
INTERVENTIONAL RADIOLOGY PROCEDURE NOTE    Date: 9/11/2020    Procedure: Procedure name not found  Preoperative diagnosis:   1  Abdominal pain    2  Gluteal abscess    3  Sepsis ([address] )    4  Partial bowel obstruction ([address] )    5  Metastatic rectal cancer    6  Palliative care patient    7  Cancer associated pain    8  Elevated CA-125    9  Ovarian mass         Postoperative diagnosis: Same  Surgeon: Meme Moreira MD     Assistant: None  No qualified resident was available  Blood loss: less than 2 ml     Specimens: none     Findings: 8Fr drain placed into small left gluteal collection  Very small amt of bloody fluid with debris  Complications: None immediate      Anesthesia: conscious sedation

## 2020-09-11 NOTE — ASSESSMENT & PLAN NOTE
Presented with increased abdominal and gluteal pain - recent admission for similar abscess noted  Associated gram-negative bacteremia present (see below)  Continue Flagyl/Cefepime - PRN pain control  Developed a high-grade fever on the night of 9/2 with waxing/waning leukocytosis  Repeat CT imaging on 9/3 revealing "Large rectal mass again noted consistent with known carcinoma  Ryan James Status post decompressive transverse colostomy, new since the prior study  [place] Complex mass in the presacral/precoccygeal region is again noted measuring 3 8 x 8 cm concerning for direct extension of the patient's rectal cancer to the presacral space with possible superinfection  Ryan James There is also continuity of this process with the medial left gluteal musculature and an area which was previously drained, suspicious for superinfection of necrotic tumor/abscess, overall measuring 2 2 x 3 3 cm  Ryan James Extensive metastatic disease to the lungs and liver  [place] Complex left adnexal mass which could be a primary ovarian lesion versus a metastatic lesion  Ryan James The overall appearance is suspicious for neoplasia  Ryan James Ventral abdominal wall hernia/hernias with ascites  Ryan James Additional stable findings as noted "  Status post IR guided drainage on 9/4 approximately 8 mL of purulent fluid -> await final fluid culture (preliminary growing gram-positive cocci in pairs)

## 2020-09-11 NOTE — SEDATION DOCUMENTATION
Left gluteal fluid collection drainage tube placed in IR by Dr Mahendra Lozano without complication  Tube to bulb suction  Tube to be flushed daily  Bedrest start time 3787

## 2020-09-11 NOTE — ASSESSMENT & PLAN NOTE
With liver/lung metastases s/p chemotherapy per outpatient oncology  CT abdomen/pelvis on 8/27 revealing concern for persistent partially obstructing mass - previously refused surgical intervention but agreeable and this hospitalization -> s/p colostomy on 8/31    PRN pain control and supportive care  Repeat CT imaging on 9/3 noted a complex left adnexal mass suspicious for a primary ovarian lesion versus metastatic lesion -> on previous CT scan, this was mentioned as a cystic lesion -> CA-125 tumor marker elevated, however, per oncology, ovarian mass more likely represents further metastasis rather than another primary malignancy  Complaining of rectal bleeding today with mucous production, colorectal surgery thinks this is from tumor, continue to watch  If there is large amount of blood notify surgery  Also, obtain h/h remains stable  CT of abdomen and pelvis similar to previous, still has mass with possible abscess/necrosis  Updated patient  Colostomy is swollen with blisters noted new, she is complaining of pain on the left side of colon  Colorectal surgery no need for intervention at this time  Abscess in the rectum-went for IR intervention

## 2020-09-11 NOTE — PLAN OF CARE
Problem: Potential for Falls  Goal: Patient will remain free of falls  Description: INTERVENTIONS:  - Assess patient frequently for physical needs  -  Identify cognitive and physical deficits and behaviors that affect risk of falls    -  [place] fall precautions as indicated by assessment   - Educate patient/family on patient safety including physical limitations  - Instruct patient to call for assistance with activity based on assessment  - Modify environment to reduce risk of injury  - Consider OT/PT consult to assist with strengthening/mobility  Outcome: Progressing     Problem: GASTROINTESTINAL - ADULT  Goal: Minimal or absence of nausea and/or vomiting  Description: INTERVENTIONS:  - Administer IV fluids if ordered to ensure adequate hydration  - Maintain NPO status until nausea and vomiting are resolved  - Nasogastric tube if ordered  - Administer ordered antiemetic medications as needed  - Provide nonpharmacologic comfort measures as appropriate  - Advance diet as tolerated, if ordered  - Consider nutrition services referral to assist patient with adequate nutrition and appropriate food choices  Outcome: Progressing  Goal: Maintains or returns to baseline bowel function  Description: INTERVENTIONS:  - Assess bowel function  - Encourage oral fluids to ensure adequate hydration  - Administer IV fluids if ordered to ensure adequate hydration  - Administer ordered medications as needed  - Encourage mobilization and activity  - Consider nutritional services referral to assist patient with adequate nutrition and appropriate food choices  Outcome: Progressing  Goal: Maintains adequate nutritional intake  Description: INTERVENTIONS:  - Monitor percentage of each meal consumed  - Identify factors contributing to decreased intake, treat as appropriate  - Assist with meals as needed  - Monitor I&O, weight, and lab values if indicated  - Obtain nutrition services referral as needed  Outcome: Progressing     Problem: Nutrition/Hydration-ADULT  Goal: Nutrient/Hydration intake appropriate for improving, restoring or maintaining nutritional needs  Description: Monitor and assess patient's nutrition/hydration status for malnutrition  Collaborate with interdisciplinary team and initiate plan and interventions as ordered  Monitor patient's weight and dietary intake as ordered or per policy  Utilize nutrition screening tool and intervene as necessary  Determine patient's food preferences and provide high-protein, high-caloric foods as appropriate       INTERVENTIONS:  - Monitor oral intake, urinary output, labs, and treatment plans  - Assess nutrition and hydration status and recommend course of action  - Evaluate amount of meals eaten  - Assist patient with eating if necessary   - Allow adequate time for meals  - Recommend/ encourage appropriate diets, oral nutritional supplements, and vitamin/mineral supplements  - Order, calculate, and assess calorie counts as needed  - Recommend, monitor, and adjust tube feedings and TPN/PPN based on assessed needs  - Assess need for intravenous fluids  - Provide specific nutrition/hydration education as appropriate  - Include patient/family/caregiver in decisions related to nutrition  Outcome: Progressing

## 2020-09-12 PROBLEM — K94.09 COLOSTOMY PROLAPSE (HCC): Status: ACTIVE | Noted: 2020-09-12

## 2020-09-12 PROCEDURE — 99232 SBSQ HOSP IP/OBS MODERATE 35: CPT | Performed by: INTERNAL MEDICINE

## 2020-09-12 PROCEDURE — 99233 SBSQ HOSP IP/OBS HIGH 50: CPT | Performed by: INTERNAL MEDICINE

## 2020-09-12 RX ORDER — CEFAZOLIN SODIUM 2 G/50ML
2000 SOLUTION INTRAVENOUS
Status: COMPLETED | OUTPATIENT
Start: 2020-09-13 | End: 2020-09-13

## 2020-09-12 RX ORDER — SODIUM CHLORIDE, SODIUM LACTATE, POTASSIUM CHLORIDE, CALCIUM CHLORIDE 600; 310; 30; 20 MG/100ML; MG/100ML; MG/100ML; MG/100ML
100 INJECTION, SOLUTION INTRAVENOUS CONTINUOUS
Status: DISCONTINUED | OUTPATIENT
Start: 2020-09-12 | End: 2020-09-13

## 2020-09-12 RX ORDER — LORAZEPAM 2 MG/ML
0.5 INJECTION INTRAMUSCULAR ONCE
Status: COMPLETED | OUTPATIENT
Start: 2020-09-12 | End: 2020-09-12

## 2020-09-12 RX ADMIN — ENOXAPARIN SODIUM 40 MG: 40 INJECTION SUBCUTANEOUS at 08:45

## 2020-09-12 RX ADMIN — LIDOCAINE 1 APPLICATION: 4 CREAM TOPICAL at 08:45

## 2020-09-12 RX ADMIN — OXYCODONE HYDROCHLORIDE 40 MG: 20 TABLET, FILM COATED, EXTENDED RELEASE ORAL at 12:52

## 2020-09-12 RX ADMIN — GABAPENTIN 300 MG: 300 CAPSULE ORAL at 18:13

## 2020-09-12 RX ADMIN — OXYCODONE HYDROCHLORIDE 40 MG: 20 TABLET, FILM COATED, EXTENDED RELEASE ORAL at 06:11

## 2020-09-12 RX ADMIN — GABAPENTIN 300 MG: 300 CAPSULE ORAL at 21:03

## 2020-09-12 RX ADMIN — OXYCODONE HYDROCHLORIDE 20 MG: 10 TABLET ORAL at 07:56

## 2020-09-12 RX ADMIN — OXYCODONE HYDROCHLORIDE 20 MG: 10 TABLET ORAL at 11:51

## 2020-09-12 RX ADMIN — LORAZEPAM 0.5 MG: 2 INJECTION INTRAMUSCULAR; INTRAVENOUS at 14:54

## 2020-09-12 RX ADMIN — HYDROMORPHONE HYDROCHLORIDE 1 MG: 1 INJECTION, SOLUTION INTRAMUSCULAR; INTRAVENOUS; SUBCUTANEOUS at 13:44

## 2020-09-12 RX ADMIN — LORATADINE 10 MG: 10 TABLET ORAL at 08:45

## 2020-09-12 RX ADMIN — SERTRALINE HYDROCHLORIDE 25 MG: 25 TABLET ORAL at 08:45

## 2020-09-12 RX ADMIN — GABAPENTIN 300 MG: 300 CAPSULE ORAL at 08:45

## 2020-09-12 RX ADMIN — OXYCODONE HYDROCHLORIDE 40 MG: 20 TABLET, FILM COATED, EXTENDED RELEASE ORAL at 21:02

## 2020-09-12 RX ADMIN — SENNOSIDES 8.6 MG: 8.6 TABLET, FILM COATED ORAL at 21:03

## 2020-09-12 RX ADMIN — SODIUM CHLORIDE, SODIUM LACTATE, POTASSIUM CHLORIDE, AND CALCIUM CHLORIDE 100 ML/HR: .6; .31; .03; .02 INJECTION, SOLUTION INTRAVENOUS at 19:30

## 2020-09-12 NOTE — ASSESSMENT & PLAN NOTE
With liver/lung metastases s/p chemotherapy per outpatient oncology  CT abdomen/pelvis on 8/27 revealing concern for persistent partially obstructing mass - previously refused surgical intervention but agreeable and this hospitalization -> s/p colostomy on 8/31    PRN pain control and supportive care  Repeat CT imaging on 9/3 noted a complex left adnexal mass suspicious for a primary ovarian lesion versus metastatic lesion -> on previous CT scan, this was mentioned as a cystic lesion -> CA-125 tumor marker elevated, however, per oncology, ovarian mass more likely represents further metastasis rather than another primary malignancy  Complaining of rectal bleeding today with mucous production, colorectal surgery thinks this is from tumor, continue to watch  If there is large amount of blood notify surgery  Also, obtain h/h remains stable  CT of abdomen and pelvis similar to previous, still has mass with possible abscess/necrosis  Colostomy is inflammed with blisters noted new, she is complaining of pain on the left side of colon  This is also reverted in the colostomy, the blisters are bigger now  Colorectal surgery was consulted, reside will discuss with attending today  Manual attempt causing pain     Abscess in the rectum-IR drains were placed, there is serosanguinous material in the drain

## 2020-09-12 NOTE — QUICK NOTE
Patient reevaluated  Distal end of colostomy prolapsed further and its color gets darker  Vital sings stable, no peritonitic  Will perform colostomy revision in AM unless it improves  Will closely follow

## 2020-09-12 NOTE — PROGRESS NOTES
Called to evaluate stoma  She has a end loop transverse colostomy  The distal end has prolapsed and is quite edematous but completely viable  Manually reduced partially and proximal lumen completely reduced and viable  Will observe distal limb for now as no viability concerns  If worsens, will revised in OR for patient comfort

## 2020-09-12 NOTE — PROGRESS NOTES
Entered into patient's chart to assist primary RN Geoffrey Lozano with medication pass of IV Ativan 0 5mg to provide patient with anxiety relief

## 2020-09-12 NOTE — PROGRESS NOTES
Progress Note - Infectious Disease   Puja Fitzpatrick 50 y o  female MRN: [id number]  Unit/Bed#: [facility] 903-01 Encounter: [id number]      Impression:  1  Recurrent left gluteal abscess with Bacteroides thetaiotaomicron bacteremia  2  Stage IV metastatic rectal carcinoma on chemotherapy S/P laparoscopic colostomy POD 8    Recommendations:    She is afebrile  with modestly elevated  WBC count 12,830 on        1  Blood culture isolate  showing Bacteroides thetaiotaomicron which is susceptible to metronidazole dermsoledadccus eladia  Repeat blood culture  is negative  Blood cultures x2 from 9/3 are finalized negative  2  Observe off antibiotics  3  IR placed 8 [place] drain into the small left gluteal collection with a return of a small amount of bloody fluid with debris  4  Now has a prolapsed colostomy stoma which was partially reduced and followed by Colorectal surgery patient says that the prolapse S since increased along with increasing pain  Have asked colorectal surgery to re-evaluate  Antibiotics:  1  None  Subjective:  Complains of increased abdominal pain with colostomy stoma prolapse  Denies chills, or sweats  Denies nausea, vomiting, or diarrhea  Objective:  Vitals:  Temp:  [98 4 °F (36 9 °C)-99 1 °F (37 3 °C)] 99 1 °F (37 3 °C)  HR:  [69-79] 73  Resp:  [16-18] 16  BP: ()/(52-62) 104/62  SpO2:  [92 %-97 %] 94 %  Temp (24hrs), Av 8 °F (37 1 °C), Min:98 4 °F (36 9 °C), Max:99 1 °F (37 3 °C)  Current: Temperature: 99 1 °F (37 3 °C)    Physical Exam:     General Appearance:  Lethargic but awake and appears uncomfortable chronically ill nontoxic, no acute distress     Eyes:  Conjunctiva pale   Throat: Oropharynx moist without lesions    Lips, mucosa, and tongue normal   Neck: Supple, symmetrical, trachea midline, no adenopathy,  no tenderness/mass/nodules   Lungs:   Clear to auscultation bilaterally, no audible wheezes, rhonchi or rales; respirations unlabored   Heart: Regular rate and rhythm, S1, S2 normal, no murmur, rub or gallop   Abdomen:   Colostomy site with prolapse and tenderness has abdominal distention, positive bowel sounds  No masses, no organomegaly    No CVA tenderness,    Extremities: Tattoos, 2+ proximal lower extremity edema   Skin: Tattoos, right subclavian PAC, left gluteal wound with ARACELI drain and mostly serous drainage and some tenderness  , colostomy as above  Invasive Devices     Central Venous Catheter Line            Port A Cath 06/22/20 Right Chest 82 days          Peripheral Intravenous Line            Peripheral IV 09/11/20 Dorsal (posterior); Left Forearm 1 day          Drain            Colostomy LUQ 12 days    Closed/Suction Drain Left Buttock Bulb 8 5 Fr  1 day                Labs, Imaging, & Other studies:   All pertinent labs were personally reviewed  Results from last 7 days   Lab Units 09/11/20  0555 09/10/20  0539 09/09/20  0502   WBC Thousand/uL 12 83* 12 38* 12 86*   HEMOGLOBIN g/dL 8 2* 7 7* 7 6*   PLATELETS Thousands/uL 446* 376 374     Results from last 7 days   Lab Units 09/11/20  0555 09/10/20  0539 09/09/20  0502   SODIUM mmol/L 136 136 136   POTASSIUM mmol/L 3 8 3 8 3 8   CHLORIDE mmol/L 101 104 102   CO2 mmol/L 29 28 28   BUN mg/dL 3* 3* 3*   CREATININE mg/dL 0 50* 0 35* 0 34*   EGFR ml/min/1 73sq m 116 130 131   CALCIUM mg/dL 8 7 8 4 8 3

## 2020-09-12 NOTE — PROGRESS NOTES
Progress Note - Jazmin Fitzpatrick 1975, 50 y o  female MRN: [id number]    Unit/Bed#: [facility] 903-01 Encounter: [id number]    Primary Care Provider: Ralph Diaz MD   Date and time admitted to hospital: 8/27/2020 12:51 AM        Severe protein-calorie malnutrition  Assessment & Plan  BMI of 21 51 in the setting of metastatic malignancy and decreased appetite/oral intake  On Ensure nutritional supplementation with meals    Bacteroides bacteremia  Assessment & Plan  Blood culture from 8/27 grow  Bacteroides species - subsequent culture from 8/28 negative - preliminary cultures from 9/3 remaining negative thus far  Continue IV Cefepime w/ PO Flagyl per Infectious Disease  See plan for associated gluteal abscess above    Possible evolving sepsis  Assessment & Plan  With waxing/waning leukocytosis and a high-grade fever of 101 1° on the night of 9/2  Mild procalcitonin bump at 0 26 subsequently normalized -> lactic acid normal  Repeat blood cultures from 9/3 remain negative thus  Continue Flagyl and IV Cefepime  See plan for gluteal abscess    Cancer associated pain  Assessment & Plan  Appreciate palliative care input regarding analgesic regimen    Anemia of chronic disease  Assessment & Plan  Monitor H/H  Monitor for postoperative blood loss - transfuse if hemoglobin < 7    Metastatic rectal cancer  Assessment & Plan  With liver/lung metastases s/p chemotherapy per outpatient oncology  CT abdomen/pelvis on 8/27 revealing concern for persistent partially obstructing mass - previously refused surgical intervention but agreeable and this hospitalization -> s/p colostomy on 8/31    PRN pain control and supportive care  Repeat CT imaging on 9/3 noted a complex left adnexal mass suspicious for a primary ovarian lesion versus metastatic lesion -> on previous CT scan, this was mentioned as a cystic lesion -> CA-125 tumor marker elevated, however, per oncology, ovarian mass more likely represents further metastasis rather than another primary malignancy  Complaining of rectal bleeding today with mucous production, colorectal surgery thinks this is from tumor, continue to watch  If there is large amount of blood notify surgery  Also, obtain h/h remains stable  CT of abdomen and pelvis similar to previous, still has mass with possible abscess/necrosis  Colostomy is inflammed with blisters noted new, she is complaining of pain on the left side of colon  This is also reverted in the colostomy, the blisters are bigger now  Colorectal surgery was consulted, reside will discuss with attending today  Manual attempt causing pain  Abscess in the rectum-IR drains were placed, there is serosanguinous material in the drain    * Recurrent left gluteal abscess  Assessment & Plan  Presented with increased abdominal and gluteal pain - recent admission for similar abscess noted  Associated gram-negative bacteremia present (see below)  Continue Flagyl/Cefepime - PRN pain control  Developed a high-grade fever on the night of 9/2 with waxing/waning leukocytosis  Repeat CT imaging on 9/3 revealing "Large rectal mass again noted consistent with known carcinoma  Bren Blair Bren Karolina Status post decompressive transverse colostomy, new since the prior study  Bren Blair Bren Blair Complex mass in the presacral/precoccygeal region is again noted measuring 3 8 x 8 cm concerning for direct extension of the patient's rectal cancer to the presacral space with possible superinfection  Bren Blair Bren Karolina There is also continuity of this process with the medial left gluteal musculature and an area which was previously drained, suspicious for superinfection of necrotic tumor/abscess, overall measuring 2 2 x 3 3 cm  Bren Blair Bren Karolina Extensive metastatic disease to the lungs and liver  Bren Blair Bren Karolina Complex left adnexal mass which could be a primary ovarian lesion versus a metastatic lesion  Bren Blair Bren Karolina The overall appearance is suspicious for neoplasia  Bren Blair Bren Blair Ventral abdominal wall hernia/hernias with ascites  Bren Blair Bren Blair Additional stable findings as noted "  Status post IR guided drainage on  approximately 8 mL of purulent fluid -> await final fluid culture (preliminary growing gram-positive cocci in pairs)           VTE Pharmacologic Prophylaxis:   Pharmacologic: Enoxaparin (Lovenox)  Mechanical VTE Prophylaxis in Place: Yes    Patient Centered Rounds: I have performed bedside rounds with nursing staff today  Discussions with Specialists or Other Care Team Provider: colorectal surgery    Education and Discussions with Family / Patient: patient at bedside     Time Spent for Care: 45 minutes  More than 50% of total time spent on counseling and coordination of care as described above  Current Length of Stay: 16 day(s)    Current Patient Status: Inpatient   Certification Statement: The patient will continue to require additional inpatient hospital stay due to drain in wound, colostomy inverted out    Discharge Plan: pending improvement of pain, colostomy  Code Status: Level 1 - Full Code      Subjective:   Patient has colostomy with blister, pain lower abdomen, noted colon reverted  Objective:     Vitals:   Temp (24hrs), Av 6 °F (37 °C), Min:98 4 °F (36 9 °C), Max:98 8 °F (37 1 °C)    Temp:  [98 4 °F (36 9 °C)-98 8 °F (37 1 °C)] 98 4 °F (36 9 °C)  HR:  [66-79] 69  Resp:  [18] 18  BP: ()/(44-60) 97/55  SpO2:  [93 %-100 %] 97 %  Body mass index is 21 51 kg/m²  Input and Output Summary (last 24 hours): Intake/Output Summary (Last 24 hours) at 2020 1333  Last data filed at 2020 1151  Gross per 24 hour   Intake 480 ml   Output 1548 ml   Net -1068 ml       Physical Exam:     Physical Exam  Vitals signs and nursing note reviewed  Exam conducted with a chaperone present  Constitutional:       General: She is not in acute distress  Appearance: Normal appearance  She is obese  She is not ill-appearing, toxic-appearing or diaphoretic  HENT:      Head: Normocephalic  Mouth/Throat:      Mouth: Mucous membranes are dry        Pharynx: Oropharynx is clear  No oropharyngeal exudate  Eyes:      Extraocular Movements: Extraocular movements intact  Conjunctiva/sclera: Conjunctivae normal       Pupils: Pupils are equal, round, and reactive to light  Neck:      Musculoskeletal: Normal range of motion and neck supple  No neck rigidity  Cardiovascular:      Rate and Rhythm: Tachycardia present  Abdominal:      General: There is distension  Tenderness: There is abdominal tenderness  There is guarding  Comments: Colostomy, reverted colon, blisters on the right side of colostomy   Musculoskeletal:         General: No swelling, tenderness, deformity or signs of injury  Right lower leg: No edema  Lymphadenopathy:      Cervical: No cervical adenopathy  Skin:     General: Skin is warm and dry  Coloration: Skin is not jaundiced  Findings: No bruising  Neurological:      General: No focal deficit present  Mental Status: She is alert  Mental status is at baseline  She is disoriented  Sensory: No sensory deficit  Psychiatric:         Mood and Affect: Mood normal          Behavior: Behavior normal          Thought Content: Thought content normal          Judgment: Judgment normal          Additional Data:     Labs:    Results from last 7 days   Lab Units 09/11/20  0555   WBC Thousand/uL 12 83*   HEMOGLOBIN g/dL 8 2*   HEMATOCRIT % 27 8*   PLATELETS Thousands/uL 446*   NEUTROS PCT % 64   LYMPHS PCT % 18   MONOS PCT % 11   EOS PCT % 4     Results from last 7 days   Lab Units 09/11/20  0555   POTASSIUM mmol/L 3 8   CHLORIDE mmol/L 101   CO2 mmol/L 29   BUN mg/dL 3*   CREATININE mg/dL 0 50*   CALCIUM mg/dL 8 7           * I Have Reviewed All Lab Data Listed Above  * Additional Pertinent Lab Tests Reviewed:  [place] Admission Reviewed    Imaging:    Imaging Reports Reviewed Today Include:    Imaging Personally Reviewed by Myself Includes:      Recent Cultures (last 7 days):           Last 24 Hours Medication List:   Current Facility-Administered Medications   Medication Dose Route Frequency Provider Last Rate    acetaminophen  650 mg Oral Q6H PRN ZACH Stover      calcium carbonate  500 mg Oral TID PRN Parag Pierre MD      enoxaparin  40 mg Subcutaneous Daily Idris yKle MD      gabapentin  300 mg Oral TID Idris Kyle MD      HYDROmorphone  1 mg Intravenous Q4H PRN Cade Cedillo,       iohexol  50 mL Oral 90 min pre-procedure Tre Gomez MD      lidocaine   Topical 4x Daily Idris Kyle MD      loratadine  10 mg Oral Daily Idris Kyle MD      nystatin  500,000 Units Swish & Swallow 4x Daily Parag Pierre MD      ondansetron  4 mg Intravenous Q4H PRN Idris Kyle MD      oxyCODONE  40 mg Oral [facility] Cade Pinon [facility], DO      oxyCODONE  20 mg Oral Q4H PRN Shavonne Cedillo,       polyethylene glycol  17 g Oral Daily PRN [facility], DO      senna  1 tablet Oral HS Shavonne Cedillo, DO      sertraline  25 mg Oral Daily Idris Kyle MD          Today, Patient Was Seen By: Artur Díaz MD    ** Please Note: Dictation voice to text software may have been used in the creation of this document   **

## 2020-09-12 NOTE — QUICK NOTE
Called since her stoma was prolapsed  She stated she noticed the prolapsed colostomy when she changed the stoma bag, was not like this yesterday  Complains some discomfort but not having abdominal pain or nausea  Distal end was prolapsed and edematous  Does not look ischemic  Manual pressure was  applied to reduced but failed  We will follow closely

## 2020-09-12 NOTE — PROGRESS NOTES
Progress Note - Infectious Disease   Puja Fitzpatrick 50 y o  female MRN: [id number]  Unit/Bed#: [facility] 903-01 Encounter: [id number]      Impression:  1  Recurrent left gluteal abscess with Bacteroides thetaiotaomicron bacteremia  2  Stage IV metastatic rectal carcinoma on chemotherapy S/P laparoscopic colostomy POD 8    Recommendations:    She is afebrile  with modestly elevated  WBC count 12,830       1  Blood culture isolate  showing Bacteroides thetaiotaomicron which is susceptible to metronidazole dermacoccus eladia  Repeat blood culture  is negative  Blood cultures x2 from 9/3 are finalized negative  2  Observe off antibiotics  3  IR placed 8 [place] drain into the small left gluteal collection with a return of a small amount of bloody fluid with debris  Antibiotics:  1  None  Subjective:  Currently sleepy after receiving sedation for her procedure  Denies chills, or sweats  Denies nausea, vomiting, or diarrhea  Objective:  Vitals:  Temp:  [98 5 °F (36 9 °C)-99 °F (37 2 °C)] 98 5 °F (36 9 °C)  HR:  [66-75] 75  Resp:  [18] 18  BP: ()/(44-70) 106/60  SpO2:  [95 %-100 %] 95 %  Temp (24hrs), Av 8 °F (37 1 °C), Min:98 5 °F (36 9 °C), Max:99 °F (37 2 °C)  Current: Temperature: 98 5 °F (36 9 °C)    Physical Exam:     General Appearance:  Postprocedure lethargy, appearing chronically ill nontoxic, no acute distress     Eyes:  Conjunctiva pale   Throat: Oropharynx moist without lesions  Lips, mucosa, and tongue normal   Neck: Supple, symmetrical, trachea midline, no adenopathy,  no tenderness/mass/nodules   Lungs:   Clear to auscultation bilaterally, no audible wheezes, rhonchi or rales; respirations unlabored   Heart:  Regular rate and rhythm, S1, S2 normal, no murmur, rub or gallop   Abdomen:   Colostomy site is clean with mild tenderness has abdominal distention, positive bowel sounds    No masses, no organomegaly    No CVA tenderness,    Extremities: Tattoos, 2+ proximal lower extremity edema   Skin: Tattoos, right subclavian PAC, left gluteal wound with ARACELI drain and mostly serous drainage and some tenderness  , colostomy as above  Invasive Devices     Central Venous Catheter Line            Port A Cath 06/22/20 Right Chest 81 days          Peripheral Intravenous Line            Peripheral IV 09/11/20 Dorsal (posterior); Left Forearm less than 1 day          Drain            Colostomy LUQ 11 days    Closed/Suction Drain Left Buttock Bulb 8 5 Fr  less than 1 day                Labs, Imaging, & Other studies:   All pertinent labs were personally reviewed  Results from last 7 days   Lab Units 09/11/20  0555 09/10/20  0539 09/09/20  0502   WBC Thousand/uL 12 83* 12 38* 12 86*   HEMOGLOBIN g/dL 8 2* 7 7* 7 6*   PLATELETS Thousands/uL 446* 376 374     Results from last 7 days   Lab Units 09/11/20  0555 09/10/20  0539 09/09/20  0502   SODIUM mmol/L 136 136 136   POTASSIUM mmol/L 3 8 3 8 3 8   CHLORIDE mmol/L 101 104 102   CO2 mmol/L 29 28 28   BUN mg/dL 3* 3* 3*   CREATININE mg/dL 0 50* 0 35* 0 34*   EGFR ml/min/1 73sq m 116 130 131   CALCIUM mg/dL 8 7 8 4 8 3

## 2020-09-12 NOTE — ASSESSMENT & PLAN NOTE
Presented with increased abdominal and gluteal pain - recent admission for similar abscess noted  Associated gram-negative bacteremia present (see below)  Continue Flagyl/Cefepime - PRN pain control  Developed a high-grade fever on the night of 9/2 with waxing/waning leukocytosis  Repeat CT imaging on 9/3 revealing "Large rectal mass again noted consistent with known carcinoma  [place] Organ Status post decompressive transverse colostomy, new since the prior study  [place] Organ [place] Organ Complex mass in the presacral/precoccygeal region is again noted measuring 3 8 x 8 cm concerning for direct extension of the patient's rectal cancer to the presacral space with possible superinfection  [place] Organ There is also continuity of this process with the medial left gluteal musculature and an area which was previously drained, suspicious for superinfection of necrotic tumor/abscess, overall measuring 2 2 x 3 3 cm  [place] Organ Extensive metastatic disease to the lungs and liver  [place] Organ [place] Organ Complex left adnexal mass which could be a primary ovarian lesion versus a metastatic lesion  [place] Organ [place] Organ The overall appearance is suspicious for neoplasia  [place] Organ Ventral abdominal wall hernia/hernias with ascites  [place] Organ [place] Organ Additional stable findings as noted "  Status post IR guided drainage on 9/4 approximately 8 mL of purulent fluid -> await final fluid culture (preliminary growing gram-positive cocci in pairs)

## 2020-09-13 ENCOUNTER — ANESTHESIA EVENT (INPATIENT)
Dept: PERIOP | Facility: HOSPITAL | Age: 45
DRG: 329 | End: 2020-09-13
Payer: COMMERCIAL

## 2020-09-13 ENCOUNTER — ANESTHESIA (INPATIENT)
Dept: PERIOP | Facility: HOSPITAL | Age: 45
DRG: 329 | End: 2020-09-13
Payer: COMMERCIAL

## 2020-09-13 VITALS — HEART RATE: 87 BPM

## 2020-09-13 LAB
ABO GROUP BLD: NORMAL
ANION GAP SERPL CALCULATED.3IONS-SCNC: 4 MMOL/L (ref 4–13)
BLD GP AB SCN SERPL QL: NEGATIVE
BUN SERPL-MCNC: 3 MG/DL (ref 5–25)
CALCIUM SERPL-MCNC: 8.5 MG/DL (ref 8.3–10.1)
CHLORIDE SERPL-SCNC: 103 MMOL/L (ref 100–108)
CO2 SERPL-SCNC: 29 MMOL/L (ref 21–32)
CREAT SERPL-MCNC: 0.42 MG/DL (ref 0.6–1.3)
ERYTHROCYTE [DISTWIDTH] IN BLOOD BY AUTOMATED COUNT: 20.5 % (ref 11.6–15.1)
GFR SERPL CREATININE-BSD FRML MDRD: 122 ML/MIN/1.73SQ M
GLUCOSE SERPL-MCNC: 80 MG/DL (ref 65–140)
HCT VFR BLD AUTO: 28.9 % (ref 34.8–46.1)
HGB BLD-MCNC: 8.4 G/DL (ref 11.5–15.4)
MCH RBC QN AUTO: 25.1 PG (ref 26.8–34.3)
MCHC RBC AUTO-ENTMCNC: 29.1 G/DL (ref 31.4–37.4)
MCV RBC AUTO: 87 FL (ref 82–98)
PLATELET # BLD AUTO: 407 THOUSANDS/UL (ref 149–390)
PMV BLD AUTO: 10.6 FL (ref 8.9–12.7)
POTASSIUM SERPL-SCNC: 4.2 MMOL/L (ref 3.5–5.3)
RBC # BLD AUTO: 3.34 MILLION/UL (ref 3.81–5.12)
RH BLD: POSITIVE
SODIUM SERPL-SCNC: 136 MMOL/L (ref 136–145)
SPECIMEN EXPIRATION DATE: NORMAL
WBC # BLD AUTO: 9.62 THOUSAND/UL (ref 4.31–10.16)

## 2020-09-13 PROCEDURE — 86901 BLOOD TYPING SEROLOGIC RH(D): CPT | Performed by: SURGERY

## 2020-09-13 PROCEDURE — 44345 REVISION OF COLOSTOMY: CPT | Performed by: COLON & RECTAL SURGERY

## 2020-09-13 PROCEDURE — 85027 COMPLETE CBC AUTOMATED: CPT | Performed by: SURGERY

## 2020-09-13 PROCEDURE — 88304 TISSUE EXAM BY PATHOLOGIST: CPT | Performed by: PATHOLOGY

## 2020-09-13 PROCEDURE — 99232 SBSQ HOSP IP/OBS MODERATE 35: CPT | Performed by: INTERNAL MEDICINE

## 2020-09-13 PROCEDURE — 99233 SBSQ HOSP IP/OBS HIGH 50: CPT | Performed by: INTERNAL MEDICINE

## 2020-09-13 PROCEDURE — 86850 RBC ANTIBODY SCREEN: CPT | Performed by: SURGERY

## 2020-09-13 PROCEDURE — 80048 BASIC METABOLIC PNL TOTAL CA: CPT | Performed by: SURGERY

## 2020-09-13 PROCEDURE — 86900 BLOOD TYPING SEROLOGIC ABO: CPT | Performed by: SURGERY

## 2020-09-13 PROCEDURE — 0WQFXZ2 REPAIR ABDOMINAL WALL, STOMA, EXTERNAL APPROACH: ICD-10-PCS | Performed by: COLON & RECTAL SURGERY

## 2020-09-13 RX ORDER — FENTANYL CITRATE/PF 50 MCG/ML
25 SYRINGE (ML) INJECTION
Status: DISCONTINUED | OUTPATIENT
Start: 2020-09-13 | End: 2020-09-13 | Stop reason: HOSPADM

## 2020-09-13 RX ORDER — KETAMINE HCL IN NACL, ISO-OSM 100MG/10ML
SYRINGE (ML) INJECTION AS NEEDED
Status: DISCONTINUED | OUTPATIENT
Start: 2020-09-13 | End: 2020-09-13

## 2020-09-13 RX ORDER — GLYCOPYRROLATE 0.2 MG/ML
INJECTION INTRAMUSCULAR; INTRAVENOUS AS NEEDED
Status: DISCONTINUED | OUTPATIENT
Start: 2020-09-13 | End: 2020-09-13

## 2020-09-13 RX ORDER — ONDANSETRON 2 MG/ML
4 INJECTION INTRAMUSCULAR; INTRAVENOUS ONCE AS NEEDED
Status: DISCONTINUED | OUTPATIENT
Start: 2020-09-13 | End: 2020-09-13 | Stop reason: HOSPADM

## 2020-09-13 RX ORDER — HYDROMORPHONE HCL/PF 1 MG/ML
SYRINGE (ML) INJECTION AS NEEDED
Status: DISCONTINUED | OUTPATIENT
Start: 2020-09-13 | End: 2020-09-13

## 2020-09-13 RX ORDER — LIDOCAINE HYDROCHLORIDE 10 MG/ML
INJECTION, SOLUTION EPIDURAL; INFILTRATION; INTRACAUDAL; PERINEURAL AS NEEDED
Status: DISCONTINUED | OUTPATIENT
Start: 2020-09-13 | End: 2020-09-13

## 2020-09-13 RX ORDER — PROPOFOL 10 MG/ML
INJECTION, EMULSION INTRAVENOUS AS NEEDED
Status: DISCONTINUED | OUTPATIENT
Start: 2020-09-13 | End: 2020-09-13

## 2020-09-13 RX ORDER — FENTANYL CITRATE 50 UG/ML
INJECTION, SOLUTION INTRAMUSCULAR; INTRAVENOUS AS NEEDED
Status: DISCONTINUED | OUTPATIENT
Start: 2020-09-13 | End: 2020-09-13

## 2020-09-13 RX ORDER — MIDAZOLAM HYDROCHLORIDE 2 MG/2ML
INJECTION, SOLUTION INTRAMUSCULAR; INTRAVENOUS AS NEEDED
Status: DISCONTINUED | OUTPATIENT
Start: 2020-09-13 | End: 2020-09-13

## 2020-09-13 RX ORDER — HYDROMORPHONE HCL/PF 1 MG/ML
0.4 SYRINGE (ML) INJECTION
Status: DISCONTINUED | OUTPATIENT
Start: 2020-09-13 | End: 2020-09-13 | Stop reason: HOSPADM

## 2020-09-13 RX ORDER — NEOSTIGMINE METHYLSULFATE 1 MG/ML
INJECTION INTRAVENOUS AS NEEDED
Status: DISCONTINUED | OUTPATIENT
Start: 2020-09-13 | End: 2020-09-13

## 2020-09-13 RX ORDER — ROCURONIUM BROMIDE 10 MG/ML
INJECTION, SOLUTION INTRAVENOUS AS NEEDED
Status: DISCONTINUED | OUTPATIENT
Start: 2020-09-13 | End: 2020-09-13

## 2020-09-13 RX ORDER — MAGNESIUM HYDROXIDE 1200 MG/15ML
LIQUID ORAL AS NEEDED
Status: DISCONTINUED | OUTPATIENT
Start: 2020-09-13 | End: 2020-09-13 | Stop reason: HOSPADM

## 2020-09-13 RX ORDER — DEXAMETHASONE SODIUM PHOSPHATE 10 MG/ML
INJECTION, SOLUTION INTRAMUSCULAR; INTRAVENOUS AS NEEDED
Status: DISCONTINUED | OUTPATIENT
Start: 2020-09-13 | End: 2020-09-13

## 2020-09-13 RX ADMIN — NEOSTIGMINE METHYLSULFATE 3 MG: 1 INJECTION, SOLUTION INTRAVENOUS at 09:20

## 2020-09-13 RX ADMIN — SODIUM CHLORIDE, SODIUM LACTATE, POTASSIUM CHLORIDE, AND CALCIUM CHLORIDE: .6; .31; .03; .02 INJECTION, SOLUTION INTRAVENOUS at 09:25

## 2020-09-13 RX ADMIN — HYDROMORPHONE HYDROCHLORIDE 0.5 MG: 1 INJECTION, SOLUTION INTRAMUSCULAR; INTRAVENOUS; SUBCUTANEOUS at 09:16

## 2020-09-13 RX ADMIN — Medication 20 MG: at 08:50

## 2020-09-13 RX ADMIN — Medication 30 MG: at 08:36

## 2020-09-13 RX ADMIN — HYDROMORPHONE HYDROCHLORIDE 1 MG: 1 INJECTION, SOLUTION INTRAMUSCULAR; INTRAVENOUS; SUBCUTANEOUS at 16:47

## 2020-09-13 RX ADMIN — GABAPENTIN 300 MG: 300 CAPSULE ORAL at 21:17

## 2020-09-13 RX ADMIN — PROPOFOL 150 MG: 10 INJECTION, EMULSION INTRAVENOUS at 08:32

## 2020-09-13 RX ADMIN — SODIUM CHLORIDE, SODIUM LACTATE, POTASSIUM CHLORIDE, AND CALCIUM CHLORIDE 100 ML/HR: .6; .31; .03; .02 INJECTION, SOLUTION INTRAVENOUS at 05:36

## 2020-09-13 RX ADMIN — LIDOCAINE HYDROCHLORIDE 50 MG: 10 INJECTION, SOLUTION EPIDURAL; INFILTRATION; INTRACAUDAL; PERINEURAL at 08:32

## 2020-09-13 RX ADMIN — OXYCODONE HYDROCHLORIDE 40 MG: 20 TABLET, FILM COATED, EXTENDED RELEASE ORAL at 13:32

## 2020-09-13 RX ADMIN — GLYCOPYRROLATE 0.4 MG: 0.2 INJECTION, SOLUTION INTRAMUSCULAR; INTRAVENOUS at 09:20

## 2020-09-13 RX ADMIN — MIDAZOLAM 2 MG: 1 INJECTION INTRAMUSCULAR; INTRAVENOUS at 08:26

## 2020-09-13 RX ADMIN — SENNOSIDES 8.6 MG: 8.6 TABLET, FILM COATED ORAL at 21:17

## 2020-09-13 RX ADMIN — ONDANSETRON 4 MG: 2 INJECTION INTRAMUSCULAR; INTRAVENOUS at 09:16

## 2020-09-13 RX ADMIN — HYDROMORPHONE HYDROCHLORIDE 0.5 MG: 1 INJECTION, SOLUTION INTRAMUSCULAR; INTRAVENOUS; SUBCUTANEOUS at 08:44

## 2020-09-13 RX ADMIN — FENTANYL CITRATE 100 MCG: 50 INJECTION, SOLUTION INTRAMUSCULAR; INTRAVENOUS at 08:32

## 2020-09-13 RX ADMIN — ROCURONIUM BROMIDE 50 MG: 10 INJECTION, SOLUTION INTRAVENOUS at 08:32

## 2020-09-13 RX ADMIN — METRONIDAZOLE 500 MG: 500 INJECTION, SOLUTION INTRAVENOUS at 08:36

## 2020-09-13 RX ADMIN — CEFAZOLIN SODIUM 2000 MG: 2 SOLUTION INTRAVENOUS at 08:35

## 2020-09-13 RX ADMIN — OXYCODONE HYDROCHLORIDE 40 MG: 20 TABLET, FILM COATED, EXTENDED RELEASE ORAL at 05:35

## 2020-09-13 RX ADMIN — OXYCODONE HYDROCHLORIDE 40 MG: 20 TABLET, FILM COATED, EXTENDED RELEASE ORAL at 21:17

## 2020-09-13 RX ADMIN — DEXAMETHASONE SODIUM PHOSPHATE 5 MG: 10 INJECTION, SOLUTION INTRAMUSCULAR; INTRAVENOUS at 08:57

## 2020-09-13 RX ADMIN — GABAPENTIN 300 MG: 300 CAPSULE ORAL at 16:47

## 2020-09-13 NOTE — PLAN OF CARE
Problem: Potential for Falls  Goal: Patient will remain free of falls  Description: INTERVENTIONS:  - Assess patient frequently for physical needs  -  Identify cognitive and physical deficits and behaviors that affect risk of falls    -  [place] fall precautions as indicated by assessment   - Educate patient/family on patient safety including physical limitations  - Instruct patient to call for assistance with activity based on assessment  - Modify environment to reduce risk of injury  - Consider OT/PT consult to assist with strengthening/mobility  Outcome: Progressing     Problem: GASTROINTESTINAL - ADULT  Goal: Minimal or absence of nausea and/or vomiting  Description: INTERVENTIONS:  - Administer IV fluids if ordered to ensure adequate hydration  - Maintain NPO status until nausea and vomiting are resolved  - Nasogastric tube if ordered  - Administer ordered antiemetic medications as needed  - Provide nonpharmacologic comfort measures as appropriate  - Advance diet as tolerated, if ordered  - Consider nutrition services referral to assist patient with adequate nutrition and appropriate food choices  Outcome: Progressing  Goal: Maintains or returns to baseline bowel function  Description: INTERVENTIONS:  - Assess bowel function  - Encourage oral fluids to ensure adequate hydration  - Administer IV fluids if ordered to ensure adequate hydration  - Administer ordered medications as needed  - Encourage mobilization and activity  - Consider nutritional services referral to assist patient with adequate nutrition and appropriate food choices  Outcome: Progressing  Goal: Maintains adequate nutritional intake  Description: INTERVENTIONS:  - Monitor percentage of each meal consumed  - Identify factors contributing to decreased intake, treat as appropriate  - Assist with meals as needed  - Monitor I&O, weight, and lab values if indicated  - Obtain nutrition services referral as needed  Outcome: Progressing     Problem: Nutrition/Hydration-ADULT  Goal: Nutrient/Hydration intake appropriate for improving, restoring or maintaining nutritional needs  Description: Monitor and assess patient's nutrition/hydration status for malnutrition  Collaborate with interdisciplinary team and initiate plan and interventions as ordered  Monitor patient's weight and dietary intake as ordered or per policy  Utilize nutrition screening tool and intervene as necessary  Determine patient's food preferences and provide high-protein, high-caloric foods as appropriate       INTERVENTIONS:  - Monitor oral intake, urinary output, labs, and treatment plans  - Assess nutrition and hydration status and recommend course of action  - Evaluate amount of meals eaten  - Assist patient with eating if necessary   - Allow adequate time for meals  - Recommend/ encourage appropriate diets, oral nutritional supplements, and vitamin/mineral supplements  - Order, calculate, and assess calorie counts as needed  - Recommend, monitor, and adjust tube feedings and TPN/PPN based on assessed needs  - Assess need for intravenous fluids  - Provide specific nutrition/hydration education as appropriate  - Include patient/family/caregiver in decisions related to nutrition  Outcome: Progressing

## 2020-09-13 NOTE — ANESTHESIA POSTPROCEDURE EVALUATION
Post-Op Assessment Note    CV Status:  Stable  Pain Score: 0    Pain management: adequate     Mental Status:  Alert and awake   Hydration Status:  Euvolemic   PONV Controlled:  Controlled   Airway Patency:  Patent      Post Op Vitals Reviewed: Yes      Staff: CRNA         No complications documented      BP   104/68   Temp   96 8   Pulse  76   Resp   14   SpO2   100% on 4L simple mask

## 2020-09-13 NOTE — ASSESSMENT & PLAN NOTE
Presented with increased abdominal and gluteal pain - recent admission for similar abscess noted  Associated gram-negative bacteremia present (see below)  Continue Flagyl/Cefepime - PRN pain control  Developed a high-grade fever on the night of 9/2 with waxing/waning leukocytosis  Repeat CT imaging on 9/3 revealing "Large rectal mass again noted consistent with known carcinoma  Brannon Prietoer Brannon Echevarria Status post decompressive transverse colostomy, new since the prior study  Brannon Wale Brannon Wale Complex mass in the presacral/precoccygeal region is again noted measuring 3 8 x 8 cm concerning for direct extension of the patient's rectal cancer to the presacral space with possible superinfection  Brannon Wale Brannon Wale There is also continuity of this process with the medial left gluteal musculature and an area which was previously drained, suspicious for superinfection of necrotic tumor/abscess, overall measuring 2 2 x 3 3 cm  Brannon Wale Brannon Wale Extensive metastatic disease to the lungs and liver  Brannon Wale Brannon Wale Complex left adnexal mass which could be a primary ovarian lesion versus a metastatic lesion  Brannon Wale Brannon Wale The overall appearance is suspicious for neoplasia  Brannon Wale Brannon Wale Ventral abdominal wall hernia/hernias with ascites  Brannon Wale Brannon Wale Additional stable findings as noted "  Status post IR guided drainage on 9/4 approximately 8 mL of purulent fluid -> await final fluid culture (preliminary growing gram-positive cocci in pairs)

## 2020-09-13 NOTE — OP NOTE
OPERATIVE REPORT  PATIENT NAME: Adriana Browning    :  1975  MRN: [id number]  Pt Location:  OR ROOM 08    SURGERY DATE: 2020    Surgeon(s) and Role:     * Darylene Canon, MD - Primary     * Vikram Covington MD - Assisting    Preop Diagnosis:  Colostomy prolapse ([facility] [address] ) [K94 09]    Post-Op Diagnosis Codes:     * Colostomy prolapse ([facility] [address] ) [K94 09]    Procedure(s) (LRB):  REVISION STOMA (N/A)    Specimen(s):  ID Type Source Tests Collected by Time Destination   1 : mucosa fistula  Tissue Small Bowel, NOS TISSUE EXAM Darylene Canon, MD 2020 0911        Estimated Blood Loss:   Minimal    Drains:  Closed/Suction Drain Left Buttock Bulb 8 5 Fr  (Active)   Site Description Unable to view 20   Dressing Status Clean;Dry; Intact 20   Drainage Appearance Serosanguineous 20   Status To bulb suction 20   Output (mL) 10 mL 20 0650   Number of days: 2       Colostomy LUQ (Active)   Stomal Appliance 1 piece 20   Stoma Assessment Edema;Bleeding;Red 20   Stoma size (in) 13 in 20   Stoma Shape Round;Budded 20 0039   Peristomal Assessment Edema; Hernia;Blisters; Red 20   Treatment Site care 20 1601   Output (mL) 50 mL 20 0650   Number of days: 13       Urethral Catheter Latex; Double-lumen 16 Fr  (Active)   Number of days: 0       Anesthesia Type:   General    Operative Indications:  Colostomy prolapse ([facility] [address] ) [K94 09]      Operative Findings:  Prolapsed but viable the bowel through the mucous fistula  Proximal functional colostomy normal    Complications:   None    Procedure and Technique:  After preoperative identification in the preoperative holding area, the patient was taken the operating room placed in the supine position  Following introduction of general anesthesia the patient waswas prepped and draped in usual sterile fashion  Timeout was undertaken and procedure begun      The bowel on the lateral side was noted to be edematous and injected but viable  Sutures were cut from the skin of the mucocutaneous junction in the medial sutures were also freed to free up the distal mucous fistula  The external colon was then split to allow full reduction of the prolapsing bowel  This could be seen to be easily viable  This was mobilized until no significant redundancy of the more distal bowel was noted  The mesentery was taken between clamps and ties  At the most proximal portion a single firing of the PASCALE 75 stapler was used to transect the proximal port of the mucous fistula  As the patient still had some distension of the bowel in the left side between the colon and the rectal cancer, I believe that mucus fistula needed to be recreated  A very small opening enough to allow air to escape was cut in the corner of the staple line of the distal transverse colon  This was sutured in 3 points using 3 0 Vicryl  The proximal colostomy was then sutured into place around this to leave no significant gaps  Stoma appliance was applied  Patient was awaken from anesthesia and transferred to recovery room stable condition         I was present for the entire procedure    Patient Disposition:  PACU     SIGNATURE: Jere Ledesma MD  DATE: September 13, 2020  TIME: 9:21 AM

## 2020-09-13 NOTE — QUICK NOTE
- s/p stoma revision   - no new complaints  - stable vital signs in room air   - stoma pink; no stool in bag yet  - abdomen soft; appropriately tender to palpation  PLAN:  - PO diet as tolerated    - other management per primary team

## 2020-09-13 NOTE — ANESTHESIA PREPROCEDURE EVALUATION
Procedure:  REVISION STOMA (N/A Abdomen)    Relevant Problems   CARDIO   (+) HTN (hypertension), benign      GI/HEPATIC   (+) Bright red blood per rectum   (+) Large bowel obstruction (HCC)   (+) Metastatic rectal cancer      GYN   (+) Breast cancer      HEMATOLOGY   (+) Anemia of chronic disease      MUSCULOSKELETAL   (+) Chronic left SI joint pain   (+) Disc degeneration, lumbar   (+) Myofascial pain syndrome      NEURO/PSYCH   (+) Anxiety with depression   (+) Chronic left SI joint pain   (+) Myofascial pain syndrome    LEFT VENTRICLE:  Systolic function was normal  Ejection fraction was estimated to be 65 %  There were no regional wall motion abnormalities      LEFT ATRIUM:  The atrium was moderately dilated      MITRAL VALVE:  There was mild regurgitation      TRICUSPID VALVE:  There was mild regurgitation  Pulmonary artery systolic pressure was within the normal range  Physical Exam    Airway    Mallampati score: I  TM Distance: >3 FB  Neck ROM: full     Dental       Cardiovascular  Cardiovascular exam normal    Pulmonary  Pulmonary exam normal     Other Findings        Anesthesia Plan  ASA Score- 2 Emergent    Anesthesia Type- general with ASA Monitors  Additional Monitors:   Airway Plan: ETT  Comment: Chronic pain meds  Will given intra-op ketamine  Plan Factors-    Chart reviewed  Induction- intravenous  Postoperative Plan-     Informed Consent- Anesthetic plan and risks discussed with patient  I personally reviewed this patient with the CRNA  Discussed and agreed on the Anesthesia Plan with the CRNA  Barb Bingham

## 2020-09-13 NOTE — PROGRESS NOTES
Progress Note - Rosi Osorio 1975, 50 y o  female MRN: [id number]    Unit/Bed#: [facility] 903-01 Encounter: [id number]    Primary Care Provider: John Grossman MD   Date and time admitted to hospital: 8/27/2020 12:51 AM        * Recurrent left gluteal abscess  Assessment & Plan  Presented with increased abdominal and gluteal pain - recent admission for similar abscess noted  Associated gram-negative bacteremia present (see below)  Continue Flagyl/Cefepime - PRN pain control  Developed a high-grade fever on the night of 9/2 with waxing/waning leukocytosis  Repeat CT imaging on 9/3 revealing "Large rectal mass again noted consistent with known carcinoma  Leila Gilchrist Leila Gilchrist Status post decompressive transverse colostomy, new since the prior study  Leila Gilchrist Leila Gilchrist Complex mass in the presacral/precoccygeal region is again noted measuring 3 8 x 8 cm concerning for direct extension of the patient's rectal cancer to the presacral space with possible superinfection  Leila Gilchrist Leila Gilchrist There is also continuity of this process with the medial left gluteal musculature and an area which was previously drained, suspicious for superinfection of necrotic tumor/abscess, overall measuring 2 2 x 3 3 cm  Leila Gilchrist Leila Gilchrist Extensive metastatic disease to the lungs and liver  Leila Gilchrist Leila Gilchrist Complex left adnexal mass which could be a primary ovarian lesion versus a metastatic lesion  Leila Gilchrist Leila Gilchrist The overall appearance is suspicious for neoplasia  Leila Gilchrist Leila Gilchrist Ventral abdominal wall hernia/hernias with ascites  Leila Gilchrist Leila Gilchrist Additional stable findings as noted "  Status post IR guided drainage on 9/4 approximately 8 mL of purulent fluid -> await final fluid culture (preliminary growing gram-positive cocci in pairs)    Metastatic rectal cancer  Assessment & Plan  With liver/lung metastases s/p chemotherapy per outpatient oncology  CT abdomen/pelvis on 8/27 revealing concern for persistent partially obstructing mass - previously refused surgical intervention but agreeable and this hospitalization -> s/p colostomy on 8/31    PRN pain control and supportive care  Repeat CT imaging on 9/3 noted a complex left adnexal mass suspicious for a primary ovarian lesion versus metastatic lesion -> on previous CT scan, this was mentioned as a cystic lesion -> CA-125 tumor marker elevated, however, per oncology, ovarian mass more likely represents further metastasis rather than another primary malignancy  Complaining of rectal bleeding today with mucous production, colorectal surgery thinks this is from tumor, continue to watch  If there is large amount of blood notify surgery  Also, obtain h/h remains stable  CT of abdomen and pelvis similar to previous, still has mass with possible abscess/necrosis  Colostomy is inflammed with blisters noted new, she is complaining of pain on the left side of colon  This is also reverted in the colostomy, the blisters are bigger now  Colorectal surgery was consulted, reside will discuss with attending today  Taken to OR today for excision of prolapse and revised stoma     Ok to eat  Abscess in the rectum-IR drains were placed, there is serosanguinous material in the drain    Severe protein-calorie malnutrition  Assessment & Plan  BMI of 21 51 in the setting of metastatic malignancy and decreased appetite/oral intake  On Ensure nutritional supplementation with meals    Bacteroides bacteremia  Assessment & Plan  Blood culture from 8/27 grow  Bacteroides species - subsequent culture from 8/28 negative - preliminary cultures from 9/3 remaining negative thus far  Continue IV Cefepime w/ PO Flagyl per Infectious Disease  See plan for associated gluteal abscess above    Possible evolving sepsis  Assessment & Plan  With waxing/waning leukocytosis and a high-grade fever of 101 1° on the night of 9/2  Mild procalcitonin bump at 0 26 subsequently normalized -> lactic acid normal  Repeat blood cultures from 9/3 remain negative thus  Continue Flagyl and IV Cefepime  See plan for gluteal abscess    Cancer associated pain  Assessment & Plan  Appreciate palliative care input regarding analgesic regimen    Anemia of chronic disease  Assessment & Plan  Monitor H/H  Monitor for postoperative blood loss - transfuse if hemoglobin < 7         VTE Pharmacologic Prophylaxis:   Pharmacologic: Pharmacologic VTE Prophylaxis contraindicated due to colon work up for  Mechanical VTE Prophylaxis in Place: Yes    Patient Centered Rounds: I have performed bedside rounds with nursing staff today  Discussions with Specialists or Other Care Team Provider: colorectal surgery updated surgical intervention and resume feeding     Education and Discussions with Family / Patient: patient     Time Spent for Care: 45 minutes  More than 50% of total time spent on counseling and coordination of care as described above  Current Length of Stay: 17 day(s)    Current Patient Status: Inpatient   Certification Statement: The patient will continue to require additional inpatient hospital stay due to she has now revised colon and stoma    Discharge Plan: next 24 to 48 hrs     Code Status: Level 1 - Full Code      Subjective:   Patient went for surgery today  She has pain  Objective:     Vitals:   Temp (24hrs), Av 1 °F (36 7 °C), Min:96 8 °F (36 °C), Max:99 1 °F (37 3 °C)    Temp:  [96 8 °F (36 °C)-99 1 °F (37 3 °C)] 97 4 °F (36 3 °C)  HR:  [] 68  Resp:  [12-18] 14  BP: ()/(54-84) 124/84  SpO2:  [92 %-100 %] 97 %  Body mass index is 21 51 kg/m²  Input and Output Summary (last 24 hours): Intake/Output Summary (Last 24 hours) at 2020 1055  Last data filed at 2020 1009  Gross per 24 hour   Intake 2130 ml   Output 2450 ml   Net -320 ml       Physical Exam:     Physical Exam  Vitals signs and nursing note reviewed  Exam conducted with a chaperone present  Constitutional:       General: She is not in acute distress  Appearance: Normal appearance  She is normal weight  She is not ill-appearing or toxic-appearing     HENT:      Nose: Nose normal    Neck:      Musculoskeletal: Normal range of motion and neck supple  Cardiovascular:      Rate and Rhythm: Bradycardia present  Pulses: Normal pulses  Heart sounds: No murmur  No friction rub  No gallop  Abdominal:      General: There is distension  Tenderness: There is abdominal tenderness  Hernia: A hernia is present  Neurological:      General: No focal deficit present  Mental Status: She is alert and oriented to person, place, and time  Mental status is at baseline  Cranial Nerves: No cranial nerve deficit  Psychiatric:         Mood and Affect: Mood normal          Additional Data:     Labs:    Results from last 7 days   Lab Units 09/13/20  0511 09/11/20  0555   WBC Thousand/uL 9 62 12 83*   HEMOGLOBIN g/dL 8 4* 8 2*   HEMATOCRIT % 28 9* 27 8*   PLATELETS Thousands/uL 407* 446*   NEUTROS PCT %  --  64   LYMPHS PCT %  --  18   MONOS PCT %  --  11   EOS PCT %  --  4     Results from last 7 days   Lab Units 09/13/20  0511   POTASSIUM mmol/L 4 2   CHLORIDE mmol/L 103   CO2 mmol/L 29   BUN mg/dL 3*   CREATININE mg/dL 0 42*   CALCIUM mg/dL 8 5           * I Have Reviewed All Lab Data Listed Above  * Additional Pertinent Lab Tests Reviewed:  [address] Admission Reviewed    Imaging:    Imaging Reports Reviewed Today Include:    Imaging Personally Reviewed by Myself Includes:      Recent Cultures (last 7 days):           Last 24 Hours Medication List:   Current Facility-Administered Medications   Medication Dose Route Frequency Provider Last Rate    acetaminophen  650 mg Oral Q6H PRN Catherine Rivero MD      calcium carbonate  500 mg Oral TID PRN Catherine Rivero MD      enoxaparin  40 mg Subcutaneous Daily Catherine Rivero MD      gabapentin  300 mg Oral TID Catherine Rivero MD      HYDROmorphone  1 mg Intravenous Q4H PRN Catherine Rivero MD      iohexol  50 mL Oral 90 min pre-procedure Catherine Rivero MD      lidocaine   Topical 4x Daily Bonnie Dilling Jed Acevedo MD      loratadine  10 mg Oral Daily Emmalene Soulier, MD      nystatin  500,000 Units Swish & Swallow 4x Daily Emmalene Soulier, MD      ondansetron  4 mg Intravenous Q4H PRN Emmalene Soulier, MD      oxyCODONE  40 mg Oral [facility] Emmalene Soulier, MD      oxyCODONE  20 mg Oral Q4H PRN Emmalene Soulier, MD      polyethylene glycol  17 g Oral Daily PRN Emmalene Soulier, MD      senna  1 tablet Oral HS Emmalene Soulier, MD      sertraline  25 mg Oral Daily Emmalene Soulier, MD          Today, Patient Was Seen By: Ashley Montiel MD    ** Please Note: Dictation voice to text software may have been used in the creation of this document   **

## 2020-09-13 NOTE — PROGRESS NOTES
Progress Note - Colorectal Surgery   Puja Fitzpatrick 50 y o  female MRN: [id number]  Unit/Bed#: [facility] 903-01 Encounter: [id number]    Assessment:  50 y o  female 15 Days Post-Op s/p Procedure(s) (LRB):  COLOSTOMY LAPAROSCOPIC (N/A)  Distal end of colostomy prolapsed yesterday, and very edematous  She is stable otherwise    Plan:   Will perform colostomy revision today   NPO/IVF   Other management per primary    Subjective/Objective     Subjective:   Complains abdominal discomfort, no nausea or vomiting, ostomy functioning    Objective:    Blood pressure 116/62, pulse 62, temperature 98 1 °F (36 7 °C), temperature source Oral, resp  rate 18, height 5' 2" (1 575 m), weight 53 3 kg (117 lb 9 5 oz), SpO2 98 %, not currently breastfeeding  ,Body mass index is 21 51 kg/m²  Intake/Output Summary (Last 24 hours) at 9/13/2020 0753  Last data filed at 9/13/2020 0650  Gross per 24 hour   Intake 1430 ml   Output 2275 ml   Net -845 ml       Invasive Devices     Central Venous Catheter Line            Port A Cath 06/22/20 Right Chest 82 days          Peripheral Intravenous Line            Peripheral IV 09/11/20 Dorsal (posterior); Left Forearm 1 day          Drain            Colostomy LUQ 12 days    Closed/Suction Drain Left Buttock Bulb 8 5 Fr  1 day                Physical Exam:   Gen:  NAD  CV:  warm, well-perfused  Lungs: nl effort  Abd:  soft, diffuse mild tenderness, prolapsed colostomy, dark red, edematous  Ext:  no CCE  Neuro: A&Ox3     Results from last 7 days   Lab Units 09/13/20  0511 09/11/20  0555 09/10/20  0539   WBC Thousand/uL 9 62 12 83* 12 38*   HEMOGLOBIN g/dL 8 4* 8 2* 7 7*   HEMATOCRIT % 28 9* 27 8* 27 0*   PLATELETS Thousands/uL 407* 446* 376     Results from last 7 days   Lab Units 09/13/20  0511 09/11/20  0555 09/10/20  0539   POTASSIUM mmol/L 4 2 3 8 3 8   CHLORIDE mmol/L 103 101 104   CO2 mmol/L 29 29 28   BUN mg/dL 3* 3* 3*   CREATININE mg/dL 0 42* 0 50* 0 35*   CALCIUM mg/dL 8 5 8 7 8 4

## 2020-09-13 NOTE — ASSESSMENT & PLAN NOTE
With liver/lung metastases s/p chemotherapy per outpatient oncology  CT abdomen/pelvis on 8/27 revealing concern for persistent partially obstructing mass - previously refused surgical intervention but agreeable and this hospitalization -> s/p colostomy on 8/31    PRN pain control and supportive care  Repeat CT imaging on 9/3 noted a complex left adnexal mass suspicious for a primary ovarian lesion versus metastatic lesion -> on previous CT scan, this was mentioned as a cystic lesion -> CA-125 tumor marker elevated, however, per oncology, ovarian mass more likely represents further metastasis rather than another primary malignancy  Complaining of rectal bleeding today with mucous production, colorectal surgery thinks this is from tumor, continue to watch  If there is large amount of blood notify surgery  Also, obtain h/h remains stable  CT of abdomen and pelvis similar to previous, still has mass with possible abscess/necrosis  Colostomy is inflammed with blisters noted new, she is complaining of pain on the left side of colon  This is also reverted in the colostomy, the blisters are bigger now  Colorectal surgery was consulted, reside will discuss with attending today  Taken to OR today for excision of prolapse and revised stoma     Ok to eat  Abscess in the rectum-IR drains were placed, there is serosanguinous material in the drain

## 2020-09-14 LAB
ANION GAP SERPL CALCULATED.3IONS-SCNC: 6 MMOL/L (ref 4–13)
BASOPHILS # BLD AUTO: 0.03 THOUSANDS/ΜL (ref 0–0.1)
BASOPHILS NFR BLD AUTO: 0 % (ref 0–1)
BUN SERPL-MCNC: 6 MG/DL (ref 5–25)
CALCIUM SERPL-MCNC: 8.2 MG/DL (ref 8.3–10.1)
CHLORIDE SERPL-SCNC: 104 MMOL/L (ref 100–108)
CO2 SERPL-SCNC: 27 MMOL/L (ref 21–32)
CREAT SERPL-MCNC: 0.41 MG/DL (ref 0.6–1.3)
EOSINOPHIL # BLD AUTO: 0 THOUSAND/ΜL (ref 0–0.61)
EOSINOPHIL NFR BLD AUTO: 0 % (ref 0–6)
ERYTHROCYTE [DISTWIDTH] IN BLOOD BY AUTOMATED COUNT: 19.9 % (ref 11.6–15.1)
GFR SERPL CREATININE-BSD FRML MDRD: 123 ML/MIN/1.73SQ M
GLUCOSE SERPL-MCNC: 165 MG/DL (ref 65–140)
HCT VFR BLD AUTO: 28.1 % (ref 34.8–46.1)
HGB BLD-MCNC: 8.4 G/DL (ref 11.5–15.4)
IMM GRANULOCYTES # BLD AUTO: 0.14 THOUSAND/UL (ref 0–0.2)
IMM GRANULOCYTES NFR BLD AUTO: 1 % (ref 0–2)
LYMPHOCYTES # BLD AUTO: 1.45 THOUSANDS/ΜL (ref 0.6–4.47)
LYMPHOCYTES NFR BLD AUTO: 11 % (ref 14–44)
MCH RBC QN AUTO: 24.9 PG (ref 26.8–34.3)
MCHC RBC AUTO-ENTMCNC: 29.9 G/DL (ref 31.4–37.4)
MCV RBC AUTO: 83 FL (ref 82–98)
MONOCYTES # BLD AUTO: 0.74 THOUSAND/ΜL (ref 0.17–1.22)
MONOCYTES NFR BLD AUTO: 6 % (ref 4–12)
NEUTROPHILS # BLD AUTO: 11.15 THOUSANDS/ΜL (ref 1.85–7.62)
NEUTS SEG NFR BLD AUTO: 82 % (ref 43–75)
NRBC BLD AUTO-RTO: 0 /100 WBCS
PLATELET # BLD AUTO: 398 THOUSANDS/UL (ref 149–390)
PMV BLD AUTO: 10 FL (ref 8.9–12.7)
POTASSIUM SERPL-SCNC: 4.1 MMOL/L (ref 3.5–5.3)
RBC # BLD AUTO: 3.37 MILLION/UL (ref 3.81–5.12)
SODIUM SERPL-SCNC: 137 MMOL/L (ref 136–145)
WBC # BLD AUTO: 13.51 THOUSAND/UL (ref 4.31–10.16)

## 2020-09-14 PROCEDURE — 99233 SBSQ HOSP IP/OBS HIGH 50: CPT | Performed by: INTERNAL MEDICINE

## 2020-09-14 PROCEDURE — 99232 SBSQ HOSP IP/OBS MODERATE 35: CPT | Performed by: INTERNAL MEDICINE

## 2020-09-14 PROCEDURE — 85025 COMPLETE CBC W/AUTO DIFF WBC: CPT | Performed by: INTERNAL MEDICINE

## 2020-09-14 PROCEDURE — 80048 BASIC METABOLIC PNL TOTAL CA: CPT | Performed by: INTERNAL MEDICINE

## 2020-09-14 RX ADMIN — OXYCODONE HYDROCHLORIDE 40 MG: 20 TABLET, FILM COATED, EXTENDED RELEASE ORAL at 14:11

## 2020-09-14 RX ADMIN — GABAPENTIN 300 MG: 300 CAPSULE ORAL at 21:14

## 2020-09-14 RX ADMIN — HYDROMORPHONE HYDROCHLORIDE 1 MG: 1 INJECTION, SOLUTION INTRAMUSCULAR; INTRAVENOUS; SUBCUTANEOUS at 17:16

## 2020-09-14 RX ADMIN — GABAPENTIN 300 MG: 300 CAPSULE ORAL at 08:04

## 2020-09-14 RX ADMIN — SERTRALINE HYDROCHLORIDE 25 MG: 25 TABLET ORAL at 08:04

## 2020-09-14 RX ADMIN — OXYCODONE HYDROCHLORIDE 20 MG: 10 TABLET ORAL at 19:30

## 2020-09-14 RX ADMIN — OXYCODONE HYDROCHLORIDE 40 MG: 20 TABLET, FILM COATED, EXTENDED RELEASE ORAL at 05:18

## 2020-09-14 RX ADMIN — HYDROMORPHONE HYDROCHLORIDE 1 MG: 1 INJECTION, SOLUTION INTRAMUSCULAR; INTRAVENOUS; SUBCUTANEOUS at 21:14

## 2020-09-14 RX ADMIN — LORATADINE 10 MG: 10 TABLET ORAL at 08:04

## 2020-09-14 RX ADMIN — ENOXAPARIN SODIUM 40 MG: 40 INJECTION SUBCUTANEOUS at 08:04

## 2020-09-14 RX ADMIN — OXYCODONE HYDROCHLORIDE 20 MG: 10 TABLET ORAL at 10:26

## 2020-09-14 RX ADMIN — OXYCODONE HYDROCHLORIDE 20 MG: 10 TABLET ORAL at 15:49

## 2020-09-14 RX ADMIN — GABAPENTIN 300 MG: 300 CAPSULE ORAL at 15:49

## 2020-09-14 RX ADMIN — OXYCODONE HYDROCHLORIDE 40 MG: 20 TABLET, FILM COATED, EXTENDED RELEASE ORAL at 21:14

## 2020-09-14 RX ADMIN — SENNOSIDES 8.6 MG: 8.6 TABLET, FILM COATED ORAL at 21:14

## 2020-09-14 NOTE — PROGRESS NOTES
Progress Note - Infectious Disease   Puja Fitzpatrick 50 y o  female MRN: [id number]  Unit/Bed#: [facility] 903-01 Encounter: [id number]      Impression:  1  Recurrent left gluteal abscess with Bacteroides thetaiotaomicron bacteremia  2  Stage IV metastatic rectal carcinoma on chemotherapy S/P laparoscopic colostomy POD 10, S/P stoma revision POD 1    Recommendations:    She is afebrile  with elevated WBC count  Underwent colostomy stomal revision    1  Blood culture isolate  showing Bacteroides thetaiotaomicron which is susceptible to metronidazole dermsoledadccus eladia  Repeat blood culture  is negative  Blood cultures x2 from 9/3 are finalized negative  2  Observe off antibiotics    Antibiotics:  1  None  Subjective:  Pain controlled   Denies chills, or sweats  Denies nausea, vomiting, or diarrhea  Objective:  Vitals:  Temp:  [97 3 °F (36 3 °C)-97 8 °F (36 6 °C)] 97 7 °F (36 5 °C)  HR:  [55-77] 71  Resp:  [18] 18  BP: ()/(51-58) 98/51  SpO2:  [92 %-100 %] 98 %  Temp (24hrs), Av 6 °F (36 4 °C), Min:97 3 °F (36 3 °C), Max:97 8 °F (36 6 °C)  Current: Temperature: 97 7 °F (36 5 °C)    Physical Exam:     General Appearance:  Lethargic but awake chronically ill nontoxic, no acute distress     Eyes:  Conjunctiva pale   Throat: Oropharynx moist without lesions  Lips, mucosa, and tongue normal   Neck: Supple, symmetrical, trachea midline, no adenopathy,  no tenderness/mass/nodules   Lungs:   Clear to auscultation bilaterally, no audible wheezes, rhonchi or rales; respirations unlabored   Heart:  Regular rate and rhythm, S1, S2 normal, no murmur, rub or gallop   Abdomen:   Colostomy with some sanguinous drainage  Less abdominal distention, positive bowel sounds    No masses, no organomegaly    No CVA tenderness,    Extremities: Tattoos, 2+ proximal lower extremity edema   Skin: Tattoos, right subclavian PAC, left gluteal wound with ARACELI drain and mostly serous drainage and some tenderness  , colostomy as above  Invasive Devices     Central Venous Catheter Line            Port A Cath 06/22/20 Right Chest 84 days          Peripheral Intravenous Line            Peripheral IV 09/11/20 Dorsal (posterior); Left Forearm 3 days          Drain            Colostomy LUQ 14 days    Closed/Suction Drain Left Buttock Bulb 8 5 Fr  2 days                Labs, Imaging, & Other studies:   All pertinent labs were personally reviewed  Results from last 7 days   Lab Units 09/14/20  0518 09/13/20  0511 09/11/20  0555   WBC Thousand/uL 13 51* 9 62 12 83*   HEMOGLOBIN g/dL 8 4* 8 4* 8 2*   PLATELETS Thousands/uL 398* 407* 446*     Results from last 7 days   Lab Units 09/14/20  0518 09/13/20  0511 09/11/20  0555   SODIUM mmol/L 137 136 136   POTASSIUM mmol/L 4 1 4 2 3 8   CHLORIDE mmol/L 104 103 101   CO2 mmol/L 27 29 29   BUN mg/dL 6 3* 3*   CREATININE mg/dL 0 41* 0 42* 0 50*   EGFR ml/min/1 73sq m 123 122 116   CALCIUM mg/dL 8 2* 8 5 8 7

## 2020-09-14 NOTE — PROGRESS NOTES
Progress Note - Infectious Disease   Puja Fitzpatrick 50 y o  female MRN: [id number]  Unit/Bed#: [facility] 903-01 Encounter: [id number]      Impression:  1  Recurrent left gluteal abscess with Bacteroides thetaiotaomicron bacteremia  2  Stage IV metastatic rectal carcinoma on chemotherapy S/P laparoscopic colostomy POD 9, S/P stoma revision POD 0    Recommendations:    She is afebrile  with normal WBC count  Underwent colostomy stomal revision today     1  Blood culture isolate  showing Bacteroides thetaiotaomicron which is susceptible to metronidazole balaji montilla  Repeat blood culture  is negative  Blood cultures x2 from 9/3 are finalized negative  2  Received cefazolin and metronidazole IV preop    Antibiotics:  1  None  Subjective:  Pain controlled postop  Denies chills, or sweats  Denies nausea, vomiting, or diarrhea  Objective:  Vitals:  Temp:  [96 8 °F (36 °C)-98 6 °F (37 °C)] 97 8 °F (36 6 °C)  HR:  [] 64  Resp:  [12-18] 18  BP: ()/(62-84) 96/62  SpO2:  [94 %-100 %] 95 %  Temp (24hrs), Av 9 °F (36 6 °C), Min:96 8 °F (36 °C), Max:98 6 °F (37 °C)  Current: Temperature: 97 8 °F (36 6 °C)    Physical Exam:     General Appearance:  Lethargic but awake chronically ill nontoxic, no acute distress     Eyes:  Conjunctiva pale   Throat: Oropharynx moist without lesions  Lips, mucosa, and tongue normal   Neck: Supple, symmetrical, trachea midline, no adenopathy,  no tenderness/mass/nodules   Lungs:   Clear to auscultation bilaterally, no audible wheezes, rhonchi or rales; respirations unlabored   Heart:  Regular rate and rhythm, S1, S2 normal, no murmur, rub or gallop   Abdomen:   Colostomy with fresh dressing abdominal distention, positive bowel sounds    No masses, no organomegaly    No CVA tenderness,    Extremities: Tattoos, 2+ proximal lower extremity edema   Skin: Tattoos, right subclavian PAC, left gluteal wound with ARACELI drain and mostly serous drainage and some tenderness  , colostomy as above  Invasive Devices     Central Venous Catheter Line            Port A Cath 06/22/20 Right Chest 83 days          Peripheral Intravenous Line            Peripheral IV 09/11/20 Dorsal (posterior); Left Forearm 2 days          Drain            Colostomy LUQ 13 days    Closed/Suction Drain Left Buttock Bulb 8 5 Fr  2 days                Labs, Imaging, & Other studies:   All pertinent labs were personally reviewed  Results from last 7 days   Lab Units 09/13/20  0511 09/11/20  0555 09/10/20  0539   WBC Thousand/uL 9 62 12 83* 12 38*   HEMOGLOBIN g/dL 8 4* 8 2* 7 7*   PLATELETS Thousands/uL 407* 446* 376     Results from last 7 days   Lab Units 09/13/20  0511 09/11/20  0555 09/10/20  0539   SODIUM mmol/L 136 136 136   POTASSIUM mmol/L 4 2 3 8 3 8   CHLORIDE mmol/L 103 101 104   CO2 mmol/L 29 29 28   BUN mg/dL 3* 3* 3*   CREATININE mg/dL 0 42* 0 50* 0 35*   EGFR ml/min/1 73sq m 122 116 130   CALCIUM mg/dL 8 5 8 7 8 4

## 2020-09-14 NOTE — DISCHARGE INSTRUCTIONS
TUBE CARE INSTRUCTIONS    Care after your procedure:    Resume your normal diet  Small sips of flat soda will help with nausea  1  The properly functioning catheter should be forward flushed once (1x) daily with 3ml of normal saline using clean technique  You will be given a prescription for flushes  To flush the tube, clean both connections with alcohol swab  Twist off the drainage bag/ bulb  tubing and twist the saline syringe into the drainage tube and flush  Remove the syringe and twist the drainage bag / bulb tubing tubing back on     2  The drainage bag/bulb may be emptied as necessary  Keep a record of the amount of fluid you drain from your tube  This should be done with clean technique as well  3  A fresh dressing should be applied daily over the tube insertion site  4  As the tube is secured to the skin with only a suture,try not to pull on your tube  Tub baths are not permitted  Showers are permitted if the patient's skin entry site is prevented from getting wet  Similarly, washcloth "baths" are acceptable  Contact Interventional Radiology at [phone] [place] PATIENTS: Contact Interventional Radiology at [phone]) [place] PATIENTS: Contact Interventional Radiology at [phone]) if:    1  Leakage or large amounts of liquid around the catheter  2  Fever of 101 degrees lasting several hours without other obvious cause (such as sore throat, flu, etc)  3  Persistent nausea or vomiting  4  Diminished drainage, which may be associated with pressure or pain  Or when the     drainage from your tube is less than 10mls for 48 hours  5  Catheter pulled back or falls out  The following pharmacies carry the flush syringes         [facility]                     [facility]  [address]                         [address] [facility]  Phone [phone]            Phone [phone]   [facility]                                [phone]  [address]                      [address] [place]  Phone [phone]            Phone [phone]                      [facility] [place]                                                                                                          [phone]  [facility]  [address]  [facility]  Phone [phone]                                                                                                           Abscess/fluid collection Aspiration      WHAT YOU NEED TO KNOW:     Today you underwent a fluid collection/abscess aspiration  Usually the fluid is sent to the lab for culture  You may have some pain associated with the puncture site  The Procedure is performed with Local anesthesia (Lidocaine) and sometimes with local and IV Sedation  After you go Home:    Home care  These tips can help your wound heal:   The wound may drain for the first 2 days  Cover the wound with a clean dry dressing  Change the dressing if it becomes soaked with blood or pus   If a gauze packing was placed inside the abscess pocket, you may be told to remove it yourself  You may do this in the shower  Once the packing is removed, you should wash the area in the shower, or clean the area as directed by your provider  Continue to do this until the skin opening has closed  Make sure you wash your hands after changing the packing or cleaning the wound     If you were prescribed antibiotics, take them as directed until they are all gone   You may use acetaminophen or ibuprofen to control pain, unless another pain medicine was prescribed  If you have liver disease or ever had a stomach ulcer, talk with your doctor before using these medicines  Follow-up care  Follow up with your healthcare provider, or as advised  If a gauze packing was put in your wound, it should be removed in 1 to 2 days  Check your wound every day for any signs that the infection is getting worse  The signs are listed below    When to seek medical advice  Call your healthcare provider right away if any of these occur:   Increasing redness or swelling   Red streaks in the skin leading away from the wound   Increasing local pain or swelling   Continued pus draining from the wound 2 days after treatment   Fever of 100 4ºF (38ºC) or higher, or as directed by your healthcare provider   Abscess  returns when you are at home

## 2020-09-14 NOTE — PROGRESS NOTES
Patient seen and examined  OR findings reviewed  Discussed revision of mucus fistula for prolapse  Doing well postoperatively  Okay to give diet as tolerated    Discharge per primary team

## 2020-09-14 NOTE — PLAN OF CARE
Problem: Potential for Falls  Goal: Patient will remain free of falls  Description: INTERVENTIONS:  - Assess patient frequently for physical needs  -  Identify cognitive and physical deficits and behaviors that affect risk of falls    -  [place] fall precautions as indicated by assessment   - Educate patient/family on patient safety including physical limitations  - Instruct patient to call for assistance with activity based on assessment  - Modify environment to reduce risk of injury  - Consider OT/PT consult to assist with strengthening/mobility  Outcome: Progressing     Problem: GASTROINTESTINAL - ADULT  Goal: Minimal or absence of nausea and/or vomiting  Description: INTERVENTIONS:  - Administer IV fluids if ordered to ensure adequate hydration  - Maintain NPO status until nausea and vomiting are resolved  - Nasogastric tube if ordered  - Administer ordered antiemetic medications as needed  - Provide nonpharmacologic comfort measures as appropriate  - Advance diet as tolerated, if ordered  - Consider nutrition services referral to assist patient with adequate nutrition and appropriate food choices  Outcome: Progressing  Goal: Maintains or returns to baseline bowel function  Description: INTERVENTIONS:  - Assess bowel function  - Encourage oral fluids to ensure adequate hydration  - Administer IV fluids if ordered to ensure adequate hydration  - Administer ordered medications as needed  - Encourage mobilization and activity  - Consider nutritional services referral to assist patient with adequate nutrition and appropriate food choices  Outcome: Progressing  Goal: Maintains adequate nutritional intake  Description: INTERVENTIONS:  - Monitor percentage of each meal consumed  - Identify factors contributing to decreased intake, treat as appropriate  - Assist with meals as needed  - Monitor I&O, weight, and lab values if indicated  - Obtain nutrition services referral as needed  Outcome: Progressing     Problem: Nutrition/Hydration-ADULT  Goal: Nutrient/Hydration intake appropriate for improving, restoring or maintaining nutritional needs  Description: Monitor and assess patient's nutrition/hydration status for malnutrition  Collaborate with interdisciplinary team and initiate plan and interventions as ordered  Monitor patient's weight and dietary intake as ordered or per policy  Utilize nutrition screening tool and intervene as necessary  Determine patient's food preferences and provide high-protein, high-caloric foods as appropriate       INTERVENTIONS:  - Monitor oral intake, urinary output, labs, and treatment plans  - Assess nutrition and hydration status and recommend course of action  - Evaluate amount of meals eaten  - Assist patient with eating if necessary   - Allow adequate time for meals  - Recommend/ encourage appropriate diets, oral nutritional supplements, and vitamin/mineral supplements  - Order, calculate, and assess calorie counts as needed  - Recommend, monitor, and adjust tube feedings and TPN/PPN based on assessed needs  - Assess need for intravenous fluids  - Provide specific nutrition/hydration education as appropriate  - Include patient/family/caregiver in decisions related to nutrition  Outcome: Progressing

## 2020-09-14 NOTE — PROGRESS NOTES
Progress Note - Vinod Dupont 1975, 50 y o  female MRN: [id number]    Unit/Bed#: [facility] 903-01 Encounter: [id number]    Primary Care Provider: Eric Kathleen MD   Date and time admitted to hospital: 8/27/2020 12:51 AM        * Recurrent left gluteal abscess  Assessment & Plan  Presented with increased abdominal and gluteal pain - recent admission for similar abscess noted  Associated gram-negative bacteremia present (see below)  Continue Flagyl/Cefepime - PRN pain control  Developed a high-grade fever on the night of 9/2 with waxing/waning leukocytosis  Repeat CT imaging on 9/3 revealing "Large rectal mass again noted consistent with known carcinoma  Kristian Simpson Status post decompressive transverse colostomy, new since the prior study  Kristian Simpson Complex mass in the presacral/precoccygeal region is again noted measuring 3 8 x 8 cm concerning for direct extension of the patient's rectal cancer to the presacral space with possible superinfection  Kristian Simpson There is also continuity of this process with the medial left gluteal musculature and an area which was previously drained, suspicious for superinfection of necrotic tumor/abscess, overall measuring 2 2 x 3 3 cm  Kristian Simpson Extensive metastatic disease to the lungs and liver  Kristian Simpson Complex left adnexal mass which could be a primary ovarian lesion versus a metastatic lesion  Kristian Simpson The overall appearance is suspicious for neoplasia  Kristian Simpson Ventral abdominal wall hernia/hernias with ascites  Kristian Simpson Additional stable findings as noted "  Status post IR guided drainage on 9/4 approximately 8 mL of purulent fluid ->  Watching off antibioitics    Metastatic rectal cancer  Assessment & Plan  With liver/lung metastases s/p chemotherapy per outpatient oncology  CT abdomen/pelvis on 8/27 revealing concern for persistent partially obstructing mass - previously refused surgical intervention but agreeable and this hospitalization -> s/p colostomy on 8/31    PRN pain control and supportive care  Repeat CT imaging on 9/3 noted a complex left adnexal mass suspicious for a primary ovarian lesion versus metastatic lesion -> on previous CT scan, this was mentioned as a cystic lesion -> CA-125 tumor marker elevated, however, per oncology, ovarian mass more likely represents further metastasis rather than another primary malignancy  Complaining of rectal bleeding today with mucous production, colorectal surgery thinks this is from tumor, continue to watch  If there is large amount of blood notify surgery  Also, obtain h/h remains stable  CT of abdomen and pelvis similar to previous, still has mass with possible abscess/necrosis  Colostomy is inflammed with blisters noted new, she is complaining of pain on the left side of colon  This is also reverted in the colostomy, the blisters are bigger now  Colorectal surgery was consulted, reside will discuss with attending today  Taken to OR today for excision of prolapse and revised stoma  Still swelling noted of the stoma, did not pass stool yet    Abscess in the rectum-IR drains were placed, there is serosanguinous material in the drain    Severe protein-calorie malnutrition  Assessment & Plan  BMI of 21 51 in the setting of metastatic malignancy and decreased appetite/oral intake  On Ensure nutritional supplementation with meals    Bacteroides bacteremia  Assessment & Plan  Blood culture from 8/27 grow  Bacteroides species - subsequent culture from 8/28 negative - preliminary cultures from 9/3 remaining negative thus far  Continue IV Cefepime w/ PO Flagyl per Infectious Disease  See plan for associated gluteal abscess above    Possible evolving sepsis  Assessment & Plan  With waxing/waning leukocytosis and a high-grade fever of 101 1° on the night of 9/2  Mild procalcitonin bump at 0 26 subsequently normalized -> lactic acid normal  Repeat blood cultures from 9/3 remain negative thus  completed Flagyl and IV Cefepime  See plan for gluteal abscess    Cancer associated pain  Assessment & Plan  Appreciate palliative care input regarding analgesic regimen    Anemia of chronic disease  Assessment & Plan  Monitor H/H  Monitor for postoperative blood loss - transfuse if hemoglobin < 7           VTE Pharmacologic Prophylaxis:   Pharmacologic: Enoxaparin (Lovenox)  Mechanical VTE Prophylaxis in Place: Yes    Patient Centered Rounds: I have performed bedside rounds with nursing staff today  Discussions with Specialists or Other Care Team Provider:  Patient is doing well  Education and Discussions with Family / Patient: patient     Time Spent for Care: 45 minutes  More than 50% of total time spent on counseling and coordination of care as described above  Current Length of Stay: 18 day(s)    Current Patient Status: Inpatient   Certification Statement: The patient will continue to require additional inpatient hospital stay due to swelling of stoma    Discharge Plan: did not have symptoms    Code Status: Level 1 - Full Code      Subjective:   Patient is eating, did not have any stool in the bag yet  Objective:     Vitals:   Temp (24hrs), Av 6 °F (36 4 °C), Min:97 3 °F (36 3 °C), Max:97 8 °F (36 6 °C)    Temp:  [97 3 °F (36 3 °C)-97 8 °F (36 6 °C)] 97 8 °F (36 6 °C)  HR:  [55-77] 77  Resp:  [18] 18  BP: ()/(53-62) 98/53  SpO2:  [92 %-100 %] 100 %  Body mass index is 21 51 kg/m²  Input and Output Summary (last 24 hours):        Intake/Output Summary (Last 24 hours) at 2020 1425  Last data filed at 2020 0840  Gross per 24 hour   Intake 820 ml   Output 1520 ml   Net -700 ml       Physical Exam:     Physical Exam    Additional Data:     Labs:    Results from last 7 days   Lab Units 20  0518   WBC Thousand/uL 13 51*   HEMOGLOBIN g/dL 8 4*   HEMATOCRIT % 28 1*   PLATELETS Thousands/uL 398*   NEUTROS PCT % 82*   LYMPHS PCT % 11*   MONOS PCT % 6   EOS PCT % 0     Results from last 7 days   Lab Units 20  0518   POTASSIUM mmol/L 4 1   CHLORIDE mmol/L 104   CO2 mmol/L 27   BUN mg/dL 6 CREATININE mg/dL 0 41*   CALCIUM mg/dL 8 2*           * I Have Reviewed All Lab Data Listed Above  * Additional Pertinent Lab Tests Reviewed: M[address] 64 Admission Reviewed    Imaging:    Imaging Reports Reviewed Today Include:    Imaging Personally Reviewed by Myself Includes:      Recent Cultures (last 7 days):           Last 24 Hours Medication List:   Current Facility-Administered Medications   Medication Dose Route Frequency Provider Last Rate    acetaminophen  650 mg Oral Q6H PRN Debara Apley, MD      calcium carbonate  500 mg Oral TID PRN Debara Apley, MD      enoxaparin  40 mg Subcutaneous Daily Debara Apley, MD      gabapentin  300 mg Oral TID Debara Apley, MD      HYDROmorphone  1 mg Intravenous Q4H PRN Debara Apley, MD      iohexol  50 mL Oral 90 min pre-procedure Debara Apley, MD      lidocaine   Topical 4x Daily Debara Apley, MD      loratadine  10 mg Oral Daily Debara Apley, MD      nystatin  500,000 Units Swish & Swallow 4x Daily Debara Apley, MD      ondansetron  4 mg Intravenous Q4H PRN Debara Apley, MD      oxyCODONE  40 mg Oral [facility] Debara Apley, MD      oxyCODONE  20 mg Oral Q4H PRN Debara Apley, MD      polyethylene glycol  17 g Oral Daily PRN Debara Apley, MD      senna  1 tablet Oral HS Debara Apley, MD      sertraline  25 mg Oral Daily Debara Apley, MD          Today, Patient Was Seen By: Sally Willard MD    ** Please Note: Dictation voice to text software may have been used in the creation of this document   **

## 2020-09-14 NOTE — PLAN OF CARE
Problem: Potential for Falls  Goal: Patient will remain free of falls  Description: INTERVENTIONS:  - Assess patient frequently for physical needs  -  Identify cognitive and physical deficits and behaviors that affect risk of falls    -  [place] fall precautions as indicated by assessment   - Educate patient/family on patient safety including physical limitations  - Instruct patient to call for assistance with activity based on assessment  - Modify environment to reduce risk of injury  - Consider OT/PT consult to assist with strengthening/mobility  Outcome: Progressing     Problem: GASTROINTESTINAL - ADULT  Goal: Minimal or absence of nausea and/or vomiting  Description: INTERVENTIONS:  - Administer IV fluids if ordered to ensure adequate hydration  - Maintain NPO status until nausea and vomiting are resolved  - Nasogastric tube if ordered  - Administer ordered antiemetic medications as needed  - Provide nonpharmacologic comfort measures as appropriate  - Advance diet as tolerated, if ordered  - Consider nutrition services referral to assist patient with adequate nutrition and appropriate food choices  Outcome: Progressing  Goal: Maintains or returns to baseline bowel function  Description: INTERVENTIONS:  - Assess bowel function  - Encourage oral fluids to ensure adequate hydration  - Administer IV fluids if ordered to ensure adequate hydration  - Administer ordered medications as needed  - Encourage mobilization and activity  - Consider nutritional services referral to assist patient with adequate nutrition and appropriate food choices  Outcome: Progressing  Goal: Maintains adequate nutritional intake  Description: INTERVENTIONS:  - Monitor percentage of each meal consumed  - Identify factors contributing to decreased intake, treat as appropriate  - Assist with meals as needed  - Monitor I&O, weight, and lab values if indicated  - Obtain nutrition services referral as needed  Outcome: Progressing     Problem: Nutrition/Hydration-ADULT  Goal: Nutrient/Hydration intake appropriate for improving, restoring or maintaining nutritional needs  Description: Monitor and assess patient's nutrition/hydration status for malnutrition  Collaborate with interdisciplinary team and initiate plan and interventions as ordered  Monitor patient's weight and dietary intake as ordered or per policy  Utilize nutrition screening tool and intervene as necessary  Determine patient's food preferences and provide high-protein, high-caloric foods as appropriate       INTERVENTIONS:  - Monitor oral intake, urinary output, labs, and treatment plans  - Assess nutrition and hydration status and recommend course of action  - Evaluate amount of meals eaten  - Assist patient with eating if necessary   - Allow adequate time for meals  - Recommend/ encourage appropriate diets, oral nutritional supplements, and vitamin/mineral supplements  - Order, calculate, and assess calorie counts as needed  - Recommend, monitor, and adjust tube feedings and TPN/PPN based on assessed needs  - Assess need for intravenous fluids  - Provide specific nutrition/hydration education as appropriate  - Include patient/family/caregiver in decisions related to nutrition  Outcome: Progressing

## 2020-09-14 NOTE — ASSESSMENT & PLAN NOTE
Presented with increased abdominal and gluteal pain - recent admission for similar abscess noted  Associated gram-negative bacteremia present (see below)  Continue Flagyl/Cefepime - PRN pain control  Developed a high-grade fever on the night of 9/2 with waxing/waning leukocytosis  Repeat CT imaging on 9/3 revealing "Large rectal mass again noted consistent with known carcinoma  Janelle Prows Janelle Prows Status post decompressive transverse colostomy, new since the prior study  Janelle Prows Janelle Prows Complex mass in the presacral/precoccygeal region is again noted measuring 3 8 x 8 cm concerning for direct extension of the patient's rectal cancer to the presacral space with possible superinfection  Janelle Prows Janelle Prows There is also continuity of this process with the medial left gluteal musculature and an area which was previously drained, suspicious for superinfection of necrotic tumor/abscess, overall measuring 2 2 x 3 3 cm  Janelle Prows Janelle Prows Extensive metastatic disease to the lungs and liver  Janelle Prows Janelle Prows Complex left adnexal mass which could be a primary ovarian lesion versus a metastatic lesion  Janelle Prows Janelle Prows The overall appearance is suspicious for neoplasia  Janelle Prows Janelle Prows Ventral abdominal wall hernia/hernias with ascites  Janelle Prows Janelle Prows Additional stable findings as noted "  Status post IR guided drainage on 9/4 approximately 8 mL of purulent fluid ->  Watching off antibioitics

## 2020-09-14 NOTE — UTILIZATION REVIEW
Elective Surgical Continued Stay Review    Date:  9/14/20       POD#: 15 from original surgery for Lap colostomy   POD #: 1 Revison of stoma     +++++++++++++++++++++++++  9/13 Operative Note    Preop Diagnosis:  Colostomy prolapse ([facility] ) [K94 09]     Post-Op Diagnosis Codes:     * Colostomy prolapse ([facility] [address] ) [K94 09]     Procedure(s) (LRB):  REVISION STOMA (N/A)    Anesthesia Type:   General     Operative Findings:  Prolapsed but viable the bowel through the mucous fistula  Proximal functional colostomy normal  +++++++++++++++++++++++++++    Current Patient Class: IP  Current Level of Care: MS    Assessment/Plan: 50 y o  female, repeat surgery date 9/13 for stoma revision  Pt is doing well on POD #1 for stoma revision  Good analgesia  Good vitals  No fever  Tolerating a diet  Palliative care managing analgesia - pt has good pain control with Oxycontin TID with occasional PRN dosing  Per ID pt has negative blood cultures and is being observed off IV antibiotics at this time        Pertinent Labs/Diagnostic Results:   Vital Signs:   09/14/20 1500   97 7 °F (36 5 °C)   71   18   98/51   98 %                09/14/20 1100   97 8 °F (36 6 °C)   77   18   98/53   100 %                09/14/20 0700   97 5 °F (36 4 °C)   71   18   100/56   100 %                09/14/20 0300   97 3 °F (36 3 °C)Abnormal     75   18   108/58   92 %                09/13/20 2300   97 6 °F (36 4 °C)   55   18   103/56   92 %                09/13/20 2115                     None (Room air)          09/13/20 1500   97 8 °F (36 6 °C)   64   18   96/62   95 %                09/13/20 1009   97 4 °F (36 3 °C)Abnormal     68   14   124/84   97 %      None (Room air)   WDL       09/13/20 1000      66   14   113/71   100 %      None (Room air)          09/13/20 0945      76   12   104/68   100 %   6 L/min   Simple mask          09/13/20 0940   96 8 °F (36 °C)Abnormal     80   16   104/68   98 %   6 L/min   Simple mask   WDL       09/13/20 0750                     None (Room air)          09/13/20 0700   98 5 °F (36 9 °C)   65   18   107/65   94 %                09/13/20 0300   98 1 °F (36 7 °C)   62   18   116/62   98 %                09/12/20 2306   98 6 °F (37 °C)   69   18   104/62   95 %                09/12/20 2102                     None (Room air)          09/12/20 1900   98 3 °F (36 8 °C)   71   18   98/54   92 %      None (Room air)      Lying    09/12/20 1714      73   16   104/62   94 %      None (Room air)      Lying    09/12/20 1542   99 1 °F (37 3 °C)   72   18   111/59   92 %      None (Room air)      Lying    09/12/20 0850                     None (Room air)          09/12/20 0700   98 4 °F (36 9 °C)   69   18   97/55   97 %                09/12/20 0300   98 7 °F (37 1 °C)   74   18   104/58   93 %                  Medications:   Scheduled Medications:  enoxaparin, 40 mg, Subcutaneous, Daily  gabapentin, 300 mg, Oral, TID  iohexol, 50 mL, Oral, 90 min pre-procedure  lidocaine, , Topical, 4x Daily  loratadine, 10 mg, Oral, Daily  nystatin, 500,000 Units, Swish & Swallow, 4x Daily  oxyCODONE, 40 mg, Oral, Q8H GONZÁLEZ  senna, 1 tablet, Oral, HS  sertraline, 25 mg, Oral, Daily    Continuous IV Infusions:   IV LR @ 100 ml/hr d/c on 9/13 @ 0924    PRN Meds:  acetaminophen, 650 mg, Oral, Q6H PRN  calcium carbonate, 500 mg, Oral, TID PRN  HYDROmorphone, 1 mg, Intravenous, Q4H PRN -x 1 9/13  ondansetron, 4 mg, Intravenous, Q4H PRN  oxyCODONE, 20 mg, Oral, Q4H PRN  polyethylene glycol, 17 g, Oral, Daily PRN    Discharge Plan: TBD     Network Utilization Review Department  [email or web address]  ATTENTION: Please call with any questions or concerns to [phone] and carefully listen to the prompts so that you are directed to the right person   All voicemails are confidential   Leigh Ann Erazo all requests for admission clinical reviews, approved or denied determinations and any other requests to dedicated fax number below belonging to the campus where the patient is receiving treatment   List of dedicated fax numbers for the Facilities:  [address] [facility] [address] DENIALS (Administrative/Medical Necessity) [phone]   [address]  (Maternity/NICU/Pediatrics) [phone]   Charisma Rodriguez [phone]   Erika Herring [phone]   Damiendimitri  [phone]   Enzo Guthrie [phone]   [address] [place] [address] [phone]   [facility]  [phone]   [address] [facility]  [address] [facility] [address] [facility] [phone]

## 2020-09-14 NOTE — PROGRESS NOTES
Progress Note -    Moncho Fitzpatrick 50 y o  female MRN: [id number]  Unit/Bed#: [facility] 903-01 Encounter: [id number]    Assessment:  51-yr old female with advanced rectal cancer; POD 1: status post revision of colostomy  Uneventful post-operative course  Stable vital signs in room air  Some bowel sweat noted in stoma bag  PLAN:  - oral diet as tolerated  - continued care per primary team     Subjective:   - no new complaints  Objective:     Vitals: Temp:  [96 8 °F (36 °C)-97 8 °F (36 6 °C)] 97 3 °F (36 3 °C)  HR:  [] 75  Resp:  [12-18] 18  BP: ()/(56-84) 108/58  Body mass index is 21 51 kg/m²  I/O       09/12 0701 - 09/13 0700 09/13 0701 - 09/14 0700 09/14 0701 - 09/15 0700    P  O  420 700     I V  (mL/kg) 1010 (18 9) 1000 (18 8)     Total Intake(mL/kg) 1430 (26 8) 1700 (31 9)     Urine (mL/kg/hr) 1450 (1 1) 2100 (1 6)     Drains 60 20     Stool 765 0     Blood  25     Total Output 2275 2145     Net -841 -138                  Physical Exam:  GEN: NAD  HEENT: MMM  CV: RRR  Lung: Normal effort  Ab: Soft, NT/ND; colostomy pink; with some bowel secretions  Extrem: No CCE  Neuro: A+Ox3    Lab, Imaging and other studies:   I have personally reviewed pertinent reports    , CBC with diff:   Lab Results   Component Value Date    WBC 13 51 (H) 09/14/2020    HGB 8 4 (L) 09/14/2020    HCT 28 1 (L) 09/14/2020    MCV 83 09/14/2020     (H) 09/14/2020    MCH 24 9 (L) 09/14/2020    MCHC 29 9 (L) 09/14/2020    RDW 19 9 (H) 09/14/2020    MPV 10 0 09/14/2020    NRBC 0 09/14/2020   , BMP/CMP:   Lab Results   Component Value Date    SODIUM 137 09/14/2020    K 4 1 09/14/2020     09/14/2020    CO2 27 09/14/2020    BUN 6 09/14/2020    CREATININE 0 41 (L) 09/14/2020    CALCIUM 8 2 (L) 09/14/2020    EGFR 123 09/14/2020     VTE Pharmacologic Prophylaxis: Heparin  VTE Mechanical Prophylaxis: sequential compression device

## 2020-09-14 NOTE — PROGRESS NOTES
Progress Note - Vanessa Fitzpatrick 1975, 50 y o  female MRN: [id number]    Unit/Bed#: [facility] 903-01 Encounter: [id number]    Primary Care Provider: Haley Sung MD   Date and time admitted to hospital: 8/27/2020 12:51 AM      Patient Active Problem List    Diagnosis Date Noted    Colostomy prolapse ([facility] [address] ) 09/12/2020    Severe protein-calorie malnutrition 09/05/2020    Bacteroides bacteremia 08/28/2020    Anxiety with depression 08/24/2020    HTN (hypertension), benign 08/24/2020    Vitamin D deficiency 08/24/2020    Bright red blood per rectum 08/10/2020    Thrombocytosis  08/08/2020    Cancer associated pain 08/04/2020    Possible evolving sepsis 08/04/2020    Recurrent left gluteal abscess 07/31/2020    Gram-positive bacteremia 07/05/2020    Metastatic rectal cancer 07/03/2020    Breast cancer 07/03/2020    Abdominal pain 07/03/2020    Anemia of chronic disease 07/03/2020    Large bowel obstruction ([facility] ) 07/01/2020    Carpal tunnel syndrome, bilateral 04/04/2016    Postlaminectomy syndrome, lumbar 04/04/2016    Neck pain 04/04/2016    Chronic left SI joint pain 09/30/2015    Muscle spasm 10/20/2014    Abnormal cytological findings in specimens from other organs, systems and tissues 07/15/2014    Nicotine dependence 09/20/2013    Disc degeneration, lumbar 08/24/2012    Myofascial pain syndrome 07/23/2012     Progress note - Palliative and Supportive Care   Puja Fitzpatrick 50 y o  female [id number]    Assessment:    - Patient states her pain is well controlled with oxycontin TID  She occasionally needs PRN oxy but is states she is doing well with current regimen  Colostomy bag is empty and has not drained since colostomy revision yesterday  Plan:  1  Symptom management -   Continue Oxycontin 40mg TID facundo  Continue oxyIR 20 mg q4h PRN  Continue IV Dilaudid 1mg q4h Prn  Continue Gabapentin 300mg TID  Continue Senna 8 6 mg daily at bedtime  Continue Miralax 17 g daily PRN           2  Goals -   Full Code  Wants to continue chemotherapy after discharge from hospital    -     Code Status: Full Code - Level 1   Decisional apparatus:  Patient is competent on my exam today  If competence is lost, patient's substitute decision maker would default to [employer] [place]  Advance Directive / Living Will / POLST:  Not on file    Interval history:       Patient is 50 F with PMH of Metastatic rectal cancer that has spread to liver and lungs with possible ovarian spread  She was on chemotherapy prior to admission  She has h/o recurrent abscesses  Left gluteal abscess on current admission which drain has been placed  Colostomy revision performed due to prolapse  Antibiotic course completed after blood cultures positive for Bacteroides  Repeat cultures negative  MEDICATIONS / ALLERGIES:     all current active meds have been reviewed    Allergies   Allergen Reactions    Augmentin [Amoxicillin-Pot Clavulanate] Confusion and Drowsiness       OBJECTIVE:    Physical Exam  Physical Exam  HENT:      Head: Normocephalic and atraumatic  Mouth/Throat:      Mouth: Mucous membranes are moist    Eyes:      Extraocular Movements: Extraocular movements intact  Pupils: Pupils are equal, round, and reactive to light  Neck:      Musculoskeletal: Normal range of motion  Cardiovascular:      Rate and Rhythm: Normal rate and regular rhythm  Pulmonary:      Effort: Pulmonary effort is normal    Abdominal:      General: Abdomen is flat  Comments: Colostomy with bag in place  Small volume blood present in bag     Musculoskeletal: Normal range of motion  Skin:     General: Skin is warm and dry  Neurological:      Mental Status: She is alert  Mental status is at baseline  Psychiatric:         Mood and Affect: Mood normal          Behavior: Behavior normal          Thought Content:  Thought content normal          Judgment: Judgment normal          Lab Results: I have personally reviewed pertinent labs  Imaging Studies: Abd CT showing rectal mass with liver, pulm, adnexal metastasis  EKG, Pathology, and Other Studies: EKG: NSR    Counseling / Coordination of Care    Total floor / unit time spent today 40 minutes  Greater than 50% of total time was spent with the patient and / or family counseling and / or coordination of care   A description of the counseling / coordination of care: chart review, medication review, supportive listening

## 2020-09-15 PROBLEM — R78.81 GRAM-NEGATIVE BACTEREMIA: Status: RESOLVED | Noted: 2020-08-28 | Resolved: 2020-09-15

## 2020-09-15 PROBLEM — K56.609 LARGE BOWEL OBSTRUCTION (HCC): Status: RESOLVED | Noted: 2020-07-01 | Resolved: 2020-09-15

## 2020-09-15 PROBLEM — A41.9 SEPSIS (HCC): Status: RESOLVED | Noted: 2020-08-04 | Resolved: 2020-09-15

## 2020-09-15 LAB
ANION GAP SERPL CALCULATED.3IONS-SCNC: 6 MMOL/L (ref 4–13)
BASOPHILS # BLD AUTO: 0.08 THOUSANDS/ΜL (ref 0–0.1)
BASOPHILS NFR BLD AUTO: 1 % (ref 0–1)
BUN SERPL-MCNC: 5 MG/DL (ref 5–25)
CALCIUM SERPL-MCNC: 8.2 MG/DL (ref 8.3–10.1)
CHLORIDE SERPL-SCNC: 104 MMOL/L (ref 100–108)
CO2 SERPL-SCNC: 30 MMOL/L (ref 21–32)
CREAT SERPL-MCNC: 0.4 MG/DL (ref 0.6–1.3)
EOSINOPHIL # BLD AUTO: 0.32 THOUSAND/ΜL (ref 0–0.61)
EOSINOPHIL NFR BLD AUTO: 3 % (ref 0–6)
ERYTHROCYTE [DISTWIDTH] IN BLOOD BY AUTOMATED COUNT: 20.2 % (ref 11.6–15.1)
GFR SERPL CREATININE-BSD FRML MDRD: 124 ML/MIN/1.73SQ M
GLUCOSE SERPL-MCNC: 85 MG/DL (ref 65–140)
HCT VFR BLD AUTO: 25.6 % (ref 34.8–46.1)
HGB BLD-MCNC: 7.5 G/DL (ref 11.5–15.4)
IMM GRANULOCYTES # BLD AUTO: 0.1 THOUSAND/UL (ref 0–0.2)
IMM GRANULOCYTES NFR BLD AUTO: 1 % (ref 0–2)
LYMPHOCYTES # BLD AUTO: 2.34 THOUSANDS/ΜL (ref 0.6–4.47)
LYMPHOCYTES NFR BLD AUTO: 21 % (ref 14–44)
MCH RBC QN AUTO: 24.9 PG (ref 26.8–34.3)
MCHC RBC AUTO-ENTMCNC: 29.3 G/DL (ref 31.4–37.4)
MCV RBC AUTO: 85 FL (ref 82–98)
MONOCYTES # BLD AUTO: 1.06 THOUSAND/ΜL (ref 0.17–1.22)
MONOCYTES NFR BLD AUTO: 9 % (ref 4–12)
NEUTROPHILS # BLD AUTO: 7.52 THOUSANDS/ΜL (ref 1.85–7.62)
NEUTS SEG NFR BLD AUTO: 65 % (ref 43–75)
NRBC BLD AUTO-RTO: 0 /100 WBCS
PLATELET # BLD AUTO: 350 THOUSANDS/UL (ref 149–390)
PMV BLD AUTO: 10 FL (ref 8.9–12.7)
POTASSIUM SERPL-SCNC: 3.9 MMOL/L (ref 3.5–5.3)
RBC # BLD AUTO: 3.01 MILLION/UL (ref 3.81–5.12)
SODIUM SERPL-SCNC: 140 MMOL/L (ref 136–145)
WBC # BLD AUTO: 11.42 THOUSAND/UL (ref 4.31–10.16)

## 2020-09-15 PROCEDURE — 80048 BASIC METABOLIC PNL TOTAL CA: CPT | Performed by: INTERNAL MEDICINE

## 2020-09-15 PROCEDURE — 99232 SBSQ HOSP IP/OBS MODERATE 35: CPT | Performed by: INTERNAL MEDICINE

## 2020-09-15 PROCEDURE — 85025 COMPLETE CBC W/AUTO DIFF WBC: CPT | Performed by: INTERNAL MEDICINE

## 2020-09-15 PROCEDURE — 99233 SBSQ HOSP IP/OBS HIGH 50: CPT | Performed by: INTERNAL MEDICINE

## 2020-09-15 PROCEDURE — 99239 HOSP IP/OBS DSCHRG MGMT >30: CPT | Performed by: INTERNAL MEDICINE

## 2020-09-15 RX ORDER — POLYETHYLENE GLYCOL 3350 17 G/17G
17 POWDER, FOR SOLUTION ORAL DAILY PRN
Qty: 30 EACH | Refills: 0 | Status: ON HOLD | OUTPATIENT
Start: 2020-09-15 | End: 2020-11-09 | Stop reason: SDUPTHER

## 2020-09-15 RX ORDER — SENNOSIDES 8.6 MG
1 TABLET ORAL
Qty: 120 EACH | Refills: 0 | Status: SHIPPED | OUTPATIENT
Start: 2020-09-15

## 2020-09-15 RX ORDER — LIDOCAINE 40 MG/G
CREAM TOPICAL 4 TIMES DAILY
Qty: 30 G | Refills: 0 | Status: CANCELLED | OUTPATIENT
Start: 2020-09-15

## 2020-09-15 RX ORDER — GABAPENTIN 300 MG/1
300 CAPSULE ORAL 3 TIMES DAILY
Qty: 90 CAPSULE | Refills: 0 | Status: SHIPPED | OUTPATIENT
Start: 2020-09-15

## 2020-09-15 RX ADMIN — SERTRALINE HYDROCHLORIDE 25 MG: 25 TABLET ORAL at 09:51

## 2020-09-15 RX ADMIN — OXYCODONE HYDROCHLORIDE 20 MG: 10 TABLET ORAL at 09:51

## 2020-09-15 RX ADMIN — OXYCODONE HYDROCHLORIDE 40 MG: 20 TABLET, FILM COATED, EXTENDED RELEASE ORAL at 15:13

## 2020-09-15 RX ADMIN — OXYCODONE HYDROCHLORIDE 40 MG: 20 TABLET, FILM COATED, EXTENDED RELEASE ORAL at 05:35

## 2020-09-15 RX ADMIN — LORATADINE 10 MG: 10 TABLET ORAL at 09:51

## 2020-09-15 RX ADMIN — GABAPENTIN 300 MG: 300 CAPSULE ORAL at 17:16

## 2020-09-15 RX ADMIN — HYDROMORPHONE HYDROCHLORIDE 1 MG: 1 INJECTION, SOLUTION INTRAMUSCULAR; INTRAVENOUS; SUBCUTANEOUS at 11:55

## 2020-09-15 RX ADMIN — OXYCODONE HYDROCHLORIDE 20 MG: 10 TABLET ORAL at 02:22

## 2020-09-15 RX ADMIN — ENOXAPARIN SODIUM 40 MG: 40 INJECTION SUBCUTANEOUS at 09:52

## 2020-09-15 RX ADMIN — GABAPENTIN 300 MG: 300 CAPSULE ORAL at 09:51

## 2020-09-15 RX ADMIN — OXYCODONE HYDROCHLORIDE 20 MG: 10 TABLET ORAL at 17:16

## 2020-09-15 NOTE — ASSESSMENT & PLAN NOTE
Presented with increased abdominal and gluteal pain - recent admission for similar abscess noted  Associated gram-negative bacteremia present (see below)  Continue Flagyl/Cefepime - PRN pain control  Developed a high-grade fever on the night of 9/2 with waxing/waning leukocytosis  Repeat CT imaging on 9/3 revealing "Large rectal mass again noted consistent with known carcinoma  Liela [place]ila [place] Status post decompressive transverse colostomy, new since the prior study  Leila [place] [facility]ila [place] Complex mass in the presacral/precoccygeal region is again noted measuring 3 8 x 8 cm concerning for direct extension of the patient's rectal cancer to the presacral space with possible superinfection  [facility] [place] There is also continuity of this process with the medial left gluteal musculature and an area which was previously drained, suspicious for superinfection of necrotic tumor/abscess, overall measuring 2 2 x 3 3 cm  Leila [place] Leila [place] Extensive metastatic disease to the lungs and liver  Leila [place] [facility]ila [place] Complex left adnexal mass which could be a primary ovarian lesion versus a metastatic lesion  [facility] [place] The overall appearance is suspicious for neoplasia  [facility] Ventral abdominal wall hernia/hernias with ascites  [facility] Additional stable findings as noted "  · Status post IR guided drainage on 9/4 approximately 8 mL of purulent fluid ->  Watching off antibioitics  · Continue regular flushes  · Follow-up outpatient with IR

## 2020-09-15 NOTE — ASSESSMENT & PLAN NOTE
With liver/lung metastases s/p chemotherapy per outpatient oncology  CT abdomen/pelvis on 8/27 revealing concern for persistent partially obstructing mass - previously refused surgical intervention but agreeable and this hospitalization -> s/p colostomy on 8/31    PRN pain control and supportive care  Repeat CT imaging on 9/3 noted a complex left adnexal mass suspicious for a primary ovarian lesion versus metastatic lesion -> on previous CT scan, this was mentioned as a cystic lesion -> CA-125 tumor marker elevated, however, per oncology, ovarian mass more likely represents further metastasis rather than another primary malignancy  · Complaining of rectal bleeding today with mucous production, colorectal surgery thinks this is from tumor, continue to watch  This was stable on day of her discharge  CT of abdomen and pelvis similar to previous, still has mass with possible abscess/necrosis  · Taken to OR for excision of prolapse and revised stoma     · Patient passing brown stool into her ostomy  · Abscess in the rectum-IR drains were placed, there is serosanguinous material in the drain  · Continue outpatient follow-up with oncology

## 2020-09-15 NOTE — DISCHARGE SUMMARY
Discharge- Jessica Aragon Reusing 1975, 50 y o  female MRN: [id number]    Unit/Bed#: [facility] 903-01 Encounter: [id number]    Primary Care Provider: Jose Atkins MD   Date and time admitted to hospital: 8/27/2020 12:51 AM        Severe protein-calorie malnutrition  Assessment & Plan  BMI of 21 51 in the setting of metastatic malignancy and decreased appetite/oral intake  · On Ensure nutritional supplementation with meals during hospitalization  · Continue supportive care and nutrition supplementation  · Continue outpatient follow-up    Thrombocytosis   Assessment & Plan  Likely reactive secondary to acute medical issues including cancer, abscess, procedures  · Platelet count normal on 09/15    Cancer associated pain  Assessment & Plan  Palliative care consulted regarding inpatient regimen  · Will continue gabapentin 3 mg t i d   · Oxycodone 60 mg 12 hours tablet every 12 hours  · Oxycodone 20 mg every 4 hours for breakthrough pain  · Dilaudid 4 mg p r n   For intractable pain refractory to oxycodone  · Continue outpatient follow-up with palliative care  · If requires more than 2 doses of Dilaudid in 2 hours for pain, call palliative care provider    Anemia of chronic disease  Assessment & Plan  · Hemoglobin stable  · Continue outpatient monitoring    Metastatic rectal cancer  Assessment & Plan  With liver/lung metastases s/p chemotherapy per outpatient oncology  CT abdomen/pelvis on 8/27 revealing concern for persistent partially obstructing mass - previously refused surgical intervention but agreeable and this hospitalization -> s/p colostomy on 8/31    PRN pain control and supportive care  Repeat CT imaging on 9/3 noted a complex left adnexal mass suspicious for a primary ovarian lesion versus metastatic lesion -> on previous CT scan, this was mentioned as a cystic lesion -> CA-125 tumor marker elevated, however, per oncology, ovarian mass more likely represents further metastasis rather than another primary malignancy  CT of abdomen and pelvis similar to previous, still has mass with possible abscess/necrosis  · Complaining of rectal bleeding today with mucous production, colorectal surgery thinks this is from tumor, continue to watch  Also likely secondary This was stable on day of her discharge  · Taken to OR for excision of prolapse and revised stoma  · Patient passing brown stool into her ostomy  · Abscess in the rectum - IR drains were placed, there is serosanguinous material in the drain  · Continue outpatient follow-up with oncology      Sepsis (resolved)  · As evidence by leukocytosis, fever  Initial blood culture positive for bacterioides thetaiotaomicron  · Patient received cefepime and metronidazole  · Received cefazolin and metronidazole preop  · Repeat blood cultures negative  · Off antibiotics at time of discharge        Resolved Problems  Date Reviewed: 9/15/2020          Resolved    * (Principal) Recurrent left gluteal abscess 9/16/2020     Resolved by  Vicky Blackman DO    Possible evolving sepsis 9/15/2020     Resolved by  Vicky Blackman DO    Hypokalemia 9/7/2020     Resolved by  Brock Brown MD    Bacteroides bacteremia 9/15/2020     Resolved by  Vicky Blackman DO    Colostomy prolapse ([facility] [address] ) 9/16/2020     Resolved by  Britt Witt DO          Admission Date:   Admission Orders (From admission, onward)     Ordered        08/27/20 0618  Inpatient Admission  Once                     Admitting Diagnosis: Rectal cancer ([address] ) [C20]  Abdominal pain [R10 9]  Gluteal abscess [L02 31]  Partial bowel obstruction ([address] ) [K56 600]  Sepsis ([address] ) [A41 9]    HPI:  This is a 24-year-old female with a past medical history of metastatic rectal adenocarcinoma presented to the hospital initially with fevers and increasing abdominal pain    She was evaluated in the ED, where CT scan revealed a partially obstructing colonic mass along with a left gluteal collection suspicious for a recurrent abscess  She was admitted for pain control and antibiotics  See H&P dated on 08/27 for further details  Procedures Performed: No orders of the defined types were placed in this encounter  Summary of Hospital Course:  Patient had refused diverting colostomy in the past, but was agreeable during this admission  Was initially supposed to get a diverting colostomy, however it was complicated by inflammation and prolapse of the colon  She was taken to the OR for surgical correction  Received IR guided drain placement for her medial abscess  She also developed a Gram-negative bacteremia suspected to be from her abscess  Completed antibiotics  Was evaluated by Oncology, ID, colorectal surgery, palliative care  Repeat CT scan had revealed a new ovarian mass, however, per Oncology this likely represented further metastasis from her rectal cancer instead of a secondary ovarian tumor  On 9/15, patient was complaining of some mild back pain and some soreness around her colostomy site  However no other complaints  No fevers, chills, shortness of breath  Wanted to make sure that her medications were taken care of by palliative care  After receiving her oral oxycodone, patient continued to complain of severe 8/10 pain in her perineal region  Also complained of pink mucousy discharge from her rectum, however this is been ongoing for months  Patient received a 1 time dose of 1 mg of IV Dilaudid, however after receiving this, she became sleepy and lethargic  Held off on giving additional Dilaudid  Unfortunately, her pain continued to be refractory, so she was held an additional day for better adjustment her pain medications  Palliative re-evaluated patient on 09/16  Patient required oral oxycodone with oral Dilaudid for breakthrough pain  Pain did improve after receiving oral Dilaudid with scheduled oxycodone  Patient was discharged on 09/16 to home with home health care    Scripts for opioids sent to pharmacy by palliative care  Instructions were given for drain flushing and proper drain care  Follow-up was encouraged with GI, Oncology, PCP, IR, palliative care  Physical Exam  Vitals signs reviewed  Constitutional:       General: She is not in acute distress  Appearance: She is not toxic-appearing or diaphoretic  HENT:      Head: Normocephalic and atraumatic  Nose: Nose normal    Eyes:      Extraocular Movements: Extraocular movements intact  Conjunctiva/sclera: Conjunctivae normal       Pupils: Pupils are equal, round, and reactive to light  Neck:      Musculoskeletal: Neck supple  Cardiovascular:      Rate and Rhythm: Normal rate and regular rhythm  Pulses: Normal pulses  Heart sounds: Normal heart sounds  Pulmonary:      Effort: Pulmonary effort is normal       Breath sounds: Normal breath sounds  Chest:      Comments: Chest port in place  Abdominal:      General: Abdomen is flat  There is no distension  Tenderness: There is no guarding  Comments: Colostomy bag in place, draining brown stool   Musculoskeletal:      Right lower leg: No edema  Left lower leg: No edema  Skin:     General: Skin is warm and dry  Comments: Gluteal abscess drain in place, draining serosanguineous fluid   Neurological:      Mental Status: She is alert and oriented to person, place, and time  Psychiatric:         Mood and Affect: Mood normal          Behavior: Behavior normal            Significant Findings, Care, Treatment and Services Provided:    · IR guided drainage on 09/04  · Excision of colostomy prolapse and revised stoma on 09/14  Complications:  Colorectal prolapse    Condition at Discharge: fair         Discharge instructions/Information to patient and family:   See after visit summary for information provided to patient and family        Provisions for Follow-Up Care:  See after visit summary for information related to follow-up care and any pertinent home health orders  PCP: Mil Del Castillo MD    Disposition: Home    Planned Readmission: No    Discharge Statement   I spent 30 minutes discharging the patient  This time was spent on the day of discharge  I had direct contact with the patient on the day of discharge  Additional documentation is required if more than 30 minutes were spent on discharge  Discharge Medications:  See after visit summary for reconciled discharge medications provided to patient and family

## 2020-09-15 NOTE — PROGRESS NOTES
Progress Note - Infectious Disease   Puja Fitzpatrick 50 y o  female MRN: [id number]  Unit/Bed#: [facility] 903-01 Encounter: [id number]      Impression:  1  Recurrent left gluteal abscess with Bacteroides thetaiotaomicron bacteremia  2  Stage IV metastatic rectal carcinoma on chemotherapy S/P laparoscopic colostomy POD 11, S/P stoma revision POD 2    Recommendations:    She is afebrile  with elevated but decreasing WBC count  Underwent colostomy stomal revision    1  Blood culture isolate  showing Bacteroides thetaiotaomicron which is susceptible to metronidazole dermacoccus eladia  Repeat blood culture  is negative  Blood cultures x2 from 9/3 are finalized negative  2  Observe off antibiotics  3  Patient says that her buttock pain is worse tonight and that she is unable to go home unless it is better controlled     Antibiotics:  1  None  Subjective:  As above, buttock pain worse and she is unable to sleep for any period of time  Denies chills, or sweats  Denies nausea, vomiting, or diarrhea  Objective:  Vitals:  Temp:  [98 2 °F (36 8 °C)-98 7 °F (37 1 °C)] 98 5 °F (36 9 °C)  HR:  [61-82] 82  Resp:  [18] 18  BP: ()/(54-63) 108/63  SpO2:  [94 %-96 %] 96 %  Temp (24hrs), Av 5 °F (36 9 °C), Min:98 2 °F (36 8 °C), Max:98 7 °F (37 1 °C)  Current: Temperature: 98 5 °F (36 9 °C)    Physical Exam:     General Appearance:  Lethargic but awake chronically ill nontoxic, no acute distress     Eyes:  Conjunctiva pale   Throat: Oropharynx moist without lesions  Lips, mucosa, and tongue normal   Neck: Supple, symmetrical, trachea midline, no adenopathy,  no tenderness/mass/nodules   Lungs:   Clear to auscultation bilaterally, no audible wheezes, rhonchi or rales; respirations unlabored   Heart:  Regular rate and rhythm, S1, S2 normal, no murmur, rub or gallop   Abdomen:   Colostomy with some sanguinous drainage  Less abdominal distention, positive bowel sounds    No masses, no organomegaly  No CVA tenderness,    Extremities: Tattoos, 2+ proximal lower extremity edema   Skin: Tattoos, right subclavian PAC, left gluteal wound with ARACELI drain and 0 sanguinous drainage and some tenderness  , colostomy as above           Invasive Devices     Central Venous Catheter Line            Port A Cath 06/22/20 Right Chest 85 days          Drain            Colostomy LUQ 15 days    Closed/Suction Drain Left Buttock Bulb 8 5 Fr  4 days                Labs, Imaging, & Other studies:   All pertinent labs were personally reviewed  Results from last 7 days   Lab Units 09/15/20  0634 09/14/20  0518 09/13/20  0511   WBC Thousand/uL 11 42* 13 51* 9 62   HEMOGLOBIN g/dL 7 5* 8 4* 8 4*   PLATELETS Thousands/uL 350 398* 407*     Results from last 7 days   Lab Units 09/15/20  0634 09/14/20  0518 09/13/20  0511   SODIUM mmol/L 140 137 136   POTASSIUM mmol/L 3 9 4 1 4 2   CHLORIDE mmol/L 104 104 103   CO2 mmol/L 30 27 29   BUN mg/dL 5 6 3*   CREATININE mg/dL 0 40* 0 41* 0 42*   EGFR ml/min/1 73sq m 124 123 122   CALCIUM mg/dL 8 2* 8 2* 8 5

## 2020-09-15 NOTE — PROGRESS NOTES
Patient seen and examined  As production from ostomy today  Ostomy is pink and viable  Pain is well controlled  Continue regular diet    Okay for discharge from surgical point of view

## 2020-09-15 NOTE — ASSESSMENT & PLAN NOTE
Palliative care consult regarding inpatient regimen  · Will continue gabapentin 3 mg t i d   · Oxycodone 60 mg 12 hours tablet every 12 hours  · Oxycodone 20 mg every 4 hours for breakthrough pain

## 2020-09-15 NOTE — ASSESSMENT & PLAN NOTE
BMI of 21 51 in the setting of metastatic malignancy and decreased appetite/oral intake  · On Ensure nutritional supplementation with meals during hospitalization  · Continue supportive care and nutrition supplementation  · Continue outpatient follow-up

## 2020-09-15 NOTE — PROGRESS NOTES
Progress Note - Beverley Sernait 1975, 50 y o  female MRN: [id number]    Unit/Bed#: [facility] 903-01 Encounter: [id number]    Primary Care Provider: Bayron Velazquez MD   Date and time admitted to hospital: 8/27/2020 12:51 AM        Colostomy prolapse [facility])  Assessment & Plan  · Status post revision of mucus fistula for prolapse by colorectal surgery  · Ostomy bag in place    Severe protein-calorie malnutrition  Assessment & Plan  BMI of 21 51 in the setting of metastatic malignancy and decreased appetite/oral intake  · On Ensure nutritional supplementation with meals during hospitalization  · Continue supportive care and nutrition supplementation  · Continue outpatient follow-up    Cancer associated pain  Assessment & Plan  Palliative care consult regarding inpatient regimen  · Will continue gabapentin 3 mg t i d   · Oxycodone 60 mg 12 hours tablet every 12 hours  · Oxycodone 20 mg every 4 hours for breakthrough pain    Anemia of chronic disease  Assessment & Plan  · Hemoglobin stable  · Continue outpatient monitoring    Metastatic rectal cancer  Assessment & Plan  With liver/lung metastases s/p chemotherapy per outpatient oncology  CT abdomen/pelvis on 8/27 revealing concern for persistent partially obstructing mass - previously refused surgical intervention but agreeable and this hospitalization -> s/p colostomy on 8/31    PRN pain control and supportive care  Repeat CT imaging on 9/3 noted a complex left adnexal mass suspicious for a primary ovarian lesion versus metastatic lesion -> on previous CT scan, this was mentioned as a cystic lesion -> CA-125 tumor marker elevated, however, per oncology, ovarian mass more likely represents further metastasis rather than another primary malignancy  · Complaining of rectal bleeding today with mucous production, colorectal surgery thinks this is from tumor, continue to watch  This was stable on day of her discharge    CT of abdomen and pelvis similar to previous, still has mass with possible abscess/necrosis  · Taken to OR for excision of prolapse and revised stoma  · Patient passing brown stool into her ostomy  · Abscess in the rectum-IR drains were placed, there is serosanguinous material in the drain  · Continue outpatient follow-up with oncology    * Recurrent left gluteal abscess  Assessment & Plan  Presented with increased abdominal and gluteal pain - recent admission for similar abscess noted  Associated gram-negative bacteremia present (see below)  Continue Flagyl/Cefepime - PRN pain control  Developed a high-grade fever on the night of 9/2 with waxing/waning leukocytosis  Repeat CT imaging on 9/3 revealing "Large rectal mass again noted consistent with known carcinoma  [facility] [place] [facility] Status post decompressive transverse colostomy, new since the prior study  [facility] [place] [facility] Complex mass in the presacral/precoccygeal region is again noted measuring 3 8 x 8 cm concerning for direct extension of the patient's rectal cancer to the presacral space with possible superinfection  [place] [facility] [place] [facility] There is also continuity of this process with the medial left gluteal musculature and an area which was previously drained, suspicious for superinfection of necrotic tumor/abscess, overall measuring 2 2 x 3 3 cm  [place] [facility] Extensive metastatic disease to the lungs and liver  [facility] [place] [facility] Complex left adnexal mass which could be a primary ovarian lesion versus a metastatic lesion  [place] [facility] [place] [facility] The overall appearance is suspicious for neoplasia  [place] [facility] [place] [facility] Ventral abdominal wall hernia/hernias with ascites  [facility] Additional stable findings as noted "  · Status post IR guided drainage on 9/4 approximately 8 mL of purulent fluid ->  Watching off antibioitics  · Continue regular flushes  · Follow-up outpatient with IR        VTE Pharmacologic Prophylaxis:   Pharmacologic: Enoxaparin (Lovenox)  Mechanical VTE Prophylaxis in Place: Yes    Patient Centered Rounds: I have performed bedside rounds with nursing staff today      Discussions with Specialists or Other Care Team Provider: palliative care    Education and Discussions with Family / Patient: patient, mother,     Time Spent for Care: 45 minutes  More than 50% of total time spent on counseling and coordination of care as described above  Current Length of Stay: 19 day(s)    Current Patient Status: Inpatient   Certification Statement: The patient will continue to require additional inpatient hospital stay due to uncontrolled pain    Discharge Plan: home with vna    Code Status: Level 1 - Full Code      Subjective:   Patient continues to have refractory pain today    Objective:     Vitals:   Temp (24hrs), Av 5 °F (36 9 °C), Min:98 2 °F (36 8 °C), Max:98 7 °F (37 1 °C)    Temp:  [98 2 °F (36 8 °C)-98 7 °F (37 1 °C)] 98 5 °F (36 9 °C)  HR:  [61-82] 82  Resp:  [18] 18  BP: ()/(54-63) 108/63  SpO2:  [94 %-96 %] 96 %  Body mass index is 21 51 kg/m²  Input and Output Summary (last 24 hours): Intake/Output Summary (Last 24 hours) at 9/15/2020 1808  Last data filed at 9/15/2020 1532  Gross per 24 hour   Intake 483 ml   Output 1365 ml   Net -882 ml       Physical Exam:     Physical Exam  Vitals signs reviewed  Constitutional:       General: She is not in acute distress  Appearance: She is not diaphoretic  HENT:      Head: Normocephalic and atraumatic  Nose: Nose normal    Eyes:      Extraocular Movements: Extraocular movements intact  Conjunctiva/sclera: Conjunctivae normal       Pupils: Pupils are equal, round, and reactive to light  Cardiovascular:      Rate and Rhythm: Normal rate and regular rhythm  Pulses: Normal pulses  Heart sounds: Normal heart sounds  Pulmonary:      Effort: Pulmonary effort is normal       Breath sounds: Normal breath sounds  Abdominal:      General: Abdomen is flat  Comments: Ostomy bag in place, draining brown stool   Skin:     General: Skin is warm and dry        Comments: IR drain in place, draining clear serosanguineous fluid   Neurological: Mental Status: She is alert  Psychiatric:         Mood and Affect: Mood normal          Behavior: Behavior normal            Additional Data:     Labs:    Results from last 7 days   Lab Units 09/15/20  0634   WBC Thousand/uL 11 42*   HEMOGLOBIN g/dL 7 5*   HEMATOCRIT % 25 6*   PLATELETS Thousands/uL 350   NEUTROS PCT % 65   LYMPHS PCT % 21   MONOS PCT % 9   EOS PCT % 3     Results from last 7 days   Lab Units 09/15/20  0634   SODIUM mmol/L 140   POTASSIUM mmol/L 3 9   CHLORIDE mmol/L 104   CO2 mmol/L 30   BUN mg/dL 5   CREATININE mg/dL 0 40*   ANION GAP mmol/L 6   CALCIUM mg/dL 8 2*   GLUCOSE RANDOM mg/dL 85                           * I Have Reviewed All Lab Data Listed Above  * Additional Pertinent Lab Tests Reviewed:  [address] Admission Reviewed    Imaging:    Imaging Reports Reviewed Today Include: n/a  Imaging Personally Reviewed by Myself Includes:  n/a    Recent Cultures (last 7 days):           Last 24 Hours Medication List:   Current Facility-Administered Medications   Medication Dose Route Frequency Provider Last Rate    acetaminophen  650 mg Oral Q6H PRN Selin Ronquillo MD      calcium carbonate  500 mg Oral TID PRN Selin Ronquillo MD      enoxaparin  40 mg Subcutaneous Daily Selin Ronquillo MD      gabapentin  300 mg Oral TID Selin Ronquillo MD      HYDROmorphone  1 mg Intravenous Q4H PRN Selin Ronquillo MD      iohexol  50 mL Oral 90 min pre-procedure Selin Ronquillo MD      lidocaine   Topical 4x Daily Selin Ronquillo MD      loratadine  10 mg Oral Daily Selin Ronquillo MD      nystatin  500,000 Units Swish & Swallow 4x Daily Selin Ronquillo MD      ondansetron  4 mg Intravenous Q4H PRN Selin Ronquillo MD      oxyCODONE  40 mg Oral [facility] Selin Ronquillo MD      oxyCODONE  20 mg Oral Q4H PRN Selin Ronquillo MD      polyethylene glycol  17 g Oral Daily PRN Selin Ronquillo MD      senna  1 tablet Oral HS Selin Ronquillo MD      sertraline  25 mg Oral Daily Loreto Nieves Earl Banegas MD          Today, Patient Was Seen By: Sandeep Ellis DO    ** Please Note: Dictation voice to text software may have been used in the creation of this document   **

## 2020-09-16 VITALS
HEART RATE: 80 BPM | DIASTOLIC BLOOD PRESSURE: 65 MMHG | RESPIRATION RATE: 18 BRPM | OXYGEN SATURATION: 98 % | HEIGHT: 62 IN | SYSTOLIC BLOOD PRESSURE: 118 MMHG | TEMPERATURE: 98.6 F | BODY MASS INDEX: 21.64 KG/M2 | WEIGHT: 117.59 LBS

## 2020-09-16 PROBLEM — L02.31 ABSCESS, GLUTEAL, LEFT: Status: RESOLVED | Noted: 2020-07-31 | Resolved: 2020-09-16

## 2020-09-16 PROBLEM — R78.81 GRAM-POSITIVE BACTEREMIA: Status: RESOLVED | Noted: 2020-07-05 | Resolved: 2020-09-16

## 2020-09-16 PROBLEM — K94.09 COLOSTOMY PROLAPSE (HCC): Status: RESOLVED | Noted: 2020-09-12 | Resolved: 2020-09-16

## 2020-09-16 PROCEDURE — 99232 SBSQ HOSP IP/OBS MODERATE 35: CPT | Performed by: INTERNAL MEDICINE

## 2020-09-16 RX ORDER — HYDROMORPHONE HYDROCHLORIDE 4 MG/1
4 TABLET ORAL EVERY 4 HOURS PRN
Status: DISCONTINUED | OUTPATIENT
Start: 2020-09-16 | End: 2020-09-16 | Stop reason: HOSPADM

## 2020-09-16 RX ORDER — HYDROMORPHONE HYDROCHLORIDE 2 MG/1
1 TABLET ORAL EVERY 4 HOURS PRN
Status: DISCONTINUED | OUTPATIENT
Start: 2020-09-16 | End: 2020-09-16

## 2020-09-16 RX ORDER — HYDROMORPHONE HYDROCHLORIDE 4 MG/1
4 TABLET ORAL
Qty: 8 TABLET | Refills: 0 | Status: SHIPPED | OUTPATIENT
Start: 2020-09-16 | End: 2020-09-26

## 2020-09-16 RX ADMIN — GABAPENTIN 300 MG: 300 CAPSULE ORAL at 16:46

## 2020-09-16 RX ADMIN — LIDOCAINE: 4 CREAM TOPICAL at 09:30

## 2020-09-16 RX ADMIN — OXYCODONE HYDROCHLORIDE 20 MG: 10 TABLET ORAL at 00:01

## 2020-09-16 RX ADMIN — SENNOSIDES 8.6 MG: 8.6 TABLET, FILM COATED ORAL at 00:01

## 2020-09-16 RX ADMIN — GABAPENTIN 300 MG: 300 CAPSULE ORAL at 00:01

## 2020-09-16 RX ADMIN — OXYCODONE HYDROCHLORIDE 20 MG: 10 TABLET ORAL at 16:46

## 2020-09-16 RX ADMIN — OXYCODONE HYDROCHLORIDE 20 MG: 10 TABLET ORAL at 06:53

## 2020-09-16 RX ADMIN — OXYCODONE HYDROCHLORIDE 40 MG: 20 TABLET, FILM COATED, EXTENDED RELEASE ORAL at 00:01

## 2020-09-16 RX ADMIN — HYDROMORPHONE HYDROCHLORIDE 4 MG: 4 TABLET ORAL at 13:46

## 2020-09-16 RX ADMIN — LORATADINE 10 MG: 10 TABLET ORAL at 08:22

## 2020-09-16 RX ADMIN — GABAPENTIN 300 MG: 300 CAPSULE ORAL at 08:22

## 2020-09-16 RX ADMIN — HYDROMORPHONE HYDROCHLORIDE 4 MG: 4 TABLET ORAL at 17:33

## 2020-09-16 RX ADMIN — ACETAMINOPHEN 650 MG: 325 TABLET, FILM COATED ORAL at 16:46

## 2020-09-16 RX ADMIN — OXYCODONE HYDROCHLORIDE 20 MG: 10 TABLET ORAL at 10:53

## 2020-09-16 RX ADMIN — SERTRALINE HYDROCHLORIDE 25 MG: 25 TABLET ORAL at 08:22

## 2020-09-16 RX ADMIN — OXYCODONE HYDROCHLORIDE 40 MG: 20 TABLET, FILM COATED, EXTENDED RELEASE ORAL at 06:53

## 2020-09-16 RX ADMIN — OXYCODONE HYDROCHLORIDE 40 MG: 20 TABLET, FILM COATED, EXTENDED RELEASE ORAL at 14:57

## 2020-09-16 RX ADMIN — ENOXAPARIN SODIUM 40 MG: 40 INJECTION SUBCUTANEOUS at 08:22

## 2020-09-16 NOTE — PROGRESS NOTES
Progress note - Palliative and Supportive Care   Beverley Fitzpatrick 50 y o  female [id number]    Assessment:    -   Patient Active Problem List   Diagnosis    Metastatic rectal cancer    Breast cancer    Abdominal pain    Anemia of chronic disease    Gram-positive bacteremia    Recurrent left gluteal abscess    Cancer associated pain    Thrombocytosis     Bright red blood per rectum    Carpal tunnel syndrome, bilateral    Disc degeneration, lumbar    Muscle spasm    Chronic left SI joint pain    Myofascial pain syndrome    Nicotine dependence    Postlaminectomy syndrome, lumbar    Neck pain    Abnormal cytological findings in specimens from other organs, systems and tissues    Anxiety with depression    HTN (hypertension), benign    Vitamin D deficiency    Severe protein-calorie malnutrition    Colostomy prolapse ([facility] [address] )         Plan:  1  Symptom management -    - Cancer related pain    Continue OxyContin 40 mg TID (script for 60 BID already picked up)    Continue oxyIR 20 mg q4H PRN (script already picked up)    Trial of PO Dilaudid 4 mg q4H PRN for intractable pain (script sent to pharmacy with limited quantity of 8 tabs and instructions to call [facility] if more than 2 tablets needed in a day)  Continue Lidocaine    IF patient fails pain control as an outpatient- will need to have discussion about revisiting block and/or PCA/CADD    2  Goals - full cares     3  Psychosocial support- time spent providing supportive listening    D/W Dr Kelly Hamilton    Interval history:       Patient's discharge held due to unrelieved pain  Patient worried she will not be able to manage at home       MEDICATIONS / ALLERGIES:     all current active meds have been reviewed and current meds:   Current Facility-Administered Medications   Medication Dose Route Frequency    acetaminophen (TYLENOL) tablet 650 mg  650 mg Oral Q6H PRN    calcium carbonate (TUMS) chewable tablet 500 mg  500 mg Oral TID PRN    enoxaparin (LOVENOX) subcutaneous injection 40 mg  40 mg Subcutaneous Daily    gabapentin (NEURONTIN) capsule 300 mg  300 mg Oral TID    HYDROmorphone (DILAUDID) tablet 4 mg  4 mg Oral Q4H PRN    iohexol (OMNIPAQUE) 240 MG/ML solution 50 mL  50 mL Oral 90 min pre-procedure    lidocaine (LMX) 4 % cream   Topical 4x Daily    loratadine (CLARITIN) tablet 10 mg  10 mg Oral Daily    nystatin (MYCOSTATIN) oral suspension 500,000 Units  500,000 Units Swish & Swallow 4x Daily    ondansetron (ZOFRAN) injection 4 mg  4 mg Intravenous Q4H PRN    oxyCODONE (OxyCONTIN) 12 hr tablet 40 mg  40 mg Oral Q8H [address]    oxyCODONE (ROXICODONE) immediate release tablet 20 mg  20 mg Oral Q4H PRN    polyethylene glycol (MIRALAX) packet 17 g  17 g Oral Daily PRN    senna (SENOKOT) tablet 8 6 mg  1 tablet Oral HS    sertraline (ZOLOFT) tablet 25 mg  25 mg Oral Daily       Allergies   Allergen Reactions    Augmentin [Amoxicillin-Pot Clavulanate] Confusion and Drowsiness       OBJECTIVE:    Physical Exam  Physical Exam  Vitals signs and nursing note reviewed  Constitutional:       General: She is not in acute distress  Comments: Appears uncomfortable   HENT:      Head: Normocephalic and atraumatic  Right Ear: External ear normal       Left Ear: External ear normal    Eyes:      General:         Right eye: No discharge  Left eye: No discharge  Cardiovascular:      Rate and Rhythm: Normal rate  Abdominal:      General: There is no distension  Palpations: Abdomen is soft  Comments: + hernia +ostomy   Musculoskeletal:      Right lower leg: No edema  Left lower leg: No edema  Skin:     Coloration: Skin is pale  Neurological:      General: No focal deficit present  Mental Status: She is oriented to person, place, and time  Mental status is at baseline  Psychiatric:         Mood and Affect: Mood normal          Behavior: Behavior normal          Thought Content:  Thought content normal          Lab Results: I have personally reviewed pertinent labs  , CBC: No results found for: WBC, HGB, HCT, MCV, PLT, ADJUSTEDWBC, MCH, MCHC, RDW, MPV, NRBC, CMP: No results found for: SODIUM, K, CL, CO2, ANIONGAP, BUN, CREATININE, GLUCOSE, CALCIUM, AST, ALT, ALKPHOS, PROT, BILITOT, EGFR, PT/PTT:No results found for: PT, PTT  Imaging Studies: reviewed  EKG, Pathology, and Other Studies: reviewed    Counseling / Coordination of Care    Total floor / unit time spent today 25+ minutes  Greater than 50% of total time was spent with the patient and / or family counseling and / or coordination of care  A description of the counseling / coordination of care: chart review, medication review with changes, supportive listening

## 2020-09-16 NOTE — PLAN OF CARE
Problem: Potential for Falls  Goal: Patient will remain free of falls  Description: INTERVENTIONS:  - Assess patient frequently for physical needs  -  Identify cognitive and physical deficits and behaviors that affect risk of falls    -  [place] fall precautions as indicated by assessment   - Educate patient/family on patient safety including physical limitations  - Instruct patient to call for assistance with activity based on assessment  - Modify environment to reduce risk of injury  - Consider OT/PT consult to assist with strengthening/mobility  Outcome: Progressing     Problem: GASTROINTESTINAL - ADULT  Goal: Minimal or absence of nausea and/or vomiting  Description: INTERVENTIONS:  - Administer IV fluids if ordered to ensure adequate hydration  - Maintain NPO status until nausea and vomiting are resolved  - Nasogastric tube if ordered  - Administer ordered antiemetic medications as needed  - Provide nonpharmacologic comfort measures as appropriate  - Advance diet as tolerated, if ordered  - Consider nutrition services referral to assist patient with adequate nutrition and appropriate food choices  Outcome: Progressing  Goal: Maintains or returns to baseline bowel function  Description: INTERVENTIONS:  - Assess bowel function  - Encourage oral fluids to ensure adequate hydration  - Administer IV fluids if ordered to ensure adequate hydration  - Administer ordered medications as needed  - Encourage mobilization and activity  - Consider nutritional services referral to assist patient with adequate nutrition and appropriate food choices  Outcome: Progressing  Goal: Maintains adequate nutritional intake  Description: INTERVENTIONS:  - Monitor percentage of each meal consumed  - Identify factors contributing to decreased intake, treat as appropriate  - Assist with meals as needed  - Monitor I&O, weight, and lab values if indicated  - Obtain nutrition services referral as needed  Outcome: Progressing     Problem: Nutrition/Hydration-ADULT  Goal: Nutrient/Hydration intake appropriate for improving, restoring or maintaining nutritional needs  Description: Monitor and assess patient's nutrition/hydration status for malnutrition  Collaborate with interdisciplinary team and initiate plan and interventions as ordered  Monitor patient's weight and dietary intake as ordered or per policy  Utilize nutrition screening tool and intervene as necessary  Determine patient's food preferences and provide high-protein, high-caloric foods as appropriate       INTERVENTIONS:  - Monitor oral intake, urinary output, labs, and treatment plans  - Assess nutrition and hydration status and recommend course of action  - Evaluate amount of meals eaten  - Assist patient with eating if necessary   - Allow adequate time for meals  - Recommend/ encourage appropriate diets, oral nutritional supplements, and vitamin/mineral supplements  - Order, calculate, and assess calorie counts as needed  - Recommend, monitor, and adjust tube feedings and TPN/PPN based on assessed needs  - Assess need for intravenous fluids  - Provide specific nutrition/hydration education as appropriate  - Include patient/family/caregiver in decisions related to nutrition  Outcome: Progressing

## 2020-09-16 NOTE — ASSESSMENT & PLAN NOTE
With liver/lung metastases s/p chemotherapy per outpatient oncology  CT abdomen/pelvis on 8/27 revealing concern for persistent partially obstructing mass - previously refused surgical intervention but agreeable and this hospitalization -> s/p colostomy on 8/31    PRN pain control and supportive care  Repeat CT imaging on 9/3 noted a complex left adnexal mass suspicious for a primary ovarian lesion versus metastatic lesion -> on previous CT scan, this was mentioned as a cystic lesion -> CA-125 tumor marker elevated, however, per oncology, ovarian mass more likely represents further metastasis rather than another primary malignancy  CT of abdomen and pelvis similar to previous, still has mass with possible abscess/necrosis  · Complaining of rectal bleeding today with mucous production, colorectal surgery thinks this is from tumor, continue to watch  Also likely secondary This was stable on day of her discharge  · Taken to OR for excision of prolapse and revised stoma     · Patient passing brown stool into her ostomy  · Abscess in the rectum - IR drains were placed, there is serosanguinous material in the drain  · Continue outpatient follow-up with oncology

## 2020-09-16 NOTE — ASSESSMENT & PLAN NOTE
Palliative care consulted regarding inpatient regimen  · Will continue gabapentin 3 mg t i d   · Oxycodone 60 mg 12 hours tablet every 12 hours  · Oxycodone 20 mg every 4 hours for breakthrough pain  · Dilaudid 4 mg p r n   For intractable pain refractory to oxycodone  · Continue outpatient follow-up with palliative care  · If requires more than 2 doses of Dilaudid in 2 hours for pain, call palliative care provider

## 2020-09-16 NOTE — ASSESSMENT & PLAN NOTE
Likely reactive secondary to acute medical issues including cancer, abscess, procedures    · Platelet count normal on 09/15

## 2020-09-17 ENCOUNTER — PATIENT OUTREACH (OUTPATIENT)
Dept: FAMILY MEDICINE CLINIC | Facility: CLINIC | Age: 45
End: 2020-09-17

## 2020-09-17 ENCOUNTER — TRANSITIONAL CARE MANAGEMENT (OUTPATIENT)
Dept: FAMILY MEDICINE CLINIC | Facility: CLINIC | Age: 45
End: 2020-09-17

## 2020-09-17 DIAGNOSIS — Z71.89 COMPLEX CARE COORDINATION: Primary | ICD-10-CM

## 2020-09-17 NOTE — UTILIZATION REVIEW
Notification of Discharge  This is a Notification of Discharge from our facility [address]  Please be advised that this patient has been discharge from our facility  Below you will find the admission and discharge date and time including the patients disposition  PRESENTATION DATE: 8/27/2020 12:51 AM  OBS ADMISSION DATE:  IP ADMISSION DATE: 8/27/20 0618   DISCHARGE DATE: 9/16/2020  7:01 PM  [facility] [place] [facility]   Admission Orders listed below:  Admission Orders (From admission, onward)     Ordered        08/27/20 0618  Inpatient Admission  Once                   Please contact the UR Department if additional information is required to close this patient's authorization/case  [address] Utilization Review Department  Main: [phone] x carefully listen to the prompts  All voicemails are confidential   [email or web address]  Send all requests for admission clinical reviews, approved or denied determinations and any other requests to dedicated fax number below belonging to the campus where the patient is receiving treatment   List of dedicated fax numbers:  [address] DENIALS (Administrative/Medical Necessity) [phone]   [address] (Maternity/NICU/Pediatrics) [phone]   Kwaku Ferreira [phone]   Particia Notice [id number]   Jhony Rain [phone]   [facility] [address] [place] [address] [phone]   [facility]  [phone]   [address] [facility] [address] [facility]  [address] [address] [facility] [phone]

## 2020-09-17 NOTE — PROGRESS NOTES
Spoke with patient  She has her medications and is working on her follow up appointments  No questions or concerns feels she can manage everything okay  Encouraged to call if she needs anything

## 2020-09-21 ENCOUNTER — TRANSCRIBE ORDERS (OUTPATIENT)
Dept: ADMINISTRATIVE | Facility: HOSPITAL | Age: 45
End: 2020-09-21

## 2020-09-21 ENCOUNTER — HOSPITAL ENCOUNTER (OUTPATIENT)
Dept: CT IMAGING | Facility: HOSPITAL | Age: 45
Discharge: HOME | End: 2020-09-21
Payer: COMMERCIAL

## 2020-09-21 ENCOUNTER — HOSPITAL ENCOUNTER (OUTPATIENT)
Dept: ULTRASOUND IMAGING | Facility: HOSPITAL | Age: 45
Discharge: HOME | End: 2020-09-21
Payer: COMMERCIAL

## 2020-09-21 ENCOUNTER — LAB REQUISITION (OUTPATIENT)
Dept: LAB | Facility: HOSPITAL | Age: 45
End: 2020-09-21
Payer: COMMERCIAL

## 2020-09-21 DIAGNOSIS — C78.02 SECONDARY MALIGNANT NEOPLASM OF LEFT LUNG (HCC): ICD-10-CM

## 2020-09-21 DIAGNOSIS — C20 MALIGNANT NEOPLASM OF RECTUM (HCC): ICD-10-CM

## 2020-09-21 DIAGNOSIS — C78.7 SECONDARY MALIGNANT NEOPLASM OF LIVER AND INTRAHEPATIC BILE DUCT (HCC): ICD-10-CM

## 2020-09-21 DIAGNOSIS — R22.43 LOCALIZED SWELLING, MASS AND LUMP, LOWER LIMB, BILATERAL: Primary | ICD-10-CM

## 2020-09-21 DIAGNOSIS — C7A.1 MALIGNANT POORLY DIFFERENTIATED NEUROENDOCRINE TUMORS (HCC): ICD-10-CM

## 2020-09-21 DIAGNOSIS — C50.411 MALIGNANT NEOPLASM OF UPPER-OUTER QUADRANT OF RIGHT FEMALE BREAST (HCC): ICD-10-CM

## 2020-09-21 DIAGNOSIS — R22.43 LOCALIZED SWELLING, MASS AND LUMP, LOWER LIMB, BILATERAL: ICD-10-CM

## 2020-09-21 DIAGNOSIS — C50.411 MALIGNANT NEOPLASM OF UPPER-OUTER QUADRANT OF RIGHT FEMALE BREAST (HCC): Primary | ICD-10-CM

## 2020-09-21 PROCEDURE — 93970 EXTREMITY STUDY: CPT | Performed by: SURGERY

## 2020-09-21 PROCEDURE — G1004 CDSM NDSC: HCPCS

## 2020-09-21 PROCEDURE — 74160 CT ABDOMEN W/CONTRAST: CPT

## 2020-09-21 PROCEDURE — 93970 EXTREMITY STUDY: CPT

## 2020-09-21 PROCEDURE — 71260 CT THORAX DX C+: CPT

## 2020-09-21 PROCEDURE — 83519 RIA NONANTIBODY: CPT | Performed by: INTERNAL MEDICINE

## 2020-09-21 RX ADMIN — IODIXANOL 100 ML: 320 INJECTION, SOLUTION INTRAVASCULAR at 14:48

## 2020-09-21 RX ADMIN — IOHEXOL 50 ML: 240 INJECTION, SOLUTION INTRATHECAL; INTRAVASCULAR; INTRAVENOUS; ORAL at 14:48

## 2020-09-23 ENCOUNTER — LAB REQUISITION (OUTPATIENT)
Dept: LAB | Facility: HOSPITAL | Age: 45
End: 2020-09-23
Payer: COMMERCIAL

## 2020-09-23 DIAGNOSIS — C20 MALIGNANT NEOPLASM OF RECTUM (HCC): ICD-10-CM

## 2020-09-23 DIAGNOSIS — C78.7 SECONDARY MALIGNANT NEOPLASM OF LIVER AND INTRAHEPATIC BILE DUCT (HCC): ICD-10-CM

## 2020-09-23 DIAGNOSIS — C7A.1 MALIGNANT POORLY DIFFERENTIATED NEUROENDOCRINE TUMORS (HCC): ICD-10-CM

## 2020-09-23 LAB
BASOPHILS # BLD AUTO: 0.06 THOUSANDS/ΜL (ref 0–0.1)
BASOPHILS NFR BLD AUTO: 1 % (ref 0–1)
EOSINOPHIL # BLD AUTO: 0.36 THOUSAND/ΜL (ref 0–0.61)
EOSINOPHIL NFR BLD AUTO: 3 % (ref 0–6)
ERYTHROCYTE [DISTWIDTH] IN BLOOD BY AUTOMATED COUNT: 19.3 % (ref 11.6–15.1)
HCT VFR BLD AUTO: 25.2 % (ref 34.8–46.1)
HGB BLD-MCNC: 7.2 G/DL (ref 11.5–15.4)
IMM GRANULOCYTES # BLD AUTO: 0.07 THOUSAND/UL (ref 0–0.2)
IMM GRANULOCYTES NFR BLD AUTO: 1 % (ref 0–2)
LYMPHOCYTES # BLD AUTO: 1.28 THOUSANDS/ΜL (ref 0.6–4.47)
LYMPHOCYTES NFR BLD AUTO: 10 % (ref 14–44)
MCH RBC QN AUTO: 24.4 PG (ref 26.8–34.3)
MCHC RBC AUTO-ENTMCNC: 28.6 G/DL (ref 31.4–37.4)
MCV RBC AUTO: 85 FL (ref 82–98)
MONOCYTES # BLD AUTO: 0.87 THOUSAND/ΜL (ref 0.17–1.22)
MONOCYTES NFR BLD AUTO: 7 % (ref 4–12)
NEUTROPHILS # BLD AUTO: 10.3 THOUSANDS/ΜL (ref 1.85–7.62)
NEUTS SEG NFR BLD AUTO: 78 % (ref 43–75)
NRBC BLD AUTO-RTO: 0 /100 WBCS
PLATELET # BLD AUTO: 404 THOUSANDS/UL (ref 149–390)
PMV BLD AUTO: 11 FL (ref 8.9–12.7)
RBC # BLD AUTO: 2.95 MILLION/UL (ref 3.81–5.12)
WBC # BLD AUTO: 12.94 THOUSAND/UL (ref 4.31–10.16)

## 2020-09-23 PROCEDURE — 83519 RIA NONANTIBODY: CPT | Performed by: INTERNAL MEDICINE

## 2020-09-23 PROCEDURE — 85025 COMPLETE CBC W/AUTO DIFF WBC: CPT | Performed by: INTERNAL MEDICINE

## 2020-09-28 ENCOUNTER — HOSPITAL ENCOUNTER (INPATIENT)
Facility: HOSPITAL | Age: 45
LOS: 15 days | Discharge: ANOTHER HOSPITAL | DRG: 872 | End: 2020-10-13
Attending: EMERGENCY MEDICINE | Admitting: INTERNAL MEDICINE
Payer: COMMERCIAL

## 2020-09-28 ENCOUNTER — APPOINTMENT (EMERGENCY)
Dept: RADIOLOGY | Facility: HOSPITAL | Age: 45
DRG: 872 | End: 2020-09-28
Payer: COMMERCIAL

## 2020-09-28 DIAGNOSIS — Z51.5 PALLIATIVE CARE PATIENT: ICD-10-CM

## 2020-09-28 DIAGNOSIS — C50.411 MALIGNANT NEOPLASM OF UPPER-OUTER QUADRANT OF RIGHT FEMALE BREAST, UNSPECIFIED ESTROGEN RECEPTOR STATUS (HCC): ICD-10-CM

## 2020-09-28 DIAGNOSIS — G89.3 CANCER ASSOCIATED PAIN: ICD-10-CM

## 2020-09-28 DIAGNOSIS — F41.8 ANXIETY WITH DEPRESSION: ICD-10-CM

## 2020-09-28 DIAGNOSIS — K61.1 RECTAL ABSCESS: ICD-10-CM

## 2020-09-28 DIAGNOSIS — A41.9 SEPSIS (HCC): ICD-10-CM

## 2020-09-28 DIAGNOSIS — C20 RECTAL CANCER (HCC): Primary | ICD-10-CM

## 2020-09-28 DIAGNOSIS — M46.20 SPINAL ABSCESS (HCC): ICD-10-CM

## 2020-09-28 DIAGNOSIS — L02.31 GLUTEAL ABSCESS: ICD-10-CM

## 2020-09-28 LAB
ALBUMIN SERPL BCP-MCNC: 2.3 G/DL (ref 3.5–5)
ALP SERPL-CCNC: 302 U/L (ref 46–116)
ALT SERPL W P-5'-P-CCNC: 12 U/L (ref 12–78)
ANION GAP SERPL CALCULATED.3IONS-SCNC: 7 MMOL/L (ref 4–13)
ANISOCYTOSIS BLD QL SMEAR: PRESENT
APTT PPP: 40 SECONDS (ref 23–37)
AST SERPL W P-5'-P-CCNC: 52 U/L (ref 5–45)
ATRIAL RATE: 83 BPM
BACTERIA UR QL AUTO: ABNORMAL /HPF
BASOPHILS # BLD MANUAL: 0 THOUSAND/UL (ref 0–0.1)
BASOPHILS NFR MAR MANUAL: 0 % (ref 0–1)
BILIRUB SERPL-MCNC: 0.3 MG/DL (ref 0.2–1)
BILIRUB UR QL STRIP: NEGATIVE
BILIRUB UR QL STRIP: NEGATIVE
BUN SERPL-MCNC: 6 MG/DL (ref 5–25)
CALCIUM ALBUM COR SERPL-MCNC: 9.8 MG/DL (ref 8.3–10.1)
CALCIUM SERPL-MCNC: 8.4 MG/DL (ref 8.3–10.1)
CHLORIDE SERPL-SCNC: 94 MMOL/L (ref 100–108)
CLARITY UR: ABNORMAL
CLARITY UR: CLEAR
CO2 SERPL-SCNC: 28 MMOL/L (ref 21–32)
COLOR UR: ABNORMAL
COLOR UR: YELLOW
CREAT SERPL-MCNC: 0.77 MG/DL (ref 0.6–1.3)
DOHLE BOD BLD QL SMEAR: PRESENT
EOSINOPHIL # BLD MANUAL: 0.33 THOUSAND/UL (ref 0–0.4)
EOSINOPHIL NFR BLD MANUAL: 1 % (ref 0–6)
ERYTHROCYTE [DISTWIDTH] IN BLOOD BY AUTOMATED COUNT: 19.1 % (ref 11.6–15.1)
EXT PREG TEST URINE: NEGATIVE
EXT. CONTROL ED NAV: NORMAL
GFR SERPL CREATININE-BSD FRML MDRD: 92 ML/MIN/1.73SQ M
GLUCOSE SERPL-MCNC: 121 MG/DL (ref 65–140)
GLUCOSE UR STRIP-MCNC: NEGATIVE MG/DL
GLUCOSE UR STRIP-MCNC: NEGATIVE MG/DL
HCG SERPL QL: NEGATIVE
HCT VFR BLD AUTO: 23 % (ref 34.8–46.1)
HCT VFR BLD AUTO: 25.4 % (ref 34.8–46.1)
HGB BLD-MCNC: 7.1 G/DL (ref 11.5–15.4)
HGB BLD-MCNC: 7.8 G/DL (ref 11.5–15.4)
HGB UR QL STRIP.AUTO: ABNORMAL
HGB UR QL STRIP.AUTO: NEGATIVE
HYPERCHROMIA BLD QL SMEAR: PRESENT
INR PPP: 1.45 (ref 0.84–1.19)
KETONES UR STRIP-MCNC: NEGATIVE MG/DL
KETONES UR STRIP-MCNC: NEGATIVE MG/DL
LACTATE SERPL-SCNC: 1.7 MMOL/L (ref 0.5–2)
LACTATE SERPL-SCNC: 2 MMOL/L (ref 0.5–2)
LACTATE SERPL-SCNC: 2.4 MMOL/L (ref 0.5–2)
LEUKOCYTE ESTERASE UR QL STRIP: ABNORMAL
LEUKOCYTE ESTERASE UR QL STRIP: NEGATIVE
LYMPHOCYTES # BLD AUTO: 0 % (ref 14–44)
LYMPHOCYTES # BLD AUTO: 0 THOUSAND/UL (ref 0.6–4.47)
MCH RBC QN AUTO: 25.6 PG (ref 26.8–34.3)
MCHC RBC AUTO-ENTMCNC: 30.9 G/DL (ref 31.4–37.4)
MCV RBC AUTO: 83 FL (ref 82–98)
MONOCYTES # BLD AUTO: 0.65 THOUSAND/UL (ref 0–1.22)
MONOCYTES NFR BLD: 2 % (ref 4–12)
NEUTROPHILS # BLD MANUAL: 30.24 THOUSAND/UL (ref 1.85–7.62)
NEUTS BAND NFR BLD MANUAL: 3 % (ref 0–8)
NEUTS SEG NFR BLD AUTO: 90 % (ref 43–75)
NITRITE UR QL STRIP: NEGATIVE
NITRITE UR QL STRIP: NEGATIVE
NON-SQ EPI CELLS URNS QL MICRO: ABNORMAL /HPF
NRBC BLD AUTO-RTO: 0 /100 WBCS
OTHER STN SPEC: ABNORMAL
OVALOCYTES BLD QL SMEAR: PRESENT
P AXIS: 58 DEGREES
PH UR STRIP.AUTO: 5.5 [PH]
PH UR STRIP.AUTO: 8.5 [PH] (ref 4.5–8)
PLATELET # BLD AUTO: 225 THOUSANDS/UL (ref 149–390)
PLATELET # BLD AUTO: 268 THOUSANDS/UL (ref 149–390)
PLATELET BLD QL SMEAR: ADEQUATE
PMV BLD AUTO: 10.7 FL (ref 8.9–12.7)
PMV BLD AUTO: 9.9 FL (ref 8.9–12.7)
POTASSIUM SERPL-SCNC: 3.4 MMOL/L (ref 3.5–5.3)
PR INTERVAL: 142 MS
PROCALCITONIN SERPL-MCNC: 2.46 NG/ML
PROT SERPL-MCNC: 6.4 G/DL (ref 6.4–8.2)
PROT UR STRIP-MCNC: >=300 MG/DL
PROT UR STRIP-MCNC: NEGATIVE MG/DL
PROTHROMBIN TIME: 17.6 SECONDS (ref 11.6–14.5)
QRS AXIS: 58 DEGREES
QRSD INTERVAL: 86 MS
QT INTERVAL: 344 MS
QTC INTERVAL: 404 MS
RBC # BLD AUTO: 2.77 MILLION/UL (ref 3.81–5.12)
RBC #/AREA URNS AUTO: ABNORMAL /HPF
SODIUM SERPL-SCNC: 129 MMOL/L (ref 136–145)
SP GR UR STRIP.AUTO: 1.01 (ref 1–1.03)
SP GR UR STRIP.AUTO: 1.02 (ref 1–1.03)
T WAVE AXIS: 65 DEGREES
TOTAL CELLS COUNTED SPEC: 100
UROBILINOGEN UR QL STRIP.AUTO: 0.2 E.U./DL
UROBILINOGEN UR QL STRIP.AUTO: 0.2 E.U./DL
VARIANT LYMPHS # BLD AUTO: 4 %
VENTRICULAR RATE: 83 BPM
WBC # BLD AUTO: 32.52 THOUSAND/UL (ref 4.31–10.16)
WBC #/AREA URNS AUTO: ABNORMAL /HPF

## 2020-09-28 PROCEDURE — 85730 THROMBOPLASTIN TIME PARTIAL: CPT | Performed by: EMERGENCY MEDICINE

## 2020-09-28 PROCEDURE — 80053 COMPREHEN METABOLIC PANEL: CPT | Performed by: EMERGENCY MEDICINE

## 2020-09-28 PROCEDURE — 96361 HYDRATE IV INFUSION ADD-ON: CPT

## 2020-09-28 PROCEDURE — 99223 1ST HOSP IP/OBS HIGH 75: CPT | Performed by: INTERNAL MEDICINE

## 2020-09-28 PROCEDURE — 83605 ASSAY OF LACTIC ACID: CPT | Performed by: EMERGENCY MEDICINE

## 2020-09-28 PROCEDURE — 87186 SC STD MICRODIL/AGAR DIL: CPT

## 2020-09-28 PROCEDURE — 99285 EMERGENCY DEPT VISIT HI MDM: CPT

## 2020-09-28 PROCEDURE — 85018 HEMOGLOBIN: CPT | Performed by: PHYSICIAN ASSISTANT

## 2020-09-28 PROCEDURE — 85610 PROTHROMBIN TIME: CPT | Performed by: EMERGENCY MEDICINE

## 2020-09-28 PROCEDURE — 85027 COMPLETE CBC AUTOMATED: CPT | Performed by: EMERGENCY MEDICINE

## 2020-09-28 PROCEDURE — 81001 URINALYSIS AUTO W/SCOPE: CPT

## 2020-09-28 PROCEDURE — 96375 TX/PRO/DX INJ NEW DRUG ADDON: CPT

## 2020-09-28 PROCEDURE — G1004 CDSM NDSC: HCPCS

## 2020-09-28 PROCEDURE — 84703 CHORIONIC GONADOTROPIN ASSAY: CPT | Performed by: EMERGENCY MEDICINE

## 2020-09-28 PROCEDURE — 87077 CULTURE AEROBIC IDENTIFY: CPT

## 2020-09-28 PROCEDURE — 87077 CULTURE AEROBIC IDENTIFY: CPT | Performed by: EMERGENCY MEDICINE

## 2020-09-28 PROCEDURE — 81025 URINE PREGNANCY TEST: CPT | Performed by: EMERGENCY MEDICINE

## 2020-09-28 PROCEDURE — 96368 THER/DIAG CONCURRENT INF: CPT

## 2020-09-28 PROCEDURE — 99285 EMERGENCY DEPT VISIT HI MDM: CPT | Performed by: EMERGENCY MEDICINE

## 2020-09-28 PROCEDURE — 36415 COLL VENOUS BLD VENIPUNCTURE: CPT | Performed by: EMERGENCY MEDICINE

## 2020-09-28 PROCEDURE — 87186 SC STD MICRODIL/AGAR DIL: CPT | Performed by: EMERGENCY MEDICINE

## 2020-09-28 PROCEDURE — 84145 PROCALCITONIN (PCT): CPT | Performed by: EMERGENCY MEDICINE

## 2020-09-28 PROCEDURE — 81003 URINALYSIS AUTO W/O SCOPE: CPT | Performed by: EMERGENCY MEDICINE

## 2020-09-28 PROCEDURE — 96365 THER/PROPH/DIAG IV INF INIT: CPT

## 2020-09-28 PROCEDURE — 74177 CT ABD & PELVIS W/CONTRAST: CPT

## 2020-09-28 PROCEDURE — 93010 ELECTROCARDIOGRAM REPORT: CPT | Performed by: INTERNAL MEDICINE

## 2020-09-28 PROCEDURE — 96366 THER/PROPH/DIAG IV INF ADDON: CPT

## 2020-09-28 PROCEDURE — 93005 ELECTROCARDIOGRAM TRACING: CPT

## 2020-09-28 PROCEDURE — 85014 HEMATOCRIT: CPT | Performed by: PHYSICIAN ASSISTANT

## 2020-09-28 PROCEDURE — 96376 TX/PRO/DX INJ SAME DRUG ADON: CPT

## 2020-09-28 PROCEDURE — 85049 AUTOMATED PLATELET COUNT: CPT | Performed by: PHYSICIAN ASSISTANT

## 2020-09-28 PROCEDURE — 83605 ASSAY OF LACTIC ACID: CPT | Performed by: PHYSICIAN ASSISTANT

## 2020-09-28 PROCEDURE — 87086 URINE CULTURE/COLONY COUNT: CPT

## 2020-09-28 PROCEDURE — 85007 BL SMEAR W/DIFF WBC COUNT: CPT | Performed by: EMERGENCY MEDICINE

## 2020-09-28 PROCEDURE — 87040 BLOOD CULTURE FOR BACTERIA: CPT | Performed by: EMERGENCY MEDICINE

## 2020-09-28 RX ORDER — HYDROMORPHONE HCL/PF 1 MG/ML
0.5 SYRINGE (ML) INJECTION EVERY 4 HOURS PRN
Status: DISCONTINUED | OUTPATIENT
Start: 2020-09-28 | End: 2020-10-01

## 2020-09-28 RX ORDER — HYDROMORPHONE HCL/PF 1 MG/ML
0.5 SYRINGE (ML) INJECTION ONCE
Status: COMPLETED | OUTPATIENT
Start: 2020-09-28 | End: 2020-09-28

## 2020-09-28 RX ORDER — SENNOSIDES 8.6 MG
1 TABLET ORAL
Status: DISCONTINUED | OUTPATIENT
Start: 2020-09-28 | End: 2020-10-01

## 2020-09-28 RX ORDER — POLYETHYLENE GLYCOL 3350 17 G/17G
17 POWDER, FOR SOLUTION ORAL DAILY PRN
Status: DISCONTINUED | OUTPATIENT
Start: 2020-09-28 | End: 2020-10-13 | Stop reason: HOSPADM

## 2020-09-28 RX ORDER — GABAPENTIN 300 MG/1
300 CAPSULE ORAL 3 TIMES DAILY
Status: DISCONTINUED | OUTPATIENT
Start: 2020-09-28 | End: 2020-10-13 | Stop reason: HOSPADM

## 2020-09-28 RX ORDER — SODIUM CHLORIDE 9 MG/ML
100 INJECTION, SOLUTION INTRAVENOUS CONTINUOUS
Status: DISCONTINUED | OUTPATIENT
Start: 2020-09-28 | End: 2020-09-30

## 2020-09-28 RX ORDER — SERTRALINE HYDROCHLORIDE 25 MG/1
25 TABLET, FILM COATED ORAL DAILY
Status: DISCONTINUED | OUTPATIENT
Start: 2020-09-29 | End: 2020-10-13 | Stop reason: HOSPADM

## 2020-09-28 RX ORDER — OXYCODONE HYDROCHLORIDE 10 MG/1
20 TABLET ORAL EVERY 4 HOURS PRN
Status: DISCONTINUED | OUTPATIENT
Start: 2020-09-28 | End: 2020-09-29

## 2020-09-28 RX ORDER — ONDANSETRON 2 MG/ML
4 INJECTION INTRAMUSCULAR; INTRAVENOUS ONCE
Status: COMPLETED | OUTPATIENT
Start: 2020-09-28 | End: 2020-09-28

## 2020-09-28 RX ORDER — SODIUM CHLORIDE, SODIUM GLUCONATE, SODIUM ACETATE, POTASSIUM CHLORIDE, MAGNESIUM CHLORIDE, SODIUM PHOSPHATE, DIBASIC, AND POTASSIUM PHOSPHATE .53; .5; .37; .037; .03; .012; .00082 G/100ML; G/100ML; G/100ML; G/100ML; G/100ML; G/100ML; G/100ML
1000 INJECTION, SOLUTION INTRAVENOUS ONCE
Status: COMPLETED | OUTPATIENT
Start: 2020-09-28 | End: 2020-09-28

## 2020-09-28 RX ORDER — ONDANSETRON 2 MG/ML
4 INJECTION INTRAMUSCULAR; INTRAVENOUS EVERY 6 HOURS PRN
Status: DISCONTINUED | OUTPATIENT
Start: 2020-09-28 | End: 2020-10-13 | Stop reason: HOSPADM

## 2020-09-28 RX ADMIN — SODIUM CHLORIDE 100 ML/HR: 0.9 INJECTION, SOLUTION INTRAVENOUS at 20:49

## 2020-09-28 RX ADMIN — GABAPENTIN 300 MG: 300 CAPSULE ORAL at 23:03

## 2020-09-28 RX ADMIN — VANCOMYCIN HYDROCHLORIDE 750 MG: 750 INJECTION, SOLUTION INTRAVENOUS at 11:55

## 2020-09-28 RX ADMIN — OXYCODONE HYDROCHLORIDE 20 MG: 10 TABLET ORAL at 18:12

## 2020-09-28 RX ADMIN — SODIUM CHLORIDE, SODIUM GLUCONATE, SODIUM ACETATE, POTASSIUM CHLORIDE, MAGNESIUM CHLORIDE, SODIUM PHOSPHATE, DIBASIC, AND POTASSIUM PHOSPHATE 1000 ML: .53; .5; .37; .037; .03; .012; .00082 INJECTION, SOLUTION INTRAVENOUS at 11:30

## 2020-09-28 RX ADMIN — IOHEXOL 100 ML: 350 INJECTION, SOLUTION INTRAVENOUS at 14:42

## 2020-09-28 RX ADMIN — HYDROMORPHONE HYDROCHLORIDE 0.5 MG: 1 INJECTION, SOLUTION INTRAMUSCULAR; INTRAVENOUS; SUBCUTANEOUS at 18:13

## 2020-09-28 RX ADMIN — ONDANSETRON 4 MG: 2 INJECTION INTRAMUSCULAR; INTRAVENOUS at 11:30

## 2020-09-28 RX ADMIN — HYDROMORPHONE HYDROCHLORIDE 0.5 MG: 1 INJECTION, SOLUTION INTRAMUSCULAR; INTRAVENOUS; SUBCUTANEOUS at 14:57

## 2020-09-28 RX ADMIN — SODIUM CHLORIDE 1000 ML: 0.9 INJECTION, SOLUTION INTRAVENOUS at 13:25

## 2020-09-28 RX ADMIN — HYDROMORPHONE HYDROCHLORIDE 0.5 MG: 1 INJECTION, SOLUTION INTRAMUSCULAR; INTRAVENOUS; SUBCUTANEOUS at 11:30

## 2020-09-28 RX ADMIN — CEFEPIME HYDROCHLORIDE 2000 MG: 2 INJECTION, POWDER, FOR SOLUTION INTRAVENOUS at 11:30

## 2020-09-28 RX ADMIN — OXYCODONE HYDROCHLORIDE 60 MG: 40 TABLET, FILM COATED, EXTENDED RELEASE ORAL at 20:44

## 2020-09-29 ENCOUNTER — APPOINTMENT (INPATIENT)
Dept: RADIOLOGY | Facility: HOSPITAL | Age: 45
DRG: 872 | End: 2020-09-29
Payer: COMMERCIAL

## 2020-09-29 PROBLEM — R78.81 BACTEREMIA: Status: ACTIVE | Noted: 2020-09-29

## 2020-09-29 LAB
ALBUMIN SERPL BCP-MCNC: 2 G/DL (ref 3.5–5)
ALP SERPL-CCNC: 300 U/L (ref 46–116)
ALT SERPL W P-5'-P-CCNC: 13 U/L (ref 12–78)
ANION GAP SERPL CALCULATED.3IONS-SCNC: 6 MMOL/L (ref 4–13)
AST SERPL W P-5'-P-CCNC: 43 U/L (ref 5–45)
BILIRUB SERPL-MCNC: 0.42 MG/DL (ref 0.2–1)
BUN SERPL-MCNC: 5 MG/DL (ref 5–25)
CALCIUM ALBUM COR SERPL-MCNC: 9.6 MG/DL (ref 8.3–10.1)
CALCIUM SERPL-MCNC: 8 MG/DL (ref 8.3–10.1)
CHLORIDE SERPL-SCNC: 98 MMOL/L (ref 100–108)
CO2 SERPL-SCNC: 27 MMOL/L (ref 21–32)
CREAT SERPL-MCNC: 0.59 MG/DL (ref 0.6–1.3)
ERYTHROCYTE [DISTWIDTH] IN BLOOD BY AUTOMATED COUNT: 19 % (ref 11.6–15.1)
GFR SERPL CREATININE-BSD FRML MDRD: 109 ML/MIN/1.73SQ M
GLUCOSE SERPL-MCNC: 89 MG/DL (ref 65–140)
HCT VFR BLD AUTO: 21.3 % (ref 34.8–46.1)
HCT VFR BLD AUTO: 23.4 % (ref 34.8–46.1)
HCT VFR BLD AUTO: 24.4 % (ref 34.8–46.1)
HCT VFR BLD AUTO: 24.9 % (ref 34.8–46.1)
HGB BLD-MCNC: 6.6 G/DL (ref 11.5–15.4)
HGB BLD-MCNC: 7 G/DL (ref 11.5–15.4)
HGB BLD-MCNC: 7.1 G/DL (ref 11.5–15.4)
HGB BLD-MCNC: 7.5 G/DL (ref 11.5–15.4)
MCH RBC QN AUTO: 25 PG (ref 26.8–34.3)
MCHC RBC AUTO-ENTMCNC: 29.9 G/DL (ref 31.4–37.4)
MCV RBC AUTO: 84 FL (ref 82–98)
PLATELET # BLD AUTO: 207 THOUSANDS/UL (ref 149–390)
PMV BLD AUTO: 10.7 FL (ref 8.9–12.7)
POTASSIUM SERPL-SCNC: 3.4 MMOL/L (ref 3.5–5.3)
PROCALCITONIN SERPL-MCNC: 2.03 NG/ML
PROT SERPL-MCNC: 6 G/DL (ref 6.4–8.2)
RBC # BLD AUTO: 2.8 MILLION/UL (ref 3.81–5.12)
SODIUM SERPL-SCNC: 131 MMOL/L (ref 136–145)
WBC # BLD AUTO: 14.87 THOUSAND/UL (ref 4.31–10.16)

## 2020-09-29 PROCEDURE — 86923 COMPATIBILITY TEST ELECTRIC: CPT

## 2020-09-29 PROCEDURE — 85027 COMPLETE CBC AUTOMATED: CPT | Performed by: PHYSICIAN ASSISTANT

## 2020-09-29 PROCEDURE — 84145 PROCALCITONIN (PCT): CPT | Performed by: PHYSICIAN ASSISTANT

## 2020-09-29 PROCEDURE — C1729 CATH, DRAINAGE: HCPCS

## 2020-09-29 PROCEDURE — 86900 BLOOD TYPING SEROLOGIC ABO: CPT | Performed by: PHYSICIAN ASSISTANT

## 2020-09-29 PROCEDURE — 99232 SBSQ HOSP IP/OBS MODERATE 35: CPT | Performed by: FAMILY MEDICINE

## 2020-09-29 PROCEDURE — C1769 GUIDE WIRE: HCPCS

## 2020-09-29 PROCEDURE — 85014 HEMATOCRIT: CPT | Performed by: PHYSICIAN ASSISTANT

## 2020-09-29 PROCEDURE — 85014 HEMATOCRIT: CPT | Performed by: COLON & RECTAL SURGERY

## 2020-09-29 PROCEDURE — NC001 PR NO CHARGE: Performed by: PHYSICIAN ASSISTANT

## 2020-09-29 PROCEDURE — 99254 IP/OBS CNSLTJ NEW/EST MOD 60: CPT | Performed by: INTERNAL MEDICINE

## 2020-09-29 PROCEDURE — 49423 EXCHANGE DRAINAGE CATHETER: CPT

## 2020-09-29 PROCEDURE — 85018 HEMOGLOBIN: CPT | Performed by: PHYSICIAN ASSISTANT

## 2020-09-29 PROCEDURE — 49423 EXCHANGE DRAINAGE CATHETER: CPT | Performed by: RADIOLOGY

## 2020-09-29 PROCEDURE — 85018 HEMOGLOBIN: CPT | Performed by: COLON & RECTAL SURGERY

## 2020-09-29 PROCEDURE — 75984 XRAY CONTROL CATHETER CHANGE: CPT | Performed by: RADIOLOGY

## 2020-09-29 PROCEDURE — 30233N1 TRANSFUSION OF NONAUTOLOGOUS RED BLOOD CELLS INTO PERIPHERAL VEIN, PERCUTANEOUS APPROACH: ICD-10-PCS | Performed by: INTERNAL MEDICINE

## 2020-09-29 PROCEDURE — 86850 RBC ANTIBODY SCREEN: CPT | Performed by: PHYSICIAN ASSISTANT

## 2020-09-29 PROCEDURE — 0H98X0Z DRAINAGE OF BUTTOCK SKIN WITH DRAINAGE DEVICE, EXTERNAL APPROACH: ICD-10-PCS | Performed by: RADIOLOGY

## 2020-09-29 PROCEDURE — 75984 XRAY CONTROL CATHETER CHANGE: CPT

## 2020-09-29 PROCEDURE — 80053 COMPREHEN METABOLIC PANEL: CPT | Performed by: PHYSICIAN ASSISTANT

## 2020-09-29 PROCEDURE — 99255 IP/OBS CONSLTJ NEW/EST HI 80: CPT | Performed by: INTERNAL MEDICINE

## 2020-09-29 PROCEDURE — 86901 BLOOD TYPING SEROLOGIC RH(D): CPT | Performed by: PHYSICIAN ASSISTANT

## 2020-09-29 RX ORDER — MIDAZOLAM HYDROCHLORIDE 2 MG/2ML
INJECTION, SOLUTION INTRAMUSCULAR; INTRAVENOUS CODE/TRAUMA/SEDATION MEDICATION
Status: COMPLETED | OUTPATIENT
Start: 2020-09-29 | End: 2020-09-29

## 2020-09-29 RX ORDER — METRONIDAZOLE 500 MG/1
500 TABLET ORAL EVERY 8 HOURS SCHEDULED
Status: DISCONTINUED | OUTPATIENT
Start: 2020-09-29 | End: 2020-10-13 | Stop reason: HOSPADM

## 2020-09-29 RX ORDER — LIDOCAINE 40 MG/G
CREAM TOPICAL 4 TIMES DAILY PRN
Status: DISCONTINUED | OUTPATIENT
Start: 2020-09-29 | End: 2020-10-02

## 2020-09-29 RX ADMIN — VANCOMYCIN HYDROCHLORIDE 750 MG: 750 INJECTION, SOLUTION INTRAVENOUS at 13:55

## 2020-09-29 RX ADMIN — OXYCODONE HYDROCHLORIDE 60 MG: 40 TABLET, FILM COATED, EXTENDED RELEASE ORAL at 20:43

## 2020-09-29 RX ADMIN — Medication: at 10:37

## 2020-09-29 RX ADMIN — GABAPENTIN 300 MG: 300 CAPSULE ORAL at 16:31

## 2020-09-29 RX ADMIN — CEFEPIME HYDROCHLORIDE 1000 MG: 1 INJECTION, POWDER, FOR SOLUTION INTRAMUSCULAR; INTRAVENOUS at 00:30

## 2020-09-29 RX ADMIN — ENOXAPARIN SODIUM 40 MG: 40 INJECTION SUBCUTANEOUS at 08:32

## 2020-09-29 RX ADMIN — VANCOMYCIN HYDROCHLORIDE 750 MG: 750 INJECTION, SOLUTION INTRAVENOUS at 00:42

## 2020-09-29 RX ADMIN — IOHEXOL 15 ML: 350 INJECTION, SOLUTION INTRAVENOUS at 15:05

## 2020-09-29 RX ADMIN — SODIUM CHLORIDE 100 ML/HR: 0.9 INJECTION, SOLUTION INTRAVENOUS at 10:48

## 2020-09-29 RX ADMIN — SERTRALINE HYDROCHLORIDE 25 MG: 25 TABLET ORAL at 08:32

## 2020-09-29 RX ADMIN — GABAPENTIN 300 MG: 300 CAPSULE ORAL at 20:43

## 2020-09-29 RX ADMIN — CEFEPIME HYDROCHLORIDE 1000 MG: 1 INJECTION, POWDER, FOR SOLUTION INTRAMUSCULAR; INTRAVENOUS at 22:50

## 2020-09-29 RX ADMIN — MIDAZOLAM 0.5 MG: 1 INJECTION INTRAMUSCULAR; INTRAVENOUS at 14:47

## 2020-09-29 RX ADMIN — METRONIDAZOLE 500 MG: 500 TABLET ORAL at 17:25

## 2020-09-29 RX ADMIN — OXYCODONE HYDROCHLORIDE 20 MG: 10 TABLET ORAL at 00:32

## 2020-09-29 RX ADMIN — CEFEPIME HYDROCHLORIDE 1000 MG: 1 INJECTION, POWDER, FOR SOLUTION INTRAMUSCULAR; INTRAVENOUS at 10:47

## 2020-09-29 RX ADMIN — OXYCODONE HYDROCHLORIDE 60 MG: 40 TABLET, FILM COATED, EXTENDED RELEASE ORAL at 08:32

## 2020-09-29 RX ADMIN — MIDAZOLAM 0.5 MG: 1 INJECTION INTRAMUSCULAR; INTRAVENOUS at 14:54

## 2020-09-29 RX ADMIN — GABAPENTIN 300 MG: 300 CAPSULE ORAL at 08:32

## 2020-09-29 RX ADMIN — OXYCODONE HYDROCHLORIDE 20 MG: 10 TABLET ORAL at 08:31

## 2020-09-30 LAB
ABO GROUP BLD: NORMAL
ANION GAP SERPL CALCULATED.3IONS-SCNC: 8 MMOL/L (ref 4–13)
BLD GP AB SCN SERPL QL: NEGATIVE
BUN SERPL-MCNC: 4 MG/DL (ref 5–25)
CALCIUM SERPL-MCNC: 7.5 MG/DL (ref 8.3–10.1)
CHLORIDE SERPL-SCNC: 96 MMOL/L (ref 100–108)
CO2 SERPL-SCNC: 25 MMOL/L (ref 21–32)
CREAT SERPL-MCNC: 0.51 MG/DL (ref 0.6–1.3)
GFR SERPL CREATININE-BSD FRML MDRD: 115 ML/MIN/1.73SQ M
GLUCOSE SERPL-MCNC: 129 MG/DL (ref 65–140)
HCT VFR BLD AUTO: 21 % (ref 34.8–46.1)
HCT VFR BLD AUTO: 21.1 % (ref 34.8–46.1)
HCT VFR BLD AUTO: 28.7 % (ref 34.8–46.1)
HGB BLD-MCNC: 6.2 G/DL (ref 11.5–15.4)
HGB BLD-MCNC: 6.4 G/DL (ref 11.5–15.4)
HGB BLD-MCNC: 9.1 G/DL (ref 11.5–15.4)
NSE SERPL IA-MCNC: 40.2 NG/ML (ref 0–12.5)
NSE SERPL IA-MCNC: 42.6 NG/ML (ref 0–12.5)
POTASSIUM SERPL-SCNC: 2.3 MMOL/L (ref 3.5–5.3)
POTASSIUM SERPL-SCNC: 2.8 MMOL/L (ref 3.5–5.3)
RH BLD: POSITIVE
SODIUM SERPL-SCNC: 129 MMOL/L (ref 136–145)
SPECIMEN EXPIRATION DATE: NORMAL

## 2020-09-30 PROCEDURE — 83540 ASSAY OF IRON: CPT | Performed by: NURSE PRACTITIONER

## 2020-09-30 PROCEDURE — 85014 HEMATOCRIT: CPT | Performed by: NURSE PRACTITIONER

## 2020-09-30 PROCEDURE — 84132 ASSAY OF SERUM POTASSIUM: CPT | Performed by: PHYSICIAN ASSISTANT

## 2020-09-30 PROCEDURE — P9040 RBC LEUKOREDUCED IRRADIATED: HCPCS

## 2020-09-30 PROCEDURE — 99233 SBSQ HOSP IP/OBS HIGH 50: CPT | Performed by: INTERNAL MEDICINE

## 2020-09-30 PROCEDURE — 99232 SBSQ HOSP IP/OBS MODERATE 35: CPT | Performed by: INTERNAL MEDICINE

## 2020-09-30 PROCEDURE — 85014 HEMATOCRIT: CPT | Performed by: COLON & RECTAL SURGERY

## 2020-09-30 PROCEDURE — 99232 SBSQ HOSP IP/OBS MODERATE 35: CPT | Performed by: COLON & RECTAL SURGERY

## 2020-09-30 PROCEDURE — 99232 SBSQ HOSP IP/OBS MODERATE 35: CPT | Performed by: NURSE PRACTITIONER

## 2020-09-30 PROCEDURE — 99251 PR INITL INPATIENT CONSULT NEW/ESTAB PT 20 MIN: CPT | Performed by: PREVENTIVE MEDICINE

## 2020-09-30 PROCEDURE — 83550 IRON BINDING TEST: CPT | Performed by: NURSE PRACTITIONER

## 2020-09-30 PROCEDURE — 85018 HEMOGLOBIN: CPT | Performed by: NURSE PRACTITIONER

## 2020-09-30 PROCEDURE — 80048 BASIC METABOLIC PNL TOTAL CA: CPT | Performed by: FAMILY MEDICINE

## 2020-09-30 PROCEDURE — 85018 HEMOGLOBIN: CPT | Performed by: COLON & RECTAL SURGERY

## 2020-09-30 PROCEDURE — 84132 ASSAY OF SERUM POTASSIUM: CPT | Performed by: NURSE PRACTITIONER

## 2020-09-30 PROCEDURE — P9016 RBC LEUKOCYTES REDUCED: HCPCS

## 2020-09-30 PROCEDURE — 82728 ASSAY OF FERRITIN: CPT | Performed by: NURSE PRACTITIONER

## 2020-09-30 RX ORDER — POTASSIUM CHLORIDE 14.9 MG/ML
20 INJECTION INTRAVENOUS
Status: COMPLETED | OUTPATIENT
Start: 2020-09-30 | End: 2020-10-01

## 2020-09-30 RX ORDER — SODIUM CHLORIDE 9 MG/ML
50 INJECTION, SOLUTION INTRAVENOUS CONTINUOUS
Status: DISCONTINUED | OUTPATIENT
Start: 2020-09-30 | End: 2020-09-30

## 2020-09-30 RX ORDER — IBUPROFEN 400 MG/1
400 TABLET ORAL ONCE
Status: COMPLETED | OUTPATIENT
Start: 2020-09-30 | End: 2020-09-30

## 2020-09-30 RX ORDER — SODIUM CHLORIDE AND POTASSIUM CHLORIDE .9; .15 G/100ML; G/100ML
50 SOLUTION INTRAVENOUS CONTINUOUS
Status: DISCONTINUED | OUTPATIENT
Start: 2020-09-30 | End: 2020-10-04

## 2020-09-30 RX ORDER — OXYCODONE HCL 40 MG/1
40 TABLET, FILM COATED, EXTENDED RELEASE ORAL EVERY 8 HOURS SCHEDULED
Status: DISCONTINUED | OUTPATIENT
Start: 2020-09-30 | End: 2020-10-01

## 2020-09-30 RX ORDER — ACETAMINOPHEN 325 MG/1
325 TABLET ORAL ONCE
Status: COMPLETED | OUTPATIENT
Start: 2020-09-30 | End: 2020-09-30

## 2020-09-30 RX ORDER — ACETAMINOPHEN 325 MG/1
650 TABLET ORAL ONCE
Status: COMPLETED | OUTPATIENT
Start: 2020-09-30 | End: 2020-09-30

## 2020-09-30 RX ADMIN — OXYCODONE HYDROCHLORIDE 60 MG: 40 TABLET, FILM COATED, EXTENDED RELEASE ORAL at 13:05

## 2020-09-30 RX ADMIN — POTASSIUM CHLORIDE 20 MEQ: 14.9 INJECTION, SOLUTION INTRAVENOUS at 15:11

## 2020-09-30 RX ADMIN — IBUPROFEN 400 MG: 400 TABLET ORAL at 03:33

## 2020-09-30 RX ADMIN — SODIUM CHLORIDE AND POTASSIUM CHLORIDE 50 ML/HR: .9; .15 SOLUTION INTRAVENOUS at 19:41

## 2020-09-30 RX ADMIN — GABAPENTIN 300 MG: 300 CAPSULE ORAL at 09:05

## 2020-09-30 RX ADMIN — POTASSIUM CHLORIDE 20 MEQ: 14.9 INJECTION, SOLUTION INTRAVENOUS at 13:01

## 2020-09-30 RX ADMIN — CEFEPIME HYDROCHLORIDE 1000 MG: 1 INJECTION, POWDER, FOR SOLUTION INTRAMUSCULAR; INTRAVENOUS at 12:14

## 2020-09-30 RX ADMIN — METRONIDAZOLE 500 MG: 500 TABLET ORAL at 22:49

## 2020-09-30 RX ADMIN — ENOXAPARIN SODIUM 40 MG: 40 INJECTION SUBCUTANEOUS at 09:05

## 2020-09-30 RX ADMIN — SERTRALINE HYDROCHLORIDE 25 MG: 25 TABLET ORAL at 09:05

## 2020-09-30 RX ADMIN — GABAPENTIN 300 MG: 300 CAPSULE ORAL at 22:49

## 2020-09-30 RX ADMIN — HYDROMORPHONE HYDROCHLORIDE 0.5 MG: 1 INJECTION, SOLUTION INTRAMUSCULAR; INTRAVENOUS; SUBCUTANEOUS at 19:53

## 2020-09-30 RX ADMIN — GABAPENTIN 300 MG: 300 CAPSULE ORAL at 17:32

## 2020-09-30 RX ADMIN — CEFTRIAXONE 1000 MG: 1 INJECTION, POWDER, FOR SOLUTION INTRAMUSCULAR; INTRAVENOUS at 22:56

## 2020-09-30 RX ADMIN — METRONIDAZOLE 500 MG: 500 TABLET ORAL at 05:07

## 2020-09-30 RX ADMIN — HYDROMORPHONE HYDROCHLORIDE 0.5 MG: 1 INJECTION, SOLUTION INTRAMUSCULAR; INTRAVENOUS; SUBCUTANEOUS at 02:55

## 2020-09-30 RX ADMIN — OXYCODONE HYDROCHLORIDE 40 MG: 40 TABLET, FILM COATED, EXTENDED RELEASE ORAL at 22:49

## 2020-09-30 RX ADMIN — ACETAMINOPHEN 325 MG: 325 TABLET, FILM COATED ORAL at 04:23

## 2020-09-30 RX ADMIN — POTASSIUM CHLORIDE 20 MEQ: 14.9 INJECTION, SOLUTION INTRAVENOUS at 17:33

## 2020-09-30 RX ADMIN — METRONIDAZOLE 500 MG: 500 TABLET ORAL at 13:05

## 2020-09-30 RX ADMIN — SODIUM CHLORIDE 100 ML/HR: 0.9 INJECTION, SOLUTION INTRAVENOUS at 04:06

## 2020-09-30 RX ADMIN — SODIUM CHLORIDE 1000 ML: 0.9 INJECTION, SOLUTION INTRAVENOUS at 09:05

## 2020-09-30 RX ADMIN — ACETAMINOPHEN 650 MG: 325 TABLET, FILM COATED ORAL at 02:52

## 2020-09-30 RX ADMIN — POTASSIUM CHLORIDE 20 MEQ: 14.9 INJECTION, SOLUTION INTRAVENOUS at 19:53

## 2020-10-01 LAB
ABO GROUP BLD BPU: NORMAL
ANION GAP SERPL CALCULATED.3IONS-SCNC: 8 MMOL/L (ref 4–13)
BACTERIA UR CULT: ABNORMAL
BACTERIA UR CULT: ABNORMAL
BPU ID: NORMAL
BUN SERPL-MCNC: 3 MG/DL (ref 5–25)
CALCIUM SERPL-MCNC: 8.3 MG/DL (ref 8.3–10.1)
CHLORIDE SERPL-SCNC: 101 MMOL/L (ref 100–108)
CO2 SERPL-SCNC: 24 MMOL/L (ref 21–32)
CREAT SERPL-MCNC: 0.38 MG/DL (ref 0.6–1.3)
CROSSMATCH: NORMAL
ERYTHROCYTE [DISTWIDTH] IN BLOOD BY AUTOMATED COUNT: 18 % (ref 11.6–15.1)
FERRITIN SERPL-MCNC: 720 NG/ML (ref 8–388)
GFR SERPL CREATININE-BSD FRML MDRD: 127 ML/MIN/1.73SQ M
GLUCOSE SERPL-MCNC: 85 MG/DL (ref 65–140)
HCT VFR BLD AUTO: 27.8 % (ref 34.8–46.1)
HGB BLD-MCNC: 8.7 G/DL (ref 11.5–15.4)
IRON SATN MFR SERPL: 13 %
IRON SERPL-MCNC: 16 UG/DL (ref 50–170)
MAGNESIUM SERPL-MCNC: 2.1 MG/DL (ref 1.6–2.6)
MCH RBC QN AUTO: 25.7 PG (ref 26.8–34.3)
MCHC RBC AUTO-ENTMCNC: 31.3 G/DL (ref 31.4–37.4)
MCV RBC AUTO: 82 FL (ref 82–98)
PLATELET # BLD AUTO: 202 THOUSANDS/UL (ref 149–390)
PMV BLD AUTO: 10.7 FL (ref 8.9–12.7)
POTASSIUM SERPL-SCNC: 3.8 MMOL/L (ref 3.5–5.3)
POTASSIUM SERPL-SCNC: 4.1 MMOL/L (ref 3.5–5.3)
RBC # BLD AUTO: 3.38 MILLION/UL (ref 3.81–5.12)
SODIUM SERPL-SCNC: 133 MMOL/L (ref 136–145)
TIBC SERPL-MCNC: 125 UG/DL (ref 250–450)
UNIT DISPENSE STATUS: NORMAL
UNIT PRODUCT CODE: NORMAL
UNIT RH: NORMAL
WBC # BLD AUTO: 15.28 THOUSAND/UL (ref 4.31–10.16)

## 2020-10-01 PROCEDURE — 80048 BASIC METABOLIC PNL TOTAL CA: CPT | Performed by: NURSE PRACTITIONER

## 2020-10-01 PROCEDURE — 99232 SBSQ HOSP IP/OBS MODERATE 35: CPT | Performed by: INTERNAL MEDICINE

## 2020-10-01 PROCEDURE — 99233 SBSQ HOSP IP/OBS HIGH 50: CPT | Performed by: FAMILY MEDICINE

## 2020-10-01 PROCEDURE — 83735 ASSAY OF MAGNESIUM: CPT | Performed by: NURSE PRACTITIONER

## 2020-10-01 PROCEDURE — 85027 COMPLETE CBC AUTOMATED: CPT | Performed by: NURSE PRACTITIONER

## 2020-10-01 RX ORDER — HYDROMORPHONE HCL/PF 1 MG/ML
0.5 SYRINGE (ML) INJECTION ONCE
Status: COMPLETED | OUTPATIENT
Start: 2020-10-01 | End: 2020-10-01

## 2020-10-01 RX ORDER — HYDROMORPHONE HCL 110MG/55ML
2 PATIENT CONTROLLED ANALGESIA SYRINGE INTRAVENOUS ONCE
Status: COMPLETED | OUTPATIENT
Start: 2020-10-01 | End: 2020-10-01

## 2020-10-01 RX ORDER — SENNOSIDES 8.6 MG
2 TABLET ORAL
Status: DISCONTINUED | OUTPATIENT
Start: 2020-10-01 | End: 2020-10-13 | Stop reason: HOSPADM

## 2020-10-01 RX ORDER — ACETAMINOPHEN 325 MG/1
325 TABLET ORAL ONCE
Status: COMPLETED | OUTPATIENT
Start: 2020-10-01 | End: 2020-10-01

## 2020-10-01 RX ORDER — HYDROMORPHONE HCL 110MG/55ML
2 PATIENT CONTROLLED ANALGESIA SYRINGE INTRAVENOUS EVERY 2 HOUR PRN
Status: DISCONTINUED | OUTPATIENT
Start: 2020-10-01 | End: 2020-10-12

## 2020-10-01 RX ADMIN — HYDROMORPHONE HYDROCHLORIDE 0.5 MG: 1 INJECTION, SOLUTION INTRAMUSCULAR; INTRAVENOUS; SUBCUTANEOUS at 09:39

## 2020-10-01 RX ADMIN — SODIUM CHLORIDE AND POTASSIUM CHLORIDE 50 ML/HR: .9; .15 SOLUTION INTRAVENOUS at 18:18

## 2020-10-01 RX ADMIN — HYDROMORPHONE HYDROCHLORIDE 0.5 MG: 1 INJECTION, SOLUTION INTRAMUSCULAR; INTRAVENOUS; SUBCUTANEOUS at 05:41

## 2020-10-01 RX ADMIN — ACETAMINOPHEN 325 MG: 325 TABLET, FILM COATED ORAL at 00:43

## 2020-10-01 RX ADMIN — HYDROMORPHONE HYDROCHLORIDE 2 MG: 2 INJECTION, SOLUTION INTRAMUSCULAR; INTRAVENOUS; SUBCUTANEOUS at 18:19

## 2020-10-01 RX ADMIN — GABAPENTIN 300 MG: 300 CAPSULE ORAL at 17:28

## 2020-10-01 RX ADMIN — GABAPENTIN 300 MG: 300 CAPSULE ORAL at 21:54

## 2020-10-01 RX ADMIN — HYDROMORPHONE HYDROCHLORIDE 2 MG: 2 INJECTION, SOLUTION INTRAMUSCULAR; INTRAVENOUS; SUBCUTANEOUS at 10:48

## 2020-10-01 RX ADMIN — Medication: at 10:59

## 2020-10-01 RX ADMIN — CEFTRIAXONE 1000 MG: 1 INJECTION, POWDER, FOR SOLUTION INTRAMUSCULAR; INTRAVENOUS at 21:54

## 2020-10-01 RX ADMIN — METRONIDAZOLE 500 MG: 500 TABLET ORAL at 21:54

## 2020-10-01 RX ADMIN — SERTRALINE HYDROCHLORIDE 25 MG: 25 TABLET ORAL at 08:49

## 2020-10-01 RX ADMIN — OXYCODONE HYDROCHLORIDE 40 MG: 40 TABLET, FILM COATED, EXTENDED RELEASE ORAL at 05:41

## 2020-10-01 RX ADMIN — GABAPENTIN 300 MG: 300 CAPSULE ORAL at 08:49

## 2020-10-01 RX ADMIN — METRONIDAZOLE 500 MG: 500 TABLET ORAL at 05:40

## 2020-10-01 RX ADMIN — METRONIDAZOLE 500 MG: 500 TABLET ORAL at 17:28

## 2020-10-01 RX ADMIN — HYDROMORPHONE HYDROCHLORIDE 2 MG: 2 INJECTION, SOLUTION INTRAMUSCULAR; INTRAVENOUS; SUBCUTANEOUS at 20:42

## 2020-10-02 ENCOUNTER — APPOINTMENT (INPATIENT)
Dept: NON INVASIVE DIAGNOSTICS | Facility: HOSPITAL | Age: 45
DRG: 872 | End: 2020-10-02
Payer: COMMERCIAL

## 2020-10-02 ENCOUNTER — APPOINTMENT (INPATIENT)
Dept: RADIOLOGY | Facility: HOSPITAL | Age: 45
DRG: 872 | End: 2020-10-02
Payer: COMMERCIAL

## 2020-10-02 PROBLEM — R60.0 BILATERAL LOWER EXTREMITY EDEMA: Status: ACTIVE | Noted: 2020-10-02

## 2020-10-02 LAB
ANION GAP SERPL CALCULATED.3IONS-SCNC: 6 MMOL/L (ref 4–13)
BUN SERPL-MCNC: 2 MG/DL (ref 5–25)
CALCIUM SERPL-MCNC: 8.6 MG/DL (ref 8.3–10.1)
CHLORIDE SERPL-SCNC: 99 MMOL/L (ref 100–108)
CO2 SERPL-SCNC: 24 MMOL/L (ref 21–32)
CREAT SERPL-MCNC: 0.42 MG/DL (ref 0.6–1.3)
ERYTHROCYTE [DISTWIDTH] IN BLOOD BY AUTOMATED COUNT: 18.6 % (ref 11.6–15.1)
GFR SERPL CREATININE-BSD FRML MDRD: 122 ML/MIN/1.73SQ M
GLUCOSE SERPL-MCNC: 85 MG/DL (ref 65–140)
HCT VFR BLD AUTO: 29.2 % (ref 34.8–46.1)
HGB BLD-MCNC: 9 G/DL (ref 11.5–15.4)
MCH RBC QN AUTO: 25.4 PG (ref 26.8–34.3)
MCHC RBC AUTO-ENTMCNC: 30.8 G/DL (ref 31.4–37.4)
MCV RBC AUTO: 83 FL (ref 82–98)
PLATELET # BLD AUTO: 233 THOUSANDS/UL (ref 149–390)
PMV BLD AUTO: 10.4 FL (ref 8.9–12.7)
POTASSIUM SERPL-SCNC: 3.5 MMOL/L (ref 3.5–5.3)
RBC # BLD AUTO: 3.54 MILLION/UL (ref 3.81–5.12)
SODIUM SERPL-SCNC: 129 MMOL/L (ref 136–145)
WBC # BLD AUTO: 12.95 THOUSAND/UL (ref 4.31–10.16)

## 2020-10-02 PROCEDURE — 93970 EXTREMITY STUDY: CPT | Performed by: SURGERY

## 2020-10-02 PROCEDURE — 93970 EXTREMITY STUDY: CPT

## 2020-10-02 PROCEDURE — 99232 SBSQ HOSP IP/OBS MODERATE 35: CPT | Performed by: INTERNAL MEDICINE

## 2020-10-02 PROCEDURE — 99233 SBSQ HOSP IP/OBS HIGH 50: CPT | Performed by: FAMILY MEDICINE

## 2020-10-02 PROCEDURE — 87040 BLOOD CULTURE FOR BACTERIA: CPT | Performed by: INTERNAL MEDICINE

## 2020-10-02 PROCEDURE — 72193 CT PELVIS W/DYE: CPT

## 2020-10-02 PROCEDURE — 85027 COMPLETE CBC AUTOMATED: CPT | Performed by: FAMILY MEDICINE

## 2020-10-02 PROCEDURE — G1004 CDSM NDSC: HCPCS

## 2020-10-02 PROCEDURE — NC001 PR NO CHARGE: Performed by: PHYSICIAN ASSISTANT

## 2020-10-02 PROCEDURE — 80048 BASIC METABOLIC PNL TOTAL CA: CPT | Performed by: FAMILY MEDICINE

## 2020-10-02 RX ORDER — LIDOCAINE 40 MG/G
CREAM TOPICAL 4 TIMES DAILY PRN
Status: DISCONTINUED | OUTPATIENT
Start: 2020-10-02 | End: 2020-10-13 | Stop reason: HOSPADM

## 2020-10-02 RX ORDER — GLYCOPYRROLATE 0.2 MG/ML
0.2 INJECTION INTRAMUSCULAR; INTRAVENOUS EVERY 4 HOURS PRN
Status: DISCONTINUED | OUTPATIENT
Start: 2020-10-02 | End: 2020-10-13 | Stop reason: HOSPADM

## 2020-10-02 RX ORDER — LORAZEPAM 2 MG/ML
1 INJECTION INTRAMUSCULAR
Status: DISCONTINUED | OUTPATIENT
Start: 2020-10-02 | End: 2020-10-05

## 2020-10-02 RX ORDER — ACETAMINOPHEN 325 MG/1
650 TABLET ORAL EVERY 6 HOURS PRN
Status: DISPENSED | OUTPATIENT
Start: 2020-10-02 | End: 2020-10-04

## 2020-10-02 RX ORDER — HALOPERIDOL 5 MG/ML
2 INJECTION INTRAMUSCULAR
Status: DISCONTINUED | OUTPATIENT
Start: 2020-10-02 | End: 2020-10-13 | Stop reason: HOSPADM

## 2020-10-02 RX ADMIN — HYDROMORPHONE HYDROCHLORIDE 2 MG: 2 INJECTION, SOLUTION INTRAMUSCULAR; INTRAVENOUS; SUBCUTANEOUS at 22:58

## 2020-10-02 RX ADMIN — SODIUM CHLORIDE AND POTASSIUM CHLORIDE 50 ML/HR: .9; .15 SOLUTION INTRAVENOUS at 13:27

## 2020-10-02 RX ADMIN — HYDROMORPHONE HYDROCHLORIDE 2 MG: 2 INJECTION, SOLUTION INTRAMUSCULAR; INTRAVENOUS; SUBCUTANEOUS at 03:20

## 2020-10-02 RX ADMIN — HYDROMORPHONE HYDROCHLORIDE 2 MG: 2 INJECTION, SOLUTION INTRAMUSCULAR; INTRAVENOUS; SUBCUTANEOUS at 15:08

## 2020-10-02 RX ADMIN — METRONIDAZOLE 500 MG: 500 TABLET ORAL at 05:21

## 2020-10-02 RX ADMIN — GABAPENTIN 300 MG: 300 CAPSULE ORAL at 08:31

## 2020-10-02 RX ADMIN — ACETAMINOPHEN 650 MG: 325 TABLET, FILM COATED ORAL at 03:20

## 2020-10-02 RX ADMIN — Medication 0.1 MG/KG/HR: at 17:14

## 2020-10-02 RX ADMIN — CEFTRIAXONE 1000 MG: 1 INJECTION, POWDER, FOR SOLUTION INTRAMUSCULAR; INTRAVENOUS at 22:28

## 2020-10-02 RX ADMIN — HYDROMORPHONE HYDROCHLORIDE 2 MG: 2 INJECTION, SOLUTION INTRAMUSCULAR; INTRAVENOUS; SUBCUTANEOUS at 20:47

## 2020-10-02 RX ADMIN — GABAPENTIN 300 MG: 300 CAPSULE ORAL at 22:28

## 2020-10-02 RX ADMIN — GABAPENTIN 300 MG: 300 CAPSULE ORAL at 17:27

## 2020-10-02 RX ADMIN — HYDROMORPHONE HYDROCHLORIDE 2 MG: 2 INJECTION, SOLUTION INTRAMUSCULAR; INTRAVENOUS; SUBCUTANEOUS at 12:14

## 2020-10-02 RX ADMIN — HYDROMORPHONE HYDROCHLORIDE 2 MG: 2 INJECTION, SOLUTION INTRAMUSCULAR; INTRAVENOUS; SUBCUTANEOUS at 18:40

## 2020-10-02 RX ADMIN — ONDANSETRON 4 MG: 2 INJECTION INTRAMUSCULAR; INTRAVENOUS at 06:33

## 2020-10-02 RX ADMIN — METRONIDAZOLE 500 MG: 500 TABLET ORAL at 22:28

## 2020-10-02 RX ADMIN — SERTRALINE HYDROCHLORIDE 25 MG: 25 TABLET ORAL at 08:31

## 2020-10-02 RX ADMIN — Medication: at 22:50

## 2020-10-02 RX ADMIN — Medication: at 07:34

## 2020-10-02 RX ADMIN — METRONIDAZOLE 500 MG: 500 TABLET ORAL at 14:26

## 2020-10-02 RX ADMIN — IOHEXOL 100 ML: 350 INJECTION, SOLUTION INTRAVENOUS at 09:44

## 2020-10-03 ENCOUNTER — APPOINTMENT (INPATIENT)
Dept: RADIOLOGY | Facility: HOSPITAL | Age: 45
DRG: 872 | End: 2020-10-03
Payer: COMMERCIAL

## 2020-10-03 PROBLEM — M46.20 SPINAL ABSCESS (HCC): Status: ACTIVE | Noted: 2020-10-03

## 2020-10-03 LAB
ABO GROUP BLD BPU: NORMAL
ABO GROUP BLD BPU: NORMAL
ANION GAP SERPL CALCULATED.3IONS-SCNC: 6 MMOL/L (ref 4–13)
BACTERIA BLD CULT: ABNORMAL
BACTERIA BLD CULT: ABNORMAL
BACTERIA BLD CULT: NORMAL
BPU ID: NORMAL
BPU ID: NORMAL
BUN SERPL-MCNC: 2 MG/DL (ref 5–25)
CALCIUM SERPL-MCNC: 8.8 MG/DL (ref 8.3–10.1)
CHLORIDE SERPL-SCNC: 97 MMOL/L (ref 100–108)
CO2 SERPL-SCNC: 27 MMOL/L (ref 21–32)
CREAT SERPL-MCNC: 0.5 MG/DL (ref 0.6–1.3)
CROSSMATCH: NORMAL
CROSSMATCH: NORMAL
ERYTHROCYTE [DISTWIDTH] IN BLOOD BY AUTOMATED COUNT: 18.7 % (ref 11.6–15.1)
GFR SERPL CREATININE-BSD FRML MDRD: 116 ML/MIN/1.73SQ M
GLUCOSE SERPL-MCNC: 94 MG/DL (ref 65–140)
GRAM STN SPEC: ABNORMAL
HCT VFR BLD AUTO: 31.6 % (ref 34.8–46.1)
HGB BLD-MCNC: 9.9 G/DL (ref 11.5–15.4)
MCH RBC QN AUTO: 25.5 PG (ref 26.8–34.3)
MCHC RBC AUTO-ENTMCNC: 31.3 G/DL (ref 31.4–37.4)
MCV RBC AUTO: 81 FL (ref 82–98)
PLATELET # BLD AUTO: 281 THOUSANDS/UL (ref 149–390)
PMV BLD AUTO: 9.8 FL (ref 8.9–12.7)
POTASSIUM SERPL-SCNC: 3.4 MMOL/L (ref 3.5–5.3)
PROCALCITONIN SERPL-MCNC: 1.18 NG/ML
RBC # BLD AUTO: 3.88 MILLION/UL (ref 3.81–5.12)
SODIUM SERPL-SCNC: 130 MMOL/L (ref 136–145)
UNIT DISPENSE STATUS: NORMAL
UNIT DISPENSE STATUS: NORMAL
UNIT PRODUCT CODE: NORMAL
UNIT PRODUCT CODE: NORMAL
UNIT RH: NORMAL
UNIT RH: NORMAL
WBC # BLD AUTO: 13.76 THOUSAND/UL (ref 4.31–10.16)

## 2020-10-03 PROCEDURE — G1004 CDSM NDSC: HCPCS

## 2020-10-03 PROCEDURE — A9585 GADOBUTROL INJECTION: HCPCS | Performed by: PHYSICIAN ASSISTANT

## 2020-10-03 PROCEDURE — 84145 PROCALCITONIN (PCT): CPT | Performed by: FAMILY MEDICINE

## 2020-10-03 PROCEDURE — 80048 BASIC METABOLIC PNL TOTAL CA: CPT | Performed by: FAMILY MEDICINE

## 2020-10-03 PROCEDURE — 99231 SBSQ HOSP IP/OBS SF/LOW 25: CPT | Performed by: INTERNAL MEDICINE

## 2020-10-03 PROCEDURE — 99254 IP/OBS CNSLTJ NEW/EST MOD 60: CPT | Performed by: NURSE PRACTITIONER

## 2020-10-03 PROCEDURE — 99232 SBSQ HOSP IP/OBS MODERATE 35: CPT | Performed by: INTERNAL MEDICINE

## 2020-10-03 PROCEDURE — 99254 IP/OBS CNSLTJ NEW/EST MOD 60: CPT | Performed by: SURGERY

## 2020-10-03 PROCEDURE — 85027 COMPLETE CBC AUTOMATED: CPT | Performed by: FAMILY MEDICINE

## 2020-10-03 PROCEDURE — 99233 SBSQ HOSP IP/OBS HIGH 50: CPT | Performed by: FAMILY MEDICINE

## 2020-10-03 PROCEDURE — 72158 MRI LUMBAR SPINE W/O & W/DYE: CPT

## 2020-10-03 RX ORDER — HEPARIN SODIUM 5000 [USP'U]/ML
5000 INJECTION, SOLUTION INTRAVENOUS; SUBCUTANEOUS EVERY 8 HOURS SCHEDULED
Status: DISCONTINUED | OUTPATIENT
Start: 2020-10-03 | End: 2020-10-03

## 2020-10-03 RX ADMIN — Medication: at 18:34

## 2020-10-03 RX ADMIN — GABAPENTIN 300 MG: 300 CAPSULE ORAL at 21:10

## 2020-10-03 RX ADMIN — HYDROMORPHONE HYDROCHLORIDE 2 MG: 2 INJECTION, SOLUTION INTRAMUSCULAR; INTRAVENOUS; SUBCUTANEOUS at 08:36

## 2020-10-03 RX ADMIN — Medication: at 07:52

## 2020-10-03 RX ADMIN — HYDROMORPHONE HYDROCHLORIDE 2 MG: 2 INJECTION, SOLUTION INTRAMUSCULAR; INTRAVENOUS; SUBCUTANEOUS at 10:52

## 2020-10-03 RX ADMIN — HYDROMORPHONE HYDROCHLORIDE 2 MG: 2 INJECTION, SOLUTION INTRAMUSCULAR; INTRAVENOUS; SUBCUTANEOUS at 21:17

## 2020-10-03 RX ADMIN — METRONIDAZOLE 500 MG: 500 TABLET ORAL at 13:17

## 2020-10-03 RX ADMIN — HYDROMORPHONE HYDROCHLORIDE 2 MG: 2 INJECTION, SOLUTION INTRAMUSCULAR; INTRAVENOUS; SUBCUTANEOUS at 05:06

## 2020-10-03 RX ADMIN — CEFTRIAXONE 1000 MG: 1 INJECTION, POWDER, FOR SOLUTION INTRAMUSCULAR; INTRAVENOUS at 21:47

## 2020-10-03 RX ADMIN — HYDROMORPHONE HYDROCHLORIDE 2 MG: 2 INJECTION, SOLUTION INTRAMUSCULAR; INTRAVENOUS; SUBCUTANEOUS at 13:17

## 2020-10-03 RX ADMIN — Medication 0.1 MG/KG/HR: at 18:29

## 2020-10-03 RX ADMIN — SERTRALINE HYDROCHLORIDE 25 MG: 25 TABLET ORAL at 08:36

## 2020-10-03 RX ADMIN — GABAPENTIN 300 MG: 300 CAPSULE ORAL at 15:22

## 2020-10-03 RX ADMIN — METRONIDAZOLE 500 MG: 500 TABLET ORAL at 21:10

## 2020-10-03 RX ADMIN — GABAPENTIN 300 MG: 300 CAPSULE ORAL at 08:36

## 2020-10-03 RX ADMIN — GADOBUTROL 5 ML: 604.72 INJECTION INTRAVENOUS at 09:55

## 2020-10-03 RX ADMIN — METRONIDAZOLE 500 MG: 500 TABLET ORAL at 05:06

## 2020-10-04 LAB
ANION GAP SERPL CALCULATED.3IONS-SCNC: 7 MMOL/L (ref 4–13)
BILIRUB UR QL STRIP: NEGATIVE
BUN SERPL-MCNC: 1 MG/DL (ref 5–25)
CALCIUM SERPL-MCNC: 8.2 MG/DL (ref 8.3–10.1)
CHLORIDE SERPL-SCNC: 98 MMOL/L (ref 100–108)
CLARITY UR: CLEAR
CO2 SERPL-SCNC: 27 MMOL/L (ref 21–32)
COLOR UR: YELLOW
CREAT SERPL-MCNC: 0.38 MG/DL (ref 0.6–1.3)
ERYTHROCYTE [DISTWIDTH] IN BLOOD BY AUTOMATED COUNT: 19 % (ref 11.6–15.1)
GFR SERPL CREATININE-BSD FRML MDRD: 127 ML/MIN/1.73SQ M
GLUCOSE SERPL-MCNC: 94 MG/DL (ref 65–140)
GLUCOSE UR STRIP-MCNC: NEGATIVE MG/DL
HCT VFR BLD AUTO: 29.1 % (ref 34.8–46.1)
HGB BLD-MCNC: 9 G/DL (ref 11.5–15.4)
HGB UR QL STRIP.AUTO: NEGATIVE
KETONES UR STRIP-MCNC: NEGATIVE MG/DL
LEUKOCYTE ESTERASE UR QL STRIP: NEGATIVE
MCH RBC QN AUTO: 25.4 PG (ref 26.8–34.3)
MCHC RBC AUTO-ENTMCNC: 30.9 G/DL (ref 31.4–37.4)
MCV RBC AUTO: 82 FL (ref 82–98)
NITRITE UR QL STRIP: NEGATIVE
PH UR STRIP.AUTO: 5.5 [PH]
PLATELET # BLD AUTO: 259 THOUSANDS/UL (ref 149–390)
PMV BLD AUTO: 9.7 FL (ref 8.9–12.7)
POTASSIUM SERPL-SCNC: 3.3 MMOL/L (ref 3.5–5.3)
PROCALCITONIN SERPL-MCNC: 0.85 NG/ML
PROT UR STRIP-MCNC: NEGATIVE MG/DL
RBC # BLD AUTO: 3.54 MILLION/UL (ref 3.81–5.12)
SODIUM SERPL-SCNC: 132 MMOL/L (ref 136–145)
SP GR UR STRIP.AUTO: 1.01 (ref 1–1.03)
UROBILINOGEN UR QL STRIP.AUTO: 0.2 E.U./DL
WBC # BLD AUTO: 17.16 THOUSAND/UL (ref 4.31–10.16)

## 2020-10-04 PROCEDURE — 80048 BASIC METABOLIC PNL TOTAL CA: CPT | Performed by: FAMILY MEDICINE

## 2020-10-04 PROCEDURE — 99232 SBSQ HOSP IP/OBS MODERATE 35: CPT | Performed by: INTERNAL MEDICINE

## 2020-10-04 PROCEDURE — 85027 COMPLETE CBC AUTOMATED: CPT | Performed by: FAMILY MEDICINE

## 2020-10-04 PROCEDURE — 84145 PROCALCITONIN (PCT): CPT | Performed by: FAMILY MEDICINE

## 2020-10-04 PROCEDURE — 81003 URINALYSIS AUTO W/O SCOPE: CPT | Performed by: FAMILY MEDICINE

## 2020-10-04 PROCEDURE — 99233 SBSQ HOSP IP/OBS HIGH 50: CPT | Performed by: FAMILY MEDICINE

## 2020-10-04 RX ORDER — FUROSEMIDE 10 MG/ML
20 INJECTION INTRAMUSCULAR; INTRAVENOUS ONCE
Status: COMPLETED | OUTPATIENT
Start: 2020-10-04 | End: 2020-10-04

## 2020-10-04 RX ORDER — POTASSIUM CHLORIDE 20 MEQ/1
40 TABLET, EXTENDED RELEASE ORAL ONCE
Status: COMPLETED | OUTPATIENT
Start: 2020-10-04 | End: 2020-10-04

## 2020-10-04 RX ORDER — HEPARIN SODIUM 5000 [USP'U]/ML
5000 INJECTION, SOLUTION INTRAVENOUS; SUBCUTANEOUS EVERY 8 HOURS SCHEDULED
Status: DISCONTINUED | OUTPATIENT
Start: 2020-10-04 | End: 2020-10-06

## 2020-10-04 RX ADMIN — METRONIDAZOLE 500 MG: 500 TABLET ORAL at 21:11

## 2020-10-04 RX ADMIN — Medication: at 17:08

## 2020-10-04 RX ADMIN — NYSTATIN 500000 UNITS: 100000 SUSPENSION ORAL at 21:11

## 2020-10-04 RX ADMIN — SODIUM CHLORIDE AND POTASSIUM CHLORIDE 50 ML/HR: .9; .15 SOLUTION INTRAVENOUS at 07:52

## 2020-10-04 RX ADMIN — METRONIDAZOLE 500 MG: 500 TABLET ORAL at 05:33

## 2020-10-04 RX ADMIN — HEPARIN SODIUM 5000 UNITS: 5000 INJECTION INTRAVENOUS; SUBCUTANEOUS at 21:11

## 2020-10-04 RX ADMIN — HYDROMORPHONE HYDROCHLORIDE 2 MG: 2 INJECTION, SOLUTION INTRAMUSCULAR; INTRAVENOUS; SUBCUTANEOUS at 07:50

## 2020-10-04 RX ADMIN — METRONIDAZOLE 500 MG: 500 TABLET ORAL at 13:15

## 2020-10-04 RX ADMIN — POTASSIUM CHLORIDE 40 MEQ: 1500 TABLET, EXTENDED RELEASE ORAL at 11:21

## 2020-10-04 RX ADMIN — GABAPENTIN 300 MG: 300 CAPSULE ORAL at 15:13

## 2020-10-04 RX ADMIN — HEPARIN SODIUM 5000 UNITS: 5000 INJECTION INTRAVENOUS; SUBCUTANEOUS at 13:15

## 2020-10-04 RX ADMIN — HYDROMORPHONE HYDROCHLORIDE 2 MG: 2 INJECTION, SOLUTION INTRAMUSCULAR; INTRAVENOUS; SUBCUTANEOUS at 11:21

## 2020-10-04 RX ADMIN — GABAPENTIN 300 MG: 300 CAPSULE ORAL at 07:51

## 2020-10-04 RX ADMIN — GABAPENTIN 300 MG: 300 CAPSULE ORAL at 21:11

## 2020-10-04 RX ADMIN — SERTRALINE HYDROCHLORIDE 25 MG: 25 TABLET ORAL at 07:51

## 2020-10-04 RX ADMIN — Medication 0.1 MG/KG/HR: at 16:24

## 2020-10-04 RX ADMIN — CEFTRIAXONE 1000 MG: 1 INJECTION, POWDER, FOR SOLUTION INTRAMUSCULAR; INTRAVENOUS at 21:11

## 2020-10-04 RX ADMIN — FUROSEMIDE 20 MG: 10 INJECTION, SOLUTION INTRAMUSCULAR; INTRAVENOUS at 11:21

## 2020-10-04 RX ADMIN — Medication: at 07:48

## 2020-10-05 PROBLEM — Z71.89 GOALS OF CARE, COUNSELING/DISCUSSION: Status: ACTIVE | Noted: 2020-10-05

## 2020-10-05 PROCEDURE — 99232 SBSQ HOSP IP/OBS MODERATE 35: CPT | Performed by: INTERNAL MEDICINE

## 2020-10-05 PROCEDURE — 99231 SBSQ HOSP IP/OBS SF/LOW 25: CPT | Performed by: INTERNAL MEDICINE

## 2020-10-05 PROCEDURE — 99233 SBSQ HOSP IP/OBS HIGH 50: CPT | Performed by: FAMILY MEDICINE

## 2020-10-05 RX ORDER — POTASSIUM CHLORIDE 20 MEQ/1
40 TABLET, EXTENDED RELEASE ORAL DAILY
Status: DISCONTINUED | OUTPATIENT
Start: 2020-10-05 | End: 2020-10-07

## 2020-10-05 RX ORDER — LORAZEPAM 2 MG/ML
1 INJECTION INTRAMUSCULAR
Status: DISCONTINUED | OUTPATIENT
Start: 2020-10-05 | End: 2020-10-13 | Stop reason: HOSPADM

## 2020-10-05 RX ORDER — ACETAMINOPHEN 325 MG/1
650 TABLET ORAL EVERY 6 HOURS PRN
Status: DISCONTINUED | OUTPATIENT
Start: 2020-10-05 | End: 2020-10-13 | Stop reason: HOSPADM

## 2020-10-05 RX ADMIN — LIDOCAINE: 4 CREAM TOPICAL at 12:30

## 2020-10-05 RX ADMIN — Medication: at 02:45

## 2020-10-05 RX ADMIN — HYDROMORPHONE HYDROCHLORIDE 2 MG: 2 INJECTION, SOLUTION INTRAMUSCULAR; INTRAVENOUS; SUBCUTANEOUS at 13:55

## 2020-10-05 RX ADMIN — HYDROMORPHONE HYDROCHLORIDE 2 MG: 2 INJECTION, SOLUTION INTRAMUSCULAR; INTRAVENOUS; SUBCUTANEOUS at 21:29

## 2020-10-05 RX ADMIN — GABAPENTIN 300 MG: 300 CAPSULE ORAL at 16:32

## 2020-10-05 RX ADMIN — ACETAMINOPHEN 650 MG: 325 TABLET, FILM COATED ORAL at 03:03

## 2020-10-05 RX ADMIN — Medication: at 14:06

## 2020-10-05 RX ADMIN — NYSTATIN 500000 UNITS: 100000 SUSPENSION ORAL at 21:20

## 2020-10-05 RX ADMIN — CEFTRIAXONE 1000 MG: 1 INJECTION, POWDER, FOR SOLUTION INTRAMUSCULAR; INTRAVENOUS at 21:20

## 2020-10-05 RX ADMIN — GABAPENTIN 300 MG: 300 CAPSULE ORAL at 08:34

## 2020-10-05 RX ADMIN — METRONIDAZOLE 500 MG: 500 TABLET ORAL at 21:20

## 2020-10-05 RX ADMIN — POTASSIUM CHLORIDE 40 MEQ: 1500 TABLET, EXTENDED RELEASE ORAL at 16:32

## 2020-10-05 RX ADMIN — HEPARIN SODIUM 5000 UNITS: 5000 INJECTION INTRAVENOUS; SUBCUTANEOUS at 05:17

## 2020-10-05 RX ADMIN — HYDROMORPHONE HYDROCHLORIDE 2 MG: 2 INJECTION, SOLUTION INTRAMUSCULAR; INTRAVENOUS; SUBCUTANEOUS at 10:48

## 2020-10-05 RX ADMIN — NYSTATIN 500000 UNITS: 100000 SUSPENSION ORAL at 08:34

## 2020-10-05 RX ADMIN — HEPARIN SODIUM 5000 UNITS: 5000 INJECTION INTRAVENOUS; SUBCUTANEOUS at 13:59

## 2020-10-05 RX ADMIN — METRONIDAZOLE 500 MG: 500 TABLET ORAL at 05:17

## 2020-10-05 RX ADMIN — Medication: at 22:25

## 2020-10-05 RX ADMIN — GABAPENTIN 300 MG: 300 CAPSULE ORAL at 21:20

## 2020-10-05 RX ADMIN — METRONIDAZOLE 500 MG: 500 TABLET ORAL at 13:59

## 2020-10-05 RX ADMIN — SERTRALINE HYDROCHLORIDE 25 MG: 25 TABLET ORAL at 08:34

## 2020-10-05 RX ADMIN — HEPARIN SODIUM 5000 UNITS: 5000 INJECTION INTRAVENOUS; SUBCUTANEOUS at 21:20

## 2020-10-06 LAB
ANION GAP SERPL CALCULATED.3IONS-SCNC: 7 MMOL/L (ref 4–13)
BUN SERPL-MCNC: 3 MG/DL (ref 5–25)
CALCIUM SERPL-MCNC: 8 MG/DL (ref 8.3–10.1)
CHLORIDE SERPL-SCNC: 98 MMOL/L (ref 100–108)
CO2 SERPL-SCNC: 28 MMOL/L (ref 21–32)
CREAT SERPL-MCNC: 0.34 MG/DL (ref 0.6–1.3)
ERYTHROCYTE [DISTWIDTH] IN BLOOD BY AUTOMATED COUNT: 19.5 % (ref 11.6–15.1)
GFR SERPL CREATININE-BSD FRML MDRD: 131 ML/MIN/1.73SQ M
GLUCOSE SERPL-MCNC: 86 MG/DL (ref 65–140)
HCT VFR BLD AUTO: 29.8 % (ref 34.8–46.1)
HGB BLD-MCNC: 9.2 G/DL (ref 11.5–15.4)
MCH RBC QN AUTO: 25.3 PG (ref 26.8–34.3)
MCHC RBC AUTO-ENTMCNC: 30.9 G/DL (ref 31.4–37.4)
MCV RBC AUTO: 82 FL (ref 82–98)
PLATELET # BLD AUTO: 310 THOUSANDS/UL (ref 149–390)
PMV BLD AUTO: 9.7 FL (ref 8.9–12.7)
POTASSIUM SERPL-SCNC: 3.6 MMOL/L (ref 3.5–5.3)
RBC # BLD AUTO: 3.64 MILLION/UL (ref 3.81–5.12)
SODIUM SERPL-SCNC: 133 MMOL/L (ref 136–145)
WBC # BLD AUTO: 20.96 THOUSAND/UL (ref 4.31–10.16)

## 2020-10-06 PROCEDURE — 99232 SBSQ HOSP IP/OBS MODERATE 35: CPT | Performed by: INTERNAL MEDICINE

## 2020-10-06 PROCEDURE — 99233 SBSQ HOSP IP/OBS HIGH 50: CPT | Performed by: INTERNAL MEDICINE

## 2020-10-06 PROCEDURE — 85027 COMPLETE CBC AUTOMATED: CPT | Performed by: FAMILY MEDICINE

## 2020-10-06 PROCEDURE — 80048 BASIC METABOLIC PNL TOTAL CA: CPT | Performed by: FAMILY MEDICINE

## 2020-10-06 RX ORDER — FUROSEMIDE 10 MG/ML
20 INJECTION INTRAMUSCULAR; INTRAVENOUS
Status: DISCONTINUED | OUTPATIENT
Start: 2020-10-07 | End: 2020-10-09

## 2020-10-06 RX ORDER — CALCIUM CARBONATE 200(500)MG
500 TABLET,CHEWABLE ORAL 3 TIMES DAILY PRN
Status: DISCONTINUED | OUTPATIENT
Start: 2020-10-06 | End: 2020-10-13 | Stop reason: HOSPADM

## 2020-10-06 RX ADMIN — HYDROMORPHONE HYDROCHLORIDE 2 MG: 2 INJECTION, SOLUTION INTRAMUSCULAR; INTRAVENOUS; SUBCUTANEOUS at 09:48

## 2020-10-06 RX ADMIN — GABAPENTIN 300 MG: 300 CAPSULE ORAL at 17:05

## 2020-10-06 RX ADMIN — POTASSIUM CHLORIDE 40 MEQ: 1500 TABLET, EXTENDED RELEASE ORAL at 09:02

## 2020-10-06 RX ADMIN — METRONIDAZOLE 500 MG: 500 TABLET ORAL at 14:48

## 2020-10-06 RX ADMIN — GABAPENTIN 300 MG: 300 CAPSULE ORAL at 09:02

## 2020-10-06 RX ADMIN — METRONIDAZOLE 500 MG: 500 TABLET ORAL at 06:01

## 2020-10-06 RX ADMIN — Medication 0.15 MG/KG/HR: at 04:17

## 2020-10-06 RX ADMIN — STANDARDIZED SENNA CONCENTRATE 8.6 MG: 8.6 TABLET ORAL at 21:43

## 2020-10-06 RX ADMIN — HYDROMORPHONE HYDROCHLORIDE 2 MG: 2 INJECTION, SOLUTION INTRAMUSCULAR; INTRAVENOUS; SUBCUTANEOUS at 02:34

## 2020-10-06 RX ADMIN — ACETAMINOPHEN 650 MG: 325 TABLET, FILM COATED ORAL at 02:40

## 2020-10-06 RX ADMIN — HEPARIN SODIUM 5000 UNITS: 5000 INJECTION INTRAVENOUS; SUBCUTANEOUS at 14:48

## 2020-10-06 RX ADMIN — Medication: at 09:48

## 2020-10-06 RX ADMIN — CEFTRIAXONE 1000 MG: 1 INJECTION, POWDER, FOR SOLUTION INTRAMUSCULAR; INTRAVENOUS at 21:44

## 2020-10-06 RX ADMIN — HEPARIN SODIUM 5000 UNITS: 5000 INJECTION INTRAVENOUS; SUBCUTANEOUS at 06:01

## 2020-10-06 RX ADMIN — CALCIUM CARBONATE (ANTACID) CHEW TAB 500 MG 500 MG: 500 CHEW TAB at 13:28

## 2020-10-06 RX ADMIN — METRONIDAZOLE 500 MG: 500 TABLET ORAL at 21:44

## 2020-10-06 RX ADMIN — GABAPENTIN 300 MG: 300 CAPSULE ORAL at 21:43

## 2020-10-06 RX ADMIN — ACETAMINOPHEN 650 MG: 325 TABLET, FILM COATED ORAL at 19:49

## 2020-10-06 RX ADMIN — SERTRALINE HYDROCHLORIDE 25 MG: 25 TABLET ORAL at 09:02

## 2020-10-06 RX ADMIN — HYDROMORPHONE HYDROCHLORIDE 2 MG: 2 INJECTION, SOLUTION INTRAMUSCULAR; INTRAVENOUS; SUBCUTANEOUS at 11:51

## 2020-10-06 RX ADMIN — Medication: at 17:14

## 2020-10-07 LAB
ALBUMIN SERPL BCP-MCNC: 2 G/DL (ref 3.5–5)
ALP SERPL-CCNC: 411 U/L (ref 46–116)
ALT SERPL W P-5'-P-CCNC: 12 U/L (ref 12–78)
ANION GAP SERPL CALCULATED.3IONS-SCNC: 7 MMOL/L (ref 4–13)
AST SERPL W P-5'-P-CCNC: 41 U/L (ref 5–45)
BACTERIA BLD CULT: NORMAL
BASOPHILS # BLD MANUAL: 0 THOUSAND/UL (ref 0–0.1)
BASOPHILS NFR MAR MANUAL: 0 % (ref 0–1)
BILIRUB SERPL-MCNC: 0.26 MG/DL (ref 0.2–1)
BUN SERPL-MCNC: 3 MG/DL (ref 5–25)
CALCIUM ALBUM COR SERPL-MCNC: 10.1 MG/DL (ref 8.3–10.1)
CALCIUM SERPL-MCNC: 8.5 MG/DL (ref 8.3–10.1)
CHLORIDE SERPL-SCNC: 99 MMOL/L (ref 100–108)
CO2 SERPL-SCNC: 27 MMOL/L (ref 21–32)
CREAT SERPL-MCNC: 0.38 MG/DL (ref 0.6–1.3)
EOSINOPHIL # BLD MANUAL: 0.22 THOUSAND/UL (ref 0–0.4)
EOSINOPHIL NFR BLD MANUAL: 1 % (ref 0–6)
ERYTHROCYTE [DISTWIDTH] IN BLOOD BY AUTOMATED COUNT: 19.3 % (ref 11.6–15.1)
GFR SERPL CREATININE-BSD FRML MDRD: 127 ML/MIN/1.73SQ M
GLUCOSE SERPL-MCNC: 82 MG/DL (ref 65–140)
HCT VFR BLD AUTO: 30.5 % (ref 34.8–46.1)
HGB BLD-MCNC: 9.3 G/DL (ref 11.5–15.4)
HYPERCHROMIA BLD QL SMEAR: PRESENT
LYMPHOCYTES # BLD AUTO: 1.09 THOUSAND/UL (ref 0.6–4.47)
LYMPHOCYTES # BLD AUTO: 5 % (ref 14–44)
MAGNESIUM SERPL-MCNC: 2 MG/DL (ref 1.6–2.6)
MCH RBC QN AUTO: 25.4 PG (ref 26.8–34.3)
MCHC RBC AUTO-ENTMCNC: 30.5 G/DL (ref 31.4–37.4)
MCV RBC AUTO: 83 FL (ref 82–98)
METAMYELOCYTES NFR BLD MANUAL: 1 % (ref 0–1)
MONOCYTES # BLD AUTO: 1.53 THOUSAND/UL (ref 0–1.22)
MONOCYTES NFR BLD: 7 % (ref 4–12)
MYELOCYTES NFR BLD MANUAL: 1 % (ref 0–1)
NEUTROPHILS # BLD MANUAL: 18.35 THOUSAND/UL (ref 1.85–7.62)
NEUTS BAND NFR BLD MANUAL: 12 % (ref 0–8)
NEUTS SEG NFR BLD AUTO: 72 % (ref 43–75)
NRBC BLD AUTO-RTO: 0 /100 WBCS
PHOSPHATE SERPL-MCNC: 3.3 MG/DL (ref 2.7–4.5)
PLASMA CELLS NFR BLD: 1 % (ref 0–0)
PLATELET # BLD AUTO: 350 THOUSANDS/UL (ref 149–390)
PLATELET BLD QL SMEAR: ADEQUATE
PMV BLD AUTO: 10.2 FL (ref 8.9–12.7)
POTASSIUM SERPL-SCNC: 3.8 MMOL/L (ref 3.5–5.3)
PROT SERPL-MCNC: 6.6 G/DL (ref 6.4–8.2)
RBC # BLD AUTO: 3.66 MILLION/UL (ref 3.81–5.12)
RBC MORPH BLD: PRESENT
SODIUM SERPL-SCNC: 133 MMOL/L (ref 136–145)
WBC # BLD AUTO: 21.84 THOUSAND/UL (ref 4.31–10.16)

## 2020-10-07 PROCEDURE — 83735 ASSAY OF MAGNESIUM: CPT | Performed by: INTERNAL MEDICINE

## 2020-10-07 PROCEDURE — 99231 SBSQ HOSP IP/OBS SF/LOW 25: CPT | Performed by: INTERNAL MEDICINE

## 2020-10-07 PROCEDURE — 84100 ASSAY OF PHOSPHORUS: CPT | Performed by: INTERNAL MEDICINE

## 2020-10-07 PROCEDURE — 99232 SBSQ HOSP IP/OBS MODERATE 35: CPT | Performed by: INTERNAL MEDICINE

## 2020-10-07 PROCEDURE — 85007 BL SMEAR W/DIFF WBC COUNT: CPT | Performed by: INTERNAL MEDICINE

## 2020-10-07 PROCEDURE — 80053 COMPREHEN METABOLIC PANEL: CPT | Performed by: INTERNAL MEDICINE

## 2020-10-07 PROCEDURE — 85027 COMPLETE CBC AUTOMATED: CPT | Performed by: INTERNAL MEDICINE

## 2020-10-07 RX ORDER — POTASSIUM CHLORIDE 20 MEQ/1
40 TABLET, EXTENDED RELEASE ORAL ONCE
Status: COMPLETED | OUTPATIENT
Start: 2020-10-07 | End: 2020-10-07

## 2020-10-07 RX ADMIN — NYSTATIN 500000 UNITS: 100000 SUSPENSION ORAL at 17:27

## 2020-10-07 RX ADMIN — Medication 0.15 MG/KG/HR: at 08:50

## 2020-10-07 RX ADMIN — FUROSEMIDE 20 MG: 10 INJECTION, SOLUTION INTRAMUSCULAR; INTRAVENOUS at 17:26

## 2020-10-07 RX ADMIN — ENOXAPARIN SODIUM 40 MG: 40 INJECTION SUBCUTANEOUS at 08:47

## 2020-10-07 RX ADMIN — GABAPENTIN 300 MG: 300 CAPSULE ORAL at 08:45

## 2020-10-07 RX ADMIN — HYDROMORPHONE HYDROCHLORIDE 2 MG: 2 INJECTION, SOLUTION INTRAMUSCULAR; INTRAVENOUS; SUBCUTANEOUS at 10:35

## 2020-10-07 RX ADMIN — GABAPENTIN 300 MG: 300 CAPSULE ORAL at 17:27

## 2020-10-07 RX ADMIN — Medication: at 02:07

## 2020-10-07 RX ADMIN — POTASSIUM CHLORIDE 40 MEQ: 1500 TABLET, EXTENDED RELEASE ORAL at 08:45

## 2020-10-07 RX ADMIN — GABAPENTIN 300 MG: 300 CAPSULE ORAL at 21:45

## 2020-10-07 RX ADMIN — HYDROMORPHONE HYDROCHLORIDE 2 MG: 2 INJECTION, SOLUTION INTRAMUSCULAR; INTRAVENOUS; SUBCUTANEOUS at 05:52

## 2020-10-07 RX ADMIN — CALCIUM CARBONATE (ANTACID) CHEW TAB 500 MG 500 MG: 500 CHEW TAB at 10:51

## 2020-10-07 RX ADMIN — HYDROMORPHONE HYDROCHLORIDE 2 MG: 2 INJECTION, SOLUTION INTRAMUSCULAR; INTRAVENOUS; SUBCUTANEOUS at 21:55

## 2020-10-07 RX ADMIN — STANDARDIZED SENNA CONCENTRATE 17.2 MG: 8.6 TABLET ORAL at 21:45

## 2020-10-07 RX ADMIN — METRONIDAZOLE 500 MG: 500 TABLET ORAL at 14:54

## 2020-10-07 RX ADMIN — Medication: at 08:53

## 2020-10-07 RX ADMIN — POTASSIUM CHLORIDE 40 MEQ: 1500 TABLET, EXTENDED RELEASE ORAL at 10:22

## 2020-10-07 RX ADMIN — SERTRALINE HYDROCHLORIDE 25 MG: 25 TABLET ORAL at 08:59

## 2020-10-07 RX ADMIN — CEFTRIAXONE 1000 MG: 1 INJECTION, POWDER, FOR SOLUTION INTRAMUSCULAR; INTRAVENOUS at 21:58

## 2020-10-07 RX ADMIN — METRONIDAZOLE 500 MG: 500 TABLET ORAL at 21:45

## 2020-10-07 RX ADMIN — Medication: at 16:58

## 2020-10-07 RX ADMIN — FUROSEMIDE 20 MG: 10 INJECTION, SOLUTION INTRAMUSCULAR; INTRAVENOUS at 08:46

## 2020-10-07 RX ADMIN — HYDROMORPHONE HYDROCHLORIDE 2 MG: 2 INJECTION, SOLUTION INTRAMUSCULAR; INTRAVENOUS; SUBCUTANEOUS at 16:40

## 2020-10-07 RX ADMIN — METRONIDAZOLE 500 MG: 500 TABLET ORAL at 05:40

## 2020-10-08 LAB
ANION GAP SERPL CALCULATED.3IONS-SCNC: 4 MMOL/L (ref 4–13)
BUN SERPL-MCNC: 3 MG/DL (ref 5–25)
CALCIUM SERPL-MCNC: 8.7 MG/DL (ref 8.3–10.1)
CHLORIDE SERPL-SCNC: 98 MMOL/L (ref 100–108)
CO2 SERPL-SCNC: 30 MMOL/L (ref 21–32)
CREAT SERPL-MCNC: 0.38 MG/DL (ref 0.6–1.3)
ERYTHROCYTE [DISTWIDTH] IN BLOOD BY AUTOMATED COUNT: 19.7 % (ref 11.6–15.1)
GFR SERPL CREATININE-BSD FRML MDRD: 127 ML/MIN/1.73SQ M
GLUCOSE SERPL-MCNC: 93 MG/DL (ref 65–140)
HCT VFR BLD AUTO: 31.1 % (ref 34.8–46.1)
HGB BLD-MCNC: 9.4 G/DL (ref 11.5–15.4)
MCH RBC QN AUTO: 24.9 PG (ref 26.8–34.3)
MCHC RBC AUTO-ENTMCNC: 30.2 G/DL (ref 31.4–37.4)
MCV RBC AUTO: 82 FL (ref 82–98)
NRBC BLD AUTO-RTO: 0 /100 WBCS
PLATELET # BLD AUTO: 361 THOUSANDS/UL (ref 149–390)
PMV BLD AUTO: 9.4 FL (ref 8.9–12.7)
POTASSIUM SERPL-SCNC: 4.1 MMOL/L (ref 3.5–5.3)
RBC # BLD AUTO: 3.78 MILLION/UL (ref 3.81–5.12)
SODIUM SERPL-SCNC: 132 MMOL/L (ref 136–145)
WBC # BLD AUTO: 22.76 THOUSAND/UL (ref 4.31–10.16)

## 2020-10-08 PROCEDURE — 99232 SBSQ HOSP IP/OBS MODERATE 35: CPT | Performed by: INTERNAL MEDICINE

## 2020-10-08 PROCEDURE — 85027 COMPLETE CBC AUTOMATED: CPT | Performed by: INTERNAL MEDICINE

## 2020-10-08 PROCEDURE — 80048 BASIC METABOLIC PNL TOTAL CA: CPT | Performed by: INTERNAL MEDICINE

## 2020-10-08 RX ADMIN — Medication: at 11:43

## 2020-10-08 RX ADMIN — CEFTRIAXONE 1000 MG: 1 INJECTION, POWDER, FOR SOLUTION INTRAMUSCULAR; INTRAVENOUS at 23:14

## 2020-10-08 RX ADMIN — FUROSEMIDE 20 MG: 10 INJECTION, SOLUTION INTRAMUSCULAR; INTRAVENOUS at 17:39

## 2020-10-08 RX ADMIN — GABAPENTIN 300 MG: 300 CAPSULE ORAL at 17:36

## 2020-10-08 RX ADMIN — HYDROMORPHONE HYDROCHLORIDE 2 MG: 2 INJECTION, SOLUTION INTRAMUSCULAR; INTRAVENOUS; SUBCUTANEOUS at 20:05

## 2020-10-08 RX ADMIN — Medication: at 02:20

## 2020-10-08 RX ADMIN — GABAPENTIN 300 MG: 300 CAPSULE ORAL at 09:37

## 2020-10-08 RX ADMIN — METRONIDAZOLE 500 MG: 500 TABLET ORAL at 05:46

## 2020-10-08 RX ADMIN — ENOXAPARIN SODIUM 40 MG: 40 INJECTION SUBCUTANEOUS at 09:34

## 2020-10-08 RX ADMIN — STANDARDIZED SENNA CONCENTRATE 8.6 MG: 8.6 TABLET ORAL at 21:57

## 2020-10-08 RX ADMIN — Medication 0.15 MG/KG/HR: at 07:51

## 2020-10-08 RX ADMIN — SERTRALINE HYDROCHLORIDE 25 MG: 25 TABLET ORAL at 09:37

## 2020-10-08 RX ADMIN — HYDROMORPHONE HYDROCHLORIDE 2 MG: 2 INJECTION, SOLUTION INTRAMUSCULAR; INTRAVENOUS; SUBCUTANEOUS at 17:49

## 2020-10-08 RX ADMIN — Medication: at 19:54

## 2020-10-08 RX ADMIN — GABAPENTIN 300 MG: 300 CAPSULE ORAL at 21:57

## 2020-10-08 RX ADMIN — METRONIDAZOLE 500 MG: 500 TABLET ORAL at 14:06

## 2020-10-08 RX ADMIN — HYDROMORPHONE HYDROCHLORIDE 2 MG: 2 INJECTION, SOLUTION INTRAMUSCULAR; INTRAVENOUS; SUBCUTANEOUS at 05:49

## 2020-10-08 RX ADMIN — METRONIDAZOLE 500 MG: 500 TABLET ORAL at 21:58

## 2020-10-09 ENCOUNTER — APPOINTMENT (INPATIENT)
Dept: RADIOLOGY | Facility: HOSPITAL | Age: 45
DRG: 872 | End: 2020-10-09
Attending: INTERNAL MEDICINE
Payer: COMMERCIAL

## 2020-10-09 LAB
ANION GAP SERPL CALCULATED.3IONS-SCNC: 8 MMOL/L (ref 4–13)
BUN SERPL-MCNC: 4 MG/DL (ref 5–25)
CALCIUM SERPL-MCNC: 8.2 MG/DL (ref 8.3–10.1)
CHLORIDE SERPL-SCNC: 94 MMOL/L (ref 100–108)
CO2 SERPL-SCNC: 29 MMOL/L (ref 21–32)
CREAT SERPL-MCNC: 0.36 MG/DL (ref 0.6–1.3)
ERYTHROCYTE [DISTWIDTH] IN BLOOD BY AUTOMATED COUNT: 19.9 % (ref 11.6–15.1)
GFR SERPL CREATININE-BSD FRML MDRD: 129 ML/MIN/1.73SQ M
GLUCOSE SERPL-MCNC: 87 MG/DL (ref 65–140)
HCT VFR BLD AUTO: 31.2 % (ref 34.8–46.1)
HGB BLD-MCNC: 9.7 G/DL (ref 11.5–15.4)
INR PPP: 1.34 (ref 0.84–1.19)
MCH RBC QN AUTO: 25.6 PG (ref 26.8–34.3)
MCHC RBC AUTO-ENTMCNC: 31.1 G/DL (ref 31.4–37.4)
MCV RBC AUTO: 82 FL (ref 82–98)
NRBC BLD AUTO-RTO: 0 /100 WBCS
PLATELET # BLD AUTO: 385 THOUSANDS/UL (ref 149–390)
PMV BLD AUTO: 10.1 FL (ref 8.9–12.7)
POTASSIUM SERPL-SCNC: 3.9 MMOL/L (ref 3.5–5.3)
PROTHROMBIN TIME: 16.5 SECONDS (ref 11.6–14.5)
RBC # BLD AUTO: 3.79 MILLION/UL (ref 3.81–5.12)
SODIUM SERPL-SCNC: 131 MMOL/L (ref 136–145)
WBC # BLD AUTO: 21.95 THOUSAND/UL (ref 4.31–10.16)

## 2020-10-09 PROCEDURE — 99232 SBSQ HOSP IP/OBS MODERATE 35: CPT | Performed by: NURSE PRACTITIONER

## 2020-10-09 PROCEDURE — 85610 PROTHROMBIN TIME: CPT | Performed by: INTERNAL MEDICINE

## 2020-10-09 PROCEDURE — NC001 PR NO CHARGE: Performed by: PHYSICIAN ASSISTANT

## 2020-10-09 PROCEDURE — 49083 ABD PARACENTESIS W/IMAGING: CPT

## 2020-10-09 PROCEDURE — 99231 SBSQ HOSP IP/OBS SF/LOW 25: CPT | Performed by: INTERNAL MEDICINE

## 2020-10-09 PROCEDURE — 99232 SBSQ HOSP IP/OBS MODERATE 35: CPT | Performed by: INTERNAL MEDICINE

## 2020-10-09 PROCEDURE — 80048 BASIC METABOLIC PNL TOTAL CA: CPT | Performed by: INTERNAL MEDICINE

## 2020-10-09 PROCEDURE — 76705 ECHO EXAM OF ABDOMEN: CPT | Performed by: PHYSICIAN ASSISTANT

## 2020-10-09 PROCEDURE — 85027 COMPLETE CBC AUTOMATED: CPT | Performed by: INTERNAL MEDICINE

## 2020-10-09 RX ORDER — FUROSEMIDE 10 MG/ML
20 INJECTION INTRAMUSCULAR; INTRAVENOUS
Status: DISCONTINUED | OUTPATIENT
Start: 2020-10-09 | End: 2020-10-13 | Stop reason: HOSPADM

## 2020-10-09 RX ADMIN — METRONIDAZOLE 500 MG: 500 TABLET ORAL at 13:16

## 2020-10-09 RX ADMIN — Medication: at 17:18

## 2020-10-09 RX ADMIN — Medication: at 03:43

## 2020-10-09 RX ADMIN — Medication: at 10:43

## 2020-10-09 RX ADMIN — METRONIDAZOLE 500 MG: 500 TABLET ORAL at 21:19

## 2020-10-09 RX ADMIN — HYDROMORPHONE HYDROCHLORIDE 2 MG: 2 INJECTION, SOLUTION INTRAMUSCULAR; INTRAVENOUS; SUBCUTANEOUS at 23:19

## 2020-10-09 RX ADMIN — GABAPENTIN 300 MG: 300 CAPSULE ORAL at 11:05

## 2020-10-09 RX ADMIN — GABAPENTIN 300 MG: 300 CAPSULE ORAL at 15:44

## 2020-10-09 RX ADMIN — METRONIDAZOLE 500 MG: 500 TABLET ORAL at 05:49

## 2020-10-09 RX ADMIN — FUROSEMIDE 20 MG: 10 INJECTION, SOLUTION INTRAMUSCULAR; INTRAVENOUS at 15:47

## 2020-10-09 RX ADMIN — Medication 0.15 MG/KG/HR: at 10:48

## 2020-10-09 RX ADMIN — SERTRALINE HYDROCHLORIDE 25 MG: 25 TABLET ORAL at 11:05

## 2020-10-09 RX ADMIN — CEFTRIAXONE 1000 MG: 1 INJECTION, POWDER, FOR SOLUTION INTRAMUSCULAR; INTRAVENOUS at 23:20

## 2020-10-09 RX ADMIN — GABAPENTIN 300 MG: 300 CAPSULE ORAL at 21:19

## 2020-10-09 RX ADMIN — STANDARDIZED SENNA CONCENTRATE 8.6 MG: 8.6 TABLET ORAL at 21:19

## 2020-10-09 RX ADMIN — HYDROMORPHONE HYDROCHLORIDE 2 MG: 2 INJECTION, SOLUTION INTRAMUSCULAR; INTRAVENOUS; SUBCUTANEOUS at 11:21

## 2020-10-09 RX ADMIN — ENOXAPARIN SODIUM 40 MG: 40 INJECTION SUBCUTANEOUS at 11:06

## 2020-10-09 RX ADMIN — HYDROMORPHONE HYDROCHLORIDE 2 MG: 2 INJECTION, SOLUTION INTRAMUSCULAR; INTRAVENOUS; SUBCUTANEOUS at 13:24

## 2020-10-10 LAB
ANION GAP SERPL CALCULATED.3IONS-SCNC: 8 MMOL/L (ref 4–13)
ANISOCYTOSIS BLD QL SMEAR: PRESENT
BASOPHILS # BLD MANUAL: 0 THOUSAND/UL (ref 0–0.1)
BASOPHILS NFR MAR MANUAL: 0 % (ref 0–1)
BUN SERPL-MCNC: 5 MG/DL (ref 5–25)
CALCIUM SERPL-MCNC: 8.1 MG/DL (ref 8.3–10.1)
CHLORIDE SERPL-SCNC: 93 MMOL/L (ref 100–108)
CO2 SERPL-SCNC: 29 MMOL/L (ref 21–32)
CREAT SERPL-MCNC: 0.34 MG/DL (ref 0.6–1.3)
EOSINOPHIL # BLD MANUAL: 0 THOUSAND/UL (ref 0–0.4)
EOSINOPHIL NFR BLD MANUAL: 0 % (ref 0–6)
ERYTHROCYTE [DISTWIDTH] IN BLOOD BY AUTOMATED COUNT: 19.8 % (ref 11.6–15.1)
GFR SERPL CREATININE-BSD FRML MDRD: 131 ML/MIN/1.73SQ M
GLUCOSE SERPL-MCNC: 95 MG/DL (ref 65–140)
HCT VFR BLD AUTO: 27.7 % (ref 34.8–46.1)
HGB BLD-MCNC: 8.4 G/DL (ref 11.5–15.4)
LYMPHOCYTES # BLD AUTO: 0.42 THOUSAND/UL (ref 0.6–4.47)
LYMPHOCYTES # BLD AUTO: 2 % (ref 14–44)
MCH RBC QN AUTO: 25.1 PG (ref 26.8–34.3)
MCHC RBC AUTO-ENTMCNC: 30.3 G/DL (ref 31.4–37.4)
MCV RBC AUTO: 83 FL (ref 82–98)
MONOCYTES # BLD AUTO: 0 THOUSAND/UL (ref 0–1.22)
MONOCYTES NFR BLD: 0 % (ref 4–12)
NEUTROPHILS # BLD MANUAL: 19.93 THOUSAND/UL (ref 1.85–7.62)
NEUTS SEG NFR BLD AUTO: 96 % (ref 43–75)
NRBC BLD AUTO-RTO: 0 /100 WBCS
PLATELET # BLD AUTO: 332 THOUSANDS/UL (ref 149–390)
PLATELET BLD QL SMEAR: ADEQUATE
PMV BLD AUTO: 10.2 FL (ref 8.9–12.7)
POTASSIUM SERPL-SCNC: 3.4 MMOL/L (ref 3.5–5.3)
PROCALCITONIN SERPL-MCNC: 0.68 NG/ML
RBC # BLD AUTO: 3.34 MILLION/UL (ref 3.81–5.12)
RBC MORPH BLD: PRESENT
SARS-COV-2 RNA RESP QL NAA+PROBE: NEGATIVE
SODIUM SERPL-SCNC: 130 MMOL/L (ref 136–145)
VARIANT LYMPHS # BLD AUTO: 2 %
WBC # BLD AUTO: 20.76 THOUSAND/UL (ref 4.31–10.16)

## 2020-10-10 PROCEDURE — 85027 COMPLETE CBC AUTOMATED: CPT | Performed by: INTERNAL MEDICINE

## 2020-10-10 PROCEDURE — U0003 INFECTIOUS AGENT DETECTION BY NUCLEIC ACID (DNA OR RNA); SEVERE ACUTE RESPIRATORY SYNDROME CORONAVIRUS 2 (SARS-COV-2) (CORONAVIRUS DISEASE [COVID-19]), AMPLIFIED PROBE TECHNIQUE, MAKING USE OF HIGH THROUGHPUT TECHNOLOGIES AS DESCRIBED BY CMS-2020-01-R: HCPCS | Performed by: INTERNAL MEDICINE

## 2020-10-10 PROCEDURE — 80048 BASIC METABOLIC PNL TOTAL CA: CPT | Performed by: INTERNAL MEDICINE

## 2020-10-10 PROCEDURE — 84145 PROCALCITONIN (PCT): CPT | Performed by: INTERNAL MEDICINE

## 2020-10-10 PROCEDURE — 99232 SBSQ HOSP IP/OBS MODERATE 35: CPT | Performed by: INTERNAL MEDICINE

## 2020-10-10 PROCEDURE — 85007 BL SMEAR W/DIFF WBC COUNT: CPT | Performed by: INTERNAL MEDICINE

## 2020-10-10 RX ORDER — FUROSEMIDE 10 MG/ML
20 INJECTION INTRAMUSCULAR; INTRAVENOUS 2 TIMES DAILY
Qty: 4 ML | Refills: 0 | Status: SHIPPED | OUTPATIENT
Start: 2020-10-10

## 2020-10-10 RX ORDER — METRONIDAZOLE 500 MG/1
500 TABLET ORAL EVERY 8 HOURS SCHEDULED
Qty: 30 TABLET | Refills: 0 | Status: SHIPPED | OUTPATIENT
Start: 2020-10-10 | End: 2020-10-20

## 2020-10-10 RX ORDER — SODIUM CHLORIDE 9 MG/ML
5 INJECTION INTRAVENOUS EVERY 12 HOURS SCHEDULED
Qty: 30 SYRINGE | Refills: 0 | Status: SHIPPED | OUTPATIENT
Start: 2020-10-10 | End: 2020-12-09

## 2020-10-10 RX ORDER — HYDROMORPHONE HCL 110MG/55ML
2 PATIENT CONTROLLED ANALGESIA SYRINGE INTRAVENOUS EVERY 2 HOUR PRN
Qty: 1 ML | Refills: 0 | Status: SHIPPED | OUTPATIENT
Start: 2020-10-10 | End: 2020-10-13 | Stop reason: HOSPADM

## 2020-10-10 RX ORDER — POTASSIUM CHLORIDE 20 MEQ/1
40 TABLET, EXTENDED RELEASE ORAL ONCE
Status: COMPLETED | OUTPATIENT
Start: 2020-10-10 | End: 2020-10-10

## 2020-10-10 RX ADMIN — Medication: at 00:49

## 2020-10-10 RX ADMIN — HYDROMORPHONE HYDROCHLORIDE 2 MG: 2 INJECTION, SOLUTION INTRAMUSCULAR; INTRAVENOUS; SUBCUTANEOUS at 19:19

## 2020-10-10 RX ADMIN — CEFTRIAXONE 1000 MG: 1 INJECTION, POWDER, FOR SOLUTION INTRAMUSCULAR; INTRAVENOUS at 22:26

## 2020-10-10 RX ADMIN — METRONIDAZOLE 500 MG: 500 TABLET ORAL at 06:11

## 2020-10-10 RX ADMIN — SERTRALINE HYDROCHLORIDE 25 MG: 25 TABLET ORAL at 08:21

## 2020-10-10 RX ADMIN — GABAPENTIN 300 MG: 300 CAPSULE ORAL at 22:26

## 2020-10-10 RX ADMIN — Medication: at 23:49

## 2020-10-10 RX ADMIN — Medication: at 15:30

## 2020-10-10 RX ADMIN — FUROSEMIDE 20 MG: 10 INJECTION, SOLUTION INTRAMUSCULAR; INTRAVENOUS at 15:40

## 2020-10-10 RX ADMIN — POTASSIUM CHLORIDE 40 MEQ: 1500 TABLET, EXTENDED RELEASE ORAL at 15:40

## 2020-10-10 RX ADMIN — GABAPENTIN 300 MG: 300 CAPSULE ORAL at 15:40

## 2020-10-10 RX ADMIN — ENOXAPARIN SODIUM 40 MG: 40 INJECTION SUBCUTANEOUS at 08:21

## 2020-10-10 RX ADMIN — GABAPENTIN 300 MG: 300 CAPSULE ORAL at 08:21

## 2020-10-10 RX ADMIN — HYDROMORPHONE HYDROCHLORIDE 2 MG: 2 INJECTION, SOLUTION INTRAMUSCULAR; INTRAVENOUS; SUBCUTANEOUS at 08:23

## 2020-10-10 RX ADMIN — METRONIDAZOLE 500 MG: 500 TABLET ORAL at 22:26

## 2020-10-10 RX ADMIN — METRONIDAZOLE 500 MG: 500 TABLET ORAL at 12:56

## 2020-10-10 RX ADMIN — FUROSEMIDE 20 MG: 10 INJECTION, SOLUTION INTRAMUSCULAR; INTRAVENOUS at 08:21

## 2020-10-10 RX ADMIN — HYDROMORPHONE HYDROCHLORIDE 2 MG: 2 INJECTION, SOLUTION INTRAMUSCULAR; INTRAVENOUS; SUBCUTANEOUS at 11:07

## 2020-10-10 RX ADMIN — Medication: at 08:31

## 2020-10-10 RX ADMIN — STANDARDIZED SENNA CONCENTRATE 8.6 MG: 8.6 TABLET ORAL at 22:26

## 2020-10-11 LAB
ANION GAP SERPL CALCULATED.3IONS-SCNC: 5 MMOL/L (ref 4–13)
ANISOCYTOSIS BLD QL SMEAR: PRESENT
BASOPHILS # BLD MANUAL: 0 THOUSAND/UL (ref 0–0.1)
BASOPHILS NFR MAR MANUAL: 0 % (ref 0–1)
BUN SERPL-MCNC: 4 MG/DL (ref 5–25)
CALCIUM SERPL-MCNC: 8.1 MG/DL (ref 8.3–10.1)
CHLORIDE SERPL-SCNC: 95 MMOL/L (ref 100–108)
CO2 SERPL-SCNC: 32 MMOL/L (ref 21–32)
CREAT SERPL-MCNC: 0.32 MG/DL (ref 0.6–1.3)
DACRYOCYTES BLD QL SMEAR: PRESENT
EOSINOPHIL # BLD MANUAL: 0.79 THOUSAND/UL (ref 0–0.4)
EOSINOPHIL NFR BLD MANUAL: 4 % (ref 0–6)
ERYTHROCYTE [DISTWIDTH] IN BLOOD BY AUTOMATED COUNT: 19.9 % (ref 11.6–15.1)
GFR SERPL CREATININE-BSD FRML MDRD: 134 ML/MIN/1.73SQ M
GLUCOSE SERPL-MCNC: 91 MG/DL (ref 65–140)
HCT VFR BLD AUTO: 26.7 % (ref 34.8–46.1)
HGB BLD-MCNC: 8.3 G/DL (ref 11.5–15.4)
HYPERCHROMIA BLD QL SMEAR: PRESENT
LYMPHOCYTES # BLD AUTO: 0.2 THOUSAND/UL (ref 0.6–4.47)
LYMPHOCYTES # BLD AUTO: 1 % (ref 14–44)
MCH RBC QN AUTO: 25.6 PG (ref 26.8–34.3)
MCHC RBC AUTO-ENTMCNC: 31.1 G/DL (ref 31.4–37.4)
MCV RBC AUTO: 82 FL (ref 82–98)
MONOCYTES # BLD AUTO: 1.98 THOUSAND/UL (ref 0–1.22)
MONOCYTES NFR BLD: 10 % (ref 4–12)
NEUTROPHILS # BLD MANUAL: 16.86 THOUSAND/UL (ref 1.85–7.62)
NEUTS SEG NFR BLD AUTO: 85 % (ref 43–75)
NRBC BLD AUTO-RTO: 0 /100 WBCS
PLATELET # BLD AUTO: 364 THOUSANDS/UL (ref 149–390)
PLATELET BLD QL SMEAR: ADEQUATE
PMV BLD AUTO: 10.2 FL (ref 8.9–12.7)
POLYCHROMASIA BLD QL SMEAR: PRESENT
POTASSIUM SERPL-SCNC: 3.6 MMOL/L (ref 3.5–5.3)
PROCALCITONIN SERPL-MCNC: 0.7 NG/ML
RBC # BLD AUTO: 3.24 MILLION/UL (ref 3.81–5.12)
RBC MORPH BLD: PRESENT
SODIUM SERPL-SCNC: 132 MMOL/L (ref 136–145)
TARGETS BLD QL SMEAR: PRESENT
WBC # BLD AUTO: 19.84 THOUSAND/UL (ref 4.31–10.16)

## 2020-10-11 PROCEDURE — 99232 SBSQ HOSP IP/OBS MODERATE 35: CPT | Performed by: INTERNAL MEDICINE

## 2020-10-11 PROCEDURE — 84145 PROCALCITONIN (PCT): CPT | Performed by: INTERNAL MEDICINE

## 2020-10-11 PROCEDURE — 80048 BASIC METABOLIC PNL TOTAL CA: CPT | Performed by: INTERNAL MEDICINE

## 2020-10-11 PROCEDURE — 99231 SBSQ HOSP IP/OBS SF/LOW 25: CPT | Performed by: INTERNAL MEDICINE

## 2020-10-11 PROCEDURE — 85007 BL SMEAR W/DIFF WBC COUNT: CPT | Performed by: INTERNAL MEDICINE

## 2020-10-11 PROCEDURE — 85027 COMPLETE CBC AUTOMATED: CPT | Performed by: INTERNAL MEDICINE

## 2020-10-11 RX ADMIN — FUROSEMIDE 20 MG: 10 INJECTION, SOLUTION INTRAMUSCULAR; INTRAVENOUS at 08:12

## 2020-10-11 RX ADMIN — HYDROMORPHONE HYDROCHLORIDE 2 MG: 2 INJECTION, SOLUTION INTRAMUSCULAR; INTRAVENOUS; SUBCUTANEOUS at 20:19

## 2020-10-11 RX ADMIN — Medication: at 14:34

## 2020-10-11 RX ADMIN — FUROSEMIDE 20 MG: 10 INJECTION, SOLUTION INTRAMUSCULAR; INTRAVENOUS at 16:18

## 2020-10-11 RX ADMIN — Medication: at 07:46

## 2020-10-11 RX ADMIN — HYDROMORPHONE HYDROCHLORIDE 2 MG: 2 INJECTION, SOLUTION INTRAMUSCULAR; INTRAVENOUS; SUBCUTANEOUS at 16:25

## 2020-10-11 RX ADMIN — HYDROMORPHONE HYDROCHLORIDE 2 MG: 2 INJECTION, SOLUTION INTRAMUSCULAR; INTRAVENOUS; SUBCUTANEOUS at 08:12

## 2020-10-11 RX ADMIN — Medication: at 10:19

## 2020-10-11 RX ADMIN — GABAPENTIN 300 MG: 300 CAPSULE ORAL at 20:22

## 2020-10-11 RX ADMIN — METRONIDAZOLE 500 MG: 500 TABLET ORAL at 13:22

## 2020-10-11 RX ADMIN — CEFTRIAXONE 1000 MG: 1 INJECTION, POWDER, FOR SOLUTION INTRAMUSCULAR; INTRAVENOUS at 21:52

## 2020-10-11 RX ADMIN — METRONIDAZOLE 500 MG: 500 TABLET ORAL at 05:29

## 2020-10-11 RX ADMIN — SERTRALINE HYDROCHLORIDE 25 MG: 25 TABLET ORAL at 08:11

## 2020-10-11 RX ADMIN — GABAPENTIN 300 MG: 300 CAPSULE ORAL at 16:17

## 2020-10-11 RX ADMIN — HYDROMORPHONE HYDROCHLORIDE 2 MG: 2 INJECTION, SOLUTION INTRAMUSCULAR; INTRAVENOUS; SUBCUTANEOUS at 10:17

## 2020-10-11 RX ADMIN — HYDROMORPHONE HYDROCHLORIDE 2 MG: 2 INJECTION, SOLUTION INTRAMUSCULAR; INTRAVENOUS; SUBCUTANEOUS at 02:18

## 2020-10-11 RX ADMIN — HYDROMORPHONE HYDROCHLORIDE 2 MG: 2 INJECTION, SOLUTION INTRAMUSCULAR; INTRAVENOUS; SUBCUTANEOUS at 22:54

## 2020-10-11 RX ADMIN — ENOXAPARIN SODIUM 40 MG: 40 INJECTION SUBCUTANEOUS at 08:11

## 2020-10-11 RX ADMIN — GABAPENTIN 300 MG: 300 CAPSULE ORAL at 08:11

## 2020-10-11 RX ADMIN — HYDROMORPHONE HYDROCHLORIDE 2 MG: 2 INJECTION, SOLUTION INTRAMUSCULAR; INTRAVENOUS; SUBCUTANEOUS at 13:25

## 2020-10-11 RX ADMIN — STANDARDIZED SENNA CONCENTRATE 17.2 MG: 8.6 TABLET ORAL at 22:02

## 2020-10-11 RX ADMIN — Medication: at 21:55

## 2020-10-11 RX ADMIN — METRONIDAZOLE 500 MG: 500 TABLET ORAL at 22:02

## 2020-10-12 ENCOUNTER — HOSPITAL ENCOUNTER (OUTPATIENT)
Dept: RADIOLOGY | Age: 45
Discharge: HOME | End: 2020-10-12

## 2020-10-12 PROCEDURE — 99232 SBSQ HOSP IP/OBS MODERATE 35: CPT | Performed by: INTERNAL MEDICINE

## 2020-10-12 PROCEDURE — 99231 SBSQ HOSP IP/OBS SF/LOW 25: CPT | Performed by: INTERNAL MEDICINE

## 2020-10-12 RX ORDER — HYDROMORPHONE HYDROCHLORIDE 4 MG/ML
3 INJECTION, SOLUTION INTRAMUSCULAR; INTRAVENOUS; SUBCUTANEOUS EVERY 2 HOUR PRN
Status: DISCONTINUED | OUTPATIENT
Start: 2020-10-12 | End: 2020-10-13 | Stop reason: HOSPADM

## 2020-10-12 RX ORDER — HYDROMORPHONE HYDROCHLORIDE 4 MG/ML
4 INJECTION, SOLUTION INTRAMUSCULAR; INTRAVENOUS; SUBCUTANEOUS ONCE
Status: COMPLETED | OUTPATIENT
Start: 2020-10-12 | End: 2020-10-12

## 2020-10-12 RX ADMIN — Medication: at 22:00

## 2020-10-12 RX ADMIN — ENOXAPARIN SODIUM 40 MG: 40 INJECTION SUBCUTANEOUS at 08:35

## 2020-10-12 RX ADMIN — SERTRALINE HYDROCHLORIDE 25 MG: 25 TABLET ORAL at 08:36

## 2020-10-12 RX ADMIN — HYDROMORPHONE HYDROCHLORIDE 2 MG: 2 INJECTION, SOLUTION INTRAMUSCULAR; INTRAVENOUS; SUBCUTANEOUS at 06:36

## 2020-10-12 RX ADMIN — GABAPENTIN 300 MG: 300 CAPSULE ORAL at 08:36

## 2020-10-12 RX ADMIN — GABAPENTIN 300 MG: 300 CAPSULE ORAL at 15:33

## 2020-10-12 RX ADMIN — HYDROMORPHONE HYDROCHLORIDE 2 MG: 2 INJECTION, SOLUTION INTRAMUSCULAR; INTRAVENOUS; SUBCUTANEOUS at 10:48

## 2020-10-12 RX ADMIN — FUROSEMIDE 20 MG: 10 INJECTION, SOLUTION INTRAMUSCULAR; INTRAVENOUS at 15:34

## 2020-10-12 RX ADMIN — HYDROMORPHONE HYDROCHLORIDE 2 MG: 2 INJECTION, SOLUTION INTRAMUSCULAR; INTRAVENOUS; SUBCUTANEOUS at 12:59

## 2020-10-12 RX ADMIN — Medication: at 14:39

## 2020-10-12 RX ADMIN — STANDARDIZED SENNA CONCENTRATE 17.2 MG: 8.6 TABLET ORAL at 21:59

## 2020-10-12 RX ADMIN — Medication 0.1 MG/KG/HR: at 08:24

## 2020-10-12 RX ADMIN — GABAPENTIN 300 MG: 300 CAPSULE ORAL at 21:59

## 2020-10-12 RX ADMIN — METRONIDAZOLE 500 MG: 500 TABLET ORAL at 14:13

## 2020-10-12 RX ADMIN — HYDROMORPHONE HYDROCHLORIDE 3 MG: 4 INJECTION, SOLUTION INTRAMUSCULAR; INTRAVENOUS; SUBCUTANEOUS at 22:13

## 2020-10-12 RX ADMIN — HYDROMORPHONE HYDROCHLORIDE 2 MG: 2 INJECTION, SOLUTION INTRAMUSCULAR; INTRAVENOUS; SUBCUTANEOUS at 15:33

## 2020-10-12 RX ADMIN — HYDROMORPHONE HYDROCHLORIDE 2 MG: 2 INJECTION, SOLUTION INTRAMUSCULAR; INTRAVENOUS; SUBCUTANEOUS at 08:35

## 2020-10-12 RX ADMIN — Medication: at 07:56

## 2020-10-12 RX ADMIN — CEFTRIAXONE 1000 MG: 1 INJECTION, POWDER, FOR SOLUTION INTRAMUSCULAR; INTRAVENOUS at 22:15

## 2020-10-12 RX ADMIN — METRONIDAZOLE 500 MG: 500 TABLET ORAL at 21:59

## 2020-10-12 RX ADMIN — FUROSEMIDE 20 MG: 10 INJECTION, SOLUTION INTRAMUSCULAR; INTRAVENOUS at 08:34

## 2020-10-12 RX ADMIN — HYDROMORPHONE HYDROCHLORIDE 4 MG: 4 INJECTION, SOLUTION INTRAMUSCULAR; INTRAVENOUS; SUBCUTANEOUS at 17:13

## 2020-10-12 RX ADMIN — METRONIDAZOLE 500 MG: 500 TABLET ORAL at 06:36

## 2020-10-13 VITALS
HEIGHT: 65 IN | BODY MASS INDEX: 20.94 KG/M2 | DIASTOLIC BLOOD PRESSURE: 72 MMHG | SYSTOLIC BLOOD PRESSURE: 112 MMHG | RESPIRATION RATE: 18 BRPM | TEMPERATURE: 100.1 F | WEIGHT: 125.66 LBS | OXYGEN SATURATION: 96 % | HEART RATE: 94 BPM

## 2020-10-13 LAB
ALBUMIN SERPL BCP-MCNC: 2 G/DL (ref 3.5–5)
ALP SERPL-CCNC: 318 U/L (ref 46–116)
ALT SERPL W P-5'-P-CCNC: 7 U/L (ref 12–78)
ANION GAP SERPL CALCULATED.3IONS-SCNC: 5 MMOL/L (ref 4–13)
AST SERPL W P-5'-P-CCNC: 58 U/L (ref 5–45)
BASOPHILS # BLD AUTO: 0.08 THOUSANDS/ΜL (ref 0–0.1)
BASOPHILS NFR BLD AUTO: 0 % (ref 0–1)
BILIRUB SERPL-MCNC: 0.45 MG/DL (ref 0.2–1)
BUN SERPL-MCNC: 3 MG/DL (ref 5–25)
CALCIUM ALBUM COR SERPL-MCNC: 10.3 MG/DL (ref 8.3–10.1)
CALCIUM SERPL-MCNC: 8.7 MG/DL (ref 8.3–10.1)
CHLORIDE SERPL-SCNC: 96 MMOL/L (ref 100–108)
CO2 SERPL-SCNC: 31 MMOL/L (ref 21–32)
CREAT SERPL-MCNC: 0.33 MG/DL (ref 0.6–1.3)
EOSINOPHIL # BLD AUTO: 0.2 THOUSAND/ΜL (ref 0–0.61)
EOSINOPHIL NFR BLD AUTO: 1 % (ref 0–6)
ERYTHROCYTE [DISTWIDTH] IN BLOOD BY AUTOMATED COUNT: 19.7 % (ref 11.6–15.1)
GFR SERPL CREATININE-BSD FRML MDRD: 133 ML/MIN/1.73SQ M
GLUCOSE SERPL-MCNC: 89 MG/DL (ref 65–140)
HCT VFR BLD AUTO: 27.1 % (ref 34.8–46.1)
HGB BLD-MCNC: 8.2 G/DL (ref 11.5–15.4)
IMM GRANULOCYTES # BLD AUTO: 0.41 THOUSAND/UL (ref 0–0.2)
IMM GRANULOCYTES NFR BLD AUTO: 2 % (ref 0–2)
LYMPHOCYTES # BLD AUTO: 1.65 THOUSANDS/ΜL (ref 0.6–4.47)
LYMPHOCYTES NFR BLD AUTO: 8 % (ref 14–44)
MAGNESIUM SERPL-MCNC: 2.1 MG/DL (ref 1.6–2.6)
MCH RBC QN AUTO: 25 PG (ref 26.8–34.3)
MCHC RBC AUTO-ENTMCNC: 30.3 G/DL (ref 31.4–37.4)
MCV RBC AUTO: 83 FL (ref 82–98)
MONOCYTES # BLD AUTO: 1.75 THOUSAND/ΜL (ref 0.17–1.22)
MONOCYTES NFR BLD AUTO: 9 % (ref 4–12)
NEUTROPHILS # BLD AUTO: 16.35 THOUSANDS/ΜL (ref 1.85–7.62)
NEUTS SEG NFR BLD AUTO: 80 % (ref 43–75)
NRBC BLD AUTO-RTO: 0 /100 WBCS
PHOSPHATE SERPL-MCNC: 3.6 MG/DL (ref 2.7–4.5)
PLATELET # BLD AUTO: 379 THOUSANDS/UL (ref 149–390)
PMV BLD AUTO: 9.9 FL (ref 8.9–12.7)
POTASSIUM SERPL-SCNC: 3.2 MMOL/L (ref 3.5–5.3)
PROT SERPL-MCNC: 6.6 G/DL (ref 6.4–8.2)
RBC # BLD AUTO: 3.28 MILLION/UL (ref 3.81–5.12)
SODIUM SERPL-SCNC: 132 MMOL/L (ref 136–145)
WBC # BLD AUTO: 20.44 THOUSAND/UL (ref 4.31–10.16)

## 2020-10-13 PROCEDURE — 85025 COMPLETE CBC W/AUTO DIFF WBC: CPT | Performed by: INTERNAL MEDICINE

## 2020-10-13 PROCEDURE — 80053 COMPREHEN METABOLIC PANEL: CPT | Performed by: INTERNAL MEDICINE

## 2020-10-13 PROCEDURE — 99239 HOSP IP/OBS DSCHRG MGMT >30: CPT | Performed by: INTERNAL MEDICINE

## 2020-10-13 PROCEDURE — 83735 ASSAY OF MAGNESIUM: CPT | Performed by: INTERNAL MEDICINE

## 2020-10-13 PROCEDURE — 99231 SBSQ HOSP IP/OBS SF/LOW 25: CPT | Performed by: INTERNAL MEDICINE

## 2020-10-13 PROCEDURE — 99232 SBSQ HOSP IP/OBS MODERATE 35: CPT | Performed by: INTERNAL MEDICINE

## 2020-10-13 PROCEDURE — 84100 ASSAY OF PHOSPHORUS: CPT | Performed by: INTERNAL MEDICINE

## 2020-10-13 RX ORDER — ONDANSETRON 2 MG/ML
4 INJECTION INTRAMUSCULAR; INTRAVENOUS EVERY 6 HOURS PRN
Refills: 0
Start: 2020-10-13

## 2020-10-13 RX ORDER — LIDOCAINE 40 MG/G
CREAM TOPICAL 4 TIMES DAILY PRN
Qty: 30 G | Refills: 0
Start: 2020-10-13

## 2020-10-13 RX ORDER — LORAZEPAM 2 MG/ML
1 INJECTION INTRAMUSCULAR
Refills: 0
Start: 2020-10-13

## 2020-10-13 RX ORDER — HYDROMORPHONE HYDROCHLORIDE 4 MG/ML
3 INJECTION, SOLUTION INTRAMUSCULAR; INTRAVENOUS; SUBCUTANEOUS EVERY 2 HOUR PRN
Refills: 0
Start: 2020-10-13 | End: 2020-10-23

## 2020-10-13 RX ORDER — OXYCODONE HYDROCHLORIDE 20 MG/1
20 TABLET ORAL EVERY 4 HOURS PRN
Refills: 0 | Status: ON HOLD
Start: 2020-10-13 | End: 2020-11-09 | Stop reason: SDUPTHER

## 2020-10-13 RX ORDER — POTASSIUM CHLORIDE 20 MEQ/1
40 TABLET, EXTENDED RELEASE ORAL ONCE
Status: COMPLETED | OUTPATIENT
Start: 2020-10-13 | End: 2020-10-13

## 2020-10-13 RX ADMIN — HYDROMORPHONE HYDROCHLORIDE 3 MG: 4 INJECTION, SOLUTION INTRAMUSCULAR; INTRAVENOUS; SUBCUTANEOUS at 08:01

## 2020-10-13 RX ADMIN — HYDROMORPHONE HYDROCHLORIDE 3 MG: 4 INJECTION, SOLUTION INTRAMUSCULAR; INTRAVENOUS; SUBCUTANEOUS at 18:19

## 2020-10-13 RX ADMIN — Medication: at 14:17

## 2020-10-13 RX ADMIN — Medication: at 20:34

## 2020-10-13 RX ADMIN — POTASSIUM CHLORIDE 40 MEQ: 1500 TABLET, EXTENDED RELEASE ORAL at 12:45

## 2020-10-13 RX ADMIN — METRONIDAZOLE 500 MG: 500 TABLET ORAL at 05:36

## 2020-10-13 RX ADMIN — SERTRALINE HYDROCHLORIDE 25 MG: 25 TABLET ORAL at 09:49

## 2020-10-13 RX ADMIN — HYDROMORPHONE HYDROCHLORIDE 3 MG: 4 INJECTION, SOLUTION INTRAMUSCULAR; INTRAVENOUS; SUBCUTANEOUS at 20:11

## 2020-10-13 RX ADMIN — Medication: at 07:10

## 2020-10-13 RX ADMIN — HYDROMORPHONE HYDROCHLORIDE 3 MG: 4 INJECTION, SOLUTION INTRAMUSCULAR; INTRAVENOUS; SUBCUTANEOUS at 10:55

## 2020-10-13 RX ADMIN — HYDROMORPHONE HYDROCHLORIDE 3 MG: 4 INJECTION, SOLUTION INTRAMUSCULAR; INTRAVENOUS; SUBCUTANEOUS at 12:50

## 2020-10-13 RX ADMIN — GABAPENTIN 300 MG: 300 CAPSULE ORAL at 09:49

## 2020-10-13 RX ADMIN — GABAPENTIN 300 MG: 300 CAPSULE ORAL at 16:38

## 2020-10-13 RX ADMIN — METRONIDAZOLE 500 MG: 500 TABLET ORAL at 14:34

## 2020-10-13 RX ADMIN — ENOXAPARIN SODIUM 40 MG: 40 INJECTION SUBCUTANEOUS at 09:49

## 2020-10-13 RX ADMIN — HYDROMORPHONE HYDROCHLORIDE 3 MG: 4 INJECTION, SOLUTION INTRAMUSCULAR; INTRAVENOUS; SUBCUTANEOUS at 05:36

## 2020-10-14 ENCOUNTER — TRANSITIONAL CARE MANAGEMENT (OUTPATIENT)
Dept: FAMILY MEDICINE CLINIC | Facility: CLINIC | Age: 45
End: 2020-10-14

## 2020-10-19 NOTE — TELEPHONE ENCOUNTER
Patient was admitted once again and was discharged 10/13/20  I did leave her a message to call our office to schedule a HFU apt

## 2020-10-20 ENCOUNTER — TELEPHONE (OUTPATIENT)
Dept: PALLIATIVE MEDICINE | Facility: CLINIC | Age: 45
End: 2020-10-20

## 2020-11-06 ENCOUNTER — LAB REQUISITION (OUTPATIENT)
Dept: LAB | Facility: HOSPITAL | Age: 45
DRG: 917 | End: 2020-11-06
Payer: COMMERCIAL

## 2020-11-06 ENCOUNTER — HOSPITAL ENCOUNTER (INPATIENT)
Facility: HOSPITAL | Age: 45
LOS: 3 days | DRG: 917 | End: 2020-11-09
Attending: EMERGENCY MEDICINE | Admitting: INTERNAL MEDICINE
Payer: COMMERCIAL

## 2020-11-06 ENCOUNTER — APPOINTMENT (EMERGENCY)
Dept: RADIOLOGY | Facility: HOSPITAL | Age: 45
DRG: 917 | End: 2020-11-06
Payer: COMMERCIAL

## 2020-11-06 DIAGNOSIS — R40.0 SOMNOLENCE: ICD-10-CM

## 2020-11-06 DIAGNOSIS — C20 RECTAL CANCER (HCC): Primary | ICD-10-CM

## 2020-11-06 DIAGNOSIS — J18.9 PNEUMONIA: ICD-10-CM

## 2020-11-06 DIAGNOSIS — D64.81 ANEMIA DUE TO ANTINEOPLASTIC CHEMOTHERAPY (CODE): ICD-10-CM

## 2020-11-06 DIAGNOSIS — C50.919 BREAST CANCER (HCC): ICD-10-CM

## 2020-11-06 DIAGNOSIS — Z51.5 PALLIATIVE CARE PATIENT: ICD-10-CM

## 2020-11-06 DIAGNOSIS — R78.81 BACTEREMIA: ICD-10-CM

## 2020-11-06 DIAGNOSIS — T40.2X1A OPIOID OVERDOSE, ACCIDENTAL OR UNINTENTIONAL, INITIAL ENCOUNTER (HCC): ICD-10-CM

## 2020-11-06 DIAGNOSIS — Z51.11 ENCOUNTER FOR ANTINEOPLASTIC CHEMOTHERAPY: ICD-10-CM

## 2020-11-06 DIAGNOSIS — N17.9 AKI (ACUTE KIDNEY INJURY) (HCC): ICD-10-CM

## 2020-11-06 DIAGNOSIS — G89.3 CANCER ASSOCIATED PAIN: ICD-10-CM

## 2020-11-06 DIAGNOSIS — K59.03 DRUG-INDUCED CONSTIPATION: ICD-10-CM

## 2020-11-06 DIAGNOSIS — E86.0 DEHYDRATION: ICD-10-CM

## 2020-11-06 DIAGNOSIS — R10.9 ABDOMINAL PAIN: ICD-10-CM

## 2020-11-06 DIAGNOSIS — R41.82 ALTERED MENTAL STATUS: ICD-10-CM

## 2020-11-06 DIAGNOSIS — D64.9 ANEMIA: ICD-10-CM

## 2020-11-06 PROBLEM — R53.83 LETHARGY: Status: ACTIVE | Noted: 2020-11-06

## 2020-11-06 PROBLEM — D72.829 LEUKOCYTOSIS: Status: ACTIVE | Noted: 2020-11-06

## 2020-11-06 PROBLEM — D63.8 ANEMIA OF CHRONIC DISEASE: Status: RESOLVED | Noted: 2020-07-03 | Resolved: 2020-11-06

## 2020-11-06 PROBLEM — E83.52 HYPERCALCEMIA: Status: ACTIVE | Noted: 2020-11-06

## 2020-11-06 LAB
ABO GROUP BLD: NORMAL
ALBUMIN SERPL BCP-MCNC: 1.6 G/DL (ref 3.5–5)
ALP SERPL-CCNC: 543 U/L (ref 46–116)
ALT SERPL W P-5'-P-CCNC: 25 U/L (ref 12–78)
ANION GAP SERPL CALCULATED.3IONS-SCNC: 7 MMOL/L (ref 4–13)
APTT PPP: 37 SECONDS (ref 23–37)
AST SERPL W P-5'-P-CCNC: 121 U/L (ref 5–45)
ATRIAL RATE: 70 BPM
BASOPHILS # BLD AUTO: 0.04 THOUSANDS/ΜL (ref 0–0.1)
BASOPHILS NFR BLD AUTO: 0 % (ref 0–1)
BILIRUB SERPL-MCNC: 0.37 MG/DL (ref 0.2–1)
BLD GP AB SCN SERPL QL: NEGATIVE
BUN SERPL-MCNC: 24 MG/DL (ref 5–25)
CALCIUM ALBUM COR SERPL-MCNC: 10.5 MG/DL (ref 8.3–10.1)
CALCIUM SERPL-MCNC: 8.6 MG/DL (ref 8.3–10.1)
CHLORIDE SERPL-SCNC: 99 MMOL/L (ref 100–108)
CO2 SERPL-SCNC: 27 MMOL/L (ref 21–32)
CREAT SERPL-MCNC: 1.56 MG/DL (ref 0.6–1.3)
EOSINOPHIL # BLD AUTO: 0.21 THOUSAND/ΜL (ref 0–0.61)
EOSINOPHIL NFR BLD AUTO: 2 % (ref 0–6)
ERYTHROCYTE [DISTWIDTH] IN BLOOD BY AUTOMATED COUNT: 19.5 % (ref 11.6–15.1)
GFR SERPL CREATININE-BSD FRML MDRD: 39 ML/MIN/1.73SQ M
GLUCOSE SERPL-MCNC: 75 MG/DL (ref 65–140)
HCT VFR BLD AUTO: 21.6 % (ref 34.8–46.1)
HGB BLD-MCNC: 6.6 G/DL (ref 11.5–15.4)
IMM GRANULOCYTES # BLD AUTO: 0.2 THOUSAND/UL (ref 0–0.2)
IMM GRANULOCYTES NFR BLD AUTO: 2 % (ref 0–2)
INR PPP: 1.44 (ref 0.84–1.19)
LACTATE SERPL-SCNC: 1.7 MMOL/L (ref 0.5–2)
LYMPHOCYTES # BLD AUTO: 0.81 THOUSANDS/ΜL (ref 0.6–4.47)
LYMPHOCYTES NFR BLD AUTO: 6 % (ref 14–44)
MCH RBC QN AUTO: 25.9 PG (ref 26.8–34.3)
MCHC RBC AUTO-ENTMCNC: 30.6 G/DL (ref 31.4–37.4)
MCV RBC AUTO: 85 FL (ref 82–98)
MONOCYTES # BLD AUTO: 0.53 THOUSAND/ΜL (ref 0.17–1.22)
MONOCYTES NFR BLD AUTO: 4 % (ref 4–12)
NEUTROPHILS # BLD AUTO: 10.94 THOUSANDS/ΜL (ref 1.85–7.62)
NEUTS SEG NFR BLD AUTO: 86 % (ref 43–75)
NRBC BLD AUTO-RTO: 0 /100 WBCS
P AXIS: 62 DEGREES
PLATELET # BLD AUTO: 230 THOUSANDS/UL (ref 149–390)
PMV BLD AUTO: 9.7 FL (ref 8.9–12.7)
POTASSIUM SERPL-SCNC: 3.8 MMOL/L (ref 3.5–5.3)
PR INTERVAL: 136 MS
PROCALCITONIN SERPL-MCNC: 15.44 NG/ML
PROT SERPL-MCNC: 6.1 G/DL (ref 6.4–8.2)
PROTHROMBIN TIME: 17.5 SECONDS (ref 11.6–14.5)
QRS AXIS: 53 DEGREES
QRSD INTERVAL: 76 MS
QT INTERVAL: 376 MS
QTC INTERVAL: 406 MS
RBC # BLD AUTO: 2.55 MILLION/UL (ref 3.81–5.12)
RH BLD: POSITIVE
SARS-COV-2 RNA RESP QL NAA+PROBE: NEGATIVE
SODIUM SERPL-SCNC: 133 MMOL/L (ref 136–145)
SPECIMEN EXPIRATION DATE: NORMAL
T WAVE AXIS: 29 DEGREES
VENTRICULAR RATE: 70 BPM
WBC # BLD AUTO: 12.73 THOUSAND/UL (ref 4.31–10.16)

## 2020-11-06 PROCEDURE — 86850 RBC ANTIBODY SCREEN: CPT | Performed by: EMERGENCY MEDICINE

## 2020-11-06 PROCEDURE — 99285 EMERGENCY DEPT VISIT HI MDM: CPT

## 2020-11-06 PROCEDURE — P9016 RBC LEUKOCYTES REDUCED: HCPCS

## 2020-11-06 PROCEDURE — 99223 1ST HOSP IP/OBS HIGH 75: CPT | Performed by: FAMILY MEDICINE

## 2020-11-06 PROCEDURE — 83605 ASSAY OF LACTIC ACID: CPT | Performed by: EMERGENCY MEDICINE

## 2020-11-06 PROCEDURE — U0003 INFECTIOUS AGENT DETECTION BY NUCLEIC ACID (DNA OR RNA); SEVERE ACUTE RESPIRATORY SYNDROME CORONAVIRUS 2 (SARS-COV-2) (CORONAVIRUS DISEASE [COVID-19]), AMPLIFIED PROBE TECHNIQUE, MAKING USE OF HIGH THROUGHPUT TECHNOLOGIES AS DESCRIBED BY CMS-2020-01-R: HCPCS | Performed by: EMERGENCY MEDICINE

## 2020-11-06 PROCEDURE — 71045 X-RAY EXAM CHEST 1 VIEW: CPT

## 2020-11-06 PROCEDURE — 87077 CULTURE AEROBIC IDENTIFY: CPT | Performed by: EMERGENCY MEDICINE

## 2020-11-06 PROCEDURE — 85730 THROMBOPLASTIN TIME PARTIAL: CPT | Performed by: EMERGENCY MEDICINE

## 2020-11-06 PROCEDURE — 36415 COLL VENOUS BLD VENIPUNCTURE: CPT | Performed by: EMERGENCY MEDICINE

## 2020-11-06 PROCEDURE — 85025 COMPLETE CBC W/AUTO DIFF WBC: CPT | Performed by: EMERGENCY MEDICINE

## 2020-11-06 PROCEDURE — 99285 EMERGENCY DEPT VISIT HI MDM: CPT | Performed by: EMERGENCY MEDICINE

## 2020-11-06 PROCEDURE — 96360 HYDRATION IV INFUSION INIT: CPT

## 2020-11-06 PROCEDURE — 86923 COMPATIBILITY TEST ELECTRIC: CPT

## 2020-11-06 PROCEDURE — 85610 PROTHROMBIN TIME: CPT | Performed by: EMERGENCY MEDICINE

## 2020-11-06 PROCEDURE — 80053 COMPREHEN METABOLIC PANEL: CPT | Performed by: EMERGENCY MEDICINE

## 2020-11-06 PROCEDURE — 30233N1 TRANSFUSION OF NONAUTOLOGOUS RED BLOOD CELLS INTO PERIPHERAL VEIN, PERCUTANEOUS APPROACH: ICD-10-PCS | Performed by: EMERGENCY MEDICINE

## 2020-11-06 PROCEDURE — 36430 TRANSFUSION BLD/BLD COMPNT: CPT

## 2020-11-06 PROCEDURE — 83519 RIA NONANTIBODY: CPT | Performed by: INTERNAL MEDICINE

## 2020-11-06 PROCEDURE — 84145 PROCALCITONIN (PCT): CPT | Performed by: EMERGENCY MEDICINE

## 2020-11-06 PROCEDURE — 86900 BLOOD TYPING SEROLOGIC ABO: CPT | Performed by: EMERGENCY MEDICINE

## 2020-11-06 PROCEDURE — 87186 SC STD MICRODIL/AGAR DIL: CPT | Performed by: EMERGENCY MEDICINE

## 2020-11-06 PROCEDURE — 86901 BLOOD TYPING SEROLOGIC RH(D): CPT | Performed by: EMERGENCY MEDICINE

## 2020-11-06 PROCEDURE — 93005 ELECTROCARDIOGRAM TRACING: CPT

## 2020-11-06 PROCEDURE — 93010 ELECTROCARDIOGRAM REPORT: CPT | Performed by: INTERNAL MEDICINE

## 2020-11-06 PROCEDURE — 87040 BLOOD CULTURE FOR BACTERIA: CPT | Performed by: EMERGENCY MEDICINE

## 2020-11-06 RX ORDER — NALOXONE HYDROCHLORIDE 0.4 MG/ML
0.1 INJECTION, SOLUTION INTRAMUSCULAR; INTRAVENOUS; SUBCUTANEOUS ONCE
Status: COMPLETED | OUTPATIENT
Start: 2020-11-06 | End: 2020-11-06

## 2020-11-06 RX ORDER — SODIUM CHLORIDE, SODIUM GLUCONATE, SODIUM ACETATE, POTASSIUM CHLORIDE, MAGNESIUM CHLORIDE, SODIUM PHOSPHATE, DIBASIC, AND POTASSIUM PHOSPHATE .53; .5; .37; .037; .03; .012; .00082 G/100ML; G/100ML; G/100ML; G/100ML; G/100ML; G/100ML; G/100ML
1000 INJECTION, SOLUTION INTRAVENOUS ONCE
Status: COMPLETED | OUTPATIENT
Start: 2020-11-06 | End: 2020-11-07

## 2020-11-06 RX ADMIN — NALXONE HYDROCHLORIDE 0.1 MG: 0.4 INJECTION INTRAMUSCULAR; INTRAVENOUS; SUBCUTANEOUS at 19:02

## 2020-11-06 RX ADMIN — AZITHROMYCIN MONOHYDRATE 500 MG: 500 INJECTION, POWDER, LYOPHILIZED, FOR SOLUTION INTRAVENOUS at 17:10

## 2020-11-06 RX ADMIN — SODIUM CHLORIDE, SODIUM GLUCONATE, SODIUM ACETATE, POTASSIUM CHLORIDE, MAGNESIUM CHLORIDE, SODIUM PHOSPHATE, DIBASIC, AND POTASSIUM PHOSPHATE 1000 ML: .53; .5; .37; .037; .03; .012; .00082 INJECTION, SOLUTION INTRAVENOUS at 22:53

## 2020-11-06 RX ADMIN — SODIUM CHLORIDE 1000 ML: 0.9 INJECTION, SOLUTION INTRAVENOUS at 14:12

## 2020-11-06 RX ADMIN — CEFTRIAXONE SODIUM 1000 MG: 10 INJECTION, POWDER, FOR SOLUTION INTRAVENOUS at 15:23

## 2020-11-07 LAB
ABO GROUP BLD BPU: NORMAL
ALBUMIN SERPL BCP-MCNC: 1.5 G/DL (ref 3.5–5)
ALP SERPL-CCNC: 516 U/L (ref 46–116)
ALT SERPL W P-5'-P-CCNC: 24 U/L (ref 12–78)
AMMONIA PLAS-SCNC: 39 UMOL/L (ref 11–35)
ANION GAP SERPL CALCULATED.3IONS-SCNC: 8 MMOL/L (ref 4–13)
AST SERPL W P-5'-P-CCNC: 101 U/L (ref 5–45)
BASE EX.OXY STD BLDV CALC-SCNC: 70.8 % (ref 60–80)
BASE EXCESS BLDV CALC-SCNC: -1.5 MMOL/L
BILIRUB SERPL-MCNC: 0.42 MG/DL (ref 0.2–1)
BPU ID: NORMAL
BUN SERPL-MCNC: 28 MG/DL (ref 5–25)
CALCIUM ALBUM COR SERPL-MCNC: 10.4 MG/DL (ref 8.3–10.1)
CALCIUM SERPL-MCNC: 8.4 MG/DL (ref 8.3–10.1)
CHLORIDE SERPL-SCNC: 101 MMOL/L (ref 100–108)
CO2 SERPL-SCNC: 26 MMOL/L (ref 21–32)
CREAT SERPL-MCNC: 1.67 MG/DL (ref 0.6–1.3)
CROSSMATCH: NORMAL
ERYTHROCYTE [DISTWIDTH] IN BLOOD BY AUTOMATED COUNT: 18.9 % (ref 11.6–15.1)
GFR SERPL CREATININE-BSD FRML MDRD: 36 ML/MIN/1.73SQ M
GLUCOSE SERPL-MCNC: 122 MG/DL (ref 65–140)
GLUCOSE SERPL-MCNC: 123 MG/DL (ref 65–140)
GLUCOSE SERPL-MCNC: 35 MG/DL (ref 65–140)
GLUCOSE SERPL-MCNC: 39 MG/DL (ref 65–140)
GLUCOSE SERPL-MCNC: 52 MG/DL (ref 65–140)
GLUCOSE SERPL-MCNC: 57 MG/DL (ref 65–140)
GLUCOSE SERPL-MCNC: 61 MG/DL (ref 65–140)
GLUCOSE SERPL-MCNC: 72 MG/DL (ref 65–140)
GLUCOSE SERPL-MCNC: 77 MG/DL (ref 65–140)
GLUCOSE SERPL-MCNC: 90 MG/DL (ref 65–140)
HCO3 BLDV-SCNC: 24.5 MMOL/L (ref 24–30)
HCT VFR BLD AUTO: 24.6 % (ref 34.8–46.1)
HGB BLD-MCNC: 7.4 G/DL (ref 11.5–15.4)
MCH RBC QN AUTO: 25.8 PG (ref 26.8–34.3)
MCHC RBC AUTO-ENTMCNC: 30.1 G/DL (ref 31.4–37.4)
MCV RBC AUTO: 86 FL (ref 82–98)
NASAL CANNULA: 4
O2 CT BLDV-SCNC: 8.4 ML/DL
PCO2 BLDV: 47.8 MM HG (ref 42–50)
PH BLDV: 7.33 [PH] (ref 7.3–7.4)
PLATELET # BLD AUTO: 182 THOUSANDS/UL (ref 149–390)
PMV BLD AUTO: 9.8 FL (ref 8.9–12.7)
PO2 BLDV: 41.4 MM HG (ref 35–45)
POTASSIUM SERPL-SCNC: 3.7 MMOL/L (ref 3.5–5.3)
PROCALCITONIN SERPL-MCNC: 13.55 NG/ML
PROT SERPL-MCNC: 5.8 G/DL (ref 6.4–8.2)
RBC # BLD AUTO: 2.87 MILLION/UL (ref 3.81–5.12)
SODIUM SERPL-SCNC: 135 MMOL/L (ref 136–145)
UNIT DISPENSE STATUS: NORMAL
UNIT PRODUCT CODE: NORMAL
UNIT RH: NORMAL
WBC # BLD AUTO: 12.94 THOUSAND/UL (ref 4.31–10.16)

## 2020-11-07 PROCEDURE — 82805 BLOOD GASES W/O2 SATURATION: CPT | Performed by: PHYSICIAN ASSISTANT

## 2020-11-07 PROCEDURE — 82948 REAGENT STRIP/BLOOD GLUCOSE: CPT

## 2020-11-07 PROCEDURE — 85027 COMPLETE CBC AUTOMATED: CPT | Performed by: PHYSICIAN ASSISTANT

## 2020-11-07 PROCEDURE — 84145 PROCALCITONIN (PCT): CPT | Performed by: PHYSICIAN ASSISTANT

## 2020-11-07 PROCEDURE — 99222 1ST HOSP IP/OBS MODERATE 55: CPT | Performed by: INTERNAL MEDICINE

## 2020-11-07 PROCEDURE — 80053 COMPREHEN METABOLIC PANEL: CPT | Performed by: PHYSICIAN ASSISTANT

## 2020-11-07 PROCEDURE — 82140 ASSAY OF AMMONIA: CPT | Performed by: PHYSICIAN ASSISTANT

## 2020-11-07 PROCEDURE — 99255 IP/OBS CONSLTJ NEW/EST HI 80: CPT | Performed by: INTERNAL MEDICINE

## 2020-11-07 PROCEDURE — 99223 1ST HOSP IP/OBS HIGH 75: CPT | Performed by: INTERNAL MEDICINE

## 2020-11-07 PROCEDURE — 99233 SBSQ HOSP IP/OBS HIGH 50: CPT | Performed by: INTERNAL MEDICINE

## 2020-11-07 RX ORDER — DEXTROSE MONOHYDRATE 25 G/50ML
25 INJECTION, SOLUTION INTRAVENOUS EVERY 2 HOUR PRN
Status: DISCONTINUED | OUTPATIENT
Start: 2020-11-07 | End: 2020-11-08

## 2020-11-07 RX ORDER — DEXTROSE MONOHYDRATE 25 G/50ML
INJECTION, SOLUTION INTRAVENOUS
Status: COMPLETED
Start: 2020-11-07 | End: 2020-11-07

## 2020-11-07 RX ORDER — HYDROMORPHONE HYDROCHLORIDE 2 MG/1
2 TABLET ORAL
Status: DISCONTINUED | OUTPATIENT
Start: 2020-11-07 | End: 2020-11-08

## 2020-11-07 RX ADMIN — CEFEPIME HYDROCHLORIDE 2000 MG: 2 INJECTION, POWDER, FOR SOLUTION INTRAVENOUS at 22:27

## 2020-11-07 RX ADMIN — DEXTROSE MONOHYDRATE 25 ML: 500 INJECTION PARENTERAL at 21:11

## 2020-11-07 RX ADMIN — DEXTROSE MONOHYDRATE 50 ML: 500 INJECTION PARENTERAL at 13:52

## 2020-11-07 RX ADMIN — CEFEPIME HYDROCHLORIDE 2000 MG: 2 INJECTION, POWDER, FOR SOLUTION INTRAVENOUS at 11:30

## 2020-11-07 RX ADMIN — DEXTROSE MONOHYDRATE 50 ML: 500 INJECTION PARENTERAL at 08:30

## 2020-11-07 RX ADMIN — HYDROMORPHONE HYDROCHLORIDE 2 MG: 2 TABLET ORAL at 14:34

## 2020-11-07 RX ADMIN — DEXTROSE MONOHYDRATE 25 ML: 500 INJECTION PARENTERAL at 17:45

## 2020-11-07 RX ADMIN — DEXTROSE MONOHYDRATE 25 ML: 500 INJECTION PARENTERAL at 22:24

## 2020-11-07 RX ADMIN — DEXTROSE MONOHYDRATE 25 ML: 500 INJECTION PARENTERAL at 20:18

## 2020-11-07 RX ADMIN — DEXTROSE MONOHYDRATE 50 ML: 25 INJECTION, SOLUTION INTRAVENOUS at 08:30

## 2020-11-07 RX ADMIN — DEXTROSE MONOHYDRATE 50 ML: 25 INJECTION, SOLUTION INTRAVENOUS at 13:52

## 2020-11-08 ENCOUNTER — APPOINTMENT (INPATIENT)
Dept: RADIOLOGY | Facility: HOSPITAL | Age: 45
DRG: 917 | End: 2020-11-08
Payer: COMMERCIAL

## 2020-11-08 LAB
ALBUMIN SERPL BCP-MCNC: 1.5 G/DL (ref 3.5–5)
ALP SERPL-CCNC: 678 U/L (ref 46–116)
ALT SERPL W P-5'-P-CCNC: 22 U/L (ref 12–78)
AMMONIA PLAS-SCNC: 12 UMOL/L (ref 11–35)
ANION GAP SERPL CALCULATED.3IONS-SCNC: 10 MMOL/L (ref 4–13)
ANION GAP SERPL CALCULATED.3IONS-SCNC: 10 MMOL/L (ref 4–13)
ANION GAP SERPL CALCULATED.3IONS-SCNC: 8 MMOL/L (ref 4–13)
ANISOCYTOSIS BLD QL SMEAR: PRESENT
ARTERIAL PATENCY WRIST A: YES
AST SERPL W P-5'-P-CCNC: 95 U/L (ref 5–45)
BASE EXCESS BLDA CALC-SCNC: -0.6 MMOL/L
BASE EXCESS BLDA CALC-SCNC: -4 MMOL/L (ref -2–3)
BASOPHILS # BLD MANUAL: 0 THOUSAND/UL (ref 0–0.1)
BASOPHILS NFR MAR MANUAL: 0 % (ref 0–1)
BILIRUB SERPL-MCNC: 0.41 MG/DL (ref 0.2–1)
BUN SERPL-MCNC: 23 MG/DL (ref 5–25)
BUN SERPL-MCNC: 28 MG/DL (ref 5–25)
BUN SERPL-MCNC: 30 MG/DL (ref 5–25)
CA-I BLD-SCNC: 1.16 MMOL/L (ref 1.12–1.32)
CALCIUM ALBUM COR SERPL-MCNC: 10.3 MG/DL (ref 8.3–10.1)
CALCIUM SERPL-MCNC: 7.9 MG/DL (ref 8.3–10.1)
CALCIUM SERPL-MCNC: 8.3 MG/DL (ref 8.3–10.1)
CALCIUM SERPL-MCNC: 8.3 MG/DL (ref 8.3–10.1)
CHLORIDE SERPL-SCNC: 102 MMOL/L (ref 100–108)
CHLORIDE SERPL-SCNC: 103 MMOL/L (ref 100–108)
CHLORIDE SERPL-SCNC: 105 MMOL/L (ref 100–108)
CO2 SERPL-SCNC: 24 MMOL/L (ref 21–32)
CO2 SERPL-SCNC: 25 MMOL/L (ref 21–32)
CO2 SERPL-SCNC: 27 MMOL/L (ref 21–32)
CREAT SERPL-MCNC: 0.89 MG/DL (ref 0.6–1.3)
CREAT SERPL-MCNC: 1.36 MG/DL (ref 0.6–1.3)
CREAT SERPL-MCNC: 1.7 MG/DL (ref 0.6–1.3)
EOSINOPHIL # BLD MANUAL: 0.46 THOUSAND/UL (ref 0–0.4)
EOSINOPHIL NFR BLD MANUAL: 2 % (ref 0–6)
ERYTHROCYTE [DISTWIDTH] IN BLOOD BY AUTOMATED COUNT: 19.3 % (ref 11.6–15.1)
ERYTHROCYTE [DISTWIDTH] IN BLOOD BY AUTOMATED COUNT: 19.3 % (ref 11.6–15.1)
FIO2 GAS DIL.REBREATH: 100 L
GFR SERPL CREATININE-BSD FRML MDRD: 35 ML/MIN/1.73SQ M
GFR SERPL CREATININE-BSD FRML MDRD: 46 ML/MIN/1.73SQ M
GFR SERPL CREATININE-BSD FRML MDRD: 77 ML/MIN/1.73SQ M
GLUCOSE SERPL-MCNC: 118 MG/DL (ref 65–140)
GLUCOSE SERPL-MCNC: 119 MG/DL (ref 65–140)
GLUCOSE SERPL-MCNC: 55 MG/DL (ref 65–140)
GLUCOSE SERPL-MCNC: 66 MG/DL (ref 65–140)
GLUCOSE SERPL-MCNC: 71 MG/DL (ref 65–140)
GLUCOSE SERPL-MCNC: 72 MG/DL (ref 65–140)
GLUCOSE SERPL-MCNC: 75 MG/DL (ref 65–140)
GLUCOSE SERPL-MCNC: 77 MG/DL (ref 65–140)
GLUCOSE SERPL-MCNC: 77 MG/DL (ref 65–140)
GLUCOSE SERPL-MCNC: 78 MG/DL (ref 65–140)
GLUCOSE SERPL-MCNC: 83 MG/DL (ref 65–140)
GLUCOSE SERPL-MCNC: 85 MG/DL (ref 65–140)
GLUCOSE SERPL-MCNC: 85 MG/DL (ref 65–140)
GLUCOSE SERPL-MCNC: 89 MG/DL (ref 65–140)
GLUCOSE SERPL-MCNC: 93 MG/DL (ref 65–140)
HCO3 BLDA-SCNC: 20.4 MMOL/L (ref 22–28)
HCO3 BLDA-SCNC: 24.2 MMOL/L (ref 22–28)
HCT VFR BLD AUTO: 26 % (ref 34.8–46.1)
HCT VFR BLD AUTO: 28.2 % (ref 34.8–46.1)
HCT VFR BLD CALC: 27 % (ref 34.8–46.1)
HGB BLD-MCNC: 8.2 G/DL (ref 11.5–15.4)
HGB BLD-MCNC: 8.5 G/DL (ref 11.5–15.4)
HGB BLDA-MCNC: 9.2 G/DL (ref 11.5–15.4)
HYPERCHROMIA BLD QL SMEAR: PRESENT
LACTATE SERPL-SCNC: 1.3 MMOL/L (ref 0.5–2)
LACTATE SERPL-SCNC: 2.3 MMOL/L (ref 0.5–2)
LACTATE SERPL-SCNC: 5.3 MMOL/L (ref 0.5–2)
LG PLATELETS BLD QL SMEAR: PRESENT
LYMPHOCYTES # BLD AUTO: 0.68 THOUSAND/UL (ref 0.6–4.47)
LYMPHOCYTES # BLD AUTO: 3 % (ref 14–44)
MAGNESIUM SERPL-MCNC: 2.5 MG/DL (ref 1.6–2.6)
MCH RBC QN AUTO: 25.7 PG (ref 26.8–34.3)
MCH RBC QN AUTO: 26.2 PG (ref 26.8–34.3)
MCHC RBC AUTO-ENTMCNC: 30.1 G/DL (ref 31.4–37.4)
MCHC RBC AUTO-ENTMCNC: 31.5 G/DL (ref 31.4–37.4)
MCV RBC AUTO: 83 FL (ref 82–98)
MCV RBC AUTO: 85 FL (ref 82–98)
METAMYELOCYTES NFR BLD MANUAL: 1 % (ref 0–1)
MICROCYTES BLD QL AUTO: PRESENT
MONOCYTES # BLD AUTO: 0.46 THOUSAND/UL (ref 0–1.22)
MONOCYTES NFR BLD: 2 % (ref 4–12)
NEUTROPHILS # BLD MANUAL: 20.99 THOUSAND/UL (ref 1.85–7.62)
NEUTS BAND NFR BLD MANUAL: 5 % (ref 0–8)
NEUTS SEG NFR BLD AUTO: 87 % (ref 43–75)
NRBC BLD AUTO-RTO: 0 /100 WBCS
NRBC BLD AUTO-RTO: 0 /100 WBCS
NRBC BLD AUTO-RTO: 1 /100 WBC (ref 0–2)
O2 CT BLDA-SCNC: 12.7 ML/DL (ref 16–23)
OTHER FIO2: ABNORMAL %
OXYHGB MFR BLDA: 92.5 % (ref 94–97)
PCO2 BLD: 21 MMOL/L (ref 21–32)
PCO2 BLD: 35.6 MM HG (ref 36–44)
PCO2 BLDA: 40.4 MM HG (ref 36–44)
PH BLD: 7.37 [PH] (ref 7.35–7.45)
PH BLDA: 7.4 [PH] (ref 7.35–7.45)
PHOSPHATE SERPL-MCNC: 5.1 MG/DL (ref 2.7–4.5)
PLATELET # BLD AUTO: 267 THOUSANDS/UL (ref 149–390)
PLATELET # BLD AUTO: 300 THOUSANDS/UL (ref 149–390)
PLATELET BLD QL SMEAR: ADEQUATE
PMV BLD AUTO: 9.6 FL (ref 8.9–12.7)
PMV BLD AUTO: 9.6 FL (ref 8.9–12.7)
PO2 BLD: 68 MM HG (ref 75–129)
PO2 BLDA: 71.7 MM HG (ref 75–129)
POTASSIUM BLD-SCNC: 4 MMOL/L (ref 3.5–5.3)
POTASSIUM SERPL-SCNC: 3.5 MMOL/L (ref 3.5–5.3)
POTASSIUM SERPL-SCNC: 3.9 MMOL/L (ref 3.5–5.3)
POTASSIUM SERPL-SCNC: 3.9 MMOL/L (ref 3.5–5.3)
PROCALCITONIN SERPL-MCNC: 8.38 NG/ML
PROT SERPL-MCNC: 6 G/DL (ref 6.4–8.2)
RBC # BLD AUTO: 3.13 MILLION/UL (ref 3.81–5.12)
RBC # BLD AUTO: 3.31 MILLION/UL (ref 3.81–5.12)
RBC MORPH BLD: PRESENT
SAO2 % BLD FROM PO2: 93 % (ref 60–85)
SODIUM BLD-SCNC: 134 MMOL/L (ref 136–145)
SODIUM SERPL-SCNC: 136 MMOL/L (ref 136–145)
SODIUM SERPL-SCNC: 138 MMOL/L (ref 136–145)
SODIUM SERPL-SCNC: 140 MMOL/L (ref 136–145)
SPECIMEN SOURCE: ABNORMAL
SPECIMEN SOURCE: ABNORMAL
TOTAL CELLS COUNTED SPEC: 100
WBC # BLD AUTO: 18.17 THOUSAND/UL (ref 4.31–10.16)
WBC # BLD AUTO: 22.81 THOUSAND/UL (ref 4.31–10.16)

## 2020-11-08 PROCEDURE — 87081 CULTURE SCREEN ONLY: CPT | Performed by: PHYSICIAN ASSISTANT

## 2020-11-08 PROCEDURE — 36600 WITHDRAWAL OF ARTERIAL BLOOD: CPT

## 2020-11-08 PROCEDURE — 82140 ASSAY OF AMMONIA: CPT | Performed by: INTERNAL MEDICINE

## 2020-11-08 PROCEDURE — 99232 SBSQ HOSP IP/OBS MODERATE 35: CPT | Performed by: INTERNAL MEDICINE

## 2020-11-08 PROCEDURE — 83605 ASSAY OF LACTIC ACID: CPT | Performed by: STUDENT IN AN ORGANIZED HEALTH CARE EDUCATION/TRAINING PROGRAM

## 2020-11-08 PROCEDURE — 83605 ASSAY OF LACTIC ACID: CPT | Performed by: INTERNAL MEDICINE

## 2020-11-08 PROCEDURE — 80048 BASIC METABOLIC PNL TOTAL CA: CPT | Performed by: STUDENT IN AN ORGANIZED HEALTH CARE EDUCATION/TRAINING PROGRAM

## 2020-11-08 PROCEDURE — 85014 HEMATOCRIT: CPT

## 2020-11-08 PROCEDURE — 84295 ASSAY OF SERUM SODIUM: CPT

## 2020-11-08 PROCEDURE — 85027 COMPLETE CBC AUTOMATED: CPT | Performed by: INTERNAL MEDICINE

## 2020-11-08 PROCEDURE — 84145 PROCALCITONIN (PCT): CPT | Performed by: PHYSICIAN ASSISTANT

## 2020-11-08 PROCEDURE — 82330 ASSAY OF CALCIUM: CPT

## 2020-11-08 PROCEDURE — 83735 ASSAY OF MAGNESIUM: CPT | Performed by: INTERNAL MEDICINE

## 2020-11-08 PROCEDURE — 80048 BASIC METABOLIC PNL TOTAL CA: CPT | Performed by: INTERNAL MEDICINE

## 2020-11-08 PROCEDURE — 94760 N-INVAS EAR/PLS OXIMETRY 1: CPT

## 2020-11-08 PROCEDURE — 84100 ASSAY OF PHOSPHORUS: CPT | Performed by: INTERNAL MEDICINE

## 2020-11-08 PROCEDURE — 85007 BL SMEAR W/DIFF WBC COUNT: CPT | Performed by: STUDENT IN AN ORGANIZED HEALTH CARE EDUCATION/TRAINING PROGRAM

## 2020-11-08 PROCEDURE — 82803 BLOOD GASES ANY COMBINATION: CPT

## 2020-11-08 PROCEDURE — 87081 CULTURE SCREEN ONLY: CPT | Performed by: INTERNAL MEDICINE

## 2020-11-08 PROCEDURE — 80053 COMPREHEN METABOLIC PANEL: CPT | Performed by: INTERNAL MEDICINE

## 2020-11-08 PROCEDURE — 87040 BLOOD CULTURE FOR BACTERIA: CPT | Performed by: PHYSICIAN ASSISTANT

## 2020-11-08 PROCEDURE — 84132 ASSAY OF SERUM POTASSIUM: CPT

## 2020-11-08 PROCEDURE — 82947 ASSAY GLUCOSE BLOOD QUANT: CPT

## 2020-11-08 PROCEDURE — 99233 SBSQ HOSP IP/OBS HIGH 50: CPT | Performed by: INTERNAL MEDICINE

## 2020-11-08 PROCEDURE — 71275 CT ANGIOGRAPHY CHEST: CPT

## 2020-11-08 PROCEDURE — 82948 REAGENT STRIP/BLOOD GLUCOSE: CPT

## 2020-11-08 PROCEDURE — 85027 COMPLETE CBC AUTOMATED: CPT | Performed by: STUDENT IN AN ORGANIZED HEALTH CARE EDUCATION/TRAINING PROGRAM

## 2020-11-08 PROCEDURE — 82805 BLOOD GASES W/O2 SATURATION: CPT | Performed by: INTERNAL MEDICINE

## 2020-11-08 RX ORDER — LORAZEPAM 2 MG/ML
1 INJECTION INTRAMUSCULAR EVERY 4 HOURS PRN
Status: DISCONTINUED | OUTPATIENT
Start: 2020-11-08 | End: 2020-11-08

## 2020-11-08 RX ORDER — DEXTROSE MONOHYDRATE 100 MG/ML
40 INJECTION, SOLUTION INTRAVENOUS CONTINUOUS
Status: DISCONTINUED | OUTPATIENT
Start: 2020-11-08 | End: 2020-11-08

## 2020-11-08 RX ORDER — HYDROMORPHONE HYDROCHLORIDE 2 MG/1
4 TABLET ORAL EVERY 2 HOUR PRN
Status: DISCONTINUED | OUTPATIENT
Start: 2020-11-08 | End: 2020-11-09

## 2020-11-08 RX ORDER — DEXTROSE MONOHYDRATE 25 G/50ML
INJECTION, SOLUTION INTRAVENOUS
Status: COMPLETED
Start: 2020-11-08 | End: 2020-11-08

## 2020-11-08 RX ORDER — LORAZEPAM 2 MG/ML
0.5 INJECTION INTRAMUSCULAR EVERY 4 HOURS PRN
Status: DISCONTINUED | OUTPATIENT
Start: 2020-11-08 | End: 2020-11-09

## 2020-11-08 RX ORDER — HYDROMORPHONE HYDROCHLORIDE 2 MG/1
4 TABLET ORAL EVERY 6 HOURS
Status: DISCONTINUED | OUTPATIENT
Start: 2020-11-08 | End: 2020-11-09

## 2020-11-08 RX ORDER — DEXTROSE AND SODIUM CHLORIDE 5; .45 G/100ML; G/100ML
50 INJECTION, SOLUTION INTRAVENOUS CONTINUOUS
Status: DISCONTINUED | OUTPATIENT
Start: 2020-11-08 | End: 2020-11-09

## 2020-11-08 RX ORDER — SODIUM CHLORIDE, SODIUM GLUCONATE, SODIUM ACETATE, POTASSIUM CHLORIDE, MAGNESIUM CHLORIDE, SODIUM PHOSPHATE, DIBASIC, AND POTASSIUM PHOSPHATE .53; .5; .37; .037; .03; .012; .00082 G/100ML; G/100ML; G/100ML; G/100ML; G/100ML; G/100ML; G/100ML
50 INJECTION, SOLUTION INTRAVENOUS CONTINUOUS
Status: DISCONTINUED | OUTPATIENT
Start: 2020-11-08 | End: 2020-11-08

## 2020-11-08 RX ORDER — MORPHINE SULFATE 15 MG/1
7.5 TABLET ORAL EVERY 6 HOURS
Status: DISCONTINUED | OUTPATIENT
Start: 2020-11-08 | End: 2020-11-08

## 2020-11-08 RX ORDER — DEXTROSE MONOHYDRATE 100 MG/ML
50 INJECTION, SOLUTION INTRAVENOUS CONTINUOUS
Status: DISCONTINUED | OUTPATIENT
Start: 2020-11-08 | End: 2020-11-09 | Stop reason: HOSPADM

## 2020-11-08 RX ORDER — SODIUM CHLORIDE, SODIUM GLUCONATE, SODIUM ACETATE, POTASSIUM CHLORIDE, MAGNESIUM CHLORIDE, SODIUM PHOSPHATE, DIBASIC, AND POTASSIUM PHOSPHATE .53; .5; .37; .037; .03; .012; .00082 G/100ML; G/100ML; G/100ML; G/100ML; G/100ML; G/100ML; G/100ML
250 INJECTION, SOLUTION INTRAVENOUS ONCE
Status: COMPLETED | OUTPATIENT
Start: 2020-11-08 | End: 2020-11-08

## 2020-11-08 RX ORDER — LORAZEPAM 2 MG/ML
1 INJECTION INTRAMUSCULAR ONCE
Status: COMPLETED | OUTPATIENT
Start: 2020-11-08 | End: 2020-11-08

## 2020-11-08 RX ADMIN — IOHEXOL 85 ML: 350 INJECTION, SOLUTION INTRAVENOUS at 03:57

## 2020-11-08 RX ADMIN — VANCOMYCIN HYDROCHLORIDE 1250 MG: 1 INJECTION, POWDER, LYOPHILIZED, FOR SOLUTION INTRAVENOUS at 06:33

## 2020-11-08 RX ADMIN — CEFEPIME HYDROCHLORIDE 2000 MG: 2 INJECTION, POWDER, FOR SOLUTION INTRAVENOUS at 11:27

## 2020-11-08 RX ADMIN — DEXTROSE MONOHYDRATE 25 ML: 500 INJECTION PARENTERAL at 15:02

## 2020-11-08 RX ADMIN — CEFEPIME HYDROCHLORIDE 2000 MG: 2 INJECTION, POWDER, FOR SOLUTION INTRAVENOUS at 22:02

## 2020-11-08 RX ADMIN — DEXTROSE MONOHYDRATE 25 ML: 500 INJECTION PARENTERAL at 21:22

## 2020-11-08 RX ADMIN — DEXTROSE MONOHYDRATE 25 ML: 500 INJECTION PARENTERAL at 02:40

## 2020-11-08 RX ADMIN — HYDROMORPHONE HYDROCHLORIDE 4 MG: 2 TABLET ORAL at 16:43

## 2020-11-08 RX ADMIN — ENOXAPARIN SODIUM 40 MG: 40 INJECTION SUBCUTANEOUS at 11:27

## 2020-11-08 RX ADMIN — MORPHINE SULFATE 2 MG: 2 INJECTION, SOLUTION INTRAMUSCULAR; INTRAVENOUS at 09:16

## 2020-11-08 RX ADMIN — LORAZEPAM 0.5 MG: 2 INJECTION INTRAMUSCULAR; INTRAVENOUS at 16:46

## 2020-11-08 RX ADMIN — DEXTROSE 50 ML/HR: 10 SOLUTION INTRAVENOUS at 21:59

## 2020-11-08 RX ADMIN — LORAZEPAM 1 MG: 2 INJECTION INTRAMUSCULAR; INTRAVENOUS at 09:18

## 2020-11-08 RX ADMIN — DEXTROSE AND SODIUM CHLORIDE 50 ML/HR: 5; .45 INJECTION, SOLUTION INTRAVENOUS at 14:49

## 2020-11-08 RX ADMIN — HYDROMORPHONE HYDROCHLORIDE 2 MG: 2 TABLET ORAL at 05:56

## 2020-11-08 RX ADMIN — SODIUM CHLORIDE, SODIUM GLUCONATE, SODIUM ACETATE, POTASSIUM CHLORIDE, MAGNESIUM CHLORIDE, SODIUM PHOSPHATE, DIBASIC, AND POTASSIUM PHOSPHATE 50 ML/HR: .53; .5; .37; .037; .03; .012; .00082 INJECTION, SOLUTION INTRAVENOUS at 05:48

## 2020-11-08 RX ADMIN — HYDROMORPHONE HYDROCHLORIDE 2 MG: 2 TABLET ORAL at 03:10

## 2020-11-08 RX ADMIN — SODIUM CHLORIDE, SODIUM GLUCONATE, SODIUM ACETATE, POTASSIUM CHLORIDE, MAGNESIUM CHLORIDE, SODIUM PHOSPHATE, DIBASIC, AND POTASSIUM PHOSPHATE 250 ML: .53; .5; .37; .037; .03; .012; .00082 INJECTION, SOLUTION INTRAVENOUS at 04:49

## 2020-11-08 RX ADMIN — HYDROMORPHONE HYDROCHLORIDE 2 MG: 2 TABLET ORAL at 09:04

## 2020-11-09 ENCOUNTER — APPOINTMENT (INPATIENT)
Dept: RADIOLOGY | Facility: HOSPITAL | Age: 45
DRG: 917 | End: 2020-11-09
Payer: COMMERCIAL

## 2020-11-09 ENCOUNTER — HOSPITAL ENCOUNTER (OUTPATIENT)
Facility: HOSPICE | Age: 45
Discharge: OTHER INSTITUTION | DRG: 917 | End: 2020-11-15
Attending: FAMILY MEDICINE | Admitting: FAMILY MEDICINE
Payer: COMMERCIAL

## 2020-11-09 VITALS
TEMPERATURE: 97.8 F | SYSTOLIC BLOOD PRESSURE: 117 MMHG | HEART RATE: 86 BPM | BODY MASS INDEX: 31.2 KG/M2 | OXYGEN SATURATION: 100 % | HEIGHT: 62 IN | WEIGHT: 169.53 LBS | DIASTOLIC BLOOD PRESSURE: 79 MMHG | RESPIRATION RATE: 28 BRPM

## 2020-11-09 PROBLEM — E44.0 MODERATE PROTEIN-CALORIE MALNUTRITION (HCC): Status: ACTIVE | Noted: 2020-11-09

## 2020-11-09 LAB
ANION GAP SERPL CALCULATED.3IONS-SCNC: 8 MMOL/L (ref 4–13)
ARTERIAL PATENCY WRIST A: YES
BACTERIA BLD CULT: ABNORMAL
BASE EXCESS BLDA CALC-SCNC: -1.4 MMOL/L
BUN SERPL-MCNC: 20 MG/DL (ref 5–25)
CALCIUM SERPL-MCNC: 7.8 MG/DL (ref 8.3–10.1)
CHLORIDE SERPL-SCNC: 106 MMOL/L (ref 100–108)
CO2 SERPL-SCNC: 27 MMOL/L (ref 21–32)
CORTIS AM PEAK SERPL-MCNC: 36.9 UG/DL (ref 4.2–22.4)
CORTIS SERPL-MCNC: 35 UG/DL
CREAT SERPL-MCNC: 0.7 MG/DL (ref 0.6–1.3)
ERYTHROCYTE [DISTWIDTH] IN BLOOD BY AUTOMATED COUNT: 19.6 % (ref 11.6–15.1)
GFR SERPL CREATININE-BSD FRML MDRD: 104 ML/MIN/1.73SQ M
GLUCOSE SERPL-MCNC: 100 MG/DL (ref 65–140)
GLUCOSE SERPL-MCNC: 105 MG/DL (ref 65–140)
GLUCOSE SERPL-MCNC: 105 MG/DL (ref 65–140)
GLUCOSE SERPL-MCNC: 115 MG/DL (ref 65–140)
GLUCOSE SERPL-MCNC: 67 MG/DL (ref 65–140)
GLUCOSE SERPL-MCNC: 86 MG/DL (ref 65–140)
GLUCOSE SERPL-MCNC: 87 MG/DL (ref 65–140)
GLUCOSE SERPL-MCNC: 88 MG/DL (ref 65–140)
GLUCOSE SERPL-MCNC: 97 MG/DL (ref 65–140)
GRAM STN SPEC: ABNORMAL
HCO3 BLDA-SCNC: 22.5 MMOL/L (ref 22–28)
HCT VFR BLD AUTO: 27.4 % (ref 34.8–46.1)
HGB BLD-MCNC: 8.5 G/DL (ref 11.5–15.4)
MCH RBC QN AUTO: 26 PG (ref 26.8–34.3)
MCHC RBC AUTO-ENTMCNC: 31 G/DL (ref 31.4–37.4)
MCV RBC AUTO: 84 FL (ref 82–98)
MRSA NOSE QL CULT: NORMAL
NASAL CANNULA: 13
NRBC BLD AUTO-RTO: 0 /100 WBCS
O2 CT BLDA-SCNC: 12.9 ML/DL (ref 16–23)
OXYHGB MFR BLDA: 95.5 % (ref 94–97)
PCO2 BLDA: 34.8 MM HG (ref 36–44)
PH BLDA: 7.43 [PH] (ref 7.35–7.45)
PLATELET # BLD AUTO: 252 THOUSANDS/UL (ref 149–390)
PMV BLD AUTO: 9.8 FL (ref 8.9–12.7)
PO2 BLDA: 88.1 MM HG (ref 75–129)
POTASSIUM SERPL-SCNC: 3.2 MMOL/L (ref 3.5–5.3)
PROCALCITONIN SERPL-MCNC: 5.01 NG/ML
RBC # BLD AUTO: 3.27 MILLION/UL (ref 3.81–5.12)
SODIUM SERPL-SCNC: 141 MMOL/L (ref 136–145)
SPECIMEN SOURCE: ABNORMAL
WBC # BLD AUTO: 18.18 THOUSAND/UL (ref 4.31–10.16)

## 2020-11-09 PROCEDURE — 82533 TOTAL CORTISOL: CPT | Performed by: PHYSICIAN ASSISTANT

## 2020-11-09 PROCEDURE — 94760 N-INVAS EAR/PLS OXIMETRY 1: CPT

## 2020-11-09 PROCEDURE — 99233 SBSQ HOSP IP/OBS HIGH 50: CPT | Performed by: INTERNAL MEDICINE

## 2020-11-09 PROCEDURE — 99232 SBSQ HOSP IP/OBS MODERATE 35: CPT | Performed by: ANESTHESIOLOGY

## 2020-11-09 PROCEDURE — 82948 REAGENT STRIP/BLOOD GLUCOSE: CPT

## 2020-11-09 PROCEDURE — 71045 X-RAY EXAM CHEST 1 VIEW: CPT

## 2020-11-09 PROCEDURE — 85027 COMPLETE CBC AUTOMATED: CPT | Performed by: INTERNAL MEDICINE

## 2020-11-09 PROCEDURE — 80048 BASIC METABOLIC PNL TOTAL CA: CPT | Performed by: INTERNAL MEDICINE

## 2020-11-09 PROCEDURE — 82805 BLOOD GASES W/O2 SATURATION: CPT | Performed by: PHYSICIAN ASSISTANT

## 2020-11-09 PROCEDURE — 36600 WITHDRAWAL OF ARTERIAL BLOOD: CPT

## 2020-11-09 PROCEDURE — 94660 CPAP INITIATION&MGMT: CPT

## 2020-11-09 PROCEDURE — 84145 PROCALCITONIN (PCT): CPT | Performed by: PHYSICIAN ASSISTANT

## 2020-11-09 PROCEDURE — 82533 TOTAL CORTISOL: CPT | Performed by: FAMILY MEDICINE

## 2020-11-09 RX ORDER — HYDROMORPHONE HCL 110MG/55ML
3 PATIENT CONTROLLED ANALGESIA SYRINGE INTRAVENOUS ONCE
Status: COMPLETED | OUTPATIENT
Start: 2020-11-09 | End: 2020-11-09

## 2020-11-09 RX ORDER — LORAZEPAM 2 MG/ML
1 INJECTION INTRAMUSCULAR ONCE
Status: COMPLETED | OUTPATIENT
Start: 2020-11-09 | End: 2020-11-09

## 2020-11-09 RX ORDER — LORAZEPAM 2 MG/ML
0.25 INJECTION INTRAMUSCULAR ONCE
Status: COMPLETED | OUTPATIENT
Start: 2020-11-09 | End: 2020-11-09

## 2020-11-09 RX ORDER — OXYCODONE HYDROCHLORIDE 20 MG/1
20 TABLET ORAL EVERY 4 HOURS PRN
Qty: 30 TABLET | Refills: 0 | OUTPATIENT
Start: 2020-11-09 | End: 2020-11-19

## 2020-11-09 RX ORDER — HYDROMORPHONE HCL/PF 1 MG/ML
1 SYRINGE (ML) INJECTION
Status: DISCONTINUED | OUTPATIENT
Start: 2020-11-09 | End: 2020-11-09 | Stop reason: HOSPADM

## 2020-11-09 RX ORDER — LORAZEPAM 2 MG/ML
2 INJECTION INTRAMUSCULAR ONCE
Status: COMPLETED | OUTPATIENT
Start: 2020-11-09 | End: 2020-11-09

## 2020-11-09 RX ORDER — DEXTROSE MONOHYDRATE 25 G/50ML
50 INJECTION, SOLUTION INTRAVENOUS EVERY 2 HOUR PRN
Status: DISCONTINUED | OUTPATIENT
Start: 2020-11-09 | End: 2020-11-09 | Stop reason: HOSPADM

## 2020-11-09 RX ORDER — LORAZEPAM 2 MG/ML
INJECTION INTRAMUSCULAR
Status: DISPENSED
Start: 2020-11-09 | End: 2020-11-10

## 2020-11-09 RX ORDER — HYDROMORPHONE HCL 110MG/55ML
1.5 PATIENT CONTROLLED ANALGESIA SYRINGE INTRAVENOUS EVERY 4 HOURS
Status: DISCONTINUED | OUTPATIENT
Start: 2020-11-09 | End: 2020-11-09 | Stop reason: HOSPADM

## 2020-11-09 RX ORDER — BISACODYL 10 MG
10 SUPPOSITORY, RECTAL RECTAL DAILY PRN
Status: DISCONTINUED | OUTPATIENT
Start: 2020-11-09 | End: 2020-11-15 | Stop reason: HOSPADM

## 2020-11-09 RX ORDER — HALOPERIDOL 5 MG/ML
1 INJECTION INTRAMUSCULAR
Status: DISCONTINUED | OUTPATIENT
Start: 2020-11-09 | End: 2020-11-15 | Stop reason: HOSPADM

## 2020-11-09 RX ORDER — HYDROMORPHONE HCL 110MG/55ML
2 PATIENT CONTROLLED ANALGESIA SYRINGE INTRAVENOUS
Status: DISCONTINUED | OUTPATIENT
Start: 2020-11-09 | End: 2020-11-15 | Stop reason: HOSPADM

## 2020-11-09 RX ORDER — DEXAMETHASONE SODIUM PHOSPHATE 4 MG/ML
INJECTION, SOLUTION INTRA-ARTICULAR; INTRALESIONAL; INTRAMUSCULAR; INTRAVENOUS; SOFT TISSUE
Status: DISPENSED
Start: 2020-11-09 | End: 2020-11-10

## 2020-11-09 RX ORDER — POLYETHYLENE GLYCOL 3350 17 G/17G
17 POWDER, FOR SOLUTION ORAL DAILY PRN
Qty: 30 EACH | Refills: 0
Start: 2020-11-09

## 2020-11-09 RX ORDER — DEXAMETHASONE SODIUM PHOSPHATE 4 MG/ML
4 INJECTION, SOLUTION INTRA-ARTICULAR; INTRALESIONAL; INTRAMUSCULAR; INTRAVENOUS; SOFT TISSUE EVERY 12 HOURS SCHEDULED
Status: DISCONTINUED | OUTPATIENT
Start: 2020-11-09 | End: 2020-11-15 | Stop reason: HOSPADM

## 2020-11-09 RX ORDER — HYDROMORPHONE HCL 110MG/55ML
2 PATIENT CONTROLLED ANALGESIA SYRINGE INTRAVENOUS
Status: DISCONTINUED | OUTPATIENT
Start: 2020-11-09 | End: 2020-11-09 | Stop reason: HOSPADM

## 2020-11-09 RX ORDER — POTASSIUM CHLORIDE 14.9 MG/ML
20 INJECTION INTRAVENOUS ONCE
Status: DISCONTINUED | OUTPATIENT
Start: 2020-11-09 | End: 2020-11-09 | Stop reason: HOSPADM

## 2020-11-09 RX ORDER — HYDROMORPHONE HCL 110MG/55ML
PATIENT CONTROLLED ANALGESIA SYRINGE INTRAVENOUS
Status: DISPENSED
Start: 2020-11-09 | End: 2020-11-10

## 2020-11-09 RX ORDER — LORAZEPAM 2 MG/ML
1 INJECTION INTRAMUSCULAR EVERY 2 HOUR PRN
Qty: 10 ML | Refills: 0 | OUTPATIENT
Start: 2020-11-09 | End: 2020-11-19

## 2020-11-09 RX ORDER — FUROSEMIDE 10 MG/ML
20 INJECTION INTRAMUSCULAR; INTRAVENOUS ONCE
Status: COMPLETED | OUTPATIENT
Start: 2020-11-09 | End: 2020-11-09

## 2020-11-09 RX ORDER — LORAZEPAM 2 MG/ML
1 INJECTION INTRAMUSCULAR
Status: DISCONTINUED | OUTPATIENT
Start: 2020-11-09 | End: 2020-11-15 | Stop reason: HOSPADM

## 2020-11-09 RX ORDER — DEXTROSE MONOHYDRATE 25 G/50ML
INJECTION, SOLUTION INTRAVENOUS
Status: COMPLETED
Start: 2020-11-09 | End: 2020-11-09

## 2020-11-09 RX ORDER — ONDANSETRON HYDROCHLORIDE 8 MG/1
8 TABLET, FILM COATED ORAL
Qty: 45 TABLET | Refills: 0
Start: 2020-11-09

## 2020-11-09 RX ORDER — LORAZEPAM 2 MG/ML
1 INJECTION INTRAMUSCULAR EVERY 2 HOUR PRN
Status: DISCONTINUED | OUTPATIENT
Start: 2020-11-09 | End: 2020-11-09 | Stop reason: HOSPADM

## 2020-11-09 RX ADMIN — LORAZEPAM 2 MG: 2 INJECTION INTRAMUSCULAR; INTRAVENOUS at 17:26

## 2020-11-09 RX ADMIN — LORAZEPAM 0.5 MG: 2 INJECTION INTRAMUSCULAR; INTRAVENOUS at 03:22

## 2020-11-09 RX ADMIN — HYDROMORPHONE HYDROCHLORIDE 3 MG: 2 INJECTION, SOLUTION INTRAMUSCULAR; INTRAVENOUS; SUBCUTANEOUS at 17:25

## 2020-11-09 RX ADMIN — HYDROMORPHONE HYDROCHLORIDE 1 MG: 1 INJECTION, SOLUTION INTRAMUSCULAR; INTRAVENOUS; SUBCUTANEOUS at 15:18

## 2020-11-09 RX ADMIN — LORAZEPAM 1 MG: 2 INJECTION INTRAMUSCULAR; INTRAVENOUS at 11:39

## 2020-11-09 RX ADMIN — MORPHINE SULFATE 2 MG: 2 INJECTION, SOLUTION INTRAMUSCULAR; INTRAVENOUS at 09:13

## 2020-11-09 RX ADMIN — FUROSEMIDE 20 MG: 10 INJECTION, SOLUTION INTRAMUSCULAR; INTRAVENOUS at 04:58

## 2020-11-09 RX ADMIN — HYDROMORPHONE HYDROCHLORIDE 2 MG: 2 TABLET ORAL at 00:14

## 2020-11-09 RX ADMIN — LORAZEPAM 0.25 MG: 2 INJECTION INTRAMUSCULAR; INTRAVENOUS at 06:04

## 2020-11-09 RX ADMIN — DEXTROSE MONOHYDRATE 25 ML: 500 INJECTION PARENTERAL at 02:02

## 2020-11-09 RX ADMIN — LORAZEPAM 0.5 MG: 2 INJECTION INTRAMUSCULAR; INTRAVENOUS at 09:44

## 2020-11-09 RX ADMIN — LORAZEPAM 1 MG: 2 INJECTION INTRAMUSCULAR; INTRAVENOUS at 15:07

## 2020-11-09 RX ADMIN — HYDROMORPHONE HYDROCHLORIDE 2 MG: 2 INJECTION, SOLUTION INTRAMUSCULAR; INTRAVENOUS; SUBCUTANEOUS at 16:11

## 2020-11-09 RX ADMIN — HYDROMORPHONE HYDROCHLORIDE 1.5 MG: 2 INJECTION, SOLUTION INTRAMUSCULAR; INTRAVENOUS; SUBCUTANEOUS at 13:24

## 2020-11-10 ENCOUNTER — TELEPHONE (OUTPATIENT)
Dept: FAMILY MEDICINE CLINIC | Facility: CLINIC | Age: 45
End: 2020-11-10

## 2020-11-10 ENCOUNTER — TRANSITIONAL CARE MANAGEMENT (OUTPATIENT)
Dept: FAMILY MEDICINE CLINIC | Facility: CLINIC | Age: 45
End: 2020-11-10

## 2020-11-10 LAB — MRSA NOSE QL CULT: NORMAL

## 2020-11-10 RX ORDER — LORAZEPAM 2 MG/ML
1 INJECTION INTRAMUSCULAR EVERY 6 HOURS
Status: DISCONTINUED | OUTPATIENT
Start: 2020-11-10 | End: 2020-11-15 | Stop reason: HOSPADM

## 2020-11-11 LAB
BACTERIA BLD CULT: NORMAL
NSE SERPL IA-MCNC: 62.4 NG/ML (ref 0–12.5)

## 2020-11-11 RX ORDER — MIDAZOLAM HYDROCHLORIDE 2 MG/2ML
5 INJECTION, SOLUTION INTRAMUSCULAR; INTRAVENOUS
Status: DISCONTINUED | OUTPATIENT
Start: 2020-11-11 | End: 2020-11-15 | Stop reason: HOSPADM

## 2020-11-13 LAB
BACTERIA BLD CULT: NORMAL
BACTERIA BLD CULT: NORMAL

## 2020-11-14 PROBLEM — R10.9 ABDOMINAL PAIN: Status: RESOLVED | Noted: 2020-07-03 | Resolved: 2020-11-14

## 2020-11-14 PROBLEM — K62.5 BRIGHT RED BLOOD PER RECTUM: Status: RESOLVED | Noted: 2020-08-10 | Resolved: 2020-11-14

## 2020-11-14 PROBLEM — E87.1 HYPONATREMIA: Status: RESOLVED | Noted: 2020-08-04 | Resolved: 2020-11-14

## 2020-11-14 PROBLEM — I10 HTN (HYPERTENSION), BENIGN: Status: RESOLVED | Noted: 2020-08-24 | Resolved: 2020-11-14

## 2020-11-14 PROBLEM — D64.9 ANEMIA: Status: RESOLVED | Noted: 2020-07-03 | Resolved: 2020-11-14

## 2020-11-14 PROBLEM — Z71.89 GOALS OF CARE, COUNSELING/DISCUSSION: Status: RESOLVED | Noted: 2020-10-05 | Resolved: 2020-11-14

## 2020-11-14 PROBLEM — R78.81 BACTEREMIA: Status: RESOLVED | Noted: 2020-09-29 | Resolved: 2020-11-14

## 2020-11-14 PROBLEM — D72.829 LEUKOCYTOSIS: Status: RESOLVED | Noted: 2020-11-06 | Resolved: 2020-11-14

## 2020-11-14 PROBLEM — R53.83 LETHARGY: Status: RESOLVED | Noted: 2020-11-06 | Resolved: 2020-11-14

## 2020-11-14 PROBLEM — E55.9 VITAMIN D DEFICIENCY: Status: RESOLVED | Noted: 2020-08-24 | Resolved: 2020-11-14

## 2020-11-14 PROBLEM — E44.0 MODERATE PROTEIN-CALORIE MALNUTRITION (HCC): Status: RESOLVED | Noted: 2020-11-09 | Resolved: 2020-11-14

## 2020-11-14 PROBLEM — E87.6 HYPOKALEMIA: Status: RESOLVED | Noted: 2020-08-04 | Resolved: 2020-11-14

## 2020-11-14 PROBLEM — D75.839 THROMBOCYTOSIS: Status: RESOLVED | Noted: 2020-08-08 | Resolved: 2020-11-14

## 2022-08-24 NOTE — ASSESSMENT & PLAN NOTE
Called pt she would like the 9AM spot this morning.----- Message from Gabriella Chilel NP sent at 8/23/2022  5:30 PM CDT -----  Please arrange telemed visit next available to review sleep study results. Thank you       With liver/lung metastases s/p chemotherapy per outpatient oncology  CT abdomen/pelvis on 8/27 revealing concern for persistent partially obstructing mass - previously refused surgical intervention but agreeable and this hospitalization -> s/p colostomy on 8/31    PRN pain control and supportive care  Repeat CT imaging on 9/3 noted a complex left adnexal mass suspicious for a primary ovarian lesion versus metastatic lesion -> on previous CT scan, this was mentioned as a cystic lesion -> CA-125 tumor marker elevated, however, per oncology, ovarian mass more likely represents further metastasis rather than another primary malignancy  Complaining of rectal bleeding today with mucous production, colorectal surgery thinks this is from tumor, continue to watch  If there is large amount of blood notify surgery  Also, obtain h/h remains stable  CT of abdomen and pelvis similar to previous, still has mass with possible abscess/necrosis  Colostomy is inflammed with blisters noted new, she is complaining of pain on the left side of colon  This is also reverted in the colostomy, the blisters are bigger now  Colorectal surgery was consulted, reside will discuss with attending today  Taken to OR today for excision of prolapse and revised stoma  Still swelling noted of the stoma, did not pass stool yet    Abscess in the rectum-IR drains were placed, there is serosanguinous material in the drain

## 2024-01-01 NOTE — ASSESSMENT & PLAN NOTE
· PDMP reviewed  · Continue Oxydone prn  · Pt said her pain well controlled  · Appreciate palliative care input 0 (no pain/absence of nonverbal indicators of pain)

## 2024-06-21 NOTE — TELEPHONE ENCOUNTER
Attempt to contact pt regarding f/u blood cx    Pt only needs to follow up in our office if issues       Faxed lab orders to [facility] Per verbal discussion with reece Poon to send 1 month supply while patient waits to establish with PCP in [place].     Geri Agosto LPN

## 2024-08-17 NOTE — ASSESSMENT & PLAN NOTE
Blood culture from 8/27 grow  Bacteroides species - subsequent culture from 8/28 negative - preliminary cultures from 9/3 remaining negative thus far  Continue IV Cefepime w/ PO Flagyl per Infectious Disease  See plan for associated gluteal abscess above Focus note    Transferred to [facility]  Patient sleeping comfortably when I entered the room  On NC  Patient states that she is doing fine  Ordered dinner  No other concerns or questions    Full note to follow in AM    Danita Pacheco DO   Family Medicine Resident PGY3

## (undated) DEVICE — TRAY FOLEY 16FR URIMETER SURESTEP

## (undated) DEVICE — GLOVE SRG BIOGEL 7.5

## (undated) DEVICE — 40595 XL TRENDELENBURG POSITIONING KIT: Brand: 40595 XL TRENDELENBURG POSITIONING KIT

## (undated) DEVICE — SCD SEQUENTIAL COMPRESSION COMFORT SLEEVE MEDIUM KNEE LENGTH: Brand: KENDALL SCD

## (undated) DEVICE — SUT MONOCRYL 4-0 PS-2 18 IN Y496G

## (undated) DEVICE — POOLE SUCTION HANDLE: Brand: CARDINAL HEALTH

## (undated) DEVICE — SUT VICRYL 3-0 SH 27 IN J416H

## (undated) DEVICE — PAD GROUNDING ADULT

## (undated) DEVICE — SUT VICRYL 2-0 REEL 54 IN J286G

## (undated) DEVICE — SUT VICRYL 3-0 SH C/R 18 IN J864D

## (undated) DEVICE — SUT VICRYL 2-0 SH 27 IN UNDYED J417H

## (undated) DEVICE — SPONGE STICK WITH PVP-I: Brand: KENDALL

## (undated) DEVICE — ENSEAL LAPAROSCOPIC TISSUE SEALER G2 STRAIGHT JAW FOR USE WITH G2 GENERATOR 5MM DIAMETER 35CM SHAFT LENGTH: Brand: ENSEAL

## (undated) DEVICE — SUT VICRYL 0 CT-1 27 IN J260H

## (undated) DEVICE — 3M™ IOBAN™ 2 ANTIMICROBIAL INCISE DRAPE 6650EZ: Brand: IOBAN™ 2

## (undated) DEVICE — SUT VICRYL 0 UR-6 27 IN J603H

## (undated) DEVICE — ADHESIVE SKIN HIGH VISCOSITY EXOFIN 1ML

## (undated) DEVICE — GOWN ,SIRUS ,NONREINFORCED 4XL: Brand: MEDLINE

## (undated) DEVICE — INTENDED FOR TISSUE SEPARATION, AND OTHER PROCEDURES THAT REQUIRE A SHARP SURGICAL BLADE TO PUNCTURE OR CUT.: Brand: BARD-PARKER SAFETY BLADES SIZE 11, STERILE

## (undated) DEVICE — PLUMEPEN PRO 10FT

## (undated) DEVICE — INTENDED FOR TISSUE SEPARATION, AND OTHER PROCEDURES THAT REQUIRE A SHARP SURGICAL BLADE TO PUNCTURE OR CUT.: Brand: BARD-PARKER SAFETY BLADES SIZE 15, STERILE

## (undated) DEVICE — CHLORAPREP HI-LITE 26ML ORANGE

## (undated) DEVICE — SUT PDS II 1 CTX 36 IN Z371T

## (undated) DEVICE — BETHLEHEM MAJOR GENERAL PACK: Brand: CARDINAL HEALTH

## (undated) DEVICE — VISUALIZATION SYSTEM: Brand: CLEARIFY

## (undated) DEVICE — TROCAR: Brand: KII FIOS FIRST ENTRY

## (undated) DEVICE — TROCAR: Brand: KII® SLEEVE

## (undated) DEVICE — 3000CC GUARDIAN II: Brand: GUARDIAN

## (undated) DEVICE — SUT VICRYL 3-0 REEL 54 IN J285G

## (undated) DEVICE — INSUFLATION TUBING INSUFLOW (LEXION)

## (undated) DEVICE — ADHESIVE REMOVER: Brand: UNI-SOLVE ADHESIVE REMOVER 8OZ US

## (undated) DEVICE — IRRIG ENDO FLO TUBING

## (undated) DEVICE — GLOVE INDICATOR PI UNDERGLOVE SZ 7.5 BLUE

## (undated) DEVICE — SUT PDS II 3-0 SH 27 IN Z316H

## (undated) DEVICE — PROXIMATE RELOADABLE LINEAR CUTTER WITH SAFETY LOCK-OUT, 75MM: Brand: PROXIMATE